# Patient Record
Sex: FEMALE | Race: OTHER | HISPANIC OR LATINO | Employment: OTHER | URBAN - METROPOLITAN AREA
[De-identification: names, ages, dates, MRNs, and addresses within clinical notes are randomized per-mention and may not be internally consistent; named-entity substitution may affect disease eponyms.]

---

## 2019-04-06 LAB — HBA1C MFR BLD HPLC: 8 %

## 2019-11-21 ENCOUNTER — OFFICE VISIT (OUTPATIENT)
Dept: INTERNAL MEDICINE CLINIC | Facility: CLINIC | Age: 64
End: 2019-11-21
Payer: COMMERCIAL

## 2019-11-21 VITALS
SYSTOLIC BLOOD PRESSURE: 150 MMHG | HEART RATE: 78 BPM | WEIGHT: 199 LBS | RESPIRATION RATE: 12 BRPM | BODY MASS INDEX: 39.07 KG/M2 | TEMPERATURE: 98.6 F | DIASTOLIC BLOOD PRESSURE: 80 MMHG | HEIGHT: 60 IN

## 2019-11-21 DIAGNOSIS — E11.9 TYPE 2 DIABETES MELLITUS WITHOUT COMPLICATION, WITHOUT LONG-TERM CURRENT USE OF INSULIN (HCC): Primary | ICD-10-CM

## 2019-11-21 DIAGNOSIS — J45.20 MILD INTERMITTENT ASTHMA WITHOUT COMPLICATION: ICD-10-CM

## 2019-11-21 DIAGNOSIS — E03.9 ACQUIRED HYPOTHYROIDISM: ICD-10-CM

## 2019-11-21 DIAGNOSIS — E78.00 PURE HYPERCHOLESTEROLEMIA: ICD-10-CM

## 2019-11-21 DIAGNOSIS — I10 ESSENTIAL HYPERTENSION: ICD-10-CM

## 2019-11-21 PROCEDURE — 99205 OFFICE O/P NEW HI 60 MIN: CPT | Performed by: INTERNAL MEDICINE

## 2019-11-21 PROCEDURE — 3725F SCREEN DEPRESSION PERFORMED: CPT | Performed by: INTERNAL MEDICINE

## 2019-11-21 PROCEDURE — 93000 ELECTROCARDIOGRAM COMPLETE: CPT | Performed by: INTERNAL MEDICINE

## 2019-11-21 RX ORDER — BUDESONIDE AND FORMOTEROL FUMARATE DIHYDRATE 160; 4.5 UG/1; UG/1
2 AEROSOL RESPIRATORY (INHALATION) 2 TIMES DAILY PRN
COMMUNITY

## 2019-11-21 RX ORDER — OMEPRAZOLE 40 MG/1
40 CAPSULE, DELAYED RELEASE ORAL DAILY
COMMUNITY
End: 2019-12-20

## 2019-11-21 RX ORDER — ATORVASTATIN CALCIUM 40 MG/1
40 TABLET, FILM COATED ORAL DAILY
COMMUNITY
End: 2019-12-20 | Stop reason: SDUPTHER

## 2019-11-21 RX ORDER — METOPROLOL SUCCINATE 25 MG/1
25 TABLET, EXTENDED RELEASE ORAL DAILY
COMMUNITY
End: 2019-12-20 | Stop reason: SDUPTHER

## 2019-11-21 RX ORDER — ASPIRIN 81 MG/1
81 TABLET, CHEWABLE ORAL DAILY
COMMUNITY

## 2019-11-21 RX ORDER — MONTELUKAST SODIUM 10 MG/1
10 TABLET ORAL
COMMUNITY
End: 2021-03-23 | Stop reason: SDUPTHER

## 2019-11-21 RX ORDER — EZETIMIBE 10 MG/1
10 TABLET ORAL DAILY
COMMUNITY
End: 2020-02-25 | Stop reason: SDUPTHER

## 2019-11-21 RX ORDER — GLIMEPIRIDE 4 MG/1
4 TABLET ORAL 2 TIMES DAILY
COMMUNITY
End: 2019-12-20 | Stop reason: SDUPTHER

## 2019-11-21 RX ORDER — INDAPAMIDE 1.25 MG/1
1.25 TABLET, FILM COATED ORAL EVERY MORNING
Qty: 30 TABLET | Refills: 3 | Status: SHIPPED | OUTPATIENT
Start: 2019-11-21 | End: 2020-10-09 | Stop reason: SDUPTHER

## 2019-11-21 RX ORDER — ALBUTEROL SULFATE 90 UG/1
2 AEROSOL, METERED RESPIRATORY (INHALATION) EVERY 6 HOURS PRN
COMMUNITY

## 2019-11-21 RX ORDER — FUROSEMIDE 20 MG/1
20 TABLET ORAL 2 TIMES DAILY PRN
COMMUNITY

## 2019-11-21 RX ORDER — INSULIN GLARGINE 100 [IU]/ML
INJECTION, SOLUTION SUBCUTANEOUS
COMMUNITY
End: 2019-12-12

## 2019-11-21 RX ORDER — LEVOTHYROXINE SODIUM 0.15 MG/1
150 TABLET ORAL DAILY
COMMUNITY
End: 2019-12-20 | Stop reason: SDUPTHER

## 2019-11-21 RX ORDER — LOSARTAN POTASSIUM 50 MG/1
50 TABLET ORAL DAILY
COMMUNITY
End: 2019-12-20 | Stop reason: SDUPTHER

## 2019-11-21 RX ORDER — FLUTICASONE PROPIONATE 50 MCG
1 SPRAY, SUSPENSION (ML) NASAL DAILY PRN
COMMUNITY

## 2019-11-21 RX ORDER — ALBUTEROL SULFATE 2.5 MG/3ML
2.5 SOLUTION RESPIRATORY (INHALATION) EVERY 6 HOURS PRN
Qty: 60 VIAL | Refills: 5 | Status: SHIPPED | OUTPATIENT
Start: 2019-11-21 | End: 2020-03-31 | Stop reason: SDUPTHER

## 2019-11-21 NOTE — PROGRESS NOTES
Assessment/Plan:  New patient to establish herself blood pressure is not control will going to start her on indapamide 1 25 mg daily is the only new medication    EKG      She is depressed on the depression screening when she comes back and going to have her come back in a couple of weeks and see how she is doing and then will probably reinstitute an SSRI she is not suicidal   Is a situation in change she was moved from [facility] she is living with her daughter now  Came in for [facility] is to restart which herself into the practice  She brought her records she is doing rather well these are the problems review  Problem 1  Diabetes type 2 she is on the following medications  Glimepiride in 4 mg twice a day  Lantus 35 units in the morning and 15 units at night  The has no cardiovascular issues  Renal function is normal no protein in the urine    Problem 2  When she went to the ER in [facility] she had problems with swallowing the told her she had an enlarged liver  Will check liver function studies suspect is fatty liver  Problem 3  Asthma as taking Symbicort 164 5 she takes 2 puffs twice a day and she takes albuterol inhaler for rescue  Will check an IgE level  Her Singulair 10 mg    She also had a nebulizer machine with albuterol    Problem 4  Hyperlipidemia she is taking Lipitor 40 mg per day she is also on Zohra a 10 mg per day will check a lipid profile she had a reaction to simvastatin with elevated liver function studies  But she is able to tolerate the Lipitor    Problem 5  Hypothyroidism she is on levothyroxine 150 mcg per day will check a TSH with the next labs  Problem 6  Hypertension blood pressure is elevated today will repeated she is on losartan 50 mg  She is on metoprolol succinate 25 mg at bedtime    Problem 7  Reflux is on omeprazole 40 mg per day she was given a prescription of Pepcid but she is not taking it      She also got a prescription for a diuretic Lasix 20 mg she only takes at needed for increased swelling  As far as surgery she has only had rotator cuff repair of both shoulders  No abdominal surgery        No problem-specific Assessment & Plan notes found for this encounter  There are no diagnoses linked to this encounter  Subjective:       BMI Counseling: There is no height or weight on file to calculate BMI  The BMI is above normal  Nutrition recommendations include reducing portion sizes, decreasing overall calorie intake, 3-5 servings of fruits/vegetables daily, reducing fast food intake, consuming healthier snacks, decreasing soda and/or juice intake, moderation in carbohydrate intake, increasing intake of lean protein, reducing intake of saturated fat and trans fat and reducing intake of cholesterol  Exercise recommendations include moderate aerobic physical activity for 150 minutes/week  Patient ID: Sunita Santo is a 59 y o  female  No chief complaint on file  No current outpatient medications on file  HPI    The following portions of the patient's history were reviewed and updated as appropriate: allergies, current medications, past family history, past medical history, past social history, past surgical history and problem list     Review of Systems   Constitutional: Negative  Negative for activity change, appetite change, fatigue, fever and unexpected weight change  HENT: Negative for congestion, ear pain, hearing loss, mouth sores, postnasal drip, rhinorrhea, sore throat, trouble swallowing and voice change  Eyes: Negative for pain, redness and visual disturbance  Respiratory: Negative for cough, chest tightness, shortness of breath and wheezing  Cardiovascular: Negative for chest pain, palpitations and leg swelling  Gastrointestinal: Negative for abdominal distention, abdominal pain, blood in stool, constipation, diarrhea and nausea     Endocrine: Negative for cold intolerance, heat intolerance, polydipsia, polyphagia and polyuria  Genitourinary: Negative for difficulty urinating, dysuria, flank pain, frequency, hematuria and urgency  Musculoskeletal: Negative for arthralgias, back pain, gait problem, joint swelling and myalgias  Skin: Negative for color change and pallor  Neurological: Negative for dizziness, tremors, seizures, syncope, weakness, numbness and headaches  Hematological: Negative for adenopathy  Does not bruise/bleed easily  Psychiatric/Behavioral: Negative  Negative for sleep disturbance  The patient is not nervous/anxious  Objective:    Patient's shoes and socks removed  Right Foot/Ankle   Right Foot Inspection  Skin Exam: skin normal skin not intact, no dry skin, no warmth, no callus, no erythema, no maceration, no abnormal color, no pre-ulcer, no ulcer and no callus                          Toe Exam: ROM and strength within normal limitsno swelling, no tenderness, erythema and  no right toe deformity  Sensory   Vibration: intact  Proprioception: intact   Monofilament testing: intact  Vascular    The right DP pulse is 2+  Left Foot/Ankle  Left Foot Inspection  Skin Exam: skin normalskin not intact, no dry skin, no warmth, no erythema, no maceration, normal color, no pre-ulcer, no ulcer and no callus                         Toe Exam: ROM and strength within normal limitsno swelling, no tenderness, no erythema and no left toe deformity                   Sensory   Vibration: intact  Proprioception: intact  Monofilament: intact  Vascular    The left DP pulse is 2+  Assign Risk Category:  No deformity present; No loss of protective sensation; No weak pulses       Risk: 0        Results for orders placed or performed in visit on 10/24/13   Cytology (HISTORICAL)   Result Value Ref Range    ADDITIONAL INFORMATION NONE GIVEN     LMP PM     PREV  PAP NONE GIVEN     PREV  BX: NONE GIVEN     SOURCE CERVICAL     Adequacy:       Satisfactory for evaluation  Endocervical/transformation zone componentpresent  GENERAL CATEGORIZATION EPITHELIAL CELL ABNORMALITY (A)     INTERPRETATION Atypical Glandular Cells (A)     COMMENT       Based on the cytology result, reflex High RiskHPV DNA testing was not performed  Suggest clinical correlation and follow-up asclinically appropriate    CYTOTECHNOLOGIST: MADONNA GIFFORD(ASCP)     Jody Parks MD DDS PhD, (electronic signature)[facility] in Anatomic and Clinical Pathology, [address] [place] [address] Maxillofacial Pathology       There were no vitals taken for this visit  Physical Exam   Constitutional: She is oriented to person, place, and time  She appears well-developed and well-nourished  HENT:   Head: Normocephalic  Right Ear: External ear normal    Left Ear: External ear normal    Nose: Nose normal    Mouth/Throat: Oropharynx is clear and moist  No oropharyngeal exudate  Eyes: Pupils are equal, round, and reactive to light  Conjunctivae and EOM are normal    Neck: Normal range of motion  Neck supple  No thyromegaly present  Cardiovascular: Normal rate, regular rhythm, normal heart sounds and intact distal pulses  Exam reveals no gallop and no friction rub  Pulses are no weak pulses  No murmur heard  Pulses:       Dorsalis pedis pulses are 2+ on the right side, and 2+ on the left side  Blood pressure 150/80 bilateral standing 160/80  S1-S2 regular rhythm  Extremities no edema   Pulmonary/Chest: Effort normal and breath sounds normal  No respiratory distress  She has no wheezes  She has no rales  Lungs are clear no wheezing rales or rhonchi   Abdominal: Soft  Bowel sounds are normal  She exhibits no distension and no mass  There is no tenderness  There is no rebound and no guarding  Abdomen obese soft nontender   Musculoskeletal: Normal range of motion  Feet:    Feet:   Right Foot:   Skin Integrity: Negative for ulcer, skin breakdown, erythema, warmth, callus or dry skin     Left Foot:   Skin Integrity: Negative for ulcer, skin breakdown, erythema, warmth, callus or dry skin  Lymphadenopathy:     She has no cervical adenopathy  Neurological: She is alert and oriented to person, place, and time  Skin: Skin is warm and dry  Psychiatric: She has a normal mood and affect  Her behavior is normal  Judgment normal    Nursing note and vitals reviewed

## 2019-11-21 NOTE — PATIENT INSTRUCTIONS
Blood pressure is not control will going to start her on indapamide 1 25 mg daily you continue all other medications  Will going to give you a glucometer so you can check her blood sugars before breakfast and dinner and bring them with the next office visit  Will see her back in 2-3 weeks    If you run out of any medications please call the office and will refer the them for you

## 2019-12-02 ENCOUNTER — APPOINTMENT (OUTPATIENT)
Dept: LAB | Facility: CLINIC | Age: 64
End: 2019-12-02
Payer: COMMERCIAL

## 2019-12-02 DIAGNOSIS — E11.9 TYPE 2 DIABETES MELLITUS WITHOUT COMPLICATION, WITHOUT LONG-TERM CURRENT USE OF INSULIN (HCC): ICD-10-CM

## 2019-12-02 DIAGNOSIS — E03.9 ACQUIRED HYPOTHYROIDISM: ICD-10-CM

## 2019-12-02 DIAGNOSIS — I10 ESSENTIAL HYPERTENSION: ICD-10-CM

## 2019-12-02 DIAGNOSIS — E78.00 PURE HYPERCHOLESTEROLEMIA: ICD-10-CM

## 2019-12-02 DIAGNOSIS — J45.20 MILD INTERMITTENT ASTHMA WITHOUT COMPLICATION: ICD-10-CM

## 2019-12-02 LAB
25(OH)D3 SERPL-MCNC: 19.3 NG/ML (ref 30–100)
ALBUMIN SERPL BCP-MCNC: 3.5 G/DL (ref 3.5–5)
ALP SERPL-CCNC: 98 U/L (ref 46–116)
ALT SERPL W P-5'-P-CCNC: 17 U/L (ref 12–78)
ANION GAP SERPL CALCULATED.3IONS-SCNC: 2 MMOL/L (ref 4–13)
AST SERPL W P-5'-P-CCNC: 11 U/L (ref 5–45)
BACTERIA UR QL AUTO: NORMAL /HPF
BASOPHILS # BLD AUTO: 0.04 THOUSANDS/ΜL (ref 0–0.1)
BASOPHILS NFR BLD AUTO: 1 % (ref 0–1)
BILIRUB SERPL-MCNC: 0.47 MG/DL (ref 0.2–1)
BILIRUB UR QL STRIP: NEGATIVE
BUN SERPL-MCNC: 14 MG/DL (ref 5–25)
CALCIUM SERPL-MCNC: 9.4 MG/DL (ref 8.3–10.1)
CHLORIDE SERPL-SCNC: 105 MMOL/L (ref 100–108)
CHOLEST SERPL-MCNC: 236 MG/DL (ref 50–200)
CLARITY UR: ABNORMAL
CO2 SERPL-SCNC: 32 MMOL/L (ref 21–32)
COLOR UR: YELLOW
CREAT SERPL-MCNC: 0.94 MG/DL (ref 0.6–1.3)
CREAT UR-MCNC: 276 MG/DL
EOSINOPHIL # BLD AUTO: 0.11 THOUSAND/ΜL (ref 0–0.61)
EOSINOPHIL NFR BLD AUTO: 1 % (ref 0–6)
ERYTHROCYTE [DISTWIDTH] IN BLOOD BY AUTOMATED COUNT: 12.8 % (ref 11.6–15.1)
EST. AVERAGE GLUCOSE BLD GHB EST-MCNC: 194 MG/DL
GFR SERPL CREATININE-BSD FRML MDRD: 64 ML/MIN/1.73SQ M
GGT SERPL-CCNC: 21 U/L (ref 5–85)
GLUCOSE P FAST SERPL-MCNC: 170 MG/DL (ref 65–99)
GLUCOSE UR STRIP-MCNC: NEGATIVE MG/DL
HBA1C MFR BLD: 8.4 % (ref 4.2–6.3)
HCT VFR BLD AUTO: 38.9 % (ref 34.8–46.1)
HDLC SERPL-MCNC: 63 MG/DL
HGB BLD-MCNC: 12.8 G/DL (ref 11.5–15.4)
HGB UR QL STRIP.AUTO: NEGATIVE
IMM GRANULOCYTES # BLD AUTO: 0.02 THOUSAND/UL (ref 0–0.2)
IMM GRANULOCYTES NFR BLD AUTO: 0 % (ref 0–2)
KETONES UR STRIP-MCNC: NEGATIVE MG/DL
LDLC SERPL CALC-MCNC: 148 MG/DL (ref 0–100)
LEUKOCYTE ESTERASE UR QL STRIP: NEGATIVE
LYMPHOCYTES # BLD AUTO: 2.98 THOUSANDS/ΜL (ref 0.6–4.47)
LYMPHOCYTES NFR BLD AUTO: 38 % (ref 14–44)
MAGNESIUM SERPL-MCNC: 2.3 MG/DL (ref 1.6–2.6)
MCH RBC QN AUTO: 28.8 PG (ref 26.8–34.3)
MCHC RBC AUTO-ENTMCNC: 32.9 G/DL (ref 31.4–37.4)
MCV RBC AUTO: 87 FL (ref 82–98)
MICROALBUMIN UR-MCNC: 28 MG/L (ref 0–20)
MICROALBUMIN/CREAT 24H UR: 10 MG/G CREATININE (ref 0–30)
MONOCYTES # BLD AUTO: 0.47 THOUSAND/ΜL (ref 0.17–1.22)
MONOCYTES NFR BLD AUTO: 6 % (ref 4–12)
NEUTROPHILS # BLD AUTO: 4.32 THOUSANDS/ΜL (ref 1.85–7.62)
NEUTS SEG NFR BLD AUTO: 54 % (ref 43–75)
NITRITE UR QL STRIP: NEGATIVE
NON-SQ EPI CELLS URNS QL MICRO: NORMAL /HPF
NRBC BLD AUTO-RTO: 0 /100 WBCS
PH UR STRIP.AUTO: 6.5 [PH]
PLATELET # BLD AUTO: 336 THOUSANDS/UL (ref 149–390)
PMV BLD AUTO: 10.8 FL (ref 8.9–12.7)
POTASSIUM SERPL-SCNC: 4.3 MMOL/L (ref 3.5–5.3)
PROT SERPL-MCNC: 7.1 G/DL (ref 6.4–8.2)
PROT UR STRIP-MCNC: ABNORMAL MG/DL
RBC # BLD AUTO: 4.45 MILLION/UL (ref 3.81–5.12)
RBC #/AREA URNS AUTO: NORMAL /HPF
SODIUM SERPL-SCNC: 139 MMOL/L (ref 136–145)
SP GR UR STRIP.AUTO: 1.02 (ref 1–1.03)
T4 FREE SERPL-MCNC: 0.96 NG/DL (ref 0.76–1.46)
TOTAL IGE SMQN RAST: 62 KU/L (ref 0–113)
TRIGL SERPL-MCNC: 124 MG/DL
TSH SERPL DL<=0.05 MIU/L-ACNC: 23.3 UIU/ML (ref 0.36–3.74)
UROBILINOGEN UR QL STRIP.AUTO: 0.2 E.U./DL
WBC # BLD AUTO: 7.94 THOUSAND/UL (ref 4.31–10.16)
WBC #/AREA URNS AUTO: NORMAL /HPF

## 2019-12-02 PROCEDURE — 83735 ASSAY OF MAGNESIUM: CPT

## 2019-12-02 PROCEDURE — 82043 UR ALBUMIN QUANTITATIVE: CPT | Performed by: INTERNAL MEDICINE

## 2019-12-02 PROCEDURE — 80061 LIPID PANEL: CPT

## 2019-12-02 PROCEDURE — 82306 VITAMIN D 25 HYDROXY: CPT

## 2019-12-02 PROCEDURE — 82977 ASSAY OF GGT: CPT

## 2019-12-02 PROCEDURE — 84443 ASSAY THYROID STIM HORMONE: CPT

## 2019-12-02 PROCEDURE — 36415 COLL VENOUS BLD VENIPUNCTURE: CPT

## 2019-12-02 PROCEDURE — 80053 COMPREHEN METABOLIC PANEL: CPT

## 2019-12-02 PROCEDURE — 82785 ASSAY OF IGE: CPT

## 2019-12-02 PROCEDURE — 81001 URINALYSIS AUTO W/SCOPE: CPT | Performed by: INTERNAL MEDICINE

## 2019-12-02 PROCEDURE — 85025 COMPLETE CBC W/AUTO DIFF WBC: CPT

## 2019-12-02 PROCEDURE — 83036 HEMOGLOBIN GLYCOSYLATED A1C: CPT

## 2019-12-02 PROCEDURE — 82570 ASSAY OF URINE CREATININE: CPT | Performed by: INTERNAL MEDICINE

## 2019-12-02 PROCEDURE — 84439 ASSAY OF FREE THYROXINE: CPT

## 2019-12-12 ENCOUNTER — APPOINTMENT (EMERGENCY)
Dept: RADIOLOGY | Facility: HOSPITAL | Age: 64
End: 2019-12-12
Payer: COMMERCIAL

## 2019-12-12 ENCOUNTER — OFFICE VISIT (OUTPATIENT)
Dept: INTERNAL MEDICINE CLINIC | Facility: CLINIC | Age: 64
End: 2019-12-12
Payer: COMMERCIAL

## 2019-12-12 ENCOUNTER — HOSPITAL ENCOUNTER (OUTPATIENT)
Facility: HOSPITAL | Age: 64
Setting detail: OBSERVATION
Discharge: HOME | End: 2019-12-13
Attending: EMERGENCY MEDICINE | Admitting: INTERNAL MEDICINE
Payer: COMMERCIAL

## 2019-12-12 VITALS
SYSTOLIC BLOOD PRESSURE: 148 MMHG | HEIGHT: 60 IN | TEMPERATURE: 98.5 F | HEART RATE: 80 BPM | DIASTOLIC BLOOD PRESSURE: 82 MMHG | WEIGHT: 196 LBS | BODY MASS INDEX: 38.48 KG/M2 | RESPIRATION RATE: 13 BRPM

## 2019-12-12 DIAGNOSIS — E03.9 ACQUIRED HYPOTHYROIDISM: ICD-10-CM

## 2019-12-12 DIAGNOSIS — Z23 ENCOUNTER FOR IMMUNIZATION: ICD-10-CM

## 2019-12-12 DIAGNOSIS — E11.9 TYPE 2 DIABETES MELLITUS WITHOUT COMPLICATION, WITHOUT LONG-TERM CURRENT USE OF INSULIN (HCC): ICD-10-CM

## 2019-12-12 DIAGNOSIS — J45.20 MILD INTERMITTENT ASTHMA WITHOUT COMPLICATION: ICD-10-CM

## 2019-12-12 DIAGNOSIS — Z12.39 SCREENING FOR BREAST CANCER: ICD-10-CM

## 2019-12-12 DIAGNOSIS — R07.9 CHEST PAIN: Primary | ICD-10-CM

## 2019-12-12 DIAGNOSIS — Z12.39 BREAST CANCER SCREENING: ICD-10-CM

## 2019-12-12 DIAGNOSIS — E78.00 PURE HYPERCHOLESTEROLEMIA: ICD-10-CM

## 2019-12-12 DIAGNOSIS — R00.2 PALPITATIONS: ICD-10-CM

## 2019-12-12 DIAGNOSIS — I10 ACCELERATED HYPERTENSION: ICD-10-CM

## 2019-12-12 DIAGNOSIS — I10 ESSENTIAL HYPERTENSION: ICD-10-CM

## 2019-12-12 DIAGNOSIS — Z00.00 HEALTHCARE MAINTENANCE: Primary | ICD-10-CM

## 2019-12-12 PROBLEM — R07.89 ATYPICAL CHEST PAIN: Status: ACTIVE | Noted: 2019-12-12

## 2019-12-12 LAB
ALBUMIN SERPL BCP-MCNC: 3.8 G/DL (ref 3.5–5)
ALP SERPL-CCNC: 108 U/L (ref 46–116)
ALT SERPL W P-5'-P-CCNC: 19 U/L (ref 12–78)
ANION GAP SERPL CALCULATED.3IONS-SCNC: 7 MMOL/L (ref 4–13)
APTT PPP: 28 SECONDS (ref 23–37)
AST SERPL W P-5'-P-CCNC: 11 U/L (ref 5–45)
ATRIAL RATE: 60 BPM
BASOPHILS # BLD AUTO: 0.04 THOUSANDS/ΜL (ref 0–0.1)
BASOPHILS NFR BLD AUTO: 0 % (ref 0–1)
BILIRUB SERPL-MCNC: 0.47 MG/DL (ref 0.2–1)
BUN SERPL-MCNC: 10 MG/DL (ref 5–25)
CALCIUM SERPL-MCNC: 9.4 MG/DL (ref 8.3–10.1)
CHLORIDE SERPL-SCNC: 99 MMOL/L (ref 100–108)
CO2 SERPL-SCNC: 35 MMOL/L (ref 21–32)
CREAT SERPL-MCNC: 0.92 MG/DL (ref 0.6–1.3)
EOSINOPHIL # BLD AUTO: 0.11 THOUSAND/ΜL (ref 0–0.61)
EOSINOPHIL NFR BLD AUTO: 1 % (ref 0–6)
ERYTHROCYTE [DISTWIDTH] IN BLOOD BY AUTOMATED COUNT: 12.9 % (ref 11.6–15.1)
FLUAV RNA NPH QL NAA+PROBE: NORMAL
FLUBV RNA NPH QL NAA+PROBE: NORMAL
GFR SERPL CREATININE-BSD FRML MDRD: 66 ML/MIN/1.73SQ M
GLUCOSE SERPL-MCNC: 134 MG/DL (ref 65–140)
GLUCOSE SERPL-MCNC: 165 MG/DL (ref 65–140)
GLUCOSE SERPL-MCNC: 167 MG/DL (ref 65–140)
GLUCOSE SERPL-MCNC: 195 MG/DL (ref 65–140)
HCT VFR BLD AUTO: 40.8 % (ref 34.8–46.1)
HGB BLD-MCNC: 13.5 G/DL (ref 11.5–15.4)
IMM GRANULOCYTES # BLD AUTO: 0.02 THOUSAND/UL (ref 0–0.2)
IMM GRANULOCYTES NFR BLD AUTO: 0 % (ref 0–2)
INR PPP: 0.92 (ref 0.84–1.19)
LIPASE SERPL-CCNC: 91 U/L (ref 73–393)
LYMPHOCYTES # BLD AUTO: 3.16 THOUSANDS/ΜL (ref 0.6–4.47)
LYMPHOCYTES NFR BLD AUTO: 31 % (ref 14–44)
MCH RBC QN AUTO: 29.3 PG (ref 26.8–34.3)
MCHC RBC AUTO-ENTMCNC: 33.1 G/DL (ref 31.4–37.4)
MCV RBC AUTO: 89 FL (ref 82–98)
MONOCYTES # BLD AUTO: 0.55 THOUSAND/ΜL (ref 0.17–1.22)
MONOCYTES NFR BLD AUTO: 5 % (ref 4–12)
NEUTROPHILS # BLD AUTO: 6.44 THOUSANDS/ΜL (ref 1.85–7.62)
NEUTS SEG NFR BLD AUTO: 63 % (ref 43–75)
NRBC BLD AUTO-RTO: 0 /100 WBCS
P AXIS: 54 DEGREES
PLATELET # BLD AUTO: 307 THOUSANDS/UL (ref 149–390)
PLATELET # BLD AUTO: 309 THOUSANDS/UL (ref 149–390)
PMV BLD AUTO: 10.3 FL (ref 8.9–12.7)
PMV BLD AUTO: 10.6 FL (ref 8.9–12.7)
POTASSIUM SERPL-SCNC: 3.9 MMOL/L (ref 3.5–5.3)
PR INTERVAL: 140 MS
PROT SERPL-MCNC: 7.7 G/DL (ref 6.4–8.2)
PROTHROMBIN TIME: 12.5 SECONDS (ref 11.6–14.5)
QRS AXIS: 32 DEGREES
QRSD INTERVAL: 84 MS
QT INTERVAL: 420 MS
QTC INTERVAL: 420 MS
RBC # BLD AUTO: 4.61 MILLION/UL (ref 3.81–5.12)
RSV RNA NPH QL NAA+PROBE: NORMAL
SODIUM SERPL-SCNC: 141 MMOL/L (ref 136–145)
T WAVE AXIS: 72 DEGREES
TROPONIN I SERPL-MCNC: <0.02 NG/ML
VENTRICULAR RATE: 60 BPM
WBC # BLD AUTO: 10.32 THOUSAND/UL (ref 4.31–10.16)

## 2019-12-12 PROCEDURE — 82948 REAGENT STRIP/BLOOD GLUCOSE: CPT

## 2019-12-12 PROCEDURE — 99285 EMERGENCY DEPT VISIT HI MDM: CPT | Performed by: EMERGENCY MEDICINE

## 2019-12-12 PROCEDURE — 87631 RESP VIRUS 3-5 TARGETS: CPT | Performed by: INTERNAL MEDICINE

## 2019-12-12 PROCEDURE — 36415 COLL VENOUS BLD VENIPUNCTURE: CPT

## 2019-12-12 PROCEDURE — 80053 COMPREHEN METABOLIC PANEL: CPT | Performed by: EMERGENCY MEDICINE

## 2019-12-12 PROCEDURE — 96375 TX/PRO/DX INJ NEW DRUG ADDON: CPT

## 2019-12-12 PROCEDURE — 84484 ASSAY OF TROPONIN QUANT: CPT | Performed by: EMERGENCY MEDICINE

## 2019-12-12 PROCEDURE — 85730 THROMBOPLASTIN TIME PARTIAL: CPT | Performed by: EMERGENCY MEDICINE

## 2019-12-12 PROCEDURE — 85610 PROTHROMBIN TIME: CPT | Performed by: EMERGENCY MEDICINE

## 2019-12-12 PROCEDURE — 99214 OFFICE O/P EST MOD 30 MIN: CPT | Performed by: INTERNAL MEDICINE

## 2019-12-12 PROCEDURE — 96374 THER/PROPH/DIAG INJ IV PUSH: CPT

## 2019-12-12 PROCEDURE — 93010 ELECTROCARDIOGRAM REPORT: CPT

## 2019-12-12 PROCEDURE — 99220 PR INITIAL OBSERVATION CARE/DAY 70 MINUTES: CPT | Performed by: INTERNAL MEDICINE

## 2019-12-12 PROCEDURE — 99285 EMERGENCY DEPT VISIT HI MDM: CPT

## 2019-12-12 PROCEDURE — 85025 COMPLETE CBC W/AUTO DIFF WBC: CPT | Performed by: EMERGENCY MEDICINE

## 2019-12-12 PROCEDURE — 93005 ELECTROCARDIOGRAM TRACING: CPT

## 2019-12-12 PROCEDURE — 85049 AUTOMATED PLATELET COUNT: CPT | Performed by: INTERNAL MEDICINE

## 2019-12-12 PROCEDURE — 71046 X-RAY EXAM CHEST 2 VIEWS: CPT

## 2019-12-12 PROCEDURE — 84484 ASSAY OF TROPONIN QUANT: CPT | Performed by: INTERNAL MEDICINE

## 2019-12-12 PROCEDURE — 83690 ASSAY OF LIPASE: CPT | Performed by: EMERGENCY MEDICINE

## 2019-12-12 RX ORDER — BUDESONIDE AND FORMOTEROL FUMARATE DIHYDRATE 160; 4.5 UG/1; UG/1
2 AEROSOL RESPIRATORY (INHALATION)
Status: DISCONTINUED | OUTPATIENT
Start: 2019-12-12 | End: 2019-12-13 | Stop reason: HOSPADM

## 2019-12-12 RX ORDER — METOPROLOL SUCCINATE 25 MG/1
25 TABLET, EXTENDED RELEASE ORAL DAILY
Status: DISCONTINUED | OUTPATIENT
Start: 2019-12-12 | End: 2019-12-13 | Stop reason: HOSPADM

## 2019-12-12 RX ORDER — GLIMEPIRIDE 2 MG/1
4 TABLET ORAL
Status: DISCONTINUED | OUTPATIENT
Start: 2019-12-12 | End: 2019-12-13 | Stop reason: HOSPADM

## 2019-12-12 RX ORDER — POLYETHYLENE GLYCOL 3350 17 G/17G
17 POWDER, FOR SOLUTION ORAL DAILY
Status: DISCONTINUED | OUTPATIENT
Start: 2019-12-12 | End: 2019-12-13 | Stop reason: HOSPADM

## 2019-12-12 RX ORDER — KETOROLAC TROMETHAMINE 30 MG/ML
15 INJECTION, SOLUTION INTRAMUSCULAR; INTRAVENOUS ONCE
Status: COMPLETED | OUTPATIENT
Start: 2019-12-12 | End: 2019-12-12

## 2019-12-12 RX ORDER — FLUTICASONE PROPIONATE 50 MCG
1 SPRAY, SUSPENSION (ML) NASAL DAILY
Status: DISCONTINUED | OUTPATIENT
Start: 2019-12-12 | End: 2019-12-13 | Stop reason: HOSPADM

## 2019-12-12 RX ORDER — ALBUTEROL SULFATE 2.5 MG/3ML
2.5 SOLUTION RESPIRATORY (INHALATION) EVERY 6 HOURS PRN
Status: DISCONTINUED | OUTPATIENT
Start: 2019-12-12 | End: 2019-12-13 | Stop reason: HOSPADM

## 2019-12-12 RX ORDER — INSULIN GLARGINE 100 [IU]/ML
20 INJECTION, SOLUTION SUBCUTANEOUS
Status: DISCONTINUED | OUTPATIENT
Start: 2019-12-12 | End: 2019-12-13 | Stop reason: HOSPADM

## 2019-12-12 RX ORDER — MONTELUKAST SODIUM 10 MG/1
10 TABLET ORAL
Status: DISCONTINUED | OUTPATIENT
Start: 2019-12-12 | End: 2019-12-13 | Stop reason: HOSPADM

## 2019-12-12 RX ORDER — ALBUTEROL SULFATE 90 UG/1
2 AEROSOL, METERED RESPIRATORY (INHALATION) EVERY 6 HOURS PRN
Status: DISCONTINUED | OUTPATIENT
Start: 2019-12-12 | End: 2019-12-13 | Stop reason: HOSPADM

## 2019-12-12 RX ORDER — ONDANSETRON 2 MG/ML
4 INJECTION INTRAMUSCULAR; INTRAVENOUS EVERY 4 HOURS PRN
Status: DISCONTINUED | OUTPATIENT
Start: 2019-12-12 | End: 2019-12-13 | Stop reason: HOSPADM

## 2019-12-12 RX ORDER — EZETIMIBE 10 MG/1
10 TABLET ORAL DAILY
Status: DISCONTINUED | OUTPATIENT
Start: 2019-12-12 | End: 2019-12-13 | Stop reason: HOSPADM

## 2019-12-12 RX ORDER — ASPIRIN 81 MG/1
81 TABLET, CHEWABLE ORAL DAILY
Status: DISCONTINUED | OUTPATIENT
Start: 2019-12-12 | End: 2019-12-13 | Stop reason: HOSPADM

## 2019-12-12 RX ORDER — PANTOPRAZOLE SODIUM 40 MG/1
40 TABLET, DELAYED RELEASE ORAL
Status: DISCONTINUED | OUTPATIENT
Start: 2019-12-12 | End: 2019-12-13 | Stop reason: HOSPADM

## 2019-12-12 RX ORDER — ATORVASTATIN CALCIUM 40 MG/1
40 TABLET, FILM COATED ORAL DAILY
Status: DISCONTINUED | OUTPATIENT
Start: 2019-12-12 | End: 2019-12-13 | Stop reason: HOSPADM

## 2019-12-12 RX ORDER — INDAPAMIDE 1.25 MG/1
1.25 TABLET, FILM COATED ORAL EVERY MORNING
Status: DISCONTINUED | OUTPATIENT
Start: 2019-12-12 | End: 2019-12-13 | Stop reason: HOSPADM

## 2019-12-12 RX ORDER — DOCUSATE SODIUM 100 MG/1
100 CAPSULE, LIQUID FILLED ORAL 2 TIMES DAILY
Status: DISCONTINUED | OUTPATIENT
Start: 2019-12-12 | End: 2019-12-13 | Stop reason: HOSPADM

## 2019-12-12 RX ORDER — OSELTAMIVIR PHOSPHATE 75 MG/1
75 CAPSULE ORAL ONCE
Status: DISCONTINUED | OUTPATIENT
Start: 2019-12-12 | End: 2019-12-12

## 2019-12-12 RX ORDER — LOSARTAN POTASSIUM 50 MG/1
50 TABLET ORAL DAILY
Status: DISCONTINUED | OUTPATIENT
Start: 2019-12-12 | End: 2019-12-13 | Stop reason: HOSPADM

## 2019-12-12 RX ORDER — ONDANSETRON 2 MG/ML
4 INJECTION INTRAMUSCULAR; INTRAVENOUS ONCE
Status: COMPLETED | OUTPATIENT
Start: 2019-12-12 | End: 2019-12-12

## 2019-12-12 RX ORDER — SENNOSIDES 8.6 MG
2 TABLET ORAL
Status: DISCONTINUED | OUTPATIENT
Start: 2019-12-12 | End: 2019-12-13 | Stop reason: HOSPADM

## 2019-12-12 RX ORDER — LEVOTHYROXINE SODIUM 0.07 MG/1
150 TABLET ORAL
Status: DISCONTINUED | OUTPATIENT
Start: 2019-12-12 | End: 2019-12-13 | Stop reason: HOSPADM

## 2019-12-12 RX ADMIN — POLYETHYLENE GLYCOL 3350 17 G: 17 POWDER, FOR SOLUTION ORAL at 12:03

## 2019-12-12 RX ADMIN — LEVOTHYROXINE SODIUM 150 MCG: 75 TABLET ORAL at 12:01

## 2019-12-12 RX ADMIN — ASPIRIN 81 MG 81 MG: 81 TABLET ORAL at 12:01

## 2019-12-12 RX ADMIN — FLUTICASONE PROPIONATE 1 SPRAY: 50 SPRAY, METERED NASAL at 13:35

## 2019-12-12 RX ADMIN — DOCUSATE SODIUM 100 MG: 100 CAPSULE, LIQUID FILLED ORAL at 17:21

## 2019-12-12 RX ADMIN — INSULIN LISPRO 2 UNITS: 100 INJECTION, SOLUTION INTRAVENOUS; SUBCUTANEOUS at 17:21

## 2019-12-12 RX ADMIN — MONTELUKAST SODIUM 10 MG: 10 TABLET, COATED ORAL at 21:01

## 2019-12-12 RX ADMIN — BUDESONIDE AND FORMOTEROL FUMARATE DIHYDRATE 2 PUFF: 160; 4.5 AEROSOL RESPIRATORY (INHALATION) at 20:59

## 2019-12-12 RX ADMIN — LOSARTAN POTASSIUM 50 MG: 50 TABLET, FILM COATED ORAL at 12:01

## 2019-12-12 RX ADMIN — BUDESONIDE AND FORMOTEROL FUMARATE DIHYDRATE 2 PUFF: 160; 4.5 AEROSOL RESPIRATORY (INHALATION) at 13:37

## 2019-12-12 RX ADMIN — METFORMIN HYDROCHLORIDE 1000 MG: 500 TABLET ORAL at 17:21

## 2019-12-12 RX ADMIN — METOPROLOL SUCCINATE 25 MG: 25 TABLET, EXTENDED RELEASE ORAL at 12:02

## 2019-12-12 RX ADMIN — PANTOPRAZOLE SODIUM 40 MG: 40 TABLET, DELAYED RELEASE ORAL at 12:02

## 2019-12-12 RX ADMIN — DOCUSATE SODIUM 100 MG: 100 CAPSULE, LIQUID FILLED ORAL at 12:01

## 2019-12-12 RX ADMIN — GLIMEPIRIDE 4 MG: 2 TABLET ORAL at 17:21

## 2019-12-12 RX ADMIN — ENOXAPARIN SODIUM 40 MG: 40 INJECTION SUBCUTANEOUS at 12:03

## 2019-12-12 RX ADMIN — EZETIMIBE 10 MG: 10 TABLET ORAL at 12:01

## 2019-12-12 RX ADMIN — SENNOSIDES 17.2 MG: 8.6 TABLET, FILM COATED ORAL at 21:01

## 2019-12-12 RX ADMIN — KETOROLAC TROMETHAMINE 15 MG: 30 INJECTION, SOLUTION INTRAMUSCULAR; INTRAVENOUS at 09:58

## 2019-12-12 RX ADMIN — ONDANSETRON 4 MG: 2 INJECTION INTRAMUSCULAR; INTRAVENOUS at 09:49

## 2019-12-12 RX ADMIN — INSULIN GLARGINE 20 UNITS: 100 INJECTION, SOLUTION SUBCUTANEOUS at 21:01

## 2019-12-12 RX ADMIN — INDAPAMIDE 1.25 MG: 1.25 TABLET, FILM COATED ORAL at 13:36

## 2019-12-12 RX ADMIN — ATORVASTATIN CALCIUM 40 MG: 40 TABLET, FILM COATED ORAL at 12:01

## 2019-12-12 NOTE — ED PROVIDER NOTES
History  Chief Complaint   Patient presents with    Chest Pain     Pt reports L chest pain beneath breast rradiating into left shoulder x 2 days    Dizziness     Pt reports dizziness and feeling off balance when walking x 2 days     58 yo female referred to ED from PCP office for further evaluation of chest pain, palpitations, nausea, vomting, intermittently over the last 2 days  She is treated for HTN, says she took her medication this morning  History provided by:  Patient  Chest Pain   Pain location:  Substernal area  Pain quality: aching    Pain radiates to:  L shoulder  Pain radiates to the back: no    Pain severity:  Moderate  Onset quality:  Gradual  Duration:  3 days  Timing:  Intermittent  Progression:  Waxing and waning  Chronicity:  New  Associated symptoms: dizziness, nausea, palpitations, shortness of breath and vomiting (intermittent for a few days)    Associated symptoms: no abdominal pain, no cough, no fever, no headache and no weakness    Risk factors: diabetes mellitus and hypertension    Risk factors: no smoking        Prior to Admission Medications   Prescriptions Last Dose Informant Patient Reported? Taking?    albuterol (2 5 mg/3 mL) 0 083 % nebulizer solution   No Yes   Sig: Take 1 vial (2 5 mg total) by nebulization every 6 (six) hours as needed for wheezing or shortness of breath   albuterol (PROVENTIL HFA,VENTOLIN HFA) 90 mcg/act inhaler   Yes Yes   Sig: Inhale 2 puffs every 6 (six) hours as needed for wheezing   aspirin 81 mg chewable tablet 2019 at Unknown time  Yes Yes   Sig: Chew 81 mg daily   atorvastatin (LIPITOR) 40 mg tablet   Yes Yes   Sig: Take 40 mg by mouth daily   budesonide-formoterol (SYMBICORT) 160-4 5 mcg/act inhaler   Yes Yes   Sig: Inhale 2 puffs 2 (two) times a day Rinse mouth after use    ezetimibe (ZETIA) 10 mg tablet   Yes Yes   Sig: Take 10 mg by mouth daily   fluticasone (FLONASE) 50 mcg/act nasal spray   Yes Yes   Si spray into each nostril daily furosemide (LASIX) 20 mg tablet   Yes Yes   Sig: Take 20 mg by mouth 2 (two) times a day as needed   glimepiride (AMARYL) 4 mg tablet   Yes Yes   Sig: Take 4 mg by mouth 2 (two) times a day   indapamide (LOZOL) 1 25 mg tablet   No Yes   Sig: Take 1 tablet (1 25 mg total) by mouth every morning   insulin glargine (LANTUS SOLOSTAR) 100 units/mL injection pen   No Yes   Si units in the morning and 15 units in the evening   levothyroxine 150 mcg tablet   Yes Yes   Sig: Take 150 mcg by mouth daily   losartan (COZAAR) 50 mg tablet   Yes Yes   Sig: Take 50 mg by mouth daily   metFORMIN (GLUCOPHAGE) 1000 MG tablet   Yes Yes   Sig: Take 1,000 mg by mouth 2 (two) times a day with meals   metoprolol succinate (TOPROL-XL) 25 mg 24 hr tablet   Yes Yes   Sig: Take 25 mg by mouth daily   montelukast (SINGULAIR) 10 mg tablet   Yes Yes   Sig: Take 10 mg by mouth daily at bedtime   omeprazole (PriLOSEC) 40 MG capsule   Yes Yes   Sig: Take 40 mg by mouth daily      Facility-Administered Medications: None       Past Medical History:   Diagnosis Date    Asthma     Diabetes mellitus ([facility] [address] )     Hypertension     Stroke [facility])        Past Surgical History:   Procedure Laterality Date    ROTATOR CUFF REPAIR Bilateral        Family History   Problem Relation Age of Onset    Diabetes Mother     Hypertension Mother     Hypertension Father      I have reviewed and agree with the history as documented  Social History     Tobacco Use    Smoking status: Never Smoker    Smokeless tobacco: Never Used   Substance Use Topics    Alcohol use: Never     Frequency: Never    Drug use: Never        Review of Systems   Constitutional: Negative for appetite change, chills and fever  HENT: Negative for sore throat  Respiratory: Positive for shortness of breath  Negative for cough and wheezing  Cardiovascular: Positive for chest pain and palpitations  Gastrointestinal: Positive for nausea and vomiting (intermittent for a few days)  Negative for abdominal pain and diarrhea  Genitourinary: Negative for dysuria and hematuria  Musculoskeletal: Negative for neck pain  Skin: Negative for rash  Neurological: Positive for dizziness  Negative for weakness and headaches  Psychiatric/Behavioral: Negative for suicidal ideas  All other systems reviewed and are negative  Physical Exam  Physical Exam   Constitutional: She is oriented to person, place, and time  Vital signs are normal  She appears well-developed and well-nourished  Non-toxic appearance  Blood pressure elevated, rate is normal and regular, while c/o palpitaions   HENT:   Head: Normocephalic and atraumatic  Right Ear: Tympanic membrane and external ear normal    Left Ear: Tympanic membrane and external ear normal    Nose: Nose normal    Mouth/Throat: Oropharynx is clear and moist    Eyes: Pupils are equal, round, and reactive to light  Conjunctivae and EOM are normal    Neck: Normal range of motion and full passive range of motion without pain  Neck supple  No Brudzinski's sign and no Kernig's sign noted  Cardiovascular: Normal rate, regular rhythm, normal heart sounds, intact distal pulses and normal pulses  No murmur heard  Pulmonary/Chest: Effort normal and breath sounds normal  No tachypnea  No respiratory distress  She has no wheezes  Abdominal: Soft  Bowel sounds are normal  She exhibits no distension  There is no tenderness  There is no rigidity, no rebound and no guarding  Musculoskeletal: Normal range of motion  Right lower leg: She exhibits no swelling  Left lower leg: She exhibits no swelling  Lymphadenopathy:     She has no cervical adenopathy  Neurological: She is alert and oriented to person, place, and time  She has normal strength and normal reflexes  No cranial nerve deficit or sensory deficit  Coordination and gait normal  GCS eye subscore is 4  GCS verbal subscore is 5  GCS motor subscore is 6     No pronator drift  BUE strength 6/6  BLE strength 6/6  Symmetrical  strength  Bilateral symmetrical rapid alternating movements that cross midline  Bilateral normal finger nose and crosses midline  No nystagmus  CN 2-12 intact   Skin: Skin is warm and dry  No rash noted  She is not diaphoretic  No pallor  Psychiatric: She has a normal mood and affect  Her speech is normal and behavior is normal  Judgment and thought content normal  Cognition and memory are normal    Nursing note and vitals reviewed        Vital Signs  ED Triage Vitals   Temperature Pulse Respirations Blood Pressure SpO2   12/12/19 0901 12/12/19 0901 12/12/19 0901 12/12/19 0901 12/12/19 0901   97 9 °F (36 6 °C) 78 14 (!) 207/82 98 %      Temp Source Heart Rate Source Patient Position - Orthostatic VS BP Location FiO2 (%)   12/12/19 0901 12/12/19 0901 12/12/19 0947 12/12/19 0901 --   Oral Monitor Lying Left arm       Pain Score       12/12/19 0901       7           Vitals:    12/12/19 0901 12/12/19 0947   BP: (!) 207/82 144/65   Pulse: 78 55   Patient Position - Orthostatic VS:  Lying         Visual Acuity      ED Medications  Medications   ondansetron (ZOFRAN) injection 4 mg (4 mg Intravenous Given 12/12/19 0949)   ketorolac (TORADOL) injection 15 mg (15 mg Intravenous Given 12/12/19 0958)       Diagnostic Studies  Results Reviewed     Procedure Component Value Units Date/Time    Lipase [[id number]]  (Normal) Collected:  12/12/19 0904    Lab Status:  Final result Specimen:  Blood from Arm, Right Updated:  12/12/19 1002     Lipase 91 u/L     Troponin I [[id number]]  (Normal) Collected:  12/12/19 0904    Lab Status:  Final result Specimen:  Blood from Arm, Right Updated:  12/12/19 0929     Troponin I <0 02 ng/mL     Comprehensive metabolic panel [[id number]]  (Abnormal) Collected:  12/12/19 0904    Lab Status:  Final result Specimen:  Blood from Arm, Right Updated:  12/12/19 0926     Sodium 141 mmol/L      Potassium 3 9 mmol/L      Chloride 99 mmol/L      CO2 35 mmol/L      ANION GAP 7 mmol/L      BUN 10 mg/dL      Creatinine 0 92 mg/dL      Glucose 165 mg/dL      Calcium 9 4 mg/dL      AST 11 U/L      ALT 19 U/L      Alkaline Phosphatase 108 U/L      Total Protein 7 7 g/dL      Albumin 3 8 g/dL      Total Bilirubin 0 47 mg/dL      eGFR 66 ml/min/1 73sq m     Narrative:       [place] guidelines for Chronic Kidney Disease (CKD):     Stage 1 with normal or high GFR (GFR > 90 mL/min/1 73 square meters)    Stage 2 Mild CKD (GFR = 60-89 mL/min/1 73 square meters)    Stage 3A Moderate CKD (GFR = 45-59 mL/min/1 73 square meters)    Stage 3B Moderate CKD (GFR = 30-44 mL/min/1 73 square meters)    Stage 4 Severe CKD (GFR = 15-29 mL/min/1 73 square meters)    Stage 5 End Stage CKD (GFR <15 mL/min/1 73 square meters)  Note: GFR calculation is accurate only with a steady state creatinine    CBC and differential [[id number]]  (Abnormal) Collected:  12/12/19 0904    Lab Status:  Final result Specimen:  Blood from Arm, Right Updated:  12/12/19 0912     WBC 10 32 Thousand/uL      RBC 4 61 Million/uL      Hemoglobin 13 5 g/dL      Hematocrit 40 8 %      MCV 89 fL      MCH 29 3 pg      MCHC 33 1 g/dL      RDW 12 9 %      MPV 10 3 fL      Platelets 009 Thousands/uL      nRBC 0 /100 WBCs      Neutrophils Relative 63 %      Immat GRANS % 0 %      Lymphocytes Relative 31 %      Monocytes Relative 5 %      Eosinophils Relative 1 %      Basophils Relative 0 %      Neutrophils Absolute 6 44 Thousands/µL      Immature Grans Absolute 0 02 Thousand/uL      Lymphocytes Absolute 3 16 Thousands/µL      Monocytes Absolute 0 55 Thousand/µL      Eosinophils Absolute 0 11 Thousand/µL      Basophils Absolute 0 04 Thousands/µL     APTT [[id number]] Collected:  12/12/19 0904    Lab Status: In process Specimen:  Blood from Arm, Right Updated:  12/12/19 0909    Protime-INR [[id number]] Collected:  12/12/19 0904    Lab Status:   In process Specimen:  Blood from Arm, Right Updated:  12/12/19 2032 XR chest 2 views   Final Result by Gilbert Orourke MD (12/12 0432)      No acute cardiopulmonary disease  Workstation performed: [id number]                    Procedures  ECG 12 Lead Documentation Only  Date/Time: 12/12/2019 9:05 AM  Performed by: Leigha Reeves MD  Authorized by: Leigha Reeves MD     Rate:     ECG rate:  60  Rhythm:     Rhythm: sinus rhythm    Ectopy:     Ectopy: none    QRS:     QRS axis:  Normal    QRS intervals:  Normal  Conduction:     Conduction: normal    ST segments:     ST segments:  Normal  T waves:     T waves: normal               ED Course  ED Course as of Dec 12 1010   Thu Dec 12, 2019   0942 No acute findings      XR chest 2 views   0950 Reviewed results with patient at bedside and updated on the plan Repeat BP improved without intervention  Chest pain component is resolved  She still c/o palpitations, and added now c/o headache, gradual onset, no change in mental status  I am inclined to admit her for continued observation for serial troponins, BP monitoring                HEART Risk Score      Most Recent Value   History  1 Filed at: 12/12/2019 0943   ECG  0 Filed at: 12/12/2019 3606   Age  1 Filed at: 12/12/2019 0943   Risk Factors  2 Filed at: 12/12/2019 0943   Troponin  0 Filed at: 12/12/2019 0943   Heart Score Risk Calculator   History  1 Filed at: 12/12/2019 0943   ECG  0 Filed at: 12/12/2019 8750   Age  1 Filed at: 12/12/2019 2479   Risk Factors  2 Filed at: 12/12/2019 0943   Troponin  0 Filed at: 12/12/2019 0943   HEART Score  4 Filed at: 12/12/2019 6881   HEART Score  4 Filed at: 12/12/2019 9778                            MDM      Disposition  Final diagnoses:   Chest pain   Palpitations   Accelerated hypertension     Time reflects when diagnosis was documented in both MDM as applicable and the Disposition within this note     Time User Action Codes Description Comment    12/12/2019 10:02 AM Molly Galo Add [R07 9] Chest pain 12/12/2019 10:02 AM Primus Marker L Add [R00 2] Palpitations     12/12/2019 10:02 AM Kanu Kyles Add [I10] Accelerated hypertension       ED Disposition     ED Disposition Condition Date/Time Comment    Admit Stable Thu Dec 12, 2019 10:08 AM Case was discussed with Dr Raynette Lombard and the patient's admission status was agreed to be Admission Status: observation status to the service of Dr Raynette Lombard  Follow-up Information    None         Patient's Medications   Discharge Prescriptions    No medications on file     No discharge procedures on file      ED Provider  Electronically Signed by           Sherry Murphy MD  12/12/19 4914

## 2019-12-12 NOTE — ASSESSMENT & PLAN NOTE
Lab Results   Component Value Date    HGBA1C 8 4 (H) 12/02/2019       Recent Labs     12/12/19  1045 12/12/19  1557   POCGLU 134 195*   SSI and Basal Bolus Protocol    Blood Sugar Average: Last 72 hrs:  (P) 164 5

## 2019-12-12 NOTE — PROGRESS NOTES
Assessment/Plan:  Chest pain palpitation dyspnea intermittent over the last 3 days 3-5 episodes  Min go to the ER for evaluation troponins EKG    She had a normal tracing on November 21st when she came to our office  Sinus rhythm no acute changes the results are in media    Problem 1  Palpitations and chest discomfort she came for routine visit today she has tablet telling me she is getting palpitations and chest pain for 5 episodes in the last 2 or 3 days whether she walks so at rest feels like her heart is palpitating and she has problems breathing and shows his chest tightness  Almost like she had an asthma attack  I am concerned because she has risk factors of coronary artery disease including diabetes hypertension hyperlipidemia she is obese  She just recently started to coming to our practice at told her was the best way to go to the ER be checked  To make sure she does not have an acute coronary syndrome she drove herself to the office and she will be going to the ER  Problem 2  Diabetes unfortunately she did not bring her blood sugar log for me to review I did not make any changes  Will going to see her back in 2 weeks    Problem 3  Blood pressure is elevated now  Will review the medication when she comes back  She needs gynecology referral and ophthalmology referral that will do in the near future    Prevnar vaccine  Mammography  Colonoscopy screening    No problem-specific Assessment & Plan notes found for this encounter  Diagnoses and all orders for this visit:    Healthcare maintenance  -     Hepatitis C antibody; Future  -     HIV 1/2 AG-AB combo; Future          Subjective:      Patient ID: Tamara Daniels is a 59 y o  female  No chief complaint on file          Current Outpatient Medications:     albuterol (2 5 mg/3 mL) 0 083 % nebulizer solution, Take 1 vial (2 5 mg total) by nebulization every 6 (six) hours as needed for wheezing or shortness of breath, Disp: 60 vial, Rfl: 5    albuterol (PROVENTIL HFA,VENTOLIN HFA) 90 mcg/act inhaler, Inhale 2 puffs every 6 (six) hours as needed for wheezing, Disp: , Rfl:     aspirin 81 mg chewable tablet, Chew 81 mg daily, Disp: , Rfl:     atorvastatin (LIPITOR) 40 mg tablet, Take 40 mg by mouth daily, Disp: , Rfl:     budesonide-formoterol (SYMBICORT) 160-4 5 mcg/act inhaler, Inhale 2 puffs 2 (two) times a day Rinse mouth after use , Disp: , Rfl:     ezetimibe (ZETIA) 10 mg tablet, Take 10 mg by mouth daily, Disp: , Rfl:     fluticasone (FLONASE) 50 mcg/act nasal spray, 1 spray into each nostril daily, Disp: , Rfl:     furosemide (LASIX) 20 mg tablet, Take 20 mg by mouth 2 (two) times a day as needed, Disp: , Rfl:     glimepiride (AMARYL) 4 mg tablet, Take 4 mg by mouth 2 (two) times a day, Disp: , Rfl:     indapamide (LOZOL) 1 25 mg tablet, Take 1 tablet (1 25 mg total) by mouth every morning, Disp: 30 tablet, Rfl: 3    insulin glargine (LANTUS) 100 units/mL subcutaneous injection, Inject under the skin Take 35 units in the AM & 15 units in the PM, Disp: , Rfl:     levothyroxine 150 mcg tablet, Take 150 mcg by mouth daily, Disp: , Rfl:     losartan (COZAAR) 50 mg tablet, Take 50 mg by mouth daily, Disp: , Rfl:     metFORMIN (GLUCOPHAGE) 1000 MG tablet, Take 1,000 mg by mouth 2 (two) times a day with meals, Disp: , Rfl:     metoprolol succinate (TOPROL-XL) 25 mg 24 hr tablet, Take 25 mg by mouth daily, Disp: , Rfl:     montelukast (SINGULAIR) 10 mg tablet, Take 10 mg by mouth daily at bedtime, Disp: , Rfl:     omeprazole (PriLOSEC) 40 MG capsule, Take 40 mg by mouth daily, Disp: , Rfl:     HPI    The following portions of the patient's history were reviewed and updated as appropriate: allergies, current medications, past family history, past medical history, past social history, past surgical history and problem list     Review of Systems   Constitutional: Negative    Negative for activity change, appetite change, fatigue, fever and unexpected weight change  HENT: Negative for congestion, ear pain, hearing loss, mouth sores, postnasal drip, rhinorrhea, sore throat, trouble swallowing and voice change  Eyes: Negative for pain, redness and visual disturbance  Respiratory: Negative for cough, chest tightness, shortness of breath and wheezing  Cardiovascular: Positive for chest pain and palpitations  Negative for leg swelling  Gastrointestinal: Negative for abdominal distention, abdominal pain, blood in stool, constipation, diarrhea and nausea  Endocrine: Negative for cold intolerance, heat intolerance, polydipsia, polyphagia and polyuria  Genitourinary: Negative for difficulty urinating, dysuria, flank pain, frequency, hematuria and urgency  Musculoskeletal: Negative for arthralgias, back pain, gait problem, joint swelling and myalgias  Skin: Negative for color change and pallor  Neurological: Negative for dizziness, tremors, seizures, syncope, weakness, numbness and headaches  Hematological: Negative for adenopathy  Does not bruise/bleed easily  Psychiatric/Behavioral: Negative  Negative for sleep disturbance  The patient is not nervous/anxious            Objective:    Results for orders placed or performed in visit on 12/02/19   CBC and differential   Result Value Ref Range    WBC 7 94 4 31 - 10 16 Thousand/uL    RBC 4 45 3 81 - 5 12 Million/uL    Hemoglobin 12 8 11 5 - 15 4 g/dL    Hematocrit 38 9 34 8 - 46 1 %    MCV 87 82 - 98 fL    MCH 28 8 26 8 - 34 3 pg    MCHC 32 9 31 4 - 37 4 g/dL    RDW 12 8 11 6 - 15 1 %    MPV 10 8 8 9 - 12 7 fL    Platelets 049 793 - 999 Thousands/uL    nRBC 0 /100 WBCs    Neutrophils Relative 54 43 - 75 %    Immat GRANS % 0 0 - 2 %    Lymphocytes Relative 38 14 - 44 %    Monocytes Relative 6 4 - 12 %    Eosinophils Relative 1 0 - 6 %    Basophils Relative 1 0 - 1 %    Neutrophils Absolute 4 32 1 85 - 7 62 Thousands/µL    Immature Grans Absolute 0 02 0 00 - 0 20 Thousand/uL    Lymphocytes Absolute 2 98 0 60 - 4 47 Thousands/µL    Monocytes Absolute 0 47 0 17 - 1 22 Thousand/µL    Eosinophils Absolute 0 11 0 00 - 0 61 Thousand/µL    Basophils Absolute 0 04 0 00 - 0 10 Thousands/µL   Comprehensive metabolic panel   Result Value Ref Range    Sodium 139 136 - 145 mmol/L    Potassium 4 3 3 5 - 5 3 mmol/L    Chloride 105 100 - 108 mmol/L    CO2 32 21 - 32 mmol/L    ANION GAP 2 (L) 4 - 13 mmol/L    BUN 14 5 - 25 mg/dL    Creatinine 0 94 0 60 - 1 30 mg/dL    Glucose, Fasting 170 (H) 65 - 99 mg/dL    Calcium 9 4 8 3 - 10 1 mg/dL    AST 11 5 - 45 U/L    ALT 17 12 - 78 U/L    Alkaline Phosphatase 98 46 - 116 U/L    Total Protein 7 1 6 4 - 8 2 g/dL    Albumin 3 5 3 5 - 5 0 g/dL    Total Bilirubin 0 47 0 20 - 1 00 mg/dL    eGFR 64 ml/min/1 73sq m   Lipid Panel with Direct LDL reflex   Result Value Ref Range    Cholesterol 236 (H) 50 - 200 mg/dL    Triglycerides 124 <=150 mg/dL    HDL, Direct 63 >=40 mg/dL    LDL Calculated 148 (H) 0 - 100 mg/dL   TSH, 3rd generation with Free T4 reflex   Result Value Ref Range    TSH 3RD GENERATON 23 300 (H) 0 358 - 3 740 uIU/mL   Vitamin D 25 hydroxy   Result Value Ref Range    Vit D, 25-Hydroxy 19 3 (L) 30 0 - 100 0 ng/mL   Gamma GT   Result Value Ref Range    GGT 21 5 - 85 U/L   Hemoglobin A1C   Result Value Ref Range    Hemoglobin A1C 8 4 (H) 4 2 - 6 3 %     mg/dl   Magnesium   Result Value Ref Range    Magnesium 2 3 1 6 - 2 6 mg/dL   IgE   Result Value Ref Range    IgE 62 0 0 - 113 kU/l   T4, free   Result Value Ref Range    Free T4 0 96 0 76 - 1 46 ng/dL       There were no vitals taken for this visit  Physical Exam   Constitutional: She is oriented to person, place, and time  She appears well-developed and well-nourished  HENT:   Head: Normocephalic  Right Ear: External ear normal    Left Ear: External ear normal    Nose: Nose normal    Mouth/Throat: Oropharynx is clear and moist  No oropharyngeal exudate     Eyes: Pupils are equal, round, and reactive to light  Conjunctivae and EOM are normal    Neck: Normal range of motion  Neck supple  No thyromegaly present  Cardiovascular: Normal rate, regular rhythm, normal heart sounds and intact distal pulses  Exam reveals no gallop and no friction rub  No murmur heard  S1-S2 regular rhythm  Extremities no edema  Blood pressure 155/80   Pulmonary/Chest: Effort normal and breath sounds normal  No respiratory distress  She has no wheezes  She has no rales  Lungs are clear no wheezing rales or rhonchi   Abdominal: Soft  Bowel sounds are normal  She exhibits no distension and no mass  There is no tenderness  There is no rebound and no guarding  Abdomen obese soft nontender   Musculoskeletal: Normal range of motion  Lymphadenopathy:     She has no cervical adenopathy  Neurological: She is alert and oriented to person, place, and time  Skin: Skin is warm and dry  Psychiatric: She has a normal mood and affect  Her behavior is normal  Judgment normal    Nursing note and vitals reviewed

## 2019-12-12 NOTE — PLAN OF CARE
Problem: Potential for Falls  Goal: Patient will remain free of falls  Description  INTERVENTIONS:  - Assess patient frequently for physical needs  -  Identify cognitive and physical deficits and behaviors that affect risk of falls    -  [place] fall precautions as indicated by assessment   - Educate patient/family on patient safety including physical limitations  - Instruct patient to call for assistance with activity based on assessment  - Modify environment to reduce risk of injury  - Consider OT/PT consult to assist with strengthening/mobility  Outcome: Progressing

## 2019-12-13 VITALS
SYSTOLIC BLOOD PRESSURE: 150 MMHG | OXYGEN SATURATION: 98 % | TEMPERATURE: 97.8 F | HEART RATE: 56 BPM | WEIGHT: 196.21 LBS | RESPIRATION RATE: 20 BRPM | DIASTOLIC BLOOD PRESSURE: 72 MMHG | BODY MASS INDEX: 36.11 KG/M2 | HEIGHT: 62 IN

## 2019-12-13 LAB
ERYTHROCYTE [DISTWIDTH] IN BLOOD BY AUTOMATED COUNT: 12.7 % (ref 11.6–15.1)
GLUCOSE SERPL-MCNC: 114 MG/DL (ref 65–140)
GLUCOSE SERPL-MCNC: 175 MG/DL (ref 65–140)
HCT VFR BLD AUTO: 36.9 % (ref 34.8–46.1)
HGB BLD-MCNC: 12 G/DL (ref 11.5–15.4)
MCH RBC QN AUTO: 29.1 PG (ref 26.8–34.3)
MCHC RBC AUTO-ENTMCNC: 32.5 G/DL (ref 31.4–37.4)
MCV RBC AUTO: 90 FL (ref 82–98)
PLATELET # BLD AUTO: 253 THOUSANDS/UL (ref 149–390)
PMV BLD AUTO: 10.7 FL (ref 8.9–12.7)
RBC # BLD AUTO: 4.12 MILLION/UL (ref 3.81–5.12)
WBC # BLD AUTO: 8.01 THOUSAND/UL (ref 4.31–10.16)

## 2019-12-13 PROCEDURE — 85027 COMPLETE CBC AUTOMATED: CPT | Performed by: INTERNAL MEDICINE

## 2019-12-13 PROCEDURE — 82948 REAGENT STRIP/BLOOD GLUCOSE: CPT

## 2019-12-13 PROCEDURE — 99217 PR OBSERVATION CARE DISCHARGE MANAGEMENT: CPT | Performed by: INTERNAL MEDICINE

## 2019-12-13 RX ADMIN — EZETIMIBE 10 MG: 10 TABLET ORAL at 08:51

## 2019-12-13 RX ADMIN — GLIMEPIRIDE 4 MG: 2 TABLET ORAL at 08:50

## 2019-12-13 RX ADMIN — ENOXAPARIN SODIUM 40 MG: 40 INJECTION SUBCUTANEOUS at 08:51

## 2019-12-13 RX ADMIN — BUDESONIDE AND FORMOTEROL FUMARATE DIHYDRATE 2 PUFF: 160; 4.5 AEROSOL RESPIRATORY (INHALATION) at 08:55

## 2019-12-13 RX ADMIN — METOPROLOL SUCCINATE 25 MG: 25 TABLET, EXTENDED RELEASE ORAL at 08:52

## 2019-12-13 RX ADMIN — METFORMIN HYDROCHLORIDE 1000 MG: 500 TABLET ORAL at 08:50

## 2019-12-13 RX ADMIN — DOCUSATE SODIUM 100 MG: 100 CAPSULE, LIQUID FILLED ORAL at 08:51

## 2019-12-13 RX ADMIN — INDAPAMIDE 1.25 MG: 1.25 TABLET, FILM COATED ORAL at 08:53

## 2019-12-13 RX ADMIN — ASPIRIN 81 MG 81 MG: 81 TABLET ORAL at 08:51

## 2019-12-13 RX ADMIN — LOSARTAN POTASSIUM 50 MG: 50 TABLET, FILM COATED ORAL at 08:51

## 2019-12-13 RX ADMIN — PANTOPRAZOLE SODIUM 40 MG: 40 TABLET, DELAYED RELEASE ORAL at 05:28

## 2019-12-13 RX ADMIN — LEVOTHYROXINE SODIUM 150 MCG: 75 TABLET ORAL at 05:28

## 2019-12-13 RX ADMIN — ATORVASTATIN CALCIUM 40 MG: 40 TABLET, FILM COATED ORAL at 08:50

## 2019-12-13 RX ADMIN — FLUTICASONE PROPIONATE 1 SPRAY: 50 SPRAY, METERED NASAL at 08:55

## 2019-12-13 NOTE — DISCHARGE INSTRUCTIONS
Chest Pain   WHAT YOU NEED TO KNOW:   Chest pain can be caused by a range of conditions, from not serious to life-threatening  Chest pain can be a symptom of a digestive problem, such as acid reflux or a stomach ulcer  An anxiety attack or a strong emotion, such as anger, can also cause chest pain  Infection, inflammation, or a fracture in the bones or cartilage in your chest can cause pain or discomfort  Sometimes chest pain or pressure is caused by poor blood flow to your heart (angina)  Chest pain may also be caused by life-threatening conditions such as a heart attack or blood clot in your lungs  DISCHARGE INSTRUCTIONS:   Call 911 if:   · You have any of the following signs of a heart attack:      ¨ Squeezing, pressure, or pain in your chest that lasts longer than 5 minutes or returns    ¨ Discomfort or pain in your back, neck, jaw, stomach, or arm     ¨ Trouble breathing    ¨ Nausea or vomiting    ¨ Lightheadedness or a sudden cold sweat, especially with chest pain or trouble breathing    Return to the emergency department if:   · You have chest discomfort that gets worse, even with medicine  · You cough or vomit blood  · Your bowel movements are black or bloody  · You cannot stop vomiting, or it hurts to swallow  Contact your healthcare provider if:   · You have questions or concerns about your condition or care  Medicines:   · Medicines  may be given to treat the cause of your chest pain  Examples include pain medicine, anxiety medicine, or medicines to increase blood flow to your heart  · Do not take certain medicines without asking your healthcare provider first   These include NSAIDs, herbal or vitamin supplements, or hormones (estrogen or progestin)  · Take your medicine as directed  Contact your healthcare provider if you think your medicine is not helping or if you have side effects  Tell him or her if you are allergic to any medicine   Keep a list of the medicines, vitamins, and herbs you take  Include the amounts, and when and why you take them  Bring the list or the pill bottles to follow-up visits  Carry your medicine list with you in case of an emergency  Follow up with your healthcare provider within 72 hours, or as directed: You may need to return for more tests to find the cause of your chest pain  You may be referred to a specialist, such as a cardiologist or gastroenterologist  Write down your questions so you remember to ask them during your visits  Healthy living tips: The following are general healthy guidelines  If your chest pain is caused by a heart problem, your healthcare provider will give you specific guidelines to follow  · Do not smoke  Nicotine and other chemicals in cigarettes and cigars can cause lung and heart damage  Ask your healthcare provider for information if you currently smoke and need help to quit  E-cigarettes or smokeless tobacco still contain nicotine  Talk to your healthcare provider before you use these products  · Eat a variety of healthy, low-fat foods  Healthy foods include fruits, vegetables, whole-grain breads, low-fat dairy products, beans, lean meats, and fish  Ask for more information about a heart healthy diet  · Ask about activity  Your healthcare provider will tell you which activities to limit or avoid  Ask when you can drive, return to work, and have sex  Ask about the best exercise plan for you  · Maintain a healthy weight  Ask your healthcare provider how much you should weigh  Ask him or her to help you create a weight loss plan if you are overweight  · Get the flu and pneumonia vaccines  All adults should get the influenza (flu) vaccine  Get it every year as soon as it becomes available  The pneumococcal vaccine is given to adults aged 72 years or older  The vaccine is given every 5 years to prevent pneumococcal disease, such as pneumonia    © 2017 [employer] Information is for End User's use only and may not be sold, redistributed or otherwise used for commercial purposes  All illustrations and images included in CareNotes® are the copyrighted property of A D A M [employer] Inc  or [employer]  The above information is an  only  It is not intended as medical advice for individual conditions or treatments  Talk to your doctor, nurse or pharmacist before following any medical regimen to see if it is safe and effective for you

## 2019-12-13 NOTE — ASSESSMENT & PLAN NOTE
Lab Results   Component Value Date    HGBA1C 8 4 (H) 12/02/2019       Recent Labs     12/12/19  1557 12/12/19  2050 12/13/19  0726 12/13/19  1133   POCGLU 195* 167* 114 175*   SSI and Basal Bolus Protocol    Blood Sugar Average: Last 72 hrs:  (P) 157     Patient to discuss outpatient blood glucose control    Blood sugars here under 180 while admitted

## 2019-12-13 NOTE — DISCHARGE SUMMARY
Discharge- Celeste Chi 1955, 59 y o  female MRN: [id number]    Unit/Bed#: E4 -01 Encounter: [id number]    Primary Care Provider: Carrie Payton MD   Date and time admitted to hospital: 12/12/2019  8:58 AM        Pure hypercholesterolemia  Assessment & Plan  Resume statin therapy    Mild intermittent asthma without complication  Assessment & Plan  Nebulizer treatments as needed, no evidence for exacerbation    Essential hypertension  Assessment & Plan  Continue hypertensive regimen, systolic blood pressure goal of 130    Acquired hypothyroidism  Assessment & Plan  Continue levothyroxine    Type 2 diabetes mellitus, without long-term current use of insulin [facility])  Assessment & Plan  Lab Results   Component Value Date    HGBA1C 8 4 (H) 12/02/2019       Recent Labs     12/12/19  1557 12/12/19  2050 12/13/19  0726 12/13/19  1133   POCGLU 195* 167* 114 175*   SSI and Basal Bolus Protocol    Blood Sugar Average: Last 72 hrs:  (P) 157     Patient to discuss outpatient blood glucose control  Blood sugars here under 180 while admitted    * Atypical chest pain  Assessment & Plan  Your patient with cardiac risk factors, will obtain serial set of cardiac enzymes as well as EKG  ANA score of 3  Patient with negative cardiac enzymes, will schedule for outpatient stress testing          Admitting Provider:  Olga Whipple MD  Discharge Provider:  Yadira Valdez DO  Admission Date: 12/12/2019       Discharge Date: 12/13/19   LOS: 0  Primary Care Physician at Discharge: Demian Ashton 80:  Celeste Chi is a 59 y o  female who presented to her primary care's office with a chief complaint of chest pain  She had been recently established in his office, and has a known history of diabetes mellitus as well as hypertension  She has no known history of coronary artery disease, and due to her symptoms it was felt that she would require an acute coronary syndrome evaluation    The patient did report having cough and congestion over the past few days, upon evaluation in the emergency room she was given nebulizer treatment and not found to be with significant exacerbation  The patient had a serial set of cardiac enzymes which were negative for ischemia  She had no evidence of EKG changes and it was felt that she would benefit from outpatient stress testing  At the time of discharge the patient was chest pain-free, she was without any complaints and subsequently was discharged with planned outpatient follow-up  Would recommend additional blood pressure medication titration in the outpatient setting, her blood pressure readings were in the 140s to 150s, and ideally would like to aim for a systolic blood pressure goal 130  Thank you for choosing [facility] for your healthcare needs  If I can be of any assistance in the future, please feel to contact me  DISCHARGE DIAGNOSES  Pure hypercholesterolemia  Assessment & Plan  Resume statin therapy    Mild intermittent asthma without complication  Assessment & Plan  Nebulizer treatments as needed, no evidence for exacerbation    Essential hypertension  Assessment & Plan  Continue hypertensive regimen, systolic blood pressure goal of 130    Acquired hypothyroidism  Assessment & Plan  Continue levothyroxine    Type 2 diabetes mellitus, without long-term current use of insulin [facility])  Assessment & Plan  Lab Results   Component Value Date    HGBA1C 8 4 (H) 12/02/2019       Recent Labs     12/12/19  1557 12/12/19  2050 12/13/19  0726 12/13/19  1133   POCGLU 195* 167* 114 175*   SSI and Basal Bolus Protocol    Blood Sugar Average: Last 72 hrs:  (P) 157     Patient to discuss outpatient blood glucose control  Blood sugars here under 180 while admitted    * Atypical chest pain  Assessment & Plan  Your patient with cardiac risk factors, will obtain serial set of cardiac enzymes as well as EKG    ANA score of 3  Patient with negative cardiac enzymes, will schedule for outpatient stress testing          CONSULTING PROVIDERS   None    PROCEDURES PERFORMED  * No surgery found *    RADIOLOGY RESULTS  Xr Chest 2 Views    Result Date: 12/12/2019  Narrative: CHEST INDICATION:   Chest Pain  COMPARISON:  None EXAM PERFORMED/VIEWS:  XR CHEST PA & LATERAL FINDINGS: Cardiomediastinal silhouette appears unremarkable  Tiny hiatal hernia suspected  The lungs are clear  No pneumothorax or pleural effusion  Osseous structures appear within normal limits for patient age  Postoperative change involving the right rotator cuff noted  Impression: No acute cardiopulmonary disease   Workstation performed: [id number]       LABS  Results from last 7 days   Lab Units 12/13/19  0529 12/12/19  1700 12/12/19  0904   WBC Thousand/uL 8 01  --  10 32*   HEMOGLOBIN g/dL 12 0  --  13 5   HEMATOCRIT % 36 9  --  40 8   MCV fL 90  --  89   PLATELETS Thousands/uL 253 307 309   INR   --   --  0 92     Results from last 7 days   Lab Units 12/12/19  0904   SODIUM mmol/L 141   POTASSIUM mmol/L 3 9   CHLORIDE mmol/L 99*   CO2 mmol/L 35*   BUN mg/dL 10   CREATININE mg/dL 0 92   CALCIUM mg/dL 9 4   ALBUMIN g/dL 3 8   TOTAL BILIRUBIN mg/dL 0 47   ALK PHOS U/L 108   ALT U/L 19   AST U/L 11   EGFR ml/min/1 73sq m 66   GLUCOSE RANDOM mg/dL 165*     Results from last 7 days   Lab Units 12/12/19  1937 12/12/19  1700 12/12/19  0904   TROPONIN I ng/mL <0 02 <0 02 <0 02              Results from last 7 days   Lab Units 12/13/19  1133 12/13/19  0726 12/12/19  2050 12/12/19  1557 12/12/19  1045   POC GLUCOSE mg/dl 175* 114 167* 195* 134                       Cultures:         Invalid input(s): URIBILINOGEN        Results from last 7 days   Lab Units 12/12/19  1205   INFLUENZA A PCR  None Detected       PHYSICAL EXAM:  Vitals:   Blood Pressure: 150/72 (12/13/19 1143)  Pulse: 56 (12/13/19 1143)  Temperature: 97 8 °F (36 6 °C) (12/13/19 0725)  Temp Source: Temporal (12/13/19 1143)  Respirations: 20 (12/13/19 1143)  Height: 5' 2" (157 5 cm) (12/12/19 1037)  Weight - Scale: 89 kg (196 lb 3 4 oz) (12/12/19 1037)  SpO2: 98 % (12/13/19 1143)      General: well appearing, no acute distress  HEENT: atraumatic, PERRLA, moist mucosa, normal pharynx, normal tonsils and adenoids, normal tongue, no fluid in sinuses  Neck: Trachea midline, no carotid bruit, no masses  Respiratory: normal chest wall expansion, CTA B, no r/r/w, no rubs  Cardiovascular: RRR, no m/r/g, Normal S1 and S2  Abdomen: Soft, non-tender, non-distended, normal bowel sounds in all quadrants, no hepatosplenomegaly, no tympany  Rectal: deferred  Musculoskeletal: normal ROM in upper and lower extremities  Integumentary: warm, dry, and pink, with no rash, purpura, or petechia  Heme/Lymph: no lymphadenopathy, no bruises  Neurological: Cranial Nerves II-XII grossly intact, no tics, normal sensation to pressure and light touch  Psychiatric: cooperative with normal mood, affect, and cognition      Discharge Disposition:  Home    Test Results Pending at Discharge:           Medications   · Summary of Medication Adjustments made as a result of this hospitalization:  No new medication changes  · Medication Dosing Tapers - Please refer to Discharge Medication List for details on any medication dosing tapers (if applicable to patient)  · Discharge Medication List: See after visit summary for reconciled discharge medications  Diet restrictions:         Diet Orders   (From admission, onward)             Start     Ordered    12/12/19 1051  Diet Cardiovascular; Cardiac; No Chocolate, No Caffeine  Diet effective now     Question Answer Comment   Diet Type Cardiovascular    Cardiac Cardiac    Other Restriction(s): No Chocolate    Other Restriction(s): No Caffeine    RD to adjust diet per protocol?  Yes        12/12/19 1051              Activity restrictions: No strenuous activity  Discharge Condition: good    Outpatient Follow-Up and Discharge Instructions  See after visit summary section titled Discharge Instructions for information provided to patient and family  Code Status: Level 1 - Full Code  Discharge Statement   I spent 35 minutes discharging the patient  This time was spent on the day of discharge  Greater than 50% of total time was spent with the patient and / or family counseling and / or coordination of care  ** Please Note: This note has been constructed using a voice recognition system   **

## 2019-12-13 NOTE — ASSESSMENT & PLAN NOTE
Your patient with cardiac risk factors, will obtain serial set of cardiac enzymes as well as EKG    ANA score of 3  Patient with negative cardiac enzymes, will schedule for outpatient stress testing

## 2019-12-13 NOTE — UTILIZATION REVIEW
Initial Clinical Review    Admission: Date/Time/Statement:  12/12 @ 1008 to OBSERVATION    Orders Placed This Encounter   Procedures    Place in Observation     Standing Status:   Standing     Number of Occurrences:   1     Order Specific Question:   Admitting Physician     Answer:   Annie Stein [[id number]]     Order Specific Question:   Level of Care     Answer:   Med Surg [16]     ED Arrival Information     Expected Arrival Acuity Means of Arrival Escorted By Service Admission Type    - 12/12/2019 08:56 Emergent [facility] Emergency    Arrival Complaint    chest pain        Chief Complaint   Patient presents with    Chest Pain     Pt reports L chest pain beneath breast rradiating into left shoulder x 2 days    Dizziness     Pt reports dizziness and feeling off balance when walking x 2 days     Assessment/Plan: 60 yo female referred to ED from PCP office for further evaluation of chest pain wit radiation to left arm, palpitations, nausea, vomting, intermittently over the last 2 days  H/O HTN & states she took her medication this morning  Initial trop neg  ANA 3  Admitted to OBS with Chest Pain  Monitor on Tele, W/U for ACS  Continue hypertensive regimen, systolic blood pressure goal of 130    12/13: Written for Discharge  Cardiac enzymes  were negative for ischemia   No evidence of EKG changes and it was felt that she would benefit from outpatient stress testing    ED Triage Vitals   Temperature Pulse Respirations Blood Pressure SpO2   12/12/19 0901 12/12/19 0901 12/12/19 0901 12/12/19 0901 12/12/19 0901   97 9 °F (36 6 °C) 78 14 (!) 207/82 98 %      Temp Source Heart Rate Source Patient Position - Orthostatic VS BP Location FiO2 (%)   12/12/19 0901 12/12/19 0901 12/12/19 0947 12/12/19 0901 --   Oral Monitor Lying Left arm       Pain Score       12/12/19 0901       7        Wt Readings from Last 1 Encounters:   12/12/19 89 kg (196 lb 3 4 oz)     Additional Vital Signs:   12/13/19 0725  97 8 °F (36 6 °C)  58  18  115/81  97 % None (Room air) Lying   12/12/19 2300  98 2 °F (36 8 °C)  54  16  128/73  96 % None (Room air) Lying   12/12/19 1900  98 1 °F (36 7 °C)  55  18  113/60  99 % None (Room air) Lying   12/12/19 1600  98 4 °F (36 9 °C)  55  18  119/74  99 % None (Room air) Lying   12/12/19 1037  97 8 °F (36 6 °C)  58  18  142/78  99 % None (Room air) Lying   12/12/19 0952           None (Room air)    12/12/19 0947    55  16  144/65  100 % None (Room air) Lying       Pertinent Labs/Diagnostic Test Results:   Results from last 7 days   Lab Units 12/13/19  0529 12/12/19  1700 12/12/19  0904   WBC Thousand/uL 8 01  --  10 32*   HEMOGLOBIN g/dL 12 0  --  13 5   HEMATOCRIT % 36 9  --  40 8   PLATELETS Thousands/uL 253 307 309   NEUTROS ABS Thousands/µL  --   --  6 44     Results from last 7 days   Lab Units 12/12/19  0904   SODIUM mmol/L 141   POTASSIUM mmol/L 3 9   CHLORIDE mmol/L 99*   CO2 mmol/L 35*   ANION GAP mmol/L 7   BUN mg/dL 10   CREATININE mg/dL 0 92   EGFR ml/min/1 73sq m 66   CALCIUM mg/dL 9 4     Results from last 7 days   Lab Units 12/12/19  0904   AST U/L 11   ALT U/L 19   ALK PHOS U/L 108   TOTAL PROTEIN g/dL 7 7   ALBUMIN g/dL 3 8   TOTAL BILIRUBIN mg/dL 0 47     Results from last 7 days   Lab Units 12/13/19  0726 12/12/19  2050 12/12/19  1557 12/12/19  1045   POC GLUCOSE mg/dl 114 167* 195* 134     Results from last 7 days   Lab Units 12/12/19  0904   GLUCOSE RANDOM mg/dL 165*     Results from last 7 days   Lab Units 12/12/19  1937 12/12/19  1700 12/12/19  0904   TROPONIN I ng/mL <0 02 <0 02 <0 02       Results from last 7 days   Lab Units 12/12/19  0904   PROTIME seconds 12 5   INR  0 92   PTT seconds 28     Results from last 7 days   Lab Units 12/12/19  0904   LIPASE u/L 91     Results from last 7 days   Lab Units 12/12/19  1205   INFLUENZA A PCR  None Detected   RSV PCR  None Detected     12/12 CXR: No acute cardiopulmonary disease    12/12 EKG: NSR      ED Treatment: Medication Administration from 12/12/2019 0856 to 12/12/2019 1036       Date/Time Order Dose Route Action     12/12/2019 0949 ondansetron (ZOFRAN) injection 4 mg 4 mg Intravenous Given     12/12/2019 0958 ketorolac (TORADOL) injection 15 mg 15 mg Intravenous Given        Past Medical History:   Diagnosis Date    Asthma     [facility] mellitus ([facility] [address] )     Hypertension     Stroke ([facility] )      Present on Admission:   Type 2 diabetes mellitus, without long-term current use of insulin (HCC)   Acquired hypothyroidism   Essential hypertension   Mild intermittent asthma without complication   Pure hypercholesterolemia      Admitting Diagnosis: Palpitations [R00 2]  Chest pain [R07 9]  Accelerated hypertension [I10]  Age/Sex: 59 y o  female     Admission Orders:  Scheduled Medications:  Medications:  aspirin 81 mg Oral Daily   atorvastatin 40 mg Oral Daily   budesonide-formoterol 2 puff Inhalation BID   docusate sodium 100 mg Oral BID   enoxaparin 40 mg Subcutaneous Daily   ezetimibe 10 mg Oral Daily   fluticasone 1 spray Nasal Daily   glimepiride 4 mg Oral BID AC   indapamide 1 25 mg Oral QAM   insulin glargine 20 Units Subcutaneous HS   insulin lispro 1-6 Units Subcutaneous TID AC   levothyroxine 150 mcg Oral Early Morning   losartan 50 mg Oral Daily   metFORMIN 1,000 mg Oral BID With Meals   metoprolol succinate 25 mg Oral Daily   montelukast 10 mg Oral HS   pantoprazole 40 mg Oral Daily Before Breakfast   polyethylene glycol 17 g Oral Daily   senna 2 tablet Oral HS     Continuous IV Infusions:     PRN Meds:    albuterol 2 puff Inhalation Q6H PRN   albuterol 2 5 mg Nebulization Q6H PRN   ondansetron 4 mg Intravenous Q4H PRN       [facility]'s      Network Utilization Review Department  [email or web address]  ATTENTION: Please call with any questions or concerns to [phone] and carefully listen to the prompts so that you are directed to the right person   All voicemails are confidential   Jose Guadalupe Zayas all requests for admission clinical reviews, approved or denied determinations and any other requests to dedicated fax number below belonging to the campus where the patient is receiving treatment   List of dedicated fax numbers for the Facilities:  [address] [place] [address] DENIALS (Administrative/Medical Necessity) [phone]   [address]  [facility] (Maternity/NICU/Pediatrics) [phone]   [facility] [phone]   [facility] [phone]   [facility] [phone]   [address] [phone]   [facility] [phone]   [facility] [address] [facility] [phone]

## 2019-12-13 NOTE — H&P
H&P- Chuckie Elizabeth 1955, 59 y o  female MRN: [id number]    Unit/Bed#: E4 -01 Encounter: [id number]    Primary Care Provider: Jing Salazar MD   Date and time admitted to hospital: 12/12/2019  8:58 AM        Pure hypercholesterolemia  Assessment & Plan  Resume statin therapy    Mild intermittent asthma without complication  Assessment & Plan  Nebulizer treatments as needed, no evidence for exacerbation    Essential hypertension  Assessment & Plan  Continue hypertensive regimen, systolic blood pressure goal of 130    Acquired hypothyroidism  Assessment & Plan  Continue levothyroxine    Type 2 diabetes mellitus, without long-term current use of insulin (HCC)  Assessment & Plan  Lab Results   Component Value Date    HGBA1C 8 4 (H) 12/02/2019       Recent Labs     12/12/19  1045 12/12/19  1557   POCGLU 134 195*   SSI and Basal Bolus Protocol    Blood Sugar Average: Last 72 hrs:  (P) 164 5    * Atypical chest pain  Assessment & Plan  Your patient with cardiac risk factors, will obtain serial set of cardiac enzymes as well as EKG  ANA score of 3    If enzymes are negative could consider stress test, inpatient versus outpatient          VTE Prophylaxis: Enoxaparin (Lovenox)  / foot pump applied   Code Status:  Full code  POLST: There is no POLST form on file for this patient (pre-hospital)  Discussion with family:  Patient    Anticipated Length of Stay:  Patient will be admitted on an Observation basis with an anticipated length of stay of  less than 2 midnights  Justification for Hospital Stay:  Patient with cardiac risk factors as well as atypical chest pain    Total Time for Visit, including Counseling / Coordination of Care: 20 minutes  Greater than 50% of this total time spent on direct patient counseling and coordination of care  Chief Complaint:   Chest pain    History of Present Illness:    Chuckie Elizabeth is a 59 y o  female who presents with chest pain at her cardiologist's office    She is a new patient to that practice common reported having 1-2 days of chest tightness with radiation to her left arm  She describes the pain as dull with no alleviating or relieving factors  She has no known history of coronary disease but does have a history of diabetes mellitus as well as hypertension and well as well as asthma  In the emergency room she is found to have nonischemic EKG, and had a negative cardiac enzyme  She denies any other recent travel or trip history, no sick contacts with any similar symptoms  Review of Systems:    A complete and comprehensive 14 point organ system review has been performed and all other systems are negative other than stated above  Past Medical and Surgical History:     Past Medical History:   Diagnosis Date    Asthma     [facility] mellitus ([facility] [address] )     Hypertension     Stroke [facility])        Past Surgical History:   Procedure Laterality Date    ROTATOR CUFF REPAIR Bilateral        Meds/Allergies:    Prior to Admission medications    Medication Sig Start Date End Date Taking? Authorizing Provider   aspirin 81 mg chewable tablet Chew 81 mg daily   Yes Historical Provider, MD   atorvastatin (LIPITOR) 40 mg tablet Take 40 mg by mouth daily   Yes Historical Provider, MD   budesonide-formoterol (SYMBICORT) 160-4 5 mcg/act inhaler Inhale 2 puffs 2 (two) times a day Rinse mouth after use     Yes Historical Provider, MD   ezetimibe (ZETIA) 10 mg tablet Take 10 mg by mouth daily   Yes Historical Provider, MD   fluticasone (FLONASE) 50 mcg/act nasal spray 1 spray into each nostril daily   Yes Historical Provider, MD   furosemide (LASIX) 20 mg tablet Take 20 mg by mouth 2 (two) times a day as needed   Yes Historical Provider, MD   glimepiride (AMARYL) 4 mg tablet Take 4 mg by mouth 2 (two) times a day   Yes Historical Provider, MD   indapamide (LOZOL) 1 25 mg tablet Take 1 tablet (1 25 mg total) by mouth every morning 11/21/19 12/21/19 Yes Huy Birch MD   insulin glargine (LANTUS SOLOSTAR) 100 units/mL injection pen 35 units in the morning and 15 units in the evening 12/12/19  Yes Mayank Lynch MD   levothyroxine 150 mcg tablet Take 150 mcg by mouth daily   Yes Historical Provider, MD   losartan (COZAAR) 50 mg tablet Take 50 mg by mouth daily   Yes Historical Provider, MD   metFORMIN (GLUCOPHAGE) 1000 MG tablet Take 1,000 mg by mouth 2 (two) times a day with meals   Yes Historical Provider, MD   metoprolol succinate (TOPROL-XL) 25 mg 24 hr tablet Take 25 mg by mouth daily   Yes Historical Provider, MD   montelukast (SINGULAIR) 10 mg tablet Take 10 mg by mouth daily at bedtime   Yes Historical Provider, MD   omeprazole (PriLOSEC) 40 MG capsule Take 40 mg by mouth daily   Yes Historical Provider, MD   albuterol (2 5 mg/3 mL) 0 083 % nebulizer solution Take 1 vial (2 5 mg total) by nebulization every 6 (six) hours as needed for wheezing or shortness of breath 11/21/19   Mayank Lynch MD   albuterol (PROVENTIL HFA,VENTOLIN HFA) 90 mcg/act inhaler Inhale 2 puffs every 6 (six) hours as needed for wheezing    Historical Provider, MD   insulin glargine (LANTUS) 100 units/mL subcutaneous injection Inject under the skin Take 35 units in the AM & 15 units in the PM  12/12/19  Historical Provider, MD     I have reviewed home medications with patient personally  Allergies:    Allergies   Allergen Reactions    Iodinated Diagnostic Agents Shortness Of Breath and Wheezing    Simvastatin Other (See Comments)     elevated liver function     Latex Rash    Morphine Palpitations    Penicillins Rash       Social History:     Marital Status: /Civil Union     Social History     Substance and Sexual Activity   Alcohol Use Never    Frequency: Never     Social History     Tobacco Use   Smoking Status Never Smoker   Smokeless Tobacco Never Used     Social History     Substance and Sexual Activity   Drug Use Never       Family History:    Family History   Problem Relation Age of Onset    Diabetes Mother     Hypertension Mother     Hypertension Father        Physical Exam:     Vitals:   Blood Pressure: 119/74 (12/12/19 1600)  Pulse: 55 (12/12/19 1600)  Temperature: 98 4 °F (36 9 °C) (12/12/19 1600)  Temp Source: Tympanic (12/12/19 1600)  Respirations: 18 (12/12/19 1600)  Height: 5' 2" (157 5 cm) (12/12/19 1037)  Weight - Scale: 89 kg (196 lb 3 4 oz) (12/12/19 1037)  SpO2: 99 % (12/12/19 1600)      General: well appearing, no acute distress  HEENT: atraumatic, PERRLA, moist mucosa, normal pharynx, normal tonsils and adenoids, normal tongue, no fluid in sinuses  Neck: Trachea midline, no carotid bruit, no masses  Respiratory: normal chest wall expansion, CTA B, no r/r/w, no rubs  Cardiovascular: RRR, no m/r/g, Normal S1 and S2  Abdomen: Soft, non-tender, non-distended, normal bowel sounds in all quadrants, no hepatosplenomegaly, no tympany  Rectal: deferred  Musculoskeletal: normal ROM in upper and lower extremities  Integumentary: warm, dry, and pink, with no rash, purpura, or petechia  Heme/Lymph: no lymphadenopathy, no bruises  Neurological: Cranial Nerves II-XII grossly intact, no tics, normal sensation to pressure and light touch  Psychiatric: cooperative with normal mood, affect, and cognition      Additional Data:     Lab Results: I have personally reviewed pertinent reports        Results from last 7 days   Lab Units 12/12/19  1700 12/12/19  0904   WBC Thousand/uL  --  10 32*   HEMOGLOBIN g/dL  --  13 5   HEMATOCRIT %  --  40 8   PLATELETS Thousands/uL 307 309   NEUTROS PCT %  --  63   LYMPHS PCT %  --  31   MONOS PCT %  --  5   EOS PCT %  --  1     Results from last 7 days   Lab Units 12/12/19  0904   SODIUM mmol/L 141   POTASSIUM mmol/L 3 9   CHLORIDE mmol/L 99*   CO2 mmol/L 35*   BUN mg/dL 10   CREATININE mg/dL 0 92   ANION GAP mmol/L 7   CALCIUM mg/dL 9 4   ALBUMIN g/dL 3 8   TOTAL BILIRUBIN mg/dL 0 47   ALK PHOS U/L 108   ALT U/L 19   AST U/L 11   GLUCOSE RANDOM mg/dL 165*     Results from last 7 days   Lab Units 12/12/19  0904   INR  0 92     Results from last 7 days   Lab Units 12/12/19  1557 12/12/19  1045   POC GLUCOSE mg/dl 195* 134               Imaging: I have personally reviewed pertinent reports  XR chest 2 views   Final Result by Daniel Thompson MD (12/12 1639)      No acute cardiopulmonary disease  Workstation performed: [id number]             EKG, Pathology, and Other Studies Reviewed on Admission:   · EKG:  EKG reviewed, normal sinus rhythm no ischemic changes    Allscripts / Epic Records Reviewed: Yes     ** Please Note: This note has been constructed using a voice recognition system   **

## 2019-12-13 NOTE — ASSESSMENT & PLAN NOTE
Your patient with cardiac risk factors, will obtain serial set of cardiac enzymes as well as EKG      ANA score of 3    If enzymes are negative could consider stress test, inpatient versus outpatient

## 2019-12-13 NOTE — PLAN OF CARE
Problem: Potential for Falls  Goal: Patient will remain free of falls  Description  INTERVENTIONS:  - Assess patient frequently for physical needs  -  Identify cognitive and physical deficits and behaviors that affect risk of falls    -  [place] fall precautions as indicated by assessment   - Educate patient/family on patient safety including physical limitations  - Instruct patient to call for assistance with activity based on assessment  - Modify environment to reduce risk of injury  - Consider OT/PT consult to assist with strengthening/mobility  Outcome: Progressing     Problem: CARDIOVASCULAR - ADULT  Goal: Maintains optimal cardiac output and hemodynamic stability  Description  INTERVENTIONS:  - Monitor I/O, vital signs and rhythm  - Monitor for S/S and trends of decreased cardiac output  - Administer and titrate ordered vasoactive medications to optimize hemodynamic stability  - Assess quality of pulses, skin color and temperature  - Assess for signs of decreased coronary artery perfusion  - Instruct patient to report change in severity of symptoms  Outcome: Progressing  Goal: Absence of cardiac dysrhythmias or at baseline rhythm  Description  INTERVENTIONS:  - Continuous cardiac monitoring, vital signs, obtain 12 lead EKG if ordered  - Administer antiarrhythmic and heart rate control medications as ordered  - Monitor electrolytes and administer replacement therapy as ordered  Outcome: Progressing     Problem: METABOLIC, FLUID AND ELECTROLYTES - ADULT  Goal: Electrolytes maintained within normal limits  Description  INTERVENTIONS:  - Monitor labs and assess patient for signs and symptoms of electrolyte imbalances  - Administer electrolyte replacement as ordered  - Monitor response to electrolyte replacements, including repeat lab results as appropriate  - Instruct patient on fluid and nutrition as appropriate  Outcome: Progressing  Goal: Fluid balance maintained  Description  INTERVENTIONS:  - Monitor labs - Monitor I/O and WT  - Instruct patient on fluid and nutrition as appropriate  - Assess for signs & symptoms of volume excess or deficit  Outcome: Progressing  Goal: Glucose maintained within target range  Description  INTERVENTIONS:  - Monitor Blood Glucose as ordered  - Assess for signs and symptoms of hyperglycemia and hypoglycemia  - Administer ordered medications to maintain glucose within target range  - Assess nutritional intake and initiate nutrition service referral as needed  Outcome: Progressing     Problem: PAIN - ADULT  Goal: Verbalizes/displays adequate comfort level or baseline comfort level  Description  Interventions:  - Encourage patient to monitor pain and request assistance  - Assess pain using appropriate pain scale  - Administer analgesics based on type and severity of pain and evaluate response  - Implement non-pharmacological measures as appropriate and evaluate response  - Consider cultural and social influences on pain and pain management  - Notify physician/advanced practitioner if interventions unsuccessful or patient reports new pain  Outcome: Progressing     Problem: INFECTION - ADULT  Goal: Absence or prevention of progression during hospitalization  Description  INTERVENTIONS:  - Assess and monitor for signs and symptoms of infection  - Monitor lab/diagnostic results  - Monitor all insertion sites, i e  indwelling lines, tubes, and drains  - Monitor endotracheal if appropriate and nasal secretions for changes in amount and color  - [place] appropriate cooling/warming therapies per order  - Administer medications as ordered  - Instruct and encourage patient and family to use good hand hygiene technique  - Identify and instruct in appropriate isolation precautions for identified infection/condition  Outcome: Progressing  Goal: Absence of fever/infection during neutropenic period  Description  INTERVENTIONS:  - Monitor WBC    Outcome: Progressing     Problem: SAFETY ADULT  Goal: Maintain or return to baseline ADL function  Description  INTERVENTIONS:  -  Assess patient's ability to carry out ADLs; assess patient's baseline for ADL function and identify physical deficits which impact ability to perform ADLs (bathing, care of mouth/teeth, toileting, grooming, dressing, etc )  - Assess/evaluate cause of self-care deficits   - Assess range of motion  - Assess patient's mobility; develop plan if impaired  - Assess patient's need for assistive devices and provide as appropriate  - Encourage maximum independence but intervene and supervise when necessary  - Involve family in performance of ADLs  - Assess for home care needs following discharge   - Consider OT consult to assist with ADL evaluation and planning for discharge  - Provide patient education as appropriate  Outcome: Progressing  Goal: Maintain or return mobility status to optimal level  Description  INTERVENTIONS:  - Assess patient's baseline mobility status (ambulation, transfers, stairs, etc )    - Identify cognitive and physical deficits and behaviors that affect mobility  - Identify mobility aids required to assist with transfers and/or ambulation (gait belt, sit-to-stand, lift, walker, cane, etc )  - [place] fall precautions as indicated by assessment  - Record patient progress and toleration of activity level on Mobility SBAR; progress patient to next Phase/Stage  - Instruct patient to call for assistance with activity based on assessment  - Consider rehabilitation consult to assist with strengthening/weightbearing, etc   Outcome: Progressing     Problem: DISCHARGE PLANNING  Goal: Discharge to home or other facility with appropriate resources  Description  INTERVENTIONS:  - Identify barriers to discharge w/patient and caregiver  - Arrange for needed discharge resources and transportation as appropriate  - Identify discharge learning needs (meds, wound care, etc )  - Arrange for interpretive services to assist at discharge as needed  - Refer to Case Management Department for coordinating discharge planning if the patient needs post-hospital services based on physician/advanced practitioner order or complex needs related to functional status, cognitive ability, or social support system  Outcome: Progressing     Problem: Knowledge Deficit  Goal: Patient/family/caregiver demonstrates understanding of disease process, treatment plan, medications, and discharge instructions  Description  Complete learning assessment and assess knowledge base    Interventions:  - Provide teaching at level of understanding  - Provide teaching via preferred learning methods  Outcome: Progressing

## 2019-12-13 NOTE — PLAN OF CARE
Problem: Potential for Falls  Goal: Patient will remain free of falls  Description  INTERVENTIONS:  - Assess patient frequently for physical needs  -  Identify cognitive and physical deficits and behaviors that affect risk of falls    -  [place] fall precautions as indicated by assessment   - Educate patient/family on patient safety including physical limitations  - Instruct patient to call for assistance with activity based on assessment  - Modify environment to reduce risk of injury  - Consider OT/PT consult to assist with strengthening/mobility  Outcome: Progressing     Problem: CARDIOVASCULAR - ADULT  Goal: Maintains optimal cardiac output and hemodynamic stability  Description  INTERVENTIONS:  - Monitor I/O, vital signs and rhythm  - Monitor for S/S and trends of decreased cardiac output  - Administer and titrate ordered vasoactive medications to optimize hemodynamic stability  - Assess quality of pulses, skin color and temperature  - Assess for signs of decreased coronary artery perfusion  - Instruct patient to report change in severity of symptoms  Outcome: Progressing  Goal: Absence of cardiac dysrhythmias or at baseline rhythm  Description  INTERVENTIONS:  - Continuous cardiac monitoring, vital signs, obtain 12 lead EKG if ordered  - Administer antiarrhythmic and heart rate control medications as ordered  - Monitor electrolytes and administer replacement therapy as ordered  Outcome: Progressing     Problem: METABOLIC, FLUID AND ELECTROLYTES - ADULT  Goal: Electrolytes maintained within normal limits  Description  INTERVENTIONS:  - Monitor labs and assess patient for signs and symptoms of electrolyte imbalances  - Administer electrolyte replacement as ordered  - Monitor response to electrolyte replacements, including repeat lab results as appropriate  - Instruct patient on fluid and nutrition as appropriate  Outcome: Progressing  Goal: Fluid balance maintained  Description  INTERVENTIONS:  - Monitor labs - Monitor I/O and WT  - Instruct patient on fluid and nutrition as appropriate  - Assess for signs & symptoms of volume excess or deficit  Outcome: Progressing  Goal: Glucose maintained within target range  Description  INTERVENTIONS:  - Monitor Blood Glucose as ordered  - Assess for signs and symptoms of hyperglycemia and hypoglycemia  - Administer ordered medications to maintain glucose within target range  - Assess nutritional intake and initiate nutrition service referral as needed  Outcome: Progressing     Problem: PAIN - ADULT  Goal: Verbalizes/displays adequate comfort level or baseline comfort level  Description  Interventions:  - Encourage patient to monitor pain and request assistance  - Assess pain using appropriate pain scale  - Administer analgesics based on type and severity of pain and evaluate response  - Implement non-pharmacological measures as appropriate and evaluate response  - Consider cultural and social influences on pain and pain management  - Notify physician/advanced practitioner if interventions unsuccessful or patient reports new pain  Outcome: Progressing     Problem: INFECTION - ADULT  Goal: Absence or prevention of progression during hospitalization  Description  INTERVENTIONS:  - Assess and monitor for signs and symptoms of infection  - Monitor lab/diagnostic results  - Monitor all insertion sites, i e  indwelling lines, tubes, and drains  - Monitor endotracheal if appropriate and nasal secretions for changes in amount and color  - [place] appropriate cooling/warming therapies per order  - Administer medications as ordered  - Instruct and encourage patient and family to use good hand hygiene technique  - Identify and instruct in appropriate isolation precautions for identified infection/condition  Outcome: Progressing  Goal: Absence of fever/infection during neutropenic period  Description  INTERVENTIONS:  - Monitor WBC    Outcome: Progressing     Problem: SAFETY ADULT  Goal: Maintain or return to baseline ADL function  Description  INTERVENTIONS:  -  Assess patient's ability to carry out ADLs; assess patient's baseline for ADL function and identify physical deficits which impact ability to perform ADLs (bathing, care of mouth/teeth, toileting, grooming, dressing, etc )  - Assess/evaluate cause of self-care deficits   - Assess range of motion  - Assess patient's mobility; develop plan if impaired  - Assess patient's need for assistive devices and provide as appropriate  - Encourage maximum independence but intervene and supervise when necessary  - Involve family in performance of ADLs  - Assess for home care needs following discharge   - Consider OT consult to assist with ADL evaluation and planning for discharge  - Provide patient education as appropriate  Outcome: Progressing  Goal: Maintain or return mobility status to optimal level  Description  INTERVENTIONS:  - Assess patient's baseline mobility status (ambulation, transfers, stairs, etc )    - Identify cognitive and physical deficits and behaviors that affect mobility  - Identify mobility aids required to assist with transfers and/or ambulation (gait belt, sit-to-stand, lift, walker, cane, etc )  - [place] fall precautions as indicated by assessment  - Record patient progress and toleration of activity level on Mobility SBAR; progress patient to next Phase/Stage  - Instruct patient to call for assistance with activity based on assessment  - Consider rehabilitation consult to assist with strengthening/weightbearing, etc   Outcome: Progressing     Problem: DISCHARGE PLANNING  Goal: Discharge to home or other facility with appropriate resources  Description  INTERVENTIONS:  - Identify barriers to discharge w/patient and caregiver  - Arrange for needed discharge resources and transportation as appropriate  - Identify discharge learning needs (meds, wound care, etc )  - Arrange for interpretive services to assist at discharge as needed  - Refer to Case Management Department for coordinating discharge planning if the patient needs post-hospital services based on physician/advanced practitioner order or complex needs related to functional status, cognitive ability, or social support system  Outcome: Progressing     Problem: Knowledge Deficit  Goal: Patient/family/caregiver demonstrates understanding of disease process, treatment plan, medications, and discharge instructions  Description  Complete learning assessment and assess knowledge base    Interventions:  - Provide teaching at level of understanding  - Provide teaching via preferred learning methods  Outcome: Progressing

## 2019-12-13 NOTE — DISCHARGE INSTR - AVS FIRST PAGE
Mrs Jennie Thompson been diagnosed with atypical chest pain  I have scheduled you for an exercise stress test   Please continue usual home medications, if you develop any new symptoms please report these to her primary care doctor       Thank you for choosing [facility] for your healthcare needs  If I can be of any assistance in the future, please feel to contact me        Dr Janny Hernández

## 2019-12-16 ENCOUNTER — TRANSITIONAL CARE MANAGEMENT (OUTPATIENT)
Dept: INTERNAL MEDICINE CLINIC | Facility: CLINIC | Age: 64
End: 2019-12-16

## 2019-12-17 ENCOUNTER — TRANSITIONAL CARE MANAGEMENT (OUTPATIENT)
Dept: INTERNAL MEDICINE CLINIC | Facility: CLINIC | Age: 64
End: 2019-12-17

## 2019-12-20 ENCOUNTER — OFFICE VISIT (OUTPATIENT)
Dept: INTERNAL MEDICINE CLINIC | Facility: CLINIC | Age: 64
End: 2019-12-20
Payer: COMMERCIAL

## 2019-12-20 VITALS
HEIGHT: 60 IN | WEIGHT: 198 LBS | TEMPERATURE: 98 F | DIASTOLIC BLOOD PRESSURE: 86 MMHG | BODY MASS INDEX: 38.87 KG/M2 | SYSTOLIC BLOOD PRESSURE: 158 MMHG | RESPIRATION RATE: 12 BRPM | HEART RATE: 84 BPM

## 2019-12-20 DIAGNOSIS — E11.9 TYPE 2 DIABETES MELLITUS WITHOUT COMPLICATION, WITHOUT LONG-TERM CURRENT USE OF INSULIN (HCC): Primary | ICD-10-CM

## 2019-12-20 DIAGNOSIS — I10 ESSENTIAL HYPERTENSION: ICD-10-CM

## 2019-12-20 DIAGNOSIS — J45.20 MILD INTERMITTENT ASTHMA WITHOUT COMPLICATION: ICD-10-CM

## 2019-12-20 DIAGNOSIS — E04.1 THYROID NODULE: ICD-10-CM

## 2019-12-20 DIAGNOSIS — R07.89 ATYPICAL CHEST PAIN: ICD-10-CM

## 2019-12-20 DIAGNOSIS — E78.00 PURE HYPERCHOLESTEROLEMIA: ICD-10-CM

## 2019-12-20 DIAGNOSIS — E03.9 ACQUIRED HYPOTHYROIDISM: ICD-10-CM

## 2019-12-20 PROCEDURE — 99495 TRANSJ CARE MGMT MOD F2F 14D: CPT | Performed by: INTERNAL MEDICINE

## 2019-12-20 RX ORDER — LOSARTAN POTASSIUM 50 MG/1
50 TABLET ORAL DAILY
Qty: 30 TABLET | Refills: 4 | Status: SHIPPED | OUTPATIENT
Start: 2019-12-20 | End: 2020-08-07

## 2019-12-20 RX ORDER — ATORVASTATIN CALCIUM 40 MG/1
40 TABLET, FILM COATED ORAL DAILY
Qty: 30 TABLET | Refills: 4 | Status: SHIPPED | OUTPATIENT
Start: 2019-12-20 | End: 2020-09-20

## 2019-12-20 RX ORDER — OMEPRAZOLE 20 MG/1
20 CAPSULE, DELAYED RELEASE ORAL DAILY
Qty: 30 CAPSULE | Refills: 3 | Status: SHIPPED | OUTPATIENT
Start: 2019-12-20 | End: 2020-06-29 | Stop reason: SDUPTHER

## 2019-12-20 RX ORDER — METOPROLOL SUCCINATE 25 MG/1
25 TABLET, EXTENDED RELEASE ORAL DAILY
Qty: 30 TABLET | Refills: 4 | Status: SHIPPED | OUTPATIENT
Start: 2019-12-20 | End: 2020-03-31 | Stop reason: SDUPTHER

## 2019-12-20 RX ORDER — LEVOTHYROXINE SODIUM 0.15 MG/1
150 TABLET ORAL DAILY
Qty: 30 TABLET | Refills: 4 | Status: SHIPPED | OUTPATIENT
Start: 2019-12-20 | End: 2020-08-03

## 2019-12-20 RX ORDER — GLIMEPIRIDE 4 MG/1
4 TABLET ORAL
Qty: 30 TABLET | Refills: 4 | Status: SHIPPED | OUTPATIENT
Start: 2019-12-20 | End: 2020-03-31 | Stop reason: SDUPTHER

## 2019-12-20 NOTE — PROGRESS NOTES
Assessment/Plan:  Admission cardia workup negative no evidence of MI Myoview stress test pending    Ultrasound of the thyroid all order to follow-up thyroid nodules history of that was being follow in [address] out of insulin all medications were renew  Will see her back in 4-6 weeks    Patient was admitted to the hospital because of chest pain  On December 12 this discharge on December 13    Final diagnosis    Chest pain atypical no evidence of myocardial infarction  Mild intermittent asthma  Hyperlipidemia  Hypertension  Diabetes  Hypothyroidism she tells me she had a history of thyroid nodule will follow up with an ultrasound of the thyroid  Type 2 diabetes she ran out of her insulin sugars in the afternoon or elevated I read reorder her diabetic medications at day cut down the glimepiride to 4 mg daily to avoid hypoglycemia        Came into with atypical chest pain I refer her to the ER for further evaluation good news the troponins were negative she has been scheduled for a Myoview stress test the done in the near future will find out the date and tell her    I renew most of her medications that she needed she was running out of her diabetes and blood pressure medication and medications for her cholesterol    Hemoglobin was stable in the hospital at hemoglobin 12 hematocrit 36 normal white count of a 1000 platelet count [id number]    Electrolytes normal including a sodium 141 potassium 3 9 BUN 10 creatinine 0 9 calcium 9 4 normal liver function studies glucose  1 65    Troponins were all negative no evidence of MI  Chest x-ray no acute pulmonary disease              Pneumococcal vaccine Number [id number] followed by the Prevnar vaccine a year later      TCM Call (since 11/19/2019)     Date and time call was made  12/17/2019 11:46 AM    [facility]    Date of Admission  12/12/19    Date of discharge  12/13/19    Diagnosis  atypical chest pain    Disposition  Home    Were the patients medications reviewed and updated  Yes    Current Symptoms  None      TCM Call (since 11/19/2019)     Should patient be enrolled in anticoag monitoring? No    Scheduled for follow up? Yes    I have advised the patient to call PCP with any new or worsening symptoms  LA Avina MA          No problem-specific Assessment & Plan notes found for this encounter  Diagnoses and all orders for this visit:    Type 2 diabetes mellitus without complication, without long-term current use of insulin (HCC)  -     insulin glargine (LANTUS SOLOSTAR) 100 units/mL injection pen; 35 units in the morning and 15 units in the evening  -     atorvastatin (LIPITOR) 40 mg tablet; Take 1 tablet (40 mg total) by mouth daily  -     glimepiride (AMARYL) 4 mg tablet; Take 1 tablet (4 mg total) by mouth daily with breakfast  -     metFORMIN (GLUCOPHAGE) 1000 MG tablet; Take 1 tablet (1,000 mg total) by mouth 2 (two) times a day with meals  -     omeprazole (PriLOSEC) 20 mg delayed release capsule; Take 1 capsule (20 mg total) by mouth daily  -     losartan (COZAAR) 50 mg tablet; Take 1 tablet (50 mg total) by mouth daily  -     metoprolol succinate (TOPROL-XL) 25 mg 24 hr tablet; Take 1 tablet (25 mg total) by mouth daily  -     levothyroxine 150 mcg tablet; Take 1 tablet (150 mcg total) by mouth daily    Acquired hypothyroidism  -     atorvastatin (LIPITOR) 40 mg tablet; Take 1 tablet (40 mg total) by mouth daily  -     glimepiride (AMARYL) 4 mg tablet; Take 1 tablet (4 mg total) by mouth daily with breakfast  -     metFORMIN (GLUCOPHAGE) 1000 MG tablet; Take 1 tablet (1,000 mg total) by mouth 2 (two) times a day with meals  -     omeprazole (PriLOSEC) 20 mg delayed release capsule; Take 1 capsule (20 mg total) by mouth daily  -     losartan (COZAAR) 50 mg tablet; Take 1 tablet (50 mg total) by mouth daily  -     metoprolol succinate (TOPROL-XL) 25 mg 24 hr tablet;  Take 1 tablet (25 mg total) by mouth daily  -     levothyroxine 150 mcg tablet; Take 1 tablet (150 mcg total) by mouth daily    Essential hypertension  -     atorvastatin (LIPITOR) 40 mg tablet; Take 1 tablet (40 mg total) by mouth daily  -     glimepiride (AMARYL) 4 mg tablet; Take 1 tablet (4 mg total) by mouth daily with breakfast  -     metFORMIN (GLUCOPHAGE) 1000 MG tablet; Take 1 tablet (1,000 mg total) by mouth 2 (two) times a day with meals  -     omeprazole (PriLOSEC) 20 mg delayed release capsule; Take 1 capsule (20 mg total) by mouth daily  -     losartan (COZAAR) 50 mg tablet; Take 1 tablet (50 mg total) by mouth daily  -     metoprolol succinate (TOPROL-XL) 25 mg 24 hr tablet; Take 1 tablet (25 mg total) by mouth daily  -     levothyroxine 150 mcg tablet; Take 1 tablet (150 mcg total) by mouth daily    Atypical chest pain  -     atorvastatin (LIPITOR) 40 mg tablet; Take 1 tablet (40 mg total) by mouth daily  -     glimepiride (AMARYL) 4 mg tablet; Take 1 tablet (4 mg total) by mouth daily with breakfast  -     metFORMIN (GLUCOPHAGE) 1000 MG tablet; Take 1 tablet (1,000 mg total) by mouth 2 (two) times a day with meals  -     omeprazole (PriLOSEC) 20 mg delayed release capsule; Take 1 capsule (20 mg total) by mouth daily  -     losartan (COZAAR) 50 mg tablet; Take 1 tablet (50 mg total) by mouth daily  -     metoprolol succinate (TOPROL-XL) 25 mg 24 hr tablet; Take 1 tablet (25 mg total) by mouth daily  -     levothyroxine 150 mcg tablet; Take 1 tablet (150 mcg total) by mouth daily    Pure hypercholesterolemia  -     atorvastatin (LIPITOR) 40 mg tablet; Take 1 tablet (40 mg total) by mouth daily  -     glimepiride (AMARYL) 4 mg tablet; Take 1 tablet (4 mg total) by mouth daily with breakfast  -     metFORMIN (GLUCOPHAGE) 1000 MG tablet; Take 1 tablet (1,000 mg total) by mouth 2 (two) times a day with meals  -     omeprazole (PriLOSEC) 20 mg delayed release capsule; Take 1 capsule (20 mg total) by mouth daily  -     losartan (COZAAR) 50 mg tablet;  Take 1 tablet (50 mg total) by mouth daily  -     metoprolol succinate (TOPROL-XL) 25 mg 24 hr tablet; Take 1 tablet (25 mg total) by mouth daily  -     levothyroxine 150 mcg tablet; Take 1 tablet (150 mcg total) by mouth daily    Mild intermittent asthma without complication  -     atorvastatin (LIPITOR) 40 mg tablet; Take 1 tablet (40 mg total) by mouth daily  -     glimepiride (AMARYL) 4 mg tablet; Take 1 tablet (4 mg total) by mouth daily with breakfast  -     metFORMIN (GLUCOPHAGE) 1000 MG tablet; Take 1 tablet (1,000 mg total) by mouth 2 (two) times a day with meals  -     omeprazole (PriLOSEC) 20 mg delayed release capsule; Take 1 capsule (20 mg total) by mouth daily  -     losartan (COZAAR) 50 mg tablet; Take 1 tablet (50 mg total) by mouth daily  -     metoprolol succinate (TOPROL-XL) 25 mg 24 hr tablet; Take 1 tablet (25 mg total) by mouth daily  -     levothyroxine 150 mcg tablet; Take 1 tablet (150 mcg total) by mouth daily    Thyroid nodule  -     US thyroid; Future          Subjective:      Patient ID: Sri Bhagat is a 59 y o  female  Chief Complaint   Patient presents with    Transition of Care Management     D/C [facility] 12 13 19 for atypical chest pain   Groin Pain     groin pain on he right side        Tingling     left arm         Current Outpatient Medications:     albuterol (2 5 mg/3 mL) 0 083 % nebulizer solution, Take 1 vial (2 5 mg total) by nebulization every 6 (six) hours as needed for wheezing or shortness of breath, Disp: 60 vial, Rfl: 5    albuterol (PROVENTIL HFA,VENTOLIN HFA) 90 mcg/act inhaler, Inhale 2 puffs every 6 (six) hours as needed for wheezing, Disp: , Rfl:     aspirin 81 mg chewable tablet, Chew 81 mg daily, Disp: , Rfl:     atorvastatin (LIPITOR) 40 mg tablet, Take 1 tablet (40 mg total) by mouth daily, Disp: 30 tablet, Rfl: 4    budesonide-formoterol (SYMBICORT) 160-4 5 mcg/act inhaler, Inhale 2 puffs 2 (two) times a day Rinse mouth after use , Disp: , Rfl:     ezetimibe (ZETIA) 10 mg tablet, Take 10 mg by mouth daily, Disp: , Rfl:     fluticasone (FLONASE) 50 mcg/act nasal spray, 1 spray into each nostril daily, Disp: , Rfl:     furosemide (LASIX) 20 mg tablet, Take 20 mg by mouth 2 (two) times a day as needed, Disp: , Rfl:     glimepiride (AMARYL) 4 mg tablet, Take 1 tablet (4 mg total) by mouth daily with breakfast, Disp: 30 tablet, Rfl: 4    indapamide (LOZOL) 1 25 mg tablet, Take 1 tablet (1 25 mg total) by mouth every morning, Disp: 30 tablet, Rfl: 3    insulin glargine (LANTUS SOLOSTAR) 100 units/mL injection pen, 35 units in the morning and 15 units in the evening, Disp: 5 pen, Rfl: 1    levothyroxine 150 mcg tablet, Take 1 tablet (150 mcg total) by mouth daily, Disp: 30 tablet, Rfl: 4    losartan (COZAAR) 50 mg tablet, Take 1 tablet (50 mg total) by mouth daily, Disp: 30 tablet, Rfl: 4    metFORMIN (GLUCOPHAGE) 1000 MG tablet, Take 1 tablet (1,000 mg total) by mouth 2 (two) times a day with meals, Disp: 60 tablet, Rfl: 3    metoprolol succinate (TOPROL-XL) 25 mg 24 hr tablet, Take 1 tablet (25 mg total) by mouth daily, Disp: 30 tablet, Rfl: 4    montelukast (SINGULAIR) 10 mg tablet, Take 10 mg by mouth daily at bedtime, Disp: , Rfl:     omeprazole (PriLOSEC) 20 mg delayed release capsule, Take 1 capsule (20 mg total) by mouth daily, Disp: 30 capsule, Rfl: 3    HPI    The following portions of the patient's history were reviewed and updated as appropriate: allergies, current medications, past family history, past medical history, past social history, past surgical history and problem list     Review of Systems   Constitutional: Negative  Negative for activity change, appetite change, fatigue, fever and unexpected weight change  HENT: Negative for congestion, ear pain, hearing loss, mouth sores, postnasal drip, rhinorrhea, sore throat, trouble swallowing and voice change  Eyes: Negative for pain, redness and visual disturbance     Respiratory: Negative for cough, chest tightness, shortness of breath and wheezing  Cardiovascular: Negative for chest pain, palpitations and leg swelling  Gastrointestinal: Negative for abdominal distention, abdominal pain, blood in stool, constipation, diarrhea and nausea  Endocrine: Negative for cold intolerance, heat intolerance, polydipsia, polyphagia and polyuria  Genitourinary: Negative for difficulty urinating, dysuria, flank pain, frequency, hematuria and urgency  Musculoskeletal: Negative for arthralgias, back pain, gait problem, joint swelling and myalgias  Some discomfort in the left arm appears to be muscular in nature she had good reflexes in the upper extremities   Skin: Negative for color change and pallor  Neurological: Negative for dizziness, tremors, seizures, syncope, weakness, numbness and headaches  Hematological: Negative for adenopathy  Does not bruise/bleed easily  Psychiatric/Behavioral: Negative  Negative for sleep disturbance  The patient is not nervous/anxious  Objective:    /86 (BP Location: Left arm, Patient Position: Sitting, Cuff Size: Large)   Pulse 84   Temp 98 °F (36 7 °C)   Resp 12   Ht 5' (1 524 m)   Wt 89 8 kg (198 lb)   BMI 38 67 kg/m²      Physical Exam   Constitutional: She is oriented to person, place, and time  She appears well-developed and well-nourished  HENT:   Head: Normocephalic  Right Ear: External ear normal    Left Ear: External ear normal    Nose: Nose normal    Mouth/Throat: Oropharynx is clear and moist  No oropharyngeal exudate  Eyes: Pupils are equal, round, and reactive to light  Conjunctivae and EOM are normal    Neck: Normal range of motion  Neck supple  No thyromegaly present  Cardiovascular: Normal rate, regular rhythm, normal heart sounds and intact distal pulses  Exam reveals no gallop and no friction rub  No murmur heard    S1-S2 regular rhythm  Extremities no edema   Pulmonary/Chest: Effort normal and breath sounds normal  No respiratory distress  She has no wheezes  She has no rales  Lungs are clear no wheezing rales or rhonchi   Abdominal: Soft  Bowel sounds are normal  She exhibits no distension and no mass  There is no tenderness  There is no rebound and no guarding  Obese soft nontender   Musculoskeletal: Normal range of motion  She exhibits no edema, tenderness or deformity  Lymphadenopathy:     She has no cervical adenopathy  Neurological: She is alert and oriented to person, place, and time  She displays normal reflexes  No cranial nerve deficit or sensory deficit  She exhibits normal muscle tone  Coordination normal    Skin: Skin is warm and dry  Psychiatric: She has a normal mood and affect  Her behavior is normal  Judgment normal    Nursing note and vitals reviewed

## 2019-12-23 ENCOUNTER — HOSPITAL ENCOUNTER (OUTPATIENT)
Dept: ULTRASOUND IMAGING | Facility: HOSPITAL | Age: 64
Discharge: HOME | End: 2019-12-23
Attending: INTERNAL MEDICINE
Payer: COMMERCIAL

## 2019-12-23 DIAGNOSIS — E04.1 THYROID NODULE: ICD-10-CM

## 2019-12-23 DIAGNOSIS — E11.9 TYPE 2 DIABETES MELLITUS WITHOUT COMPLICATION, WITHOUT LONG-TERM CURRENT USE OF INSULIN (HCC): Primary | ICD-10-CM

## 2019-12-23 PROCEDURE — 76536 US EXAM OF HEAD AND NECK: CPT

## 2019-12-26 DIAGNOSIS — E11.9 TYPE 2 DIABETES MELLITUS WITHOUT COMPLICATION, WITHOUT LONG-TERM CURRENT USE OF INSULIN (HCC): Primary | ICD-10-CM

## 2020-01-06 ENCOUNTER — TELEPHONE (OUTPATIENT)
Dept: INTERNAL MEDICINE CLINIC | Facility: CLINIC | Age: 65
End: 2020-01-06

## 2020-01-06 NOTE — TELEPHONE ENCOUNTER
----- Message from Mayank Lynch MD sent at 12/30/2019  6:57 AM EST -----  Normal results ultrasound of the thyroid no nodules found good news

## 2020-01-10 LAB
LEFT EYE DIABETIC RETINOPATHY: NORMAL
RIGHT EYE DIABETIC RETINOPATHY: NORMAL

## 2020-01-10 PROCEDURE — 2022F DILAT RTA XM EVC RTNOPTHY: CPT | Performed by: INTERNAL MEDICINE

## 2020-01-20 ENCOUNTER — HOSPITAL ENCOUNTER (OUTPATIENT)
Dept: NUCLEAR MEDICINE | Facility: HOSPITAL | Age: 65
Discharge: HOME | End: 2020-01-20
Attending: INTERNAL MEDICINE
Payer: COMMERCIAL

## 2020-01-20 ENCOUNTER — HOSPITAL ENCOUNTER (OUTPATIENT)
Dept: NON INVASIVE DIAGNOSTICS | Facility: HOSPITAL | Age: 65
Discharge: HOME | End: 2020-01-20
Attending: INTERNAL MEDICINE
Payer: COMMERCIAL

## 2020-01-20 DIAGNOSIS — R07.9 CHEST PAIN: ICD-10-CM

## 2020-01-20 LAB
CHEST PAIN STATEMENT: NORMAL
MAX DIASTOLIC BP: 94 MMHG
MAX HEART RATE: 113 BPM
MAX PREDICTED HEART RATE: 156 BPM
MAX. SYSTOLIC BP: 184 MMHG
PROTOCOL NAME: NORMAL
REASON FOR TERMINATION: NORMAL
TARGET HR FORMULA: NORMAL
TEST INDICATION: NORMAL
TIME IN EXERCISE PHASE: NORMAL

## 2020-01-20 PROCEDURE — 93018 CV STRESS TEST I&R ONLY: CPT | Performed by: INTERNAL MEDICINE

## 2020-01-20 PROCEDURE — 78452 HT MUSCLE IMAGE SPECT MULT: CPT

## 2020-01-20 PROCEDURE — 93016 CV STRESS TEST SUPVJ ONLY: CPT | Performed by: INTERNAL MEDICINE

## 2020-01-20 PROCEDURE — 93017 CV STRESS TEST TRACING ONLY: CPT

## 2020-01-20 PROCEDURE — 78452 HT MUSCLE IMAGE SPECT MULT: CPT | Performed by: INTERNAL MEDICINE

## 2020-01-20 PROCEDURE — A9502 TC99M TETROFOSMIN: HCPCS

## 2020-01-20 RX ADMIN — REGADENOSON 0.4 MG: 0.08 INJECTION, SOLUTION INTRAVENOUS at 09:14

## 2020-02-11 ENCOUNTER — TELEPHONE (OUTPATIENT)
Dept: INTERNAL MEDICINE CLINIC | Facility: CLINIC | Age: 65
End: 2020-02-11

## 2020-02-13 ENCOUNTER — TELEPHONE (OUTPATIENT)
Dept: INTERNAL MEDICINE CLINIC | Facility: CLINIC | Age: 65
End: 2020-02-13

## 2020-02-18 ENCOUNTER — HOSPITAL ENCOUNTER (INPATIENT)
Facility: HOSPITAL | Age: 65
LOS: 5 days | Discharge: HOME | DRG: 872 | End: 2020-02-23
Attending: EMERGENCY MEDICINE | Admitting: HOSPITALIST
Payer: COMMERCIAL

## 2020-02-18 ENCOUNTER — APPOINTMENT (EMERGENCY)
Dept: RADIOLOGY | Facility: HOSPITAL | Age: 65
DRG: 872 | End: 2020-02-18
Payer: COMMERCIAL

## 2020-02-18 DIAGNOSIS — J45.901 ACUTE ASTHMA EXACERBATION: Primary | ICD-10-CM

## 2020-02-18 DIAGNOSIS — J10.1 INFLUENZA A: ICD-10-CM

## 2020-02-18 DIAGNOSIS — E11.9 TYPE 2 DIABETES MELLITUS WITHOUT COMPLICATION, WITHOUT LONG-TERM CURRENT USE OF INSULIN (HCC): ICD-10-CM

## 2020-02-18 DIAGNOSIS — R50.9 FEVER: ICD-10-CM

## 2020-02-18 DIAGNOSIS — J45.21 MILD INTERMITTENT ASTHMA WITH ACUTE EXACERBATION: ICD-10-CM

## 2020-02-18 LAB
ALBUMIN SERPL BCP-MCNC: 3.7 G/DL (ref 3.5–5)
ALP SERPL-CCNC: 112 U/L (ref 46–116)
ALT SERPL W P-5'-P-CCNC: 21 U/L (ref 12–78)
ANION GAP SERPL CALCULATED.3IONS-SCNC: 14 MMOL/L (ref 4–13)
APTT PPP: 28 SECONDS (ref 23–37)
AST SERPL W P-5'-P-CCNC: 16 U/L (ref 5–45)
BACTERIA UR QL AUTO: ABNORMAL /HPF
BASOPHILS # BLD AUTO: 0.04 THOUSANDS/ΜL (ref 0–0.1)
BASOPHILS NFR BLD AUTO: 0 % (ref 0–1)
BILIRUB SERPL-MCNC: 0.27 MG/DL (ref 0.2–1)
BILIRUB UR QL STRIP: NEGATIVE
BUN SERPL-MCNC: 14 MG/DL (ref 5–25)
CALCIUM SERPL-MCNC: 9.1 MG/DL (ref 8.3–10.1)
CHLORIDE SERPL-SCNC: 99 MMOL/L (ref 100–108)
CLARITY UR: CLEAR
CO2 SERPL-SCNC: 25 MMOL/L (ref 21–32)
COLOR UR: YELLOW
CREAT SERPL-MCNC: 1.03 MG/DL (ref 0.6–1.3)
EOSINOPHIL # BLD AUTO: 0.02 THOUSAND/ΜL (ref 0–0.61)
EOSINOPHIL NFR BLD AUTO: 0 % (ref 0–6)
ERYTHROCYTE [DISTWIDTH] IN BLOOD BY AUTOMATED COUNT: 12.7 % (ref 11.6–15.1)
GFR SERPL CREATININE-BSD FRML MDRD: 58 ML/MIN/1.73SQ M
GLUCOSE SERPL-MCNC: 275 MG/DL (ref 65–140)
GLUCOSE UR STRIP-MCNC: ABNORMAL MG/DL
HCT VFR BLD AUTO: 37.1 % (ref 34.8–46.1)
HGB BLD-MCNC: 12.5 G/DL (ref 11.5–15.4)
HGB UR QL STRIP.AUTO: NEGATIVE
IMM GRANULOCYTES # BLD AUTO: 0.06 THOUSAND/UL (ref 0–0.2)
IMM GRANULOCYTES NFR BLD AUTO: 1 % (ref 0–2)
INR PPP: 0.92 (ref 0.84–1.19)
KETONES UR STRIP-MCNC: ABNORMAL MG/DL
LACTATE SERPL-SCNC: 3 MMOL/L (ref 0.5–2)
LACTATE SERPL-SCNC: 3.5 MMOL/L (ref 0.5–2)
LEUKOCYTE ESTERASE UR QL STRIP: ABNORMAL
LYMPHOCYTES # BLD AUTO: 0.83 THOUSANDS/ΜL (ref 0.6–4.47)
LYMPHOCYTES NFR BLD AUTO: 7 % (ref 14–44)
MCH RBC QN AUTO: 28.8 PG (ref 26.8–34.3)
MCHC RBC AUTO-ENTMCNC: 33.7 G/DL (ref 31.4–37.4)
MCV RBC AUTO: 86 FL (ref 82–98)
MONOCYTES # BLD AUTO: 0.65 THOUSAND/ΜL (ref 0.17–1.22)
MONOCYTES NFR BLD AUTO: 5 % (ref 4–12)
NEUTROPHILS # BLD AUTO: 10.52 THOUSANDS/ΜL (ref 1.85–7.62)
NEUTS SEG NFR BLD AUTO: 87 % (ref 43–75)
NITRITE UR QL STRIP: NEGATIVE
NON-SQ EPI CELLS URNS QL MICRO: ABNORMAL /HPF
NRBC BLD AUTO-RTO: 0 /100 WBCS
OTHER STN SPEC: ABNORMAL
PH UR STRIP.AUTO: 5.5 [PH]
PLATELET # BLD AUTO: 306 THOUSANDS/UL (ref 149–390)
PMV BLD AUTO: 10 FL (ref 8.9–12.7)
POTASSIUM SERPL-SCNC: 4.2 MMOL/L (ref 3.5–5.3)
PROCALCITONIN SERPL-MCNC: <0.05 NG/ML
PROT SERPL-MCNC: 7.9 G/DL (ref 6.4–8.2)
PROT UR STRIP-MCNC: NEGATIVE MG/DL
PROTHROMBIN TIME: 12.5 SECONDS (ref 11.6–14.5)
RBC # BLD AUTO: 4.34 MILLION/UL (ref 3.81–5.12)
RBC #/AREA URNS AUTO: ABNORMAL /HPF
SODIUM SERPL-SCNC: 138 MMOL/L (ref 136–145)
SP GR UR STRIP.AUTO: 1.02 (ref 1–1.03)
TROPONIN I SERPL-MCNC: 0.02 NG/ML
UROBILINOGEN UR QL STRIP.AUTO: 0.2 E.U./DL
WBC # BLD AUTO: 12.12 THOUSAND/UL (ref 4.31–10.16)
WBC #/AREA URNS AUTO: ABNORMAL /HPF

## 2020-02-18 PROCEDURE — 96361 HYDRATE IV INFUSION ADD-ON: CPT

## 2020-02-18 PROCEDURE — 99223 1ST HOSP IP/OBS HIGH 75: CPT | Performed by: PHYSICIAN ASSISTANT

## 2020-02-18 PROCEDURE — 96375 TX/PRO/DX INJ NEW DRUG ADDON: CPT

## 2020-02-18 PROCEDURE — 71046 X-RAY EXAM CHEST 2 VIEWS: CPT

## 2020-02-18 PROCEDURE — 36415 COLL VENOUS BLD VENIPUNCTURE: CPT

## 2020-02-18 PROCEDURE — 83605 ASSAY OF LACTIC ACID: CPT | Performed by: EMERGENCY MEDICINE

## 2020-02-18 PROCEDURE — 84145 PROCALCITONIN (PCT): CPT | Performed by: EMERGENCY MEDICINE

## 2020-02-18 PROCEDURE — 96365 THER/PROPH/DIAG IV INF INIT: CPT

## 2020-02-18 PROCEDURE — 85025 COMPLETE CBC W/AUTO DIFF WBC: CPT | Performed by: EMERGENCY MEDICINE

## 2020-02-18 PROCEDURE — 81001 URINALYSIS AUTO W/SCOPE: CPT | Performed by: EMERGENCY MEDICINE

## 2020-02-18 PROCEDURE — 99285 EMERGENCY DEPT VISIT HI MDM: CPT | Performed by: EMERGENCY MEDICINE

## 2020-02-18 PROCEDURE — 99285 EMERGENCY DEPT VISIT HI MDM: CPT

## 2020-02-18 PROCEDURE — 87631 RESP VIRUS 3-5 TARGETS: CPT | Performed by: EMERGENCY MEDICINE

## 2020-02-18 PROCEDURE — 93005 ELECTROCARDIOGRAM TRACING: CPT

## 2020-02-18 PROCEDURE — 87040 BLOOD CULTURE FOR BACTERIA: CPT | Performed by: EMERGENCY MEDICINE

## 2020-02-18 PROCEDURE — 94640 AIRWAY INHALATION TREATMENT: CPT

## 2020-02-18 PROCEDURE — 84484 ASSAY OF TROPONIN QUANT: CPT | Performed by: EMERGENCY MEDICINE

## 2020-02-18 PROCEDURE — 85730 THROMBOPLASTIN TIME PARTIAL: CPT | Performed by: EMERGENCY MEDICINE

## 2020-02-18 PROCEDURE — 80053 COMPREHEN METABOLIC PANEL: CPT | Performed by: EMERGENCY MEDICINE

## 2020-02-18 PROCEDURE — 85610 PROTHROMBIN TIME: CPT | Performed by: EMERGENCY MEDICINE

## 2020-02-18 RX ORDER — METHYLPREDNISOLONE SODIUM SUCCINATE 125 MG/2ML
125 INJECTION, POWDER, LYOPHILIZED, FOR SOLUTION INTRAMUSCULAR; INTRAVENOUS ONCE
Status: COMPLETED | OUTPATIENT
Start: 2020-02-18 | End: 2020-02-18

## 2020-02-18 RX ORDER — SODIUM CHLORIDE FOR INHALATION 0.9 %
3 VIAL, NEBULIZER (ML) INHALATION ONCE
Status: COMPLETED | OUTPATIENT
Start: 2020-02-18 | End: 2020-02-18

## 2020-02-18 RX ORDER — ACETAMINOPHEN 325 MG/1
650 TABLET ORAL ONCE
Status: COMPLETED | OUTPATIENT
Start: 2020-02-18 | End: 2020-02-18

## 2020-02-18 RX ORDER — DOXYCYCLINE HYCLATE 100 MG/1
100 CAPSULE ORAL ONCE
Status: COMPLETED | OUTPATIENT
Start: 2020-02-18 | End: 2020-02-18

## 2020-02-18 RX ADMIN — ISODIUM CHLORIDE 3 ML: 0.03 SOLUTION RESPIRATORY (INHALATION) at 22:48

## 2020-02-18 RX ADMIN — ISODIUM CHLORIDE 3 ML: 0.03 SOLUTION RESPIRATORY (INHALATION) at 21:21

## 2020-02-18 RX ADMIN — DOXYCYCLINE 100 MG: 100 CAPSULE ORAL at 21:37

## 2020-02-18 RX ADMIN — ALBUTEROL SULFATE 10 MG: 2.5 SOLUTION RESPIRATORY (INHALATION) at 21:21

## 2020-02-18 RX ADMIN — ALBUTEROL SULFATE 10 MG: 2.5 SOLUTION RESPIRATORY (INHALATION) at 22:47

## 2020-02-18 RX ADMIN — ACETAMINOPHEN 650 MG: 325 TABLET ORAL at 20:51

## 2020-02-18 RX ADMIN — IPRATROPIUM BROMIDE 1 MG: 0.5 SOLUTION RESPIRATORY (INHALATION) at 21:21

## 2020-02-18 RX ADMIN — METHYLPREDNISOLONE SODIUM SUCCINATE 125 MG: 125 INJECTION, POWDER, FOR SOLUTION INTRAMUSCULAR; INTRAVENOUS at 21:28

## 2020-02-18 RX ADMIN — SODIUM CHLORIDE 500 ML: 0.9 INJECTION, SOLUTION INTRAVENOUS at 21:41

## 2020-02-18 RX ADMIN — CEFTRIAXONE 2000 MG: 2 INJECTION, POWDER, FOR SOLUTION INTRAMUSCULAR; INTRAVENOUS at 21:33

## 2020-02-18 RX ADMIN — IPRATROPIUM BROMIDE 1 MG: 0.5 SOLUTION RESPIRATORY (INHALATION) at 22:48

## 2020-02-18 RX ADMIN — SODIUM CHLORIDE 1000 ML: 0.9 INJECTION, SOLUTION INTRAVENOUS at 20:49

## 2020-02-19 PROBLEM — R65.20 SEVERE SEPSIS (HCC): Status: ACTIVE | Noted: 2020-02-19

## 2020-02-19 PROBLEM — J10.1 INFLUENZA A: Status: ACTIVE | Noted: 2020-02-19

## 2020-02-19 PROBLEM — A41.9 SEPSIS (HCC): Status: ACTIVE | Noted: 2020-02-19

## 2020-02-19 PROBLEM — R91.8 LUNG INFILTRATE: Status: ACTIVE | Noted: 2020-02-19

## 2020-02-19 LAB
ATRIAL RATE: 128 BPM
FLUAV RNA NPH QL NAA+PROBE: DETECTED
FLUBV RNA NPH QL NAA+PROBE: ABNORMAL
GLUCOSE SERPL-MCNC: 240 MG/DL (ref 65–140)
GLUCOSE SERPL-MCNC: 248 MG/DL (ref 65–140)
GLUCOSE SERPL-MCNC: 286 MG/DL (ref 65–140)
GLUCOSE SERPL-MCNC: 313 MG/DL (ref 65–140)
GLUCOSE SERPL-MCNC: 407 MG/DL (ref 65–140)
LACTATE SERPL-SCNC: 2 MMOL/L (ref 0.5–2)
LACTATE SERPL-SCNC: 2.3 MMOL/L (ref 0.5–2)
LACTATE SERPL-SCNC: 2.6 MMOL/L (ref 0.5–2)
LACTATE SERPL-SCNC: 5.1 MMOL/L (ref 0.5–2)
LACTATE SERPL-SCNC: 5.3 MMOL/L (ref 0.5–2)
P AXIS: 79 DEGREES
PR INTERVAL: 136 MS
PROCALCITONIN SERPL-MCNC: <0.05 NG/ML
QRS AXIS: 70 DEGREES
QRSD INTERVAL: 80 MS
QT INTERVAL: 318 MS
QTC INTERVAL: 464 MS
RSV RNA NPH QL NAA+PROBE: ABNORMAL
T WAVE AXIS: 58 DEGREES
VENTRICULAR RATE: 128 BPM

## 2020-02-19 PROCEDURE — 84145 PROCALCITONIN (PCT): CPT | Performed by: FAMILY MEDICINE

## 2020-02-19 PROCEDURE — 87449 NOS EACH ORGANISM AG IA: CPT | Performed by: FAMILY MEDICINE

## 2020-02-19 PROCEDURE — 82948 REAGENT STRIP/BLOOD GLUCOSE: CPT

## 2020-02-19 PROCEDURE — 94640 AIRWAY INHALATION TREATMENT: CPT

## 2020-02-19 PROCEDURE — 99232 SBSQ HOSP IP/OBS MODERATE 35: CPT | Performed by: FAMILY MEDICINE

## 2020-02-19 PROCEDURE — 83605 ASSAY OF LACTIC ACID: CPT | Performed by: PHYSICIAN ASSISTANT

## 2020-02-19 PROCEDURE — 93010 ELECTROCARDIOGRAM REPORT: CPT | Performed by: INTERNAL MEDICINE

## 2020-02-19 PROCEDURE — 83605 ASSAY OF LACTIC ACID: CPT | Performed by: HOSPITALIST

## 2020-02-19 PROCEDURE — 36415 COLL VENOUS BLD VENIPUNCTURE: CPT | Performed by: PHYSICIAN ASSISTANT

## 2020-02-19 PROCEDURE — 83605 ASSAY OF LACTIC ACID: CPT | Performed by: FAMILY MEDICINE

## 2020-02-19 PROCEDURE — 94760 N-INVAS EAR/PLS OXIMETRY 1: CPT

## 2020-02-19 RX ORDER — LEVOTHYROXINE SODIUM 0.07 MG/1
150 TABLET ORAL
Status: DISCONTINUED | OUTPATIENT
Start: 2020-02-19 | End: 2020-02-23 | Stop reason: HOSPADM

## 2020-02-19 RX ORDER — SODIUM CHLORIDE 9 MG/ML
125 INJECTION, SOLUTION INTRAVENOUS CONTINUOUS
Status: DISCONTINUED | OUTPATIENT
Start: 2020-02-19 | End: 2020-02-20

## 2020-02-19 RX ORDER — ASPIRIN 81 MG/1
81 TABLET, CHEWABLE ORAL DAILY
Status: DISCONTINUED | OUTPATIENT
Start: 2020-02-19 | End: 2020-02-23 | Stop reason: HOSPADM

## 2020-02-19 RX ORDER — ALBUTEROL SULFATE 90 UG/1
2 AEROSOL, METERED RESPIRATORY (INHALATION) EVERY 6 HOURS PRN
Status: DISCONTINUED | OUTPATIENT
Start: 2020-02-19 | End: 2020-02-23 | Stop reason: HOSPADM

## 2020-02-19 RX ORDER — INSULIN GLARGINE 100 [IU]/ML
35 INJECTION, SOLUTION SUBCUTANEOUS EVERY MORNING
Status: DISCONTINUED | OUTPATIENT
Start: 2020-02-20 | End: 2020-02-20

## 2020-02-19 RX ORDER — PANTOPRAZOLE SODIUM 40 MG/1
40 TABLET, DELAYED RELEASE ORAL
Status: DISCONTINUED | OUTPATIENT
Start: 2020-02-19 | End: 2020-02-19

## 2020-02-19 RX ORDER — LEVALBUTEROL 1.25 MG/.5ML
1.25 SOLUTION, CONCENTRATE RESPIRATORY (INHALATION)
Status: DISCONTINUED | OUTPATIENT
Start: 2020-02-19 | End: 2020-02-23 | Stop reason: HOSPADM

## 2020-02-19 RX ORDER — EZETIMIBE 10 MG/1
10 TABLET ORAL DAILY
Status: DISCONTINUED | OUTPATIENT
Start: 2020-02-19 | End: 2020-02-23 | Stop reason: HOSPADM

## 2020-02-19 RX ORDER — BUDESONIDE AND FORMOTEROL FUMARATE DIHYDRATE 160; 4.5 UG/1; UG/1
2 AEROSOL RESPIRATORY (INHALATION) 2 TIMES DAILY
Status: DISCONTINUED | OUTPATIENT
Start: 2020-02-19 | End: 2020-02-23 | Stop reason: HOSPADM

## 2020-02-19 RX ORDER — LOSARTAN POTASSIUM 50 MG/1
50 TABLET ORAL DAILY
Status: DISCONTINUED | OUTPATIENT
Start: 2020-02-19 | End: 2020-02-23 | Stop reason: HOSPADM

## 2020-02-19 RX ORDER — ACETAMINOPHEN 325 MG/1
650 TABLET ORAL EVERY 6 HOURS PRN
Status: DISCONTINUED | OUTPATIENT
Start: 2020-02-19 | End: 2020-02-23 | Stop reason: HOSPADM

## 2020-02-19 RX ORDER — POLYETHYLENE GLYCOL 3350 17 G/17G
17 POWDER, FOR SOLUTION ORAL DAILY PRN
Status: DISCONTINUED | OUTPATIENT
Start: 2020-02-19 | End: 2020-02-23 | Stop reason: HOSPADM

## 2020-02-19 RX ORDER — PANTOPRAZOLE SODIUM 40 MG/1
40 TABLET, DELAYED RELEASE ORAL
Status: DISCONTINUED | OUTPATIENT
Start: 2020-02-19 | End: 2020-02-23 | Stop reason: HOSPADM

## 2020-02-19 RX ORDER — METHYLPREDNISOLONE SODIUM SUCCINATE 40 MG/ML
40 INJECTION, POWDER, LYOPHILIZED, FOR SOLUTION INTRAMUSCULAR; INTRAVENOUS EVERY 8 HOURS
Status: DISCONTINUED | OUTPATIENT
Start: 2020-02-19 | End: 2020-02-23 | Stop reason: HOSPADM

## 2020-02-19 RX ORDER — ATORVASTATIN CALCIUM 40 MG/1
40 TABLET, FILM COATED ORAL DAILY
Status: DISCONTINUED | OUTPATIENT
Start: 2020-02-19 | End: 2020-02-23 | Stop reason: HOSPADM

## 2020-02-19 RX ORDER — GUAIFENESIN 600 MG
600 TABLET, EXTENDED RELEASE 12 HR ORAL 2 TIMES DAILY
Status: DISCONTINUED | OUTPATIENT
Start: 2020-02-19 | End: 2020-02-23 | Stop reason: HOSPADM

## 2020-02-19 RX ORDER — SODIUM CHLORIDE FOR INHALATION 0.9 %
3 VIAL, NEBULIZER (ML) INHALATION
Status: DISCONTINUED | OUTPATIENT
Start: 2020-02-19 | End: 2020-02-19

## 2020-02-19 RX ORDER — MAGNESIUM HYDROXIDE/ALUMINUM HYDROXICE/SIMETHICONE 120; 1200; 1200 MG/30ML; MG/30ML; MG/30ML
30 SUSPENSION ORAL EVERY 6 HOURS PRN
Status: DISCONTINUED | OUTPATIENT
Start: 2020-02-19 | End: 2020-02-23 | Stop reason: HOSPADM

## 2020-02-19 RX ORDER — INSULIN GLARGINE 100 [IU]/ML
15 INJECTION, SOLUTION SUBCUTANEOUS
Status: DISCONTINUED | OUTPATIENT
Start: 2020-02-19 | End: 2020-02-19

## 2020-02-19 RX ORDER — SODIUM CHLORIDE 9 MG/ML
150 INJECTION, SOLUTION INTRAVENOUS CONTINUOUS
Status: DISPENSED | OUTPATIENT
Start: 2020-02-19 | End: 2020-02-19

## 2020-02-19 RX ORDER — INSULIN GLARGINE 100 [IU]/ML
30 INJECTION, SOLUTION SUBCUTANEOUS EVERY MORNING
Status: DISCONTINUED | OUTPATIENT
Start: 2020-02-19 | End: 2020-02-19

## 2020-02-19 RX ORDER — INSULIN GLARGINE 100 [IU]/ML
20 INJECTION, SOLUTION SUBCUTANEOUS
Status: DISCONTINUED | OUTPATIENT
Start: 2020-02-19 | End: 2020-02-20

## 2020-02-19 RX ORDER — FLUTICASONE PROPIONATE 50 MCG
1 SPRAY, SUSPENSION (ML) NASAL DAILY
Status: DISCONTINUED | OUTPATIENT
Start: 2020-02-19 | End: 2020-02-23 | Stop reason: HOSPADM

## 2020-02-19 RX ORDER — MONTELUKAST SODIUM 10 MG/1
10 TABLET ORAL
Status: DISCONTINUED | OUTPATIENT
Start: 2020-02-19 | End: 2020-02-23 | Stop reason: HOSPADM

## 2020-02-19 RX ORDER — METOPROLOL SUCCINATE 25 MG/1
25 TABLET, EXTENDED RELEASE ORAL DAILY
Status: DISCONTINUED | OUTPATIENT
Start: 2020-02-19 | End: 2020-02-23 | Stop reason: HOSPADM

## 2020-02-19 RX ORDER — OSELTAMIVIR PHOSPHATE 30 MG/1
30 CAPSULE ORAL EVERY 12 HOURS SCHEDULED
Status: DISCONTINUED | OUTPATIENT
Start: 2020-02-19 | End: 2020-02-23 | Stop reason: HOSPADM

## 2020-02-19 RX ADMIN — ACETAMINOPHEN 650 MG: 325 TABLET ORAL at 22:24

## 2020-02-19 RX ADMIN — MONTELUKAST SODIUM 10 MG: 10 TABLET, COATED ORAL at 21:36

## 2020-02-19 RX ADMIN — LEVALBUTEROL HYDROCHLORIDE 1.25 MG: 1.25 SOLUTION, CONCENTRATE RESPIRATORY (INHALATION) at 07:24

## 2020-02-19 RX ADMIN — INSULIN GLARGINE 20 UNITS: 100 INJECTION, SOLUTION SUBCUTANEOUS at 21:36

## 2020-02-19 RX ADMIN — AZITHROMYCIN MONOHYDRATE 500 MG: 500 INJECTION, POWDER, LYOPHILIZED, FOR SOLUTION INTRAVENOUS at 13:32

## 2020-02-19 RX ADMIN — ASPIRIN 81 MG 81 MG: 81 TABLET ORAL at 09:19

## 2020-02-19 RX ADMIN — IPRATROPIUM BROMIDE 0.5 MG: 0.5 SOLUTION RESPIRATORY (INHALATION) at 07:24

## 2020-02-19 RX ADMIN — INSULIN LISPRO 4 UNITS: 100 INJECTION, SOLUTION INTRAVENOUS; SUBCUTANEOUS at 17:33

## 2020-02-19 RX ADMIN — INSULIN LISPRO 6 UNITS: 100 INJECTION, SOLUTION INTRAVENOUS; SUBCUTANEOUS at 03:15

## 2020-02-19 RX ADMIN — LEVALBUTEROL HYDROCHLORIDE 1.25 MG: 1.25 SOLUTION, CONCENTRATE RESPIRATORY (INHALATION) at 19:00

## 2020-02-19 RX ADMIN — METHYLPREDNISOLONE SODIUM SUCCINATE 40 MG: 40 INJECTION, POWDER, FOR SOLUTION INTRAMUSCULAR; INTRAVENOUS at 15:57

## 2020-02-19 RX ADMIN — IPRATROPIUM BROMIDE 0.5 MG: 0.5 SOLUTION RESPIRATORY (INHALATION) at 19:00

## 2020-02-19 RX ADMIN — METHYLPREDNISOLONE SODIUM SUCCINATE 40 MG: 40 INJECTION, POWDER, FOR SOLUTION INTRAMUSCULAR; INTRAVENOUS at 06:09

## 2020-02-19 RX ADMIN — INSULIN LISPRO 6 UNITS: 100 INJECTION, SOLUTION INTRAVENOUS; SUBCUTANEOUS at 13:32

## 2020-02-19 RX ADMIN — LOSARTAN POTASSIUM 50 MG: 50 TABLET, FILM COATED ORAL at 09:19

## 2020-02-19 RX ADMIN — IPRATROPIUM BROMIDE 0.5 MG: 0.5 SOLUTION RESPIRATORY (INHALATION) at 13:07

## 2020-02-19 RX ADMIN — EZETIMIBE 10 MG: 10 TABLET ORAL at 09:19

## 2020-02-19 RX ADMIN — OSELTAMIVIR PHOSPHATE 30 MG: 30 CAPSULE ORAL at 00:43

## 2020-02-19 RX ADMIN — OSELTAMIVIR PHOSPHATE 30 MG: 30 CAPSULE ORAL at 11:06

## 2020-02-19 RX ADMIN — INSULIN LISPRO 3 UNITS: 100 INJECTION, SOLUTION INTRAVENOUS; SUBCUTANEOUS at 21:36

## 2020-02-19 RX ADMIN — METOPROLOL SUCCINATE 25 MG: 25 TABLET, EXTENDED RELEASE ORAL at 09:19

## 2020-02-19 RX ADMIN — INSULIN LISPRO 4 UNITS: 100 INJECTION, SOLUTION INTRAVENOUS; SUBCUTANEOUS at 17:34

## 2020-02-19 RX ADMIN — OSELTAMIVIR PHOSPHATE 30 MG: 30 CAPSULE ORAL at 21:36

## 2020-02-19 RX ADMIN — SODIUM CHLORIDE 125 ML/HR: 0.9 INJECTION, SOLUTION INTRAVENOUS at 18:06

## 2020-02-19 RX ADMIN — BUDESONIDE AND FORMOTEROL FUMARATE DIHYDRATE 2 PUFF: 160; 4.5 AEROSOL RESPIRATORY (INHALATION) at 09:18

## 2020-02-19 RX ADMIN — THIAMINE HYDROCHLORIDE 100 MG: 100 INJECTION, SOLUTION INTRAMUSCULAR; INTRAVENOUS at 12:29

## 2020-02-19 RX ADMIN — FLUTICASONE PROPIONATE 1 SPRAY: 50 SPRAY, METERED NASAL at 09:18

## 2020-02-19 RX ADMIN — ENOXAPARIN SODIUM 40 MG: 40 INJECTION SUBCUTANEOUS at 09:18

## 2020-02-19 RX ADMIN — CEFTRIAXONE SODIUM 1000 MG: 10 INJECTION, POWDER, FOR SOLUTION INTRAVENOUS at 22:20

## 2020-02-19 RX ADMIN — SODIUM CHLORIDE 150 ML/HR: 0.9 INJECTION, SOLUTION INTRAVENOUS at 05:32

## 2020-02-19 RX ADMIN — INSULIN GLARGINE 30 UNITS: 100 INJECTION, SOLUTION SUBCUTANEOUS at 09:20

## 2020-02-19 RX ADMIN — GUAIFENESIN 600 MG: 600 TABLET ORAL at 17:33

## 2020-02-19 RX ADMIN — GUAIFENESIN 600 MG: 600 TABLET ORAL at 09:19

## 2020-02-19 RX ADMIN — THIAMINE HYDROCHLORIDE 100 MG: 100 INJECTION, SOLUTION INTRAMUSCULAR; INTRAVENOUS at 21:36

## 2020-02-19 RX ADMIN — LEVALBUTEROL HYDROCHLORIDE 1.25 MG: 1.25 SOLUTION, CONCENTRATE RESPIRATORY (INHALATION) at 13:07

## 2020-02-19 RX ADMIN — PANTOPRAZOLE SODIUM 40 MG: 40 TABLET, DELAYED RELEASE ORAL at 03:18

## 2020-02-19 RX ADMIN — ACETAMINOPHEN 650 MG: 325 TABLET ORAL at 09:23

## 2020-02-19 RX ADMIN — BUDESONIDE AND FORMOTEROL FUMARATE DIHYDRATE 2 PUFF: 160; 4.5 AEROSOL RESPIRATORY (INHALATION) at 17:36

## 2020-02-19 RX ADMIN — INSULIN LISPRO 8 UNITS: 100 INJECTION, SOLUTION INTRAVENOUS; SUBCUTANEOUS at 09:19

## 2020-02-19 RX ADMIN — INSULIN GLARGINE 15 UNITS: 100 INJECTION, SOLUTION SUBCUTANEOUS at 03:16

## 2020-02-19 RX ADMIN — ATORVASTATIN CALCIUM 40 MG: 40 TABLET, FILM COATED ORAL at 09:19

## 2020-02-19 RX ADMIN — LEVOTHYROXINE SODIUM 150 MCG: 75 TABLET ORAL at 05:36

## 2020-02-19 NOTE — ASSESSMENT & PLAN NOTE
Patient presents with one week of progressively worsening shortness of breath and wheezing  Started on IV solumedrol in ED, continue 40 mg TID  Continue scheduled nebulizers  Given ceftriaxone and doxycycline in ED, will hold off on further antibiotics at this time given negative procalcitonin  Incentive spirometry  Respiratory protocol   Flu pending   Slightly elevated lactic, IV fluids and recheck

## 2020-02-19 NOTE — ED NOTES
Overdue medications not administered at this time d/t not being sent up or verified by pharmacy        Shwetha Urena RN  02/19/20 [id number]

## 2020-02-19 NOTE — PROGRESS NOTES
Progress Note - Ceferino Mays 1955, 59 y o  female MRN: [id number]    Unit/Bed#: [place] [address] -01 Encounter: [id number]    Primary Care Provider: Rod Quinonez MD   Date and time admitted to hospital: 2/18/2020  8:32 PM        * Severe sepsis [facility])  Assessment & Plan  POA  Likely viral sepsis due to influenza infection, also noted for an infiltrate of chest x-ray and possibility of post influenza pneumonia  - patient was meeting criteria with leukocytosis of 12, tachycardia, lactic acidosis  - continue IV fluids  - proceed with Rocephin and azithromycin  - infectious workup    Influenza A  Assessment & Plan  Patient found to be influenza A positive  Started on Tamiflu  Respiratory protocol  Droplet precautions    Mild intermittent asthma with acute exacerbation  Assessment & Plan  Patient presents with one week of progressively worsening shortness of breath and wheezing  Started on IV solumedrol in ED, continue 40 mg TID  Continue scheduled nebulizers  Given ceftriaxone and doxycycline in ED, proceed with Rocephin and azithromycin given and infiltrate on chest x-ray and concern for possible post flu pneumonia in setting of positive flu test  Incentive spirometry  Respiratory protocol   Elevated lactic acid, improved but has not normalized, continue to trend and continue IV fluids      Lung infiltrate  Assessment & Plan  Proceed with Rocephin and azithromycin for concern for post influenza pneumonia in setting of patient's meeting SIRS criteria  Initial procalcitonin negative, repeat in a m    Urine Legionella and strep antigens    Essential hypertension  Assessment & Plan  BP appears controlled at this time  Continue home regimen metoprolol, losartan   Continue to monitor BP     Acquired hypothyroidism  Assessment & Plan  Continue daily levothyroxine    Type 2 diabetes mellitus, without long-term current use of insulin [facility])  Assessment & Plan  Lab Results   Component Value Date    HGBA1C 8 4 (H) 12/02/2019 Recent Labs     20  0302 20  0615 20  1235   POCGLU 407* 313* 286*       Blood Sugar Average: Last 72 hrs:  (P) 239 [id number]   Accu-Cheks markedly elevated with systemic steroid therapy likely contributing  Adjust Lantus - increase to 20 units HS and to 35 units qAM  Add premeal Humalog  Continue sliding scale insulin with accu checks   Diabetic diet       VTE Pharmacologic Prophylaxis:   Pharmacologic: Enoxaparin (Lovenox)  Mechanical VTE Prophylaxis in Place: Yes    Patient Centered Rounds: I have performed bedside rounds with nursing staff today  Discussions with Specialists or Other Care Team Provider: Nursing    Education and Discussions with Family / Patient: patient    Time Spent for Care: 30 minutes  More than 50% of total time spent on counseling and coordination of care as described above  Current Length of Stay: 1 day(s)    Current Patient Status: Inpatient   Certification Statement: The patient will continue to require additional inpatient hospital stay due to IV steroids, antibiotics     Discharge Plan: TBD    Code Status: Level 1 - Full Code      Subjective:   Patient seen and examined  She reports feeling short of breath and wheezing  Was found to be flu positive  No overnight events  Objective:     Vitals:   Temp (24hrs), Av 8 °F (37 7 °C), Min:98 3 °F (36 8 °C), Max:101 °F (38 3 °C)    Temp:  [98 3 °F (36 8 °C)-101 °F (38 3 °C)] 100 °F (37 8 °C)  HR:  [103-131] 109  Resp:  [16-24] 16  BP: (135-206)/(61-88) 141/77  SpO2:  [92 %-100 %] 97 %  Body mass index is 39 53 kg/m²  Input and Output Summary (last 24 hours): Intake/Output Summary (Last 24 hours) at 2020 1701  Last data filed at 2020 0047  Gross per 24 hour   Intake 550 ml   Output    Net 550 ml       Physical Exam:     Physical Exam   Constitutional: She is oriented to person, place, and time  No distress  HENT:   Head: Normocephalic and atraumatic     Eyes: Conjunctivae are normal    Neck: No JVD present  Cardiovascular: Normal rate and regular rhythm  No murmur heard  Pulmonary/Chest: Effort normal  No respiratory distress  She has wheezes  She has no rales  Abdominal: Soft  She exhibits no distension  There is no tenderness  There is no guarding  Musculoskeletal: She exhibits no edema  Neurological: She is alert and oriented to person, place, and time  Skin: Skin is warm and dry  Psychiatric: She has a normal mood and affect  Additional Data:     Labs:    Results from last 7 days   Lab Units 02/18/20  2045   WBC Thousand/uL 12 12*   HEMOGLOBIN g/dL 12 5   HEMATOCRIT % 37 1   PLATELETS Thousands/uL 306   NEUTROS PCT % 87*   LYMPHS PCT % 7*   MONOS PCT % 5   EOS PCT % 0     Results from last 7 days   Lab Units 02/18/20  2046   SODIUM mmol/L 138   POTASSIUM mmol/L 4 2   CHLORIDE mmol/L 99*   CO2 mmol/L 25   BUN mg/dL 14   CREATININE mg/dL 1 03   ANION GAP mmol/L 14*   CALCIUM mg/dL 9 1   ALBUMIN g/dL 3 7   TOTAL BILIRUBIN mg/dL 0 27   ALK PHOS U/L 112   ALT U/L 21   AST U/L 16   GLUCOSE RANDOM mg/dL 275*     Results from last 7 days   Lab Units 02/18/20  2046   INR  0 92     Results from last 7 days   Lab Units 02/19/20  1630 02/19/20  1235 02/19/20  0615 02/19/20  0302   POC GLUCOSE mg/dl 240* 286* 313* 407*         Results from last 7 days   Lab Units 02/19/20  1153 02/19/20  0605 02/19/20  0047 02/18/20  2242 02/18/20  2046   LACTIC ACID mmol/L 2 6* 5 1* 5 3* 3 5* 3 0*   PROCALCITONIN ng/ml  --   --   --   --  <0 05           * I Have Reviewed All Lab Data Listed Above  * Additional Pertinent Lab Tests Reviewed: [place] [facility] Admission Reviewed    Imaging:    Imaging Reports Reviewed Today Include: CXR      Recent Cultures (last 7 days):     Results from last 7 days   Lab Units 02/18/20  2049 02/18/20  2046   BLOOD CULTURE  Received in Microbiology Lab  Culture in Progress  Received in Microbiology Lab  Culture in Progress         Last 24 Hours Medication List:     Current Facility-Administered Medications:  acetaminophen 650 mg Oral Q6H PRN Kristin Smudde, PAJERMAN    albuterol 2 puff Inhalation Q6H PRN Kristin Smudde, HODAN    aluminum-magnesium hydroxide-simethicone 30 mL Oral Q6H PRN Kristin Smudde, PA-C    aspirin 81 mg Oral Daily Kristin Smudde, PA-C    atorvastatin 40 mg Oral Daily Kristin Smudde, PA-C    azithromycin 500 mg Intravenous Q24H Karyle Mater, MD Last Rate: 500 mg (02/19/20 1332)   budesonide-formoterol 2 puff Inhalation BID Kristin Talonudde, HODAN    cefTRIAXone 1,000 mg Intravenous Q24H Karyle Mater, MD    enoxaparin 40 mg Subcutaneous Daily Kristin Smudde, HODAN    ezetimibe 10 mg Oral Daily Kristin Smudde, PA-C    fluticasone 1 spray Nasal Daily Kristin Talonudde, PA-C    guaiFENesin 600 mg Oral BID Kristin Talonudde, HODAN    insulin glargine 20 Units Subcutaneous HS Karyle Mater, MD    [START ON 2/20/2020] insulin glargine 35 Units Subcutaneous QAM Yolanda Roberts MD    insulin lispro 1-6 Units Subcutaneous HS Kristin Talonudde, HODAN    insulin lispro 2-12 Units Subcutaneous TID AC Kristin Mak, HODAN    insulin lispro 4 Units Subcutaneous TID With Meals Karyle Mater, MD    ipratropium 0 5 mg Nebulization TID Alexandra Nasima Smudde, HODAN    levalbuterol 1 25 mg Nebulization TID Alexandra Nasima Smudde, HODAN    levothyroxine 150 mcg Oral Early Morning Kristin Smudde, PAQuianaC    losartan 50 mg Oral Daily Kristin Smudde, HODAN    methylPREDNISolone sodium succinate 40 mg Intravenous Q8H Kristin Smudde, PA-C    metoprolol succinate 25 mg Oral Daily Kristin Smudde, PA-C    montelukast 10 mg Oral HS Kristin Smudde, PA-C    oseltamivir 30 mg Oral Q12H [facility] Kristin Smudde, PA-JOSE    pantoprazole 40 mg Oral Early Morning Kristin Smudde, PA-C    polyethylene glycol 17 g Oral Daily PRN Kristin Mak PA-C    thiamine 100 mg Intravenous BID Karyle Mater, MD Last Rate: 100 mg (02/19/20 1229)        Today, Patient Was Seen By: Angi Miller MD    ** Please Note: Dictation voice to text software may have been used in the creation of this document   **

## 2020-02-19 NOTE — ASSESSMENT & PLAN NOTE
BP appears controlled at this time  Continue home regimen metoprolol, losartan   Continue to monitor BP

## 2020-02-19 NOTE — ASSESSMENT & PLAN NOTE
Proceed with Rocephin and azithromycin for concern for post influenza pneumonia in setting of patient's meeting SIRS criteria  Initial procalcitonin negative, repeat in a m    Urine Legionella and strep antigens

## 2020-02-19 NOTE — H&P
[facility] SPECIALTY [facility] Internal Medicine  H&P- Philipp Outl 1955, 59 y o  female MRN: [id number]    Unit/Bed#: ED 09 Encounter: [id number]    Primary Care Provider: Alvarado Madrigal MD   Date and time admitted to hospital: 2/18/2020  8:32 PM        * Mild intermittent asthma with acute exacerbation  Assessment & Plan  Patient presents with one week of progressively worsening shortness of breath and wheezing  Started on IV solumedrol in ED, continue 40 mg TID  Continue scheduled nebulizers  Given ceftriaxone and doxycycline in ED, will hold off on further antibiotics at this time given negative procalcitonin  Incentive spirometry  Respiratory protocol   Flu pending   Slightly elevated lactic, IV fluids and recheck       Pure hypercholesterolemia  Assessment & Plan  Continue daily statin and Zetia    Essential hypertension  Assessment & Plan  BP appears controlled at this time  Continue home regimen metoprolol, losartan   Continue to monitor BP     Acquired hypothyroidism  Assessment & Plan  Continue daily levothyroxine    Type 2 diabetes mellitus, without long-term current use of insulin (HCC)  Assessment & Plan  Lab Results   Component Value Date    HGBA1C 8 4 (H) 12/02/2019       No results for input(s): POCGLU in the last 72 hours  Blood Sugar Average: Last 72 hrs:   Continue home regimen of Lantus 15 units HS and 30 units qAM  Add sliding scale insulin with accu checks   Monitor for hyperglycemia while on steroids   Diabetic diet       VTE Prophylaxis: Enoxaparin (Lovenox)  / sequential compression device   Code Status:  Full code  POLST: POLST form is not discussed and not completed at this time  Discussion with family:  None    Anticipated Length of Stay:  Patient will be admitted on an Inpatient basis with an anticipated length of stay of  more than 2 midnights  Justification for Hospital Stay:  Asthma exacerbation, influenza    Total Time for Visit, including Counseling / Coordination of Care: 1 hour    Greater than 50% of this total time spent on direct patient counseling and coordination of care  Chief Complaint:   Shortness of breath    History of Present Illness:    Sunita Santo is a 59 y o  female with past medical history of asthma, diabetes, hypertension who presents with shortness of breath  Patient presents with 1 week of progressively worsening shortness of breath  Patient reports tightness in her chest and significant wheezing  Patient also notes she had leftover prednisone which she took a few pills and did not have any relief  Also notes she has been taking her inhalers and nebulizers as often as prescribed without relief of her shortness of breath  Patient also notes intermittent cough, nonproductive  Patient denies any chest pain  Patient also reports recent fevers at home, denies chills  Patient denies any recent sick contacts or travel  Review of Systems:    Review of Systems   Constitutional: Positive for fever  Negative for chills  HENT: Negative for trouble swallowing  Eyes: Negative for visual disturbance  Respiratory: Positive for cough, shortness of breath and wheezing  Cardiovascular: Negative for chest pain and leg swelling  Gastrointestinal: Positive for nausea  Negative for abdominal pain and vomiting  Genitourinary: Negative for difficulty urinating  Musculoskeletal: Negative for back pain and joint swelling  Skin: Negative for rash  Neurological: Negative for dizziness and weakness  Psychiatric/Behavioral: Negative for confusion  Past Medical and Surgical History:     Past Medical History:   Diagnosis Date    Asthma     Diabetes mellitus ([facility] [address] )     Hypertension     Obesity     Stroke [facility])        Past Surgical History:   Procedure Laterality Date    ROTATOR CUFF REPAIR Bilateral        Meds/Allergies:    Prior to Admission medications    Medication Sig Start Date End Date Taking?  Authorizing Provider   albuterol (2 5 mg/3 mL) 0 083 % nebulizer solution Take 1 vial (2 5 mg total) by nebulization every 6 (six) hours as needed for wheezing or shortness of breath 11/21/19  Yes Jose Luis Marroquin MD   albuterol (PROVENTIL HFA,VENTOLIN HFA) 90 mcg/act inhaler Inhale 2 puffs every 6 (six) hours as needed for wheezing   Yes Historical Provider, MD   aspirin 81 mg chewable tablet Chew 81 mg daily   Yes Historical Provider, MD   budesonide-formoterol (SYMBICORT) 160-4 5 mcg/act inhaler Inhale 2 puffs 2 (two) times a day Rinse mouth after use     Yes Historical Provider, MD   ezetimibe (ZETIA) 10 mg tablet Take 10 mg by mouth daily   Yes Historical Provider, MD   fluticasone (FLONASE) 50 mcg/act nasal spray 1 spray into each nostril daily   Yes Historical Provider, MD   glimepiride (AMARYL) 4 mg tablet Take 1 tablet (4 mg total) by mouth daily with breakfast 12/20/19 2/19/20 Yes Jose Luis Marroquin MD   indapamide (LOZOL) 1 25 mg tablet Take 1 tablet (1 25 mg total) by mouth every morning 11/21/19 2/19/20 Yes Jose Luis Marroquin MD   insulin glargine (LANTUS SOLOSTAR) 100 units/mL injection pen 35 units in the morning and 15 units in the evening 12/20/19  Yes Jose Luis Marroquin MD   Insulin Pen Needle (BD PEN NEEDLE LISA U/F) 32G X 4 MM MISC by Does not apply route 2 (two) times a day 12/26/19  Yes Jose Luis Marroquin MD   levothyroxine 150 mcg tablet Take 1 tablet (150 mcg total) by mouth daily 12/20/19 2/19/20 Yes Jose Luis Marroquin MD   losartan (COZAAR) 50 mg tablet Take 1 tablet (50 mg total) by mouth daily 12/20/19 2/19/20 Yes Jose Luis Marroquin MD   metFORMIN (GLUCOPHAGE) 1000 MG tablet Take 1 tablet (1,000 mg total) by mouth 2 (two) times a day with meals 12/20/19 2/19/20 Yes Jose Luis Marroquin MD   metoprolol succinate (TOPROL-XL) 25 mg 24 hr tablet Take 1 tablet (25 mg total) by mouth daily 12/20/19 2/19/20 Yes Jose Luis Marroquin MD   montelukast (SINGULAIR) 10 mg tablet Take 10 mg by mouth daily at bedtime   Yes Historical Provider, MD   omeprazole (PriLOSEC) 20 mg delayed release capsule Take 1 capsule (20 mg total) by mouth daily 12/20/19 2/19/20 Yes Herman Mckay MD   atorvastatin (LIPITOR) 40 mg tablet Take 1 tablet (40 mg total) by mouth daily 12/20/19 1/19/20  Herman Mckay MD   furosemide (LASIX) 20 mg tablet Take 20 mg by mouth 2 (two) times a day as needed    Historical Provider, MD     I have reviewed home medications with patient personally  Allergies: Allergies   Allergen Reactions    Iodinated Diagnostic Agents Shortness Of Breath and Wheezing    Simvastatin Other (See Comments)     elevated liver function     Latex Rash    Morphine Palpitations    Penicillins Rash       Social History:     Marital Status: /Civil Union   Occupation:  Unknown  Patient Pre-hospital Living Situation:  Home  Patient Pre-hospital Level of Mobility:  Ambulatory  Patient Pre-hospital Diet Restrictions:  Diabetic  Substance Use History:   Social History     Substance and Sexual Activity   Alcohol Use Never    Frequency: Never     Social History     Tobacco Use   Smoking Status Never Smoker   Smokeless Tobacco Never Used     Social History     Substance and Sexual Activity   Drug Use Never       Family History:    Family History   Problem Relation Age of Onset    Diabetes Mother     Hypertension Mother     Hypertension Father        Physical Exam:     Vitals:   Blood Pressure: 153/76 (02/19/20 0253)  Pulse: (!) 117 (02/19/20 0253)  Temperature: 98 3 °F (36 8 °C) (02/19/20 0253)  Temp Source: Oral (02/18/20 2141)  Respirations: 20 (02/19/20 0253)  Weight - Scale: 91 8 kg (202 lb 6 1 oz) (02/18/20 2026)  SpO2: 95 % (02/19/20 0253)    Physical Exam   Constitutional: She is oriented to person, place, and time  She appears well-nourished  No distress  HENT:   Head: Normocephalic and atraumatic  Mouth/Throat: Oropharynx is clear and moist    Eyes: Conjunctivae and EOM are normal  No scleral icterus  Neck: Normal range of motion  Cardiovascular: Normal rate, regular rhythm and normal heart sounds     No murmur heard   Pulmonary/Chest: Effort normal  No respiratory distress  She has decreased breath sounds (At bases)  She has wheezes (Diffuse expiratory wheezes)  She has no rales  Abdominal: Soft  Bowel sounds are normal  She exhibits no distension  There is no tenderness  Musculoskeletal: Normal range of motion  She exhibits no edema  Neurological: She is alert and oriented to person, place, and time  Skin: Skin is warm and dry  Psychiatric: She has a normal mood and affect  Her behavior is normal  Thought content normal    Vitals reviewed  Additional Data:     Lab Results: I have personally reviewed pertinent reports  Results from last 7 days   Lab Units 02/18/20  2045   WBC Thousand/uL 12 12*   HEMOGLOBIN g/dL 12 5   HEMATOCRIT % 37 1   PLATELETS Thousands/uL 306   NEUTROS PCT % 87*   LYMPHS PCT % 7*   MONOS PCT % 5   EOS PCT % 0     Results from last 7 days   Lab Units 02/18/20  2046   SODIUM mmol/L 138   POTASSIUM mmol/L 4 2   CHLORIDE mmol/L 99*   CO2 mmol/L 25   BUN mg/dL 14   CREATININE mg/dL 1 03   ANION GAP mmol/L 14*   CALCIUM mg/dL 9 1   ALBUMIN g/dL 3 7   TOTAL BILIRUBIN mg/dL 0 27   ALK PHOS U/L 112   ALT U/L 21   AST U/L 16   GLUCOSE RANDOM mg/dL 275*     Results from last 7 days   Lab Units 02/18/20  2046   INR  0 92     Results from last 7 days   Lab Units 02/19/20  0302   POC GLUCOSE mg/dl 407*         Results from last 7 days   Lab Units 02/19/20  0047 02/18/20  2242 02/18/20  2046   LACTIC ACID mmol/L 5 3* 3 5* 3 0*   PROCALCITONIN ng/ml  --   --  <0 05       Imaging: I have personally reviewed pertinent reports  XR chest 2 views   ED Interpretation by Caridad Hinojosa MD (02/18 2203)            [employer] / [employer] Records Reviewed: Yes     ** Please Note: This note has been constructed using a voice recognition system   ** [phone]

## 2020-02-19 NOTE — PLAN OF CARE
Problem: Potential for Falls  Goal: Patient will remain free of falls  Description  INTERVENTIONS:  - Assess patient frequently for physical needs  -  Identify cognitive and physical deficits and behaviors that affect risk of falls    -  [place] fall precautions as indicated by assessment   - Educate patient/family on patient safety including physical limitations  - Instruct patient to call for assistance with activity based on assessment  - Modify environment to reduce risk of injury  - Consider OT/PT consult to assist with strengthening/mobility  Outcome: Progressing     Problem: PAIN - ADULT  Goal: Verbalizes/displays adequate comfort level or baseline comfort level  Description  Interventions:  - Encourage patient to monitor pain and request assistance  - Assess pain using appropriate pain scale  - Administer analgesics based on type and severity of pain and evaluate response  - Implement non-pharmacological measures as appropriate and evaluate response  - Consider cultural and social influences on pain and pain management  - Notify physician/advanced practitioner if interventions unsuccessful or patient reports new pain  Outcome: Progressing     Problem: INFECTION - ADULT  Goal: Absence or prevention of progression during hospitalization  Description  INTERVENTIONS:  - Assess and monitor for signs and symptoms of infection  - Monitor lab/diagnostic results  - Monitor all insertion sites, i e  indwelling lines, tubes, and drains  - Monitor endotracheal if appropriate and nasal secretions for changes in amount and color  - [place] appropriate cooling/warming therapies per order  - Administer medications as ordered  - Instruct and encourage patient and family to use good hand hygiene technique  - Identify and instruct in appropriate isolation precautions for identified infection/condition  Outcome: Progressing     Problem: SAFETY ADULT  Goal: Patient will remain free of falls  Description  INTERVENTIONS:  - Assess patient frequently for physical needs  -  Identify cognitive and physical deficits and behaviors that affect risk of falls    -  [place] fall precautions as indicated by assessment   - Educate patient/family on patient safety including physical limitations  - Instruct patient to call for assistance with activity based on assessment  - Modify environment to reduce risk of injury  - Consider OT/PT consult to assist with strengthening/mobility  Outcome: Progressing  Goal: Maintain or return to baseline ADL function  Description  INTERVENTIONS:  -  Assess patient's ability to carry out ADLs; assess patient's baseline for ADL function and identify physical deficits which impact ability to perform ADLs (bathing, care of mouth/teeth, toileting, grooming, dressing, etc )  - Assess/evaluate cause of self-care deficits   - Assess range of motion  - Assess patient's mobility; develop plan if impaired  - Assess patient's need for assistive devices and provide as appropriate  - Encourage maximum independence but intervene and supervise when necessary  - Involve family in performance of ADLs  - Assess for home care needs following discharge   - Consider OT consult to assist with ADL evaluation and planning for discharge  - Provide patient education as appropriate  Outcome: Progressing  Goal: Maintain or return mobility status to optimal level  Description  INTERVENTIONS:  - Assess patient's baseline mobility status (ambulation, transfers, stairs, etc )    - Identify cognitive and physical deficits and behaviors that affect mobility  - Identify mobility aids required to assist with transfers and/or ambulation (gait belt, sit-to-stand, lift, walker, cane, etc )  - [place] fall precautions as indicated by assessment  - Record patient progress and toleration of activity level on Mobility SBAR; progress patient to next Phase/Stage  - Instruct patient to call for assistance with activity based on assessment  - Consider rehabilitation consult to assist with strengthening/weightbearing, etc   Outcome: Progressing     Problem: DISCHARGE PLANNING  Goal: Discharge to home or other facility with appropriate resources  Description  INTERVENTIONS:  - Identify barriers to discharge w/patient and caregiver  - Arrange for needed discharge resources and transportation as appropriate  - Identify discharge learning needs (meds, wound care, etc )  - Arrange for interpretive services to assist at discharge as needed  - Refer to Case Management Department for coordinating discharge planning if the patient needs post-hospital services based on physician/advanced practitioner order or complex needs related to functional status, cognitive ability, or social support system  Outcome: Progressing     Problem: Knowledge Deficit  Goal: Patient/family/caregiver demonstrates understanding of disease process, treatment plan, medications, and discharge instructions  Description  Complete learning assessment and assess knowledge base    Interventions:  - Provide teaching at level of understanding  - Provide teaching via preferred learning methods  Outcome: Progressing     Problem: RESPIRATORY - ADULT  Goal: Achieves optimal ventilation and oxygenation  Description  INTERVENTIONS:  - Assess for changes in respiratory status  - Assess for changes in mentation and behavior  - Position to facilitate oxygenation and minimize respiratory effort  - Oxygen administered by appropriate delivery if ordered  - Initiate smoking cessation education as indicated  - Encourage broncho-pulmonary hygiene including cough, deep breathe, Incentive Spirometry  - Assess the need for suctioning and aspirate as needed  - Assess and instruct to report SOB or any respiratory difficulty  - Respiratory Therapy support as indicated  Outcome: Progressing     Problem: METABOLIC, FLUID AND ELECTROLYTES - ADULT  Goal: Electrolytes maintained within normal limits  Description  INTERVENTIONS:  - Monitor labs and assess patient for signs and symptoms of electrolyte imbalances  - Administer electrolyte replacement as ordered  - Monitor response to electrolyte replacements, including repeat lab results as appropriate  - Instruct patient on fluid and nutrition as appropriate  Outcome: Progressing  Goal: Fluid balance maintained  Description  INTERVENTIONS:  - Monitor labs   - Monitor I/O and WT  - Instruct patient on fluid and nutrition as appropriate  - Assess for signs & symptoms of volume excess or deficit  Outcome: Progressing  Goal: Glucose maintained within target range  Description  INTERVENTIONS:  - Monitor Blood Glucose as ordered  - Assess for signs and symptoms of hyperglycemia and hypoglycemia  - Administer ordered medications to maintain glucose within target range  - Assess nutritional intake and initiate nutrition service referral as needed  Outcome: Progressing

## 2020-02-19 NOTE — ED NOTES
Pt ambulated from room 9 to BR  Unsteady gait noted and pt complained of dizziness       Kati Toussaint  02/18/20 2041

## 2020-02-19 NOTE — ASSESSMENT & PLAN NOTE
Patient found to be influenza A positive  Started on Tamiflu  Respiratory protocol  Droplet precautions

## 2020-02-19 NOTE — ED PROVIDER NOTES
History  Chief Complaint   Patient presents with    Asthma     Asthma history, using nebulizer and inhaler without relief, also taking leftover prednisone, dry cough, subjective fevers  HPI   22-year-old woman with history of asthma presenting for shortness of breath  Patient has been feeling generally unwell over the past week  She feels more wheezy and short of breath than usual   She has been using her albuterol nebulizer at home more frequently  She gave herself 3 breathing treatments today  She also took some leftover prednisone that she had from a previous prescription  The shortness of breath feels like her usual asthma flare but less responsive to her home treatments than normal   She has also been coughing  She has some chest congestion but denies explicit chest pain  She has previously required hospitalization for her asthma, but has never required intubation/ICU care  She was noted to be febrile on arrival to the emergency department  She denies any recent sick contacts  Last hospitalization was in December 2019  Prior to Admission Medications   Prescriptions Last Dose Informant Patient Reported? Taking?    Insulin Pen Needle (BD PEN NEEDLE LISA U/F) 32G X 4 MM MISC   No Yes   Sig: by Does not apply route 2 (two) times a day   albuterol (2 5 mg/3 mL) 0 083 % nebulizer solution   No Yes   Sig: Take 1 vial (2 5 mg total) by nebulization every 6 (six) hours as needed for wheezing or shortness of breath   albuterol (PROVENTIL HFA,VENTOLIN HFA) 90 mcg/act inhaler   Yes Yes   Sig: Inhale 2 puffs every 6 (six) hours as needed for wheezing   aspirin 81 mg chewable tablet   Yes Yes   Sig: Chew 81 mg daily   atorvastatin (LIPITOR) 40 mg tablet   No No   Sig: Take 1 tablet (40 mg total) by mouth daily   budesonide-formoterol (SYMBICORT) 160-4 5 mcg/act inhaler   Yes Yes   Sig: Inhale 2 puffs 2 (two) times a day Rinse mouth after use    ezetimibe (ZETIA) 10 mg tablet   Yes Yes   Sig: Take 10 mg by mouth daily   fluticasone (FLONASE) 50 mcg/act nasal spray   Yes Yes   Si spray into each nostril daily   furosemide (LASIX) 20 mg tablet   Yes Yes   Sig: Take 20 mg by mouth 2 (two) times a day as needed   glimepiride (AMARYL) 4 mg tablet   No No   Sig: Take 1 tablet (4 mg total) by mouth daily with breakfast   indapamide (LOZOL) 1 25 mg tablet   No No   Sig: Take 1 tablet (1 25 mg total) by mouth every morning   insulin glargine (LANTUS SOLOSTAR) 100 units/mL injection pen   No Yes   Si units in the morning and 15 units in the evening   levothyroxine 150 mcg tablet   No No   Sig: Take 1 tablet (150 mcg total) by mouth daily   losartan (COZAAR) 50 mg tablet   No No   Sig: Take 1 tablet (50 mg total) by mouth daily   metFORMIN (GLUCOPHAGE) 1000 MG tablet   No No   Sig: Take 1 tablet (1,000 mg total) by mouth 2 (two) times a day with meals   metoprolol succinate (TOPROL-XL) 25 mg 24 hr tablet   No No   Sig: Take 1 tablet (25 mg total) by mouth daily   montelukast (SINGULAIR) 10 mg tablet   Yes Yes   Sig: Take 10 mg by mouth daily at bedtime   omeprazole (PriLOSEC) 20 mg delayed release capsule   No No   Sig: Take 1 capsule (20 mg total) by mouth daily      Facility-Administered Medications: None       Past Medical History:   Diagnosis Date    Asthma     Diabetes mellitus ([facility] [address] )     Hypertension     Stroke [facility])        Past Surgical History:   Procedure Laterality Date    ROTATOR CUFF REPAIR Bilateral        Family History   Problem Relation Age of Onset    Diabetes Mother     Hypertension Mother     Hypertension Father      I have reviewed and agree with the history as documented  Social History     Tobacco Use    Smoking status: Never Smoker    Smokeless tobacco: Never Used   Substance Use Topics    Alcohol use: Never     Frequency: Never    Drug use: Never        Review of Systems   Constitutional: Positive for fatigue and fever  Negative for chills     Respiratory: Positive for cough, chest tightness, shortness of breath and wheezing  Cardiovascular: Negative for chest pain, palpitations and leg swelling  Gastrointestinal: Negative for abdominal pain, diarrhea, nausea and vomiting  Genitourinary: Negative for dysuria, flank pain and frequency  Musculoskeletal: Negative for joint swelling and neck pain  Skin: Negative for pallor and rash  Neurological: Negative for dizziness and headaches  All other systems reviewed and are negative  Physical Exam  ED Triage Vitals [02/18/20 2026]   Temperature Pulse Respirations Blood Pressure SpO2   (!) 101 °F (38 3 °C) (!) 131 22 (!) 206/88 96 %      Temp Source Heart Rate Source Patient Position - Orthostatic VS BP Location FiO2 (%)   Oral Monitor Sitting Right arm --      Pain Score       6             Orthostatic Vital Signs  Vitals:    02/18/20 2141 02/18/20 2245 02/18/20 2300 02/18/20 2330   BP: (!) 178/79  157/67 156/65   Pulse: (!) 125 (!) 114 (!) 108 (!) 112   Patient Position - Orthostatic VS: Sitting          Physical Exam   Constitutional: She is oriented to person, place, and time  She appears well-developed and well-nourished  No distress  HENT:   Head: Normocephalic and atraumatic  Eyes: Pupils are equal, round, and reactive to light  Conjunctivae and EOM are normal  No scleral icterus  Neck: Normal range of motion  Neck supple  No JVD present  Cardiovascular: Regular rhythm  Exam reveals no gallop and no friction rub  No murmur heard  Regular tachycardia  Pulmonary/Chest: No stridor  She has wheezes  She has no rales  She appears slightly tachypneic  She is able to speak in full sentences but has some visibly increased work of breathing  Lung sounds are diminished bilaterally with wheezing heard in all fields  Abdominal: Soft  She exhibits no distension  There is no tenderness  There is no rebound and no guarding  Musculoskeletal: Normal range of motion  She exhibits no edema or tenderness     Neurological: She is alert and oriented to person, place, and time  No cranial nerve deficit or sensory deficit  She exhibits normal muscle tone  Skin: Skin is warm and dry  She is not diaphoretic  No erythema  No pallor  Psychiatric: She has a normal mood and affect  Her behavior is normal    Nursing note and vitals reviewed  ED Medications  Medications   sodium chloride 0 9 % bolus 1,000 mL (1,000 mL Intravenous New Bag 2/18/20 2049)   acetaminophen (TYLENOL) tablet 650 mg (650 mg Oral Given 2/18/20 2051)   albuterol inhalation solution 10 mg (10 mg Nebulization Given 2/18/20 2121)     And   ipratropium (ATROVENT) 0 02 % inhalation solution 1 mg (1 mg Nebulization Given 2/18/20 2121)     And   sodium chloride 0 9 % inhalation solution 3 mL (3 mL Nebulization Given 2/18/20 2121)   methylPREDNISolone sodium succinate (Solu-MEDROL) injection 125 mg (125 mg Intravenous Given 2/18/20 2128)   ceftriaxone (ROCEPHIN) 2 g/50 mL in dextrose IVPB (0 mg Intravenous Stopped 2/18/20 2203)   doxycycline hyclate (VIBRAMYCIN) capsule 100 mg (100 mg Oral Given 2/18/20 2137)   sodium chloride 0 9 % bolus 500 mL (500 mL Intravenous New Bag 2/18/20 2141)   albuterol inhalation solution 10 mg (10 mg Nebulization Given 2/18/20 2247)     And   ipratropium (ATROVENT) 0 02 % inhalation solution 1 mg (1 mg Nebulization Given 2/18/20 2248)     And   sodium chloride 0 9 % inhalation solution 3 mL (3 mL Nebulization Given 2/18/20 2248)       Diagnostic Studies  Results Reviewed     Procedure Component Value Units Date/Time    Lactic acid, plasma [[id number]]     Lab Status:  No result Specimen:  Blood     Influenza A/B and RSV PCR [[id number]] Collected:  02/18/20 2325    Lab Status:   In process Specimen:  Nasopharyngeal Swab Updated:  02/18/20 2331    Lactic acid x2 [[id number]]  (Abnormal) Collected:  02/18/20 2242    Lab Status:  Final result Specimen:  Blood from Arm, Left Updated:  02/18/20 2331     LACTIC ACID 3 5 mmol/L     Narrative:       Result may be elevated if tourniquet was used during collection  Procalcitonin [[id number]]  (Normal) Collected:  02/18/20 2046    Lab Status:  Final result Specimen:  Blood from Arm, Left Updated:  02/18/20 2320     Procalcitonin <0 05 ng/ml     Comprehensive metabolic panel [[id number]]  (Abnormal) Collected:  02/18/20 2046    Lab Status:  Final result Specimen:  Blood from Arm, Left Updated:  02/18/20 2131     Sodium 138 mmol/L      Potassium 4 2 mmol/L      Chloride 99 mmol/L      CO2 25 mmol/L      ANION GAP 14 mmol/L      BUN 14 mg/dL      Creatinine 1 03 mg/dL      Glucose 275 mg/dL      Calcium 9 1 mg/dL      AST 16 U/L      ALT 21 U/L      Alkaline Phosphatase 112 U/L      Total Protein 7 9 g/dL      Albumin 3 7 g/dL      Total Bilirubin 0 27 mg/dL      eGFR 58 ml/min/1 73sq m     Narrative:       [place] guidelines for Chronic Kidney Disease (CKD):     Stage 1 with normal or high GFR (GFR > 90 mL/min/1 73 square meters)    Stage 2 Mild CKD (GFR = 60-89 mL/min/1 73 square meters)    Stage 3A Moderate CKD (GFR = 45-59 mL/min/1 73 square meters)    Stage 3B Moderate CKD (GFR = 30-44 mL/min/1 73 square meters)    Stage 4 Severe CKD (GFR = 15-29 mL/min/1 73 square meters)    Stage 5 End Stage CKD (GFR <15 mL/min/1 73 square meters)  Note: GFR calculation is accurate only with a steady state creatinine    Troponin I [[id number]]  (Normal) Collected:  02/18/20 2049    Lab Status:  Final result Specimen:  Blood from Arm, Left Updated:  02/18/20 2125     Troponin I 0 02 ng/mL     Lactic acid x2 [[id number]]  (Abnormal) Collected:  02/18/20 2046    Lab Status:  Final result Specimen:  Blood from Arm, Left Updated:  02/18/20 2125     LACTIC ACID 3 0 mmol/L     Narrative:       Result may be elevated if tourniquet was used during collection      Urine Microscopic [[id number]]  (Abnormal) Collected:  02/18/20 2044    Lab Status:  Final result Specimen:  Urine, Clean Catch Updated:  02/18/20 2123 RBC, UA None Seen /hpf      WBC, UA 1-2 /hpf      Epithelial Cells Occasional /hpf      Bacteria, UA Occasional /hpf      OTHER OBSERVATIONS Yeast Cells Present    UA w Reflex to Microscopic w Reflex to Culture [[id number]]  (Abnormal) Collected:  02/18/20 2044    Lab Status:  Final result Specimen:  Urine, Clean Catch Updated:  02/18/20 2115     Color, UA Yellow     Clarity, UA Clear     Specific Gravity, UA 1 025     pH, UA 5 5     Leukocytes, UA Elevated glucose may cause decreased leukocyte values   See urine microscopic for [facility] result/     Nitrite, UA Negative     Protein, UA Negative mg/dl      Glucose, UA >=1000 (1%) mg/dl      Ketones, UA Trace mg/dl      Urobilinogen, UA 0 2 E U /dl      Bilirubin, UA Negative     Blood, UA Negative    APTT [[id number]]  (Normal) Collected:  02/18/20 2046    Lab Status:  Final result Specimen:  Blood from Arm, Left Updated:  02/18/20 2111     PTT 28 seconds     Protime-INR [[id number]]  (Normal) Collected:  02/18/20 2046    Lab Status:  Final result Specimen:  Blood from Arm, Left Updated:  02/18/20 2111     Protime 12 5 seconds      INR 0 92    CBC and differential [[id number]]  (Abnormal) Collected:  02/18/20 2045    Lab Status:  Final result Specimen:  Blood from Arm, Left Updated:  02/18/20 2059     WBC 12 12 Thousand/uL      RBC 4 34 Million/uL      Hemoglobin 12 5 g/dL      Hematocrit 37 1 %      MCV 86 fL      MCH 28 8 pg      MCHC 33 7 g/dL      RDW 12 7 %      MPV 10 0 fL      Platelets 696 Thousands/uL      nRBC 0 /100 WBCs      Neutrophils Relative 87 %      Immat GRANS % 1 %      Lymphocytes Relative 7 %      Monocytes Relative 5 %      Eosinophils Relative 0 %      Basophils Relative 0 %      Neutrophils Absolute 10 52 Thousands/µL      Immature Grans Absolute 0 06 Thousand/uL      Lymphocytes Absolute 0 83 Thousands/µL      Monocytes Absolute 0 65 Thousand/µL      Eosinophils Absolute 0 02 Thousand/µL      Basophils Absolute 0 04 Thousands/µL     Blood culture #2 [[id number]] Collected:  02/18/20 2046    Lab Status: In process Specimen:  Blood from Arm, Left Updated:  02/18/20 2054    Blood culture #1 [[id number]] Collected:  02/18/20 2049    Lab Status: In process Specimen:  Blood from Arm, Right Updated:  02/18/20 2054                 XR chest 2 views   ED Interpretation by Domenic Shirley MD (02/18 2203)            Procedures  Procedures      ED Course  ED Course as of Feb 19 0015   Tue Feb 18, 2020 2129 LACTIC ACID(!!): 3 0   2131 Sepsis alert called          Initial Sepsis Screening     Row Name 02/18/20 2215 02/18/20 2115 02/18/20 2055 02/18/20 2046       Is the patient's history suggestive of a new or worsening infection?     -ST  (!) Yes (Proceed)  -ST       Suspected source of infection      -ST  pneumonia  -ST       Are two or more of the following signs & symptoms of infection both present and new to the patient?     -ST  (!) Yes (Proceed)  -ST       Indicate SIRS criteria      -ST  Hyperthemia > 38 3C (100 9F); Tachypnea > 20 resp per min;Leukocytosis (WBC > 56817 IJL); Tachycardia > 90 bpm  -ST       If the answer is yes to both questions, suspicion of sepsis is present             If severe sepsis is present AND tissue hypoperfusion perists in the hour after fluid resuscitation or lactate > 4, the patient meets criteria for SEPTIC SHOCK             Are any of the following organ dysfunction criteria present within 6 hours of suspected infection and SIRS criteria that are NOT considered to be chronic conditions?       (!) Yes  -ST    -ST     Organ dysfunction      Lactate > 2 0 mmol/L  -ST    -ST     Date of presentation of severe sepsis      02/18/20  -ST    -ST     Time of presentation of severe sepsis      2055  -ST    -ST     Tissue hypoperfusion persists in the hour after crystalloid fluid administration, evidenced, by either:             Was hypotension present within one hour of the conclusion of crystalloid fluid administration?   No  -ST      -ST       Date of presentation of septic shock             Time of presentation of septic shock               User Key  (r) = Recorded By, (t) = Taken By, (c) = Cosigned By    [address] Name Provider Anita Garrido MD Resident           Default Flowsheet Data (last 720 hours)      Sepsis Reassess     Row Name 02/18/20 3343                   Repeat Volume Status and Tissue Perfusion Assessment Performed    Repeat Volume Status and Tissue Perfusion Assessment Performed  Yes  -ST           Volume Status and Tissue Perfusion Post Fluid Resuscitation * Must Document All *    Vital Signs Reviewed (HR, RR, BP, T)  Yes  -ST        Shock Index Reviewed  Yes  -ST        Arterial Oxygen Saturation Reviewed (POx, SaO2 or SpO2)  Yes (comment %) 98  -ST        Cardio  (!) Tachycardia  -ST        Pulmonary  (!) Wheezes  -ST        Capillary Refill  Brisk  -ST        Peripheral Pulses  Radial  -ST        Peripheral Pulse  +2  -ST        Skin  Warm  -ST        Urine output assessed  Adequate  -ST           *OR*   Intensive Monitoring- Must Document One of the Following Four *:    Vital Signs Reviewed          * Central Venous Pressure (CVP or RAP)          * Central Venous Oxygen (SVO2, ScvO2 or Oxygen saturation via central catheter)          * Bedside Cardiovascular US in IVC diameter and % collapse          * Passive Leg Raise OR Crystalloid Challenge            User Key  (r) = Recorded By, (t) = Taken By, (c) = Cosigned By    Initials Name Provider Type    ST Nader Johnson MD Resident          MDM  Number of Diagnoses or Management Options  Acute asthma exacerbation: new and requires workup  Fever: new and requires workup     Amount and/or Complexity of Data Reviewed  Clinical lab tests: ordered and reviewed  Tests in the radiology section of CPT®: ordered and reviewed  Tests in the medicine section of CPT®: ordered and reviewed  Discuss the patient with other providers: yes  Independent visualization of images, tracings, or specimens: yes    Patient Progress  Patient progress: improved    70-year-old woman presenting with asthma exacerbation  Chest x-ray appears to show a right lower lobe consolidation concerning for pneumonia, likely cause of her asthma flare  She was given a Heart neb and methylprednisolone  She does meet positive criteria for sepsis given her tachycardia, tachypnea, and fever  Sepsis workup ordered  She does have an elevated lactic acid to 3 0  Sepsis alert called  No other signs of end-organ damage  She was given 30 cc/kilos of IV crystalloid based on ideal body weight given obesity  IV ceftriaxone and doxycycline ordered for her presumed right lower lobe pneumonia  Aeration of her lungs was improved after breathing treatment, but she continued to be wheezy  A 2nd Heart neb was ordered  Patient will require admission to the hospital for treatment of community-acquired pneumonia and asthma exacerbation  Disposition  Final diagnoses:   Acute asthma exacerbation   Fever - possible community acquired pneumonia     Time reflects when diagnosis was documented in both MDM as applicable and the Disposition within this note     Time User Action Codes Description Comment    2/18/2020 11:23 PM Suzan Cordon Add [E81 309] Acute asthma exacerbation     2/18/2020 11:24 PM Suzan Cordon Add [R50 9] Fever     2/18/2020 11:24 PM Suzan Cordon Modify [R50 9] Fever possible community acquired pneumonia      ED Disposition     ED Disposition Condition Date/Time Comment    Admit Stable [place] Feb 18, 2020 11:36 PM Case was discussed with Eddie Fairchild and the patient's admission status was agreed to be Admission Status: inpatient status to the service of Dr Bulmaro Lopez   Follow-up Information    None         Patient's Medications   Discharge Prescriptions    No medications on file     No discharge procedures on file      ED Provider  Attending physically available and evaluated Sharan Richardson I managed the patient along with the ED Attending      Electronically Signed by         Mela Olson MD  02/19/20 0194

## 2020-02-19 NOTE — ED ATTENDING ATTESTATION
2/18/2020  IDeshaun MD, saw and evaluated the patient  I have discussed the patient with the resident/non-physician practitioner and agree with the resident's/non-physician practitioner's findings, Plan of Care, and MDM as documented in the resident's/non-physician practitioner's note, except where noted  All available labs and Radiology studies were reviewed  I was present for key portions of any procedure(s) performed by the resident/non-physician practitioner and I was immediately available to provide assistance  At this point I agree with the current assessment done in the Emergency Department  I have conducted an independent evaluation of this patient a history and physical is as follows:  60 yo female with asthma, c/o sick for about 1 week with increased shortness of breath, dry cough, fever in ED  Home regimen has not been as effective as usual for asthma  She took some steroids she had left over at home  No recent travel, no recent abx  VS on arrival notable for elevated BP, fever with associated tachycardia  She is nontoxic appearing, in mild respiratory distress, with bilateral wheezing, treat with albuterol, additional steroids        ED Course         Critical Care Time  CriticalCare Time  Performed by: Deshaun Valdez MD  Authorized by: Deshaun Valdez MD     Critical care provider statement:     Critical care time (minutes):  30    Critical care time was exclusive of:  Separately billable procedures and treating other patients and teaching time    Critical care was necessary to treat or prevent imminent or life-threatening deterioration of the following conditions:  Sepsis    Critical care was time spent personally by me on the following activities:  Blood draw for specimens, obtaining history from patient or surrogate, development of treatment plan with patient or surrogate, discussions with consultants, evaluation of patient's response to treatment, examination of patient, interpretation of cardiac output measurements, ordering and performing treatments and interventions, ordering and review of laboratory studies, ordering and review of radiographic studies, re-evaluation of patient's condition and review of old charts    I assumed direction of critical care for this patient from another provider in my specialty: no

## 2020-02-19 NOTE — ASSESSMENT & PLAN NOTE
Patient presents with one week of progressively worsening shortness of breath and wheezing  Started on IV solumedrol in ED, continue 40 mg TID  Continue scheduled nebulizers  Given ceftriaxone and doxycycline in ED, proceed with Rocephin and azithromycin given and infiltrate on chest x-ray and concern for possible post flu pneumonia in setting of positive flu test  Incentive spirometry  Respiratory protocol   Elevated lactic acid, improved but has not normalized, continue to trend and continue IV fluids

## 2020-02-19 NOTE — ASSESSMENT & PLAN NOTE
Lab Results   Component Value Date    HGBA1C 8 4 (H) 12/02/2019       Recent Labs     02/19/20  0302 02/19/20  0615 02/19/20  1235   POCGLU 407* 313* 286*       Blood Sugar Average: Last 72 hrs:  (P) 551 [id number]   Accu-Cheks markedly elevated with systemic steroid therapy likely contributing  Adjust Lantus - increase to 20 units HS and to 35 units qAM  Add premeal Humalog  Continue sliding scale insulin with accu checks   Diabetic diet

## 2020-02-19 NOTE — SOCIAL WORK
CM obtained all of the following info about the pt  HOME: Pt reports living in a basement apartment at her step-dtrs home; Pt denies difficulty with climbing steps  LIVES W/: Step-dtr and her five children   : Martín De La Cruz (Step-daughter), [phone] (H)  INDEPENDENT: Yes  No to DME, VNA, STR, HHC, MH or D&A issues  PCP: Dr Leonardo Morning: [facility]    INCOME SOURCE: Unemployed  [facility]: [facility]  MEDICAL POA:  or step-dtr  TRANSPORTATION AT D/C:      CM reviewed d/c planning process including the following: identifying help at home, patient preference for d/c planning needs, [facility] Meds to Bed program, availability of treatment team to discuss questions or concerns patient and/or family may have regarding understanding medications and recognizing signs and symptoms once discharged  CM also encouraged patient to follow up with all recommended appointments after discharge  Patient advised of importance for patient and family to participate in managing patients medical well being  Patient/caregiver received discharge checklist  Content reviewed  Patient/caregiver encouraged to participate in discharge plan of care prior to discharge home      PACHECO Patel  2/19/2020  0096

## 2020-02-19 NOTE — ASSESSMENT & PLAN NOTE
Lab Results   Component Value Date    HGBA1C 8 4 (H) 12/02/2019       No results for input(s): POCGLU in the last 72 hours      Blood Sugar Average: Last 72 hrs:   Continue home regimen of Lantus 15 units HS and 30 units qAM  Add sliding scale insulin with accu checks   Monitor for hyperglycemia while on steroids   Diabetic diet

## 2020-02-19 NOTE — ASSESSMENT & PLAN NOTE
POA  Likely viral sepsis due to influenza infection, also noted for an infiltrate of chest x-ray and possibility of post influenza pneumonia  - patient was meeting criteria with leukocytosis of 12, tachycardia, lactic acidosis  - continue IV fluids  - proceed with Rocephin and azithromycin  - infectious workup

## 2020-02-19 NOTE — NURSING NOTE
Pt  BG is 407  RN administered 15 units Lantus and 6 units Humalog  SLIM made aware  Will continue to monitor

## 2020-02-19 NOTE — UTILIZATION REVIEW
Initial Clinical Review    Admission: Date/Time/Statement: Admission Orders (From admission, onward)     Ordered        02/18/20 2325  Inpatient Admission  Once                   Orders Placed This Encounter   Procedures    Inpatient Admission     Standing Status:   Standing     Number of Occurrences:   1     Order Specific Question:   Admitting Physician     Answer:   Christ Maza [[id number]]     Order Specific Question:   Level of Care     Answer:   Med Surg [[id number]]     Order Specific Question:   Estimated length of stay     Answer:   More than 2 Midnights     Order Specific Question:   Certification     Answer:   I certify that inpatient services are medically necessary for this patient for a duration of greater than two midnights  See H&P and MD Progress Notes for additional information about the patient's course of treatment  ED Arrival Information     Expected Arrival Acuity Means of Arrival Escorted By Service Admission Type    - 2/18/2020 20:18 Emergent Walk-In Self Hospitalist Emergency    Arrival Complaint    asthma        Chief Complaint   Patient presents with    Asthma     Asthma history, using nebulizer and inhaler without relief, also taking leftover prednisone, dry cough, subjective fevers  Assessment/Plan:   60 yo female,  To ER from home,  Admitted INPT status at [facility] level of care for treatment of asthma w/acute exacerbation in setting of Influenza A  Pt reports feeling unwell for past week, more "wheezy and SOB" than usual   Has been using her albuterol neb at home more frequently, 3 breathing rx today + took leslie e "leftover" prednisone   With minimal improvement in symptoms  States  Feels like her "usual" asthma flare but home treatments less effective than normal  Febrile in ER  Procalcitonin neg  Lactic elevated  Exam - cough w/diffuse expiratory wheezing  Will continue IV solumedrol therapy;  Give scheduled and prn duonebs;  Give IVF and roslyn lactic acid until normalized  Initiate Tamiflu        ED Triage Vitals [02/18/20 2026]   Temperature Pulse Respirations Blood Pressure SpO2   (!) 101 °F (38 3 °C) (!) 131 22 (!) 206/88 96 %      Temp Source Heart Rate Source Patient Position - Orthostatic VS BP Location FiO2 (%)   Oral Monitor Sitting Right arm --      Pain Score       6        Wt Readings from Last 1 Encounters:   02/18/20 91 8 kg (202 lb 6 1 oz)     Additional Vital Signs:    02/19/20 0000    110  20  146/65  94  95 %  None (Room air)   02/18/20 2330    112  22  156/65  94  97 %  None (Room air)   02/18/20 2300    108   24Abnormal   157/67  97  99 %  None (Room air)   02/18/20 2245    114  24Abnormal       99 %     02/18/20 2141  100 1   125   24Abnormal   178/79    98 %       02/19/20 02:53:21  98 3 °F   117  20  153/76  102  95 %  rm air        Pertinent Labs/Diagnostic Test Results:   ekg none   2/18  CXR -   Mild streaky opacity in the right lower lung zone may be related to atelectasis or minimal infiltrate         Results from last 7 days   Lab Units 02/18/20  2045   WBC Thousand/uL 12 12*   HEMOGLOBIN g/dL 12 5   HEMATOCRIT % 37 1   PLATELETS Thousands/uL 306   NEUTROS ABS Thousands/µL 10 52*         Results from last 7 days   Lab Units 02/18/20 2046   SODIUM mmol/L 138   POTASSIUM mmol/L 4 2   CHLORIDE mmol/L 99*   CO2 mmol/L 25   ANION GAP mmol/L 14*   BUN mg/dL 14   CREATININE mg/dL 1 03   EGFR ml/min/1 73sq m 58   CALCIUM mg/dL 9 1     Results from last 7 days   Lab Units 02/18/20  2046   AST U/L 16   ALT U/L 21   ALK PHOS U/L 112   TOTAL PROTEIN g/dL 7 9   ALBUMIN g/dL 3 7   TOTAL BILIRUBIN mg/dL 0 27     Results from last 7 days   Lab Units 02/19/20  1235 02/19/20  0615 02/19/20  0302   POC GLUCOSE mg/dl 286* 313* 407*     Results from last 7 days   Lab Units 02/18/20  2046   GLUCOSE RANDOM mg/dL 275*     Results from last 7 days   Lab Units 02/18/20  2049 TROPONIN I ng/mL 0 02         Results from last 7 days   Lab Units 02/18/20  2046   PROTIME seconds 12 5   INR  0 92   PTT seconds 28         Results from last 7 days   Lab Units 02/18/20  2046   PROCALCITONIN ng/ml <0 05     Results from last 7 days   Lab Units 02/19/20  0605 02/19/20  0047 02/18/20  2242 02/18/20  2046   LACTIC ACID mmol/L 5 1* 5 3* 3 5* 3 0*     Results from last 7 days   Lab Units 02/18/20  2044   CLARITY UA  Clear   COLOR UA  Yellow   SPEC GRAV UA  1 025   PH UA  5 5   GLUCOSE UA mg/dl >=1000 (1%)*   KETONES UA mg/dl Trace*   BLOOD UA  Negative   PROTEIN UA mg/dl Negative   NITRITE UA  Negative   BILIRUBIN UA  Negative   UROBILINOGEN UA E U /dl 0 2   LEUKOCYTES UA  Elevated glucose may cause decreased leukocyte values  See urine microscopic for [facility] result/*   WBC UA /hpf 1-2*   RBC UA /hpf None Seen   BACTERIA UA /hpf Occasional   EPITHELIAL CELLS WET PREP /hpf Occasional     Results from last 7 days   Lab 02/18/20  2325   INFLUENZA A PCR Detected*   INFLUENZA B PCR None Detected   RSV PCR None Detected     Results from last 7 days   Lab 02/18/20  2049 02/18/20  2046   BLOOD CULTURE Received in Microbiology Lab  Culture in Progress  Received in Microbiology Lab  Culture in Progress         ED Treatment:   Medication Administration from 02/18/2020 2018 to 02/19/2020 0246       Date/Time Order Dose Route Action     02/18/2020 2049 sodium chloride 0 9 % bolus 1,000 mL 1,000 mL Intravenous New Bag     02/18/2020 2051 acetaminophen (TYLENOL) tablet 650 mg 650 mg Oral Given     02/18/2020 2121 albuterol inhalation solution 10 mg 10 mg Nebulization Given     02/18/2020 2121 ipratropium (ATROVENT) 0 02 % inhalation solution 1 mg 1 mg Nebulization Given     02/18/2020 2128 methylPREDNISolone sodium succinate (Solu-MEDROL) injection 125 mg 125 mg Intravenous Given     02/18/2020 2133 ceftriaxone (ROCEPHIN) 2 g/50 mL in dextrose IVPB 2,000 mg Intravenous New Bag     02/18/2020 2137 doxycycline hyclate (VIBRAMYCIN) capsule 100 mg 100 mg Oral Given     02/18/2020 2141 sodium chloride 0 9 % bolus 500 mL 500 mL Intravenous New Bag     02/18/2020 2247 albuterol inhalation solution 10 mg 10 mg Nebulization Given     02/18/2020 2248 ipratropium (ATROVENT) 0 02 % inhalation solution 1 mg 1 mg Nebulization Given     02/19/2020 0043 oseltamivir (TAMIFLU) capsule 30 mg 30 mg Oral Given        Past Medical History:   Diagnosis Date    Asthma     [facility]     Hypertension     Obesity     Stroke [facility])      Present on Admission:   Type 2 diabetes mellitus, without long-term current use of insulin ([facility]   Acquired hypothyroidism   Essential hypertension   Pure hypercholesterolemia   Mild intermittent asthma with acute exacerbation      Admitting Diagnosis: Asthma [J45 909]  Fever [R50 9]  Acute asthma exacerbation [J45 901]  Age/Sex: 59 y o  female  Admission Orders:  SCD's;  resp therapy to assess q shift/ nebs per protocol;   accucks qid w/SSI;   Lo carb diet;  IVF NS  @  150 cc/hr     Scheduled Medications:    Medications:  aspirin 81 mg Oral Daily   atorvastatin 40 mg Oral Daily   azithromycin 500 mg Intravenous Q24H   budesonide-formoterol 2 puff Inhalation BID   cefTRIAXone 1,000 mg Intravenous Q24H   enoxaparin 40 mg Subcutaneous Daily   ezetimibe 10 mg Oral Daily   fluticasone 1 spray Nasal Daily   guaiFENesin 600 mg Oral BID   insulin glargine 15 Units Subcutaneous HS   insulin glargine 30 Units Subcutaneous QAM   insulin lispro 1-6 Units Subcutaneous HS   insulin lispro 2-12 Units Subcutaneous TID AC   ipratropium 0 5 mg Nebulization TID   levalbuterol 1 25 mg Nebulization TID   levothyroxine 150 mcg Oral Early Morning   losartan 50 mg Oral Daily   methylPREDNISolone sodium succinate 40 mg Intravenous Q8H   metoprolol succinate 25 mg Oral Daily   montelukast 10 mg Oral HS   oseltamivir 30 mg Oral Q12H MARISOL   pantoprazole 40 mg Oral Early Morning   thiamine 100 mg Intravenous BID     Continuous IV Infusions:    sodium chloride 150 mL/hr Intravenous Continuous     PRN Meds:    acetaminophen 650 mg Oral Q6H PRN   albuterol 2 puff Inhalation Q6H PRN   aluminum-magnesium hydroxide-simethicone 30 mL Oral Q6H PRN   polyethylene glycol 17 g Oral Daily PRN       Network Utilization Review Department  [email or web address]  ATTENTION: Please call with any questions or concerns to [phone] and carefully listen to the prompts so that you are directed to the right person  All voicemails are confidential   Lesly Vance all requests for admission clinical reviews, approved or denied determinations and any other requests to dedicated fax number below belonging to the campus where the patient is receiving treatment   List of dedicated fax numbers for the Facilities:  [address] DENIALS (Administrative/Medical Necessity) [phone]   [address] (Maternity/NICU/Pediatrics) [phone]   [facility] [phone]   Michael Bryson [phone]   Mary Lindsey [phone]   Randi Calderon [phone]   1205 [facility] 1525 [facility] [phone]   [facility]  [phone]   2[address] [facility] [address]  2401 [facility] [address] [facility] [phone]

## 2020-02-19 NOTE — PLAN OF CARE
Problem: Potential for Falls  Goal: Patient will remain free of falls  Description  INTERVENTIONS:  - Assess patient frequently for physical needs  -  Identify cognitive and physical deficits and behaviors that affect risk of falls    -  [place] fall precautions as indicated by assessment   - Educate patient/family on patient safety including physical limitations  - Instruct patient to call for assistance with activity based on assessment  - Modify environment to reduce risk of injury  - Consider OT/PT consult to assist with strengthening/mobility  Outcome: Progressing     Problem: PAIN - ADULT  Goal: Verbalizes/displays adequate comfort level or baseline comfort level  Description  Interventions:  - Encourage patient to monitor pain and request assistance  - Assess pain using appropriate pain scale  - Administer analgesics based on type and severity of pain and evaluate response  - Implement non-pharmacological measures as appropriate and evaluate response  - Consider cultural and social influences on pain and pain management  - Notify physician/advanced practitioner if interventions unsuccessful or patient reports new pain  Outcome: Progressing     Problem: INFECTION - ADULT  Goal: Absence or prevention of progression during hospitalization  Description  INTERVENTIONS:  - Assess and monitor for signs and symptoms of infection  - Monitor lab/diagnostic results  - Monitor all insertion sites, i e  indwelling lines, tubes, and drains  - Monitor endotracheal if appropriate and nasal secretions for changes in amount and color  - [place] appropriate cooling/warming therapies per order  - Administer medications as ordered  - Instruct and encourage patient and family to use good hand hygiene technique  - Identify and instruct in appropriate isolation precautions for identified infection/condition  Outcome: Progressing  Goal: Absence of fever/infection during neutropenic period  Description  INTERVENTIONS:  - Monitor WBC    Outcome: Progressing     Problem: SAFETY ADULT  Goal: Patient will remain free of falls  Description  INTERVENTIONS:  - Assess patient frequently for physical needs  -  Identify cognitive and physical deficits and behaviors that affect risk of falls    -  [place] fall precautions as indicated by assessment   - Educate patient/family on patient safety including physical limitations  - Instruct patient to call for assistance with activity based on assessment  - Modify environment to reduce risk of injury  - Consider OT/PT consult to assist with strengthening/mobility  Outcome: Progressing  Goal: Maintain or return to baseline ADL function  Description  INTERVENTIONS:  -  Assess patient's ability to carry out ADLs; assess patient's baseline for ADL function and identify physical deficits which impact ability to perform ADLs (bathing, care of mouth/teeth, toileting, grooming, dressing, etc )  - Assess/evaluate cause of self-care deficits   - Assess range of motion  - Assess patient's mobility; develop plan if impaired  - Assess patient's need for assistive devices and provide as appropriate  - Encourage maximum independence but intervene and supervise when necessary  - Involve family in performance of ADLs  - Assess for home care needs following discharge   - Consider OT consult to assist with ADL evaluation and planning for discharge  - Provide patient education as appropriate  Outcome: Progressing  Goal: Maintain or return mobility status to optimal level  Description  INTERVENTIONS:  - Assess patient's baseline mobility status (ambulation, transfers, stairs, etc )    - Identify cognitive and physical deficits and behaviors that affect mobility  - Identify mobility aids required to assist with transfers and/or ambulation (gait belt, sit-to-stand, lift, walker, cane, etc )  - [place] fall precautions as indicated by assessment  - Record patient progress and toleration of activity level on Mobility SBAR; progress patient to next Phase/Stage  - Instruct patient to call for assistance with activity based on assessment  - Consider rehabilitation consult to assist with strengthening/weightbearing, etc   Outcome: Progressing     Problem: DISCHARGE PLANNING  Goal: Discharge to home or other facility with appropriate resources  Description  INTERVENTIONS:  - Identify barriers to discharge w/patient and caregiver  - Arrange for needed discharge resources and transportation as appropriate  - Identify discharge learning needs (meds, wound care, etc )  - Arrange for interpretive services to assist at discharge as needed  - Refer to Case Management Department for coordinating discharge planning if the patient needs post-hospital services based on physician/advanced practitioner order or complex needs related to functional status, cognitive ability, or social support system  Outcome: Progressing     Problem: Knowledge Deficit  Goal: Patient/family/caregiver demonstrates understanding of disease process, treatment plan, medications, and discharge instructions  Description  Complete learning assessment and assess knowledge base    Interventions:  - Provide teaching at level of understanding  - Provide teaching via preferred learning methods  Outcome: Progressing     Problem: RESPIRATORY - ADULT  Goal: Achieves optimal ventilation and oxygenation  Description  INTERVENTIONS:  - Assess for changes in respiratory status  - Assess for changes in mentation and behavior  - Position to facilitate oxygenation and minimize respiratory effort  - Oxygen administered by appropriate delivery if ordered  - Initiate smoking cessation education as indicated  - Encourage broncho-pulmonary hygiene including cough, deep breathe, Incentive Spirometry  - Assess the need for suctioning and aspirate as needed  - Assess and instruct to report SOB or any respiratory difficulty  - Respiratory Therapy support as indicated  Outcome: Progressing     Problem: METABOLIC, FLUID AND ELECTROLYTES - ADULT  Goal: Electrolytes maintained within normal limits  Description  INTERVENTIONS:  - Monitor labs and assess patient for signs and symptoms of electrolyte imbalances  - Administer electrolyte replacement as ordered  - Monitor response to electrolyte replacements, including repeat lab results as appropriate  - Instruct patient on fluid and nutrition as appropriate  Outcome: Progressing  Goal: Fluid balance maintained  Description  INTERVENTIONS:  - Monitor labs   - Monitor I/O and WT  - Instruct patient on fluid and nutrition as appropriate  - Assess for signs & symptoms of volume excess or deficit  Outcome: Progressing  Goal: Glucose maintained within target range  Description  INTERVENTIONS:  - Monitor Blood Glucose as ordered  - Assess for signs and symptoms of hyperglycemia and hypoglycemia  - Administer ordered medications to maintain glucose within target range  - Assess nutritional intake and initiate nutrition service referral as needed  Outcome: Progressing

## 2020-02-20 LAB
ANION GAP SERPL CALCULATED.3IONS-SCNC: 6 MMOL/L (ref 4–13)
BASOPHILS # BLD AUTO: 0.01 THOUSANDS/ΜL (ref 0–0.1)
BASOPHILS NFR BLD AUTO: 0 % (ref 0–1)
BUN SERPL-MCNC: 17 MG/DL (ref 5–25)
CALCIUM SERPL-MCNC: 8.7 MG/DL (ref 8.3–10.1)
CHLORIDE SERPL-SCNC: 108 MMOL/L (ref 100–108)
CO2 SERPL-SCNC: 30 MMOL/L (ref 21–32)
CREAT SERPL-MCNC: 0.88 MG/DL (ref 0.6–1.3)
EOSINOPHIL # BLD AUTO: 0 THOUSAND/ΜL (ref 0–0.61)
EOSINOPHIL NFR BLD AUTO: 0 % (ref 0–6)
ERYTHROCYTE [DISTWIDTH] IN BLOOD BY AUTOMATED COUNT: 13.3 % (ref 11.6–15.1)
GFR SERPL CREATININE-BSD FRML MDRD: 70 ML/MIN/1.73SQ M
GLUCOSE SERPL-MCNC: 207 MG/DL (ref 65–140)
GLUCOSE SERPL-MCNC: 220 MG/DL (ref 65–140)
GLUCOSE SERPL-MCNC: 223 MG/DL (ref 65–140)
GLUCOSE SERPL-MCNC: 240 MG/DL (ref 65–140)
GLUCOSE SERPL-MCNC: 307 MG/DL (ref 65–140)
HCT VFR BLD AUTO: 32.6 % (ref 34.8–46.1)
HGB BLD-MCNC: 10.7 G/DL (ref 11.5–15.4)
IMM GRANULOCYTES # BLD AUTO: 0.13 THOUSAND/UL (ref 0–0.2)
IMM GRANULOCYTES NFR BLD AUTO: 1 % (ref 0–2)
L PNEUMO1 AG UR QL IA.RAPID: NEGATIVE
LYMPHOCYTES # BLD AUTO: 0.97 THOUSANDS/ΜL (ref 0.6–4.47)
LYMPHOCYTES NFR BLD AUTO: 8 % (ref 14–44)
MCH RBC QN AUTO: 29.2 PG (ref 26.8–34.3)
MCHC RBC AUTO-ENTMCNC: 32.8 G/DL (ref 31.4–37.4)
MCV RBC AUTO: 89 FL (ref 82–98)
MONOCYTES # BLD AUTO: 0.75 THOUSAND/ΜL (ref 0.17–1.22)
MONOCYTES NFR BLD AUTO: 6 % (ref 4–12)
NEUTROPHILS # BLD AUTO: 10.85 THOUSANDS/ΜL (ref 1.85–7.62)
NEUTS SEG NFR BLD AUTO: 85 % (ref 43–75)
NRBC BLD AUTO-RTO: 0 /100 WBCS
PLATELET # BLD AUTO: 266 THOUSANDS/UL (ref 149–390)
PMV BLD AUTO: 10.1 FL (ref 8.9–12.7)
POTASSIUM SERPL-SCNC: 4.2 MMOL/L (ref 3.5–5.3)
PROCALCITONIN SERPL-MCNC: <0.05 NG/ML
RBC # BLD AUTO: 3.66 MILLION/UL (ref 3.81–5.12)
S PNEUM AG UR QL: NEGATIVE
SODIUM SERPL-SCNC: 144 MMOL/L (ref 136–145)
WBC # BLD AUTO: 12.71 THOUSAND/UL (ref 4.31–10.16)

## 2020-02-20 PROCEDURE — 80048 BASIC METABOLIC PNL TOTAL CA: CPT | Performed by: FAMILY MEDICINE

## 2020-02-20 PROCEDURE — 82948 REAGENT STRIP/BLOOD GLUCOSE: CPT

## 2020-02-20 PROCEDURE — 84145 PROCALCITONIN (PCT): CPT | Performed by: FAMILY MEDICINE

## 2020-02-20 PROCEDURE — 99232 SBSQ HOSP IP/OBS MODERATE 35: CPT | Performed by: FAMILY MEDICINE

## 2020-02-20 PROCEDURE — 85025 COMPLETE CBC W/AUTO DIFF WBC: CPT | Performed by: FAMILY MEDICINE

## 2020-02-20 PROCEDURE — 94640 AIRWAY INHALATION TREATMENT: CPT

## 2020-02-20 RX ORDER — INSULIN GLARGINE 100 [IU]/ML
38 INJECTION, SOLUTION SUBCUTANEOUS EVERY MORNING
Status: DISCONTINUED | OUTPATIENT
Start: 2020-02-21 | End: 2020-02-21

## 2020-02-20 RX ORDER — METHOCARBAMOL 500 MG/1
500 TABLET, FILM COATED ORAL EVERY 6 HOURS PRN
Status: DISCONTINUED | OUTPATIENT
Start: 2020-02-20 | End: 2020-02-23 | Stop reason: HOSPADM

## 2020-02-20 RX ORDER — INSULIN GLARGINE 100 [IU]/ML
24 INJECTION, SOLUTION SUBCUTANEOUS
Status: DISCONTINUED | OUTPATIENT
Start: 2020-02-20 | End: 2020-02-21

## 2020-02-20 RX ORDER — LIDOCAINE 50 MG/G
1 PATCH TOPICAL DAILY
Status: DISCONTINUED | OUTPATIENT
Start: 2020-02-20 | End: 2020-02-23 | Stop reason: HOSPADM

## 2020-02-20 RX ADMIN — BUDESONIDE AND FORMOTEROL FUMARATE DIHYDRATE 2 PUFF: 160; 4.5 AEROSOL RESPIRATORY (INHALATION) at 08:43

## 2020-02-20 RX ADMIN — OSELTAMIVIR PHOSPHATE 30 MG: 30 CAPSULE ORAL at 21:42

## 2020-02-20 RX ADMIN — LOSARTAN POTASSIUM 50 MG: 50 TABLET, FILM COATED ORAL at 08:43

## 2020-02-20 RX ADMIN — IPRATROPIUM BROMIDE 0.5 MG: 0.5 SOLUTION RESPIRATORY (INHALATION) at 07:46

## 2020-02-20 RX ADMIN — GUAIFENESIN 600 MG: 600 TABLET ORAL at 08:43

## 2020-02-20 RX ADMIN — THIAMINE HYDROCHLORIDE 100 MG: 100 INJECTION, SOLUTION INTRAMUSCULAR; INTRAVENOUS at 19:00

## 2020-02-20 RX ADMIN — AZITHROMYCIN MONOHYDRATE 500 MG: 500 INJECTION, POWDER, LYOPHILIZED, FOR SOLUTION INTRAVENOUS at 11:56

## 2020-02-20 RX ADMIN — IPRATROPIUM BROMIDE 0.5 MG: 0.5 SOLUTION RESPIRATORY (INHALATION) at 20:00

## 2020-02-20 RX ADMIN — INSULIN LISPRO 3 UNITS: 100 INJECTION, SOLUTION INTRAVENOUS; SUBCUTANEOUS at 21:44

## 2020-02-20 RX ADMIN — LEVALBUTEROL HYDROCHLORIDE 1.25 MG: 1.25 SOLUTION, CONCENTRATE RESPIRATORY (INHALATION) at 07:46

## 2020-02-20 RX ADMIN — EZETIMIBE 10 MG: 10 TABLET ORAL at 08:43

## 2020-02-20 RX ADMIN — LEVALBUTEROL HYDROCHLORIDE 1.25 MG: 1.25 SOLUTION, CONCENTRATE RESPIRATORY (INHALATION) at 13:50

## 2020-02-20 RX ADMIN — LEVALBUTEROL HYDROCHLORIDE 1.25 MG: 1.25 SOLUTION, CONCENTRATE RESPIRATORY (INHALATION) at 20:00

## 2020-02-20 RX ADMIN — THIAMINE HYDROCHLORIDE 100 MG: 100 INJECTION, SOLUTION INTRAMUSCULAR; INTRAVENOUS at 08:56

## 2020-02-20 RX ADMIN — SODIUM CHLORIDE 125 ML/HR: 0.9 INJECTION, SOLUTION INTRAVENOUS at 05:11

## 2020-02-20 RX ADMIN — LEVOTHYROXINE SODIUM 150 MCG: 75 TABLET ORAL at 05:10

## 2020-02-20 RX ADMIN — MONTELUKAST SODIUM 10 MG: 10 TABLET, COATED ORAL at 21:42

## 2020-02-20 RX ADMIN — INSULIN GLARGINE 24 UNITS: 100 INJECTION, SOLUTION SUBCUTANEOUS at 21:42

## 2020-02-20 RX ADMIN — ATORVASTATIN CALCIUM 40 MG: 40 TABLET, FILM COATED ORAL at 08:43

## 2020-02-20 RX ADMIN — METHYLPREDNISOLONE SODIUM SUCCINATE 40 MG: 40 INJECTION, POWDER, FOR SOLUTION INTRAMUSCULAR; INTRAVENOUS at 23:28

## 2020-02-20 RX ADMIN — METHYLPREDNISOLONE SODIUM SUCCINATE 40 MG: 40 INJECTION, POWDER, FOR SOLUTION INTRAMUSCULAR; INTRAVENOUS at 14:48

## 2020-02-20 RX ADMIN — GUAIFENESIN 600 MG: 600 TABLET ORAL at 18:13

## 2020-02-20 RX ADMIN — LIDOCAINE 1 PATCH: 50 PATCH TOPICAL at 14:40

## 2020-02-20 RX ADMIN — PANTOPRAZOLE SODIUM 40 MG: 40 TABLET, DELAYED RELEASE ORAL at 05:11

## 2020-02-20 RX ADMIN — OSELTAMIVIR PHOSPHATE 30 MG: 30 CAPSULE ORAL at 08:51

## 2020-02-20 RX ADMIN — ASPIRIN 81 MG 81 MG: 81 TABLET ORAL at 08:43

## 2020-02-20 RX ADMIN — BUDESONIDE AND FORMOTEROL FUMARATE DIHYDRATE 2 PUFF: 160; 4.5 AEROSOL RESPIRATORY (INHALATION) at 18:13

## 2020-02-20 RX ADMIN — INSULIN LISPRO 4 UNITS: 100 INJECTION, SOLUTION INTRAVENOUS; SUBCUTANEOUS at 16:07

## 2020-02-20 RX ADMIN — INSULIN LISPRO 4 UNITS: 100 INJECTION, SOLUTION INTRAVENOUS; SUBCUTANEOUS at 08:45

## 2020-02-20 RX ADMIN — INSULIN LISPRO 8 UNITS: 100 INJECTION, SOLUTION INTRAVENOUS; SUBCUTANEOUS at 11:55

## 2020-02-20 RX ADMIN — INSULIN LISPRO 4 UNITS: 100 INJECTION, SOLUTION INTRAVENOUS; SUBCUTANEOUS at 11:56

## 2020-02-20 RX ADMIN — METHYLPREDNISOLONE SODIUM SUCCINATE 40 MG: 40 INJECTION, POWDER, FOR SOLUTION INTRAMUSCULAR; INTRAVENOUS at 06:22

## 2020-02-20 RX ADMIN — INSULIN GLARGINE 35 UNITS: 100 INJECTION, SOLUTION SUBCUTANEOUS at 08:44

## 2020-02-20 RX ADMIN — METOPROLOL SUCCINATE 25 MG: 25 TABLET, EXTENDED RELEASE ORAL at 08:50

## 2020-02-20 RX ADMIN — METHYLPREDNISOLONE SODIUM SUCCINATE 40 MG: 40 INJECTION, POWDER, FOR SOLUTION INTRAMUSCULAR; INTRAVENOUS at 00:46

## 2020-02-20 RX ADMIN — ENOXAPARIN SODIUM 40 MG: 40 INJECTION SUBCUTANEOUS at 08:44

## 2020-02-20 RX ADMIN — IPRATROPIUM BROMIDE 0.5 MG: 0.5 SOLUTION RESPIRATORY (INHALATION) at 13:50

## 2020-02-20 RX ADMIN — FLUTICASONE PROPIONATE 1 SPRAY: 50 SPRAY, METERED NASAL at 08:50

## 2020-02-20 NOTE — ASSESSMENT & PLAN NOTE
Patient found to be influenza A positive  Continue course of Tamiflu  Respiratory protocol  Droplet precautions

## 2020-02-20 NOTE — PLAN OF CARE
Problem: Potential for Falls  Goal: Patient will remain free of falls  Description  INTERVENTIONS:  - Assess patient frequently for physical needs  -  Identify cognitive and physical deficits and behaviors that affect risk of falls    -  [place] fall precautions as indicated by assessment   - Educate patient/family on patient safety including physical limitations  - Instruct patient to call for assistance with activity based on assessment  - Modify environment to reduce risk of injury  - Consider OT/PT consult to assist with strengthening/mobility  Outcome: Progressing     Problem: PAIN - ADULT  Goal: Verbalizes/displays adequate comfort level or baseline comfort level  Description  Interventions:  - Encourage patient to monitor pain and request assistance  - Assess pain using appropriate pain scale  - Administer analgesics based on type and severity of pain and evaluate response  - Implement non-pharmacological measures as appropriate and evaluate response  - Consider cultural and social influences on pain and pain management  - Notify physician/advanced practitioner if interventions unsuccessful or patient reports new pain  Outcome: Progressing     Problem: INFECTION - ADULT  Goal: Absence or prevention of progression during hospitalization  Description  INTERVENTIONS:  - Assess and monitor for signs and symptoms of infection  - Monitor lab/diagnostic results  - Monitor all insertion sites, i e  indwelling lines, tubes, and drains  - Monitor endotracheal if appropriate and nasal secretions for changes in amount and color  - [place] appropriate cooling/warming therapies per order  - Administer medications as ordered  - Instruct and encourage patient and family to use good hand hygiene technique  - Identify and instruct in appropriate isolation precautions for identified infection/condition  Outcome: Progressing     Problem: SAFETY ADULT  Goal: Patient will remain free of falls  Description  INTERVENTIONS:  - Assess patient frequently for physical needs  -  Identify cognitive and physical deficits and behaviors that affect risk of falls    -  [place] fall precautions as indicated by assessment   - Educate patient/family on patient safety including physical limitations  - Instruct patient to call for assistance with activity based on assessment  - Modify environment to reduce risk of injury  - Consider OT/PT consult to assist with strengthening/mobility  Outcome: Progressing  Goal: Maintain or return to baseline ADL function  Description  INTERVENTIONS:  -  Assess patient's ability to carry out ADLs; assess patient's baseline for ADL function and identify physical deficits which impact ability to perform ADLs (bathing, care of mouth/teeth, toileting, grooming, dressing, etc )  - Assess/evaluate cause of self-care deficits   - Assess range of motion  - Assess patient's mobility; develop plan if impaired  - Assess patient's need for assistive devices and provide as appropriate  - Encourage maximum independence but intervene and supervise when necessary  - Involve family in performance of ADLs  - Assess for home care needs following discharge   - Consider OT consult to assist with ADL evaluation and planning for discharge  - Provide patient education as appropriate  Outcome: Progressing  Goal: Maintain or return mobility status to optimal level  Description  INTERVENTIONS:  - Assess patient's baseline mobility status (ambulation, transfers, stairs, etc )    - Identify cognitive and physical deficits and behaviors that affect mobility  - Identify mobility aids required to assist with transfers and/or ambulation (gait belt, sit-to-stand, lift, walker, cane, etc )  - [place] fall precautions as indicated by assessment  - Record patient progress and toleration of activity level on Mobility SBAR; progress patient to next Phase/Stage  - Instruct patient to call for assistance with activity based on assessment  - Consider rehabilitation consult to assist with strengthening/weightbearing, etc   Outcome: Progressing     Problem: DISCHARGE PLANNING  Goal: Discharge to home or other facility with appropriate resources  Description  INTERVENTIONS:  - Identify barriers to discharge w/patient and caregiver  - Arrange for needed discharge resources and transportation as appropriate  - Identify discharge learning needs (meds, wound care, etc )  - Arrange for interpretive services to assist at discharge as needed  - Refer to Case Management Department for coordinating discharge planning if the patient needs post-hospital services based on physician/advanced practitioner order or complex needs related to functional status, cognitive ability, or social support system  Outcome: Progressing     Problem: Knowledge Deficit  Goal: Patient/family/caregiver demonstrates understanding of disease process, treatment plan, medications, and discharge instructions  Description  Complete learning assessment and assess knowledge base    Interventions:  - Provide teaching at level of understanding  - Provide teaching via preferred learning methods  Outcome: Progressing     Problem: RESPIRATORY - ADULT  Goal: Achieves optimal ventilation and oxygenation  Description  INTERVENTIONS:  - Assess for changes in respiratory status  - Assess for changes in mentation and behavior  - Position to facilitate oxygenation and minimize respiratory effort  - Oxygen administered by appropriate delivery if ordered  - Initiate smoking cessation education as indicated  - Encourage broncho-pulmonary hygiene including cough, deep breathe, Incentive Spirometry  - Assess the need for suctioning and aspirate as needed  - Assess and instruct to report SOB or any respiratory difficulty  - Respiratory Therapy support as indicated  Outcome: Progressing     Problem: METABOLIC, FLUID AND ELECTROLYTES - ADULT  Goal: Electrolytes maintained within normal limits  Description  INTERVENTIONS:  - Monitor labs and assess patient for signs and symptoms of electrolyte imbalances  - Administer electrolyte replacement as ordered  - Monitor response to electrolyte replacements, including repeat lab results as appropriate  - Instruct patient on fluid and nutrition as appropriate  Outcome: Progressing  Goal: Fluid balance maintained  Description  INTERVENTIONS:  - Monitor labs   - Monitor I/O and WT  - Instruct patient on fluid and nutrition as appropriate  - Assess for signs & symptoms of volume excess or deficit  Outcome: Progressing  Goal: Glucose maintained within target range  Description  INTERVENTIONS:  - Monitor Blood Glucose as ordered  - Assess for signs and symptoms of hyperglycemia and hypoglycemia  - Administer ordered medications to maintain glucose within target range  - Assess nutritional intake and initiate nutrition service referral as needed  Outcome: Progressing

## 2020-02-20 NOTE — ASSESSMENT & PLAN NOTE
Patient presents with one week of progressively worsening shortness of breath and wheezing  Started on IV solumedrol in ED, continue 40 mg TID  Continue scheduled nebulizers  Given ceftriaxone and doxycycline in ED, continued with Rocephin and azithromycin given and infiltrate on chest x-ray and concern for possible post flu pneumonia in setting of positive flu test - given normal procalcitonin x 2 Abx discontinued  Incentive spirometry  Respiratory protocol

## 2020-02-20 NOTE — ASSESSMENT & PLAN NOTE
POA  Likely viral sepsis due to influenza infection, also noted for an infiltrate of chest x-ray but procalcitonin normal and pneumonia workup negaitve  - patient was meeting criteria with leukocytosis of 12, tachycardia, lactic acidosis resolved with IVF, discontinued  - f/u remained of the infectious workup  - D/c Abx and observe off

## 2020-02-20 NOTE — PROGRESS NOTES
Progress Note - Blair Leonardo 1955, 59 y o  female MRN: [id number]    Unit/Bed#: [place] [address] -01 Encounter: [id number]    Primary Care Provider: Gonzalo Pulido MD   Date and time admitted to hospital: 2/18/2020  8:32 PM        * Severe sepsis [facility])  Assessment & Plan  POA  Likely viral sepsis due to influenza infection, also noted for an infiltrate of chest x-ray but procalcitonin normal and pneumonia workup negaitve  - patient was meeting criteria with leukocytosis of 12, tachycardia, lactic acidosis resolved with IVF, discontinued  - f/u remained of the infectious workup  - D/c Abx and observe off     Influenza A  Assessment & Plan  Patient found to be influenza A positive  Continue course of Tamiflu  Respiratory protocol  Droplet precautions    Mild intermittent asthma with acute exacerbation  Assessment & Plan  Patient presents with one week of progressively worsening shortness of breath and wheezing  Started on IV solumedrol in ED, continue 40 mg TID  Continue scheduled nebulizers  Given ceftriaxone and doxycycline in ED, continued with Rocephin and azithromycin given and infiltrate on chest x-ray and concern for possible post flu pneumonia in setting of positive flu test - given normal procalcitonin x 2 Abx discontinued  Incentive spirometry  Respiratory protocol         Lung infiltrate  Assessment & Plan  Procalcitonin negative x2  Urine Legionella and strep antigens negative  Antibiotics discontinued    Essential hypertension  Assessment & Plan  BP appears controlled at this time  Continue home regimen metoprolol, losartan   Continue to monitor BP     Acquired hypothyroidism  Assessment & Plan  Continue daily levothyroxine    Type 2 diabetes mellitus, without long-term current use of insulin [facility])  Assessment & Plan  Lab Results   Component Value Date    HGBA1C 8 4 (H) 12/02/2019       Recent Labs     02/19/20  0302 02/19/20  0615 02/19/20  1235   POCGLU 407* 313* 286*       Blood Sugar Average: Last 72 hrs:  (P) 828 [id number]   Accu-Cheks markedly elevated with systemic steroid therapy likely contributing  Adjust Lantus - increase to 24 units HS and to 38 units qAM  Increase premeal Humalog to 6 u TID   Continue sliding scale insulin with accu checks   Diabetic diet       VTE Pharmacologic Prophylaxis:   Pharmacologic: Enoxaparin (Lovenox)  Mechanical VTE Prophylaxis in Place: Yes    Patient Centered Rounds: I have performed bedside rounds with nursing staff today  Discussions with Specialists or Other Care Team Provider: CM    Education and Discussions with Family / Patient: Patient    Time Spent for Care: 30 minutes  More than 50% of total time spent on counseling and coordination of care as described above  Current Length of Stay: 2 day(s)    Current Patient Status: Inpatient   Certification Statement: The patient will continue to require additional inpatient hospital stay due to no new    Discharge Plan: TBD    Code Status: Level 1 - Full Code      Subjective:   Patient seen and examined  She reports cough and pleuritic pain  Fever curve trending down  No overnight events  Objective:     Vitals:   Temp (24hrs), Av 2 °F (37 9 °C), Min:98 6 °F (37 °C), Max:101 7 °F (38 7 °C)    Temp:  [98 6 °F (37 °C)-101 7 °F (38 7 °C)] 98 6 °F (37 °C)  HR:  [] 77  Resp:  [16-20] 20  BP: (136-141)/() 136/105  SpO2:  [92 %-97 %] 94 %  Body mass index is 39 53 kg/m²  Input and Output Summary (last 24 hours):     No intake or output data in the 24 hours ending 20 1302    Physical Exam:     Physical Exam   Constitutional: She is oriented to person, place, and time  No distress  HENT:   Head: Normocephalic and atraumatic  Eyes: Conjunctivae are normal    Neck: No JVD present  Cardiovascular: Normal rate and regular rhythm  No murmur heard  Pulmonary/Chest: Effort normal  No respiratory distress  She has wheezes  She has no rales  Abdominal: Soft   She exhibits no distension  There is no tenderness  There is no guarding  Musculoskeletal: She exhibits no edema  Neurological: She is alert and oriented to person, place, and time  Skin: Skin is warm and dry  Psychiatric: She has a normal mood and affect  Additional Data:     Labs:    Results from last 7 days   Lab Units 02/20/20  0631   WBC Thousand/uL 12 71*   HEMOGLOBIN g/dL 10 7*   HEMATOCRIT % 32 6*   PLATELETS Thousands/uL 266   NEUTROS PCT % 85*   LYMPHS PCT % 8*   MONOS PCT % 6   EOS PCT % 0     Results from last 7 days   Lab Units 02/20/20  0631 02/18/20  2046   SODIUM mmol/L 144 138   POTASSIUM mmol/L 4 2 4 2   CHLORIDE mmol/L 108 99*   CO2 mmol/L 30 25   BUN mg/dL 17 14   CREATININE mg/dL 0 88 1 03   ANION GAP mmol/L 6 14*   CALCIUM mg/dL 8 7 9 1   ALBUMIN g/dL  --  3 7   TOTAL BILIRUBIN mg/dL  --  0 27   ALK PHOS U/L  --  112   ALT U/L  --  21   AST U/L  --  16   GLUCOSE RANDOM mg/dL 223* 275*     Results from last 7 days   Lab Units 02/18/20  2046   INR  0 92     Results from last 7 days   Lab Units 02/20/20  1047 02/20/20  0719 02/19/20  2107 02/19/20  1630 02/19/20  1235 02/19/20  0615 02/19/20  0302   POC GLUCOSE mg/dl 307* 207* 248* 240* 286* 313* 407*         Results from last 7 days   Lab Units 02/20/20  0631 02/19/20  2036 02/19/20  1639 02/19/20  1153 02/19/20  0605  02/18/20  2046   LACTIC ACID mmol/L  --  2 0 2 3* 2 6* 5 1*   < > 3 0*   PROCALCITONIN ng/ml <0 05  --   --  <0 05  --   --  <0 05    < > = values in this interval not displayed  * I Have Reviewed All Lab Data Listed Above  * Additional Pertinent Lab Tests Reviewed: [facility] Admission Reviewed    Imaging:    Imaging Reports Reviewed Today Include: no new      Recent Cultures (last 7 days):     Results from last 7 days   Lab Units 02/19/20  1850 02/18/20  2049 02/18/20  2046   BLOOD CULTURE   --  No Growth at 24 hrs  No Growth at 24 hrs     LEGIONELLA URINARY ANTIGEN  Negative  --   --        Last 24 Hours Medication List:     Current Facility-Administered Medications:  acetaminophen 650 mg Oral Q6H PRN Kristin Smudde, PA-C    albuterol 2 puff Inhalation Q6H PRN Kristin Smudde, PA-C    aluminum-magnesium hydroxide-simethicone 30 mL Oral Q6H PRN Kristin Smudde, PA-C    aspirin 81 mg Oral Daily Kristin Smudde, PA-C    atorvastatin 40 mg Oral Daily Kristin Smudde, PA-C    budesonide-formoterol 2 puff Inhalation BID Kristin Smudde, PA-C    enoxaparin 40 mg Subcutaneous Daily Kristin Smudde, PA-C    ezetimibe 10 mg Oral Daily Kristin Smudde, PA-C    fluticasone 1 spray Nasal Daily Kristin Smudde, PA-C    guaiFENesin 600 mg Oral BID Kristin Smudde, PA-C    insulin glargine 24 Units Subcutaneous HS Leigh Vuong MD    [START ON 2/21/2020] insulin glargine 38 Units Subcutaneous QAM Yolanda Roberts MD    insulin lispro 1-6 Units Subcutaneous HS Kristin Smudde, PA-C    insulin lispro 2-12 Units Subcutaneous TID AC Kristin Smudde, PA-C    insulin lispro 6 Units Subcutaneous TID With Meals Leigh Vuong MD    ipratropium 0 5 mg Nebulization TID Amanda Rodairam Hanleyudde, PA-C    levalbuterol 1 25 mg Nebulization TID Amanda Adelaida Sandovalde, PA-C    levothyroxine 150 mcg Oral Early Morning Kristin Smudde, PA-C    lidocaine 1 patch Topical Daily Leigh Vuong MD    losartan 50 mg Oral Daily Kristin Smudde, PAQuianaC    methocarbamol 500 mg Oral Q6H PRN Leigh Vuong MD    methylPREDNISolone sodium succinate 40 mg Intravenous Q8H Kristin Smudde, PA-C    metoprolol succinate 25 mg Oral Daily Kristin Smudde, PA-C    montelukast 10 mg Oral HS Kristin Smudde, PA-C    oseltamivir 30 mg Oral Q12H [address] Kristin Smudde, PA-C    pantoprazole 40 mg Oral Early Morning Kristin Smudde, PA-C    polyethylene glycol 17 g Oral Daily PRN Kristin Mak PA-C    thiamine 100 mg Intravenous BID Leigh Vuong MD Last Rate: Stopped (02/20/20 0945)        Today, Patient Was Seen By: Vinita Contreras MD    ** Please Note: Dictation voice to text software may have been used in the creation of this document   **

## 2020-02-20 NOTE — ASSESSMENT & PLAN NOTE
Lab Results   Component Value Date    HGBA1C 8 4 (H) 12/02/2019       Recent Labs     02/19/20  0302 02/19/20  0615 02/19/20  1235   POCGLU 407* 313* 286*       Blood Sugar Average: Last 72 hrs:  (P) 791 [id number]   Accu-Cheks markedly elevated with systemic steroid therapy likely contributing  Adjust Lantus - increase to 24 units HS and to 38 units qAM  Increase premeal Humalog to 6 u TID   Continue sliding scale insulin with accu checks   Diabetic diet

## 2020-02-21 LAB
ANION GAP SERPL CALCULATED.3IONS-SCNC: 9 MMOL/L (ref 4–13)
BASOPHILS # BLD AUTO: 0.01 THOUSANDS/ΜL (ref 0–0.1)
BASOPHILS NFR BLD AUTO: 0 % (ref 0–1)
BUN SERPL-MCNC: 16 MG/DL (ref 5–25)
CALCIUM SERPL-MCNC: 8.6 MG/DL (ref 8.3–10.1)
CHLORIDE SERPL-SCNC: 104 MMOL/L (ref 100–108)
CO2 SERPL-SCNC: 28 MMOL/L (ref 21–32)
CREAT SERPL-MCNC: 0.93 MG/DL (ref 0.6–1.3)
EOSINOPHIL # BLD AUTO: 0 THOUSAND/ΜL (ref 0–0.61)
EOSINOPHIL NFR BLD AUTO: 0 % (ref 0–6)
ERYTHROCYTE [DISTWIDTH] IN BLOOD BY AUTOMATED COUNT: 13.3 % (ref 11.6–15.1)
GFR SERPL CREATININE-BSD FRML MDRD: 65 ML/MIN/1.73SQ M
GLUCOSE SERPL-MCNC: 171 MG/DL (ref 65–140)
GLUCOSE SERPL-MCNC: 175 MG/DL (ref 65–140)
GLUCOSE SERPL-MCNC: 280 MG/DL (ref 65–140)
GLUCOSE SERPL-MCNC: 304 MG/DL (ref 65–140)
GLUCOSE SERPL-MCNC: 345 MG/DL (ref 65–140)
HCT VFR BLD AUTO: 34.4 % (ref 34.8–46.1)
HGB BLD-MCNC: 11 G/DL (ref 11.5–15.4)
IMM GRANULOCYTES # BLD AUTO: 0.13 THOUSAND/UL (ref 0–0.2)
IMM GRANULOCYTES NFR BLD AUTO: 1 % (ref 0–2)
LYMPHOCYTES # BLD AUTO: 1.17 THOUSANDS/ΜL (ref 0.6–4.47)
LYMPHOCYTES NFR BLD AUTO: 10 % (ref 14–44)
MCH RBC QN AUTO: 28.5 PG (ref 26.8–34.3)
MCHC RBC AUTO-ENTMCNC: 32 G/DL (ref 31.4–37.4)
MCV RBC AUTO: 89 FL (ref 82–98)
MONOCYTES # BLD AUTO: 0.76 THOUSAND/ΜL (ref 0.17–1.22)
MONOCYTES NFR BLD AUTO: 7 % (ref 4–12)
NEUTROPHILS # BLD AUTO: 9.34 THOUSANDS/ΜL (ref 1.85–7.62)
NEUTS SEG NFR BLD AUTO: 82 % (ref 43–75)
NRBC BLD AUTO-RTO: 0 /100 WBCS
PLATELET # BLD AUTO: 270 THOUSANDS/UL (ref 149–390)
PMV BLD AUTO: 10.7 FL (ref 8.9–12.7)
POTASSIUM SERPL-SCNC: 4 MMOL/L (ref 3.5–5.3)
RBC # BLD AUTO: 3.86 MILLION/UL (ref 3.81–5.12)
SODIUM SERPL-SCNC: 141 MMOL/L (ref 136–145)
WBC # BLD AUTO: 11.41 THOUSAND/UL (ref 4.31–10.16)

## 2020-02-21 PROCEDURE — 82948 REAGENT STRIP/BLOOD GLUCOSE: CPT

## 2020-02-21 PROCEDURE — 80048 BASIC METABOLIC PNL TOTAL CA: CPT | Performed by: FAMILY MEDICINE

## 2020-02-21 PROCEDURE — 85025 COMPLETE CBC W/AUTO DIFF WBC: CPT | Performed by: FAMILY MEDICINE

## 2020-02-21 PROCEDURE — 99232 SBSQ HOSP IP/OBS MODERATE 35: CPT | Performed by: FAMILY MEDICINE

## 2020-02-21 PROCEDURE — 94640 AIRWAY INHALATION TREATMENT: CPT

## 2020-02-21 RX ORDER — INSULIN GLARGINE 100 [IU]/ML
26 INJECTION, SOLUTION SUBCUTANEOUS
Status: DISCONTINUED | OUTPATIENT
Start: 2020-02-21 | End: 2020-02-22

## 2020-02-21 RX ORDER — INSULIN GLARGINE 100 [IU]/ML
40 INJECTION, SOLUTION SUBCUTANEOUS EVERY MORNING
Status: DISCONTINUED | OUTPATIENT
Start: 2020-02-22 | End: 2020-02-22

## 2020-02-21 RX ADMIN — IPRATROPIUM BROMIDE 0.5 MG: 0.5 SOLUTION RESPIRATORY (INHALATION) at 08:19

## 2020-02-21 RX ADMIN — LIDOCAINE 1 PATCH: 50 PATCH TOPICAL at 08:36

## 2020-02-21 RX ADMIN — LEVALBUTEROL HYDROCHLORIDE 1.25 MG: 1.25 SOLUTION, CONCENTRATE RESPIRATORY (INHALATION) at 19:30

## 2020-02-21 RX ADMIN — PANTOPRAZOLE SODIUM 40 MG: 40 TABLET, DELAYED RELEASE ORAL at 06:05

## 2020-02-21 RX ADMIN — LOSARTAN POTASSIUM 50 MG: 50 TABLET, FILM COATED ORAL at 08:43

## 2020-02-21 RX ADMIN — INSULIN LISPRO 2 UNITS: 100 INJECTION, SOLUTION INTRAVENOUS; SUBCUTANEOUS at 08:31

## 2020-02-21 RX ADMIN — INSULIN GLARGINE 26 UNITS: 100 INJECTION, SOLUTION SUBCUTANEOUS at 21:45

## 2020-02-21 RX ADMIN — THIAMINE HYDROCHLORIDE 100 MG: 100 INJECTION, SOLUTION INTRAMUSCULAR; INTRAVENOUS at 08:35

## 2020-02-21 RX ADMIN — INSULIN LISPRO 4 UNITS: 100 INJECTION, SOLUTION INTRAVENOUS; SUBCUTANEOUS at 21:45

## 2020-02-21 RX ADMIN — THIAMINE HYDROCHLORIDE 100 MG: 100 INJECTION, SOLUTION INTRAMUSCULAR; INTRAVENOUS at 17:04

## 2020-02-21 RX ADMIN — MONTELUKAST SODIUM 10 MG: 10 TABLET, COATED ORAL at 21:45

## 2020-02-21 RX ADMIN — OSELTAMIVIR PHOSPHATE 30 MG: 30 CAPSULE ORAL at 08:43

## 2020-02-21 RX ADMIN — LEVALBUTEROL HYDROCHLORIDE 1.25 MG: 1.25 SOLUTION, CONCENTRATE RESPIRATORY (INHALATION) at 08:19

## 2020-02-21 RX ADMIN — OSELTAMIVIR PHOSPHATE 30 MG: 30 CAPSULE ORAL at 21:45

## 2020-02-21 RX ADMIN — EZETIMIBE 10 MG: 10 TABLET ORAL at 08:43

## 2020-02-21 RX ADMIN — INSULIN GLARGINE 38 UNITS: 100 INJECTION, SOLUTION SUBCUTANEOUS at 08:35

## 2020-02-21 RX ADMIN — METHYLPREDNISOLONE SODIUM SUCCINATE 40 MG: 40 INJECTION, POWDER, FOR SOLUTION INTRAMUSCULAR; INTRAVENOUS at 08:34

## 2020-02-21 RX ADMIN — INSULIN LISPRO 8 UNITS: 100 INJECTION, SOLUTION INTRAVENOUS; SUBCUTANEOUS at 16:54

## 2020-02-21 RX ADMIN — IPRATROPIUM BROMIDE 0.5 MG: 0.5 SOLUTION RESPIRATORY (INHALATION) at 13:30

## 2020-02-21 RX ADMIN — METHYLPREDNISOLONE SODIUM SUCCINATE 40 MG: 40 INJECTION, POWDER, FOR SOLUTION INTRAMUSCULAR; INTRAVENOUS at 23:57

## 2020-02-21 RX ADMIN — BUDESONIDE AND FORMOTEROL FUMARATE DIHYDRATE 2 PUFF: 160; 4.5 AEROSOL RESPIRATORY (INHALATION) at 08:34

## 2020-02-21 RX ADMIN — ENOXAPARIN SODIUM 40 MG: 40 INJECTION SUBCUTANEOUS at 08:35

## 2020-02-21 RX ADMIN — GUAIFENESIN 600 MG: 600 TABLET ORAL at 08:43

## 2020-02-21 RX ADMIN — IPRATROPIUM BROMIDE 0.5 MG: 0.5 SOLUTION RESPIRATORY (INHALATION) at 19:29

## 2020-02-21 RX ADMIN — INSULIN LISPRO 6 UNITS: 100 INJECTION, SOLUTION INTRAVENOUS; SUBCUTANEOUS at 13:03

## 2020-02-21 RX ADMIN — FLUTICASONE PROPIONATE 1 SPRAY: 50 SPRAY, METERED NASAL at 08:34

## 2020-02-21 RX ADMIN — LEVALBUTEROL HYDROCHLORIDE 1.25 MG: 1.25 SOLUTION, CONCENTRATE RESPIRATORY (INHALATION) at 13:30

## 2020-02-21 RX ADMIN — GUAIFENESIN 600 MG: 600 TABLET ORAL at 17:03

## 2020-02-21 RX ADMIN — ATORVASTATIN CALCIUM 40 MG: 40 TABLET, FILM COATED ORAL at 08:43

## 2020-02-21 RX ADMIN — ASPIRIN 81 MG 81 MG: 81 TABLET ORAL at 08:43

## 2020-02-21 RX ADMIN — BUDESONIDE AND FORMOTEROL FUMARATE DIHYDRATE 2 PUFF: 160; 4.5 AEROSOL RESPIRATORY (INHALATION) at 17:04

## 2020-02-21 RX ADMIN — METOPROLOL SUCCINATE 25 MG: 25 TABLET, EXTENDED RELEASE ORAL at 08:43

## 2020-02-21 RX ADMIN — METHYLPREDNISOLONE SODIUM SUCCINATE 40 MG: 40 INJECTION, POWDER, FOR SOLUTION INTRAMUSCULAR; INTRAVENOUS at 15:19

## 2020-02-21 RX ADMIN — LEVOTHYROXINE SODIUM 150 MCG: 75 TABLET ORAL at 06:05

## 2020-02-21 NOTE — ASSESSMENT & PLAN NOTE
POA  Likely viral sepsis due to influenza infection, also noted for an infiltrate of chest x-ray but procalcitonin normal and pneumonia workup negaitve  - patient was meeting criteria with leukocytosis of 12, tachycardia, lactic acidosis resolved with IVF, discontinued  - continue to observe off antibiotics

## 2020-02-21 NOTE — PROGRESS NOTES
Progress Note - Danny Chavez 1955, 59 y o  female MRN: [id number]    Unit/Bed#: [place] [address] -01 Encounter: [id number]    Primary Care Provider: Rosita Thompson MD   Date and time admitted to hospital: 2/18/2020  8:32 PM        * Severe sepsis [facility])  Assessment & Plan  POA  Likely viral sepsis due to influenza infection, also noted for an infiltrate of chest x-ray but procalcitonin normal and pneumonia workup negaitve  - patient was meeting criteria with leukocytosis of 12, tachycardia, lactic acidosis resolved with IVF, discontinued  - continue to observe off antibiotics    Influenza A  Assessment & Plan  Patient found to be influenza A positive  Continue course of Tamiflu  Respiratory protocol  Droplet precautions    Mild intermittent asthma with acute exacerbation  Assessment & Plan  Patient presents with one week of progressively worsening shortness of breath and wheezing  Started on IV solumedrol in ED, continue 40 mg TID  Continue scheduled nebulizers  Given ceftriaxone and doxycycline in ED, continued with Rocephin and azithromycin given and infiltrate on chest x-ray and concern for possible post flu pneumonia in setting of positive flu test - given normal procalcitonin x 2 Abx discontinued  Incentive spirometry  Respiratory protocol         Lung infiltrate  Assessment & Plan  Procalcitonin negative x2  Urine Legionella and strep antigens negative  Antibiotics discontinued 2/20    Essential hypertension  Assessment & Plan  BP appears controlled at this time  Continue home regimen metoprolol, losartan   Continue to monitor BP     Acquired hypothyroidism  Assessment & Plan  Continue daily levothyroxine    Type 2 diabetes mellitus, without long-term current use of insulin [facility])  Assessment & Plan  Lab Results   Component Value Date    HGBA1C 8 4 (H) 12/02/2019       Recent Labs     02/20/20  2115 02/21/20  0727 02/21/20  1130 02/21/20  1608   POCGLU 240* 171* 280* 345*       Blood Sugar Average: Last 72 hrs:  (P) 272   Accu-Cheks markedly elevated with systemic steroid therapy likely contributing  Adjust Lantus - increase to 26 units HS and to 40 units qAM  Increase premeal Humalog to 8 u TID   Continue sliding scale insulin with accu checks   Diabetic diet       VTE Pharmacologic Prophylaxis:   Pharmacologic: Enoxaparin (Lovenox)  Mechanical VTE Prophylaxis in Place: Yes    Patient Centered Rounds: I have performed bedside rounds with nursing staff today  Discussions with Specialists or Other Care Team Provider: CM    Education and Discussions with Family / Patient: Patient    Time Spent for Care: 30 minutes  More than 50% of total time spent on counseling and coordination of care as described above  Current Length of Stay: 3 day(s)    Current Patient Status: Inpatient   Certification Statement: The patient will continue to require additional inpatient hospital stay due to IV steroids    Discharge Plan: 1-2 days    Code Status: Level 1 - Full Code      Subjective:   Patient seen and examined  She continues to experience significant amount of wheezing and exertional SOB with tachycardia  Afebrile  No overnight events  Objective:     Vitals:   Temp (24hrs), Av 2 °F (36 8 °C), Min:97 9 °F (36 6 °C), Max:98 5 °F (36 9 °C)    Temp:  [97 9 °F (36 6 °C)-98 5 °F (36 9 °C)] 98 2 °F (36 8 °C)  HR:  [65-94] 94  Resp:  [19-20] 20  BP: (123-147)/(63-75) 137/66  SpO2:  [94 %-100 %] 94 %  Body mass index is 39 53 kg/m²  Input and Output Summary (last 24 hours): Intake/Output Summary (Last 24 hours) at 2020 1724  Last data filed at 2020 0500  Gross per 24 hour   Intake    Output 800 ml   Net -800 ml       Physical Exam:     Physical Exam   Constitutional: She is oriented to person, place, and time  No distress  HENT:   Head: Normocephalic and atraumatic  Eyes: Conjunctivae are normal    Neck: No JVD present  Cardiovascular: Normal rate and regular rhythm     No murmur heard   Pulmonary/Chest: Effort normal  No respiratory distress  She has wheezes  She has no rales  Abdominal: Soft  She exhibits no distension  There is no tenderness  There is no guarding  Musculoskeletal: She exhibits no edema  Neurological: She is alert and oriented to person, place, and time  Skin: Skin is warm and dry  Psychiatric: She has a normal mood and affect  Additional Data:     Labs:    Results from last 7 days   Lab Units 02/21/20  0510   WBC Thousand/uL 11 41*   HEMOGLOBIN g/dL 11 0*   HEMATOCRIT % 34 4*   PLATELETS Thousands/uL 270   NEUTROS PCT % 82*   LYMPHS PCT % 10*   MONOS PCT % 7   EOS PCT % 0     Results from last 7 days   Lab Units 02/21/20  0510  02/18/20  2046   SODIUM mmol/L 141   < > 138   POTASSIUM mmol/L 4 0   < > 4 2   CHLORIDE mmol/L 104   < > 99*   CO2 mmol/L 28   < > 25   BUN mg/dL 16   < > 14   CREATININE mg/dL 0 93   < > 1 03   ANION GAP mmol/L 9   < > 14*   CALCIUM mg/dL 8 6   < > 9 1   ALBUMIN g/dL  --   --  3 7   TOTAL BILIRUBIN mg/dL  --   --  0 27   ALK PHOS U/L  --   --  112   ALT U/L  --   --  21   AST U/L  --   --  16   GLUCOSE RANDOM mg/dL 175*   < > 275*    < > = values in this interval not displayed  Results from last 7 days   Lab Units 02/18/20  2046   INR  0 92     Results from last 7 days   Lab Units 02/21/20  1608 02/21/20  1130 02/21/20  0727 02/20/20  2115 02/20/20  1548 02/20/20  1047 02/20/20  0719 02/19/20  2107 02/19/20  1630 02/19/20  1235 02/19/20  0615 02/19/20  0302   POC GLUCOSE mg/dl 345* 280* 171* 240* 220* 307* 207* 248* 240* 286* 313* 407*         Results from last 7 days   Lab Units 02/20/20  0631 02/19/20  2036 02/19/20  1639 02/19/20  1153 02/19/20  0605  02/18/20  2046   LACTIC ACID mmol/L  --  2 0 2 3* 2 6* 5 1*   < > 3 0*   PROCALCITONIN ng/ml <0 05  --   --  <0 05  --   --  <0 05    < > = values in this interval not displayed  * I Have Reviewed All Lab Data Listed Above    * Additional Pertinent Lab Tests Reviewed: [address] [address] Admission Reviewed    Imaging:    Imaging Reports Reviewed Today Include: no new    Recent Cultures (last 7 days):     Results from last 7 days   Lab Units 02/19/20  1850 02/18/20 2049 02/18/20 2046   BLOOD CULTURE   --  No Growth at 48 hrs  No Growth at 48 hrs     LEGIONELLA URINARY ANTIGEN  Negative  --   --        Last 24 Hours Medication List:     Current Facility-Administered Medications:  acetaminophen 650 mg Oral Q6H PRN Kristin Smudde, PA-C    albuterol 2 puff Inhalation Q6H PRN Kristin Smudde, PA-C    aluminum-magnesium hydroxide-simethicone 30 mL Oral Q6H PRN Kristin Smudde, PA-C    aspirin 81 mg Oral Daily Kristin Smudde, PA-C    atorvastatin 40 mg Oral Daily Kristin Smudde, PA-C    budesonide-formoterol 2 puff Inhalation BID Kristin Smudde, PA-C    enoxaparin 40 mg Subcutaneous Daily Kristin Smudde, PA-C    ezetimibe 10 mg Oral Daily Kristin Smudde, PA-C    fluticasone 1 spray Nasal Daily Kristin Smudde, PA-C    guaiFENesin 600 mg Oral BID Kristin Smudde, PA-C    insulin glargine 26 Units Subcutaneous HS Abida Silva MD    [START ON 2/22/2020] insulin glargine 40 Units Subcutaneous QAM Yolanda Roberts MD    insulin lispro 1-6 Units Subcutaneous HS Kristin Mak, HODAN    insulin lispro 2-12 Units Subcutaneous TID AC Kristin Mak PA-C    [START ON 2/22/2020] insulin lispro 8 Units Subcutaneous TID With Meals Abida Silva MD    ipratropium 0 5 mg Nebulization TID Almer Patron Smudde, HODAN    levalbuterol 1 25 mg Nebulization TID Almer Patron Loride, HODAN    levothyroxine 150 mcg Oral Early Morning Kristin Smudde, PA-JOSE    lidocaine 1 patch Topical Daily Yolanda Roberts MD    losartan 50 mg Oral Daily Kristin Smudde, HODAN    methocarbamol 500 mg Oral Q6H PRN Abida Silva MD    methylPREDNISolone sodium succinate 40 mg Intravenous Q8H Kristin Smudde, PA-C    metoprolol succinate 25 mg Oral Daily Kristin Mak PA-C    montelukast 10 mg Oral HS Kristin Mak PA-C    oseltamivir 30 mg Oral Q12H [address] Kristin Aubree PA-C    pantoprazole 40 mg Oral Early Morning Kristin Smudde, PA-C    polyethylene glycol 17 g Oral Daily PRN Kristin Mak, PA-C    thiamine 100 mg Intravenous BID Mary Villarreal MD Last Rate: 100 mg (02/21/20 2539)        Today, Patient Was Seen By: Mayela Ramos MD    ** Please Note: Dictation voice to text software may have been used in the creation of this document   **

## 2020-02-21 NOTE — PROGRESS NOTES
Patient resting comfortably in bed  All VS within normal limits  Call bell within reach  Will continue to monitor patient

## 2020-02-21 NOTE — ASSESSMENT & PLAN NOTE
Procalcitonin negative x2  Urine Legionella and strep antigens negative  Antibiotics discontinued 2/20

## 2020-02-21 NOTE — ASSESSMENT & PLAN NOTE
Lab Results   Component Value Date    HGBA1C 8 4 (H) 12/02/2019       Recent Labs     02/20/20  2115 02/21/20  0727 02/21/20  1130 02/21/20  1608   POCGLU 240* 171* 280* 345*       Blood Sugar Average: Last 72 hrs:  (P) 272   Accu-Cheks markedly elevated with systemic steroid therapy likely contributing  Adjust Lantus - increase to 26 units HS and to 40 units qAM  Increase premeal Humalog to 8 u TID   Continue sliding scale insulin with accu checks   Diabetic diet

## 2020-02-22 LAB
ANION GAP SERPL CALCULATED.3IONS-SCNC: 7 MMOL/L (ref 4–13)
BASOPHILS # BLD AUTO: 0.01 THOUSANDS/ΜL (ref 0–0.1)
BASOPHILS NFR BLD AUTO: 0 % (ref 0–1)
BUN SERPL-MCNC: 25 MG/DL (ref 5–25)
CALCIUM SERPL-MCNC: 8.6 MG/DL (ref 8.3–10.1)
CHLORIDE SERPL-SCNC: 105 MMOL/L (ref 100–108)
CO2 SERPL-SCNC: 30 MMOL/L (ref 21–32)
CREAT SERPL-MCNC: 0.92 MG/DL (ref 0.6–1.3)
EOSINOPHIL # BLD AUTO: 0 THOUSAND/ΜL (ref 0–0.61)
EOSINOPHIL NFR BLD AUTO: 0 % (ref 0–6)
ERYTHROCYTE [DISTWIDTH] IN BLOOD BY AUTOMATED COUNT: 13.2 % (ref 11.6–15.1)
GFR SERPL CREATININE-BSD FRML MDRD: 66 ML/MIN/1.73SQ M
GLUCOSE SERPL-MCNC: 202 MG/DL (ref 65–140)
GLUCOSE SERPL-MCNC: 211 MG/DL (ref 65–140)
GLUCOSE SERPL-MCNC: 226 MG/DL (ref 65–140)
GLUCOSE SERPL-MCNC: 282 MG/DL (ref 65–140)
GLUCOSE SERPL-MCNC: 322 MG/DL (ref 65–140)
HCT VFR BLD AUTO: 33.6 % (ref 34.8–46.1)
HGB BLD-MCNC: 11.3 G/DL (ref 11.5–15.4)
IMM GRANULOCYTES # BLD AUTO: 0.14 THOUSAND/UL (ref 0–0.2)
IMM GRANULOCYTES NFR BLD AUTO: 1 % (ref 0–2)
LYMPHOCYTES # BLD AUTO: 1.32 THOUSANDS/ΜL (ref 0.6–4.47)
LYMPHOCYTES NFR BLD AUTO: 11 % (ref 14–44)
MCH RBC QN AUTO: 29.3 PG (ref 26.8–34.3)
MCHC RBC AUTO-ENTMCNC: 33.6 G/DL (ref 31.4–37.4)
MCV RBC AUTO: 87 FL (ref 82–98)
MONOCYTES # BLD AUTO: 0.59 THOUSAND/ΜL (ref 0.17–1.22)
MONOCYTES NFR BLD AUTO: 5 % (ref 4–12)
NEUTROPHILS # BLD AUTO: 9.77 THOUSANDS/ΜL (ref 1.85–7.62)
NEUTS SEG NFR BLD AUTO: 83 % (ref 43–75)
NRBC BLD AUTO-RTO: 0 /100 WBCS
PLATELET # BLD AUTO: 244 THOUSANDS/UL (ref 149–390)
PMV BLD AUTO: 10.1 FL (ref 8.9–12.7)
POTASSIUM SERPL-SCNC: 3.8 MMOL/L (ref 3.5–5.3)
RBC # BLD AUTO: 3.86 MILLION/UL (ref 3.81–5.12)
SODIUM SERPL-SCNC: 142 MMOL/L (ref 136–145)
WBC # BLD AUTO: 11.83 THOUSAND/UL (ref 4.31–10.16)

## 2020-02-22 PROCEDURE — 99232 SBSQ HOSP IP/OBS MODERATE 35: CPT | Performed by: FAMILY MEDICINE

## 2020-02-22 PROCEDURE — 80048 BASIC METABOLIC PNL TOTAL CA: CPT | Performed by: FAMILY MEDICINE

## 2020-02-22 PROCEDURE — 94640 AIRWAY INHALATION TREATMENT: CPT

## 2020-02-22 PROCEDURE — 85025 COMPLETE CBC W/AUTO DIFF WBC: CPT | Performed by: FAMILY MEDICINE

## 2020-02-22 PROCEDURE — 82948 REAGENT STRIP/BLOOD GLUCOSE: CPT

## 2020-02-22 PROCEDURE — 94760 N-INVAS EAR/PLS OXIMETRY 1: CPT

## 2020-02-22 RX ORDER — PREDNISONE 10 MG/1
TABLET ORAL
Qty: 45 TABLET | Refills: 0 | Status: SHIPPED | OUTPATIENT
Start: 2020-02-22 | End: 2020-02-23 | Stop reason: SDUPTHER

## 2020-02-22 RX ORDER — INSULIN GLARGINE 100 [IU]/ML
45 INJECTION, SOLUTION SUBCUTANEOUS EVERY MORNING
Status: DISCONTINUED | OUTPATIENT
Start: 2020-02-23 | End: 2020-02-23 | Stop reason: HOSPADM

## 2020-02-22 RX ORDER — INSULIN GLARGINE 100 [IU]/ML
30 INJECTION, SOLUTION SUBCUTANEOUS
Status: DISCONTINUED | OUTPATIENT
Start: 2020-02-22 | End: 2020-02-23 | Stop reason: HOSPADM

## 2020-02-22 RX ADMIN — PANTOPRAZOLE SODIUM 40 MG: 40 TABLET, DELAYED RELEASE ORAL at 06:14

## 2020-02-22 RX ADMIN — THIAMINE HYDROCHLORIDE 100 MG: 100 INJECTION, SOLUTION INTRAMUSCULAR; INTRAVENOUS at 08:30

## 2020-02-22 RX ADMIN — INSULIN LISPRO 5 UNITS: 100 INJECTION, SOLUTION INTRAVENOUS; SUBCUTANEOUS at 21:58

## 2020-02-22 RX ADMIN — ATORVASTATIN CALCIUM 40 MG: 40 TABLET, FILM COATED ORAL at 08:29

## 2020-02-22 RX ADMIN — IPRATROPIUM BROMIDE 0.5 MG: 0.5 SOLUTION RESPIRATORY (INHALATION) at 18:59

## 2020-02-22 RX ADMIN — METHYLPREDNISOLONE SODIUM SUCCINATE 40 MG: 40 INJECTION, POWDER, FOR SOLUTION INTRAMUSCULAR; INTRAVENOUS at 06:14

## 2020-02-22 RX ADMIN — INSULIN LISPRO 4 UNITS: 100 INJECTION, SOLUTION INTRAVENOUS; SUBCUTANEOUS at 08:27

## 2020-02-22 RX ADMIN — THIAMINE HYDROCHLORIDE 100 MG: 100 INJECTION, SOLUTION INTRAMUSCULAR; INTRAVENOUS at 16:13

## 2020-02-22 RX ADMIN — METOPROLOL SUCCINATE 25 MG: 25 TABLET, EXTENDED RELEASE ORAL at 08:29

## 2020-02-22 RX ADMIN — GUAIFENESIN 600 MG: 600 TABLET ORAL at 16:14

## 2020-02-22 RX ADMIN — LEVALBUTEROL HYDROCHLORIDE 1.25 MG: 1.25 SOLUTION, CONCENTRATE RESPIRATORY (INHALATION) at 13:21

## 2020-02-22 RX ADMIN — METHYLPREDNISOLONE SODIUM SUCCINATE 40 MG: 40 INJECTION, POWDER, FOR SOLUTION INTRAMUSCULAR; INTRAVENOUS at 14:54

## 2020-02-22 RX ADMIN — ENOXAPARIN SODIUM 40 MG: 40 INJECTION SUBCUTANEOUS at 08:26

## 2020-02-22 RX ADMIN — MONTELUKAST SODIUM 10 MG: 10 TABLET, COATED ORAL at 22:03

## 2020-02-22 RX ADMIN — INSULIN LISPRO 4 UNITS: 100 INJECTION, SOLUTION INTRAVENOUS; SUBCUTANEOUS at 12:22

## 2020-02-22 RX ADMIN — METHYLPREDNISOLONE SODIUM SUCCINATE 40 MG: 40 INJECTION, POWDER, FOR SOLUTION INTRAMUSCULAR; INTRAVENOUS at 22:00

## 2020-02-22 RX ADMIN — EZETIMIBE 10 MG: 10 TABLET ORAL at 08:29

## 2020-02-22 RX ADMIN — LIDOCAINE 1 PATCH: 50 PATCH TOPICAL at 08:29

## 2020-02-22 RX ADMIN — FLUTICASONE PROPIONATE 1 SPRAY: 50 SPRAY, METERED NASAL at 08:29

## 2020-02-22 RX ADMIN — GUAIFENESIN 600 MG: 600 TABLET ORAL at 08:29

## 2020-02-22 RX ADMIN — INSULIN GLARGINE 30 UNITS: 100 INJECTION, SOLUTION SUBCUTANEOUS at 21:58

## 2020-02-22 RX ADMIN — LEVALBUTEROL HYDROCHLORIDE 1.25 MG: 1.25 SOLUTION, CONCENTRATE RESPIRATORY (INHALATION) at 07:30

## 2020-02-22 RX ADMIN — BUDESONIDE AND FORMOTEROL FUMARATE DIHYDRATE 2 PUFF: 160; 4.5 AEROSOL RESPIRATORY (INHALATION) at 08:30

## 2020-02-22 RX ADMIN — LOSARTAN POTASSIUM 50 MG: 50 TABLET, FILM COATED ORAL at 08:29

## 2020-02-22 RX ADMIN — OSELTAMIVIR PHOSPHATE 30 MG: 30 CAPSULE ORAL at 08:29

## 2020-02-22 RX ADMIN — OSELTAMIVIR PHOSPHATE 30 MG: 30 CAPSULE ORAL at 20:06

## 2020-02-22 RX ADMIN — INSULIN LISPRO 6 UNITS: 100 INJECTION, SOLUTION INTRAVENOUS; SUBCUTANEOUS at 17:11

## 2020-02-22 RX ADMIN — IPRATROPIUM BROMIDE 0.5 MG: 0.5 SOLUTION RESPIRATORY (INHALATION) at 07:30

## 2020-02-22 RX ADMIN — INSULIN GLARGINE 40 UNITS: 100 INJECTION, SOLUTION SUBCUTANEOUS at 08:26

## 2020-02-22 RX ADMIN — ASPIRIN 81 MG 81 MG: 81 TABLET ORAL at 08:29

## 2020-02-22 RX ADMIN — LEVOTHYROXINE SODIUM 150 MCG: 75 TABLET ORAL at 06:14

## 2020-02-22 RX ADMIN — ACETAMINOPHEN 650 MG: 325 TABLET ORAL at 20:06

## 2020-02-22 RX ADMIN — LEVALBUTEROL HYDROCHLORIDE 1.25 MG: 1.25 SOLUTION, CONCENTRATE RESPIRATORY (INHALATION) at 18:59

## 2020-02-22 RX ADMIN — IPRATROPIUM BROMIDE 0.5 MG: 0.5 SOLUTION RESPIRATORY (INHALATION) at 13:21

## 2020-02-22 RX ADMIN — BUDESONIDE AND FORMOTEROL FUMARATE DIHYDRATE 2 PUFF: 160; 4.5 AEROSOL RESPIRATORY (INHALATION) at 17:23

## 2020-02-22 NOTE — ASSESSMENT & PLAN NOTE
Patient presents with one week of progressively worsening shortness of breath and wheezing - still persistent wheezing and SOB on exertion  Continue 40 mg TID  Continue scheduled nebulizers  Given ceftriaxone and doxycycline in ED, continued with Rocephin and azithromycin given and infiltrate on chest x-ray and concern for possible post flu pneumonia in setting of positive flu test - given normal procalcitonin x 2 Abx discontinued  Incentive spirometry  Respiratory protocol

## 2020-02-22 NOTE — ASSESSMENT & PLAN NOTE
Lab Results   Component Value Date    HGBA1C 8 4 (H) 12/02/2019       Recent Labs     02/21/20  1608 02/21/20  2125 02/22/20  0801 02/22/20  1130   POCGLU 345* 304* 202* 226*       Blood Sugar Average: Last 72 hrs:  (P) 266 4   Accu-Cheks markedly elevated with systemic steroid therapy likely contributing  Adjust Lantus - increase to 30 units HS and to 45 units qAM  Increase premeal Humalog to 10 u TID   Continue sliding scale insulin with accu checks   Diabetic diet

## 2020-02-22 NOTE — PROGRESS NOTES
Progress Note - Blair Leonardo 1955, 59 y o  female MRN: [id number]    Unit/Bed#: [place] [address] -01 Encounter: [id number]    Primary Care Provider: Gonzalo Pulido MD   Date and time admitted to hospital: 2/18/2020  8:32 PM        * Severe sepsis [facility])  Assessment & Plan  POA  Likely viral sepsis due to influenza infection, also noted for an infiltrate of chest x-ray but procalcitonin normal and pneumonia workup negaitve  - patient was meeting criteria with leukocytosis of 12, tachycardia, lactic acidosis resolved with IVF, discontinued  - continue to observe off antibiotics    Influenza A  Assessment & Plan  Patient found to be influenza A positive  Continue course of Tamiflu  Respiratory protocol  Droplet precautions    Mild intermittent asthma with acute exacerbation  Assessment & Plan  Patient presents with one week of progressively worsening shortness of breath and wheezing - still persistent wheezing and SOB on exertion  Continue 40 mg TID  Continue scheduled nebulizers  Given ceftriaxone and doxycycline in ED, continued with Rocephin and azithromycin given and infiltrate on chest x-ray and concern for possible post flu pneumonia in setting of positive flu test - given normal procalcitonin x 2 Abx discontinued  Incentive spirometry  Respiratory protocol         Lung infiltrate  Assessment & Plan  Procalcitonin negative x2  Urine Legionella and strep antigens negative  Antibiotics discontinued 2/20    Pure hypercholesterolemia  Assessment & Plan  Continue daily statin and Zetia    Essential hypertension  Assessment & Plan  BP appears controlled at this time  Continue home regimen metoprolol, losartan   Continue to monitor BP     Acquired hypothyroidism  Assessment & Plan  Continue daily levothyroxine    Type 2 diabetes mellitus, without long-term current use of insulin [facility])  Assessment & Plan  Lab Results   Component Value Date    HGBA1C 8 4 (H) 12/02/2019       Recent Labs     02/21/20  1608 205 20  0801 20  1130   POCGLU 345* 304* 202* 226*       Blood Sugar Average: Last 72 hrs:  (P) 266 4   Accu-Cheks markedly elevated with systemic steroid therapy likely contributing  Adjust Lantus - increase to 30 units HS and to 45 units qAM  Increase premeal Humalog to 10 u TID   Continue sliding scale insulin with accu checks   Diabetic diet       VTE Pharmacologic Prophylaxis:   Pharmacologic: Enoxaparin (Lovenox)  Mechanical VTE Prophylaxis in Place: Yes    Patient Centered Rounds: I have performed bedside rounds with nursing staff today  Discussions with Specialists or Other Care Team Provider: Nursing    Education and Discussions with Family / Patient: Patient    Time Spent for Care: 30 minutes  More than 50% of total time spent on counseling and coordination of care as described above  Current Length of Stay: 4 day(s)    Current Patient Status: Inpatient   Certification Statement: The patient will continue to require additional inpatient hospital stay due to IV steroids    Discharge Plan: possibly tomorrow    Code Status: Level 1 - Full Code      Subjective:   Patient seen and examined  She continues to complain of severe wheezing and exertional shortness of breath  Afebrile  Feels somewhat improved overall  No overnight events  Objective:     Vitals:   Temp (24hrs), Av 3 °F (36 8 °C), Min:98 2 °F (36 8 °C), Max:98 5 °F (36 9 °C)    Temp:  [98 2 °F (36 8 °C)-98 5 °F (36 9 °C)] 98 2 °F (36 8 °C)  HR:  [61-79] 73  Resp:  [18-20] 18  BP: (115-164)/(57-72) 115/57  SpO2:  [94 %-96 %] 94 %  Body mass index is 39 53 kg/m²  Input and Output Summary (last 24 hours):     No intake or output data in the 24 hours ending 20 1526    Physical Exam:     Physical Exam   Constitutional: She is oriented to person, place, and time  No distress  HENT:   Head: Normocephalic and atraumatic  Eyes: Conjunctivae are normal    Neck: No JVD present     Cardiovascular: Normal rate and regular rhythm  No murmur heard  Pulmonary/Chest: Effort normal  No respiratory distress  She has wheezes (inspiratory/expiratory diffusely )  Abdominal: Soft  She exhibits no distension  There is no tenderness  There is no guarding  Musculoskeletal: She exhibits no edema  Neurological: She is alert and oriented to person, place, and time  Skin: Skin is warm and dry  Psychiatric: She has a normal mood and affect  Additional Data:     Labs:    Results from last 7 days   Lab Units 02/22/20  0554   WBC Thousand/uL 11 83*   HEMOGLOBIN g/dL 11 3*   HEMATOCRIT % 33 6*   PLATELETS Thousands/uL 244   NEUTROS PCT % 83*   LYMPHS PCT % 11*   MONOS PCT % 5   EOS PCT % 0     Results from last 7 days   Lab Units 02/22/20  0554  02/18/20  2046   SODIUM mmol/L 142   < > 138   POTASSIUM mmol/L 3 8   < > 4 2   CHLORIDE mmol/L 105   < > 99*   CO2 mmol/L 30   < > 25   BUN mg/dL 25   < > 14   CREATININE mg/dL 0 92   < > 1 03   ANION GAP mmol/L 7   < > 14*   CALCIUM mg/dL 8 6   < > 9 1   ALBUMIN g/dL  --   --  3 7   TOTAL BILIRUBIN mg/dL  --   --  0 27   ALK PHOS U/L  --   --  112   ALT U/L  --   --  21   AST U/L  --   --  16   GLUCOSE RANDOM mg/dL 211*   < > 275*    < > = values in this interval not displayed  Results from last 7 days   Lab Units 02/18/20  2046   INR  0 92     Results from last 7 days   Lab Units 02/22/20  1130 02/22/20  0801 02/21/20  2125 02/21/20  1608 02/21/20  1130 02/21/20  0727 02/20/20  2115 02/20/20  1548 02/20/20  1047 02/20/20  0719 02/19/20  2107 02/19/20  1630   POC GLUCOSE mg/dl 226* 202* 304* 345* 280* 171* 240* 220* 307* 207* 248* 240*         Results from last 7 days   Lab Units 02/20/20  0631 02/19/20  2036 02/19/20  1639 02/19/20  1153 02/19/20  0605  02/18/20  2046   LACTIC ACID mmol/L  --  2 0 2 3* 2 6* 5 1*   < > 3 0*   PROCALCITONIN ng/ml <0 05  --   --  <0 05  --   --  <0 05    < > = values in this interval not displayed             * I Have Reviewed All Lab Data Listed Above   * Additional Pertinent Lab Tests Reviewed: [place] [facility] Admission Reviewed    Imaging:    Imaging Reports Reviewed Today Include: no new      Recent Cultures (last 7 days):     Results from last 7 days   Lab Units 02/19/20 1850 02/18/20 2049 02/18/20 2046   BLOOD CULTURE   --  No Growth at 72 hrs  No Growth at 72 hrs     LEGIONELLA URINARY ANTIGEN  Negative  --   --        Last 24 Hours Medication List:     Current Facility-Administered Medications:  acetaminophen 650 mg Oral Q6H PRN Kristin Smudde, PA-C    albuterol 2 puff Inhalation Q6H PRN Kristin Smudde, PA-C    aluminum-magnesium hydroxide-simethicone 30 mL Oral Q6H PRN Kristin Smudde, PA-C    aspirin 81 mg Oral Daily Kristin Smudde, PA-C    atorvastatin 40 mg Oral Daily Kristin Smudde, PA-C    budesonide-formoterol 2 puff Inhalation BID Kristin Smudde, PA-C    enoxaparin 40 mg Subcutaneous Daily Kristin Smudde, PA-C    ezetimibe 10 mg Oral Daily Kristin Smudde, PA-C    fluticasone 1 spray Nasal Daily Kristin Smudde, PA-C    guaiFENesin 600 mg Oral BID Kristin Smudde, PA-C    insulin glargine 30 Units Subcutaneous HS Inder Lee MD    [START ON 2/23/2020] insulin glargine 45 Units Subcutaneous QAM Yolanda Roberts MD    insulin lispro 1-6 Units Subcutaneous HS Kristin Mak, PA-C    insulin lispro 10 Units Subcutaneous TID With Meals Inder Lee MD    insulin lispro 2-12 Units Subcutaneous TID AC Kristin Loride, PA-C    ipratropium 0 5 mg Nebulization TID Kristin Smudde, PA-C    levalbuterol 1 25 mg Nebulization TID Mine Mak, PA-C    levothyroxine 150 mcg Oral Early Morning Kristin Smudde, PA-C    lidocaine 1 patch Topical Daily Yolanda Roberts MD    losartan 50 mg Oral Daily Kristin Smudde, PA-C    methocarbamol 500 mg Oral Q6H PRN Inder Lee MD    methylPREDNISolone sodium succinate 40 mg Intravenous Q8H Kristin Smudde, PA-C    metoprolol succinate 25 mg Oral Daily Kristin Mak PA-C    montelukast 10 mg Oral HS Stan Monahan PA-C oseltamivir 30 mg Oral Q12H [address] Kristin Mak PA-C    pantoprazole 40 mg Oral Early Morning Kristin Mak PA-C    polyethylene glycol 17 g Oral Daily PRN Kristin Mak PA-C    thiamine 100 mg Intravenous BID Miguelina Mays MD Last Rate: 100 mg (02/22/20 0830)        Today, Patient Was Seen By: Bonnie George MD    ** Please Note: Dictation voice to text software may have been used in the creation of this document   **

## 2020-02-23 VITALS
SYSTOLIC BLOOD PRESSURE: 158 MMHG | RESPIRATION RATE: 18 BRPM | BODY MASS INDEX: 39.53 KG/M2 | WEIGHT: 202.38 LBS | TEMPERATURE: 97.4 F | DIASTOLIC BLOOD PRESSURE: 73 MMHG | HEART RATE: 57 BPM | OXYGEN SATURATION: 98 %

## 2020-02-23 LAB
ANION GAP SERPL CALCULATED.3IONS-SCNC: 4 MMOL/L (ref 4–13)
BASOPHILS # BLD AUTO: 0.01 THOUSANDS/ΜL (ref 0–0.1)
BASOPHILS NFR BLD AUTO: 0 % (ref 0–1)
BUN SERPL-MCNC: 25 MG/DL (ref 5–25)
CALCIUM SERPL-MCNC: 8.6 MG/DL (ref 8.3–10.1)
CHLORIDE SERPL-SCNC: 105 MMOL/L (ref 100–108)
CO2 SERPL-SCNC: 33 MMOL/L (ref 21–32)
CREAT SERPL-MCNC: 0.83 MG/DL (ref 0.6–1.3)
EOSINOPHIL # BLD AUTO: 0 THOUSAND/ΜL (ref 0–0.61)
EOSINOPHIL NFR BLD AUTO: 0 % (ref 0–6)
ERYTHROCYTE [DISTWIDTH] IN BLOOD BY AUTOMATED COUNT: 12.9 % (ref 11.6–15.1)
GFR SERPL CREATININE-BSD FRML MDRD: 75 ML/MIN/1.73SQ M
GLUCOSE SERPL-MCNC: 129 MG/DL (ref 65–140)
GLUCOSE SERPL-MCNC: 164 MG/DL (ref 65–140)
HCT VFR BLD AUTO: 33.6 % (ref 34.8–46.1)
HGB BLD-MCNC: 11.1 G/DL (ref 11.5–15.4)
IMM GRANULOCYTES # BLD AUTO: 0.17 THOUSAND/UL (ref 0–0.2)
IMM GRANULOCYTES NFR BLD AUTO: 1 % (ref 0–2)
LYMPHOCYTES # BLD AUTO: 1.62 THOUSANDS/ΜL (ref 0.6–4.47)
LYMPHOCYTES NFR BLD AUTO: 14 % (ref 14–44)
MCH RBC QN AUTO: 28.9 PG (ref 26.8–34.3)
MCHC RBC AUTO-ENTMCNC: 33 G/DL (ref 31.4–37.4)
MCV RBC AUTO: 88 FL (ref 82–98)
MONOCYTES # BLD AUTO: 0.45 THOUSAND/ΜL (ref 0.17–1.22)
MONOCYTES NFR BLD AUTO: 4 % (ref 4–12)
NEUTROPHILS # BLD AUTO: 9.75 THOUSANDS/ΜL (ref 1.85–7.62)
NEUTS SEG NFR BLD AUTO: 81 % (ref 43–75)
NRBC BLD AUTO-RTO: 0 /100 WBCS
PLATELET # BLD AUTO: 255 THOUSANDS/UL (ref 149–390)
PMV BLD AUTO: 10.4 FL (ref 8.9–12.7)
POTASSIUM SERPL-SCNC: 3.8 MMOL/L (ref 3.5–5.3)
RBC # BLD AUTO: 3.84 MILLION/UL (ref 3.81–5.12)
SODIUM SERPL-SCNC: 142 MMOL/L (ref 136–145)
WBC # BLD AUTO: 12 THOUSAND/UL (ref 4.31–10.16)

## 2020-02-23 PROCEDURE — 80048 BASIC METABOLIC PNL TOTAL CA: CPT | Performed by: FAMILY MEDICINE

## 2020-02-23 PROCEDURE — 82948 REAGENT STRIP/BLOOD GLUCOSE: CPT

## 2020-02-23 PROCEDURE — 99239 HOSP IP/OBS DSCHRG MGMT >30: CPT | Performed by: FAMILY MEDICINE

## 2020-02-23 PROCEDURE — 94640 AIRWAY INHALATION TREATMENT: CPT

## 2020-02-23 PROCEDURE — 85025 COMPLETE CBC W/AUTO DIFF WBC: CPT | Performed by: FAMILY MEDICINE

## 2020-02-23 RX ORDER — OSELTAMIVIR PHOSPHATE 30 MG/1
30 CAPSULE ORAL EVERY 12 HOURS SCHEDULED
Qty: 1 CAPSULE | Refills: 0 | Status: SHIPPED | OUTPATIENT
Start: 2020-02-23 | End: 2020-02-23

## 2020-02-23 RX ORDER — GUAIFENESIN 600 MG
600 TABLET, EXTENDED RELEASE 12 HR ORAL 2 TIMES DAILY
Qty: 14 TABLET | Refills: 0 | Status: SHIPPED | OUTPATIENT
Start: 2020-02-23 | End: 2020-02-25 | Stop reason: ALTCHOICE

## 2020-02-23 RX ORDER — ACETAMINOPHEN 325 MG/1
650 TABLET ORAL EVERY 6 HOURS PRN
Qty: 30 TABLET | Refills: 0 | Status: SHIPPED | OUTPATIENT
Start: 2020-02-23 | End: 2021-03-31 | Stop reason: ALTCHOICE

## 2020-02-23 RX ORDER — PREDNISONE 10 MG/1
TABLET ORAL
Qty: 45 TABLET | Refills: 0 | Status: SHIPPED | OUTPATIENT
Start: 2020-02-23 | End: 2020-02-25 | Stop reason: SDUPTHER

## 2020-02-23 RX ORDER — OSELTAMIVIR PHOSPHATE 30 MG/1
30 CAPSULE ORAL EVERY 12 HOURS SCHEDULED
Qty: 1 CAPSULE | Refills: 0 | Status: SHIPPED | OUTPATIENT
Start: 2020-02-23 | End: 2020-02-24

## 2020-02-23 RX ADMIN — LEVOTHYROXINE SODIUM 150 MCG: 75 TABLET ORAL at 05:54

## 2020-02-23 RX ADMIN — ATORVASTATIN CALCIUM 40 MG: 40 TABLET, FILM COATED ORAL at 08:28

## 2020-02-23 RX ADMIN — METHYLPREDNISOLONE SODIUM SUCCINATE 40 MG: 40 INJECTION, POWDER, FOR SOLUTION INTRAMUSCULAR; INTRAVENOUS at 05:54

## 2020-02-23 RX ADMIN — LIDOCAINE 1 PATCH: 50 PATCH TOPICAL at 08:29

## 2020-02-23 RX ADMIN — GUAIFENESIN 600 MG: 600 TABLET ORAL at 08:28

## 2020-02-23 RX ADMIN — METOPROLOL SUCCINATE 25 MG: 25 TABLET, EXTENDED RELEASE ORAL at 08:28

## 2020-02-23 RX ADMIN — ASPIRIN 81 MG 81 MG: 81 TABLET ORAL at 08:29

## 2020-02-23 RX ADMIN — LEVALBUTEROL HYDROCHLORIDE 1.25 MG: 1.25 SOLUTION, CONCENTRATE RESPIRATORY (INHALATION) at 07:17

## 2020-02-23 RX ADMIN — FLUTICASONE PROPIONATE 1 SPRAY: 50 SPRAY, METERED NASAL at 08:30

## 2020-02-23 RX ADMIN — PANTOPRAZOLE SODIUM 40 MG: 40 TABLET, DELAYED RELEASE ORAL at 05:54

## 2020-02-23 RX ADMIN — BUDESONIDE AND FORMOTEROL FUMARATE DIHYDRATE 2 PUFF: 160; 4.5 AEROSOL RESPIRATORY (INHALATION) at 08:28

## 2020-02-23 RX ADMIN — EZETIMIBE 10 MG: 10 TABLET ORAL at 08:28

## 2020-02-23 RX ADMIN — IPRATROPIUM BROMIDE 0.5 MG: 0.5 SOLUTION RESPIRATORY (INHALATION) at 07:17

## 2020-02-23 RX ADMIN — INSULIN GLARGINE 45 UNITS: 100 INJECTION, SOLUTION SUBCUTANEOUS at 08:29

## 2020-02-23 RX ADMIN — LOSARTAN POTASSIUM 50 MG: 50 TABLET, FILM COATED ORAL at 08:28

## 2020-02-23 RX ADMIN — THIAMINE HYDROCHLORIDE 100 MG: 100 INJECTION, SOLUTION INTRAMUSCULAR; INTRAVENOUS at 08:39

## 2020-02-23 RX ADMIN — OSELTAMIVIR PHOSPHATE 30 MG: 30 CAPSULE ORAL at 08:28

## 2020-02-23 NOTE — ASSESSMENT & PLAN NOTE
Patient found to be influenza A positive  Completing course of Tamiflu  Treating associated asthma exacerbation with course of steroids and frequent nebs

## 2020-02-23 NOTE — PLAN OF CARE
Problem: Potential for Falls  Goal: Patient will remain free of falls  Description  INTERVENTIONS:  - Assess patient frequently for physical needs  -  Identify cognitive and physical deficits and behaviors that affect risk of falls    -  [place] fall precautions as indicated by assessment   - Educate patient/family on patient safety including physical limitations  - Instruct patient to call for assistance with activity based on assessment  - Modify environment to reduce risk of injury  - Consider OT/PT consult to assist with strengthening/mobility  Outcome: Progressing     Problem: PAIN - ADULT  Goal: Verbalizes/displays adequate comfort level or baseline comfort level  Description  Interventions:  - Encourage patient to monitor pain and request assistance  - Assess pain using appropriate pain scale  - Administer analgesics based on type and severity of pain and evaluate response  - Implement non-pharmacological measures as appropriate and evaluate response  - Consider cultural and social influences on pain and pain management  - Notify physician/advanced practitioner if interventions unsuccessful or patient reports new pain  Outcome: Progressing     Problem: INFECTION - ADULT  Goal: Absence or prevention of progression during hospitalization  Description  INTERVENTIONS:  - Assess and monitor for signs and symptoms of infection  - Monitor lab/diagnostic results  - Monitor all insertion sites, i e  indwelling lines, tubes, and drains  - Monitor endotracheal if appropriate and nasal secretions for changes in amount and color  - [place] appropriate cooling/warming therapies per order  - Administer medications as ordered  - Instruct and encourage patient and family to use good hand hygiene technique  - Identify and instruct in appropriate isolation precautions for identified infection/condition  Outcome: Progressing     Problem: SAFETY ADULT  Goal: Patient will remain free of falls  Description  INTERVENTIONS:  - Assess patient frequently for physical needs  -  Identify cognitive and physical deficits and behaviors that affect risk of falls    -  [place] fall precautions as indicated by assessment   - Educate patient/family on patient safety including physical limitations  - Instruct patient to call for assistance with activity based on assessment  - Modify environment to reduce risk of injury  - Consider OT/PT consult to assist with strengthening/mobility  Outcome: Progressing  Goal: Maintain or return to baseline ADL function  Description  INTERVENTIONS:  -  Assess patient's ability to carry out ADLs; assess patient's baseline for ADL function and identify physical deficits which impact ability to perform ADLs (bathing, care of mouth/teeth, toileting, grooming, dressing, etc )  - Assess/evaluate cause of self-care deficits   - Assess range of motion  - Assess patient's mobility; develop plan if impaired  - Assess patient's need for assistive devices and provide as appropriate  - Encourage maximum independence but intervene and supervise when necessary  - Involve family in performance of ADLs  - Assess for home care needs following discharge   - Consider OT consult to assist with ADL evaluation and planning for discharge  - Provide patient education as appropriate  Outcome: Progressing  Goal: Maintain or return mobility status to optimal level  Description  INTERVENTIONS:  - Assess patient's baseline mobility status (ambulation, transfers, stairs, etc )    - Identify cognitive and physical deficits and behaviors that affect mobility  - Identify mobility aids required to assist with transfers and/or ambulation (gait belt, sit-to-stand, lift, walker, cane, etc )  - [place] fall precautions as indicated by assessment  - Record patient progress and toleration of activity level on Mobility SBAR; progress patient to next Phase/Stage  - Instruct patient to call for assistance with activity based on assessment  - Consider rehabilitation consult to assist with strengthening/weightbearing, etc   Outcome: Progressing     Problem: DISCHARGE PLANNING  Goal: Discharge to home or other facility with appropriate resources  Description  INTERVENTIONS:  - Identify barriers to discharge w/patient and caregiver  - Arrange for needed discharge resources and transportation as appropriate  - Identify discharge learning needs (meds, wound care, etc )  - Arrange for interpretive services to assist at discharge as needed  - Refer to Case Management Department for coordinating discharge planning if the patient needs post-hospital services based on physician/advanced practitioner order or complex needs related to functional status, cognitive ability, or social support system  Outcome: Progressing     Problem: Knowledge Deficit  Goal: Patient/family/caregiver demonstrates understanding of disease process, treatment plan, medications, and discharge instructions  Description  Complete learning assessment and assess knowledge base    Interventions:  - Provide teaching at level of understanding  - Provide teaching via preferred learning methods  Outcome: Progressing     Problem: RESPIRATORY - ADULT  Goal: Achieves optimal ventilation and oxygenation  Description  INTERVENTIONS:  - Assess for changes in respiratory status  - Assess for changes in mentation and behavior  - Position to facilitate oxygenation and minimize respiratory effort  - Oxygen administered by appropriate delivery if ordered  - Initiate smoking cessation education as indicated  - Encourage broncho-pulmonary hygiene including cough, deep breathe, Incentive Spirometry  - Assess the need for suctioning and aspirate as needed  - Assess and instruct to report SOB or any respiratory difficulty  - Respiratory Therapy support as indicated  Outcome: Progressing     Problem: METABOLIC, FLUID AND ELECTROLYTES - ADULT  Goal: Electrolytes maintained within normal limits  Description  INTERVENTIONS:  - Monitor labs and assess patient for signs and symptoms of electrolyte imbalances  - Administer electrolyte replacement as ordered  - Monitor response to electrolyte replacements, including repeat lab results as appropriate  - Instruct patient on fluid and nutrition as appropriate  Outcome: Progressing  Goal: Fluid balance maintained  Description  INTERVENTIONS:  - Monitor labs   - Monitor I/O and WT  - Instruct patient on fluid and nutrition as appropriate  - Assess for signs & symptoms of volume excess or deficit  Outcome: Progressing  Goal: Glucose maintained within target range  Description  INTERVENTIONS:  - Monitor Blood Glucose as ordered  - Assess for signs and symptoms of hyperglycemia and hypoglycemia  - Administer ordered medications to maintain glucose within target range  - Assess nutritional intake and initiate nutrition service referral as needed  Outcome: Progressing

## 2020-02-23 NOTE — PLAN OF CARE
Problem: Potential for Falls  Goal: Patient will remain free of falls  Description  INTERVENTIONS:  - Assess patient frequently for physical needs  -  Identify cognitive and physical deficits and behaviors that affect risk of falls    -  [place] fall precautions as indicated by assessment   - Educate patient/family on patient safety including physical limitations  - Instruct patient to call for assistance with activity based on assessment  - Modify environment to reduce risk of injury  - Consider OT/PT consult to assist with strengthening/mobility  Outcome: Progressing     Problem: PAIN - ADULT  Goal: Verbalizes/displays adequate comfort level or baseline comfort level  Description  Interventions:  - Encourage patient to monitor pain and request assistance  - Assess pain using appropriate pain scale  - Administer analgesics based on type and severity of pain and evaluate response  - Implement non-pharmacological measures as appropriate and evaluate response  - Consider cultural and social influences on pain and pain management  - Notify physician/advanced practitioner if interventions unsuccessful or patient reports new pain  Outcome: Progressing     Problem: INFECTION - ADULT  Goal: Absence or prevention of progression during hospitalization  Description  INTERVENTIONS:  - Assess and monitor for signs and symptoms of infection  - Monitor lab/diagnostic results  - Monitor all insertion sites, i e  indwelling lines, tubes, and drains  - Monitor endotracheal if appropriate and nasal secretions for changes in amount and color  - [place] appropriate cooling/warming therapies per order  - Administer medications as ordered  - Instruct and encourage patient and family to use good hand hygiene technique  - Identify and instruct in appropriate isolation precautions for identified infection/condition  Outcome: Progressing     Problem: SAFETY ADULT  Goal: Patient will remain free of falls  Description  INTERVENTIONS:  - Assess patient frequently for physical needs  -  Identify cognitive and physical deficits and behaviors that affect risk of falls    -  [place] fall precautions as indicated by assessment   - Educate patient/family on patient safety including physical limitations  - Instruct patient to call for assistance with activity based on assessment  - Modify environment to reduce risk of injury  - Consider OT/PT consult to assist with strengthening/mobility  Outcome: Progressing  Goal: Maintain or return to baseline ADL function  Description  INTERVENTIONS:  -  Assess patient's ability to carry out ADLs; assess patient's baseline for ADL function and identify physical deficits which impact ability to perform ADLs (bathing, care of mouth/teeth, toileting, grooming, dressing, etc )  - Assess/evaluate cause of self-care deficits   - Assess range of motion  - Assess patient's mobility; develop plan if impaired  - Assess patient's need for assistive devices and provide as appropriate  - Encourage maximum independence but intervene and supervise when necessary  - Involve family in performance of ADLs  - Assess for home care needs following discharge   - Consider OT consult to assist with ADL evaluation and planning for discharge  - Provide patient education as appropriate  Outcome: Progressing  Goal: Maintain or return mobility status to optimal level  Description  INTERVENTIONS:  - Assess patient's baseline mobility status (ambulation, transfers, stairs, etc )    - Identify cognitive and physical deficits and behaviors that affect mobility  - Identify mobility aids required to assist with transfers and/or ambulation (gait belt, sit-to-stand, lift, walker, cane, etc )  - [place] fall precautions as indicated by assessment  - Record patient progress and toleration of activity level on Mobility SBAR; progress patient to next Phase/Stage  - Instruct patient to call for assistance with activity based on assessment  - Consider rehabilitation consult to assist with strengthening/weightbearing, etc   Outcome: Progressing     Problem: DISCHARGE PLANNING  Goal: Discharge to home or other facility with appropriate resources  Description  INTERVENTIONS:  - Identify barriers to discharge w/patient and caregiver  - Arrange for needed discharge resources and transportation as appropriate  - Identify discharge learning needs (meds, wound care, etc )  - Arrange for interpretive services to assist at discharge as needed  - Refer to Case Management Department for coordinating discharge planning if the patient needs post-hospital services based on physician/advanced practitioner order or complex needs related to functional status, cognitive ability, or social support system  Outcome: Progressing     Problem: Knowledge Deficit  Goal: Patient/family/caregiver demonstrates understanding of disease process, treatment plan, medications, and discharge instructions  Description  Complete learning assessment and assess knowledge base    Interventions:  - Provide teaching at level of understanding  - Provide teaching via preferred learning methods  Outcome: Progressing     Problem: RESPIRATORY - ADULT  Goal: Achieves optimal ventilation and oxygenation  Description  INTERVENTIONS:  - Assess for changes in respiratory status  - Assess for changes in mentation and behavior  - Position to facilitate oxygenation and minimize respiratory effort  - Oxygen administered by appropriate delivery if ordered  - Initiate smoking cessation education as indicated  - Encourage broncho-pulmonary hygiene including cough, deep breathe, Incentive Spirometry  - Assess the need for suctioning and aspirate as needed  - Assess and instruct to report SOB or any respiratory difficulty  - Respiratory Therapy support as indicated  Outcome: Progressing     Problem: METABOLIC, FLUID AND ELECTROLYTES - ADULT  Goal: Electrolytes maintained within normal limits  Description  INTERVENTIONS:  - Monitor labs and assess patient for signs and symptoms of electrolyte imbalances  - Administer electrolyte replacement as ordered  - Monitor response to electrolyte replacements, including repeat lab results as appropriate  - Instruct patient on fluid and nutrition as appropriate  Outcome: Progressing  Goal: Fluid balance maintained  Description  INTERVENTIONS:  - Monitor labs   - Monitor I/O and WT  - Instruct patient on fluid and nutrition as appropriate  - Assess for signs & symptoms of volume excess or deficit  Outcome: Progressing  Goal: Glucose maintained within target range  Description  INTERVENTIONS:  - Monitor Blood Glucose as ordered  - Assess for signs and symptoms of hyperglycemia and hypoglycemia  - Administer ordered medications to maintain glucose within target range  - Assess nutritional intake and initiate nutrition service referral as needed  Outcome: Progressing

## 2020-02-23 NOTE — PLAN OF CARE
Problem: Potential for Falls  Goal: Patient will remain free of falls  Description  INTERVENTIONS:  - Assess patient frequently for physical needs  -  Identify cognitive and physical deficits and behaviors that affect risk of falls    -  [place] fall precautions as indicated by assessment   - Educate patient/family on patient safety including physical limitations  - Instruct patient to call for assistance with activity based on assessment  - Modify environment to reduce risk of injury  - Consider OT/PT consult to assist with strengthening/mobility  2/23/2020 1016 by Kel Lin RN  Outcome: Adequate for Discharge  2/23/2020 0944 by Kel Lin RN  Outcome: Progressing     Problem: PAIN - ADULT  Goal: Verbalizes/displays adequate comfort level or baseline comfort level  Description  Interventions:  - Encourage patient to monitor pain and request assistance  - Assess pain using appropriate pain scale  - Administer analgesics based on type and severity of pain and evaluate response  - Implement non-pharmacological measures as appropriate and evaluate response  - Consider cultural and social influences on pain and pain management  - Notify physician/advanced practitioner if interventions unsuccessful or patient reports new pain  2/23/2020 1016 by Kel Lin RN  Outcome: Adequate for Discharge  2/23/2020 0944 by Kel Lin RN  Outcome: Progressing     Problem: INFECTION - ADULT  Goal: Absence or prevention of progression during hospitalization  Description  INTERVENTIONS:  - Assess and monitor for signs and symptoms of infection  - Monitor lab/diagnostic results  - Monitor all insertion sites, i e  indwelling lines, tubes, and drains  - Monitor endotracheal if appropriate and nasal secretions for changes in amount and color  - [place] appropriate cooling/warming therapies per order  - Administer medications as ordered  - Instruct and encourage patient and family to use good hand hygiene technique  - Identify and instruct in appropriate isolation precautions for identified infection/condition  2/23/2020 1016 by Melody Dodd RN  Outcome: Adequate for Discharge  2/23/2020 0944 by Melody Dodd RN  Outcome: Progressing     Problem: SAFETY ADULT  Goal: Patient will remain free of falls  Description  INTERVENTIONS:  - Assess patient frequently for physical needs  -  Identify cognitive and physical deficits and behaviors that affect risk of falls    -  [place] fall precautions as indicated by assessment   - Educate patient/family on patient safety including physical limitations  - Instruct patient to call for assistance with activity based on assessment  - Modify environment to reduce risk of injury  - Consider OT/PT consult to assist with strengthening/mobility  2/23/2020 1016 by Melody Dodd RN  Outcome: Adequate for Discharge  2/23/2020 0944 by Melody Dodd RN  Outcome: Progressing  Goal: Maintain or return to baseline ADL function  Description  INTERVENTIONS:  -  Assess patient's ability to carry out ADLs; assess patient's baseline for ADL function and identify physical deficits which impact ability to perform ADLs (bathing, care of mouth/teeth, toileting, grooming, dressing, etc )  - Assess/evaluate cause of self-care deficits   - Assess range of motion  - Assess patient's mobility; develop plan if impaired  - Assess patient's need for assistive devices and provide as appropriate  - Encourage maximum independence but intervene and supervise when necessary  - Involve family in performance of ADLs  - Assess for home care needs following discharge   - Consider OT consult to assist with ADL evaluation and planning for discharge  - Provide patient education as appropriate  2/23/2020 1016 by Melody Dodd RN  Outcome: Adequate for Discharge  2/23/2020 0944 by Melody Dodd RN  Outcome: Progressing  Goal: Maintain or return mobility status to optimal level  Description  INTERVENTIONS:  - Assess patient's baseline mobility status (ambulation, transfers, stairs, etc )    - Identify cognitive and physical deficits and behaviors that affect mobility  - Identify mobility aids required to assist with transfers and/or ambulation (gait belt, sit-to-stand, lift, walker, cane, etc )  - [place] fall precautions as indicated by assessment  - Record patient progress and toleration of activity level on Mobility SBAR; progress patient to next Phase/Stage  - Instruct patient to call for assistance with activity based on assessment  - Consider rehabilitation consult to assist with strengthening/weightbearing, etc   2/23/2020 1016 by Catherne Ahumada, RN  Outcome: Adequate for Discharge  2/23/2020 0944 by Catherne Ahumada, RN  Outcome: Progressing     Problem: DISCHARGE PLANNING  Goal: Discharge to home or other facility with appropriate resources  Description  INTERVENTIONS:  - Identify barriers to discharge w/patient and caregiver  - Arrange for needed discharge resources and transportation as appropriate  - Identify discharge learning needs (meds, wound care, etc )  - Arrange for interpretive services to assist at discharge as needed  - Refer to Case Management Department for coordinating discharge planning if the patient needs post-hospital services based on physician/advanced practitioner order or complex needs related to functional status, cognitive ability, or social support system  2/23/2020 1016 by Catherne Ahumada, RN  Outcome: Adequate for Discharge  2/23/2020 0944 by Catherne Ahumada, RN  Outcome: Progressing     Problem: Knowledge Deficit  Goal: Patient/family/caregiver demonstrates understanding of disease process, treatment plan, medications, and discharge instructions  Description  Complete learning assessment and assess knowledge base    Interventions:  - Provide teaching at level of understanding  - Provide teaching via preferred learning methods  2/23/2020 1016 by Catherne Ahumada, RN  Outcome: Adequate for Discharge  2/23/2020 0944 by Catherne Ahumada, RN  Outcome: Progressing     Problem: RESPIRATORY - ADULT  Goal: Achieves optimal ventilation and oxygenation  Description  INTERVENTIONS:  - Assess for changes in respiratory status  - Assess for changes in mentation and behavior  - Position to facilitate oxygenation and minimize respiratory effort  - Oxygen administered by appropriate delivery if ordered  - Initiate smoking cessation education as indicated  - Encourage broncho-pulmonary hygiene including cough, deep breathe, Incentive Spirometry  - Assess the need for suctioning and aspirate as needed  - Assess and instruct to report SOB or any respiratory difficulty  - Respiratory Therapy support as indicated  2/23/2020 1016 by Markell Dietrich RN  Outcome: Adequate for Discharge  2/23/2020 0944 by Markell Dietrich RN  Outcome: Progressing     Problem: METABOLIC, FLUID AND ELECTROLYTES - ADULT  Goal: Electrolytes maintained within normal limits  Description  INTERVENTIONS:  - Monitor labs and assess patient for signs and symptoms of electrolyte imbalances  - Administer electrolyte replacement as ordered  - Monitor response to electrolyte replacements, including repeat lab results as appropriate  - Instruct patient on fluid and nutrition as appropriate  2/23/2020 1016 by Markell Dietrich RN  Outcome: Adequate for Discharge  2/23/2020 0944 by Markell Dietrich RN  Outcome: Progressing  Goal: Fluid balance maintained  Description  INTERVENTIONS:  - Monitor labs   - Monitor I/O and WT  - Instruct patient on fluid and nutrition as appropriate  - Assess for signs & symptoms of volume excess or deficit  2/23/2020 1016 by Markell Dietrich RN  Outcome: Adequate for Discharge  2/23/2020 0944 by Markell Dietrich RN  Outcome: Progressing  Goal: Glucose maintained within target range  Description  INTERVENTIONS:  - Monitor Blood Glucose as ordered  - Assess for signs and symptoms of hyperglycemia and hypoglycemia  - Administer ordered medications to maintain glucose within target range  - Assess nutritional intake and initiate nutrition service referral as needed  2/23/2020 1016 by Jeanne Swift RN  Outcome: Adequate for Discharge  2/23/2020 0944 by Jeanne Swift RN  Outcome: Progressing

## 2020-02-23 NOTE — ASSESSMENT & PLAN NOTE
Lab Results   Component Value Date    HGBA1C 8 4 (H) 12/02/2019       Recent Labs     02/22/20  1130 02/22/20  1632 02/22/20  2101 02/23/20  0738   POCGLU 226* 282* 322* 129       Blood Sugar Average: Last 72 hrs:  (P) 455 [id number]   Accu-Cheks markedly elevated with systemic steroid therapy likely contributing - improved with adjusted insulin  D/c on home regimen

## 2020-02-23 NOTE — DISCHARGE SUMMARY
Discharge- Chuckie Elizabeth 1955, 59 y o  female MRN: [id number]    Unit/Bed#: [address] 87 214-01 Encounter: [id number]    Primary Care Provider: Jing Salazar MD   Date and time admitted to hospital: 2/18/2020  8:32 PM        * Severe sepsis [facility])  Assessment & Plan  POA  Likely viral sepsis due to influenza infection, also noted for an infiltrate of chest x-ray but procalcitonin normal and pneumonia workup negaitve  - patient was meeting criteria with leukocytosis of 12, tachycardia, lactic acidosis resolved with IVF, discontinued  - resolved  - stable off antibiotics since 2/20/20    Influenza A  Assessment & Plan  Patient found to be influenza A positive  Completing course of Tamiflu  Treating associated asthma exacerbation with course of steroids and frequent nebs     Mild intermittent asthma with acute exacerbation  Assessment & Plan  Secondary to influenza infeciton  Patient presents with one week of progressively worsening shortness of breath and wheezing - still persistent wheezing and SOB on exertion  Discharge on prolonged steroid taper   Continue nebulizers upon discharge  F/u with PCP in 1 week         Lung infiltrate  Assessment & Plan  Procalcitonin negative x2  Urine Legionella and strep antigens negative  Antibiotics discontinued 2/20    Pure hypercholesterolemia  Assessment & Plan  Continue daily statin and Zetia    Essential hypertension  Assessment & Plan  BP appears controlled at this time  Continue home regimen metoprolol, losartan   Continue to monitor BP     Acquired hypothyroidism  Assessment & Plan  Continue daily levothyroxine    Type 2 diabetes mellitus, without long-term current use of insulin [facility])  Assessment & Plan  Lab Results   Component Value Date    HGBA1C 8 4 (H) 12/02/2019       Recent Labs     02/22/20  1130 02/22/20  1632 02/22/20  2101 02/23/20  0738   POCGLU 226* 282* 322* 129       Blood Sugar Average: Last 72 hrs:  (P) 248 [id number]   Accu-Cheks markedly elevated with systemic steroid therapy likely contributing - improved with adjusted insulin  D/c on home regimen          Discharging Physician / Practitioner: Armando Babinski, MD  PCP: Jose Luis Marroquin MD  Admission Date:   Admission Orders (From admission, onward)     Ordered        02/18/20 2325  Inpatient Admission  Once                   Discharge Date: 02/23/20    Resolved Problems  Date Reviewed: 2/23/2020    None          Consultations During Hospital Stay:  · CM    Procedures Performed:   XR chest 2 views   Final Result by Christiano Rodgers MD (02/19 0535)      Mild streaky opacity in the right lower lung zone may be related to atelectasis or minimal infiltrate  Findings concur with the preliminary ER interpretation  Workstation performed: [id number]             Significant Findings / Test Results:   Results from last 7 days   Lab Units 02/23/20  0449   WBC Thousand/uL 12 00*   HEMOGLOBIN g/dL 11 1*   HEMATOCRIT % 33 6*   PLATELETS Thousands/uL 255     Results from last 7 days   Lab Units 02/23/20  0449   SODIUM mmol/L 142   POTASSIUM mmol/L 3 8   CHLORIDE mmol/L 105   CO2 mmol/L 33*   BUN mg/dL 25   CREATININE mg/dL 0 83   CALCIUM mg/dL 8 6         Incidental Findings:   None     Test Results Pending at Discharge (will require follow up): · None     Outpatient Tests Requested:  · None    Complications:  None    Reason for Admission: SOB    Hospital Course:     Sharan Richardson is a 59 y o  female patient with history of diabetes, obesity and asthma who originally presented to the hospital on 2/18/2020 due to shortness of breath  The patient was found to be an asthma exacerbation secondary to influenza infection  There was a questionable infiltrate on the chest x-ray, however, pneumonia workup was negative and antibiotics were discontinued  The patient was noted for prolonged hospital stay due to persistent wheezing, she was treated with IV steroids and showed improvement prior to discharge  The patient was also treated with Tamiflu with her course complelting on the day of the discharge  The patient was feeling significantly improved the time of the discharge  Oxygen saturations were normal on room air  Her appetite and oral intake were adequate  Return precautions were discussed with the patient and she verbalized understanding  Please see above list of diagnoses and related plan for additional information  Condition at Discharge: good     Discharge Day Visit / Exam:     Subjective:  Patient seen and examined  She reports improved and states that she would like to be discharged home  She reports improvement in her wheezing and cough  She denies chest pain  She denies shortness of breath  She denies nausea, vomiting, diarrhea constipation  No overnight events  Vitals: Blood Pressure: 158/73 (02/23/20 0719)  Pulse: 57 (02/23/20 0719)  Temperature: (!) 97 4 °F (36 3 °C) (02/23/20 0719)  Temp Source: Oral (02/23/20 0719)  Respirations: 18 (02/23/20 0719)  Weight - Scale: 91 8 kg (202 lb 6 1 oz) (02/18/20 2026)  SpO2: 98 % (02/23/20 0719)    Exam:     Physical Exam   Constitutional: She is oriented to person, place, and time  No distress  HENT:   Head: Normocephalic and atraumatic  Eyes: Conjunctivae are normal    Neck: No JVD present  Cardiovascular: Normal rate and regular rhythm  No murmur heard  Pulmonary/Chest: Effort normal  No respiratory distress  She has wheezes  She has no rales  Abdominal: Soft  She exhibits no distension  There is no tenderness  There is no guarding  Musculoskeletal: She exhibits no edema  Neurological: She is alert and oriented to person, place, and time  Skin: Skin is warm and dry  Discussion with Family: Patient stated she would update family    Discharge instructions/Information to patient and family:   See after visit summary for information provided to patient and family        Provisions for Follow-Up Care:  See after visit summary for information related to follow-up care and any pertinent home health orders  Disposition:     Home    For Discharges to [address] [facility]:   · Not Applicable to this Patient - Not Applicable to this Patient    Planned Readmission: No     Discharge Statement:  I spent 35 minutes discharging the patient  This time was spent on the day of discharge  I had direct contact with the patient on the day of discharge  Greater than 50% of the total time was spent examining patient, answering all patient questions, arranging and discussing plan of care with patient as well as directly providing post-discharge instructions  Additional time then spent on discharge activities  Discharge Medications:  See after visit summary for reconciled discharge medications provided to patient and family        ** Please Note: This note has been constructed using a voice recognition system **

## 2020-02-23 NOTE — ASSESSMENT & PLAN NOTE
Secondary to influenza infeciton  Patient presents with one week of progressively worsening shortness of breath and wheezing - still persistent wheezing and SOB on exertion  Discharge on prolonged steroid taper   Continue nebulizers upon discharge  F/u with PCP in 1 week

## 2020-02-23 NOTE — NURSING NOTE
Patient discharged 2/23/2020 10:33 AM  AVS and discharge instructions reviewed with patient and spouse  All questions answered  Patient walked to exit with spouse to be taken home via personal vehicle      Giuseppe Null RN 2/23/2020 10:34 AM

## 2020-02-23 NOTE — ASSESSMENT & PLAN NOTE
POA  Likely viral sepsis due to influenza infection, also noted for an infiltrate of chest x-ray but procalcitonin normal and pneumonia workup negaitve  - patient was meeting criteria with leukocytosis of 12, tachycardia, lactic acidosis resolved with IVF, discontinued  - resolved  - stable off antibiotics since 2/20/20

## 2020-02-24 ENCOUNTER — TRANSITIONAL CARE MANAGEMENT (OUTPATIENT)
Dept: INTERNAL MEDICINE CLINIC | Facility: CLINIC | Age: 65
End: 2020-02-24

## 2020-02-24 LAB
BACTERIA BLD CULT: NORMAL
BACTERIA BLD CULT: NORMAL

## 2020-02-25 ENCOUNTER — OFFICE VISIT (OUTPATIENT)
Dept: INTERNAL MEDICINE CLINIC | Facility: CLINIC | Age: 65
End: 2020-02-25
Payer: COMMERCIAL

## 2020-02-25 VITALS
RESPIRATION RATE: 13 BRPM | BODY MASS INDEX: 39.85 KG/M2 | SYSTOLIC BLOOD PRESSURE: 140 MMHG | WEIGHT: 203 LBS | TEMPERATURE: 98.9 F | HEIGHT: 60 IN | DIASTOLIC BLOOD PRESSURE: 60 MMHG | HEART RATE: 70 BPM

## 2020-02-25 DIAGNOSIS — E11.9 TYPE 2 DIABETES MELLITUS WITHOUT COMPLICATION, WITHOUT LONG-TERM CURRENT USE OF INSULIN (HCC): ICD-10-CM

## 2020-02-25 DIAGNOSIS — E11.9 TYPE 2 DIABETES MELLITUS WITHOUT COMPLICATION, WITHOUT LONG-TERM CURRENT USE OF INSULIN (HCC): Primary | ICD-10-CM

## 2020-02-25 DIAGNOSIS — I10 ESSENTIAL HYPERTENSION: ICD-10-CM

## 2020-02-25 DIAGNOSIS — R65.20 SEVERE SEPSIS (HCC): ICD-10-CM

## 2020-02-25 DIAGNOSIS — A41.9 SEVERE SEPSIS (HCC): ICD-10-CM

## 2020-02-25 DIAGNOSIS — J10.1 INFLUENZA A: Primary | ICD-10-CM

## 2020-02-25 DIAGNOSIS — N30.90 CYSTITIS: ICD-10-CM

## 2020-02-25 DIAGNOSIS — J45.21 MILD INTERMITTENT ASTHMA WITH ACUTE EXACERBATION: ICD-10-CM

## 2020-02-25 DIAGNOSIS — E03.9 ACQUIRED HYPOTHYROIDISM: ICD-10-CM

## 2020-02-25 DIAGNOSIS — E78.00 PURE HYPERCHOLESTEROLEMIA: ICD-10-CM

## 2020-02-25 PROCEDURE — 1111F DSCHRG MED/CURRENT MED MERGE: CPT | Performed by: INTERNAL MEDICINE

## 2020-02-25 PROCEDURE — 99496 TRANSJ CARE MGMT HIGH F2F 7D: CPT | Performed by: INTERNAL MEDICINE

## 2020-02-25 RX ORDER — PREDNISONE 10 MG/1
TABLET ORAL
Qty: 45 TABLET | Refills: 0 | Status: SHIPPED | OUTPATIENT
Start: 2020-02-25 | End: 2020-03-11

## 2020-02-25 RX ORDER — BLOOD-GLUCOSE METER
EACH MISCELLANEOUS
Qty: 1 EACH | Refills: 0 | Status: SHIPPED | OUTPATIENT
Start: 2020-02-25

## 2020-02-25 RX ORDER — LANCETS 33 GAUGE
EACH MISCELLANEOUS
Qty: 100 EACH | Refills: 1 | Status: SHIPPED | OUTPATIENT
Start: 2020-02-25 | End: 2020-06-29

## 2020-02-25 RX ORDER — EZETIMIBE 10 MG/1
10 TABLET ORAL DAILY
Qty: 90 TABLET | Refills: 1 | Status: SHIPPED | OUTPATIENT
Start: 2020-02-25 | End: 2020-12-21 | Stop reason: SDUPTHER

## 2020-02-25 NOTE — PROGRESS NOTES
Assessment/Plan:Tapering doses of prednisone will give the normal back vaccine when she comes back in 2 weeks  She was instructed on the Tresiba at the new medication that will stop stop to to the Lantus      Complain of some dysuria and burning feeling she was in the hospital I think we should check a UA and CNS      Pneumococcal vaccine number 23  Colonoscopy in future    Post hospital visit was admitted with severe sepsis  From February 18 2 February 23rd she is here for post hospital visit she has not  the prednisone yet as tapering doses because it was not available will start her on a lower dose of prednisone she is still wheezing  She but she appears in no distress  Will going to do the following  Prednisone 10 mg 3 tablets for 3 days 2 tablets for 3 days 1 tablet for 1 week and then stop  She will continue on the Symbicort inhalers and the all the other medications    Diabetes of course the blood sugars were elevated when she was in the hospital because she had influenza and she had an exacerbation of her asthma  She had no evidence of infection and the antibiotics were stopped and she was on tapering doses of prednisone  Her Lantus has been changed to [place] by pharmaceutical so she is going to start that in about a day or 2  She was instructed to take 25 units in the morning and 10 units in the evening  Going to take glimepiride 4 mg in the morning  Metformin a 1000 mg twice a day way     Blood pressure well control as you are    No change in medications    Hypothyroidism continue levothyroxine as you are     Hyperlipidemia continue on the  zetia  and and atorvastatin                TCM Call (since 1/25/2020)     Date and time call was made  2/24/2020 12:10 PM    Patient was hospitialized at  [facility]     Date of Admission  02/18/20    Date of discharge  02/23/20    Diagnosis  asthma with acute exacerbation    Disposition  Home    Were the patients medications reviewed and updated  Yes    Current Symptoms  None      TCM Call (since 1/25/2020)     Should patient be enrolled in anticoag monitoring? No    Scheduled for follow up? Yes    I have advised the patient to call PCP with any new or worsening symptoms  Surendra Wolfe MA          No problem-specific Assessment & Plan notes found for this encounter  There are no diagnoses linked to this encounter  Subjective:      Patient ID: Tamara Daniels is a 59 y o  female  No chief complaint on file          Current Outpatient Medications:     acetaminophen (TYLENOL) 325 mg tablet, Take 2 tablets (650 mg total) by mouth every 6 (six) hours as needed for mild pain, moderate pain, headaches or fever, Disp: 30 tablet, Rfl: 0    albuterol (2 5 mg/3 mL) 0 083 % nebulizer solution, Take 1 vial (2 5 mg total) by nebulization every 6 (six) hours as needed for wheezing or shortness of breath, Disp: 60 vial, Rfl: 5    albuterol (PROVENTIL HFA,VENTOLIN HFA) 90 mcg/act inhaler, Inhale 2 puffs every 6 (six) hours as needed for wheezing, Disp: , Rfl:     aspirin 81 mg chewable tablet, Chew 81 mg daily, Disp: , Rfl:     atorvastatin (LIPITOR) 40 mg tablet, Take 1 tablet (40 mg total) by mouth daily, Disp: 30 tablet, Rfl: 4    budesonide-formoterol (SYMBICORT) 160-4 5 mcg/act inhaler, Inhale 2 puffs 2 (two) times a day Rinse mouth after use , Disp: , Rfl:     ezetimibe (ZETIA) 10 mg tablet, Take 10 mg by mouth daily, Disp: , Rfl:     fluticasone (FLONASE) 50 mcg/act nasal spray, 1 spray into each nostril daily, Disp: , Rfl:     furosemide (LASIX) 20 mg tablet, Take 20 mg by mouth 2 (two) times a day as needed, Disp: , Rfl:     glimepiride (AMARYL) 4 mg tablet, Take 1 tablet (4 mg total) by mouth daily with breakfast, Disp: 30 tablet, Rfl: 4    guaiFENesin (MUCINEX) 600 mg 12 hr tablet, Take 1 tablet (600 mg total) by mouth 2 (two) times a day, Disp: 14 tablet, Rfl: 0    indapamide (LOZOL) 1 25 mg tablet, Take 1 tablet (1 25 mg total) by mouth every morning, Disp: 30 tablet, Rfl: 3    insulin glargine (Lantus SoloStar) 100 units/mL injection pen, 35 units in the morning and 15 units in the evening, Disp: 5 pen, Rfl: 0    Insulin Pen Needle (BD PEN NEEDLE LISA U/F) 32G X 4 MM MISC, by Does not apply route 2 (two) times a day, Disp: 100 each, Rfl: 1    levothyroxine 150 mcg tablet, Take 1 tablet (150 mcg total) by mouth daily, Disp: 30 tablet, Rfl: 4    losartan (COZAAR) 50 mg tablet, Take 1 tablet (50 mg total) by mouth daily, Disp: 30 tablet, Rfl: 4    metFORMIN (GLUCOPHAGE) 1000 MG tablet, Take 1 tablet (1,000 mg total) by mouth 2 (two) times a day with meals, Disp: 60 tablet, Rfl: 3    metoprolol succinate (TOPROL-XL) 25 mg 24 hr tablet, Take 1 tablet (25 mg total) by mouth daily, Disp: 30 tablet, Rfl: 4    montelukast (SINGULAIR) 10 mg tablet, Take 10 mg by mouth daily at bedtime, Disp: , Rfl:     omeprazole (PriLOSEC) 20 mg delayed release capsule, Take 1 capsule (20 mg total) by mouth daily, Disp: 30 capsule, Rfl: 3    predniSONE 10 mg tablet, 5 tabs daily x 3 days, 4 tabs daily x 3 days, 3 tabs daily x 3 days, 2 tabs daily x 3 days, 1 tab daily x 3 days, Disp: 45 tablet, Rfl: 0    HPI    The following portions of the patient's history were reviewed and updated as appropriate: allergies, current medications, past family history, past medical history, past social history, past surgical history and problem list     Review of Systems   Constitutional: Negative  Negative for activity change, appetite change, fatigue, fever and unexpected weight change  HENT: Negative for congestion, ear pain, hearing loss, mouth sores, postnasal drip, rhinorrhea, sore throat, trouble swallowing and voice change  Eyes: Negative for pain, redness and visual disturbance  Respiratory: Positive for wheezing  Negative for cough, chest tightness and shortness of breath  Cardiovascular: Negative for chest pain, palpitations and leg swelling  Gastrointestinal: Negative for abdominal distention, abdominal pain, blood in stool, constipation, diarrhea and nausea  Endocrine: Negative for cold intolerance, heat intolerance, polydipsia, polyphagia and polyuria  Genitourinary: Positive for dysuria and frequency  Negative for difficulty urinating, flank pain, hematuria and urgency  Musculoskeletal: Negative for arthralgias, back pain, gait problem, joint swelling and myalgias  Skin: Negative for color change and pallor  Neurological: Negative for dizziness, tremors, seizures, syncope, weakness, numbness and headaches  Hematological: Negative for adenopathy  Does not bruise/bleed easily  Psychiatric/Behavioral: Negative  Negative for sleep disturbance  The patient is not nervous/anxious  Objective:    Results for orders placed or performed during the hospital encounter of 02/18/20   Blood culture #1   Result Value Ref Range    Blood Culture No Growth After 5 Days  Blood culture #2   Result Value Ref Range    Blood Culture No Growth After 5 Days      Influenza A/B and RSV PCR   Result Value Ref Range    INFLUENZA A PCR Detected (A) None Detected    INFLUENZA B PCR None Detected None Detected    RSV PCR None Detected None Detected   Legionella antigen, urine   Result Value Ref Range    Legionella Urinary Antigen Negative Negative   Strep Pneumoniae, Urine   Result Value Ref Range    Strep pneumoniae antigen, urine Negative Negative   APTT   Result Value Ref Range    PTT 28 23 - 37 seconds   Protime-INR   Result Value Ref Range    Protime 12 5 11 6 - 14 5 seconds    INR 0 92 0 84 - 1 19   CBC and differential   Result Value Ref Range    WBC 12 12 (H) 4 31 - 10 16 Thousand/uL    RBC 4 34 3 81 - 5 12 Million/uL    Hemoglobin 12 5 11 5 - 15 4 g/dL    Hematocrit 37 1 34 8 - 46 1 %    MCV 86 82 - 98 fL    MCH 28 8 26 8 - 34 3 pg    MCHC 33 7 31 4 - 37 4 g/dL    RDW 12 7 11 6 - 15 1 %    MPV 10 0 8 9 - 12 7 fL    Platelets 850 460 - 846 Thousands/uL    nRBC 0 /100 WBCs    Neutrophils Relative 87 (H) 43 - 75 %    Immat GRANS % 1 0 - 2 %    Lymphocytes Relative 7 (L) 14 - 44 %    Monocytes Relative 5 4 - 12 %    Eosinophils Relative 0 0 - 6 %    Basophils Relative 0 0 - 1 %    Neutrophils Absolute 10 52 (H) 1 85 - 7 62 Thousands/µL    Immature Grans Absolute 0 06 0 00 - 0 20 Thousand/uL    Lymphocytes Absolute 0 83 0 60 - 4 47 Thousands/µL    Monocytes Absolute 0 65 0 17 - 1 22 Thousand/µL    Eosinophils Absolute 0 02 0 00 - 0 61 Thousand/µL    Basophils Absolute 0 04 0 00 - 0 10 Thousands/µL   Comprehensive metabolic panel   Result Value Ref Range    Sodium 138 136 - 145 mmol/L    Potassium 4 2 3 5 - 5 3 mmol/L    Chloride 99 (L) 100 - 108 mmol/L    CO2 25 21 - 32 mmol/L    ANION GAP 14 (H) 4 - 13 mmol/L    BUN 14 5 - 25 mg/dL    Creatinine 1 03 0 60 - 1 30 mg/dL    Glucose 275 (H) 65 - 140 mg/dL    Calcium 9 1 8 3 - 10 1 mg/dL    AST 16 5 - 45 U/L    ALT 21 12 - 78 U/L    Alkaline Phosphatase 112 46 - 116 U/L    Total Protein 7 9 6 4 - 8 2 g/dL    Albumin 3 7 3 5 - 5 0 g/dL    Total Bilirubin 0 27 0 20 - 1 00 mg/dL    eGFR 58 ml/min/1 73sq m   Lactic acid x2   Result Value Ref Range    LACTIC ACID 3 0 (HH) 0 5 - 2 0 mmol/L   Lactic acid x2   Result Value Ref Range    LACTIC ACID 3 5 (HH) 0 5 - 2 0 mmol/L   Procalcitonin   Result Value Ref Range    Procalcitonin <0 05 <=0 25 ng/ml   UA w Reflex to Microscopic w Reflex to Culture   Result Value Ref Range    Color, UA Yellow     Clarity, UA Clear     Specific Gravity, UA 1 025 1 003 - 1 030    pH, UA 5 5 4 5, 5 0, 5 5, 6 0, 6 5, 7 0, 7 5, 8 0    Leukocytes, UA (A) Negative     Elevated glucose may cause decreased leukocyte values   See urine microscopic for [facility] result/    Nitrite, UA Negative Negative    Protein, UA Negative Negative mg/dl    Glucose, UA >=1000 (1%) (A) Negative mg/dl    Ketones, UA Trace (A) Negative mg/dl    Urobilinogen, UA 0 2 0 2, 1 0 E U /dl E U /dl    Bilirubin, UA Negative Negative    Blood, UA Negative Negative   Troponin I   Result Value Ref Range    Troponin I 0 02 <=0 04 ng/mL   Urine Microscopic   Result Value Ref Range    RBC, UA None Seen None Seen, 0-5 /hpf    WBC, UA 1-2 (A) None Seen, 0-5, 5-55, 5-65 /hpf    Epithelial Cells Occasional None Seen, Occasional /hpf    Bacteria, UA Occasional None Seen, Occasional /hpf    OTHER OBSERVATIONS Yeast Cells Present    Lactic acid, plasma   Result Value Ref Range    LACTIC ACID 5 3 (HH) 0 5 - 2 0 mmol/L   Lactic acid, plasma   Result Value Ref Range    LACTIC ACID 5 1 (HH) 0 5 - 2 0 mmol/L   Procalcitonin   Result Value Ref Range    Procalcitonin <0 05 <=0 25 ng/ml   Lactic acid, plasma   Result Value Ref Range    LACTIC ACID 2 6 (HH) 0 5 - 2 0 mmol/L   Lactic acid, plasma   Result Value Ref Range    LACTIC ACID 2 3 (HH) 0 5 - 2 0 mmol/L   Lactic acid, plasma   Result Value Ref Range    LACTIC ACID 2 0 0 5 - 2 0 mmol/L   CBC and differential   Result Value Ref Range    WBC 12 71 (H) 4 31 - 10 16 Thousand/uL    RBC 3 66 (L) 3 81 - 5 12 Million/uL    Hemoglobin 10 7 (L) 11 5 - 15 4 g/dL    Hematocrit 32 6 (L) 34 8 - 46 1 %    MCV 89 82 - 98 fL    MCH 29 2 26 8 - 34 3 pg    MCHC 32 8 31 4 - 37 4 g/dL    RDW 13 3 11 6 - 15 1 %    MPV 10 1 8 9 - 12 7 fL    Platelets 280 873 - 866 Thousands/uL    nRBC 0 /100 WBCs    Neutrophils Relative 85 (H) 43 - 75 %    Immat GRANS % 1 0 - 2 %    Lymphocytes Relative 8 (L) 14 - 44 %    Monocytes Relative 6 4 - 12 %    Eosinophils Relative 0 0 - 6 %    Basophils Relative 0 0 - 1 %    Neutrophils Absolute 10 85 (H) 1 85 - 7 62 Thousands/µL    Immature Grans Absolute 0 13 0 00 - 0 20 Thousand/uL    Lymphocytes Absolute 0 97 0 60 - 4 47 Thousands/µL    Monocytes Absolute 0 75 0 17 - 1 22 Thousand/µL    Eosinophils Absolute 0 00 0 00 - 0 61 Thousand/µL    Basophils Absolute 0 01 0 00 - 0 10 Thousands/µL   Basic metabolic panel   Result Value Ref Range    Sodium 144 136 - 145 mmol/L    Potassium 4 2 3 5 - 5 3 mmol/L    Chloride 108 100 - 108 mmol/L    CO2 30 21 - 32 mmol/L    ANION GAP 6 4 - 13 mmol/L    BUN 17 5 - 25 mg/dL    Creatinine 0 88 0 60 - 1 30 mg/dL    Glucose 223 (H) 65 - 140 mg/dL    Calcium 8 7 8 3 - 10 1 mg/dL    eGFR 70 ml/min/1 73sq m   Procalcitonin   Result Value Ref Range    Procalcitonin <0 05 <=0 25 ng/ml   CBC and differential   Result Value Ref Range    WBC 11 41 (H) 4 31 - 10 16 Thousand/uL    RBC 3 86 3 81 - 5 12 Million/uL    Hemoglobin 11 0 (L) 11 5 - 15 4 g/dL    Hematocrit 34 4 (L) 34 8 - 46 1 %    MCV 89 82 - 98 fL    MCH 28 5 26 8 - 34 3 pg    MCHC 32 0 31 4 - 37 4 g/dL    RDW 13 3 11 6 - 15 1 %    MPV 10 7 8 9 - 12 7 fL    Platelets 107 521 - 045 Thousands/uL    nRBC 0 /100 WBCs    Neutrophils Relative 82 (H) 43 - 75 %    Immat GRANS % 1 0 - 2 %    Lymphocytes Relative 10 (L) 14 - 44 %    Monocytes Relative 7 4 - 12 %    Eosinophils Relative 0 0 - 6 %    Basophils Relative 0 0 - 1 %    Neutrophils Absolute 9 34 (H) 1 85 - 7 62 Thousands/µL    Immature Grans Absolute 0 13 0 00 - 0 20 Thousand/uL    Lymphocytes Absolute 1 17 0 60 - 4 47 Thousands/µL    Monocytes Absolute 0 76 0 17 - 1 22 Thousand/µL    Eosinophils Absolute 0 00 0 00 - 0 61 Thousand/µL    Basophils Absolute 0 01 0 00 - 0 10 Thousands/µL   Basic metabolic panel   Result Value Ref Range    Sodium 141 136 - 145 mmol/L    Potassium 4 0 3 5 - 5 3 mmol/L    Chloride 104 100 - 108 mmol/L    CO2 28 21 - 32 mmol/L    ANION GAP 9 4 - 13 mmol/L    BUN 16 5 - 25 mg/dL    Creatinine 0 93 0 60 - 1 30 mg/dL    Glucose 175 (H) 65 - 140 mg/dL    Calcium 8 6 8 3 - 10 1 mg/dL    eGFR 65 ml/min/1 73sq m   CBC and differential   Result Value Ref Range    WBC 11 83 (H) 4 31 - 10 16 Thousand/uL    RBC 3 86 3 81 - 5 12 Million/uL    Hemoglobin 11 3 (L) 11 5 - 15 4 g/dL    Hematocrit 33 6 (L) 34 8 - 46 1 %    MCV 87 82 - 98 fL    MCH 29 3 26 8 - 34 3 pg    MCHC 33 6 31 4 - 37 4 g/dL    RDW 13 2 11 6 - 15 1 %    MPV 10 1 8 9 - 12 7 fL    Platelets 244 149 - 390 Thousands/uL    nRBC 0 /100 WBCs    Neutrophils Relative 83 (H) 43 - 75 %    Immat GRANS % 1 0 - 2 %    Lymphocytes Relative 11 (L) 14 - 44 %    Monocytes Relative 5 4 - 12 %    Eosinophils Relative 0 0 - 6 %    Basophils Relative 0 0 - 1 %    Neutrophils Absolute 9 77 (H) 1 85 - 7 62 Thousands/µL    Immature Grans Absolute 0 14 0 00 - 0 20 Thousand/uL    Lymphocytes Absolute 1 32 0 60 - 4 47 Thousands/µL    Monocytes Absolute 0 59 0 17 - 1 22 Thousand/µL    Eosinophils Absolute 0 00 0 00 - 0 61 Thousand/µL    Basophils Absolute 0 01 0 00 - 0 10 Thousands/µL   Basic metabolic panel   Result Value Ref Range    Sodium 142 136 - 145 mmol/L    Potassium 3 8 3 5 - 5 3 mmol/L    Chloride 105 100 - 108 mmol/L    CO2 30 21 - 32 mmol/L    ANION GAP 7 4 - 13 mmol/L    BUN 25 5 - 25 mg/dL    Creatinine 0 92 0 60 - 1 30 mg/dL    Glucose 211 (H) 65 - 140 mg/dL    Calcium 8 6 8 3 - 10 1 mg/dL    eGFR 66 ml/min/1 73sq m   CBC and differential   Result Value Ref Range    WBC 12 00 (H) 4 31 - 10 16 Thousand/uL    RBC 3 84 3 81 - 5 12 Million/uL    Hemoglobin 11 1 (L) 11 5 - 15 4 g/dL    Hematocrit 33 6 (L) 34 8 - 46 1 %    MCV 88 82 - 98 fL    MCH 28 9 26 8 - 34 3 pg    MCHC 33 0 31 4 - 37 4 g/dL    RDW 12 9 11 6 - 15 1 %    MPV 10 4 8 9 - 12 7 fL    Platelets 082 243 - 115 Thousands/uL    nRBC 0 /100 WBCs    Neutrophils Relative 81 (H) 43 - 75 %    Immat GRANS % 1 0 - 2 %    Lymphocytes Relative 14 14 - 44 %    Monocytes Relative 4 4 - 12 %    Eosinophils Relative 0 0 - 6 %    Basophils Relative 0 0 - 1 %    Neutrophils Absolute 9 75 (H) 1 85 - 7 62 Thousands/µL    Immature Grans Absolute 0 17 0 00 - 0 20 Thousand/uL    Lymphocytes Absolute 1 62 0 60 - 4 47 Thousands/µL    Monocytes Absolute 0 45 0 17 - 1 22 Thousand/µL    Eosinophils Absolute 0 00 0 00 - 0 61 Thousand/µL    Basophils Absolute 0 01 0 00 - 0 10 Thousands/µL   Basic metabolic panel   Result Value Ref Range    Sodium 142 136 - 145 mmol/L    Potassium 3 8 3 5 - 5 3 mmol/L    Chloride 105 100 - 108 mmol/L    CO2 33 (H) 21 - 32 mmol/L    ANION GAP 4 4 - 13 mmol/L    BUN 25 5 - 25 mg/dL    Creatinine 0 83 0 60 - 1 30 mg/dL    Glucose 164 (H) 65 - 140 mg/dL    Calcium 8 6 8 3 - 10 1 mg/dL    eGFR 75 ml/min/1 73sq m   ECG 12 lead   Result Value Ref Range    Ventricular Rate 128 BPM    Atrial Rate 128 BPM    DE Interval 136 ms    QRSD Interval 80 ms    QT Interval 318 ms    QTC Interval 464 ms    P [place] 79 degrees    QRS Axis 70 degrees    T Wave Axis 58 degrees   Fingerstick Glucose (POCT)   Result Value Ref Range    POC Glucose 407 (H) 65 - 140 mg/dl   Fingerstick Glucose (POCT)   Result Value Ref Range    POC Glucose 313 (H) 65 - 140 mg/dl   Fingerstick Glucose (POCT)   Result Value Ref Range    POC Glucose 286 (H) 65 - 140 mg/dl   Fingerstick Glucose (POCT)   Result Value Ref Range    POC Glucose 240 (H) 65 - 140 mg/dl   Fingerstick Glucose (POCT)   Result Value Ref Range    POC Glucose 248 (H) 65 - 140 mg/dl   Fingerstick Glucose (POCT)   Result Value Ref Range    POC Glucose 207 (H) 65 - 140 mg/dl   Fingerstick Glucose (POCT)   Result Value Ref Range    POC Glucose 307 (H) 65 - 140 mg/dl   Fingerstick Glucose (POCT)   Result Value Ref Range    POC Glucose 220 (H) 65 - 140 mg/dl   Fingerstick Glucose (POCT)   Result Value Ref Range    POC Glucose 240 (H) 65 - 140 mg/dl   Fingerstick Glucose (POCT)   Result Value Ref Range    POC Glucose 171 (H) 65 - 140 mg/dl   Fingerstick Glucose (POCT)   Result Value Ref Range    POC Glucose 280 (H) 65 - 140 mg/dl   Fingerstick Glucose (POCT)   Result Value Ref Range    POC Glucose 345 (H) 65 - 140 mg/dl   Fingerstick Glucose (POCT)   Result Value Ref Range    POC Glucose 304 (H) 65 - 140 mg/dl   Fingerstick Glucose (POCT)   Result Value Ref Range    POC Glucose 202 (H) 65 - 140 mg/dl   Fingerstick Glucose (POCT)   Result Value Ref Range    POC Glucose 226 (H) 65 - 140 mg/dl   Fingerstick Glucose (POCT)   Result Value Ref Range POC Glucose 282 (H) 65 - 140 mg/dl   Fingerstick Glucose (POCT)   Result Value Ref Range    POC Glucose 322 (H) 65 - 140 mg/dl   Fingerstick Glucose (POCT)   Result Value Ref Range    POC Glucose 129 65 - 140 mg/dl     There were no vitals taken for this visit  Physical Exam   Constitutional: She is oriented to person, place, and time  She appears well-developed and well-nourished  HENT:   Head: Normocephalic  Right Ear: External ear normal    Left Ear: External ear normal    Nose: Nose normal    Mouth/Throat: Oropharynx is clear and moist  No oropharyngeal exudate  Eyes: Pupils are equal, round, and reactive to light  Conjunctivae and EOM are normal    Neck: Normal range of motion  Neck supple  No thyromegaly present  Cardiovascular: Normal rate, regular rhythm, normal heart sounds and intact distal pulses  Exam reveals no gallop and no friction rub  No murmur heard  S1-S2 regular rhythm  Extremities no edema or calf tenderness     Pulmonary/Chest: Effort normal and breath sounds normal  No respiratory distress  She has no wheezes  She has no rales  Lungs show some expiratory wheezing anteriorly some rhonchi at the bases  But good breath sounds   Abdominal: Soft  Bowel sounds are normal  She exhibits no distension and no mass  There is no tenderness  There is no rebound and no guarding  Musculoskeletal: Normal range of motion  Lymphadenopathy:     She has no cervical adenopathy  Neurological: She is alert and oriented to person, place, and time  Skin: Skin is warm and dry  Psychiatric: She has a normal mood and affect  Her behavior is normal  Judgment normal    Nursing note and vitals reviewed

## 2020-02-25 NOTE — PATIENT INSTRUCTIONS
prednisone 10 mg 3 tablets for 3 days 2 tablets for 3 days 1 tablet for 1 week    We send Tresiba to the pharmacy with the instructions that was substitute the Lantus    Continue all the other medications    Check her blood sugar before breakfast and before dinner and in the middle of the day if you need to before lunch    I will see her back in 2 weeks

## 2020-02-27 ENCOUNTER — TELEPHONE (OUTPATIENT)
Dept: INTERNAL MEDICINE CLINIC | Facility: CLINIC | Age: 65
End: 2020-02-27

## 2020-02-27 NOTE — TELEPHONE ENCOUNTER
Called patient, as per Dr Ben Chi, to instruct her to go the the lab to have a UA and Urine culture    Patient stated she would go tomorrow

## 2020-03-02 ENCOUNTER — APPOINTMENT (OUTPATIENT)
Dept: LAB | Facility: HOSPITAL | Age: 65
End: 2020-03-02
Attending: INTERNAL MEDICINE
Payer: COMMERCIAL

## 2020-03-02 DIAGNOSIS — I10 ESSENTIAL HYPERTENSION: ICD-10-CM

## 2020-03-02 DIAGNOSIS — E11.9 TYPE 2 DIABETES MELLITUS WITHOUT COMPLICATION, WITHOUT LONG-TERM CURRENT USE OF INSULIN (HCC): ICD-10-CM

## 2020-03-02 DIAGNOSIS — E03.9 ACQUIRED HYPOTHYROIDISM: ICD-10-CM

## 2020-03-02 DIAGNOSIS — N30.90 CYSTITIS: ICD-10-CM

## 2020-03-02 LAB
ANION GAP SERPL CALCULATED.3IONS-SCNC: 6 MMOL/L (ref 4–13)
BASOPHILS # BLD AUTO: 0.02 THOUSANDS/ΜL (ref 0–0.1)
BASOPHILS NFR BLD AUTO: 0 % (ref 0–1)
BILIRUB UR QL STRIP: NEGATIVE
BUN SERPL-MCNC: 17 MG/DL (ref 5–25)
CALCIUM SERPL-MCNC: 9.2 MG/DL (ref 8.3–10.1)
CHLORIDE SERPL-SCNC: 103 MMOL/L (ref 100–108)
CLARITY UR: NORMAL
CO2 SERPL-SCNC: 32 MMOL/L (ref 21–32)
COLOR UR: YELLOW
CREAT SERPL-MCNC: 0.88 MG/DL (ref 0.6–1.3)
EOSINOPHIL # BLD AUTO: 0.08 THOUSAND/ΜL (ref 0–0.61)
EOSINOPHIL NFR BLD AUTO: 1 % (ref 0–6)
ERYTHROCYTE [DISTWIDTH] IN BLOOD BY AUTOMATED COUNT: 12.9 % (ref 11.6–15.1)
GFR SERPL CREATININE-BSD FRML MDRD: 70 ML/MIN/1.73SQ M
GLUCOSE P FAST SERPL-MCNC: 146 MG/DL (ref 65–99)
GLUCOSE UR STRIP-MCNC: NEGATIVE MG/DL
HCT VFR BLD AUTO: 35.1 % (ref 34.8–46.1)
HGB BLD-MCNC: 11.5 G/DL (ref 11.5–15.4)
HGB UR QL STRIP.AUTO: NEGATIVE
IMM GRANULOCYTES # BLD AUTO: 0.09 THOUSAND/UL (ref 0–0.2)
IMM GRANULOCYTES NFR BLD AUTO: 1 % (ref 0–2)
KETONES UR STRIP-MCNC: NEGATIVE MG/DL
LEUKOCYTE ESTERASE UR QL STRIP: NEGATIVE
LYMPHOCYTES # BLD AUTO: 3.31 THOUSANDS/ΜL (ref 0.6–4.47)
LYMPHOCYTES NFR BLD AUTO: 30 % (ref 14–44)
MAGNESIUM SERPL-MCNC: 1.6 MG/DL (ref 1.6–2.6)
MCH RBC QN AUTO: 29.1 PG (ref 26.8–34.3)
MCHC RBC AUTO-ENTMCNC: 32.8 G/DL (ref 31.4–37.4)
MCV RBC AUTO: 89 FL (ref 82–98)
MONOCYTES # BLD AUTO: 0.61 THOUSAND/ΜL (ref 0.17–1.22)
MONOCYTES NFR BLD AUTO: 6 % (ref 4–12)
NEUTROPHILS # BLD AUTO: 6.87 THOUSANDS/ΜL (ref 1.85–7.62)
NEUTS SEG NFR BLD AUTO: 62 % (ref 43–75)
NITRITE UR QL STRIP: NEGATIVE
NRBC BLD AUTO-RTO: 0 /100 WBCS
PH UR STRIP.AUTO: 6.5 [PH]
PLATELET # BLD AUTO: 261 THOUSANDS/UL (ref 149–390)
PMV BLD AUTO: 9.9 FL (ref 8.9–12.7)
POTASSIUM SERPL-SCNC: 4.2 MMOL/L (ref 3.5–5.3)
PROT UR STRIP-MCNC: NEGATIVE MG/DL
RBC # BLD AUTO: 3.95 MILLION/UL (ref 3.81–5.12)
SODIUM SERPL-SCNC: 141 MMOL/L (ref 136–145)
SP GR UR STRIP.AUTO: 1.02 (ref 1–1.03)
UROBILINOGEN UR QL STRIP.AUTO: 0.2 E.U./DL
WBC # BLD AUTO: 10.98 THOUSAND/UL (ref 4.31–10.16)

## 2020-03-02 PROCEDURE — 81003 URINALYSIS AUTO W/O SCOPE: CPT | Performed by: INTERNAL MEDICINE

## 2020-03-02 PROCEDURE — 36415 COLL VENOUS BLD VENIPUNCTURE: CPT

## 2020-03-02 PROCEDURE — 80048 BASIC METABOLIC PNL TOTAL CA: CPT

## 2020-03-02 PROCEDURE — 87086 URINE CULTURE/COLONY COUNT: CPT

## 2020-03-02 PROCEDURE — 85025 COMPLETE CBC W/AUTO DIFF WBC: CPT

## 2020-03-02 PROCEDURE — 83735 ASSAY OF MAGNESIUM: CPT

## 2020-03-03 LAB — BACTERIA UR CULT: NORMAL

## 2020-03-06 ENCOUNTER — TELEPHONE (OUTPATIENT)
Dept: INTERNAL MEDICINE CLINIC | Facility: CLINIC | Age: 65
End: 2020-03-06

## 2020-03-06 NOTE — TELEPHONE ENCOUNTER
Spoke to patient and gave her the results      ----- Message from Oscar Salazar MD sent at 3/2/2020 10:46 AM EST -----  Normal results normal urinalysis no evidence of infection which is good news

## 2020-03-11 ENCOUNTER — OFFICE VISIT (OUTPATIENT)
Dept: INTERNAL MEDICINE CLINIC | Facility: CLINIC | Age: 65
End: 2020-03-11
Payer: COMMERCIAL

## 2020-03-11 VITALS
TEMPERATURE: 98.6 F | BODY MASS INDEX: 40.44 KG/M2 | HEIGHT: 60 IN | WEIGHT: 206 LBS | HEART RATE: 60 BPM | RESPIRATION RATE: 13 BRPM | SYSTOLIC BLOOD PRESSURE: 122 MMHG | DIASTOLIC BLOOD PRESSURE: 70 MMHG

## 2020-03-11 DIAGNOSIS — E03.9 ACQUIRED HYPOTHYROIDISM: Primary | ICD-10-CM

## 2020-03-11 DIAGNOSIS — E11.9 TYPE 2 DIABETES MELLITUS WITHOUT COMPLICATION, WITHOUT LONG-TERM CURRENT USE OF INSULIN (HCC): ICD-10-CM

## 2020-03-11 DIAGNOSIS — I10 ESSENTIAL HYPERTENSION: ICD-10-CM

## 2020-03-11 DIAGNOSIS — Z12.11 COLON CANCER SCREENING: ICD-10-CM

## 2020-03-11 DIAGNOSIS — E78.00 PURE HYPERCHOLESTEROLEMIA: ICD-10-CM

## 2020-03-11 DIAGNOSIS — Z00.00 HEALTHCARE MAINTENANCE: ICD-10-CM

## 2020-03-11 DIAGNOSIS — F41.9 ANXIETY: ICD-10-CM

## 2020-03-11 DIAGNOSIS — Z23 ENCOUNTER FOR IMMUNIZATION: ICD-10-CM

## 2020-03-11 DIAGNOSIS — J45.21 MILD INTERMITTENT ASTHMA WITH ACUTE EXACERBATION: ICD-10-CM

## 2020-03-11 DIAGNOSIS — Z12.39 SCREENING FOR BREAST CANCER: ICD-10-CM

## 2020-03-11 PROCEDURE — 90670 PCV13 VACCINE IM: CPT | Performed by: INTERNAL MEDICINE

## 2020-03-11 PROCEDURE — 2026F EYE IMG VALID EVC RTNOPTHY: CPT | Performed by: INTERNAL MEDICINE

## 2020-03-11 PROCEDURE — 3074F SYST BP LT 130 MM HG: CPT | Performed by: INTERNAL MEDICINE

## 2020-03-11 PROCEDURE — 3078F DIAST BP <80 MM HG: CPT | Performed by: INTERNAL MEDICINE

## 2020-03-11 PROCEDURE — 3008F BODY MASS INDEX DOCD: CPT | Performed by: INTERNAL MEDICINE

## 2020-03-11 PROCEDURE — G0009 ADMIN PNEUMOCOCCAL VACCINE: HCPCS | Performed by: INTERNAL MEDICINE

## 2020-03-11 PROCEDURE — 99214 OFFICE O/P EST MOD 30 MIN: CPT | Performed by: INTERNAL MEDICINE

## 2020-03-11 PROCEDURE — 1111F DSCHRG MED/CURRENT MED MERGE: CPT | Performed by: INTERNAL MEDICINE

## 2020-03-11 PROCEDURE — 1036F TOBACCO NON-USER: CPT | Performed by: INTERNAL MEDICINE

## 2020-03-11 RX ORDER — CLONAZEPAM 0.12 MG/1
TABLET, ORALLY DISINTEGRATING ORAL
Qty: 30 TABLET | Refills: 1 | Status: SHIPPED | OUTPATIENT
Start: 2020-03-11 | End: 2020-06-17

## 2020-03-11 NOTE — PROGRESS NOTES
Assessment/Plan:    Pneumococcal vaccine Prevnar 13  Colonoscopy  Hospitalization severe sepsis influenza tapering steroids February 18, 2020    Problem 1  Diabetes according to the patient is better control she did not bring the blood sugars to review here but we review the medications for diabetes he is taking them appropriately  Metformin a g twice a day  Ukprachiine her long-acting insulin  Glimepiride 4 mg in the morning    Problem 2  High blood pressure well controlled no chest pain or palpitations  She is on the following medications  Metoprolol succinate extended release 25 mg  Losartan 50 mg  Lasix 20 mg twice a day as needed for edema    Indapamide 1 25 mg daily    Reactive airway disease bronchitis reason flu is still taking the inhalers as albuterol inhaler and the Symbicort that she takes 2 puffs twice a day    Hyperlipidemia she is on Lipitor 40 mg per day no myalgias also takes zetia  10 mg daily    No problem-specific Assessment & Plan notes found for this encounter  Diagnoses and all orders for this visit:    Acquired hypothyroidism  -     CBC and differential; Future  -     Comprehensive metabolic panel; Future  -     TSH, 3rd generation with Free T4 reflex; Future  -     Magnesium; Future  -     Lipid Panel with Direct LDL reflex; Future  -     UA (URINE) with reflex to Scope  -     Vitamin D 25 hydroxy; Future  -     Gamma GT; Future  -     Hemoglobin A1C; Future    Type 2 diabetes mellitus without complication, without long-term current use of insulin (HCC)  -     CBC and differential; Future  -     Comprehensive metabolic panel; Future  -     TSH, 3rd generation with Free T4 reflex; Future  -     Magnesium; Future  -     Lipid Panel with Direct LDL reflex; Future  -     UA (URINE) with reflex to Scope  -     Vitamin D 25 hydroxy;  Future  -     Gamma GT; Future  -     Hemoglobin A1C; Future    Mild intermittent asthma with acute exacerbation    Essential hypertension  -     CBC and differential; Future  -     Comprehensive metabolic panel; Future  -     TSH, 3rd generation with Free T4 reflex; Future  -     Magnesium; Future  -     Lipid Panel with Direct LDL reflex; Future  -     UA (URINE) with reflex to Scope  -     Vitamin D 25 hydroxy; Future  -     Gamma GT; Future  -     Hemoglobin A1C; Future    Pure hypercholesterolemia  -     CBC and differential; Future  -     Comprehensive metabolic panel; Future  -     TSH, 3rd generation with Free T4 reflex; Future  -     Magnesium; Future  -     Lipid Panel with Direct LDL reflex; Future  -     UA (URINE) with reflex to Scope  -     Vitamin D 25 hydroxy; Future  -     Gamma GT; Future  -     Hemoglobin A1C; Future    Encounter for immunization  -     PNEUMOCOCCAL CONJUGATE VACCINE 13-VALENT GREATER THAN 6 MONTHS    Colon cancer screening  -     Ambulatory referral for colonoscopy; Future    Screening for breast cancer  -     Mammo screening bilateral w cad; Future    Anxiety  -     clonazePAM (KlonoPIN) 0 125 mg disintegrating tablet; One tablet as needed for anxiety    Healthcare maintenance  -     Ambulatory referral to Gynecology; Future          Subjective:      Patient ID: Jose Sandoval is a 59 y o  female  Chief Complaint   Patient presents with    Physical Exam     Meds not verified  Needs mammogram, colonoscopy & pap smear        Urinary Frequency     burning & itching         Current Outpatient Medications:     acetaminophen (TYLENOL) 325 mg tablet, Take 2 tablets (650 mg total) by mouth every 6 (six) hours as needed for mild pain, moderate pain, headaches or fever, Disp: 30 tablet, Rfl: 0    albuterol (2 5 mg/3 mL) 0 083 % nebulizer solution, Take 1 vial (2 5 mg total) by nebulization every 6 (six) hours as needed for wheezing or shortness of breath, Disp: 60 vial, Rfl: 5    albuterol (PROVENTIL HFA,VENTOLIN HFA) 90 mcg/act inhaler, Inhale 2 puffs every 6 (six) hours as needed for wheezing, Disp: , Rfl:     aspirin 81 mg chewable tablet, Chew 81 mg daily, Disp: , Rfl:     atorvastatin (LIPITOR) 40 mg tablet, Take 1 tablet (40 mg total) by mouth daily, Disp: 30 tablet, Rfl: 4    Blood Glucose Monitoring Suppl (ONE TOUCH ULTRA 2) w/Device KIT, Test blood sugar 3 times daily, Disp: 1 each, Rfl: 0    budesonide-formoterol (SYMBICORT) 160-4 5 mcg/act inhaler, Inhale 2 puffs 2 (two) times a day Rinse mouth after use , Disp: , Rfl:     clonazePAM (KlonoPIN) 0 125 mg disintegrating tablet, One tablet as needed for anxiety, Disp: 30 tablet, Rfl: 1    ezetimibe (ZETIA) 10 mg tablet, Take 1 tablet (10 mg total) by mouth daily, Disp: 90 tablet, Rfl: 1    fluticasone (FLONASE) 50 mcg/act nasal spray, 1 spray into each nostril daily, Disp: , Rfl:     furosemide (LASIX) 20 mg tablet, Take 20 mg by mouth 2 (two) times a day as needed, Disp: , Rfl:     glimepiride (AMARYL) 4 mg tablet, Take 1 tablet (4 mg total) by mouth daily with breakfast, Disp: 30 tablet, Rfl: 4    glucose blood (ONE TOUCH ULTRA TEST) test strip, Test blood sugar 3 times daily, Disp: 100 each, Rfl: 1    indapamide (LOZOL) 1 25 mg tablet, Take 1 tablet (1 25 mg total) by mouth every morning, Disp: 30 tablet, Rfl: 3    insulin degludec (Tresiba FlexTouch) 100 units/mL injection pen, Inject 25 units in the morning and 10 units in the evening, Disp: 5 pen, Rfl: 1    Insulin Pen Needle (BD PEN NEEDLE LISA U/F) 32G X 4 MM MISC, by Does not apply route 2 (two) times a day, Disp: 100 each, Rfl: 1    levothyroxine 150 mcg tablet, Take 1 tablet (150 mcg total) by mouth daily, Disp: 30 tablet, Rfl: 4    losartan (COZAAR) 50 mg tablet, Take 1 tablet (50 mg total) by mouth daily, Disp: 30 tablet, Rfl: 4    metFORMIN (GLUCOPHAGE) 1000 MG tablet, Take 1 tablet (1,000 mg total) by mouth 2 (two) times a day with meals, Disp: 60 tablet, Rfl: 3    metoprolol succinate (TOPROL-XL) 25 mg 24 hr tablet, Take 1 tablet (25 mg total) by mouth daily, Disp: 30 tablet, Rfl: 4    montelukast (SINGULAIR) 10 mg tablet, Take 10 mg by mouth daily at bedtime, Disp: , Rfl:     omeprazole (PriLOSEC) 20 mg delayed release capsule, Take 1 capsule (20 mg total) by mouth daily, Disp: 30 capsule, Rfl: 3    OneTouch Delica Lancets 89A MISC, Test blood sugar 3 times daily, Disp: 100 each, Rfl: 1    HPI    The following portions of the patient's history were reviewed and updated as appropriate: allergies, current medications, past family history, past medical history, past social history, past surgical history and problem list     Review of Systems   Constitutional: Negative  Negative for activity change, appetite change, fatigue, fever and unexpected weight change  HENT: Negative for congestion, ear pain, hearing loss, mouth sores, postnasal drip, rhinorrhea, sore throat, trouble swallowing and voice change  Eyes: Negative for pain, redness and visual disturbance  Respiratory: Negative for cough, chest tightness, shortness of breath and wheezing  Cardiovascular: Negative for chest pain, palpitations and leg swelling  Gastrointestinal: Negative for abdominal distention, abdominal pain, blood in stool, constipation, diarrhea and nausea  Endocrine: Negative for cold intolerance, heat intolerance, polydipsia, polyphagia and polyuria  Genitourinary: Negative for difficulty urinating, dysuria, flank pain, frequency, hematuria and urgency  Musculoskeletal: Negative for arthralgias, back pain, gait problem, joint swelling and myalgias  Skin: Negative for color change and pallor  Neurological: Negative for dizziness, tremors, seizures, syncope, weakness, numbness and headaches  Hematological: Negative for adenopathy  Does not bruise/bleed easily  Psychiatric/Behavioral: Negative  Negative for sleep disturbance  The patient is not nervous/anxious            Objective:    Results for orders placed or performed in visit on 03/02/20   Urine culture   Result Value Ref Range    Urine Culture >100,000 cfu/ml     CBC and differential   Result Value Ref Range    WBC 10 98 (H) 4 31 - 10 16 Thousand/uL    RBC 3 95 3 81 - 5 12 Million/uL    Hemoglobin 11 5 11 5 - 15 4 g/dL    Hematocrit 35 1 34 8 - 46 1 %    MCV 89 82 - 98 fL    MCH 29 1 26 8 - 34 3 pg    MCHC 32 8 31 4 - 37 4 g/dL    RDW 12 9 11 6 - 15 1 %    MPV 9 9 8 9 - 12 7 fL    Platelets 860 086 - 371 Thousands/uL    nRBC 0 /100 WBCs    Neutrophils Relative 62 43 - 75 %    Immat GRANS % 1 0 - 2 %    Lymphocytes Relative 30 14 - 44 %    Monocytes Relative 6 4 - 12 %    Eosinophils Relative 1 0 - 6 %    Basophils Relative 0 0 - 1 %    Neutrophils Absolute 6 87 1 85 - 7 62 Thousands/µL    Immature Grans Absolute 0 09 0 00 - 0 20 Thousand/uL    Lymphocytes Absolute 3 31 0 60 - 4 47 Thousands/µL    Monocytes Absolute 0 61 0 17 - 1 22 Thousand/µL    Eosinophils Absolute 0 08 0 00 - 0 61 Thousand/µL    Basophils Absolute 0 02 0 00 - 0 10 Thousands/µL   Basic metabolic panel   Result Value Ref Range    Sodium 141 136 - 145 mmol/L    Potassium 4 2 3 5 - 5 3 mmol/L    Chloride 103 100 - 108 mmol/L    CO2 32 21 - 32 mmol/L    ANION GAP 6 4 - 13 mmol/L    BUN 17 5 - 25 mg/dL    Creatinine 0 88 0 60 - 1 30 mg/dL    Glucose, Fasting 146 (H) 65 - 99 mg/dL    Calcium 9 2 8 3 - 10 1 mg/dL    eGFR 70 ml/min/1 73sq m   Magnesium   Result Value Ref Range    Magnesium 1 6 1 6 - 2 6 mg/dL       /70 (BP Location: Right arm, Patient Position: Sitting)   Pulse 60   Temp 98 6 °F (37 °C)   Resp 13   Ht 5' (1 524 m)   Wt 93 4 kg (206 lb)   BMI 40 23 kg/m²      Physical Exam   Constitutional: She is oriented to person, place, and time  She appears well-developed and well-nourished  HENT:   Head: Normocephalic  Right Ear: External ear normal    Left Ear: External ear normal    Nose: Nose normal    Mouth/Throat: Oropharynx is clear and moist  No oropharyngeal exudate  Eyes: Pupils are equal, round, and reactive to light   Conjunctivae and EOM are normal    Neck: Normal range of motion  Neck supple  No thyromegaly present  Cardiovascular: Normal rate, regular rhythm, normal heart sounds and intact distal pulses  Exam reveals no gallop and no friction rub  No murmur heard  Pulmonary/Chest: Effort normal and breath sounds normal  No respiratory distress  She has no wheezes  She has no rales  Abdominal: Soft  Bowel sounds are normal  She exhibits no distension and no mass  There is no tenderness  There is no rebound and no guarding  Musculoskeletal: Normal range of motion  Lymphadenopathy:     She has no cervical adenopathy  Neurological: She is alert and oriented to person, place, and time  Skin: Skin is warm and dry  Psychiatric: She has a normal mood and affect  Her behavior is normal  Judgment normal    Nursing note and vitals reviewed

## 2020-03-11 NOTE — PATIENT INSTRUCTIONS
Get her lab work done before the next office visit  Your blood pressure is control  Monitor your diabetes check her blood sugars before breakfast and dinner  Go for your gynecology evaluation and Pap smear  Will order the mammography  Your order to go to see the gastroenterologist for your colonoscopy colon cancer screening  Will see her back in 3 months

## 2020-03-31 DIAGNOSIS — E11.9 TYPE 2 DIABETES MELLITUS WITHOUT COMPLICATION, WITHOUT LONG-TERM CURRENT USE OF INSULIN (HCC): ICD-10-CM

## 2020-03-31 DIAGNOSIS — E03.9 ACQUIRED HYPOTHYROIDISM: ICD-10-CM

## 2020-03-31 DIAGNOSIS — I10 ESSENTIAL HYPERTENSION: ICD-10-CM

## 2020-03-31 DIAGNOSIS — E78.00 PURE HYPERCHOLESTEROLEMIA: ICD-10-CM

## 2020-03-31 DIAGNOSIS — R07.89 ATYPICAL CHEST PAIN: ICD-10-CM

## 2020-03-31 DIAGNOSIS — J45.20 MILD INTERMITTENT ASTHMA WITHOUT COMPLICATION: ICD-10-CM

## 2020-03-31 RX ORDER — METOPROLOL SUCCINATE 25 MG/1
25 TABLET, EXTENDED RELEASE ORAL DAILY
Qty: 30 TABLET | Refills: 4 | Status: SHIPPED | OUTPATIENT
Start: 2020-03-31 | End: 2021-05-12 | Stop reason: SDUPTHER

## 2020-03-31 RX ORDER — GLIMEPIRIDE 4 MG/1
4 TABLET ORAL
Qty: 30 TABLET | Refills: 4 | Status: SHIPPED | OUTPATIENT
Start: 2020-03-31 | End: 2020-12-21 | Stop reason: SDUPTHER

## 2020-03-31 RX ORDER — ALBUTEROL SULFATE 2.5 MG/3ML
2.5 SOLUTION RESPIRATORY (INHALATION) EVERY 6 HOURS PRN
Qty: 60 VIAL | Refills: 5 | Status: SHIPPED | OUTPATIENT
Start: 2020-03-31

## 2020-05-11 ENCOUNTER — TELEMEDICINE (OUTPATIENT)
Dept: INTERNAL MEDICINE CLINIC | Facility: CLINIC | Age: 65
End: 2020-05-11
Payer: COMMERCIAL

## 2020-05-11 DIAGNOSIS — L24.7 IRRITANT CONTACT DERMATITIS DUE TO PLANTS, EXCEPT FOOD: Primary | ICD-10-CM

## 2020-05-11 DIAGNOSIS — L30.9 ECZEMA, UNSPECIFIED TYPE: ICD-10-CM

## 2020-05-11 DIAGNOSIS — E03.9 ACQUIRED HYPOTHYROIDISM: ICD-10-CM

## 2020-05-11 DIAGNOSIS — J45.21 MILD INTERMITTENT ASTHMA WITH ACUTE EXACERBATION: ICD-10-CM

## 2020-05-11 DIAGNOSIS — E11.9 TYPE 2 DIABETES MELLITUS WITHOUT COMPLICATION, WITHOUT LONG-TERM CURRENT USE OF INSULIN (HCC): ICD-10-CM

## 2020-05-11 DIAGNOSIS — E78.00 PURE HYPERCHOLESTEROLEMIA: ICD-10-CM

## 2020-05-11 PROBLEM — L24.9 IRRITANT CONTACT DERMATITIS: Status: ACTIVE | Noted: 2020-05-11

## 2020-05-11 PROCEDURE — 4040F PNEUMOC VAC/ADMIN/RCVD: CPT | Performed by: INTERNAL MEDICINE

## 2020-05-11 PROCEDURE — 99214 OFFICE O/P EST MOD 30 MIN: CPT | Performed by: INTERNAL MEDICINE

## 2020-05-12 DIAGNOSIS — J45.21 MILD INTERMITTENT ASTHMA WITH ACUTE EXACERBATION: ICD-10-CM

## 2020-05-12 DIAGNOSIS — E11.9 TYPE 2 DIABETES MELLITUS WITHOUT COMPLICATION, WITHOUT LONG-TERM CURRENT USE OF INSULIN (HCC): ICD-10-CM

## 2020-05-12 RX ORDER — PREDNISONE 10 MG/1
TABLET ORAL
Qty: 45 TABLET | Refills: 0 | Status: SHIPPED | OUTPATIENT
Start: 2020-05-12 | End: 2020-06-17

## 2020-06-17 ENCOUNTER — OFFICE VISIT (OUTPATIENT)
Dept: INTERNAL MEDICINE CLINIC | Facility: CLINIC | Age: 65
End: 2020-06-17
Payer: COMMERCIAL

## 2020-06-17 VITALS
DIASTOLIC BLOOD PRESSURE: 88 MMHG | SYSTOLIC BLOOD PRESSURE: 176 MMHG | HEIGHT: 60 IN | RESPIRATION RATE: 14 BRPM | WEIGHT: 212 LBS | BODY MASS INDEX: 41.62 KG/M2 | HEART RATE: 80 BPM | TEMPERATURE: 98.8 F

## 2020-06-17 DIAGNOSIS — J45.21 MILD INTERMITTENT ASTHMA WITH ACUTE EXACERBATION: ICD-10-CM
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
## 2020-06-17 DIAGNOSIS — E78.00 PURE HYPERCHOLESTEROLEMIA: ICD-10-CM

## 2020-06-17 DIAGNOSIS — E03.9 ACQUIRED HYPOTHYROIDISM: ICD-10-CM

## 2020-06-17 DIAGNOSIS — R26.9 GAIT ABNORMALITY: ICD-10-CM

## 2020-06-17 DIAGNOSIS — E11.9 TYPE 2 DIABETES MELLITUS WITHOUT COMPLICATION, WITHOUT LONG-TERM CURRENT USE OF INSULIN (HCC): Primary | ICD-10-CM

## 2020-06-17 DIAGNOSIS — I10 ESSENTIAL HYPERTENSION: ICD-10-CM

## 2020-06-17 PROCEDURE — 1100F PTFALLS ASSESS-DOCD GE2>/YR: CPT | Performed by: INTERNAL MEDICINE

## 2020-06-17 PROCEDURE — 2026F EYE IMG VALID EVC RTNOPTHY: CPT | Performed by: INTERNAL MEDICINE

## 2020-06-17 PROCEDURE — 99214 OFFICE O/P EST MOD 30 MIN: CPT | Performed by: INTERNAL MEDICINE

## 2020-06-17 PROCEDURE — 3077F SYST BP >= 140 MM HG: CPT | Performed by: INTERNAL MEDICINE

## 2020-06-17 PROCEDURE — 4040F PNEUMOC VAC/ADMIN/RCVD: CPT | Performed by: INTERNAL MEDICINE

## 2020-06-17 PROCEDURE — 3008F BODY MASS INDEX DOCD: CPT | Performed by: INTERNAL MEDICINE

## 2020-06-17 PROCEDURE — 1036F TOBACCO NON-USER: CPT | Performed by: INTERNAL MEDICINE

## 2020-06-17 PROCEDURE — 3288F FALL RISK ASSESSMENT DOCD: CPT | Performed by: INTERNAL MEDICINE

## 2020-06-17 PROCEDURE — 3079F DIAST BP 80-89 MM HG: CPT | Performed by: INTERNAL MEDICINE

## 2020-06-19 ENCOUNTER — EVALUATION (OUTPATIENT)
Dept: PHYSICAL THERAPY | Facility: CLINIC | Age: 65
End: 2020-06-19
Payer: COMMERCIAL
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
## 2020-06-19 DIAGNOSIS — R26.9 GAIT ABNORMALITY: ICD-10-CM

## 2020-06-19 PROCEDURE — 97161 PT EVAL LOW COMPLEX 20 MIN: CPT | Performed by: PHYSICAL THERAPIST

## 2020-06-19 PROCEDURE — 97110 THERAPEUTIC EXERCISES: CPT | Performed by: PHYSICAL THERAPIST

## 2020-06-19 PROCEDURE — 97112 NEUROMUSCULAR REEDUCATION: CPT | Performed by: PHYSICAL THERAPIST

## 2020-06-26 ENCOUNTER — APPOINTMENT (OUTPATIENT)
Dept: PHYSICAL THERAPY | Facility: CLINIC | Age: 65
End: 2020-06-26
Payer: COMMERCIAL

## 2020-06-29 ENCOUNTER — APPOINTMENT (OUTPATIENT)
Dept: LAB | Facility: HOSPITAL | Age: 65
End: 2020-06-29
Attending: INTERNAL MEDICINE
Payer: COMMERCIAL

## 2020-06-29 DIAGNOSIS — E78.00 PURE HYPERCHOLESTEROLEMIA: ICD-10-CM

## 2020-06-29 DIAGNOSIS — E03.9 ACQUIRED HYPOTHYROIDISM: ICD-10-CM

## 2020-06-29 DIAGNOSIS — E11.9 TYPE 2 DIABETES MELLITUS WITHOUT COMPLICATION, WITHOUT LONG-TERM CURRENT USE OF INSULIN (HCC): ICD-10-CM

## 2020-06-29 DIAGNOSIS — J45.20 MILD INTERMITTENT ASTHMA WITHOUT COMPLICATION: ICD-10-CM

## 2020-06-29 DIAGNOSIS — I10 ESSENTIAL HYPERTENSION: ICD-10-CM

## 2020-06-29 DIAGNOSIS — R07.89 ATYPICAL CHEST PAIN: ICD-10-CM

## 2020-06-29 LAB
25(OH)D3 SERPL-MCNC: 19.2 NG/ML (ref 30–100)
ALBUMIN SERPL BCP-MCNC: 3.3 G/DL (ref 3.5–5)
ALP SERPL-CCNC: 85 U/L (ref 46–116)
ALT SERPL W P-5'-P-CCNC: 19 U/L (ref 12–78)
ANION GAP SERPL CALCULATED.3IONS-SCNC: 6 MMOL/L (ref 4–13)
AST SERPL W P-5'-P-CCNC: 16 U/L (ref 5–45)
BASOPHILS # BLD AUTO: 0.04 THOUSANDS/ΜL (ref 0–0.1)
BASOPHILS NFR BLD AUTO: 1 % (ref 0–1)
BILIRUB SERPL-MCNC: 0.4 MG/DL (ref 0.2–1)
BILIRUB UR QL STRIP: NEGATIVE
BUN SERPL-MCNC: 21 MG/DL (ref 5–25)
CALCIUM SERPL-MCNC: 8.9 MG/DL (ref 8.3–10.1)
CHLORIDE SERPL-SCNC: 104 MMOL/L (ref 100–108)
CHOLEST SERPL-MCNC: 196 MG/DL (ref 50–200)
CLARITY UR: CLEAR
CO2 SERPL-SCNC: 30 MMOL/L (ref 21–32)
COLOR UR: YELLOW
CREAT SERPL-MCNC: 0.87 MG/DL (ref 0.6–1.3)
EOSINOPHIL # BLD AUTO: 0.12 THOUSAND/ΜL (ref 0–0.61)
EOSINOPHIL NFR BLD AUTO: 2 % (ref 0–6)
ERYTHROCYTE [DISTWIDTH] IN BLOOD BY AUTOMATED COUNT: 13.1 % (ref 11.6–15.1)
EST. AVERAGE GLUCOSE BLD GHB EST-MCNC: 169 MG/DL
GFR SERPL CREATININE-BSD FRML MDRD: 70 ML/MIN/1.73SQ M
GGT SERPL-CCNC: 25 U/L (ref 5–85)
GLUCOSE P FAST SERPL-MCNC: 172 MG/DL (ref 65–99)
GLUCOSE UR STRIP-MCNC: NEGATIVE MG/DL
HBA1C MFR BLD: 7.5 %
HCT VFR BLD AUTO: 35.3 % (ref 34.8–46.1)
HDLC SERPL-MCNC: 61 MG/DL
HGB BLD-MCNC: 11.6 G/DL (ref 11.5–15.4)
HGB UR QL STRIP.AUTO: NEGATIVE
IMM GRANULOCYTES # BLD AUTO: 0.03 THOUSAND/UL (ref 0–0.2)
IMM GRANULOCYTES NFR BLD AUTO: 0 % (ref 0–2)
KETONES UR STRIP-MCNC: NEGATIVE MG/DL
LDLC SERPL CALC-MCNC: 111 MG/DL (ref 0–100)
LEUKOCYTE ESTERASE UR QL STRIP: NEGATIVE
LYMPHOCYTES # BLD AUTO: 2.43 THOUSANDS/ΜL (ref 0.6–4.47)
LYMPHOCYTES NFR BLD AUTO: 32 % (ref 14–44)
MAGNESIUM SERPL-MCNC: 2 MG/DL (ref 1.6–2.6)
MCH RBC QN AUTO: 28.8 PG (ref 26.8–34.3)
MCHC RBC AUTO-ENTMCNC: 32.9 G/DL (ref 31.4–37.4)
MCV RBC AUTO: 88 FL (ref 82–98)
MONOCYTES # BLD AUTO: 0.51 THOUSAND/ΜL (ref 0.17–1.22)
MONOCYTES NFR BLD AUTO: 7 % (ref 4–12)
NEUTROPHILS # BLD AUTO: 4.38 THOUSANDS/ΜL (ref 1.85–7.62)
NEUTS SEG NFR BLD AUTO: 58 % (ref 43–75)
NITRITE UR QL STRIP: NEGATIVE
NRBC BLD AUTO-RTO: 0 /100 WBCS
PH UR STRIP.AUTO: 5.5 [PH]
PLATELET # BLD AUTO: 259 THOUSANDS/UL (ref 149–390)
PMV BLD AUTO: 10.2 FL (ref 8.9–12.7)
POTASSIUM SERPL-SCNC: 4.3 MMOL/L (ref 3.5–5.3)
PROT SERPL-MCNC: 6.8 G/DL (ref 6.4–8.2)
PROT UR STRIP-MCNC: NEGATIVE MG/DL
RBC # BLD AUTO: 4.03 MILLION/UL (ref 3.81–5.12)
SODIUM SERPL-SCNC: 140 MMOL/L (ref 136–145)
SP GR UR STRIP.AUTO: >=1.03 (ref 1–1.03)
T4 FREE SERPL-MCNC: 1.09 NG/DL (ref 0.76–1.46)
TRIGL SERPL-MCNC: 119 MG/DL
TSH SERPL DL<=0.05 MIU/L-ACNC: 7.29 UIU/ML (ref 0.36–3.74)
UROBILINOGEN UR QL STRIP.AUTO: 0.2 E.U./DL
WBC # BLD AUTO: 7.51 THOUSAND/UL (ref 4.31–10.16)

## 2020-06-29 PROCEDURE — 83735 ASSAY OF MAGNESIUM: CPT

## 2020-06-29 PROCEDURE — 80061 LIPID PANEL: CPT

## 2020-06-29 PROCEDURE — 81003 URINALYSIS AUTO W/O SCOPE: CPT | Performed by: INTERNAL MEDICINE

## 2020-06-29 PROCEDURE — 82977 ASSAY OF GGT: CPT

## 2020-06-29 PROCEDURE — 84443 ASSAY THYROID STIM HORMONE: CPT

## 2020-06-29 PROCEDURE — 36415 COLL VENOUS BLD VENIPUNCTURE: CPT

## 2020-06-29 PROCEDURE — 83036 HEMOGLOBIN GLYCOSYLATED A1C: CPT

## 2020-06-29 PROCEDURE — 85025 COMPLETE CBC W/AUTO DIFF WBC: CPT

## 2020-06-29 PROCEDURE — 84439 ASSAY OF FREE THYROXINE: CPT

## 2020-06-29 PROCEDURE — 80053 COMPREHEN METABOLIC PANEL: CPT

## 2020-06-29 PROCEDURE — 3051F HG A1C>EQUAL 7.0%<8.0%: CPT | Performed by: INTERNAL MEDICINE

## 2020-06-29 PROCEDURE — 82306 VITAMIN D 25 HYDROXY: CPT

## 2020-06-29 RX ORDER — LANCETS 33 GAUGE
EACH MISCELLANEOUS
Qty: 100 EACH | Refills: 1 | Status: SHIPPED | OUTPATIENT
Start: 2020-06-29

## 2020-06-29 RX ORDER — OMEPRAZOLE 20 MG/1
20 CAPSULE, DELAYED RELEASE ORAL DAILY
Qty: 30 CAPSULE | Refills: 3 | Status: SHIPPED | OUTPATIENT
Start: 2020-06-29 | End: 2020-08-07

## 2020-06-29 RX ORDER — OMEPRAZOLE 20 MG/1
CAPSULE, DELAYED RELEASE ORAL
Qty: 90 CAPSULE | Refills: 1 | Status: SHIPPED | OUTPATIENT
Start: 2020-06-29 | End: 2020-12-21 | Stop reason: SDUPTHER

## 2020-08-03 DIAGNOSIS — J45.20 MILD INTERMITTENT ASTHMA WITHOUT COMPLICATION: ICD-10-CM

## 2020-08-03 DIAGNOSIS — E11.9 TYPE 2 DIABETES MELLITUS WITHOUT COMPLICATION, WITHOUT LONG-TERM CURRENT USE OF INSULIN (HCC): ICD-10-CM

## 2020-08-03 DIAGNOSIS — E03.9 ACQUIRED HYPOTHYROIDISM: ICD-10-CM

## 2020-08-03 DIAGNOSIS — E78.00 PURE HYPERCHOLESTEROLEMIA: ICD-10-CM

## 2020-08-03 DIAGNOSIS — I10 ESSENTIAL HYPERTENSION: ICD-10-CM

## 2020-08-03 DIAGNOSIS — R07.89 ATYPICAL CHEST PAIN: ICD-10-CM

## 2020-08-03 RX ORDER — LEVOTHYROXINE SODIUM 0.15 MG/1
TABLET ORAL
Qty: 90 TABLET | Refills: 1 | Status: SHIPPED | OUTPATIENT
Start: 2020-08-03 | End: 2020-12-21 | Stop reason: SDUPTHER

## 2020-08-07 ENCOUNTER — OFFICE VISIT (OUTPATIENT)
Dept: INTERNAL MEDICINE CLINIC | Facility: CLINIC | Age: 65
End: 2020-08-07
Payer: COMMERCIAL

## 2020-08-07 VITALS
SYSTOLIC BLOOD PRESSURE: 150 MMHG | HEIGHT: 60 IN | HEART RATE: 76 BPM | TEMPERATURE: 97.6 F | BODY MASS INDEX: 42.13 KG/M2 | WEIGHT: 214.6 LBS | DIASTOLIC BLOOD PRESSURE: 82 MMHG | RESPIRATION RATE: 14 BRPM

## 2020-08-07 DIAGNOSIS — I10 ESSENTIAL HYPERTENSION: ICD-10-CM

## 2020-08-07 DIAGNOSIS — E78.00 PURE HYPERCHOLESTEROLEMIA: ICD-10-CM

## 2020-08-07 DIAGNOSIS — J45.21 MILD INTERMITTENT ASTHMA WITH ACUTE EXACERBATION: ICD-10-CM

## 2020-08-07 DIAGNOSIS — E03.9 ACQUIRED HYPOTHYROIDISM: ICD-10-CM

## 2020-08-07 DIAGNOSIS — E11.9 TYPE 2 DIABETES MELLITUS WITHOUT COMPLICATION, WITHOUT LONG-TERM CURRENT USE OF INSULIN (HCC): Primary | ICD-10-CM

## 2020-08-07 PROCEDURE — 3079F DIAST BP 80-89 MM HG: CPT | Performed by: INTERNAL MEDICINE

## 2020-08-07 PROCEDURE — 4010F ACE/ARB THERAPY RXD/TAKEN: CPT | Performed by: INTERNAL MEDICINE

## 2020-08-07 PROCEDURE — 3008F BODY MASS INDEX DOCD: CPT | Performed by: INTERNAL MEDICINE

## 2020-08-07 PROCEDURE — 3051F HG A1C>EQUAL 7.0%<8.0%: CPT | Performed by: INTERNAL MEDICINE

## 2020-08-07 PROCEDURE — 2026F EYE IMG VALID EVC RTNOPTHY: CPT | Performed by: INTERNAL MEDICINE

## 2020-08-07 PROCEDURE — 4040F PNEUMOC VAC/ADMIN/RCVD: CPT | Performed by: INTERNAL MEDICINE

## 2020-08-07 PROCEDURE — 3077F SYST BP >= 140 MM HG: CPT | Performed by: INTERNAL MEDICINE

## 2020-08-07 PROCEDURE — 1036F TOBACCO NON-USER: CPT | Performed by: INTERNAL MEDICINE

## 2020-08-07 PROCEDURE — 99214 OFFICE O/P EST MOD 30 MIN: CPT | Performed by: INTERNAL MEDICINE

## 2020-08-07 RX ORDER — LOSARTAN POTASSIUM 100 MG/1
100 TABLET ORAL DAILY
Qty: 90 TABLET | Refills: 1 | Status: SHIPPED | OUTPATIENT
Start: 2020-08-07 | End: 2020-08-07 | Stop reason: SDUPTHER

## 2020-08-07 RX ORDER — LOSARTAN POTASSIUM 100 MG/1
100 TABLET ORAL DAILY
Qty: 90 TABLET | Refills: 1 | Status: SHIPPED | OUTPATIENT
Start: 2020-08-07 | End: 2020-12-21 | Stop reason: SDUPTHER

## 2020-08-07 NOTE — PROGRESS NOTES
Assessment/Plan:  Increase the losartan from  mg per day back in October for flu shot and blood pressure and diabetes checkup    Problem 1  Blood pressure not well control the goal is to get a below 313 systolic will increase the losartan from  mg per day her renal function and electrolytes are normal that will be repeated with the next lab on also microalbumin creatinine ratio    Problem 2  Diabetes no polyuria polydipsia will check a hemoglobin A1c needs to check her blood sugars from home when she comes back  Did not make any medication changes  Problem 3  Hyperlipidemia continue on statin Lipitor 40 mg per day no myalgias also takes Zohra a  Problem 4  Asthma is in remission she has no wheezing of chest pain or shortness of breath  No problem-specific Assessment & Plan notes found for this encounter  Diagnoses and all orders for this visit:    Type 2 diabetes mellitus without complication, without long-term current use of insulin (HCC)    Acquired hypothyroidism    Mild intermittent asthma with acute exacerbation    Essential hypertension    Pure hypercholesterolemia          Subjective:      Patient ID: Candance Gone is a 72 y o  female  No chief complaint on file          Current Outpatient Medications:     acetaminophen (TYLENOL) 325 mg tablet, Take 2 tablets (650 mg total) by mouth every 6 (six) hours as needed for mild pain, moderate pain, headaches or fever, Disp: 30 tablet, Rfl: 0    albuterol (2 5 mg/3 mL) 0 083 % nebulizer solution, Take 1 vial (2 5 mg total) by nebulization every 6 (six) hours as needed for wheezing or shortness of breath, Disp: 60 vial, Rfl: 5    albuterol (PROVENTIL HFA,VENTOLIN HFA) 90 mcg/act inhaler, Inhale 2 puffs every 6 (six) hours as needed for wheezing, Disp: , Rfl:     aspirin 81 mg chewable tablet, Chew 81 mg daily, Disp: , Rfl:     atorvastatin (LIPITOR) 40 mg tablet, Take 1 tablet (40 mg total) by mouth daily, Disp: 30 tablet, Rfl: 4    Blood Glucose Monitoring Suppl (ONE TOUCH ULTRA 2) w/Device KIT, Test blood sugar 3 times daily, Disp: 1 each, Rfl: 0    budesonide-formoterol (SYMBICORT) 160-4 5 mcg/act inhaler, Inhale 2 puffs 2 (two) times a day Rinse mouth after use , Disp: , Rfl:     ezetimibe (ZETIA) 10 mg tablet, Take 1 tablet (10 mg total) by mouth daily, Disp: 90 tablet, Rfl: 1    fluticasone (FLONASE) 50 mcg/act nasal spray, 1 spray into each nostril daily, Disp: , Rfl:     furosemide (LASIX) 20 mg tablet, Take 20 mg by mouth 2 (two) times a day as needed, Disp: , Rfl:     glimepiride (AMARYL) 4 mg tablet, Take 1 tablet (4 mg total) by mouth daily with breakfast, Disp: 30 tablet, Rfl: 4    glucose blood test strip, Prior to each meal, Disp: 300 each, Rfl: 1    hydrocortisone 2 5 % cream, Apply topically 2 (two) times a day, Disp: 30 g, Rfl: 0    indapamide (LOZOL) 1 25 mg tablet, Take 1 tablet (1 25 mg total) by mouth every morning, Disp: 30 tablet, Rfl: 3    insulin degludec (Tresiba FlexTouch) 100 units/mL injection pen, inject 25 units subcutaneously every morning and 10 units every evening, Disp: 15 mL, Rfl: 1    Insulin Pen Needle (BD PEN NEEDLE LISA U/F) 32G X 4 MM MISC, by Does not apply route 2 (two) times a day, Disp: 100 each, Rfl: 1    Lancets (ONETOUCH DELICA PLUS [id number]) MISC, TEST 3 TIMES, Disp: 100 each, Rfl: 1    levothyroxine 150 mcg tablet, take 1 tablet by mouth once daily, Disp: 90 tablet, Rfl: 1    losartan (COZAAR) 50 mg tablet, Take 1 tablet (50 mg total) by mouth daily, Disp: 30 tablet, Rfl: 4    metFORMIN (GLUCOPHAGE) 1000 MG tablet, Take 1 tablet (1,000 mg total) by mouth 2 (two) times a day with meals, Disp: 60 tablet, Rfl: 3    metoprolol succinate (TOPROL-XL) 25 mg 24 hr tablet, Take 1 tablet (25 mg total) by mouth daily, Disp: 30 tablet, Rfl: 4    montelukast (SINGULAIR) 10 mg tablet, Take 10 mg by mouth daily at bedtime, Disp: , Rfl:     omeprazole (PriLOSEC) 20 mg delayed release capsule, Take 1 capsule (20 mg total) by mouth daily, Disp: 30 capsule, Rfl: 3    omeprazole (PriLOSEC) 20 mg delayed release capsule, take 1 capsule by mouth daily, Disp: 90 capsule, Rfl: 1    HPI    The following portions of the patient's history were reviewed and updated as appropriate: allergies, current medications, past family history, past medical history, past social history, past surgical history and problem list     Review of Systems   Constitutional: Negative  Negative for activity change, appetite change, fatigue, fever and unexpected weight change  HENT: Negative for congestion, ear pain, hearing loss, mouth sores, postnasal drip, rhinorrhea, sore throat, trouble swallowing and voice change  Eyes: Negative for pain, redness and visual disturbance  Respiratory: Negative for cough, chest tightness, shortness of breath and wheezing  Cardiovascular: Negative for chest pain, palpitations and leg swelling  Gastrointestinal: Negative for abdominal distention, abdominal pain, blood in stool, constipation, diarrhea and nausea  Endocrine: Negative for cold intolerance, heat intolerance, polydipsia, polyphagia and polyuria  Genitourinary: Negative for difficulty urinating, dysuria, flank pain, frequency, hematuria and urgency  Musculoskeletal: Negative for arthralgias, back pain, gait problem, joint swelling and myalgias  Skin: Negative for color change and pallor  Neurological: Negative for dizziness, tremors, seizures, syncope, weakness, numbness and headaches  Hematological: Negative for adenopathy  Does not bruise/bleed easily  Psychiatric/Behavioral: Negative  Negative for sleep disturbance  The patient is not nervous/anxious  Objective: There were no vitals taken for this visit  Physical Exam   Constitutional: She is oriented to person, place, and time  She appears well-developed  HENT:   Head: Normocephalic     Right Ear: External ear normal    Left Ear: External ear normal    Nose: Nose normal    Mouth/Throat: No oropharyngeal exudate  Eyes: Pupils are equal, round, and reactive to light  Conjunctivae are normal    Neck: Normal range of motion  Neck supple  No thyromegaly present  Cardiovascular: Normal rate, regular rhythm and normal heart sounds  Exam reveals no gallop and no friction rub  No murmur heard  S1-S2 regular rhythm  Blood pressure 150/80 standing 140/80  Extremities no edema   Pulmonary/Chest: Effort normal and breath sounds normal  No respiratory distress  She has no wheezes  She has no rales  Abdominal: Soft  Bowel sounds are normal  She exhibits no distension and no mass  There is no abdominal tenderness  There is no rebound and no guarding  Musculoskeletal: Normal range of motion  Lymphadenopathy:     She has no cervical adenopathy  Neurological: She is alert and oriented to person, place, and time  Skin: Skin is warm and dry  Psychiatric: Her behavior is normal  Judgment normal    Nursing note and vitals reviewed

## 2020-08-07 NOTE — PATIENT INSTRUCTIONS
Better control your blood pressure we increase the losartan from  mg per day continue your indapamide 1 25 mg daily and dural metoprolol succinate 25 mg per day will see her back in about 8 or 9 weeks and give you the flu vaccine

## 2020-08-13 DIAGNOSIS — E11.9 TYPE 2 DIABETES MELLITUS WITHOUT COMPLICATION, WITHOUT LONG-TERM CURRENT USE OF INSULIN (HCC): ICD-10-CM

## 2020-08-13 RX ORDER — PEN NEEDLE, DIABETIC 32GX 5/32"
NEEDLE, DISPOSABLE MISCELLANEOUS
Qty: 100 EACH | Refills: 1 | Status: SHIPPED | OUTPATIENT
Start: 2020-08-13

## 2020-09-18 DIAGNOSIS — J45.20 MILD INTERMITTENT ASTHMA WITHOUT COMPLICATION: ICD-10-CM

## 2020-09-18 DIAGNOSIS — I10 ESSENTIAL HYPERTENSION: ICD-10-CM

## 2020-09-18 DIAGNOSIS — E78.00 PURE HYPERCHOLESTEROLEMIA: ICD-10-CM

## 2020-09-18 DIAGNOSIS — E11.9 TYPE 2 DIABETES MELLITUS WITHOUT COMPLICATION, WITHOUT LONG-TERM CURRENT USE OF INSULIN (HCC): ICD-10-CM

## 2020-09-18 DIAGNOSIS — E03.9 ACQUIRED HYPOTHYROIDISM: ICD-10-CM

## 2020-09-18 DIAGNOSIS — R07.89 ATYPICAL CHEST PAIN: ICD-10-CM

## 2020-09-20 DIAGNOSIS — E11.9 TYPE 2 DIABETES MELLITUS WITHOUT COMPLICATION, WITHOUT LONG-TERM CURRENT USE OF INSULIN (HCC): ICD-10-CM

## 2020-09-20 DIAGNOSIS — E78.00 PURE HYPERCHOLESTEROLEMIA: ICD-10-CM

## 2020-09-20 DIAGNOSIS — E03.9 ACQUIRED HYPOTHYROIDISM: ICD-10-CM

## 2020-09-20 DIAGNOSIS — J45.20 MILD INTERMITTENT ASTHMA WITHOUT COMPLICATION: ICD-10-CM

## 2020-09-20 DIAGNOSIS — I10 ESSENTIAL HYPERTENSION: ICD-10-CM

## 2020-09-20 DIAGNOSIS — R07.89 ATYPICAL CHEST PAIN: ICD-10-CM

## 2020-09-20 RX ORDER — ATORVASTATIN CALCIUM 40 MG/1
TABLET, FILM COATED ORAL
Qty: 90 TABLET | Refills: 1 | Status: SHIPPED | OUTPATIENT
Start: 2020-09-20 | End: 2020-12-21 | Stop reason: SDUPTHER

## 2020-10-02 DIAGNOSIS — E11.9 TYPE 2 DIABETES MELLITUS WITHOUT COMPLICATION, WITHOUT LONG-TERM CURRENT USE OF INSULIN (HCC): ICD-10-CM

## 2020-10-02 DIAGNOSIS — E03.9 ACQUIRED HYPOTHYROIDISM: ICD-10-CM

## 2020-10-02 DIAGNOSIS — R07.89 ATYPICAL CHEST PAIN: ICD-10-CM

## 2020-10-02 DIAGNOSIS — J45.20 MILD INTERMITTENT ASTHMA WITHOUT COMPLICATION: ICD-10-CM

## 2020-10-02 DIAGNOSIS — I10 ESSENTIAL HYPERTENSION: ICD-10-CM

## 2020-10-02 DIAGNOSIS — E78.00 PURE HYPERCHOLESTEROLEMIA: ICD-10-CM

## 2020-10-09 ENCOUNTER — OFFICE VISIT (OUTPATIENT)
Dept: INTERNAL MEDICINE CLINIC | Facility: CLINIC | Age: 65
End: 2020-10-09
Payer: COMMERCIAL

## 2020-10-09 ENCOUNTER — LAB (OUTPATIENT)
Dept: LAB | Facility: HOSPITAL | Age: 65
End: 2020-10-09
Attending: INTERNAL MEDICINE
Payer: COMMERCIAL

## 2020-10-09 VITALS
TEMPERATURE: 98.1 F | HEIGHT: 60 IN | BODY MASS INDEX: 41.62 KG/M2 | RESPIRATION RATE: 13 BRPM | SYSTOLIC BLOOD PRESSURE: 150 MMHG | HEART RATE: 86 BPM | DIASTOLIC BLOOD PRESSURE: 72 MMHG | WEIGHT: 212 LBS

## 2020-10-09 DIAGNOSIS — I10 ESSENTIAL HYPERTENSION: ICD-10-CM

## 2020-10-09 DIAGNOSIS — E03.9 ACQUIRED HYPOTHYROIDISM: ICD-10-CM

## 2020-10-09 DIAGNOSIS — J45.21 MILD INTERMITTENT ASTHMA WITH ACUTE EXACERBATION: ICD-10-CM

## 2020-10-09 DIAGNOSIS — E11.9 TYPE 2 DIABETES MELLITUS WITHOUT COMPLICATION, WITHOUT LONG-TERM CURRENT USE OF INSULIN (HCC): ICD-10-CM

## 2020-10-09 DIAGNOSIS — E78.00 PURE HYPERCHOLESTEROLEMIA: ICD-10-CM

## 2020-10-09 DIAGNOSIS — E87.6 HYPOKALEMIA: ICD-10-CM

## 2020-10-09 DIAGNOSIS — E11.9 TYPE 2 DIABETES MELLITUS WITHOUT COMPLICATION, WITHOUT LONG-TERM CURRENT USE OF INSULIN (HCC): Primary | ICD-10-CM

## 2020-10-09 LAB
25(OH)D3 SERPL-MCNC: 24.9 NG/ML (ref 30–100)
ALBUMIN SERPL BCP-MCNC: 3.2 G/DL (ref 3.5–5)
ALP SERPL-CCNC: 102 U/L (ref 46–116)
ALT SERPL W P-5'-P-CCNC: 22 U/L (ref 12–78)
ANION GAP SERPL CALCULATED.3IONS-SCNC: 8 MMOL/L (ref 4–13)
AST SERPL W P-5'-P-CCNC: 12 U/L (ref 5–45)
BACTERIA UR QL AUTO: ABNORMAL /HPF
BASOPHILS # BLD AUTO: 0.04 THOUSANDS/ΜL (ref 0–0.1)
BASOPHILS NFR BLD AUTO: 1 % (ref 0–1)
BILIRUB SERPL-MCNC: 0.41 MG/DL (ref 0.2–1)
BILIRUB UR QL STRIP: NEGATIVE
BUN SERPL-MCNC: 13 MG/DL (ref 5–25)
CALCIUM ALBUM COR SERPL-MCNC: 9.4 MG/DL (ref 8.3–10.1)
CALCIUM SERPL-MCNC: 8.8 MG/DL (ref 8.3–10.1)
CHLORIDE SERPL-SCNC: 103 MMOL/L (ref 100–108)
CHOLEST SERPL-MCNC: 147 MG/DL (ref 50–200)
CLARITY UR: ABNORMAL
CO2 SERPL-SCNC: 27 MMOL/L (ref 21–32)
COLOR UR: YELLOW
CREAT SERPL-MCNC: 0.95 MG/DL (ref 0.6–1.3)
CREAT UR-MCNC: 218 MG/DL
EOSINOPHIL # BLD AUTO: 0.08 THOUSAND/ΜL (ref 0–0.61)
EOSINOPHIL NFR BLD AUTO: 1 % (ref 0–6)
ERYTHROCYTE [DISTWIDTH] IN BLOOD BY AUTOMATED COUNT: 12.7 % (ref 11.6–15.1)
EST. AVERAGE GLUCOSE BLD GHB EST-MCNC: 189 MG/DL
GFR SERPL CREATININE-BSD FRML MDRD: 63 ML/MIN/1.73SQ M
GGT SERPL-CCNC: 22 U/L (ref 5–85)
GLUCOSE P FAST SERPL-MCNC: 173 MG/DL (ref 65–99)
GLUCOSE UR STRIP-MCNC: NEGATIVE MG/DL
HBA1C MFR BLD: 8.2 %
HCT VFR BLD AUTO: 35.5 % (ref 34.8–46.1)
HDLC SERPL-MCNC: 59 MG/DL
HGB BLD-MCNC: 11.7 G/DL (ref 11.5–15.4)
HGB UR QL STRIP.AUTO: NEGATIVE
IMM GRANULOCYTES # BLD AUTO: 0.02 THOUSAND/UL (ref 0–0.2)
IMM GRANULOCYTES NFR BLD AUTO: 0 % (ref 0–2)
KETONES UR STRIP-MCNC: NEGATIVE MG/DL
LDLC SERPL CALC-MCNC: 67 MG/DL (ref 0–100)
LEUKOCYTE ESTERASE UR QL STRIP: NEGATIVE
LYMPHOCYTES # BLD AUTO: 3.34 THOUSANDS/ΜL (ref 0.6–4.47)
LYMPHOCYTES NFR BLD AUTO: 40 % (ref 14–44)
MAGNESIUM SERPL-MCNC: 1.7 MG/DL (ref 1.6–2.6)
MCH RBC QN AUTO: 28.5 PG (ref 26.8–34.3)
MCHC RBC AUTO-ENTMCNC: 33 G/DL (ref 31.4–37.4)
MCV RBC AUTO: 86 FL (ref 82–98)
MICROALBUMIN UR-MCNC: 57.4 MG/L (ref 0–20)
MICROALBUMIN/CREAT 24H UR: 26 MG/G CREATININE (ref 0–30)
MONOCYTES # BLD AUTO: 0.45 THOUSAND/ΜL (ref 0.17–1.22)
MONOCYTES NFR BLD AUTO: 5 % (ref 4–12)
NEUTROPHILS # BLD AUTO: 4.51 THOUSANDS/ΜL (ref 1.85–7.62)
NEUTS SEG NFR BLD AUTO: 53 % (ref 43–75)
NITRITE UR QL STRIP: NEGATIVE
NON-SQ EPI CELLS URNS QL MICRO: ABNORMAL /HPF
NRBC BLD AUTO-RTO: 0 /100 WBCS
PH UR STRIP.AUTO: 5.5 [PH]
PLATELET # BLD AUTO: 270 THOUSANDS/UL (ref 149–390)
PMV BLD AUTO: 9.9 FL (ref 8.9–12.7)
POTASSIUM SERPL-SCNC: 3.5 MMOL/L (ref 3.5–5.3)
PROT SERPL-MCNC: 7 G/DL (ref 6.4–8.2)
PROT UR STRIP-MCNC: ABNORMAL MG/DL
RBC # BLD AUTO: 4.11 MILLION/UL (ref 3.81–5.12)
RBC #/AREA URNS AUTO: ABNORMAL /HPF
SODIUM SERPL-SCNC: 138 MMOL/L (ref 136–145)
SP GR UR STRIP.AUTO: >=1.03 (ref 1–1.03)
T4 FREE SERPL-MCNC: 1.26 NG/DL (ref 0.76–1.46)
TRIGL SERPL-MCNC: 105 MG/DL
TSH SERPL DL<=0.05 MIU/L-ACNC: 4.12 UIU/ML (ref 0.36–3.74)
UROBILINOGEN UR QL STRIP.AUTO: 0.2 E.U./DL
WBC # BLD AUTO: 8.44 THOUSAND/UL (ref 4.31–10.16)
WBC #/AREA URNS AUTO: ABNORMAL /HPF

## 2020-10-09 PROCEDURE — 3052F HG A1C>EQUAL 8.0%<EQUAL 9.0%: CPT | Performed by: INTERNAL MEDICINE

## 2020-10-09 PROCEDURE — 1036F TOBACCO NON-USER: CPT | Performed by: INTERNAL MEDICINE

## 2020-10-09 PROCEDURE — 82977 ASSAY OF GGT: CPT

## 2020-10-09 PROCEDURE — 84439 ASSAY OF FREE THYROXINE: CPT

## 2020-10-09 PROCEDURE — 80061 LIPID PANEL: CPT

## 2020-10-09 PROCEDURE — 3061F NEG MICROALBUMINURIA REV: CPT | Performed by: INTERNAL MEDICINE

## 2020-10-09 PROCEDURE — 36415 COLL VENOUS BLD VENIPUNCTURE: CPT

## 2020-10-09 PROCEDURE — 83735 ASSAY OF MAGNESIUM: CPT

## 2020-10-09 PROCEDURE — 85025 COMPLETE CBC W/AUTO DIFF WBC: CPT

## 2020-10-09 PROCEDURE — 82306 VITAMIN D 25 HYDROXY: CPT

## 2020-10-09 PROCEDURE — 81001 URINALYSIS AUTO W/SCOPE: CPT | Performed by: INTERNAL MEDICINE

## 2020-10-09 PROCEDURE — 82043 UR ALBUMIN QUANTITATIVE: CPT | Performed by: INTERNAL MEDICINE

## 2020-10-09 PROCEDURE — 80053 COMPREHEN METABOLIC PANEL: CPT

## 2020-10-09 PROCEDURE — 3078F DIAST BP <80 MM HG: CPT | Performed by: INTERNAL MEDICINE

## 2020-10-09 PROCEDURE — 83036 HEMOGLOBIN GLYCOSYLATED A1C: CPT

## 2020-10-09 PROCEDURE — 82570 ASSAY OF URINE CREATININE: CPT | Performed by: INTERNAL MEDICINE

## 2020-10-09 PROCEDURE — 84443 ASSAY THYROID STIM HORMONE: CPT

## 2020-10-09 PROCEDURE — 99214 OFFICE O/P EST MOD 30 MIN: CPT | Performed by: INTERNAL MEDICINE

## 2020-10-09 RX ORDER — SPIRONOLACTONE 25 MG/1
25 TABLET ORAL DAILY
Qty: 90 TABLET | Refills: 1 | Status: SHIPPED | OUTPATIENT
Start: 2020-10-09 | End: 2021-03-23 | Stop reason: SDUPTHER

## 2020-10-09 RX ORDER — INDAPAMIDE 1.25 MG/1
1.25 TABLET, FILM COATED ORAL EVERY MORNING
Qty: 90 TABLET | Refills: 1 | Status: SHIPPED | OUTPATIENT
Start: 2020-10-09 | End: 2021-03-23 | Stop reason: SDUPTHER

## 2020-10-14 DIAGNOSIS — E11.9 TYPE 2 DIABETES MELLITUS WITHOUT COMPLICATION, WITHOUT LONG-TERM CURRENT USE OF INSULIN (HCC): ICD-10-CM

## 2020-10-14 RX ORDER — INSULIN DEGLUDEC INJECTION 100 U/ML
INJECTION, SOLUTION SUBCUTANEOUS
Qty: 5 PEN | Refills: 0 | Status: SHIPPED | OUTPATIENT
Start: 2020-10-14 | End: 2020-12-11

## 2020-11-19 ENCOUNTER — OFFICE VISIT (OUTPATIENT)
Dept: INTERNAL MEDICINE CLINIC | Facility: CLINIC | Age: 65
End: 2020-11-19
Payer: COMMERCIAL

## 2020-11-19 VITALS
SYSTOLIC BLOOD PRESSURE: 136 MMHG | OXYGEN SATURATION: 98 % | HEART RATE: 78 BPM | TEMPERATURE: 97.6 F | BODY MASS INDEX: 42.41 KG/M2 | DIASTOLIC BLOOD PRESSURE: 78 MMHG | HEIGHT: 60 IN | WEIGHT: 216 LBS

## 2020-11-19 DIAGNOSIS — E03.9 ACQUIRED HYPOTHYROIDISM: ICD-10-CM

## 2020-11-19 DIAGNOSIS — R07.2 PRECORDIAL PAIN: ICD-10-CM

## 2020-11-19 DIAGNOSIS — F33.9 DEPRESSION, RECURRENT (HCC): ICD-10-CM

## 2020-11-19 DIAGNOSIS — R00.2 PALPITATIONS: ICD-10-CM

## 2020-11-19 DIAGNOSIS — Z00.00 MEDICARE ANNUAL WELLNESS VISIT, SUBSEQUENT: ICD-10-CM

## 2020-11-19 DIAGNOSIS — J45.21 MILD INTERMITTENT ASTHMA WITH ACUTE EXACERBATION: ICD-10-CM

## 2020-11-19 DIAGNOSIS — Z23 FLU VACCINE NEED: ICD-10-CM

## 2020-11-19 DIAGNOSIS — Z12.31 ENCOUNTER FOR SCREENING MAMMOGRAM FOR MALIGNANT NEOPLASM OF BREAST: ICD-10-CM

## 2020-11-19 DIAGNOSIS — E78.00 PURE HYPERCHOLESTEROLEMIA: ICD-10-CM

## 2020-11-19 DIAGNOSIS — E11.65 UNCONTROLLED TYPE 2 DIABETES MELLITUS WITH HYPERGLYCEMIA (HCC): ICD-10-CM

## 2020-11-19 DIAGNOSIS — I10 ESSENTIAL HYPERTENSION: ICD-10-CM

## 2020-11-19 DIAGNOSIS — E66.01 CLASS 3 SEVERE OBESITY DUE TO EXCESS CALORIES WITH SERIOUS COMORBIDITY AND BODY MASS INDEX (BMI) OF 40.0 TO 44.9 IN ADULT (HCC): ICD-10-CM

## 2020-11-19 DIAGNOSIS — E11.9 TYPE 2 DIABETES MELLITUS WITHOUT COMPLICATION, WITHOUT LONG-TERM CURRENT USE OF INSULIN (HCC): Primary | ICD-10-CM

## 2020-11-19 PROCEDURE — G0008 ADMIN INFLUENZA VIRUS VAC: HCPCS | Performed by: INTERNAL MEDICINE

## 2020-11-19 PROCEDURE — 3075F SYST BP GE 130 - 139MM HG: CPT | Performed by: INTERNAL MEDICINE

## 2020-11-19 PROCEDURE — 3725F SCREEN DEPRESSION PERFORMED: CPT | Performed by: INTERNAL MEDICINE

## 2020-11-19 PROCEDURE — G0438 PPPS, INITIAL VISIT: HCPCS | Performed by: INTERNAL MEDICINE

## 2020-11-19 PROCEDURE — 1036F TOBACCO NON-USER: CPT | Performed by: INTERNAL MEDICINE

## 2020-11-19 PROCEDURE — 3008F BODY MASS INDEX DOCD: CPT | Performed by: INTERNAL MEDICINE

## 2020-11-19 PROCEDURE — 99214 OFFICE O/P EST MOD 30 MIN: CPT | Performed by: INTERNAL MEDICINE

## 2020-11-19 PROCEDURE — 93000 ELECTROCARDIOGRAM COMPLETE: CPT | Performed by: INTERNAL MEDICINE

## 2020-11-19 PROCEDURE — 90662 IIV NO PRSV INCREASED AG IM: CPT | Performed by: INTERNAL MEDICINE

## 2020-11-19 PROCEDURE — 3078F DIAST BP <80 MM HG: CPT | Performed by: INTERNAL MEDICINE

## 2020-11-30 ENCOUNTER — HOSPITAL ENCOUNTER (OUTPATIENT)
Dept: NON INVASIVE DIAGNOSTICS | Facility: HOSPITAL | Age: 65
Discharge: HOME | End: 2020-11-30
Attending: INTERNAL MEDICINE
Payer: COMMERCIAL

## 2020-11-30 DIAGNOSIS — E11.9 TYPE 2 DIABETES MELLITUS WITHOUT COMPLICATION, WITHOUT LONG-TERM CURRENT USE OF INSULIN (HCC): ICD-10-CM

## 2020-11-30 DIAGNOSIS — I10 ESSENTIAL HYPERTENSION: ICD-10-CM

## 2020-11-30 DIAGNOSIS — R00.2 PALPITATIONS: ICD-10-CM

## 2020-11-30 DIAGNOSIS — R07.2 PRECORDIAL PAIN: ICD-10-CM

## 2020-11-30 PROCEDURE — 93226 XTRNL ECG REC<48 HR SCAN A/R: CPT

## 2020-11-30 PROCEDURE — 93225 XTRNL ECG REC<48 HRS REC: CPT

## 2020-12-04 PROCEDURE — 93227 XTRNL ECG REC<48 HR R&I: CPT

## 2020-12-11 DIAGNOSIS — E11.9 TYPE 2 DIABETES MELLITUS WITHOUT COMPLICATION, WITHOUT LONG-TERM CURRENT USE OF INSULIN (HCC): ICD-10-CM

## 2020-12-11 RX ORDER — INSULIN DEGLUDEC INJECTION 100 U/ML
INJECTION, SOLUTION SUBCUTANEOUS
Qty: 5 PEN | Refills: 1 | Status: SHIPPED | OUTPATIENT
Start: 2020-12-11 | End: 2021-03-23 | Stop reason: SDUPTHER

## 2020-12-14 ENCOUNTER — TELEPHONE (OUTPATIENT)
Dept: SLEEP CENTER | Facility: CLINIC | Age: 65
End: 2020-12-14

## 2020-12-21 DIAGNOSIS — E78.00 PURE HYPERCHOLESTEROLEMIA: ICD-10-CM

## 2020-12-21 DIAGNOSIS — J45.20 MILD INTERMITTENT ASTHMA WITHOUT COMPLICATION: ICD-10-CM

## 2020-12-21 DIAGNOSIS — E11.9 TYPE 2 DIABETES MELLITUS WITHOUT COMPLICATION, WITHOUT LONG-TERM CURRENT USE OF INSULIN (HCC): ICD-10-CM

## 2020-12-21 DIAGNOSIS — R07.89 ATYPICAL CHEST PAIN: ICD-10-CM

## 2020-12-21 DIAGNOSIS — I10 ESSENTIAL HYPERTENSION: ICD-10-CM

## 2020-12-21 DIAGNOSIS — E03.9 ACQUIRED HYPOTHYROIDISM: ICD-10-CM

## 2020-12-21 PROCEDURE — 4010F ACE/ARB THERAPY RXD/TAKEN: CPT | Performed by: INTERNAL MEDICINE

## 2020-12-21 RX ORDER — LEVOTHYROXINE SODIUM 0.15 MG/1
150 TABLET ORAL DAILY
Qty: 90 TABLET | Refills: 1 | Status: SHIPPED | OUTPATIENT
Start: 2020-12-21 | End: 2021-03-23 | Stop reason: SDUPTHER

## 2020-12-21 RX ORDER — EZETIMIBE 10 MG/1
10 TABLET ORAL DAILY
Qty: 90 TABLET | Refills: 1 | Status: SHIPPED | OUTPATIENT
Start: 2020-12-21 | End: 2021-03-23 | Stop reason: SDUPTHER

## 2020-12-21 RX ORDER — ATORVASTATIN CALCIUM 40 MG/1
40 TABLET, FILM COATED ORAL DAILY
Qty: 90 TABLET | Refills: 1 | Status: SHIPPED | OUTPATIENT
Start: 2020-12-21 | End: 2021-03-23 | Stop reason: SDUPTHER

## 2020-12-21 RX ORDER — GLIMEPIRIDE 4 MG/1
4 TABLET ORAL
Qty: 90 TABLET | Refills: 1 | Status: SHIPPED | OUTPATIENT
Start: 2020-12-21 | End: 2021-03-23 | Stop reason: SDUPTHER

## 2020-12-21 RX ORDER — OMEPRAZOLE 20 MG/1
20 CAPSULE, DELAYED RELEASE ORAL DAILY
Qty: 90 CAPSULE | Refills: 1 | Status: SHIPPED | OUTPATIENT
Start: 2020-12-21 | End: 2021-03-23 | Stop reason: SDUPTHER

## 2020-12-21 RX ORDER — LOSARTAN POTASSIUM 100 MG/1
100 TABLET ORAL DAILY
Qty: 90 TABLET | Refills: 1 | Status: SHIPPED | OUTPATIENT
Start: 2020-12-21 | End: 2021-03-23 | Stop reason: SDUPTHER

## 2021-01-14 ENCOUNTER — OFFICE VISIT (OUTPATIENT)
Dept: INTERNAL MEDICINE CLINIC | Facility: CLINIC | Age: 66
End: 2021-01-14
Payer: COMMERCIAL

## 2021-01-14 ENCOUNTER — CONSULT (OUTPATIENT)
Dept: CARDIOLOGY CLINIC | Facility: CLINIC | Age: 66
End: 2021-01-14
Payer: COMMERCIAL

## 2021-01-14 VITALS
HEIGHT: 60 IN | RESPIRATION RATE: 12 BRPM | TEMPERATURE: 97.6 F | BODY MASS INDEX: 41.62 KG/M2 | HEART RATE: 84 BPM | SYSTOLIC BLOOD PRESSURE: 148 MMHG | DIASTOLIC BLOOD PRESSURE: 80 MMHG | WEIGHT: 212 LBS

## 2021-01-14 VITALS
HEIGHT: 60 IN | BODY MASS INDEX: 41.35 KG/M2 | SYSTOLIC BLOOD PRESSURE: 136 MMHG | TEMPERATURE: 97.9 F | WEIGHT: 210.6 LBS | DIASTOLIC BLOOD PRESSURE: 70 MMHG | HEART RATE: 97 BPM

## 2021-01-14 DIAGNOSIS — R94.31 ABNORMAL ECG: ICD-10-CM

## 2021-01-14 DIAGNOSIS — E78.00 PURE HYPERCHOLESTEROLEMIA: ICD-10-CM

## 2021-01-14 DIAGNOSIS — G89.29 CHRONIC BILATERAL LOW BACK PAIN WITH SCIATICA, SCIATICA LATERALITY UNSPECIFIED: ICD-10-CM

## 2021-01-14 DIAGNOSIS — E11.9 TYPE 2 DIABETES MELLITUS WITHOUT COMPLICATION, WITHOUT LONG-TERM CURRENT USE OF INSULIN (HCC): Primary | ICD-10-CM

## 2021-01-14 DIAGNOSIS — M54.40 CHRONIC BILATERAL LOW BACK PAIN WITH SCIATICA, SCIATICA LATERALITY UNSPECIFIED: ICD-10-CM

## 2021-01-14 DIAGNOSIS — I10 ESSENTIAL HYPERTENSION: ICD-10-CM

## 2021-01-14 DIAGNOSIS — R00.2 PALPITATIONS: Primary | ICD-10-CM

## 2021-01-14 DIAGNOSIS — E66.01 CLASS 3 SEVERE OBESITY DUE TO EXCESS CALORIES WITH SERIOUS COMORBIDITY AND BODY MASS INDEX (BMI) OF 40.0 TO 44.9 IN ADULT (HCC): ICD-10-CM

## 2021-01-14 DIAGNOSIS — F33.9 DEPRESSION, RECURRENT (HCC): ICD-10-CM

## 2021-01-14 DIAGNOSIS — J45.21 MILD INTERMITTENT ASTHMA WITH ACUTE EXACERBATION: ICD-10-CM

## 2021-01-14 DIAGNOSIS — E11.65 UNCONTROLLED TYPE 2 DIABETES MELLITUS WITH HYPERGLYCEMIA (HCC): ICD-10-CM

## 2021-01-14 DIAGNOSIS — E11.9 TYPE 2 DIABETES MELLITUS WITHOUT COMPLICATION, WITHOUT LONG-TERM CURRENT USE OF INSULIN (HCC): ICD-10-CM

## 2021-01-14 DIAGNOSIS — Z78.9 HEALTH MAINTENANCE ALTERATION: ICD-10-CM

## 2021-01-14 DIAGNOSIS — Z12.31 ENCOUNTER FOR SCREENING MAMMOGRAM FOR MALIGNANT NEOPLASM OF BREAST: ICD-10-CM

## 2021-01-14 PROCEDURE — 99214 OFFICE O/P EST MOD 30 MIN: CPT | Performed by: INTERNAL MEDICINE

## 2021-01-14 PROCEDURE — 99204 OFFICE O/P NEW MOD 45 MIN: CPT | Performed by: INTERNAL MEDICINE

## 2021-01-14 PROCEDURE — 93000 ELECTROCARDIOGRAM COMPLETE: CPT | Performed by: INTERNAL MEDICINE

## 2021-01-14 NOTE — PROGRESS NOTES
Cardiology Office Note  MD Daniel Mckenzie MD Brunetta Deal, DO, MD Carol Crow DO, Valarie Jaramillo DO, [facility] [place]  ----------------------------------------------------------------  [address]  [address], [address]    Sri Bhagat 72 y o  female MRN: [id number]  Unit/Bed#:  Encounter: [id number]      History of Present Illness: It was a pleasure to see Sri Bhagat in the office today for initial CV evaluation  She has a past medical history of hypertension, dyslipidemia, type 2 diabetes mellitus and hypothyroid disease  She presents with progressive episodes of palpitations  Palpitations began in October 2020 following her  requiring coronary artery bypass graft surgery  She admits to significant anxiety around that time frame  Since then, things have improved and her palpitations have gotten better along with her anxiety  Palpitations come on and last hours at a time and then subsequently resolved  It is described as a racing in the chest   She has never lost consciousness  previously in January 2020, she underwent pharmacologic nuclear stress test due to episodes of progressive dyspnea on exertion that she has been experiencing  These episodes of dyspnea have not progressed since January  She denies any chest pain, pressure, tightness or squeezing  Denies lower extremity swelling, orthopnea or paroxysmal nocturnal dyspnea  Review of Systems:  Review of Systems   Constitution: Negative for decreased appetite, fever, weight gain and weight loss  HENT: Negative for congestion and sore throat  Eyes: Negative for visual disturbance  Cardiovascular: Positive for palpitations  Negative for chest pain, dyspnea on exertion, leg swelling and near-syncope  Respiratory: Negative for cough and shortness of breath  Hematologic/Lymphatic: Negative for bleeding problem  Skin: Negative for rash  Musculoskeletal: Negative for myalgias and neck pain  Gastrointestinal: Negative for abdominal pain and nausea  Neurological: Negative for light-headedness and weakness  Psychiatric/Behavioral: Negative for depression         Past Medical History:   Diagnosis Date    Asthma     Diabetes mellitus ([address] )     Hypertension     Obesity     Stroke [facility])        Past Surgical History:   Procedure Laterality Date    ROTATOR CUFF REPAIR Bilateral        Social History     Socioeconomic History    Marital status: /Civil Union     Spouse name: Not on file    Number of children: Not on file    Years of education: Not on file    Highest education level: Not on file   Occupational History    Not on file   Social Needs    Financial resource strain: Not on file    Food insecurity     Worry: Not on file     Inability: Not on file   [employer] needs     Medical: Not on file     Non-medical: Not on file   Tobacco Use    Smoking status: Never Smoker    Smokeless tobacco: Never Used   Substance and Sexual Activity    Alcohol use: Never     Frequency: Never    Drug use: Never    Sexual activity: Not on file   Lifestyle    Physical activity     Days per week: Not on file     Minutes per session: Not on file    Stress: Not on file   Relationships    Social connections     Talks on phone: Not on file     Gets together: Not on file     Attends [employer] service: Not on file     Active member of club or organization: Not on file     Attends meetings of clubs or organizations: Not on file     Relationship status: Not on file    Intimate partner violence     Fear of current or ex partner: Not on file     Emotionally abused: Not on file     Physically abused: Not on file     Forced sexual activity: Not on file   Other Topics Concern    Not on file   Social History Narrative    Not on file       Family History   Problem Relation Age of Onset    Diabetes Mother     Hypertension Mother     Hypertension Father        Allergies   Allergen Reactions    Etodolac Shortness Of Breath    Iodinated Diagnostic Agents Shortness Of Breath and Wheezing    Simvastatin Other (See Comments)     elevated liver function     Latex Rash    Morphine Palpitations    Penicillins Rash         Current Outpatient Medications:     acetaminophen (TYLENOL) 325 mg tablet, Take 2 tablets (650 mg total) by mouth every 6 (six) hours as needed for mild pain, moderate pain, headaches or fever, Disp: 30 tablet, Rfl: 0    albuterol (2 5 mg/3 mL) 0 083 % nebulizer solution, Take 1 vial (2 5 mg total) by nebulization every 6 (six) hours as needed for wheezing or shortness of breath, Disp: 60 vial, Rfl: 5    albuterol (PROVENTIL HFA,VENTOLIN HFA) 90 mcg/act inhaler, Inhale 2 puffs every 6 (six) hours as needed for wheezing, Disp: , Rfl:     aspirin 81 mg chewable tablet, Chew 81 mg daily, Disp: , Rfl:     atorvastatin (LIPITOR) 40 mg tablet, Take 1 tablet (40 mg total) by mouth daily, Disp: 90 tablet, Rfl: 1    BD Pen Needle Berenice 2nd Gen 32G X 4 MM MISC, USE TWICE A DAY, Disp: 100 each, Rfl: 1    Blood Glucose Monitoring Suppl (ONE TOUCH ULTRA 2) w/Device KIT, Test blood sugar 3 times daily, Disp: 1 each, Rfl: 0    budesonide-formoterol (SYMBICORT) 160-4 5 mcg/act inhaler, Inhale 2 puffs 2 (two) times a day Rinse mouth after use , Disp: , Rfl:     ezetimibe (ZETIA) 10 mg tablet, Take 1 tablet (10 mg total) by mouth daily, Disp: 90 tablet, Rfl: 1    fluticasone (FLONASE) 50 mcg/act nasal spray, 1 spray into each nostril daily, Disp: , Rfl:     furosemide (LASIX) 20 mg tablet, Take 20 mg by mouth 2 (two) times a day as needed, Disp: , Rfl:     glimepiride (AMARYL) 4 mg tablet, Take 1 tablet (4 mg total) by mouth daily with breakfast, Disp: 90 tablet, Rfl: 1    glucose blood test strip, Prior to each meal, Disp: 300 each, Rfl: 1    hydrocortisone 2 5 % cream, Apply topically 2 (two) times a day, Disp: 30 g, Rfl: 0    indapamide (LOZOL) 1 25 mg tablet, Take 1 tablet (1 25 mg total) by mouth every morning, Disp: 90 tablet, Rfl: 1    Lancets (ONETOUCH DELICA PLUS DLGIEX02N) MISC, TEST 3 TIMES, Disp: 100 each, Rfl: 1    levothyroxine 150 mcg tablet, Take 1 tablet (150 mcg total) by mouth daily, Disp: 90 tablet, Rfl: 1    losartan (COZAAR) 100 MG tablet, Take 1 tablet (100 mg total) by mouth daily, Disp: 90 tablet, Rfl: 1    metFORMIN (GLUCOPHAGE) 1000 MG tablet, Take 1 tablet (1,000 mg total) by mouth 2 (two) times a day with meals, Disp: 180 tablet, Rfl: 1    metoprolol succinate (TOPROL-XL) 25 mg 24 hr tablet, Take 1 tablet (25 mg total) by mouth daily, Disp: 30 tablet, Rfl: 4    montelukast (SINGULAIR) 10 mg tablet, Take 10 mg by mouth daily at bedtime, Disp: , Rfl:     omeprazole (PriLOSEC) 20 mg delayed release capsule, Take 1 capsule (20 mg total) by mouth daily, Disp: 90 capsule, Rfl: 1    spironolactone (ALDACTONE) 25 mg tablet, Take 1 tablet (25 mg total) by mouth daily, Disp: 90 tablet, Rfl: 1    Tresiba FlexTouch 100 units/mL injection pen, INJECT 25UNITS IN THE MORNING AND 10 UNITS IN THE EVENING, Disp: 5 pen, Rfl: 1    Vitals:    01/14/21 1548   BP: 136/70   Pulse: 97   Temp: 97 9 °F (36 6 °C)   Weight: 95 5 kg (210 lb 9 6 oz)   Height: 5' (1 524 m)       PHYSICAL EXAMINATION:  Gen: Awake, Alert, NAD    HEENT: AT/NC, Anicteric, mmm  Neck: Supple, No elevated JVP  Resp: CTA bilaterally no w/r/r  CV: RRR +S1, S2, No m/r/g  Abd: Soft, NT/ND + BS  Ext: warm, no LE edema bilaterally  Neuro: Follows commands, moves all extermities  Psych: Appropriate affect    --------------------------------------------------------------------------------  TREADMILL STRESS  No results found for this or any previous visit    --------------------------------------------------------------------------------  NUCLEAR STRESS TEST: No results found for this or any previous visit    Results for orders placed during the hospital encounter of 01/20/20 NM myocardial perfusion spect (rx stress and/or rest)    Narrative [address] [place] [address]  [place], [address]  [phone]    Rest/Stress Gated SPECT Myocardial Perfusion Imaging After Regadenoson    Patient: Justo Shaw  MR number: [id number]  Account number: [de-identified]  : 1955  Age: 59 years  Gender: Female  Status: Outpatient  Location: Stress lab  Height: 60 in  Weight: 198 lb  BP: 150/ 66 mmHg    Allergies: IODINATED DIAGNOSTIC AGENTS, SIMVASTATIN, LATEX, MORPHINE, PENICILLINS    Diagnosis: R07 89 - Other chest pain    Primary Physician:  Johnson Guadalupe MD  Technician:  Nirali Lackey  RN:  Mina Fatima RN BSN  Referring Physician:  Karie Markham:  [facility]  Report Prepared By[de-identified]  Mina Fatima RN BSN  Interpreting Physician:  Nikolay Watson DO    INDICATIONS: Detection of Coronary artery disease  HISTORY: The patient is a 59year old  female  Chest pain status: chest pain, consistent with atypical angina  Coronary artery disease risk factors: dyslipidemia, hypertension, diabetes mellitus, and post-menopausal state  Cardiovascular history: none significant  Co-morbidity: history of lung disease  Medications: a beta blocker, an ACE inhibitor/ARB, a diuretic, aspirin, a lipid lowering agent, and thyroid medications  PHYSICAL EXAM: Baseline physical exam screening: normal lung exam     REST ECG: Normal sinus rhythm  Normal baseline ECG  PROCEDURE: The study was performed in the the Stress lab  A regadenoson infusion pharmacologic stress test was performed  Gated SPECT myocardial perfusion imaging was performed after stress and at rest  Systolic blood pressure was 150  mmHg, at the start of the study  Diastolic blood pressure was 66 mmHg, at the start of the study  The heart rate was 63 bpm, at the start of the study  IV double checked    Regadenoson protocol:  HR bpm SBP mmHg DBP mmHg Symptoms  Baseline 63 150 66 none  Immediate 113 174 70 moderate dyspnea  3 min 109 184 94 subsiding  5 min 94 164 80 none  No medications or fluids given  The patient also performed low level exercise  STRESS SUMMARY: Duration of pharmacologic stress was 3 min  Maximal heart rate during stress was 113 bpm  The rate-pressure product for the peak heart rate and blood pressure was 74978  There was no chest pain during stress  The stress  test was terminated due to protocol completion  Pre oxygen saturation: 99 %  Peak oxygen saturation: 100 %  The stress ECG was negative for ischemia  There were no stress arrhythmias or conduction abnormalities  ISOTOPE ADMINISTRATION:  Resting isotope administration Stress isotope administration  Agent Tetrofosmin Tetrofosmin  Dose 10 mCi 31 89 mCi  Date 01/20/2020 01/20/2020  Injection time 07:51 09:13  Imaging time 08:18 09:50  Injection-image interval 27 min 37 min    The radiopharmaceutical was injected at the peak effect of pharmacologic stress  MYOCARDIAL PERFUSION IMAGING:  The image quality was good  Left ventricular size was normal  The TID ratio was 1 14  PERFUSION DEFECTS:  -  There were no perfusion defects  GATED SPECT:  The calculated left ventricular ejection fraction was 68 %  Left ventricular ejection fraction was within normal limits by visual estimate  There was no left ventricular regional abnormality  SUMMARY:  -  Stress results: There was no chest pain during stress  -  ECG conclusions: The stress ECG was negative for ischemia  -  Perfusion imaging: There were no perfusion defects   -  Gated SPECT: The calculated left ventricular ejection fraction was 68 %  Left ventricular ejection fraction was within normal limits by visual estimate  There was no left ventricular regional abnormality  IMPRESSIONS: Normal study after vasodilation and low level exercise  Myocardial perfusion imaging was normal at rest and with stress   Left ventricular systolic function was normal     Prepared and signed by    DO Rosalinda Pretty 2020 14:02:00         --------------------------------------------------------------------------------  CATH:  No results found for this or any previous visit   --------------------------------------------------------------------------------  ECHO:   No results found for this or any previous visit  No results found for this or any previous visit   --------------------------------------------------------------------------------  HOLTER  No results found for this or any previous visit  Results for orders placed during the hospital encounter of 20   Holter monitor - 48 hour    Narrative PT NAME: Sunita Santo  : 1955  AGE: 72 y o  GENDER: female  MRN: [id number]   PROCEDURE: Holter monitor - 48 hour    INDICATIONS:  Palpitations    FINDINGS:  Total beats: 193,833    Ventricular Ectopy  Total VE Beats: 0  VE percentage: 0%    Supraventricular Ectopy  Total SVE Beat: 57  SVE percentage: <0 1%    Atrial Fibrillation  AF beats: 0  AF percentage 0%    CONCLUSIONS:  1  Holter monitor reveals the underlying rhythm is sinus rhythm with an   average heart rate of 73 beats per minute, a minimum heart rate of 51   beats per minute and a maximum heart rate of 133 beats per minute  2  There were 0 ventricular ectopic beats with no evidence of ventricular   tachycardia  3  There were about 57 supraventricular ectopic beats, mostly occurring as   single PAC's and late beats with no evidence of supraventricular   tachycardia, atrial fibrillation, or atrial flutter  4  There is no evidence of significant bradyarrhythmia or advanced heart   block  The longest R to R was 1 3 seconds  5  The patient's symptom diary reported no symptoms  6  The overall quality of the scan was poor    Significant baseline wander   and baseline artifact was noted        --------------------------------------------------------------------------------  CAROTIDS  No results found for this or any previous visit    --------------------------------------------------------------------------------  ECGs:  Results for orders placed or performed in visit on 01/14/21   POCT ECG    Impression    Sinus rhythm 97 bpm, low voltage, non-specific ST-T wave abnormalities        Lab Results   Component Value Date    WBC 8 44 10/09/2020    HGB 11 7 10/09/2020    HCT 35 5 10/09/2020    MCV 86 10/09/2020     10/09/2020      Lab Results   Component Value Date    SODIUM 138 10/09/2020    K 3 5 10/09/2020     10/09/2020    CO2 27 10/09/2020    BUN 13 10/09/2020    CREATININE 0 95 10/09/2020    GLUC 164 (H) 02/23/2020    CALCIUM 8 8 10/09/2020      Lab Results   Component Value Date    HGBA1C 8 2 (H) 10/09/2020      Lab Results   Component Value Date    CHOL 168 03/26/2014     Lab Results   Component Value Date    HDL 59 10/09/2020    HDL 61 06/29/2020    HDL 63 12/02/2019     Lab Results   Component Value Date    LDLCALC 67 10/09/2020    LDLCALC 111 (H) 06/29/2020    LDLCALC 148 (H) 12/02/2019     Lab Results   Component Value Date    TRIG 105 10/09/2020    TRIG 119 06/29/2020    TRIG 124 12/02/2019     No results found for: CHOLHDL   Lab Results   Component Value Date    INR 0 92 02/18/2020    INR 0 92 12/12/2019    PROTIME 12 5 02/18/2020    PROTIME 12 5 12/12/2019        1  Palpitations  -     Ambulatory referral to Cardiology  -     POCT ECG  -     Echo complete with contrast if indicated; Future; Expected date: 01/14/2021    2  Abnormal ECG    3  Essential hypertension  -     Ambulatory referral to Cardiology  -     POCT ECG  -     Echo complete with contrast if indicated; Future; Expected date: 01/14/2021    4  Type 2 diabetes mellitus without complication, without long-term current use of insulin ([facility] [address] )  -     Ambulatory referral to Cardiology  -     POCT ECG    5   Pure hypercholesterolemia  -     Ambulatory referral to Cardiology  -     POCT ECG        IMPRESSION:  · Palpitations  · Hypertension   · Dyslipidemia w/ LDL 67 mg/dL October 2020  · Type 2 diabetes mellitus  · Holter w/ SR avg 73 bpm, rare APCs, no reported symptoms, November 2020  · Pharmacologic nuclear stress test negative for myocardial ischemia, gated EF 68%, January 2020  · Hypothyroid    PLAN:  It was a pleasure to see Glady Mcburney in the office today for initial CV evaluation  She is here today for episodes of palpitations she has been having  The palpitations have been going on since October 2020  The seem to coincide with the time that her  underwent CABG surgery  Holter monitor was performed 48 hours the patient states that her palpitations are going on during the monitor  She assures me that they were as bad as they ever get during the test   Monitor demonstrated no evidence of significant arrhythmia with only rare APCs  I have shared with her the stable news  Her blood pressure and heart rate are stable in the office today  She has no side effects on her current medication regimen  She has no symptoms concerning for angina and no signs or symptoms of heart failure  She examines to be euvolemic in the office today  Based on her clinical presentation, I have the following recommendations:    1  Recommend checking a 2D echocardiogram to assess cardiac structure and function for completeness  2  Should the patient have any further palpitations without any high risk features such as loss of consciousness or lightheadedness, have instructed her to call the office so that we may get further monitoring  If symptoms of lightheadedness or severe palpitations occur, she should seek immediate medical attention  3  Repeat TSH is pending  Recent T4 level was within normal limits  4  Continue current antihypertensive regimen including losartan, Toprol-XL and spironolactone without any changes  5  Continue high-intensity statin  6   As always, I have recommended a heart healthy diet low in sodium and exercise regimen  7  We will follow up with her after testing  As always, please do not hesitate to call with any questions  Portions of the record may have been created with voice recognition software  Occasional wrong word or "sound a like" substitutions may have occurred due to the inherent limitations of voice recognition software  Read the chart carefully and recognize, using context, where substitutions have occurred          Signed: Quita Flowers DO, [facility] [place]

## 2021-01-14 NOTE — PATIENT INSTRUCTIONS
Go for your cardiology appointment  Go for your sleep study appointment  Get her lab work done  I order x-rays or your back  I order an orthopedic consultation  Will see her back in 6 weeks

## 2021-01-14 NOTE — PROGRESS NOTES
Assessment/Plan:    Problem 1  Chest pain says is better I think you mostly treated by anxiety but she is going to see the cardiologist this afternoon which is great  Problem 2  Back pain she has had back pain for years she states she was workup in [place] they told her there was a pinched nerve occasionally she has pain radiating down to the back will going to send her to orthopedic she is walking well without any assistive device here on percussion of the back she has exquisite tenderness in the sacral area  Will get an x-ray of the back  Problem 3  High blood pressure little labile she gets nervous when she comes to the office  She is taking the following medications for blood pressure  Indapamide 1 25 mg  Losartan 100 mg  Spironolactone 25 mg  Metoprolol succinate 25 mg  She takes Lasix as needed for increasing edema    Problem 4  Diabetes no polyuria polydipsia she is overdue for lab will check a hemoglobin A1c with the labs  She is on the following medications  Glimepiride 4 mg with breakfast   Tresiba 25 units in the morning and 10 units in the evening  Metformin a g twice a day    Problem 5  Hyperlipidemia she has no myalgias she is tolerating the statin she is also on Zohra a 10 mg per day and Lipitor 40 mg daily    Hypothyroidism on levothyroxine will check a TSH with the next labs  She is doing better she is taking care of her  the has a heart disease condition      Obese class 3 I encouraged her to lose weight for better diabetic control      We had a discussion about the COVID-19 vaccine I recommended that she should take it we gave her literature concerning that when her age group comes up which is anybody over the age of 72  Suspect sleep apnea the workup is pending she has an appointment  No problem-specific Assessment & Plan notes found for this encounter         Diagnoses and all orders for this visit:    Type 2 diabetes mellitus without complication, without long-term current use of insulin (HCC)  -     CBC and differential; Future  -     Comprehensive metabolic panel; Future  -     TSH, 3rd generation with Free T4 reflex; Future  -     Magnesium; Future  -     Hemoglobin A1C; Future  -     Microalbumin / creatinine urine ratio  -     UA (URINE) with reflex to Scope    Mild intermittent asthma with acute exacerbation  -     CBC and differential; Future  -     Comprehensive metabolic panel; Future  -     TSH, 3rd generation with Free T4 reflex; Future  -     Magnesium; Future  -     Hemoglobin A1C; Future  -     Microalbumin / creatinine urine ratio  -     UA (URINE) with reflex to Scope    Essential hypertension  -     CBC and differential; Future  -     Comprehensive metabolic panel; Future  -     TSH, 3rd generation with Free T4 reflex; Future  -     Magnesium; Future  -     Hemoglobin A1C; Future  -     Microalbumin / creatinine urine ratio  -     UA (URINE) with reflex to Scope    Pure hypercholesterolemia    [facility]    Uncontrolled type 2 diabetes mellitus with hyperglycemia ([facility])  -     CBC and differential; Future  -     Comprehensive metabolic panel; Future  -     TSH, 3rd generation with Free T4 reflex; Future  -     Magnesium; Future  -     Hemoglobin A1C; Future  -     Microalbumin / creatinine urine ratio  -     UA (URINE) with reflex to Scope    Class 3 severe obesity due to excess calories with serious comorbidity and body mass index (BMI) of 40 0 to 44 9 in adult ([facility])  -     CBC and differential; Future  -     Comprehensive metabolic panel; Future  -     TSH, 3rd generation with Free T4 reflex; Future  -     Magnesium; Future  -     Hemoglobin A1C; Future  -     Microalbumin / creatinine urine ratio  -     UA (URINE) with reflex to Scope  -     XR spine lumbar minimum 4 views non injury; Future  -     Ambulatory referral to Physical Therapy; Future  -     Ambulatory referral to Orthopedic Surgery;  Future    Chronic bilateral low back pain with sciatica, sciatica laterality unspecified    Health maintenance alteration  -     Mammo screening bilateral w 3d & cad; Future  -     Hepatitis C antibody; Future  -     HIV 1/2 Antigen/Antibody (4th Generation) w Reflex [facility]; Future    Encounter for screening mammogram for malignant neoplasm of breast  -     Mammo screening bilateral w 3d & cad; Future  -     Hepatitis C antibody; Future  -     HIV 1/2 Antigen/Antibody (4th Generation) w Reflex [facility]; Future          Subjective:      Patient ID: Colette Zhang is a 72 y o  female  Chief Complaint   Patient presents with    Follow-up     Needs colonoscopy & pap smear           Current Outpatient Medications:     acetaminophen (TYLENOL) 325 mg tablet, Take 2 tablets (650 mg total) by mouth every 6 (six) hours as needed for mild pain, moderate pain, headaches or fever, Disp: 30 tablet, Rfl: 0    albuterol (2 5 mg/3 mL) 0 083 % nebulizer solution, Take 1 vial (2 5 mg total) by nebulization every 6 (six) hours as needed for wheezing or shortness of breath, Disp: 60 vial, Rfl: 5    albuterol (PROVENTIL HFA,VENTOLIN HFA) 90 mcg/act inhaler, Inhale 2 puffs every 6 (six) hours as needed for wheezing, Disp: , Rfl:     aspirin 81 mg chewable tablet, Chew 81 mg daily, Disp: , Rfl:     atorvastatin (LIPITOR) 40 mg tablet, Take 1 tablet (40 mg total) by mouth daily, Disp: 90 tablet, Rfl: 1    BD Pen Needle Berenice 2nd Gen 32G X 4 MM MISC, USE TWICE A DAY, Disp: 100 each, Rfl: 1    Blood Glucose Monitoring Suppl (ONE TOUCH ULTRA 2) w/Device KIT, Test blood sugar 3 times daily, Disp: 1 each, Rfl: 0    budesonide-formoterol (SYMBICORT) 160-4 5 mcg/act inhaler, Inhale 2 puffs 2 (two) times a day Rinse mouth after use , Disp: , Rfl:     ezetimibe (ZETIA) 10 mg tablet, Take 1 tablet (10 mg total) by mouth daily, Disp: 90 tablet, Rfl: 1    fluticasone (FLONASE) 50 mcg/act nasal spray, 1 spray into each nostril daily, Disp: , Rfl:     furosemide (LASIX) 20 mg tablet, Take 20 mg by mouth 2 (two) times a day as needed, Disp: , Rfl:     glimepiride (AMARYL) 4 mg tablet, Take 1 tablet (4 mg total) by mouth daily with breakfast, Disp: 90 tablet, Rfl: 1    glucose blood test strip, Prior to each meal, Disp: 300 each, Rfl: 1    hydrocortisone 2 5 % cream, Apply topically 2 (two) times a day, Disp: 30 g, Rfl: 0    indapamide (LOZOL) 1 25 mg tablet, Take 1 tablet (1 25 mg total) by mouth every morning, Disp: 90 tablet, Rfl: 1    Lancets (ONETOUCH DELICA PLUS [id number]) MISC, TEST 3 TIMES, Disp: 100 each, Rfl: 1    levothyroxine 150 mcg tablet, Take 1 tablet (150 mcg total) by mouth daily, Disp: 90 tablet, Rfl: 1    losartan (COZAAR) 100 MG tablet, Take 1 tablet (100 mg total) by mouth daily, Disp: 90 tablet, Rfl: 1    metFORMIN (GLUCOPHAGE) 1000 MG tablet, Take 1 tablet (1,000 mg total) by mouth 2 (two) times a day with meals, Disp: 180 tablet, Rfl: 1    montelukast (SINGULAIR) 10 mg tablet, Take 10 mg by mouth daily at bedtime, Disp: , Rfl:     omeprazole (PriLOSEC) 20 mg delayed release capsule, Take 1 capsule (20 mg total) by mouth daily, Disp: 90 capsule, Rfl: 1    spironolactone (ALDACTONE) 25 mg tablet, Take 1 tablet (25 mg total) by mouth daily, Disp: 90 tablet, Rfl: 1    Tresiba FlexTouch 100 units/mL injection pen, INJECT 25UNITS IN THE MORNING AND 10 UNITS IN THE EVENING, Disp: 5 pen, Rfl: 1    metoprolol succinate (TOPROL-XL) 25 mg 24 hr tablet, Take 1 tablet (25 mg total) by mouth daily, Disp: 30 tablet, Rfl: 4    HPI    The following portions of the patient's history were reviewed and updated as appropriate: allergies, current medications, past family history, past medical history, past social history, past surgical history and problem list     Review of Systems   Constitutional: Negative  Negative for activity change, appetite change, fatigue, fever and unexpected weight change     HENT: Negative for congestion, ear pain, hearing loss, mouth sores, postnasal drip, rhinorrhea, sore throat, trouble swallowing and voice change  Eyes: Negative for pain, redness and visual disturbance  Respiratory: Negative for cough, chest tightness, shortness of breath and wheezing  Cardiovascular: Negative for chest pain, palpitations and leg swelling  Gastrointestinal: Negative for abdominal distention, abdominal pain, blood in stool, constipation, diarrhea and nausea  Endocrine: Negative for cold intolerance, heat intolerance, polydipsia, polyphagia and polyuria  Genitourinary: Negative for difficulty urinating, dysuria, flank pain, frequency, hematuria and urgency  Musculoskeletal: Positive for back pain  Negative for arthralgias, gait problem, joint swelling and myalgias  Skin: Negative for color change and pallor  Neurological: Negative for dizziness, tremors, seizures, syncope, weakness, numbness and headaches  Hematological: Negative for adenopathy  Does not bruise/bleed easily  Psychiatric/Behavioral: Negative  Negative for sleep disturbance  The patient is not nervous/anxious  Objective:    /80 (BP Location: Left arm, Patient Position: Sitting)   Pulse 84   Temp 97 6 °F (36 4 °C)   Resp 12   Ht 5' (1 524 m)   Wt 96 2 kg (212 lb)   BMI 41 40 kg/m²      Physical Exam  Vitals signs and nursing note reviewed  Constitutional:       Appearance: She is well-developed  HENT:      Head: Normocephalic  Right Ear: External ear normal       Left Ear: External ear normal       Nose: Nose normal       Mouth/Throat:      Pharynx: No oropharyngeal exudate  Eyes:      Conjunctiva/sclera: Conjunctivae normal       Pupils: Pupils are equal, round, and reactive to light  Neck:      Musculoskeletal: Normal range of motion and neck supple  Thyroid: No thyromegaly  Cardiovascular:      Rate and Rhythm: Normal rate and regular rhythm  Heart sounds: Normal heart sounds  No murmur  No friction rub  No gallop         Comments: S1-S2 regular rhythm  Extremities no edema or calf tenderness    Pulmonary:      Effort: Pulmonary effort is normal  No respiratory distress  Breath sounds: Normal breath sounds  No wheezing or rales  Comments: Lungs are clear no wheezing rales or rhonchi  Abdominal:      General: Bowel sounds are normal  There is no distension  Palpations: Abdomen is soft  There is no mass  Tenderness: There is no abdominal tenderness  There is no guarding or rebound  Musculoskeletal: Normal range of motion  Lymphadenopathy:      Cervical: No cervical adenopathy  Skin:     General: Skin is warm and dry  Neurological:      Mental Status: She is alert and oriented to person, place, and time     Psychiatric:         Behavior: Behavior normal          Judgment: Judgment normal

## 2021-01-18 PROBLEM — E11.9 TYPE 2 DIABETES MELLITUS, WITHOUT LONG-TERM CURRENT USE OF INSULIN (HCC): Status: RESOLVED | Noted: 2019-11-21 | Resolved: 2021-01-18

## 2021-01-20 ENCOUNTER — TELEPHONE (OUTPATIENT)
Dept: INTERNAL MEDICINE CLINIC | Facility: CLINIC | Age: 66
End: 2021-01-20

## 2021-01-20 ENCOUNTER — HOSPITAL ENCOUNTER (EMERGENCY)
Facility: HOSPITAL | Age: 66
Discharge: HOME | End: 2021-01-20
Attending: EMERGENCY MEDICINE | Admitting: EMERGENCY MEDICINE
Payer: COMMERCIAL

## 2021-01-20 ENCOUNTER — APPOINTMENT (EMERGENCY)
Dept: CT IMAGING | Facility: HOSPITAL | Age: 66
End: 2021-01-20
Payer: COMMERCIAL

## 2021-01-20 ENCOUNTER — APPOINTMENT (EMERGENCY)
Dept: ULTRASOUND IMAGING | Facility: HOSPITAL | Age: 66
End: 2021-01-20
Payer: COMMERCIAL

## 2021-01-20 VITALS
SYSTOLIC BLOOD PRESSURE: 152 MMHG | RESPIRATION RATE: 18 BRPM | HEART RATE: 82 BPM | OXYGEN SATURATION: 98 % | TEMPERATURE: 99 F | DIASTOLIC BLOOD PRESSURE: 72 MMHG | WEIGHT: 208.78 LBS | BODY MASS INDEX: 40.77 KG/M2

## 2021-01-20 DIAGNOSIS — K80.50 BILIARY COLIC: ICD-10-CM

## 2021-01-20 DIAGNOSIS — R10.11 RIGHT UPPER QUADRANT ABDOMINAL PAIN: Primary | ICD-10-CM

## 2021-01-20 LAB
ALBUMIN SERPL BCP-MCNC: 3.6 G/DL (ref 3.5–5)
ALP SERPL-CCNC: 84 U/L (ref 46–116)
ALT SERPL W P-5'-P-CCNC: 18 U/L (ref 12–78)
ANION GAP SERPL CALCULATED.3IONS-SCNC: 7 MMOL/L (ref 4–13)
AST SERPL W P-5'-P-CCNC: 8 U/L (ref 5–45)
BASOPHILS # BLD AUTO: 0.04 THOUSANDS/ΜL (ref 0–0.1)
BASOPHILS NFR BLD AUTO: 0 % (ref 0–1)
BILIRUB SERPL-MCNC: 0.34 MG/DL (ref 0.2–1)
BUN SERPL-MCNC: 18 MG/DL (ref 5–25)
CALCIUM SERPL-MCNC: 9.5 MG/DL (ref 8.3–10.1)
CHLORIDE SERPL-SCNC: 100 MMOL/L (ref 100–108)
CO2 SERPL-SCNC: 33 MMOL/L (ref 21–32)
CREAT SERPL-MCNC: 1.02 MG/DL (ref 0.6–1.3)
EOSINOPHIL # BLD AUTO: 0.04 THOUSAND/ΜL (ref 0–0.61)
EOSINOPHIL NFR BLD AUTO: 0 % (ref 0–6)
ERYTHROCYTE [DISTWIDTH] IN BLOOD BY AUTOMATED COUNT: 13.6 % (ref 11.6–15.1)
GFR SERPL CREATININE-BSD FRML MDRD: 58 ML/MIN/1.73SQ M
GLUCOSE SERPL-MCNC: 131 MG/DL (ref 65–140)
GLUCOSE SERPL-MCNC: 150 MG/DL (ref 65–140)
GLUCOSE SERPL-MCNC: 159 MG/DL (ref 65–140)
HCT VFR BLD AUTO: 36.9 % (ref 34.8–46.1)
HGB BLD-MCNC: 12.2 G/DL (ref 11.5–15.4)
IMM GRANULOCYTES # BLD AUTO: 0.06 THOUSAND/UL (ref 0–0.2)
IMM GRANULOCYTES NFR BLD AUTO: 1 % (ref 0–2)
LIPASE SERPL-CCNC: 100 U/L (ref 73–393)
LYMPHOCYTES # BLD AUTO: 3.24 THOUSANDS/ΜL (ref 0.6–4.47)
LYMPHOCYTES NFR BLD AUTO: 30 % (ref 14–44)
MCH RBC QN AUTO: 28.5 PG (ref 26.8–34.3)
MCHC RBC AUTO-ENTMCNC: 33.1 G/DL (ref 31.4–37.4)
MCV RBC AUTO: 86 FL (ref 82–98)
MONOCYTES # BLD AUTO: 0.61 THOUSAND/ΜL (ref 0.17–1.22)
MONOCYTES NFR BLD AUTO: 6 % (ref 4–12)
NEUTROPHILS # BLD AUTO: 6.68 THOUSANDS/ΜL (ref 1.85–7.62)
NEUTS SEG NFR BLD AUTO: 63 % (ref 43–75)
NRBC BLD AUTO-RTO: 0 /100 WBCS
PLATELET # BLD AUTO: 288 THOUSANDS/UL (ref 149–390)
PMV BLD AUTO: 10.2 FL (ref 8.9–12.7)
POTASSIUM SERPL-SCNC: 3.7 MMOL/L (ref 3.5–5.3)
PROT SERPL-MCNC: 7.5 G/DL (ref 6.4–8.2)
RBC # BLD AUTO: 4.28 MILLION/UL (ref 3.81–5.12)
SODIUM SERPL-SCNC: 140 MMOL/L (ref 136–145)
WBC # BLD AUTO: 10.67 THOUSAND/UL (ref 4.31–10.16)

## 2021-01-20 PROCEDURE — 82948 REAGENT STRIP/BLOOD GLUCOSE: CPT

## 2021-01-20 PROCEDURE — 85025 COMPLETE CBC W/AUTO DIFF WBC: CPT | Performed by: EMERGENCY MEDICINE

## 2021-01-20 PROCEDURE — 96375 TX/PRO/DX INJ NEW DRUG ADDON: CPT

## 2021-01-20 PROCEDURE — 99284 EMERGENCY DEPT VISIT MOD MDM: CPT

## 2021-01-20 PROCEDURE — 83690 ASSAY OF LIPASE: CPT | Performed by: EMERGENCY MEDICINE

## 2021-01-20 PROCEDURE — 36415 COLL VENOUS BLD VENIPUNCTURE: CPT | Performed by: EMERGENCY MEDICINE

## 2021-01-20 PROCEDURE — 74176 CT ABD & PELVIS W/O CONTRAST: CPT

## 2021-01-20 PROCEDURE — 96365 THER/PROPH/DIAG IV INF INIT: CPT

## 2021-01-20 PROCEDURE — 80053 COMPREHEN METABOLIC PANEL: CPT | Performed by: EMERGENCY MEDICINE

## 2021-01-20 PROCEDURE — 99284 EMERGENCY DEPT VISIT MOD MDM: CPT | Performed by: EMERGENCY MEDICINE

## 2021-01-20 PROCEDURE — 76705 ECHO EXAM OF ABDOMEN: CPT

## 2021-01-20 RX ORDER — MAGNESIUM HYDROXIDE/ALUMINUM HYDROXICE/SIMETHICONE 120; 1200; 1200 MG/30ML; MG/30ML; MG/30ML
30 SUSPENSION ORAL ONCE
Status: COMPLETED | OUTPATIENT
Start: 2021-01-20 | End: 2021-01-20

## 2021-01-20 RX ORDER — LIDOCAINE HYDROCHLORIDE 20 MG/ML
15 SOLUTION OROPHARYNGEAL ONCE
Status: COMPLETED | OUTPATIENT
Start: 2021-01-20 | End: 2021-01-20

## 2021-01-20 RX ADMIN — FAMOTIDINE 20 MG: 10 INJECTION INTRAVENOUS at 20:10

## 2021-01-20 RX ADMIN — SODIUM CHLORIDE, SODIUM LACTATE, POTASSIUM CHLORIDE, AND CALCIUM CHLORIDE 1000 ML: .6; .31; .03; .02 INJECTION, SOLUTION INTRAVENOUS at 20:10

## 2021-01-20 RX ADMIN — ALUMINUM HYDROXIDE, MAGNESIUM HYDROXIDE, AND SIMETHICONE 30 ML: 200; 200; 20 SUSPENSION ORAL at 20:10

## 2021-01-20 RX ADMIN — LIDOCAINE HYDROCHLORIDE 15 ML: 20 SOLUTION ORAL; TOPICAL at 20:10

## 2021-01-20 NOTE — ED PROVIDER NOTES
Pt Name: Vianey Monteiro  MRN: [id number]  [place] 1955  Age/Sex: 72 y o  female  Date of evaluation: 1/20/2021  PCP: Omkar Khan MD    [address] [place] [address]    Chief Complaint   Patient presents with    Abdominal Pain     Pt was seen at PCP due to abdominal pain in upper middle and RUQ that radiates into upper back  Pt is concerned for appendicitis  Pt has nausea but no vomiting  Pt is has glucose level concerns due to shaky hands  HPI    [place]s presents to the Emergency Department complaining of upper abdominal pain since Monday  She ate a cheesesteak and then started to have significant pain  The pain has persisted so she spoke with her PCP who told her to come to ER for evaluation  No fever  No vomiting or diarrhea  No other complaints  She had similar pain years ago and had barium study and EGD that revealed possible ulcer and she has been taking prilosec since  HPI      Past Medical and Surgical History    Past Medical History:   Diagnosis Date    Asthma     Diabetes mellitus [facility] )     Hypertension     Obesity     Stroke [facility])        Past Surgical History:   Procedure Laterality Date    ROTATOR CUFF REPAIR Bilateral        Family History   Problem Relation Age of Onset    Diabetes Mother     Hypertension Mother     Hypertension Father        Social History     Tobacco Use    Smoking status: Never Smoker    Smokeless tobacco: Never Used   Substance Use Topics    Alcohol use: Never     Frequency: Never    Drug use: Never           Allergies    Allergies   Allergen Reactions    Etodolac Shortness Of Breath    Iodinated Diagnostic Agents Shortness Of Breath and Wheezing    Simvastatin Other (See Comments)     elevated liver function     Latex Rash    Morphine Palpitations    Penicillins Rash       Home Medications    Prior to Admission medications    Medication Sig Start Date End Date Taking?  Authorizing Provider   acetaminophen (TYLENOL) 325 mg tablet Take 2 tablets (650 mg total) by mouth every 6 (six) hours as needed for mild pain, moderate pain, headaches or fever 2/23/20   Dominick Ornelas MD   albuterol (2 5 mg/3 mL) 0 083 % nebulizer solution Take 1 vial (2 5 mg total) by nebulization every 6 (six) hours as needed for wheezing or shortness of breath 3/31/20   Guillermina Carter MD   albuterol (PROVENTIL HFA,VENTOLIN HFA) 90 mcg/act inhaler Inhale 2 puffs every 6 (six) hours as needed for wheezing    Historical Provider, MD   aspirin 81 mg chewable tablet Chew 81 mg daily    Historical Provider, MD   atorvastatin (LIPITOR) 40 mg tablet Take 1 tablet (40 mg total) by mouth daily 12/21/20   Guillermina Carter MD   BD Pen Needle Berenice 2nd Gen 32G X 4 MM MISC USE TWICE A DAY 8/13/20   Guillermina Carter MD   Blood Glucose Monitoring Suppl (ONE TOUCH ULTRA 2) w/Device KIT Test blood sugar 3 times daily 2/25/20   Guillermina Carter MD   budesonide-formoterol [facility]) 160-4 5 mcg/act inhaler Inhale 2 puffs 2 (two) times a day Rinse mouth after use      Historical Provider, MD   ezetimibe (ZETIA) 10 mg tablet Take 1 tablet (10 mg total) by mouth daily 12/21/20   Guillermina Carter MD   fluticasone [facility]) 50 mcg/act nasal spray 1 spray into each nostril daily    Historical Provider, MD   furosemide (LASIX) 20 mg tablet Take 20 mg by mouth 2 (two) times a day as needed    Historical Provider, MD   glimepiride (AMARYL) 4 mg tablet Take 1 tablet (4 mg total) by mouth daily with breakfast 12/21/20   Guillermina Carter MD   glucose blood test strip Prior to each meal 6/29/20   Guillermina Carter MD   hydrocortisone 2 5 % cream Apply topically 2 (two) times a day 5/11/20   Guillermina Carter MD   indapamide (LOZOL) 1 25 mg tablet Take 1 tablet (1 25 mg total) by mouth every morning 10/9/20 1/14/21  Guillermina Carter MD   Lancets ([address]) [address] TEST 3 TIMES 6/29/20   Guillermina Carter MD   levothyroxine 150 mcg tablet Take 1 tablet (150 mcg total) by mouth daily 12/21/20   Guillermina Carter MD   losartan (COZAAR) 100 MG tablet Take 1 tablet (100 mg total) by mouth daily 12/21/20 3/21/21  Kimberlyn Ibanez MD   metFORMIN (GLUCOPHAGE) 1000 MG tablet Take 1 tablet (1,000 mg total) by mouth 2 (two) times a day with meals 12/21/20   Kimberlyn Ibanez MD   metoprolol succinate (TOPROL-XL) 25 mg 24 hr tablet Take 1 tablet (25 mg total) by mouth daily 3/31/20 1/14/21  Kimberlyn Ibanez MD   montelukast (SINGULAIR) 10 mg tablet Take 10 mg by mouth daily at bedtime    Historical Provider, MD   omeprazole (PriLOSEC) 20 mg delayed release capsule Take 1 capsule (20 mg total) by mouth daily 12/21/20   Kimberlyn Ibanez MD   spironolactone (ALDACTONE) 25 mg tablet Take 1 tablet (25 mg total) by mouth daily 10/9/20 1/14/21  Kimberlyn Ibanez MD   Joyce Poles FlexTouch 100 units/mL injection pen INJECT 25UNITS IN THE MORNING AND 10 UNITS IN THE EVENING 12/11/20   Kimberlyn Ibanez MD           Review of Systems    Review of Systems   Constitutional: Negative for chills and fever  HENT: Negative for ear pain and sore throat  Eyes: Negative for pain and visual disturbance  Respiratory: Negative for cough and shortness of breath  Cardiovascular: Negative for chest pain and palpitations  Gastrointestinal: Positive for abdominal pain  Negative for vomiting  Genitourinary: Negative for dysuria and hematuria  Musculoskeletal: Negative for arthralgias and back pain  Skin: Negative for color change and rash  Neurological: Negative for seizures and syncope  All other systems reviewed and are negative          Physical Exam      ED Triage Vitals   Temperature Pulse Respirations Blood Pressure SpO2   01/20/21 1844 01/20/21 1844 01/20/21 1844 01/20/21 1844 01/20/21 1956   99 °F (37 2 °C) 96 18 (!) 224/105 98 %      Temp Source Heart Rate Source Patient Position - Orthostatic VS BP Location FiO2 (%)   01/20/21 1844 01/20/21 1844 01/20/21 1844 01/20/21 1844 --   Temporal Monitor Sitting Right arm       Pain Score       01/20/21 1844       Worst Possible Pain Physical Exam  Vitals signs and nursing note reviewed  Constitutional:       General: She is not in acute distress  Appearance: She is well-developed  HENT:      Head: Normocephalic and atraumatic  Eyes:      Conjunctiva/sclera: Conjunctivae normal    Neck:      Musculoskeletal: Neck supple  Cardiovascular:      Rate and Rhythm: Normal rate and regular rhythm  Heart sounds: No murmur  Pulmonary:      Effort: Pulmonary effort is normal  No respiratory distress  Breath sounds: Normal breath sounds  Abdominal:      Palpations: Abdomen is soft  Tenderness: There is no abdominal tenderness  Skin:     General: Skin is warm and dry  Neurological:      Mental Status: She is alert  Assessment and Plan    Leonila Ruffin is a 72 y o  female who presents with upper abdominal pain  Physical examination remarkable for same  Differential diagnosis (not completely inclusive) includes acute intra-abdominal pathology, biliary colic, gastris vs ulcer  Plan will be to perform diagnostic testing and treat symptomatically        MDM    Diagnostic Results      Labs:    Results for orders placed or performed during the hospital encounter of 01/20/21   CBC and differential   Result Value Ref Range    WBC 10 67 (H) 4 31 - 10 16 Thousand/uL    RBC 4 28 3 81 - 5 12 Million/uL    Hemoglobin 12 2 11 5 - 15 4 g/dL    Hematocrit 36 9 34 8 - 46 1 %    MCV 86 82 - 98 fL    MCH 28 5 26 8 - 34 3 pg    MCHC 33 1 31 4 - 37 4 g/dL    RDW 13 6 11 6 - 15 1 %    MPV 10 2 8 9 - 12 7 fL    Platelets 479 186 - 785 Thousands/uL    nRBC 0 /100 WBCs    Neutrophils Relative 63 43 - 75 %    Immat GRANS % 1 0 - 2 %    Lymphocytes Relative 30 14 - 44 %    Monocytes Relative 6 4 - 12 %    Eosinophils Relative 0 0 - 6 %    Basophils Relative 0 0 - 1 %    Neutrophils Absolute 6 68 1 85 - 7 62 Thousands/µL    Immature Grans Absolute 0 06 0 00 - 0 20 Thousand/uL    Lymphocytes Absolute 3 24 0 60 - 4 47 Thousands/µL Monocytes Absolute 0 61 0 17 - 1 22 Thousand/µL    Eosinophils Absolute 0 04 0 00 - 0 61 Thousand/µL    Basophils Absolute 0 04 0 00 - 0 10 Thousands/µL   Comprehensive metabolic panel   Result Value Ref Range    Sodium 140 136 - 145 mmol/L    Potassium 3 7 3 5 - 5 3 mmol/L    Chloride 100 100 - 108 mmol/L    CO2 33 (H) 21 - 32 mmol/L    ANION GAP 7 4 - 13 mmol/L    BUN 18 5 - 25 mg/dL    Creatinine 1 02 0 60 - 1 30 mg/dL    Glucose 159 (H) 65 - 140 mg/dL    Calcium 9 5 8 3 - 10 1 mg/dL    AST 8 5 - 45 U/L    ALT 18 12 - 78 U/L    Alkaline Phosphatase 84 46 - 116 U/L    Total Protein 7 5 6 4 - 8 2 g/dL    Albumin 3 6 3 5 - 5 0 g/dL    Total Bilirubin 0 34 0 20 - 1 00 mg/dL    eGFR 58 ml/min/1 73sq m   Lipase   Result Value Ref Range    Lipase 100 73 - 393 u/L   Fingerstick Glucose (POCT)   Result Value Ref Range    POC Glucose 131 65 - 140 mg/dl   Fingerstick Glucose (POCT)   Result Value Ref Range    POC Glucose 150 (H) 65 - 140 mg/dl       All labs reviewed and utilized in the medical decision making process    Radiology:    US right upper quadrant   Final Result      1  Limited evaluation due to the patient's body habitus and bowel gas  2   The gallbladder is partially contracted limiting its evaluation  The gallbladder wall is top normal in caliber measuring 3 mm in thickness and there is no pericholecystic fluid  A positive sonographic Buchanan's sign was elicited  The findings are    equivocal for acute cholecystitis and clinical correlation is recommended  HIDA scan may be considered for further evaluation, if clinically indicated  3   Hepatic steatosis  Workstation performed: [id number]         CT abdomen pelvis wo contrast   Final Result      No acute inflammatory changes in the abdomen or pelvis              Workstation performed: [id number]             All radiology studies independently viewed by me and interpreted by the radiologist     Procedure    Procedures      ED Course of Care and Re-Assessments        Medications   lactated ringers bolus 1,000 mL (0 mL Intravenous Stopped 1/20/21 2123)   famotidine (PEPCID) injection 20 mg (20 mg Intravenous Given 1/20/21 2010)   aluminum-magnesium hydroxide-simethicone (MYLANTA) oral suspension 30 mL (30 mL Oral Given 1/20/21 2010)   Lidocaine Viscous HCl (XYLOCAINE) 2 % mucosal solution 15 mL (15 mL Swish & Spit Given 1/20/21 2010)           FINAL IMPRESSION    Final diagnoses:   Right upper quadrant abdominal pain   Biliary colic         DISPOSITION/PLAN      Time reflects when diagnosis was documented in both MDM as applicable and the Disposition within this note     Time User Action Codes Description Comment    1/20/2021  9:57 PM Karen MILLER Add [R10 11] Right upper quadrant abdominal pain     1/20/2021 10:04 PM Karen Mahoney [P65 05] Biliary colic       ED Disposition     ED Disposition Condition Date/Time Comment    Discharge Stable Wed Jan 20, 2021  9:57 PM Kin Gone discharge to home/self care  Follow-up Information     Follow up With Specialties Details Why 1531 MD Y[facility] Internal Medicine Schedule an appointment as soon as possible for a visit   [address] [facility]  [address]       Maureen Augustine MD General Surgery Schedule an appointment as soon as possible for a visit   [address] [facility]   [address]  [address] [facility] [address]  Jim Fontaine [address] [facility], [address], [address] [place], [place], [address]   [phone]            PATIENT REFERRED TO:    Mikayla Da Silva MD  [address]   [facility] [facility]    Schedule an appointment as soon as possible for a visit       Maureen Augustine[address] [place] [address]     Schedule an appointment as soon as possible for a visit       Dre Holt [facility]  [address] [facility]  [address] [facility] [address] [phone]          DISCHARGE MEDICATIONS:    Patient's Medications   Discharge Prescriptions    No medications on file       No discharge procedures on file           Hang Foster, [address] [place] [address],   01/20/21 2778

## 2021-01-21 ENCOUNTER — OFFICE VISIT (OUTPATIENT)
Dept: INTERNAL MEDICINE CLINIC | Facility: CLINIC | Age: 66
End: 2021-01-21
Payer: COMMERCIAL

## 2021-01-21 VITALS
HEIGHT: 60 IN | OXYGEN SATURATION: 96 % | SYSTOLIC BLOOD PRESSURE: 140 MMHG | DIASTOLIC BLOOD PRESSURE: 70 MMHG | TEMPERATURE: 97.7 F | HEART RATE: 63 BPM | BODY MASS INDEX: 41.23 KG/M2 | WEIGHT: 210 LBS

## 2021-01-21 DIAGNOSIS — E11.65 UNCONTROLLED TYPE 2 DIABETES MELLITUS WITH HYPERGLYCEMIA (HCC): ICD-10-CM

## 2021-01-21 DIAGNOSIS — R10.11 RIGHT UPPER QUADRANT PAIN: Primary | ICD-10-CM

## 2021-01-21 PROCEDURE — 99214 OFFICE O/P EST MOD 30 MIN: CPT | Performed by: INTERNAL MEDICINE

## 2021-01-21 NOTE — PROGRESS NOTES
Assessment/Plan:  Refer to general surgeon to check the gallbladder  I told her to take omeprazole 20 mg twice a day I told her to going to low-fat diet    Problem 1  Right upper quadrant tenderness  Went to the ER yesterday had a CT scan of the abdomen was unremarkable had an ultrasound of the abdomen which showed contracted gallbladder  With a thickened wall but no inflammation  I had her come here today she has quitted tenderness in the right upper quadrant and some rib tenderness also she is nauseated when she had a sandwich a couple of days ago  Continue taking omeprazole 20 mg in the morning  And take omeprazole 20 mg in the evening    Surgical evaluation    Problem 2  Blood pressure well control continue same medications    Problem 3  Diabetes continue your medication check her blood sugars before breakfast and dinner    Obesity try to continue to lose some weight with the low-fat diet        No problem-specific Assessment & Plan notes found for this encounter  There are no diagnoses linked to this encounter  Subjective:      Patient ID: Colette Zhang is a 72 y o  female      Chief Complaint   Patient presents with    Follow-up     ER visit follow up epigastric pain          Current Outpatient Medications:     acetaminophen (TYLENOL) 325 mg tablet, Take 2 tablets (650 mg total) by mouth every 6 (six) hours as needed for mild pain, moderate pain, headaches or fever, Disp: 30 tablet, Rfl: 0    albuterol (2 5 mg/3 mL) 0 083 % nebulizer solution, Take 1 vial (2 5 mg total) by nebulization every 6 (six) hours as needed for wheezing or shortness of breath, Disp: 60 vial, Rfl: 5    albuterol (PROVENTIL HFA,VENTOLIN HFA) 90 mcg/act inhaler, Inhale 2 puffs every 6 (six) hours as needed for wheezing, Disp: , Rfl:     aspirin 81 mg chewable tablet, Chew 81 mg daily, Disp: , Rfl:     atorvastatin (LIPITOR) 40 mg tablet, Take 1 tablet (40 mg total) by mouth daily, Disp: 90 tablet, Rfl: 1    BD Pen Needle Berneice 2nd Gen 32G X 4 MM MISC, USE TWICE A DAY, Disp: 100 each, Rfl: 1    Blood Glucose Monitoring Suppl (ONE TOUCH ULTRA 2) w/Device KIT, Test blood sugar 3 times daily, Disp: 1 each, Rfl: 0    budesonide-formoterol (SYMBICORT) 160-4 5 mcg/act inhaler, Inhale 2 puffs 2 (two) times a day Rinse mouth after use , Disp: , Rfl:     ezetimibe (ZETIA) 10 mg tablet, Take 1 tablet (10 mg total) by mouth daily, Disp: 90 tablet, Rfl: 1    fluticasone (FLONASE) 50 mcg/act nasal spray, 1 spray into each nostril daily, Disp: , Rfl:     furosemide (LASIX) 20 mg tablet, Take 20 mg by mouth 2 (two) times a day as needed, Disp: , Rfl:     glimepiride (AMARYL) 4 mg tablet, Take 1 tablet (4 mg total) by mouth daily with breakfast, Disp: 90 tablet, Rfl: 1    glucose blood test strip, Prior to each meal, Disp: 300 each, Rfl: 1    hydrocortisone 2 5 % cream, Apply topically 2 (two) times a day, Disp: 30 g, Rfl: 0    Lancets (ONETOUCH DELICA PLUS [id number]) MISC, TEST 3 TIMES, Disp: 100 each, Rfl: 1    levothyroxine 150 mcg tablet, Take 1 tablet (150 mcg total) by mouth daily, Disp: 90 tablet, Rfl: 1    losartan (COZAAR) 100 MG tablet, Take 1 tablet (100 mg total) by mouth daily, Disp: 90 tablet, Rfl: 1    metFORMIN (GLUCOPHAGE) 1000 MG tablet, Take 1 tablet (1,000 mg total) by mouth 2 (two) times a day with meals, Disp: 180 tablet, Rfl: 1    montelukast (SINGULAIR) 10 mg tablet, Take 10 mg by mouth daily at bedtime, Disp: , Rfl:     omeprazole (PriLOSEC) 20 mg delayed release capsule, Take 1 capsule (20 mg total) by mouth daily, Disp: 90 capsule, Rfl: 1    Tresiba FlexTouch 100 units/mL injection pen, INJECT 25UNITS IN THE MORNING AND 10 UNITS IN THE EVENING, Disp: 5 pen, Rfl: 1    indapamide (LOZOL) 1 25 mg tablet, Take 1 tablet (1 25 mg total) by mouth every morning, Disp: 90 tablet, Rfl: 1    metoprolol succinate (TOPROL-XL) 25 mg 24 hr tablet, Take 1 tablet (25 mg total) by mouth daily, Disp: 30 tablet, Rfl: 4    spironolactone (ALDACTONE) 25 mg tablet, Take 1 tablet (25 mg total) by mouth daily, Disp: 90 tablet, Rfl: 1  No current facility-administered medications for this visit  HPI    The following portions of the patient's history were reviewed and updated as appropriate: allergies, current medications, past family history, past medical history, past social history, past surgical history and problem list     Review of Systems   Constitutional: Negative  Negative for activity change, appetite change, fatigue, fever and unexpected weight change  HENT: Negative for congestion, ear pain, hearing loss, mouth sores, postnasal drip, rhinorrhea, sore throat, trouble swallowing and voice change  Eyes: Negative for pain, redness and visual disturbance  Respiratory: Negative for cough, chest tightness, shortness of breath and wheezing  Cardiovascular: Negative for chest pain, palpitations and leg swelling  Gastrointestinal: Positive for abdominal pain  Negative for abdominal distention, blood in stool, constipation and diarrhea  Endocrine: Negative for cold intolerance, heat intolerance, polydipsia, polyphagia and polyuria  Genitourinary: Negative for difficulty urinating, dysuria, flank pain, frequency, hematuria and urgency  Musculoskeletal: Negative for arthralgias, back pain, gait problem, joint swelling and myalgias  Skin: Negative for color change and pallor  Neurological: Negative for dizziness, tremors, seizures, syncope, weakness, numbness and headaches  Hematological: Negative for adenopathy  Does not bruise/bleed easily  Psychiatric/Behavioral: Negative  Negative for sleep disturbance  The patient is not nervous/anxious  Objective:    /70 (BP Location: Left arm, Patient Position: Sitting)   Pulse 63   Temp 97 7 °F (36 5 °C) (Skin)   Ht 5' (1 524 m)   Wt 95 3 kg (210 lb)   SpO2 96%   BMI 41 01 kg/m²      Physical Exam  Vitals signs and nursing note reviewed  Constitutional:       Appearance: She is well-developed  HENT:      Head: Normocephalic  Right Ear: External ear normal       Left Ear: External ear normal       Nose: Nose normal       Mouth/Throat:      Pharynx: No oropharyngeal exudate  Eyes:      Conjunctiva/sclera: Conjunctivae normal       Pupils: Pupils are equal, round, and reactive to light  Neck:      Musculoskeletal: Normal range of motion and neck supple  Thyroid: No thyromegaly  Cardiovascular:      Rate and Rhythm: Normal rate and regular rhythm  Heart sounds: Normal heart sounds  No murmur  No friction rub  No gallop  Comments: S1-S2 regular rhythm    Pulmonary:      Effort: Pulmonary effort is normal  No respiratory distress  Breath sounds: Normal breath sounds  No wheezing or rales  Comments: Lungs are clear no wheezing rales or rhonchi  Abdominal:      General: Bowel sounds are normal  There is no distension  Palpations: Abdomen is soft  There is no mass  Tenderness: There is no abdominal tenderness  There is no guarding or rebound  Comments: Abdomen obese soft tenderness  in the right upper quadrant no rebound tenderness active bowel sounds   Musculoskeletal: Normal range of motion  Lymphadenopathy:      Cervical: No cervical adenopathy  Skin:     General: Skin is warm and dry  Neurological:      Mental Status: She is alert and oriented to person, place, and time     Psychiatric:         Behavior: Behavior normal          Judgment: Judgment normal

## 2021-01-21 NOTE — LETTER
January 21, 2021     Maureen Augustine MD  [address] [place] [address]    Patient: Олег Nolen   YOB: 1955   Date of Visit: 1/21/2021       Dear Dr Debby Hobson: Thank you for referring Caitlin Rodriguez to me for evaluation  Below are my notes for this consultation  If you have questions, please do not hesitate to call me  I look forward to following your patient along with you  Good afternoon  As saw this patient today having right upper quadrant pain she had a workup in the ER if you could please see her within a week I would appreciated started her on a low-fat diet I told her to take omeprazole 20 mg twice a day       Sincerely,        Mikayla Da Silva MD        CC: No Recipients  Mikayla Da Silva MD  1/21/2021  4:12 PM  Sign when Signing Visit  Assessment/Plan:  Refer to general surgeon to check the gallbladder  I told her to take omeprazole 20 mg twice a day I told her to going to low-fat diet    Problem 1  Right upper quadrant tenderness  Went to the ER yesterday had a CT scan of the abdomen was unremarkable had an ultrasound of the abdomen which showed contracted gallbladder  With a thickened wall but no inflammation  I had her come here today she has quitted tenderness in the right upper quadrant and some rib tenderness also she is nauseated when she had a sandwich a couple of days ago  Continue taking omeprazole 20 mg in the morning  And take omeprazole 20 mg in the evening    Surgical evaluation    Problem 2  Blood pressure well control continue same medications    Problem 3  Diabetes continue your medication check her blood sugars before breakfast and dinner    Obesity try to continue to lose some weight with the low-fat diet        No problem-specific Assessment & Plan notes found for this encounter  There are no diagnoses linked to this encounter  Subjective:      Patient ID: Олег Nolen is a 72 y o  female      Chief Complaint   Patient presents with  Follow-up     ER visit follow up epigastric pain          Current Outpatient Medications:     acetaminophen (TYLENOL) 325 mg tablet, Take 2 tablets (650 mg total) by mouth every 6 (six) hours as needed for mild pain, moderate pain, headaches or fever, Disp: 30 tablet, Rfl: 0    albuterol (2 5 mg/3 mL) 0 083 % nebulizer solution, Take 1 vial (2 5 mg total) by nebulization every 6 (six) hours as needed for wheezing or shortness of breath, Disp: 60 vial, Rfl: 5    albuterol (PROVENTIL HFA,VENTOLIN HFA) 90 mcg/act inhaler, Inhale 2 puffs every 6 (six) hours as needed for wheezing, Disp: , Rfl:     aspirin 81 mg chewable tablet, Chew 81 mg daily, Disp: , Rfl:     atorvastatin (LIPITOR) 40 mg tablet, Take 1 tablet (40 mg total) by mouth daily, Disp: 90 tablet, Rfl: 1    BD Pen Needle Berenice 2nd Gen 32G X 4 MM MISC, USE TWICE A DAY, Disp: 100 each, Rfl: 1    Blood Glucose Monitoring Suppl (ONE TOUCH ULTRA 2) w/Device KIT, Test blood sugar 3 times daily, Disp: 1 each, Rfl: 0    budesonide-formoterol (SYMBICORT) 160-4 5 mcg/act inhaler, Inhale 2 puffs 2 (two) times a day Rinse mouth after use , Disp: , Rfl:     ezetimibe (ZETIA) 10 mg tablet, Take 1 tablet (10 mg total) by mouth daily, Disp: 90 tablet, Rfl: 1    fluticasone (FLONASE) 50 mcg/act nasal spray, 1 spray into each nostril daily, Disp: , Rfl:     furosemide (LASIX) 20 mg tablet, Take 20 mg by mouth 2 (two) times a day as needed, Disp: , Rfl:     glimepiride (AMARYL) 4 mg tablet, Take 1 tablet (4 mg total) by mouth daily with breakfast, Disp: 90 tablet, Rfl: 1    glucose blood test strip, Prior to each meal, Disp: 300 each, Rfl: 1    hydrocortisone 2 5 % cream, Apply topically 2 (two) times a day, Disp: 30 g, Rfl: 0    Lancets (ONETOUCH DELICA PLUS [id number]) MISC, TEST 3 TIMES, Disp: 100 each, Rfl: 1    levothyroxine 150 mcg tablet, Take 1 tablet (150 mcg total) by mouth daily, Disp: 90 tablet, Rfl: 1    losartan (COZAAR) 100 MG tablet, Take 1 tablet (100 mg total) by mouth daily, Disp: 90 tablet, Rfl: 1    metFORMIN (GLUCOPHAGE) 1000 MG tablet, Take 1 tablet (1,000 mg total) by mouth 2 (two) times a day with meals, Disp: 180 tablet, Rfl: 1    montelukast (SINGULAIR) 10 mg tablet, Take 10 mg by mouth daily at bedtime, Disp: , Rfl:     omeprazole (PriLOSEC) 20 mg delayed release capsule, Take 1 capsule (20 mg total) by mouth daily, Disp: 90 capsule, Rfl: 1    Tresiba FlexTouch 100 units/mL injection pen, INJECT 25UNITS IN THE MORNING AND 10 UNITS IN THE EVENING, Disp: 5 pen, Rfl: 1    indapamide (LOZOL) 1 25 mg tablet, Take 1 tablet (1 25 mg total) by mouth every morning, Disp: 90 tablet, Rfl: 1    metoprolol succinate (TOPROL-XL) 25 mg 24 hr tablet, Take 1 tablet (25 mg total) by mouth daily, Disp: 30 tablet, Rfl: 4    spironolactone (ALDACTONE) 25 mg tablet, Take 1 tablet (25 mg total) by mouth daily, Disp: 90 tablet, Rfl: 1  No current facility-administered medications for this visit  HPI    The following portions of the patient's history were reviewed and updated as appropriate: allergies, current medications, past family history, past medical history, past social history, past surgical history and problem list     Review of Systems   Constitutional: Negative  Negative for activity change, appetite change, fatigue, fever and unexpected weight change  HENT: Negative for congestion, ear pain, hearing loss, mouth sores, postnasal drip, rhinorrhea, sore throat, trouble swallowing and voice change  Eyes: Negative for pain, redness and visual disturbance  Respiratory: Negative for cough, chest tightness, shortness of breath and wheezing  Cardiovascular: Negative for chest pain, palpitations and leg swelling  Gastrointestinal: Positive for abdominal pain  Negative for abdominal distention, blood in stool, constipation and diarrhea  Endocrine: Negative for cold intolerance, heat intolerance, polydipsia, polyphagia and polyuria  Genitourinary: Negative for difficulty urinating, dysuria, flank pain, frequency, hematuria and urgency  Musculoskeletal: Negative for arthralgias, back pain, gait problem, joint swelling and myalgias  Skin: Negative for color change and pallor  Neurological: Negative for dizziness, tremors, seizures, syncope, weakness, numbness and headaches  Hematological: Negative for adenopathy  Does not bruise/bleed easily  Psychiatric/Behavioral: Negative  Negative for sleep disturbance  The patient is not nervous/anxious  Objective:    /70 (BP Location: Left arm, Patient Position: Sitting)   Pulse 63   Temp 97 7 °F (36 5 °C) (Skin)   Ht 5' (1 524 m)   Wt 95 3 kg (210 lb)   SpO2 96%   BMI 41 01 kg/m²      Physical Exam  Vitals signs and nursing note reviewed  Constitutional:       Appearance: She is well-developed  HENT:      Head: Normocephalic  Right Ear: External ear normal       Left Ear: External ear normal       Nose: Nose normal       Mouth/Throat:      Pharynx: No oropharyngeal exudate  Eyes:      Conjunctiva/sclera: Conjunctivae normal       Pupils: Pupils are equal, round, and reactive to light  Neck:      Musculoskeletal: Normal range of motion and neck supple  Thyroid: No thyromegaly  Cardiovascular:      Rate and Rhythm: Normal rate and regular rhythm  Heart sounds: Normal heart sounds  No murmur  No friction rub  No gallop  Comments: S1-S2 regular rhythm    Pulmonary:      Effort: Pulmonary effort is normal  No respiratory distress  Breath sounds: Normal breath sounds  No wheezing or rales  Comments: Lungs are clear no wheezing rales or rhonchi  Abdominal:      General: Bowel sounds are normal  There is no distension  Palpations: Abdomen is soft  There is no mass  Tenderness: There is no abdominal tenderness  There is no guarding or rebound        Comments: Abdomen obese soft tenderness  in the right upper quadrant no rebound tenderness active bowel sounds   Musculoskeletal: Normal range of motion  Lymphadenopathy:      Cervical: No cervical adenopathy  Skin:     General: Skin is warm and dry  Neurological:      Mental Status: She is alert and oriented to person, place, and time     Psychiatric:         Behavior: Behavior normal          Judgment: Judgment normal

## 2021-01-21 NOTE — DISCHARGE INSTRUCTIONS
You should have a HIDA scan for further evaluation of your gallbladder and follow up with general surgery

## 2021-01-28 ENCOUNTER — TELEPHONE (OUTPATIENT)
Dept: SURGERY | Facility: CLINIC | Age: 66
End: 2021-01-28

## 2021-01-28 ENCOUNTER — HOSPITAL ENCOUNTER (OUTPATIENT)
Dept: RADIOLOGY | Facility: HOSPITAL | Age: 66
Discharge: HOME | End: 2021-01-28
Attending: INTERNAL MEDICINE
Payer: COMMERCIAL

## 2021-01-28 ENCOUNTER — APPOINTMENT (OUTPATIENT)
Dept: LAB | Facility: HOSPITAL | Age: 66
End: 2021-01-28
Attending: INTERNAL MEDICINE
Payer: COMMERCIAL

## 2021-01-28 ENCOUNTER — TELEPHONE (OUTPATIENT)
Dept: INTERNAL MEDICINE CLINIC | Facility: CLINIC | Age: 66
End: 2021-01-28

## 2021-01-28 ENCOUNTER — CONSULT (OUTPATIENT)
Dept: SURGERY | Facility: CLINIC | Age: 66
End: 2021-01-28
Payer: COMMERCIAL

## 2021-01-28 VITALS
HEIGHT: 60 IN | TEMPERATURE: 97.1 F | BODY MASS INDEX: 40.68 KG/M2 | HEART RATE: 98 BPM | SYSTOLIC BLOOD PRESSURE: 124 MMHG | DIASTOLIC BLOOD PRESSURE: 60 MMHG | WEIGHT: 207.2 LBS

## 2021-01-28 DIAGNOSIS — I10 ESSENTIAL HYPERTENSION: ICD-10-CM

## 2021-01-28 DIAGNOSIS — Z12.31 ENCOUNTER FOR SCREENING MAMMOGRAM FOR MALIGNANT NEOPLASM OF BREAST: ICD-10-CM

## 2021-01-28 DIAGNOSIS — J45.21 MILD INTERMITTENT ASTHMA WITH ACUTE EXACERBATION: ICD-10-CM

## 2021-01-28 DIAGNOSIS — E66.01 CLASS 3 SEVERE OBESITY DUE TO EXCESS CALORIES WITH SERIOUS COMORBIDITY AND BODY MASS INDEX (BMI) OF 40.0 TO 44.9 IN ADULT (HCC): ICD-10-CM

## 2021-01-28 DIAGNOSIS — K21.9 GASTROESOPHAGEAL REFLUX DISEASE, UNSPECIFIED WHETHER ESOPHAGITIS PRESENT: Primary | ICD-10-CM

## 2021-01-28 DIAGNOSIS — R10.11 RIGHT UPPER QUADRANT PAIN: ICD-10-CM

## 2021-01-28 DIAGNOSIS — E11.9 TYPE 2 DIABETES MELLITUS WITHOUT COMPLICATION, WITHOUT LONG-TERM CURRENT USE OF INSULIN (HCC): ICD-10-CM

## 2021-01-28 DIAGNOSIS — E11.65 UNCONTROLLED TYPE 2 DIABETES MELLITUS WITH HYPERGLYCEMIA (HCC): ICD-10-CM

## 2021-01-28 DIAGNOSIS — Z78.9 HEALTH MAINTENANCE ALTERATION: ICD-10-CM

## 2021-01-28 LAB
ALBUMIN SERPL BCP-MCNC: 3.6 G/DL (ref 3.5–5)
ALP SERPL-CCNC: 93 U/L (ref 46–116)
ALT SERPL W P-5'-P-CCNC: 17 U/L (ref 12–78)
ANION GAP SERPL CALCULATED.3IONS-SCNC: 3 MMOL/L (ref 4–13)
AST SERPL W P-5'-P-CCNC: 15 U/L (ref 5–45)
BASOPHILS # BLD AUTO: 0.03 THOUSANDS/ΜL (ref 0–0.1)
BASOPHILS NFR BLD AUTO: 0 % (ref 0–1)
BILIRUB SERPL-MCNC: 0.57 MG/DL (ref 0.2–1)
BILIRUB UR QL STRIP: NEGATIVE
BUN SERPL-MCNC: 10 MG/DL (ref 5–25)
CALCIUM SERPL-MCNC: 9.1 MG/DL (ref 8.3–10.1)
CHLORIDE SERPL-SCNC: 102 MMOL/L (ref 100–108)
CLARITY UR: CLEAR
CO2 SERPL-SCNC: 34 MMOL/L (ref 21–32)
COLOR UR: YELLOW
CREAT SERPL-MCNC: 0.95 MG/DL (ref 0.6–1.3)
CREAT UR-MCNC: 54.5 MG/DL
EOSINOPHIL # BLD AUTO: 0.02 THOUSAND/ΜL (ref 0–0.61)
EOSINOPHIL NFR BLD AUTO: 0 % (ref 0–6)
ERYTHROCYTE [DISTWIDTH] IN BLOOD BY AUTOMATED COUNT: 13.5 % (ref 11.6–15.1)
EST. AVERAGE GLUCOSE BLD GHB EST-MCNC: 180 MG/DL
GFR SERPL CREATININE-BSD FRML MDRD: 63 ML/MIN/1.73SQ M
GLUCOSE P FAST SERPL-MCNC: 101 MG/DL (ref 65–99)
GLUCOSE UR STRIP-MCNC: NEGATIVE MG/DL
HBA1C MFR BLD: 7.9 %
HCT VFR BLD AUTO: 37.8 % (ref 34.8–46.1)
HCV AB SER QL: NORMAL
HGB BLD-MCNC: 12.4 G/DL (ref 11.5–15.4)
HGB UR QL STRIP.AUTO: NEGATIVE
IMM GRANULOCYTES # BLD AUTO: 0.04 THOUSAND/UL (ref 0–0.2)
IMM GRANULOCYTES NFR BLD AUTO: 0 % (ref 0–2)
KETONES UR STRIP-MCNC: NEGATIVE MG/DL
LEUKOCYTE ESTERASE UR QL STRIP: NEGATIVE
LYMPHOCYTES # BLD AUTO: 2.62 THOUSANDS/ΜL (ref 0.6–4.47)
LYMPHOCYTES NFR BLD AUTO: 24 % (ref 14–44)
MAGNESIUM SERPL-MCNC: 1.7 MG/DL (ref 1.6–2.6)
MCH RBC QN AUTO: 28.6 PG (ref 26.8–34.3)
MCHC RBC AUTO-ENTMCNC: 32.8 G/DL (ref 31.4–37.4)
MCV RBC AUTO: 87 FL (ref 82–98)
MICROALBUMIN UR-MCNC: 8.4 MG/L (ref 0–20)
MICROALBUMIN/CREAT 24H UR: 15 MG/G CREATININE (ref 0–30)
MONOCYTES # BLD AUTO: 0.57 THOUSAND/ΜL (ref 0.17–1.22)
MONOCYTES NFR BLD AUTO: 5 % (ref 4–12)
NEUTROPHILS # BLD AUTO: 7.55 THOUSANDS/ΜL (ref 1.85–7.62)
NEUTS SEG NFR BLD AUTO: 71 % (ref 43–75)
NITRITE UR QL STRIP: NEGATIVE
NRBC BLD AUTO-RTO: 0 /100 WBCS
PH UR STRIP.AUTO: 7.5 [PH]
PLATELET # BLD AUTO: 337 THOUSANDS/UL (ref 149–390)
PMV BLD AUTO: 10.2 FL (ref 8.9–12.7)
POTASSIUM SERPL-SCNC: 3.5 MMOL/L (ref 3.5–5.3)
PROT SERPL-MCNC: 7.6 G/DL (ref 6.4–8.2)
PROT UR STRIP-MCNC: NEGATIVE MG/DL
RBC # BLD AUTO: 4.34 MILLION/UL (ref 3.81–5.12)
SODIUM SERPL-SCNC: 139 MMOL/L (ref 136–145)
SP GR UR STRIP.AUTO: 1.01 (ref 1–1.03)
T4 FREE SERPL-MCNC: 1.07 NG/DL (ref 0.76–1.46)
TSH SERPL DL<=0.05 MIU/L-ACNC: 16.6 UIU/ML (ref 0.36–3.74)
UROBILINOGEN UR QL STRIP.AUTO: 0.2 E.U./DL
WBC # BLD AUTO: 10.83 THOUSAND/UL (ref 4.31–10.16)

## 2021-01-28 PROCEDURE — 84443 ASSAY THYROID STIM HORMONE: CPT

## 2021-01-28 PROCEDURE — 3061F NEG MICROALBUMINURIA REV: CPT | Performed by: EMERGENCY MEDICINE

## 2021-01-28 PROCEDURE — 85025 COMPLETE CBC W/AUTO DIFF WBC: CPT

## 2021-01-28 PROCEDURE — 72110 X-RAY EXAM L-2 SPINE 4/>VWS: CPT

## 2021-01-28 PROCEDURE — 83735 ASSAY OF MAGNESIUM: CPT

## 2021-01-28 PROCEDURE — 81003 URINALYSIS AUTO W/O SCOPE: CPT | Performed by: INTERNAL MEDICINE

## 2021-01-28 PROCEDURE — 3051F HG A1C>EQUAL 7.0%<8.0%: CPT | Performed by: EMERGENCY MEDICINE

## 2021-01-28 PROCEDURE — 80053 COMPREHEN METABOLIC PANEL: CPT

## 2021-01-28 PROCEDURE — 84439 ASSAY OF FREE THYROXINE: CPT

## 2021-01-28 PROCEDURE — 83036 HEMOGLOBIN GLYCOSYLATED A1C: CPT

## 2021-01-28 PROCEDURE — 87389 HIV-1 AG W/HIV-1&-2 AB AG IA: CPT

## 2021-01-28 PROCEDURE — 36415 COLL VENOUS BLD VENIPUNCTURE: CPT

## 2021-01-28 PROCEDURE — 82570 ASSAY OF URINE CREATININE: CPT | Performed by: INTERNAL MEDICINE

## 2021-01-28 PROCEDURE — 99203 OFFICE O/P NEW LOW 30 MIN: CPT | Performed by: PHYSICIAN ASSISTANT

## 2021-01-28 PROCEDURE — 86803 HEPATITIS C AB TEST: CPT

## 2021-01-28 PROCEDURE — 82043 UR ALBUMIN QUANTITATIVE: CPT | Performed by: INTERNAL MEDICINE

## 2021-01-28 RX ORDER — SODIUM, POTASSIUM,MAG SULFATES 17.5-3.13G
1 SOLUTION, RECONSTITUTED, ORAL ORAL ONCE
Qty: 2 BOTTLE | Refills: 0 | Status: SHIPPED | OUTPATIENT
Start: 2021-01-28 | End: 2021-03-31 | Stop reason: ALTCHOICE

## 2021-01-28 NOTE — TELEPHONE ENCOUNTER
Dr Carlos Grant,    Please see clearance form for patient under media tab  Can patient hold aspirin 5-7 days prior to colonoscopy/EGD on 2/9/21?     Thanks,  Gagan Lopez [facility]

## 2021-01-28 NOTE — PROGRESS NOTES
Assessment/Plan:   Danny Gibbs is a 72 y  o female who is here for The primary encounter diagnosis was Gastroesophageal reflux disease, unspecified whether esophagitis present  A diagnosis of Right upper quadrant pain was also pertinent to this visit  patient with approximately 2 week history of right upper quadrant or pain that radiates to her right flank  Pain is worse after eating  Patient feels as if food gets stuck in her mid chest to epigastric  She has been on a liquid diet since the pain started  Associated nausea  andvomiting and especially after eating     patient had a colonoscopy and upper endoscopy approximately 7-8 years ago  There is question of Kaplan's esophagitis  Along with history of GERD in which she takes omeprazole daily      Ultrasound without gallstones, mildly thickened  Contracted gallbladder  Plan:  Would recommend upper endoscopy and colonoscopy  Prior to surgical removal of gallbladder  Although that she may have a component of biliary colic  Would proceed with upper endoscopy and colonoscopy  Will also order a HIDA scan to evaluate for gallbladder dysfunction  Patient will follow-up after upper endoscopy and colonoscopy to discuss  If there is a further need for surgical removal of her gallbladder  Recommend a bland low-fat diet until all studies completed        ______________________________________________________  CC:Cholelithiasis (Patient states she cannot eat because it causes her pain states she has been taking fluids only)    HPI:  Danny Gibbs is a 72 y  o female who was referred for evaluation of Cholelithiasis (Patient states she cannot eat because it causes her pain states she has been taking fluids only)    Currently  Epigastric pain that radiates to right upper quadrant  Associated with nausea and vomiting  This has been going on for approximately 2 weeks    Patient is currently on a liquid diet because food gets stuck in her chest   Pain has been slowly improving except when she eats  Denies fevers, chills, shortness of breath or chest pain      ROS:  General ROS: negative  negative for - chills, fatigue, fever or night sweats, weight loss  Respiratory ROS: no cough, shortness of breath, or wheezing  Cardiovascular ROS: no chest pain or dyspnea on exertion  Genito-Urinary ROS: no dysuria, trouble voiding, or hematuria  Musculoskeletal ROS: negative for - gait disturbance, joint pain or muscle pain  Neurological ROS: no TIA or stroke symptoms  Gastrointestinal: see HPI  No abdominal pain, melena, BRB unless specified in HPI  Skin ROS: no new rashes or lesions   Lymphatic ROS: no new adenopathy noted by pt     GYN ROS: see HPI, no new GYN history or bleeding noted  Psy ROS: no new mental or behavioral disturbances       Patient Active Problem List   Diagnosis    Acquired hypothyroidism    Essential hypertension    Mild intermittent asthma with acute exacerbation    Pure hypercholesterolemia    Atypical chest pain    Influenza A    Lung infiltrate    [facility] ([facility])    Irritant contact dermatitis    Depression, recurrent ([address] )    Uncontrolled type 2 diabetes mellitus with hyperglycemia ([facility])    Class 3 severe obesity due to excess calories with serious comorbidity and body mass index (BMI) of 40 0 to 44 9 in adult [facility])         Allergies:  Etodolac, Iodinated diagnostic agents, Simvastatin, Latex, Morphine, and Penicillins      Current Outpatient Medications:     acetaminophen (TYLENOL) 325 mg tablet, Take 2 tablets (650 mg total) by mouth every 6 (six) hours as needed for mild pain, moderate pain, headaches or fever, Disp: 30 tablet, Rfl: 0    albuterol (2 5 mg/3 mL) 0 083 % nebulizer solution, Take 1 vial (2 5 mg total) by nebulization every 6 (six) hours as needed for wheezing or shortness of breath, Disp: 60 vial, Rfl: 5    albuterol (PROVENTIL HFA,VENTOLIN HFA) 90 mcg/act inhaler, Inhale 2 puffs every 6 (six) hours as needed for wheezing, Disp: , Rfl:     aspirin 81 mg chewable tablet, Chew 81 mg daily, Disp: , Rfl:     atorvastatin (LIPITOR) 40 mg tablet, Take 1 tablet (40 mg total) by mouth daily, Disp: 90 tablet, Rfl: 1    BD Pen Needle Berenice 2nd Gen 32G X 4 MM MISC, USE TWICE A DAY, Disp: 100 each, Rfl: 1    Blood Glucose Monitoring Suppl (ONE TOUCH ULTRA 2) w/Device KIT, Test blood sugar 3 times daily, Disp: 1 each, Rfl: 0    budesonide-formoterol (SYMBICORT) 160-4 5 mcg/act inhaler, Inhale 2 puffs 2 (two) times a day Rinse mouth after use , Disp: , Rfl:     ezetimibe (ZETIA) 10 mg tablet, Take 1 tablet (10 mg total) by mouth daily, Disp: 90 tablet, Rfl: 1    fluticasone (FLONASE) 50 mcg/act nasal spray, 1 spray into each nostril daily, Disp: , Rfl:     furosemide (LASIX) 20 mg tablet, Take 20 mg by mouth 2 (two) times a day as needed, Disp: , Rfl:     glimepiride (AMARYL) 4 mg tablet, Take 1 tablet (4 mg total) by mouth daily with breakfast, Disp: 90 tablet, Rfl: 1    glucose blood test strip, Prior to each meal, Disp: 300 each, Rfl: 1    hydrocortisone 2 5 % cream, Apply topically 2 (two) times a day, Disp: 30 g, Rfl: 0    Lancets (ONETOUCH DELICA PLUS [id number]) MISC, TEST 3 TIMES, Disp: 100 each, Rfl: 1    levothyroxine 150 mcg tablet, Take 1 tablet (150 mcg total) by mouth daily, Disp: 90 tablet, Rfl: 1    losartan (COZAAR) 100 MG tablet, Take 1 tablet (100 mg total) by mouth daily, Disp: 90 tablet, Rfl: 1    metFORMIN (GLUCOPHAGE) 1000 MG tablet, Take 1 tablet (1,000 mg total) by mouth 2 (two) times a day with meals, Disp: 180 tablet, Rfl: 1    montelukast (SINGULAIR) 10 mg tablet, Take 10 mg by mouth daily at bedtime, Disp: , Rfl:     omeprazole (PriLOSEC) 20 mg delayed release capsule, Take 1 capsule (20 mg total) by mouth daily, Disp: 90 capsule, Rfl: 1    Tresiba FlexTouch 100 units/mL injection pen, INJECT 25UNITS IN THE MORNING AND 10 UNITS IN THE EVENING, Disp: 5 pen, Rfl: 1    indapamide (LOZOL) 1 25 mg tablet, Take 1 tablet (1 25 mg total) by mouth every morning, Disp: 90 tablet, Rfl: 1    metoprolol succinate (TOPROL-XL) 25 mg 24 hr tablet, Take 1 tablet (25 mg total) by mouth daily, Disp: 30 tablet, Rfl: 4    Na Sulfate-K Sulfate-Mg Sulf (Suprep Bowel Prep Kit) 17 5-3 13-1 6 GM/177ML SOLN, Take 1 kit by mouth once for 1 dose, Disp: 2 Bottle, Rfl: 0    spironolactone (ALDACTONE) 25 mg tablet, Take 1 tablet (25 mg total) by mouth daily, Disp: 90 tablet, Rfl: 1    Past Medical History:   Diagnosis Date    Asthma     Diabetes mellitus ([facility] )     Hypertension     Obesity     Stroke [facility])        Past Surgical History:   Procedure Laterality Date    ROTATOR CUFF REPAIR Bilateral        Family History   Problem Relation Age of Onset    Diabetes Mother     Hypertension Mother     Hypertension Father         reports that she has never smoked  She has never used smokeless tobacco  She reports that she does not drink alcohol or use drugs  Labs:   Lab Results   Component Value Date    WBC 10 67 (H) 01/20/2021    HGB 12 2 01/20/2021    HCT 36 9 01/20/2021    MCV 86 01/20/2021     01/20/2021     Lab Results   Component Value Date     03/26/2014    K 3 7 01/20/2021     01/20/2021    CO2 33 (H) 01/20/2021    ANIONGAP 8 03/26/2014    BUN 18 01/20/2021    CREATININE 1 02 01/20/2021    GLUCOSE 231 (H) 03/26/2014    GLUF 173 (H) 10/09/2020    CALCIUM 9 5 01/20/2021    CORRECTEDCA 9 4 10/09/2020    AST 8 01/20/2021    ALT 18 01/20/2021    ALKPHOS 84 01/20/2021    PROT 7 3 03/26/2014    BILITOT 0 3 03/26/2014    EGFR 58 01/20/2021         Imaging: No new pertinent imaging studies           PHYSICAL EXAM    Vitals:    01/28/21 0959   BP: 124/60   Pulse: 98   Temp: (!) 97 1 °F (36 2 °C)          PHYSICAL EXAM  General Appearance:    Alert, cooperative, no distress, healthy   Head:    Normocephalic without obvious abnormality   Eyes:    PERRL, conjunctiva/corneas clear, EOM's intact        Neck:   Supple, no adenopathy, no JVD   Back:     Symmetric, no spinal or CVA tenderness   Lungs:     Clear to auscultation bilaterally, no wheezing or rhonchi   Heart:    Regular rate and rhythm, S1 and S2 normal, no murmur   Abdomen:     Soft,  Mild epigastric and right upper quadrant tenderness to deep palpitation  Positive bowel sounds   Extremities:   Extremities normal  No clubbing, cyanosis or edema   Psych:   Normal Affect   Neurologic:   CNII-XII intact  Strength symmetric, speech intact                                           Some portions of this record may have been generated with voice recognition software  There may be translation, syntax,  or grammatical errors  Occasional wrong word or "sound-a-like" substitutions may have occurred due to the inherent limitations of the voice recognition software  Read the chart carefully and recognize, using context, where substitutions may have occurred  If you have any questions, please contact the dictating provider for clarification or correction, as needed  This encounter has been coded by a non-certified coder         Rafi Saxena PA-C    Date: 1/28/2021 Time: 10:36 AM

## 2021-01-28 NOTE — TELEPHONE ENCOUNTER
Patient called stated her blood sugars have been running low because she has not been eating much because of her stomach issues  She gave me the following blood sugar readings:    1/25/21 - 134 AM  1/26/21 - 129 AM  1/27/21 - 115 AM - 34 PM  1/28/21 - 98 AM    I spoke with Dr Ami Paz and he stated he will have her stop Glimepiride 4mg, continue Metformin as directed and she is to decrease Tresiba to 15 units at bedtime  She is to check her blood sugars twice and day and call back with readings  I called the patient back and advised her of Dr Tracy Son orders and she understood

## 2021-01-28 NOTE — H&P (VIEW-ONLY)
Assessment/Plan:   Debbie Busby is a 72 y  o female who is here for The primary encounter diagnosis was Gastroesophageal reflux disease, unspecified whether esophagitis present  A diagnosis of Right upper quadrant pain was also pertinent to this visit  patient with approximately 2 week history of right upper quadrant or pain that radiates to her right flank  Pain is worse after eating  Patient feels as if food gets stuck in her mid chest to epigastric  She has been on a liquid diet since the pain started  Associated nausea  andvomiting and especially after eating     patient had a colonoscopy and upper endoscopy approximately 7-8 years ago  There is question of Kaplan's esophagitis  Along with history of GERD in which she takes omeprazole daily      Ultrasound without gallstones, mildly thickened  Contracted gallbladder  Plan:  Would recommend upper endoscopy and colonoscopy  Prior to surgical removal of gallbladder  Although that she may have a component of biliary colic  Would proceed with upper endoscopy and colonoscopy  Will also order a HIDA scan to evaluate for gallbladder dysfunction  Patient will follow-up after upper endoscopy and colonoscopy to discuss  If there is a further need for surgical removal of her gallbladder  Recommend a bland low-fat diet until all studies completed        ______________________________________________________  CC:Cholelithiasis (Patient states she cannot eat because it causes her pain states she has been taking fluids only)    HPI:  Debbie Busby is a 72 y  o female who was referred for evaluation of Cholelithiasis (Patient states she cannot eat because it causes her pain states she has been taking fluids only)    Currently  Epigastric pain that radiates to right upper quadrant  Associated with nausea and vomiting  This has been going on for approximately 2 weeks    Patient is currently on a liquid diet because food gets stuck in her chest   Pain has been slowly improving except when she eats  Denies fevers, chills, shortness of breath or chest pain      ROS:  General ROS: negative  negative for - chills, fatigue, fever or night sweats, weight loss  Respiratory ROS: no cough, shortness of breath, or wheezing  Cardiovascular ROS: no chest pain or dyspnea on exertion  Genito-Urinary ROS: no dysuria, trouble voiding, or hematuria  Musculoskeletal ROS: negative for - gait disturbance, joint pain or muscle pain  Neurological ROS: no TIA or stroke symptoms  Gastrointestinal: see HPI  No abdominal pain, melena, BRB unless specified in HPI  Skin ROS: no new rashes or lesions   Lymphatic ROS: no new adenopathy noted by pt     GYN ROS: see HPI, no new GYN history or bleeding noted  Psy ROS: no new mental or behavioral disturbances       Patient Active Problem List   Diagnosis    Acquired hypothyroidism    Essential hypertension    Mild intermittent asthma with acute exacerbation    Pure hypercholesterolemia    Atypical chest pain    Influenza A    Lung infiltrate    [facility]    Irritant contact dermatitis    Depression, recurrent ([address] )    Uncontrolled type 2 diabetes mellitus with hyperglycemia ([facility])    Class 3 severe obesity due to excess calories with serious comorbidity and body mass index (BMI) of 40 0 to 44 9 in adult [facility])         Allergies:  Etodolac, Iodinated diagnostic agents, Simvastatin, Latex, Morphine, and Penicillins      Current Outpatient Medications:     acetaminophen (TYLENOL) 325 mg tablet, Take 2 tablets (650 mg total) by mouth every 6 (six) hours as needed for mild pain, moderate pain, headaches or fever, Disp: 30 tablet, Rfl: 0    albuterol (2 5 mg/3 mL) 0 083 % nebulizer solution, Take 1 vial (2 5 mg total) by nebulization every 6 (six) hours as needed for wheezing or shortness of breath, Disp: 60 vial, Rfl: 5    albuterol (PROVENTIL HFA,VENTOLIN HFA) 90 mcg/act inhaler, Inhale 2 puffs every 6 (six) hours as needed for wheezing, Disp: , Rfl:     aspirin 81 mg chewable tablet, Chew 81 mg daily, Disp: , Rfl:     atorvastatin (LIPITOR) 40 mg tablet, Take 1 tablet (40 mg total) by mouth daily, Disp: 90 tablet, Rfl: 1    BD Pen Needle Berenice 2nd Gen 32G X 4 MM MISC, USE TWICE A DAY, Disp: 100 each, Rfl: 1    Blood Glucose Monitoring Suppl (ONE TOUCH ULTRA 2) w/Device KIT, Test blood sugar 3 times daily, Disp: 1 each, Rfl: 0    budesonide-formoterol (SYMBICORT) 160-4 5 mcg/act inhaler, Inhale 2 puffs 2 (two) times a day Rinse mouth after use , Disp: , Rfl:     ezetimibe (ZETIA) 10 mg tablet, Take 1 tablet (10 mg total) by mouth daily, Disp: 90 tablet, Rfl: 1    fluticasone (FLONASE) 50 mcg/act nasal spray, 1 spray into each nostril daily, Disp: , Rfl:     furosemide (LASIX) 20 mg tablet, Take 20 mg by mouth 2 (two) times a day as needed, Disp: , Rfl:     glimepiride (AMARYL) 4 mg tablet, Take 1 tablet (4 mg total) by mouth daily with breakfast, Disp: 90 tablet, Rfl: 1    glucose blood test strip, Prior to each meal, Disp: 300 each, Rfl: 1    hydrocortisone 2 5 % cream, Apply topically 2 (two) times a day, Disp: 30 g, Rfl: 0    Lancets (ONETOUCH DELICA PLUS [id number]) MISC, TEST 3 TIMES, Disp: 100 each, Rfl: 1    levothyroxine 150 mcg tablet, Take 1 tablet (150 mcg total) by mouth daily, Disp: 90 tablet, Rfl: 1    losartan (COZAAR) 100 MG tablet, Take 1 tablet (100 mg total) by mouth daily, Disp: 90 tablet, Rfl: 1    metFORMIN (GLUCOPHAGE) 1000 MG tablet, Take 1 tablet (1,000 mg total) by mouth 2 (two) times a day with meals, Disp: 180 tablet, Rfl: 1    montelukast (SINGULAIR) 10 mg tablet, Take 10 mg by mouth daily at bedtime, Disp: , Rfl:     omeprazole (PriLOSEC) 20 mg delayed release capsule, Take 1 capsule (20 mg total) by mouth daily, Disp: 90 capsule, Rfl: 1    Tresiba FlexTouch 100 units/mL injection pen, INJECT 25UNITS IN THE MORNING AND 10 UNITS IN THE EVENING, Disp: 5 pen, Rfl: 1    indapamide (LOZOL) 1 25 mg tablet, Take 1 tablet (1 25 mg total) by mouth every morning, Disp: 90 tablet, Rfl: 1    metoprolol succinate (TOPROL-XL) 25 mg 24 hr tablet, Take 1 tablet (25 mg total) by mouth daily, Disp: 30 tablet, Rfl: 4    Na Sulfate-K Sulfate-Mg Sulf (Suprep Bowel Prep Kit) 17 5-3 13-1 6 GM/177ML SOLN, Take 1 kit by mouth once for 1 dose, Disp: 2 Bottle, Rfl: 0    spironolactone (ALDACTONE) 25 mg tablet, Take 1 tablet (25 mg total) by mouth daily, Disp: 90 tablet, Rfl: 1    Past Medical History:   Diagnosis Date    Asthma     [facility] mellitus [facility]     Hypertension     Obesity     Stroke [facility])        Past Surgical History:   Procedure Laterality Date    ROTATOR CUFF REPAIR Bilateral        Family History   Problem Relation Age of Onset    Diabetes Mother     Hypertension Mother     Hypertension Father         reports that she has never smoked  She has never used smokeless tobacco  She reports that she does not drink alcohol or use drugs  Labs:   Lab Results   Component Value Date    WBC 10 67 (H) 01/20/2021    HGB 12 2 01/20/2021    HCT 36 9 01/20/2021    MCV 86 01/20/2021     01/20/2021     Lab Results   Component Value Date     03/26/2014    K 3 7 01/20/2021     01/20/2021    CO2 33 (H) 01/20/2021    ANIONGAP 8 03/26/2014    BUN 18 01/20/2021    CREATININE 1 02 01/20/2021    GLUCOSE 231 (H) 03/26/2014    GLUF 173 (H) 10/09/2020    CALCIUM 9 5 01/20/2021    CORRECTEDCA 9 4 10/09/2020    AST 8 01/20/2021    ALT 18 01/20/2021    ALKPHOS 84 01/20/2021    PROT 7 3 03/26/2014    BILITOT 0 3 03/26/2014    EGFR 58 01/20/2021         Imaging: No new pertinent imaging studies           PHYSICAL EXAM    Vitals:    01/28/21 0959   BP: 124/60   Pulse: 98   Temp: (!) 97 1 °F (36 2 °C)          PHYSICAL EXAM  General Appearance:    Alert, cooperative, no distress, healthy   Head:    Normocephalic without obvious abnormality   Eyes:    PERRL, conjunctiva/corneas clear, EOM's intact        Neck:   Supple, no adenopathy, no JVD   Back:     Symmetric, no spinal or CVA tenderness   Lungs:     Clear to auscultation bilaterally, no wheezing or rhonchi   Heart:    Regular rate and rhythm, S1 and S2 normal, no murmur   Abdomen:     Soft,  Mild epigastric and right upper quadrant tenderness to deep palpitation  Positive bowel sounds   Extremities:   Extremities normal  No clubbing, cyanosis or edema   Psych:   Normal Affect   Neurologic:   CNII-XII intact  Strength symmetric, speech intact                                           Some portions of this record may have been generated with voice recognition software  There may be translation, syntax,  or grammatical errors  Occasional wrong word or "sound-a-like" substitutions may have occurred due to the inherent limitations of the voice recognition software  Read the chart carefully and recognize, using context, where substitutions may have occurred  If you have any questions, please contact the dictating provider for clarification or correction, as needed  This encounter has been coded by a non-certified coder         Jeanne Wren PA-C    Date: 1/28/2021 Time: 10:36 AM

## 2021-01-29 ENCOUNTER — OFFICE VISIT (OUTPATIENT)
Dept: OBGYN CLINIC | Facility: MEDICAL CENTER | Age: 66
End: 2021-01-29
Payer: COMMERCIAL

## 2021-01-29 VITALS
HEART RATE: 83 BPM | DIASTOLIC BLOOD PRESSURE: 76 MMHG | BODY MASS INDEX: 36.8 KG/M2 | HEIGHT: 62 IN | SYSTOLIC BLOOD PRESSURE: 163 MMHG | WEIGHT: 200 LBS

## 2021-01-29 DIAGNOSIS — M43.16 SPONDYLOLISTHESIS OF LUMBAR REGION: ICD-10-CM

## 2021-01-29 DIAGNOSIS — G89.29 CHRONIC MIDLINE LOW BACK PAIN WITHOUT SCIATICA: Primary | ICD-10-CM

## 2021-01-29 DIAGNOSIS — M47.816 FACET ARTHROPATHY, LUMBAR: ICD-10-CM

## 2021-01-29 DIAGNOSIS — M54.50 CHRONIC MIDLINE LOW BACK PAIN WITHOUT SCIATICA: Primary | ICD-10-CM

## 2021-01-29 DIAGNOSIS — E66.01 CLASS 3 SEVERE OBESITY DUE TO EXCESS CALORIES WITH SERIOUS COMORBIDITY AND BODY MASS INDEX (BMI) OF 40.0 TO 44.9 IN ADULT (HCC): ICD-10-CM

## 2021-01-29 LAB — HIV 1+2 AB+HIV1 P24 AG SERPL QL IA: NORMAL

## 2021-01-29 PROCEDURE — 3008F BODY MASS INDEX DOCD: CPT | Performed by: EMERGENCY MEDICINE

## 2021-01-29 PROCEDURE — 3078F DIAST BP <80 MM HG: CPT | Performed by: EMERGENCY MEDICINE

## 2021-01-29 PROCEDURE — 3077F SYST BP >= 140 MM HG: CPT | Performed by: EMERGENCY MEDICINE

## 2021-01-29 PROCEDURE — 1160F RVW MEDS BY RX/DR IN RCRD: CPT | Performed by: EMERGENCY MEDICINE

## 2021-01-29 PROCEDURE — 99203 OFFICE O/P NEW LOW 30 MIN: CPT | Performed by: EMERGENCY MEDICINE

## 2021-01-29 PROCEDURE — 1036F TOBACCO NON-USER: CPT | Performed by: EMERGENCY MEDICINE
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
## 2021-01-29 NOTE — PATIENT INSTRUCTIONS
Dolor crónico de espalda   LO QUE NECESITA SABER:   Dolor de espalda crónico es aquel que dura 3 meses o más  Puede incluir el dolor que no se ha controlado o no mejore con el tratamiento  Puede que barfield dolor de espalda cause debilidad o dolor que se propaga a zohaib brazos o piernas  INSTRUCCIONES SOBRE EL [facility]:   Regrese a la rocio de emergencias si:   · Usted tiene dolor intenso  · Usted tiene signos nuevos de adormecimiento o debilidad, sobre todo en la parte inferior de la espalda, piernas, brazos o genitales  · Usted pierde control de barfield vejiga o heces  · Usted tiene fiebre o pérdida de peso repentina  Pregúntele a barfield Dorisann Rollins vitaminas y minerales son adecuados para usted  · Usted tiene un dolor nuevo o peor  · Usted tiene preguntas o inquietudes acerca de barfield condición o cuidado  Medicamentos:   · AINEs (Analgésicos antiinflamatorios no esteroides)  ayudan a disminuir la inflamación y el dolor  Lyric medicamento se puede comprar con o sin receta médica  Lyric medicamento puede causar sangrado estomacal o problemas en los riñones en ciertas personas  Si usted pedro un medicamento anticoagulante, asegúrese de preguntarle a barfield médico si los LUIS son seguros para usted  Norma siempre la etiqueta y siga cuidadosamente las instrucciones antes de usar lyric medicamento  · El acetaminofén  [employer]  Está disponible sin receta médica  Pregunte la cantidad y la frecuencia con que debe tomarlos  [address]  El acetaminofén puede causar daño en el hígado cuando no se pedro de forma correcta  · Un medicamento con receta para el dolor  también se puede administrar para disminuir el dolor  No espere hasta que el dolor sea severo antes de mt lyric medicamento  · Relajantes musculares  ayudan a disminuir los espasmos musculares y el dolor de espalda  · [place] zohaib medicamentos jaleel se le haya indicado    Consulte con barfield médico si usted romulo que barfield medicamento no le está ayudando o si presenta efectos secundarios  Infórmele si es alérgico a cualquier medicamento  Mantenga maria luz lista actualizada de los [place], las vitaminas y los productos herbales que pedro  Incluya los siguientes datos de los medicamentos: cantidad, frecuencia y motivo de administración  Traiga con usted la lista o los envases de la píldoras a zohaib citas de seguimiento  Lleve la lista de los medicamentos con usted en valente de maria luz emergencia  Acuda a zohaib consultas de control con london médico según le indicaron  Es probable que lo refieran a un especialista en medicina del deporte o un especialista en eve dorsal  Anote zohaib preguntas para que se acuerde de hacerlas kady zohaib visitas  Controle london dolor crónico de espalda:   · El calor  ayuda a disminuir dolor y espasmos musculares  Aplique calor en london espalda por 15 a 20 minutos cada 2 horas o con la frecuencia posible  · Manténgase activo  lo más que pueda sin causar [employer]  Pregúntele a london médico cuáles ejercicios son correctos para usted  No se siente ni se acueste kady largos períodos  Debido a que un movimiento brusco podría empeorar london dolor de espalda  Evite levantar objetos hasta que ya no tenga dolor  · Un fisioterapeuta  puede enseñarle ejercicios para ayudar a mejorar el movimiento y la fuerza, y para disminuir el dolor  © 2017 [address] Information is for End User's use only and may not be sold, redistributed or otherwise used for commercial purposes  All illustrations and images included in CareNotes® are the copyrighted property of A D A M , Inc  or [place]  Esta información es sólo para uso en educación  [place] intención no es darle un consejo médico sobre enfermedades o tratamientos  Colsulte con london [place] Rafael farmacéutico antes de seguir cualquier régimen médico para saber si es seguro y efectivo para usted
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
## 2021-01-29 NOTE — LETTER
January 29, 2021     Chalino Mccauley MD  [address] [facility]    Patient: Kan Lung   YOB: 1955   Date of Visit: 1/29/2021       Dear Dr Terell Harrington: Thank you for referring Dayana Carlson to me for evaluation  Below are the relevant portions of my assessment and plan of care  If you have questions, please do not hesitate to call me  I look forward to following [facility] along with you           Sincerely,        Rodrigue Rodriguez MD        CC: No Recipients

## 2021-02-05 ENCOUNTER — TELEPHONE (OUTPATIENT)
Dept: INTERNAL MEDICINE CLINIC | Facility: CLINIC | Age: 66
End: 2021-02-05

## 2021-02-05 ENCOUNTER — TELEPHONE (OUTPATIENT)
Dept: SURGERY | Facility: CLINIC | Age: 66
End: 2021-02-05
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
## 2021-02-05 NOTE — TELEPHONE ENCOUNTER
Patient is scheduled for an EGD and colonoscopy with Dr Amy Her  We recived a call from their office requesting a hold on her aspirin for 5 days  Per Dr Ceferino Shukla pt can hold aspirin for 5 days  Kelin at Dr Sharona Orantes office has been informed

## 2021-02-08 ENCOUNTER — ANESTHESIA EVENT (OUTPATIENT)
Dept: GASTROENTEROLOGY | Facility: HOSPITAL | Age: 66
End: 2021-02-08

## 2021-02-09 ENCOUNTER — HOSPITAL ENCOUNTER (EMERGENCY)
Facility: HOSPITAL | Age: 66
Discharge: HOME | End: 2021-02-09
Attending: EMERGENCY MEDICINE | Admitting: EMERGENCY MEDICINE
Payer: COMMERCIAL

## 2021-02-09 ENCOUNTER — APPOINTMENT (EMERGENCY)
Dept: RADIOLOGY | Facility: HOSPITAL | Age: 66
End: 2021-02-09
Payer: COMMERCIAL

## 2021-02-09 ENCOUNTER — TELEPHONE (OUTPATIENT)
Dept: SURGERY | Facility: CLINIC | Age: 66
End: 2021-02-09

## 2021-02-09 ENCOUNTER — ANESTHESIA (OUTPATIENT)
Dept: GASTROENTEROLOGY | Facility: HOSPITAL | Age: 66
End: 2021-02-09

## 2021-02-09 ENCOUNTER — TRANSCRIBE ORDERS (OUTPATIENT)
Dept: SURGERY | Facility: CLINIC | Age: 66
End: 2021-02-09

## 2021-02-09 ENCOUNTER — HOSPITAL ENCOUNTER (OUTPATIENT)
Dept: GASTROENTEROLOGY | Facility: HOSPITAL | Age: 66
Setting detail: OUTPATIENT SURGERY
Discharge: HOME | End: 2021-02-09
Attending: SURGERY
Payer: COMMERCIAL

## 2021-02-09 VITALS
OXYGEN SATURATION: 100 % | DIASTOLIC BLOOD PRESSURE: 63 MMHG | TEMPERATURE: 99.9 F | HEART RATE: 56 BPM | RESPIRATION RATE: 17 BRPM | SYSTOLIC BLOOD PRESSURE: 134 MMHG

## 2021-02-09 VITALS — HEART RATE: 59 BPM

## 2021-02-09 VITALS
HEART RATE: 70 BPM | HEIGHT: 62 IN | TEMPERATURE: 98.3 F | SYSTOLIC BLOOD PRESSURE: 177 MMHG | WEIGHT: 202.6 LBS | BODY MASS INDEX: 37.28 KG/M2 | OXYGEN SATURATION: 98 % | DIASTOLIC BLOOD PRESSURE: 84 MMHG | RESPIRATION RATE: 20 BRPM

## 2021-02-09 DIAGNOSIS — K21.9 GASTROESOPHAGEAL REFLUX DISEASE, UNSPECIFIED WHETHER ESOPHAGITIS PRESENT: ICD-10-CM

## 2021-02-09 DIAGNOSIS — J45.901 ASTHMA EXACERBATION: Primary | ICD-10-CM

## 2021-02-09 DIAGNOSIS — R10.11 RIGHT UPPER QUADRANT PAIN: Primary | ICD-10-CM

## 2021-02-09 DIAGNOSIS — Z12.11 ENCOUNTER FOR SCREENING COLONOSCOPY: ICD-10-CM

## 2021-02-09 DIAGNOSIS — R10.11 RIGHT UPPER QUADRANT PAIN: ICD-10-CM

## 2021-02-09 LAB
GLUCOSE SERPL-MCNC: 159 MG/DL (ref 65–140)
GLUCOSE SERPL-MCNC: 173 MG/DL (ref 65–140)

## 2021-02-09 PROCEDURE — 94640 AIRWAY INHALATION TREATMENT: CPT

## 2021-02-09 PROCEDURE — 82948 REAGENT STRIP/BLOOD GLUCOSE: CPT

## 2021-02-09 PROCEDURE — U0005 INFEC AGEN DETEC AMPLI PROBE: HCPCS | Performed by: EMERGENCY MEDICINE

## 2021-02-09 PROCEDURE — U0003 INFECTIOUS AGENT DETECTION BY NUCLEIC ACID (DNA OR RNA); SEVERE ACUTE RESPIRATORY SYNDROME CORONAVIRUS 2 (SARS-COV-2) (CORONAVIRUS DISEASE [COVID-19]), AMPLIFIED PROBE TECHNIQUE, MAKING USE OF HIGH THROUGHPUT TECHNOLOGIES AS DESCRIBED BY CMS-2020-01-R: HCPCS | Performed by: EMERGENCY MEDICINE

## 2021-02-09 PROCEDURE — 71045 X-RAY EXAM CHEST 1 VIEW: CPT

## 2021-02-09 PROCEDURE — 45385 COLONOSCOPY W/LESION REMOVAL: CPT | Performed by: SURGERY

## 2021-02-09 PROCEDURE — 43239 EGD BIOPSY SINGLE/MULTIPLE: CPT | Performed by: SURGERY

## 2021-02-09 PROCEDURE — 45380 COLONOSCOPY AND BIOPSY: CPT | Performed by: SURGERY

## 2021-02-09 PROCEDURE — 99284 EMERGENCY DEPT VISIT MOD MDM: CPT | Performed by: EMERGENCY MEDICINE

## 2021-02-09 PROCEDURE — 88305 TISSUE EXAM BY PATHOLOGIST: CPT | Performed by: PATHOLOGY

## 2021-02-09 PROCEDURE — 99285 EMERGENCY DEPT VISIT HI MDM: CPT

## 2021-02-09 RX ORDER — FENTANYL CITRATE/PF 50 MCG/ML
25 SYRINGE (ML) INJECTION
Status: DISCONTINUED | OUTPATIENT
Start: 2021-02-09 | End: 2021-02-13 | Stop reason: HOSPADM

## 2021-02-09 RX ORDER — PREDNISONE 20 MG/1
40 TABLET ORAL DAILY
Qty: 8 TABLET | Refills: 0 | Status: SHIPPED | OUTPATIENT
Start: 2021-02-09 | End: 2021-02-13

## 2021-02-09 RX ORDER — ALBUTEROL SULFATE 90 UG/1
1-2 AEROSOL, METERED RESPIRATORY (INHALATION) EVERY 6 HOURS PRN
Qty: 1 INHALER | Refills: 0 | Status: ON HOLD | OUTPATIENT
Start: 2021-02-09 | End: 2021-03-10

## 2021-02-09 RX ORDER — SODIUM CHLORIDE 9 MG/ML
125 INJECTION, SOLUTION INTRAVENOUS CONTINUOUS
Status: DISCONTINUED | OUTPATIENT
Start: 2021-02-09 | End: 2021-02-13 | Stop reason: HOSPADM

## 2021-02-09 RX ORDER — ONDANSETRON 2 MG/ML
4 INJECTION INTRAMUSCULAR; INTRAVENOUS ONCE AS NEEDED
Status: DISCONTINUED | OUTPATIENT
Start: 2021-02-09 | End: 2021-02-13 | Stop reason: HOSPADM

## 2021-02-09 RX ORDER — IPRATROPIUM BROMIDE AND ALBUTEROL SULFATE 2.5; .5 MG/3ML; MG/3ML
3 SOLUTION RESPIRATORY (INHALATION) ONCE
Status: COMPLETED | OUTPATIENT
Start: 2021-02-09 | End: 2021-02-09

## 2021-02-09 RX ORDER — PROPOFOL 10 MG/ML
INJECTION, EMULSION INTRAVENOUS AS NEEDED
Status: DISCONTINUED | OUTPATIENT
Start: 2021-02-09 | End: 2021-02-09

## 2021-02-09 RX ORDER — PREDNISONE 20 MG/1
60 TABLET ORAL ONCE
Status: COMPLETED | OUTPATIENT
Start: 2021-02-09 | End: 2021-02-09

## 2021-02-09 RX ORDER — ALBUTEROL SULFATE 2.5 MG/3ML
2.5 SOLUTION RESPIRATORY (INHALATION) EVERY 6 HOURS PRN
Qty: 75 ML | Refills: 0 | Status: ON HOLD | OUTPATIENT
Start: 2021-02-09 | End: 2021-03-10

## 2021-02-09 RX ADMIN — PROPOFOL 50 MG: 10 INJECTION, EMULSION INTRAVENOUS at 07:39

## 2021-02-09 RX ADMIN — PROPOFOL 120 MG: 10 INJECTION, EMULSION INTRAVENOUS at 07:36

## 2021-02-09 RX ADMIN — SODIUM CHLORIDE 125 ML/HR: 0.9 INJECTION, SOLUTION INTRAVENOUS at 07:28

## 2021-02-09 RX ADMIN — PROPOFOL 50 MG: 10 INJECTION, EMULSION INTRAVENOUS at 07:52

## 2021-02-09 RX ADMIN — PROPOFOL 50 MG: 10 INJECTION, EMULSION INTRAVENOUS at 07:43

## 2021-02-09 RX ADMIN — IPRATROPIUM BROMIDE AND ALBUTEROL SULFATE 3 ML: .5; 3 SOLUTION RESPIRATORY (INHALATION) at 16:42

## 2021-02-09 RX ADMIN — PREDNISONE 60 MG: 20 TABLET ORAL at 16:41

## 2021-02-09 NOTE — DISCHARGE INSTRUCTIONS
[facility]  Endoscopy Post-Operative Instructions  Dr Epi Cary MD, FACS    Procedure: Colonoscopy and EGD     Findings:  Diverticulosis and Colon Polyp(s) also a large hiatal hernia with short esophagus  Follow-Up: You will need a repeat Endoscopy in (generally)3 years    Consider seen in the office to consider esophageal surgery  You need an upper GI  Our office should call you to schedule this  Your hiatal hernia may be causing many of your symptoms        Will await final pathology report for final determination of number of years until your follow up endoscopy, if you had polyps on this exam   Different types of polyps require different lengths of follow up surveillance  Please call our office or your primary doctor's office if you have any questions, once the report is returned  You should have an endoscopy sooner than recommended if you have any symptoms of bleeding or change in stools or other concerns  You will receive a call from our office with your results, in addition to the the preliminary results you received today  You will usually receive a follow-up letter from our office in 1-2 weeks  Call the office if you do not hear from us  You are welcome to also schedule an office visit if desired to discuss the results further  It is your responsibility to contact our office for results in 1- 2 weeks if you do not hear from us  If a follow up endoscopy is needed, you are responsible for arranging that follow up appointment at the appropriate time  The office may or may not issue a reminder at that future time  Please take responsibility for your own follow up healthcare  Diet: Eat a light snack first, and then resume your previous diet  Call the office if you have unusual fevers, chills, nausea or vomiting or abdominal pain  Report to the emergency room with these are severe in nature        Activity: Do not drive a car, operate machinery, or sign legal documents for 24 hours after your procedure  Normal activity may be resumed on the day following the procedure  Call the office at [phone] for any of the following: Severe abdominal pain, significant rectal bleeding, chills, or fever above 100°, new onset of persistent cough or persistent vomiting  [address], [address]   Phone: [phone]                        Upper Endoscopy   WHAT YOU NEED TO KNOW:   An upper endoscopy is also called an upper gastrointestinal (GI) endoscopy, or an esophagogastroduodenoscopy (EGD)  You may feel bloated, gassy, or have some abdominal discomfort after your procedure  Your throat may be sore for 24 to 36 hours  You may burp or pass gas from air that is still inside your body  DISCHARGE INSTRUCTIONS:   Call 911 if:   · You have sudden chest pain or trouble breathing  Seek care immediately if:   · You feel dizzy or faint  · You have trouble swallowing  · You have severe throat pain  · Your bowel movements are very dark or black  · Your abdomen is hard and firm and you have severe pain  · You vomit blood  Contact your healthcare provider if:   · You feel full or bloated and cannot burp or pass gas  · You have not had a bowel movement for 3 days after your procedure  · You have neck pain  · You have a fever or chills  · You have nausea or are vomiting  · You have a rash or hives  · You have questions or concerns about your endoscopy  Relieve a sore throat:  Suck on throat lozenges or crushed ice  Gargle with a small amount of warm salt water  Mix 1 teaspoon of salt and 1 cup of warm water to make salt water  Relieve gas and discomfort from bloating:  Lie on your right side with a heating pad on your abdomen  Take short walks to help pass gas  Eat small meals until bloating is relieved    Rest after your procedure:  Do not drive or make important decisions until the day after your procedure  Return to your normal activity as directed  You can usually return to work the day after your procedure  Follow up with your healthcare provider as directed:  Write down your questions so you remember to ask them during your visits  © Copyright [address] Information is for End User's use only and may not be sold, redistributed or otherwise used for commercial purposes  All illustrations and images included in CareNotes® are the copyrighted property of A D A M , Inc  or Delmar Johnson   The above information is an  only  It is not intended as medical advice for individual conditions or treatments  Talk to your doctor, nurse or pharmacist before following any medical regimen to see if it is safe and effective for you  Hiatal Hernia   WHAT YOU NEED TO KNOW:   What is a hiatal hernia? A hiatal hernia is a condition that causes part of your stomach to bulge through the hiatus (small opening) in your diaphragm  The part of the stomach may move up and down, or it may get trapped above the diaphragm  What increases my risk for a hiatal hernia? The exact cause of a hiatal hernia is not known  You may have been born with a large hiatus  The following may increase your risk of a hiatal hernia:  · Obesity    · Older age    · Medical conditions such as diverticulosis or esophagitis    · Previous surgery of the esophagus or stomach or trauma such as from a motor vehicle accident    What are the types of hiatal hernia? · Type I (sliding hiatal hernia): A portion of the stomach slides in and out of the hiatus  This type is the most common and usually causes gastroesophageal reflux disease (GERD)  GERD occurs when the esophageal sphincter does not close properly and causes acid reflux  The esophageal sphincter is the lower muscle of the esophagus       · Type II (paraesophageal hiatal hernia):  Type II hiatal hernia forms when a part of the stomach squeezes through the hiatus and lies next to the esophagus  · Type III (combined):  Type III hiatal hernia is a combination of a sliding and a paraesophageal hiatal hernia  · Type IV (complex paraesophageal hiatal hernia): The whole stomach, the small and large bowels, spleen, pancreas, or liver is pushed up into the chest     What are the signs and symptoms of a hiatal hernia? The most common symptom is heartburn  This usually occurs after meals and spreads to your neck, jaw, or shoulder  You may have no signs or symptoms, or you may have any of the following:  · Abdominal pain, especially in the area just above your navel    · Bitter or acid taste in your mouth    · Trouble swallowing    · Coughing or hoarseness    · Chest pain or shortness of breath that occurs after eating    · Frequent burping or hiccups    · Uncomfortable feeling of fullness after eating    How is a hiatal hernia diagnosed? · An upper GI series test  includes x-rays of your esophagus, stomach, and your small intestines  It is also called a barium swallow test  You will be given barium (a chalky liquid) to drink before the pictures are taken  This liquid helps your stomach and intestines show up better on the x-rays  An upper GI series can show if you have an ulcer, a blocked intestine, or other problems  · An endoscopy  uses a scope to see the inside of your digestive tract  A scope is a long, bendable tube with a light on the end of it  A camera may be hooked to the scope to take pictures  How is a hiatal hernia treated? Treatment depends on the type of hiatal hernia you have and on your symptoms  You may not need any treatment  You may need any of the following:  · Medicines  may be given to relieve heartburn symptoms  These medicines help to decrease or block stomach acid  You may also be given medicines that help to tighten the esophageal sphincter       · Surgery  may be done when medicines cannot control your symptoms, or other problems are present  Your healthcare provider may also suggest surgery depending on the type of hernia you have  Your healthcare provider can put your stomach back into its normal location  He may make the hiatus (hole) smaller and anchor your stomach in your abdomen  Fundoplication is a surgery that wraps the upper part of the stomach around the esophageal sphincter to strengthen it  How can I manage symptoms? The following nutrition and lifestyle changes may be recommended to relieve symptoms of heartburn  · Avoid foods that make your symptoms worse  These may include spicy foods, fruit juices, alcohol, caffeine, chocolate, and mint  · Eat several small meals during the day  Small meals give your stomach less food to digest     · Avoid lying down and bending forward after you eat  Do not eat meals 2 to 3 hours before bedtime  This decreases your risk for reflux  · Maintain a healthy weight  If you are overweight, weight loss may help relieve your symptoms  · Sleep with your head elevated  at least 6 inches  · Do not smoke  Smoking can increase your symptoms of heartburn  When should I seek immediate care? · You have severe abdominal pain  · You try to vomit but nothing comes out (retching)  · You have severe chest pain and sudden trouble breathing  · Your bowel movements are black or bloody  · Your vomit looks like coffee grounds or has blood in it  When should I contact my healthcare provider? · Your symptoms are getting worse  · You have nausea, and you are vomiting  · You are losing weight without trying  · You have questions or concerns about your condition or care  CARE AGREEMENT:   You have the right to help plan your care  Learn about your health condition and how it may be treated  Discuss treatment options with your healthcare providers to decide what care you want to receive  You always have the right to refuse treatment   The above information is an  only  It is not intended as medical advice for individual conditions or treatments  Talk to your doctor, nurse or pharmacist before following any medical regimen to see if it is safe and effective for you  © Copyright [address] Information is for End User's use only and may not be sold, redistributed or otherwise used for commercial purposes  All illustrations and images included in CareNotes® are the copyrighted property of A D A M , Inc  or [address] [place], [place] [address]      Colonoscopy   WHAT YOU NEED TO KNOW:   A colonoscopy is a procedure to examine the inside of your colon (intestine) with a scope  Polyps or tissue growths may have been removed during your colonoscopy  It is normal to feel bloated and to have some abdominal discomfort  You should be passing gas  If you have hemorrhoids or you had polyps removed, you may have a small amount of bleeding  DISCHARGE INSTRUCTIONS:   Call your doctor if:   · You have a large amount of bright red blood in your bowel movements  · Your abdomen is hard and firm and you have severe pain  · You have sudden trouble breathing  · You develop a rash or hives  · You have a fever within 24 hours of your procedure  · You have not had a bowel movement for 3 days after your procedure  · You have questions or concerns about your condition or care  After your colonoscopy:   · Do not lift, strain, or run  for 3 days  · Rest as much as possible  You have been given medicine to relax you  Do not  drive or make important decisions for at least 24 hours  Return to your normal activity as directed  · Relieve gas and discomfort from bloating  by lying on your left side with a heating pad on your abdomen  You may need to take short walks to help the gas move out  Eat small meals until bloating is relieved  If you had polyps removed: For 7 days after your procedure:  · Do not  take aspirin      · Do not  go on long car rides  Help prevent constipation:   · Eat a variety of healthy foods  Healthy foods include fruit, vegetables, whole-grain breads, low-fat dairy products, beans, lean meat, and fish  Ask if you need to be on a special diet  Your healthcare provider may recommend that you eat high-fiber foods such as cooked beans  Fiber helps you have regular bowel movements  · Drink liquids as directed  Adults should drink between 9 and 13 eight-ounce cups of liquid every day  Ask what amount is best for you  For most people, good liquids to drink are water, juice, and milk  · Exercise as directed  Talk to your healthcare provider about the best exercise plan for you  Exercise can help prevent constipation, decrease your blood pressure and improve your health  Follow up with your healthcare provider as directed:  Write down your questions so you remember to ask them during your visits  © Copyright [facility] [address] Information is for End User's use only and may not be sold, redistributed or otherwise used for commercial purposes  All illustrations and images included in CareNotes® are the copyrighted property of A D A M , Inc  or [address] [place] [address]  The above information is an  only  It is not intended as medical advice for individual conditions or treatments  Talk to your doctor, nurse or pharmacist before following any medical regimen to see if it is safe and effective for you  Colorectal Polyps   WHAT YOU NEED TO KNOW:   What are colorectal polyps? Colorectal polyps are small growths of tissue in the lining of the colon and rectum  Most polyps are hyperplastic polyps and are usually benign (noncancerous)  Certain types of polyps, called adenomatous polyps, may turn into cancer  What increases my risk of colorectal polyps? The exact cause of colorectal polyps is unknown   The following may increase your risk:  · Older age    · A diet of foods high in fat and low in fiber · Family history of polyps    · Intestinal diseases, such as Crohn's disease or ulcerative colitis    · An unhealthy lifestyle, such as physical inactivity, smoking, or drinking alcohol    · Obesity    What are the signs and symptoms of colorectal polyps? · Blood in your bowel movement or bleeding from the rectum    · Change in bowel movement habits, such as diarrhea and constipation    · Abdominal pain    How are colorectal polyps diagnosed? You should have fecal blood screening once a year for colorectal disease if you are over 48years old  You should be screened earlier if you have an intestinal disease or a family history of polyps or colorectal cancer  During this screening, a sample of your bowel movement is checked for blood, which may be an early sign of colorectal polyps or cancer  You may also need any of the following tests:  · Digital rectal exam:  Your healthcare provider will examine your anus and use a finger to check your rectum for polyps  · Barium enema: A barium enema is an x-ray of the colon  A tube is put into your anus, and a liquid called barium is put through the tube  Barium is used so that healthcare providers can see your colon better on the x-ray film  · Virtual colonoscopy: This is a CT scan that takes pictures of the inside of your colon and rectum  A small, flexible tube is put into your rectum and air or carbon dioxide (gas) is used to expand your colon  This lets healthcare providers clearly see your colon and any polyps on a monitor  · Colonoscopy or sigmoidoscopy: These procedures help your healthcare provider see the inside of your colon using a flexible tube with a small light and camera on the end  During a sigmoidoscopy, your healthcare provider will only look at rectum and lower colon  During a colonoscopy, healthcare providers will look at the full length of your colon   Healthcare providers may remove a small amount of tissue from the colon for a biopsy  How are colorectal polyps treated? A polypectomy is a minimally invasive procedure to remove your polyps  They may be removed during a colonoscopy or sigmoidoscopy  Your healthcare provider may need to remove the polyps with a laparoscope  Laparoscopy is done by inserting a small, flexible scope into incisions made on your abdomen  What are the risks of colorectal polyps? You may bleed during a colonoscopy procedure  Your bowel may be perforated (torn) when polyps are removed  This may lead to an open abdominal surgery  During surgery, you may bleed too much or get an infection  Adenomatous polyps that are not removed may turn into cancer and become more difficult to treat  Where can I find support and more information? · Kareem Mcclelland ([facility])  [address], [place] [address]  Phone: [phone]  Web Address: Elio Ball  [facility] gov    When should I contact my healthcare provider? · You have a fever  · You have chills, a cough, or feel weak and achy  · You have abdominal pain that does not go away or gets worse after you take medicine  · Your abdomen is swollen  · You are losing weight without trying  · You have questions or concerns about your condition or care  When should I seek immediate care or call 911? · You have sudden shortness of breath  · You have a fast heart rate, fast breathing, or are too dizzy to stand up  · You have severe abdominal pain  · You see blood in your bowel movement  CARE AGREEMENT:   You have the right to help plan your care  Learn about your health condition and how it may be treated  Discuss treatment options with your healthcare providers to decide what care you want to receive  You always have the right to refuse treatment  The above information is an  only  It is not intended as medical advice for individual conditions or treatments   Talk to your doctor, nurse or pharmacist before following any medical regimen to see if it is safe and effective for you  © Copyright [address] Information is for End User's use only and may not be sold, redistributed or otherwise used for commercial purposes  All illustrations and images included in CareNotes® are the copyrighted property of A D A M , Inc  or Delmar Johnson       Diverticulosis   WHAT YOU NEED TO KNOW:   What is diverticulosis? Diverticulosis is a condition that causes small pockets called diverticula to form in your intestine  These pockets make it difficult for bowel movements to pass through your digestive system  What causes diverticulosis? Diverticula form when muscles have to work hard to move bowel movements through the intestine  The force causes bulges to form at weak areas in the intestine  This may happen if you eat foods that are low in fiber  Fiber helps give your bowel movements more bulk so they are larger and easier to move through your colon  The following may increase your risk of diverticulosis:  · A history of constipation    · Age 36 or older    · Obesity    · Lack of exercise    What are the signs and symptoms of diverticulosis? Diverticulosis usually does not cause any signs or symptoms  It may cause any of the following in some people:  · Pain or discomfort in your lower abdomen    · Abdominal bloating    · Constipation or diarrhea    How is diverticulosis diagnosed? Your healthcare provider will examine you and ask about your bowel movements, diet, and symptoms  He or she will also ask about any medical conditions you have or medicines you take  You may need any of the following:  · Blood tests  may be done to check for signs of inflammation  · A barium enema  is an x-ray of your colon that may show diverticula  A tube is put into your anus, and a liquid called barium is put through the tube   Barium is used so that healthcare providers can see your colon more clearly  · Flexible sigmoidoscopy  is a test to look for any changes in your lower intestines and rectum  It may also show the cause of any bleeding or pain  A soft, bendable tube with a light on the end will be put into your anus  It will then be moved forward into your intestine  · A colonoscopy  is used to look at your whole colon  A scope (long bendable tube with a light on the end) is used to take pictures  This test may show diverticula  · A CT scan , or CAT scan, may show diverticula  You may be given contrast liquid before the scan  Tell the healthcare provider if you have ever had an allergic reaction to contrast liquid  How is diverticulosis managed? The goal of treatment is to manage any symptoms you have and prevent other problems such as diverticulitis  Diverticulitis is swelling or infection of the diverticula  Your healthcare provider may recommend any of the following:  · Eat a variety of high-fiber foods  High-fiber foods help you have regular bowel movements  High-fiber foods include cooked beans, fruits, vegetables, and some cereals  Most adults need 25 to 35 grams of fiber each day  Your healthcare provider may recommend that you have more  Ask your healthcare provider how much fiber you need  Increase fiber slowly  You may have abdominal discomfort, bloating, and gas if you add fiber to your diet too quickly  You may need to take a fiber supplement if you are not getting enough fiber from food  · Medicines  to soften your bowel movements may be given  You may also need medicines to treat symptoms such as bloating and pain  · Drink liquids as directed  You may need to drink 2 to 3 liters (8 to 12 cups) of liquids every day  Ask your healthcare provider how much liquid to drink each day and which liquids are best for you  · Apply heat  on your abdomen for 20 to 30 minutes every 2 hours for as many days as directed  Heat helps decrease pain and muscle spasms      How can I help prevent diverticulitis or other symptoms? The following may help decrease your risk for diverticulitis or symptoms, such as bleeding  Talk to your provider about these or other things you can do to prevent problems that may occur with diverticulosis  · Exercise regularly  Ask your healthcare provider about the best exercise plan for you  Exercise can help you have regular bowel movements  Get 30 minutes of exercise on most days of the week  · Maintain a healthy weight  Ask your healthcare provider how much you should weigh  Ask him or her to help you create a weight loss plan if you are overweight  · Do not smoke  Nicotine and other chemicals in cigarettes increase your risk for diverticulitis  Ask your healthcare provider for information if you currently smoke and need help to quit  E-cigarettes or smokeless tobacco still contain nicotine  Talk to your healthcare provider before you use these products  · Ask your healthcare provider if it is safe to take NSAIDs  NSAIDs may increase your risk of diverticulitis  When should I seek immediate care? · You have severe pain on the left side of your lower abdomen  · Your bowel movements are bright or dark red  When should I contact my healthcare provider? · You have a fever and chills  · You feel dizzy or lightheaded  · You have nausea, or you are vomiting  · You have a change in your bowel movements  · You have questions or concerns about your condition or care  CARE AGREEMENT:   You have the right to help plan your care  Learn about your health condition and how it may be treated  Discuss treatment options with your healthcare providers to decide what care you want to receive  You always have the right to refuse treatment  The above information is an  only  It is not intended as medical advice for individual conditions or treatments   Talk to your doctor, nurse or pharmacist before following any medical regimen to see if it is safe and effective for you  © Copyright [address] Information is for End User's use only and may not be sold, redistributed or otherwise used for commercial purposes   All illustrations and images included in CareNotes® are the copyrighted property of A D A M , Inc  or [address] [place] [address]

## 2021-02-09 NOTE — TELEPHONE ENCOUNTER
Dr Angie Blancas called the office after patient's colonoscopy & EGD she would like patient to have a barium swallow test and also go through with the HIDA scan  Patient called in after she was home and the plan was reviewed with her  She is to go for her HIDA scan on Thursday 2/11 and we will put an order in for the barium swallow and also schedule the test  Patient agreed

## 2021-02-09 NOTE — ED PROVIDER NOTES
History  Chief Complaint   Patient presents with    Shortness of Breath     pt c/o sob after having a asthma exerbation x2 days ago  pt states she has nebulizers at home but has not gotten any relief  pt denies cp/n/v/d   Asthma     C/o sob/wheezing for 2 days  Pt  Has a hx of asthma and states that her inhaler isn't really helping lately  She also states that her nebulizer machine at home broke  No fevers, +slight cough  She had a colonoscopy/EGD this am that was uneventful  No known covid contacts  No cp          Prior to Admission Medications   Prescriptions Last Dose Informant Patient Reported? Taking?    BD Pen Needle Berenice 2nd Gen 32G X 4 MM MISC  Self No No   Sig: USE TWICE A DAY   Blood Glucose Monitoring Suppl (ONE TOUCH ULTRA 2) w/Device KIT  Self No No   Sig: Test blood sugar 3 times daily   Lancets (ONETOUCH DELICA PLUS [id number]) MISC  Self No Yes   Sig: TEST 3 TIMES   Na Sulfate-K Sulfate-Mg Sulf (Suprep Bowel Prep Kit) 17 5-3 13-1 6 GM/177ML SOLN   No No   Sig: Take 1 kit by mouth once for 1 dose   Tresiba FlexTouch 100 units/mL injection pen Past Week at Unknown time Self No Yes   Sig: INJECT 25UNITS IN THE MORNING AND 10 UNITS IN THE EVENING   acetaminophen (TYLENOL) 325 mg tablet  Self No Yes   Sig: Take 2 tablets (650 mg total) by mouth every 6 (six) hours as needed for mild pain, moderate pain, headaches or fever   albuterol (2 5 mg/3 mL) 0 083 % nebulizer solution Past Month at Unknown time Self No Yes   Sig: Take 1 vial (2 5 mg total) by nebulization every 6 (six) hours as needed for wheezing or shortness of breath   albuterol (PROVENTIL HFA,VENTOLIN HFA) 90 mcg/act inhaler 2/9/2021 at Unknown time Self Yes Yes   Sig: Inhale 2 puffs every 6 (six) hours as needed for wheezing   aspirin 81 mg chewable tablet Past Month at Unknown time Self Yes Yes   Sig: Chew 81 mg daily   atorvastatin (LIPITOR) 40 mg tablet Past Week at Unknown time Self No Yes   Sig: Take 1 tablet (40 mg total) by mouth daily   budesonide-formoterol (SYMBICORT) 160-4 5 mcg/act inhaler 2021 at Unknown time Self Yes Yes   Sig: Inhale 2 puffs 2 (two) times a day Rinse mouth after use    ezetimibe (ZETIA) 10 mg tablet 2021 at Unknown time Self No Yes   Sig: Take 1 tablet (10 mg total) by mouth daily   fluticasone (FLONASE) 50 mcg/act nasal spray 2021 at Unknown time Self Yes Yes   Si spray into each nostril daily   furosemide (LASIX) 20 mg tablet Past Week at Unknown time Self Yes Yes   Sig: Take 20 mg by mouth 2 (two) times a day as needed   glimepiride (AMARYL) 4 mg tablet 2021 at Unknown time Self No Yes   Sig: Take 1 tablet (4 mg total) by mouth daily with breakfast   glucose blood test strip  Self No No   Sig: Prior to each meal   hydrocortisone 2 5 % cream  Self No No   Sig: Apply topically 2 (two) times a day   indapamide (LOZOL) 1 25 mg tablet Past Month at Unknown time Self No Yes   Sig: Take 1 tablet (1 25 mg total) by mouth every morning   levothyroxine 150 mcg tablet 2021 at Unknown time Self No Yes   Sig: Take 1 tablet (150 mcg total) by mouth daily   losartan (COZAAR) 100 MG tablet 2021 at Unknown time Self No Yes   Sig: Take 1 tablet (100 mg total) by mouth daily   metFORMIN (GLUCOPHAGE) 1000 MG tablet 2021 at Unknown time Self No Yes   Sig: Take 1 tablet (1,000 mg total) by mouth 2 (two) times a day with meals   metoprolol succinate (TOPROL-XL) 25 mg 24 hr tablet 2021 at Unknown time Self No Yes   Sig: Take 1 tablet (25 mg total) by mouth daily   montelukast (SINGULAIR) 10 mg tablet Past Month at Unknown time Self Yes Yes   Sig: Take 10 mg by mouth daily at bedtime   omeprazole (PriLOSEC) 20 mg delayed release capsule  Self No No   Sig: Take 1 capsule (20 mg total) by mouth daily   spironolactone (ALDACTONE) 25 mg tablet 2021 at Unknown time Self No Yes   Sig: Take 1 tablet (25 mg total) by mouth daily      Facility-Administered Medications: None       Past Medical History: Diagnosis Date    Asthma     Diabetes mellitus ([facility] [address] )     Hypertension     Obesity     Stroke [facility])        Past Surgical History:   Procedure Laterality Date    ROTATOR CUFF REPAIR Bilateral        Family History   Problem Relation Age of Onset    Diabetes Mother     Hypertension Mother     Hypertension Father      I have reviewed and agree with the history as documented  E-Cigarette/Vaping    E-Cigarette Use Never User      E-Cigarette/Vaping Substances    Nicotine No     THC No     CBD No     Flavoring No     Other No     Unknown No      Social History     Tobacco Use    Smoking status: Never Smoker    Smokeless tobacco: Never Used   Substance Use Topics    Alcohol use: Never     Frequency: Never    Drug use: Never       Review of Systems   Constitutional: Negative for appetite change, fatigue and fever  HENT: Negative for rhinorrhea and sore throat  Respiratory: Positive for cough, shortness of breath and wheezing  Cardiovascular: Negative for chest pain and leg swelling  Gastrointestinal: Negative for abdominal pain, diarrhea, nausea and vomiting  Genitourinary: Negative for dysuria and flank pain  Musculoskeletal: Negative for back pain and neck pain  Skin: Negative for rash  Neurological: Negative for syncope and headaches  Psychiatric/Behavioral:        Mood normal       Physical Exam  Physical Exam  Vitals signs and nursing note reviewed  Constitutional:       Appearance: She is well-developed  HENT:      Head: Normocephalic and atraumatic  Neck:      Musculoskeletal: Normal range of motion and neck supple  Cardiovascular:      Rate and Rhythm: Normal rate and regular rhythm  Pulmonary:      Effort: Pulmonary effort is normal       Breath sounds: Normal breath sounds  Comments: Occasional exp  wheezing  Abdominal:      Palpations: Abdomen is soft  Tenderness: There is no abdominal tenderness  Musculoskeletal: Normal range of motion     Skin: General: Skin is warm and dry  Neurological:      General: No focal deficit present  Mental Status: She is alert and oriented to person, place, and time  Vital Signs  ED Triage Vitals [02/09/21 1556]   Temperature Pulse Respirations Blood Pressure SpO2   98 3 °F (36 8 °C) 81 18 (!) 228/90 98 %      Temp Source Heart Rate Source Patient Position - Orthostatic VS BP Location FiO2 (%)   Oral Monitor Sitting Right arm --      Pain Score       3           Vitals:    02/09/21 1556 02/09/21 1616 02/09/21 1642   BP: (!) 228/90 (!) 192/95 (!) 177/84   Pulse: 81 89 70   Patient Position - Orthostatic VS: Sitting  Sitting         Visual Acuity      ED Medications  Medications   predniSONE tablet 60 mg (60 mg Oral Given 2/9/21 1641)   ipratropium-albuterol (DUO-NEB) 0 5-2 5 mg/3 mL inhalation solution 3 mL (3 mL Nebulization Given 2/9/21 1642)       Diagnostic Studies  Results Reviewed     Procedure Component Value Units Date/Time    Novel Coronavirus [facility] [[id number]] Collected: 02/09/21 1643    Lab Status: In process Specimen: Nares from Nasopharyngeal Swab Updated: 02/09/21 1647                 XR chest 1 view portable   Final Result by Son Garrison MD (02/09 1658)      No acute cardiopulmonary disease  Workstation performed: [id number]                    Procedures  Procedures         ED Course                             SBIRT 22yo+      Most Recent Value   SBIRT (24 yo +)   In order to provide better care to our patients, we are screening all of our patients for alcohol and drug use  Would it be okay to ask you these screening questions? No Filed at: 02/09/2021 1614   Initial Alcohol Screen: US AUDIT-C    1  How often do you have a drink containing alcohol?  0 Filed at: 02/09/2021 1614   2  How many drinks containing alcohol do you have on a typical day you are drinking? 0 Filed at: 02/09/2021 1614   3a  Male UNDER 65:  How often do you have five or more drinks on one occasion? 0 Filed at: 02/09/2021 1614   3b  FEMALE Any Age, or MALE 65+: How often do you have 4 or more drinks on one occassion? 0 Filed at: 02/09/2021 1614   Audit-C Score  0 Filed at: 02/09/2021 1614   ARA: How many times in the past year have you    Used an illegal drug or used a prescription medication for non-medical reasons? Never Filed at: 02/09/2021 1614                    [facility]  Number of Diagnoses or Management Options  Asthma exacerbation:      Amount and/or Complexity of Data Reviewed  Tests in the radiology section of CPT®: ordered and reviewed    Risk of Complications, Morbidity, and/or Mortality  Presenting problems: moderate  General comments: Pt  Felt better after meds/nebs - pulse ox 98% RA    She is stable for outpt  Follow up        Disposition  Final diagnoses:   Asthma exacerbation     Time reflects when diagnosis was documented in both MDM as applicable and the Disposition within this note     Time User Action Codes Description Comment    2/9/2021  4:31 PM Rosalie Salas Add [Z86 [id number]] Asthma exacerbation       ED Disposition     ED Disposition Condition Date/Time Comment    Discharge Stable Tue Feb 9, 2021  5:05 PM Mary Million discharge to home/self care  Follow-up Information     Follow up With Specialties Details Why Rosario Razo MD Internal Medicine   [address] [facility]  [address] [place] [address]            Discharge Medication List as of 2/9/2021  5:06 PM      START taking these medications    Details   !! albuterol (2 5 mg/3 mL) 0 083 % nebulizer solution Take 1 vial (2 5 mg total) by nebulization every 6 (six) hours as needed for wheezing or shortness of breath, Starting Tue 2/9/2021, Normal      !! albuterol (PROVENTIL HFA,VENTOLIN HFA) 90 mcg/act inhaler Inhale 1-2 puffs every 6 (six) hours as needed for wheezing, Starting Tue 2/9/2021, Normal      predniSONE 20 mg tablet Take 2 tablets (40 mg total) by mouth daily for 4 days, Starting Tue 2/9/2021, Until Sat 2/13/2021, Normal       !! - Potential duplicate medications found  Please discuss with provider        CONTINUE these medications which have NOT CHANGED    Details   acetaminophen (TYLENOL) 325 mg tablet Take 2 tablets (650 mg total) by mouth every 6 (six) hours as needed for mild pain, moderate pain, headaches or fever, Starting Sun 2/23/2020, Normal      !! albuterol (2 5 mg/3 mL) 0 083 % nebulizer solution Take 1 vial (2 5 mg total) by nebulization every 6 (six) hours as needed for wheezing or shortness of breath, Starting Tue 3/31/2020, Normal      !! albuterol (PROVENTIL HFA,VENTOLIN HFA) 90 mcg/act inhaler Inhale 2 puffs every 6 (six) hours as needed for wheezing, Historical Med      aspirin 81 mg chewable tablet Chew 81 mg daily, Historical Med      atorvastatin (LIPITOR) 40 mg tablet Take 1 tablet (40 mg total) by mouth daily, Starting Mon 12/21/2020, Normal      budesonide-formoterol (SYMBICORT) 160-4 5 mcg/act inhaler Inhale 2 puffs 2 (two) times a day Rinse mouth after use , Historical Med      ezetimibe (ZETIA) 10 mg tablet Take 1 tablet (10 mg total) by mouth daily, Starting Mon 12/21/2020, Normal      fluticasone (FLONASE) 50 mcg/act nasal spray 1 spray into each nostril daily, Historical Med      furosemide (LASIX) 20 mg tablet Take 20 mg by mouth 2 (two) times a day as needed, Historical Med      glimepiride (AMARYL) 4 mg tablet Take 1 tablet (4 mg total) by mouth daily with breakfast, Starting Mon 12/21/2020, Normal      indapamide (LOZOL) 1 25 mg tablet Take 1 tablet (1 25 mg total) by mouth every morning, Starting Fri 10/9/2020, Until Tue 2/9/2021, Normal      Lancets (ONETOUCH DELICA PLUS [id number]) MISC TEST 3 TIMES, Normal      levothyroxine 150 mcg tablet Take 1 tablet (150 mcg total) by mouth daily, Starting Mon 12/21/2020, Normal      losartan (COZAAR) 100 MG tablet Take 1 tablet (100 mg total) by mouth daily, Starting Mon 12/21/2020, Until Sun 3/21/2021, Normal      metFORMIN (GLUCOPHAGE) 1000 MG tablet Take 1 tablet (1,000 mg total) by mouth 2 (two) times a day with meals, Starting Mon 12/21/2020, Normal      metoprolol succinate (TOPROL-XL) 25 mg 24 hr tablet Take 1 tablet (25 mg total) by mouth daily, Starting Tue 3/31/2020, Until Tue 2/9/2021, Normal      montelukast (SINGULAIR) 10 mg tablet Take 10 mg by mouth daily at bedtime, Historical Med      spironolactone (ALDACTONE) 25 mg tablet Take 1 tablet (25 mg total) by mouth daily, Starting Fri 10/9/2020, Until Tue 2/9/2021, Normal      Tresiba FlexTouch 100 units/mL injection pen INJECT 25UNITS IN THE MORNING AND 10 UNITS IN THE EVENING, Normal      BD Pen Needle Berenice 2nd Gen 32G X 4 MM MISC USE TWICE A DAY, Normal      Blood Glucose Monitoring Suppl (ONE TOUCH ULTRA 2) w/Device KIT Test blood sugar 3 times daily, Normal      glucose blood test strip Prior to each meal, Normal      hydrocortisone 2 5 % cream Apply topically 2 (two) times a day, Starting Mon 5/11/2020, Normal      Na Sulfate-K Sulfate-Mg Sulf (Suprep Bowel Prep Kit) 17 5-3 13-1 6 GM/177ML SOLN Take 1 kit by mouth once for 1 dose, Starting Thu 1/28/2021, Normal      omeprazole (PriLOSEC) 20 mg delayed release capsule Take 1 capsule (20 mg total) by mouth daily, Starting Mon 12/21/2020, Normal       !! - Potential duplicate medications found  Please discuss with provider          Outpatient Discharge Orders   Nebulizer       PDMP Review     None          ED Provider  Electronically Signed by           Henrique Green MD  02/09/21 8290

## 2021-02-09 NOTE — ANESTHESIA PREPROCEDURE EVALUATION
Procedure:  COLONOSCOPY  EGD    Relevant Problems   CARDIO   (+) Atypical chest pain   (+) Essential hypertension   (+) Pure hypercholesterolemia      ENDO   (+) Acquired hypothyroidism      NEURO/PSYCH   (+) Depression, recurrent (HCC)      PULMONARY   (+) Mild intermittent asthma with acute exacerbation        Physical Exam    Airway    Mallampati score: II  TM Distance: >3 FB  Neck ROM: full     Dental   Comment: Partial (out),     Cardiovascular  Rhythm: regular, Rate: normal, Cardiovascular exam normal    Pulmonary  Pulmonary exam normal Breath sounds clear to auscultation,     Other Findings        Anesthesia Plan  ASA Score- 2     Anesthesia Type- IV sedation with anesthesia and general with ASA Monitors  Additional Monitors:   Airway Plan:           Plan Factors-    Chart reviewed  Patient summary reviewed  Patient is not a current smoker  Patient instructed to abstain from smoking on day of procedure  Patient did not smoke on day of surgery  Induction- intravenous  Postoperative Plan-     Informed Consent- Anesthetic plan and risks discussed with patient

## 2021-02-09 NOTE — TELEPHONE ENCOUNTER
Put barium swallow order in [facility], called scheduled for Friday 2/12 830am at [facility]  Gabe Goncalves will call patient and go over instructions for barium swallow

## 2021-02-09 NOTE — INTERVAL H&P NOTE
H&P reviewed  After examining the patient I find no changes in the patients condition since the H&P had been written      Vitals:    02/09/21 0703   BP: 148/70   Pulse: 81   Resp: 16   Temp: 99 9 °F (37 7 °C)   SpO2: 99%

## 2021-02-10 LAB — SARS-COV-2 N GENE RESP QL NAA+PROBE: NEGATIVE

## 2021-02-10 NOTE — TELEPHONE ENCOUNTER
S/P = colonoscopy = 2/9/21    Called and spoke with patient  She denies any F/V/N/C and she has had a bowel movement  Patient is aware she will be receiving a call once her pathology has been finalized and verified  Path pending

## 2021-02-10 NOTE — RESULT ENCOUNTER NOTE
I called Cassy Adames and let her know that her COVID-19 swab was negative  I advised her to continue social distancing procedures

## 2021-02-11 ENCOUNTER — HOSPITAL ENCOUNTER (OUTPATIENT)
Dept: RADIOLOGY | Facility: HOSPITAL | Age: 66
Discharge: HOME | End: 2021-02-11
Attending: SURGERY
Payer: COMMERCIAL

## 2021-02-11 ENCOUNTER — TELEPHONE (OUTPATIENT)
Dept: INTERNAL MEDICINE CLINIC | Facility: CLINIC | Age: 66
End: 2021-02-11

## 2021-02-11 DIAGNOSIS — E03.9 ACQUIRED HYPOTHYROIDISM: Primary | ICD-10-CM

## 2021-02-11 DIAGNOSIS — R10.11 RIGHT UPPER QUADRANT PAIN: ICD-10-CM

## 2021-02-11 PROCEDURE — G1004 CDSM NDSC: HCPCS

## 2021-02-11 PROCEDURE — 78227 HEPATOBIL SYST IMAGE W/DRUG: CPT

## 2021-02-11 PROCEDURE — A9537 TC99M MEBROFENIN: HCPCS

## 2021-02-11 RX ADMIN — SINCALIDE 1.8 MCG: 5 INJECTION, POWDER, LYOPHILIZED, FOR SOLUTION INTRAVENOUS at 09:27

## 2021-02-11 NOTE — TELEPHONE ENCOUNTER
LM on  to return call for results      ----- Message from Rod Quinonez MD sent at 1/28/2021  5:58 PM EST -----  Please call the patient regarding her abnormal result   Lab resolves hemoglobin A1c 7 9 which has improved good news   TSH is 16 63 months ago was 4 1 make sure she is taking the levothyroxine is a high dose 150 mcg daily on an empty stomach if she is repeat the TSH and T4 in 4 weeks save the note for office review

## 2021-02-11 NOTE — TELEPHONE ENCOUNTER
Spoke to patient and reviewed the results  I told her I would mail out the lab slip for the repeat of the TSH and T4  Instructed again on how to take the Levothyroxin       ----- Message from Oscar Salazar MD sent at 1/28/2021  5:58 PM EST -----  Please call the patient regarding her abnormal result   Lab resolves hemoglobin A1c 7 9 which has improved good news   TSH is 16 63 months ago was 4 1 make sure she is taking the levothyroxine is a high dose 150 mcg daily on an empty stomach if she is repeat the TSH and T4 in 4 weeks save the note for office review

## 2021-02-12 ENCOUNTER — HOSPITAL ENCOUNTER (OUTPATIENT)
Dept: RADIOLOGY | Facility: HOSPITAL | Age: 66
Discharge: HOME | End: 2021-02-12
Attending: SURGERY
Payer: COMMERCIAL

## 2021-02-12 DIAGNOSIS — R10.11 RIGHT UPPER QUADRANT PAIN: ICD-10-CM

## 2021-02-12 DIAGNOSIS — K21.9 GASTROESOPHAGEAL REFLUX DISEASE, UNSPECIFIED WHETHER ESOPHAGITIS PRESENT: ICD-10-CM

## 2021-02-12 PROCEDURE — 74220 X-RAY XM ESOPHAGUS 1CNTRST: CPT

## 2021-02-15 NOTE — RESULT ENCOUNTER NOTE
Please call pt with abnormal results and schedule follow up  If I recall, her HIDA scan is positive  Bring her back in to discuss HIDA scan versus the reflux  Probably proceed with a lap choly

## 2021-02-19 ENCOUNTER — OFFICE VISIT (OUTPATIENT)
Dept: SURGERY | Facility: CLINIC | Age: 66
End: 2021-02-19
Payer: COMMERCIAL

## 2021-02-19 VITALS
WEIGHT: 205 LBS | SYSTOLIC BLOOD PRESSURE: 142 MMHG | DIASTOLIC BLOOD PRESSURE: 82 MMHG | TEMPERATURE: 97.1 F | HEIGHT: 62 IN | HEART RATE: 60 BPM | BODY MASS INDEX: 37.73 KG/M2

## 2021-02-19 DIAGNOSIS — K21.9 GASTROESOPHAGEAL REFLUX DISEASE WITHOUT ESOPHAGITIS: ICD-10-CM

## 2021-02-19 DIAGNOSIS — K80.50 BILIARY COLIC: Primary | ICD-10-CM

## 2021-02-19 PROCEDURE — 99213 OFFICE O/P EST LOW 20 MIN: CPT | Performed by: SURGERY

## 2021-02-19 NOTE — H&P (VIEW-ONLY)
Assessment/Plan:   Celeste Chi is a 72 y  o female who is here for Gallbladder disease      patient is here for follow-up from her right upper quadrant abdominal pain associated with nausea and vomiting  She did undergo an upper endoscopy and colonoscopy along with a barium swallow and HIDA scan   Since her last visit  patient with history of GERD and a small hiatal hernia which we thought was related to majority of her symptoms but she does have a positive HIDA scan with a 70% ejection fraction  Patient continues with upper abdominal pain and nausea vomiting especially after eating  She is eating small frequent meals  Upon evaluation today patient has: Bilary colic      Plan: Laparoscopic Cholecystectomy with possible Intraoperative Cholangiogram  Possible Robotic assisted  patient is aware that because of her GERD and hiatal hernia she still may have similar symptoms but the hope is with removal of her gallbladder her symptoms may improved by 50% or more  Consider GI follow-up and evaluation     HIDA scan with abnormal contract tile response to the gallbladder suspicious for biliary dyskinesia  Gallbladder ejection fraction of 17%     barium swallow with mild intermittent gastroesophageal reflux with findings suggested of mild reflux esophagitis  Very small hiatal hernia     EGD/colonoscopy on 01/28/2021  With tubular adenomas  No H pylori  Post Op Pain Management: [facility]      Ultrasound findings:  Partially contracted gallbladder with top normal caliber measuring 3 mm in thickness  There is no pericholecystic fluid  No stones or sludge  The liver measures 17 cm  Preoperative Clearance: Medical          Images:         ____________________________________________________    HPI:  Celeste Chi is a 72 y  o female who was referred for evaluation of There were no encounter diagnoses        Abdominal pain Location: in the RUQ without radiation  Quality: pressure-like, sharp and shooting  Quantity: 6/10 in intensity  Chronicity: Onset 1 month ago, unchanged since and intermittent   Aggravating factors: eating  Alleviating factors: none  Associated symptoms: nausea and vomiting, which is Intermittent  and over 1 month     nausea and vomiting,     regular bowel movement  Duration of pain or symptoms: Intermittent and over 1 month      Prior Colonoscopy : 1/28/21    Prior Abdominal Surgery: none    Anticoagulation: none    Imaging:   No orders to display           LABS:    Lab Results   Component Value Date     03/26/2014    K 3 5 01/28/2021     01/28/2021    CO2 34 (H) 01/28/2021    ANIONGAP 8 03/26/2014    BUN 10 01/28/2021    CREATININE 0 95 01/28/2021    GLUCOSE 231 (H) 03/26/2014    GLUF 101 (H) 01/28/2021    CALCIUM 9 1 01/28/2021    CORRECTEDCA 9 4 10/09/2020    AST 15 01/28/2021    ALT 17 01/28/2021    ALKPHOS 93 01/28/2021    PROT 7 3 03/26/2014    BILITOT 0 3 03/26/2014    EGFR 63 01/28/2021       Lab Results   Component Value Date    WBC 10 83 (H) 01/28/2021    HGB 12 4 01/28/2021    HCT 37 8 01/28/2021    MCV 87 01/28/2021     01/28/2021     Lab Results   Component Value Date    INR 0 92 02/18/2020    PROTIME 12 5 02/18/2020     Lab Results   Component Value Date    HGBA1C 7 9 (H) 01/28/2021           ROS:  General ROS: negative for - chills, fatigue, fever or night sweats, weight loss  Respiratory ROS: no cough, shortness of breath, or wheezing  Cardiovascular ROS: no chest pain or dyspnea on exertion  Genito-Urinary ROS: no dysuria, trouble voiding, or hematuria  Musculoskeletal ROS: negative for - gait disturbance, joint pain or muscle pain  Neurological ROS: no TIA or stroke symptoms  Gastrointestinal ROS: See HPI   GI ROS: see HPI  Skin ROS: no new rashes or lesions   Lymphatic ROS: no new adenopathy noted by pt     GYN ROS: see HPI, no new GYN history or bleeding noted  Psy ROS: no new mental or behavioral disturbances       Patient Active Problem List Diagnosis    Acquired hypothyroidism    Essential hypertension    Mild intermittent asthma with acute exacerbation    Pure hypercholesterolemia    Atypical chest pain    Influenza A    Lung infiltrate    Severe sepsis (HCC)    Irritant contact dermatitis    Depression, recurrent (HCC)    Uncontrolled type 2 diabetes mellitus with hyperglycemia ([facility])    Class 3 severe obesity due to excess calories with serious comorbidity and body mass index (BMI) of 40 0 to 44 9 in adult [facility])    Spondylolisthesis of lumbar region         Allergies:  Etodolac, Iodinated diagnostic agents, Simvastatin, Latex, Morphine, and Penicillins      Current Outpatient Medications:     albuterol (2 5 mg/3 mL) 0 083 % nebulizer solution, Take 1 vial (2 5 mg total) by nebulization every 6 (six) hours as needed for wheezing or shortness of breath, Disp: 60 vial, Rfl: 5    albuterol (PROVENTIL HFA,VENTOLIN HFA) 90 mcg/act inhaler, Inhale 2 puffs every 6 (six) hours as needed for wheezing, Disp: , Rfl:     aspirin 81 mg chewable tablet, Chew 81 mg daily, Disp: , Rfl:     atorvastatin (LIPITOR) 40 mg tablet, Take 1 tablet (40 mg total) by mouth daily, Disp: 90 tablet, Rfl: 1    BD Pen Needle Berenice 2nd Gen 32G X 4 MM MISC, USE TWICE A DAY, Disp: 100 each, Rfl: 1    Blood Glucose Monitoring Suppl (ONE TOUCH ULTRA 2) w/Device KIT, Test blood sugar 3 times daily, Disp: 1 each, Rfl: 0    budesonide-formoterol (SYMBICORT) 160-4 5 mcg/act inhaler, Inhale 2 puffs 2 (two) times a day Rinse mouth after use , Disp: , Rfl:     ezetimibe (ZETIA) 10 mg tablet, Take 1 tablet (10 mg total) by mouth daily, Disp: 90 tablet, Rfl: 1    fluticasone (FLONASE) 50 mcg/act nasal spray, 1 spray into each nostril daily, Disp: , Rfl:     furosemide (LASIX) 20 mg tablet, Take 20 mg by mouth 2 (two) times a day as needed, Disp: , Rfl:     glimepiride (AMARYL) 4 mg tablet, Take 1 tablet (4 mg total) by mouth daily with breakfast, Disp: 90 tablet, Rfl: 1    glucose blood test strip, Prior to each meal, Disp: 300 each, Rfl: 1    Lancets (ONETOUCH DELICA PLUS [id number]) MISC, TEST 3 TIMES, Disp: 100 each, Rfl: 1    levothyroxine 150 mcg tablet, Take 1 tablet (150 mcg total) by mouth daily, Disp: 90 tablet, Rfl: 1    losartan (COZAAR) 100 MG tablet, Take 1 tablet (100 mg total) by mouth daily, Disp: 90 tablet, Rfl: 1    metFORMIN (GLUCOPHAGE) 1000 MG tablet, Take 1 tablet (1,000 mg total) by mouth 2 (two) times a day with meals, Disp: 180 tablet, Rfl: 1    montelukast (SINGULAIR) 10 mg tablet, Take 10 mg by mouth daily at bedtime, Disp: , Rfl:     omeprazole (PriLOSEC) 20 mg delayed release capsule, Take 1 capsule (20 mg total) by mouth daily, Disp: 90 capsule, Rfl: 1    Tresiba FlexTouch 100 units/mL injection pen, INJECT 25UNITS IN THE MORNING AND 10 UNITS IN THE EVENING, Disp: 5 pen, Rfl: 1    acetaminophen (TYLENOL) 325 mg tablet, Take 2 tablets (650 mg total) by mouth every 6 (six) hours as needed for mild pain, moderate pain, headaches or fever (Patient not taking: Reported on 2/19/2021), Disp: 30 tablet, Rfl: 0    albuterol (2 5 mg/3 mL) 0 083 % nebulizer solution, Take 1 vial (2 5 mg total) by nebulization every 6 (six) hours as needed for wheezing or shortness of breath (Patient not taking: Reported on 2/19/2021), Disp: 75 mL, Rfl: 0    albuterol (PROVENTIL HFA,VENTOLIN HFA) 90 mcg/act inhaler, Inhale 1-2 puffs every 6 (six) hours as needed for wheezing (Patient not taking: Reported on 2/19/2021), Disp: 1 Inhaler, Rfl: 0    hydrocortisone 2 5 % cream, Apply topically 2 (two) times a day (Patient not taking: Reported on 2/19/2021), Disp: 30 g, Rfl: 0    indapamide (LOZOL) 1 25 mg tablet, Take 1 tablet (1 25 mg total) by mouth every morning, Disp: 90 tablet, Rfl: 1    metoprolol succinate (TOPROL-XL) 25 mg 24 hr tablet, Take 1 tablet (25 mg total) by mouth daily, Disp: 30 tablet, Rfl: 4    Na Sulfate-K Sulfate-Mg Sulf (Suprep Bowel Prep Kit) 17 5-3 13-1 6 GM/177ML SOLN, Take 1 kit by mouth once for 1 dose, Disp: 2 Bottle, Rfl: 0    spironolactone (ALDACTONE) 25 mg tablet, Take 1 tablet (25 mg total) by mouth daily, Disp: 90 tablet, Rfl: 1    Past Medical History:   Diagnosis Date    Asthma     Diabetes mellitus ([facility] [address] )     Hypertension     Obesity     Stroke [facility])        Past Surgical History:   Procedure Laterality Date    ROTATOR CUFF REPAIR Bilateral        Family History   Problem Relation Age of Onset    Diabetes Mother     Hypertension Mother     Hypertension Father         reports that she has never smoked  She has never used smokeless tobacco  She reports that she does not drink alcohol or use drugs  Exam:   Vitals:    02/19/21 0936   BP: 142/82   Pulse: 60   Temp: (!) 97 1 °F (36 2 °C)       PHYSICAL EXAM  General Appearance:    Alert, cooperative, no distress, healthy   Head:    Normocephalic without obvious abnormality   Eyes:    PERRL, conjunctiva/corneas clear,       Neck:   Supple, no adenopathy, no JVD   Back:     Symmetric, no spinal or CVA tenderness   Lungs:     Clear to auscultation bilaterally, no wheezing or rhonchi   Heart:    Regular rate and rhythm, S1 and S2 normal, no murmur   Abdomen:     mild tenderness in the in the RLQ  Bowel sounds are normal      Extremities:   Extremities normal  No clubbing, cyanosis or edema   Psych:   Normal Affect, AOx3  Neurologic:  Skin:   CNII-XII intact  Strength symmetric, speech intact    Warm, dry, intact, no visible rashes or lesions      Informed consent for procedure was personally discussed, reviewed, and signed by Dr Tania Saucedo  Discussion by Dr Tania Saucedo was carried out regarding risks, benefits, and alternatives with the patient  Risks include but are not limited to:  bleeding, infection, and delayed wound healing or an open wound, pulmonary embolus, leaks from bowel or bile ducts or other viscus, transfusions, death    Discussed in further detail the more common complications and their rates of occurrence   was used if necessary  Patient expressed understanding of the issues discussed and wished/consented to proceed  All questions were answered by Dr Farhat Izquierdo  Discussion performed between patient and the provider signing below  Signature:   Melanie Green MD    Date: 2/19/2021 Time: 9:52 AM                          Some portions of this record may have been generated with voice recognition software  There may be translation, syntax,  or grammatical errors  Occasional wrong word or "sound-a-like" substitutions may have occurred due to the inherent limitations of the voice recognition software  Read the chart carefully and recognize, using context, where substitutions may have occurred  If you have any questions, please contact the dictating provider for clarification or correction, as needed  This encounter has been coded by a non-certified coder

## 2021-02-19 NOTE — PROGRESS NOTES
Assessment/Plan:   Deisi Muhammad is a 72 y  o female who is here for Gallbladder disease      patient is here for follow-up from her right upper quadrant abdominal pain associated with nausea and vomiting  She did undergo an upper endoscopy and colonoscopy along with a barium swallow and HIDA scan   Since her last visit  patient with history of GERD and a small hiatal hernia which we thought was related to majority of her symptoms but she does have a positive HIDA scan with a 70% ejection fraction  Patient continues with upper abdominal pain and nausea vomiting especially after eating  She is eating small frequent meals  Upon evaluation today patient has: Bilary colic      Plan: Laparoscopic Cholecystectomy with possible Intraoperative Cholangiogram  Possible Robotic assisted  patient is aware that because of her GERD and hiatal hernia she still may have similar symptoms but the hope is with removal of her gallbladder her symptoms may improved by 50% or more  Consider GI follow-up and evaluation     HIDA scan with abnormal contract tile response to the gallbladder suspicious for biliary dyskinesia  Gallbladder ejection fraction of 17%     barium swallow with mild intermittent gastroesophageal reflux with findings suggested of mild reflux esophagitis  Very small hiatal hernia     EGD/colonoscopy on 01/28/2021  With tubular adenomas  No H pylori  Post Op Pain Management: [facility]co      Ultrasound findings:  Partially contracted gallbladder with top normal caliber measuring 3 mm in thickness  There is no pericholecystic fluid  No stones or sludge  The liver measures 17 cm  Preoperative Clearance: Medical          Images:         ____________________________________________________    HPI:  Deisi Muhammad is a 72 y  o female who was referred for evaluation of There were no encounter diagnoses        Abdominal pain Location: in the RUQ without radiation  Quality: pressure-like, sharp and shooting  Quantity: 6/10 in intensity  Chronicity: Onset 1 month ago, unchanged since and intermittent   Aggravating factors: eating  Alleviating factors: none  Associated symptoms: nausea and vomiting, which is Intermittent  and over 1 month     nausea and vomiting,     regular bowel movement  Duration of pain or symptoms: Intermittent and over 1 month      Prior Colonoscopy : 1/28/21    Prior Abdominal Surgery: none    Anticoagulation: none    Imaging:   No orders to display           LABS:    Lab Results   Component Value Date     03/26/2014    K 3 5 01/28/2021     01/28/2021    CO2 34 (H) 01/28/2021    ANIONGAP 8 03/26/2014    BUN 10 01/28/2021    CREATININE 0 95 01/28/2021    GLUCOSE 231 (H) 03/26/2014    GLUF 101 (H) 01/28/2021    CALCIUM 9 1 01/28/2021    CORRECTEDCA 9 4 10/09/2020    AST 15 01/28/2021    ALT 17 01/28/2021    ALKPHOS 93 01/28/2021    PROT 7 3 03/26/2014    BILITOT 0 3 03/26/2014    EGFR 63 01/28/2021       Lab Results   Component Value Date    WBC 10 83 (H) 01/28/2021    HGB 12 4 01/28/2021    HCT 37 8 01/28/2021    MCV 87 01/28/2021     01/28/2021     Lab Results   Component Value Date    INR 0 92 02/18/2020    PROTIME 12 5 02/18/2020     Lab Results   Component Value Date    HGBA1C 7 9 (H) 01/28/2021           ROS:  General ROS: negative for - chills, fatigue, fever or night sweats, weight loss  Respiratory ROS: no cough, shortness of breath, or wheezing  Cardiovascular ROS: no chest pain or dyspnea on exertion  Genito-Urinary ROS: no dysuria, trouble voiding, or hematuria  Musculoskeletal ROS: negative for - gait disturbance, joint pain or muscle pain  Neurological ROS: no TIA or stroke symptoms  Gastrointestinal ROS: See HPI   GI ROS: see HPI  Skin ROS: no new rashes or lesions   Lymphatic ROS: no new adenopathy noted by pt     GYN ROS: see HPI, no new GYN history or bleeding noted  Psy ROS: no new mental or behavioral disturbances       Patient Active Problem List Diagnosis    Acquired hypothyroidism    Essential hypertension    Mild intermittent asthma with acute exacerbation    Pure hypercholesterolemia    Atypical chest pain    Influenza A    Lung infiltrate    Severe sepsis (HCC)    Irritant contact dermatitis    Depression, recurrent (HCC)    Uncontrolled type 2 diabetes mellitus with hyperglycemia ([facility])    Class 3 severe obesity due to excess calories with serious comorbidity and body mass index (BMI) of 40 0 to 44 9 in adult [facility])    Spondylolisthesis of lumbar region         Allergies:  Etodolac, Iodinated diagnostic agents, Simvastatin, Latex, Morphine, and Penicillins      Current Outpatient Medications:     albuterol (2 5 mg/3 mL) 0 083 % nebulizer solution, Take 1 vial (2 5 mg total) by nebulization every 6 (six) hours as needed for wheezing or shortness of breath, Disp: 60 vial, Rfl: 5    albuterol (PROVENTIL HFA,VENTOLIN HFA) 90 mcg/act inhaler, Inhale 2 puffs every 6 (six) hours as needed for wheezing, Disp: , Rfl:     aspirin 81 mg chewable tablet, Chew 81 mg daily, Disp: , Rfl:     atorvastatin (LIPITOR) 40 mg tablet, Take 1 tablet (40 mg total) by mouth daily, Disp: 90 tablet, Rfl: 1    BD Pen Needle Berenice 2nd Gen 32G X 4 MM MISC, USE TWICE A DAY, Disp: 100 each, Rfl: 1    Blood Glucose Monitoring Suppl (ONE TOUCH ULTRA 2) w/Device KIT, Test blood sugar 3 times daily, Disp: 1 each, Rfl: 0    budesonide-formoterol (SYMBICORT) 160-4 5 mcg/act inhaler, Inhale 2 puffs 2 (two) times a day Rinse mouth after use , Disp: , Rfl:     ezetimibe (ZETIA) 10 mg tablet, Take 1 tablet (10 mg total) by mouth daily, Disp: 90 tablet, Rfl: 1    fluticasone (FLONASE) 50 mcg/act nasal spray, 1 spray into each nostril daily, Disp: , Rfl:     furosemide (LASIX) 20 mg tablet, Take 20 mg by mouth 2 (two) times a day as needed, Disp: , Rfl:     glimepiride (AMARYL) 4 mg tablet, Take 1 tablet (4 mg total) by mouth daily with breakfast, Disp: 90 tablet, Rfl: 1    glucose blood test strip, Prior to each meal, Disp: 300 each, Rfl: 1    Lancets (ONETOUCH DELICA PLUS [id number]) MISC, TEST 3 TIMES, Disp: 100 each, Rfl: 1    levothyroxine 150 mcg tablet, Take 1 tablet (150 mcg total) by mouth daily, Disp: 90 tablet, Rfl: 1    losartan (COZAAR) 100 MG tablet, Take 1 tablet (100 mg total) by mouth daily, Disp: 90 tablet, Rfl: 1    metFORMIN (GLUCOPHAGE) 1000 MG tablet, Take 1 tablet (1,000 mg total) by mouth 2 (two) times a day with meals, Disp: 180 tablet, Rfl: 1    montelukast (SINGULAIR) 10 mg tablet, Take 10 mg by mouth daily at bedtime, Disp: , Rfl:     omeprazole (PriLOSEC) 20 mg delayed release capsule, Take 1 capsule (20 mg total) by mouth daily, Disp: 90 capsule, Rfl: 1    Tresiba FlexTouch 100 units/mL injection pen, INJECT 25UNITS IN THE MORNING AND 10 UNITS IN THE EVENING, Disp: 5 pen, Rfl: 1    acetaminophen (TYLENOL) 325 mg tablet, Take 2 tablets (650 mg total) by mouth every 6 (six) hours as needed for mild pain, moderate pain, headaches or fever (Patient not taking: Reported on 2/19/2021), Disp: 30 tablet, Rfl: 0    albuterol (2 5 mg/3 mL) 0 083 % nebulizer solution, Take 1 vial (2 5 mg total) by nebulization every 6 (six) hours as needed for wheezing or shortness of breath (Patient not taking: Reported on 2/19/2021), Disp: 75 mL, Rfl: 0    albuterol (PROVENTIL HFA,VENTOLIN HFA) 90 mcg/act inhaler, Inhale 1-2 puffs every 6 (six) hours as needed for wheezing (Patient not taking: Reported on 2/19/2021), Disp: 1 Inhaler, Rfl: 0    hydrocortisone 2 5 % cream, Apply topically 2 (two) times a day (Patient not taking: Reported on 2/19/2021), Disp: 30 g, Rfl: 0    indapamide (LOZOL) 1 25 mg tablet, Take 1 tablet (1 25 mg total) by mouth every morning, Disp: 90 tablet, Rfl: 1    metoprolol succinate (TOPROL-XL) 25 mg 24 hr tablet, Take 1 tablet (25 mg total) by mouth daily, Disp: 30 tablet, Rfl: 4    Na Sulfate-K Sulfate-Mg Sulf (Suprep Bowel Prep Kit) 17 5-3 13-1 6 GM/177ML SOLN, Take 1 kit by mouth once for 1 dose, Disp: 2 Bottle, Rfl: 0    spironolactone (ALDACTONE) 25 mg tablet, Take 1 tablet (25 mg total) by mouth daily, Disp: 90 tablet, Rfl: 1    Past Medical History:   Diagnosis Date    Asthma     Diabetes mellitus ([facility] [address] )     Hypertension     Obesity     [facility])        Past Surgical History:   Procedure Laterality Date    ROTATOR CUFF REPAIR Bilateral        Family History   Problem Relation Age of Onset    Diabetes Mother     Hypertension Mother     Hypertension Father         reports that she has never smoked  She has never used smokeless tobacco  She reports that she does not drink alcohol or use drugs  Exam:   Vitals:    02/19/21 0936   BP: 142/82   Pulse: 60   Temp: (!) 97 1 °F (36 2 °C)       PHYSICAL EXAM  General Appearance:    Alert, cooperative, no distress, healthy   Head:    Normocephalic without obvious abnormality   Eyes:    PERRL, conjunctiva/corneas clear,       Neck:   Supple, no adenopathy, no JVD   Back:     Symmetric, no spinal or CVA tenderness   Lungs:     Clear to auscultation bilaterally, no wheezing or rhonchi   Heart:    Regular rate and rhythm, S1 and S2 normal, no murmur   Abdomen:     mild tenderness in the in the RLQ  Bowel sounds are normal      Extremities:   Extremities normal  No clubbing, cyanosis or edema   Psych:   Normal Affect, AOx3  Neurologic:  Skin:   CNII-XII intact  Strength symmetric, speech intact    Warm, dry, intact, no visible rashes or lesions      Informed consent for procedure was personally discussed, reviewed, and signed by Dr Tania Saucedo  Discussion by Dr Tania Saucedo was carried out regarding risks, benefits, and alternatives with the patient  Risks include but are not limited to:  bleeding, infection, and delayed wound healing or an open wound, pulmonary embolus, leaks from bowel or bile ducts or other viscus, transfusions, death    Discussed in further detail the more common complications and their rates of occurrence   was used if necessary  Patient expressed understanding of the issues discussed and wished/consented to proceed  All questions were answered by Dr Medhat Zelaya  Discussion performed between patient and the provider signing below  Signature:   Leona Andrew MD    Date: 2/19/2021 Time: 9:52 AM                          Some portions of this record may have been generated with voice recognition software  There may be translation, syntax,  or grammatical errors  Occasional wrong word or "sound-a-like" substitutions may have occurred due to the inherent limitations of the voice recognition software  Read the chart carefully and recognize, using context, where substitutions may have occurred  If you have any questions, please contact the dictating provider for clarification or correction, as needed  This encounter has been coded by a non-certified coder

## 2021-02-22 ENCOUNTER — HOSPITAL ENCOUNTER (OUTPATIENT)
Dept: MAMMOGRAPHY | Facility: CLINIC | Age: 66
Discharge: HOME | End: 2021-02-22
Payer: COMMERCIAL

## 2021-02-22 VITALS — WEIGHT: 205 LBS | HEIGHT: 62 IN | BODY MASS INDEX: 37.73 KG/M2

## 2021-02-22 DIAGNOSIS — Z12.31 ENCOUNTER FOR SCREENING MAMMOGRAM FOR MALIGNANT NEOPLASM OF BREAST: ICD-10-CM

## 2021-02-22 PROCEDURE — 77067 SCR MAMMO BI INCL CAD: CPT

## 2021-02-22 PROCEDURE — 77063 BREAST TOMOSYNTHESIS BI: CPT

## 2021-03-03 ENCOUNTER — ANESTHESIA EVENT (OUTPATIENT)
Dept: PERIOP | Facility: HOSPITAL | Age: 66
End: 2021-03-03
Payer: COMMERCIAL

## 2021-03-03 RX ORDER — MAGNESIUM HYDROXIDE/ALUMINUM HYDROXICE/SIMETHICONE 120; 1200; 1200 MG/30ML; MG/30ML; MG/30ML
30 SUSPENSION ORAL EVERY 4 HOURS PRN
COMMUNITY
End: 2021-03-31 | Stop reason: ALTCHOICE

## 2021-03-03 NOTE — PRE-PROCEDURE INSTRUCTIONS
Pre-Surgery Instructions:   Medication Instructions    albuterol (2 5 mg/3 mL) 0 083 % nebulizer solution Instructed patient per Anesthesia Guidelines   albuterol (PROVENTIL HFA,VENTOLIN HFA) 90 mcg/act inhaler Instructed patient per Anesthesia Guidelines   aluminum-magnesium hydroxide-simethicone (MYLANTA) 200-200-20 mg/5 mL suspension Instructed patient per Anesthesia Guidelines   aspirin 81 mg chewable tablet Instructed patient per Anesthesia Guidelines   aspirin-sod bicarb-citric acid (BARRINGTON-SELTZER) 325 MG TBEF Instructed patient per Anesthesia Guidelines   atorvastatin (LIPITOR) 40 mg tablet Instructed patient per Anesthesia Guidelines   budesonide-formoterol (SYMBICORT) 160-4 5 mcg/act inhaler Instructed patient per Anesthesia Guidelines   ezetimibe (ZETIA) 10 mg tablet Instructed patient per Anesthesia Guidelines   fluticasone (FLONASE) 50 mcg/act nasal spray Instructed patient per Anesthesia Guidelines   furosemide (LASIX) 20 mg tablet Instructed patient per Anesthesia Guidelines   indapamide (LOZOL) 1 25 mg tablet Instructed patient per Anesthesia Guidelines   levothyroxine 150 mcg tablet Instructed patient per Anesthesia Guidelines   losartan (COZAAR) 100 MG tablet Instructed patient per Anesthesia Guidelines   metFORMIN (GLUCOPHAGE) 1000 MG tablet Instructed patient per Anesthesia Guidelines   metoprolol succinate (TOPROL-XL) 25 mg 24 hr tablet Instructed patient per Anesthesia Guidelines   montelukast (SINGULAIR) 10 mg tablet Instructed patient per Anesthesia Guidelines   omeprazole (PriLOSEC) 20 mg delayed release capsule Instructed patient per Anesthesia Guidelines   spironolactone (ALDACTONE) 25 mg tablet Instructed patient per Anesthesia Guidelines  Jessie Splinter Jannette Overall FlexTouch 100 units/mL injection pen Instructed patient per Anesthesia Guidelines   VITAMIN D PO Instructed patient per Anesthesia Guidelines      Patient  instructed *to take atorvastatin,ezemetibe,omeprazole, metoprolol and levothyroxine*with a sip of water the morning of surgery and if needed Inhalers and or Nebulizer Patient / instructed on use of chlorhexidine soap per hospital protocol    Patient instructed to stop all ASA, NSAIDS, vitamins and herbal supplements one week prior to surgery or per Dr Ifrah Bourne

## 2021-03-04 ENCOUNTER — CONSULT (OUTPATIENT)
Dept: INTERNAL MEDICINE CLINIC | Facility: CLINIC | Age: 66
End: 2021-03-04
Payer: COMMERCIAL

## 2021-03-04 VITALS
HEART RATE: 60 BPM | BODY MASS INDEX: 36.25 KG/M2 | HEIGHT: 62 IN | DIASTOLIC BLOOD PRESSURE: 80 MMHG | TEMPERATURE: 97.4 F | WEIGHT: 197 LBS | SYSTOLIC BLOOD PRESSURE: 142 MMHG | OXYGEN SATURATION: 99 %

## 2021-03-04 DIAGNOSIS — F33.9 DEPRESSION, RECURRENT (HCC): ICD-10-CM

## 2021-03-04 DIAGNOSIS — E11.65 UNCONTROLLED TYPE 2 DIABETES MELLITUS WITH HYPERGLYCEMIA (HCC): ICD-10-CM

## 2021-03-04 DIAGNOSIS — Z01.818 PREOP EXAMINATION: ICD-10-CM

## 2021-03-04 DIAGNOSIS — J45.21 MILD INTERMITTENT ASTHMA WITH ACUTE EXACERBATION: ICD-10-CM

## 2021-03-04 DIAGNOSIS — E78.00 PURE HYPERCHOLESTEROLEMIA: ICD-10-CM

## 2021-03-04 DIAGNOSIS — E66.01 CLASS 3 SEVERE OBESITY DUE TO EXCESS CALORIES WITH SERIOUS COMORBIDITY AND BODY MASS INDEX (BMI) OF 40.0 TO 44.9 IN ADULT (HCC): ICD-10-CM

## 2021-03-04 DIAGNOSIS — I10 ESSENTIAL HYPERTENSION: ICD-10-CM

## 2021-03-04 DIAGNOSIS — E03.9 ACQUIRED HYPOTHYROIDISM: Primary | ICD-10-CM

## 2021-03-04 PROCEDURE — 99214 OFFICE O/P EST MOD 30 MIN: CPT | Performed by: INTERNAL MEDICINE

## 2021-03-04 PROCEDURE — 1160F RVW MEDS BY RX/DR IN RCRD: CPT | Performed by: INTERNAL MEDICINE

## 2021-03-04 PROCEDURE — 1036F TOBACCO NON-USER: CPT | Performed by: INTERNAL MEDICINE

## 2021-03-04 NOTE — PROGRESS NOTES
Assessment/Plan:        Medically clear for gallbladder surgery she has multiple comorbidities        Cardiovascular risk is intermediate moderate because of the history of diabetes obesity and stay she had a history of mini-stroke no history of MI or heart failure renal function is normal      She saw the cardiologist in January there was a thallium stress test done last year which showed no evidence of ischemia  There is no evidence of heart failure on the physical examination  Her blood pressure is well control  Her diabetes is acceptable control blood sugars were review  We went over the medications with her that she needs to take the day of surgery  The day of surgery she would hold all diabetic medications  The medication she will take the morning of surgery are as follows      EKG sinus rhythm rate of 70 nonspecific T-wave changes done by Cardiology in January this year    Indapamide 1 25 mg  Zetia 10 mg  Montelukast 10 mg  Losartan 100 mg  Levothyroxine 150 mcg  Metoprolol succinate 25 mg  Spironolactone 25 mg  Atorvastatin 40 mg  Symbicort 160/4 5  2 puffs    Results for orders placed or performed during the hospital encounter of 02/09/21   [facility]    Specimen: Nasopharyngeal Swab; Nares   Result Value Ref Range    SARS-CoV-2 Negative Negative       No problem-specific Assessment & Plan notes found for this encounter  Diagnoses and all orders for this visit:    Acquired hypothyroidism    Uncontrolled type 2 diabetes mellitus with hyperglycemia ([address] )    Mild intermittent asthma with acute exacerbation    Essential hypertension    Pure hypercholesterolemia    Class 3 severe obesity due to excess calories with serious comorbidity and body mass index (BMI) of 40 0 to 44 9 in [facility])    Depression, recurrent (HCC)          Subjective:      Patient ID: Tamara Daniels is a 72 y o  female  No chief complaint on file          Current Outpatient Medications:    acetaminophen (TYLENOL) 325 mg tablet, Take 2 tablets (650 mg total) by mouth every 6 (six) hours as needed for mild pain, moderate pain, headaches or fever (Patient not taking: Reported on 2/19/2021), Disp: 30 tablet, Rfl: 0    albuterol (2 5 mg/3 mL) 0 083 % nebulizer solution, Take 1 vial (2 5 mg total) by nebulization every 6 (six) hours as needed for wheezing or shortness of breath, Disp: 60 vial, Rfl: 5    albuterol (2 5 mg/3 mL) 0 083 % nebulizer solution, Take 1 vial (2 5 mg total) by nebulization every 6 (six) hours as needed for wheezing or shortness of breath (Patient not taking: Reported on 2/19/2021), Disp: 75 mL, Rfl: 0    albuterol (PROVENTIL HFA,VENTOLIN HFA) 90 mcg/act inhaler, Inhale 2 puffs every 6 (six) hours as needed for wheezing, Disp: , Rfl:     albuterol (PROVENTIL HFA,VENTOLIN HFA) 90 mcg/act inhaler, Inhale 1-2 puffs every 6 (six) hours as needed for wheezing (Patient not taking: Reported on 2/19/2021), Disp: 1 Inhaler, Rfl: 0    aluminum-magnesium hydroxide-simethicone (MYLANTA) 200-200-20 mg/5 mL suspension, Take 30 mL by mouth every 4 (four) hours as needed for indigestion or heartburn, Disp: , Rfl:     aspirin 81 mg chewable tablet, Chew 81 mg daily, Disp: , Rfl:     aspirin-sod bicarb-citric acid (BARRINGTON-SELTZER) 325 MG TBEF, Take 325 mg by mouth every 6 (six) hours as needed for mild pain, Disp: , Rfl:     atorvastatin (LIPITOR) 40 mg tablet, Take 1 tablet (40 mg total) by mouth daily, Disp: 90 tablet, Rfl: 1    BD Pen Needle Berenice 2nd Gen 32G X 4 MM MISC, USE TWICE A DAY, Disp: 100 each, Rfl: 1    Blood Glucose Monitoring Suppl (ONE TOUCH ULTRA 2) w/Device KIT, Test blood sugar 3 times daily, Disp: 1 each, Rfl: 0    budesonide-formoterol (SYMBICORT) 160-4 5 mcg/act inhaler, Inhale 2 puffs 2 (two) times a day as needed Rinse mouth after use  , Disp: , Rfl:     ezetimibe (ZETIA) 10 mg tablet, Take 1 tablet (10 mg total) by mouth daily, Disp: 90 tablet, Rfl: 1    fluticasone (FLONASE) 50 mcg/act nasal spray, 1 spray into each nostril daily as needed , Disp: , Rfl:     furosemide (LASIX) 20 mg tablet, Take 20 mg by mouth 2 (two) times a day as needed, Disp: , Rfl:     glimepiride (AMARYL) 4 mg tablet, Take 1 tablet (4 mg total) by mouth daily with breakfast, Disp: 90 tablet, Rfl: 1    glucose blood test strip, Prior to each meal, Disp: 300 each, Rfl: 1    hydrocortisone 2 5 % cream, Apply topically 2 (two) times a day (Patient not taking: Reported on 2/19/2021), Disp: 30 g, Rfl: 0    indapamide (LOZOL) 1 25 mg tablet, Take 1 tablet (1 25 mg total) by mouth every morning (Patient taking differently: Take 1 25 mg by mouth daily as needed Depending on BP), Disp: 90 tablet, Rfl: 1    Lancets (ONETOUCH DELICA PLUS [id number]) MISC, TEST 3 TIMES, Disp: 100 each, Rfl: 1    levothyroxine 150 mcg tablet, Take 1 tablet (150 mcg total) by mouth daily, Disp: 90 tablet, Rfl: 1    losartan (COZAAR) 100 MG tablet, Take 1 tablet (100 mg total) by mouth daily, Disp: 90 tablet, Rfl: 1    metFORMIN (GLUCOPHAGE) 1000 MG tablet, Take 1 tablet (1,000 mg total) by mouth 2 (two) times a day with meals, Disp: 180 tablet, Rfl: 1    metoprolol succinate (TOPROL-XL) 25 mg 24 hr tablet, Take 1 tablet (25 mg total) by mouth daily, Disp: 30 tablet, Rfl: 4    montelukast (SINGULAIR) 10 mg tablet, Take 10 mg by mouth daily at bedtime as needed , Disp: , Rfl:     Na Sulfate-K Sulfate-Mg Sulf (Suprep Bowel Prep Kit) 17 5-3 13-1 6 GM/177ML SOLN, Take 1 kit by mouth once for 1 dose, Disp: 2 Bottle, Rfl: 0    omeprazole (PriLOSEC) 20 mg delayed release capsule, Take 1 capsule (20 mg total) by mouth daily, Disp: 90 capsule, Rfl: 1    spironolactone (ALDACTONE) 25 mg tablet, Take 1 tablet (25 mg total) by mouth daily (Patient taking differently: Take 25 mg by mouth daily as needed If needed depending on BP), Disp: 90 tablet, Rfl: 1    Tresiba FlexTouch 100 units/mL injection pen, INJECT 25UNITS IN THE MORNING AND 10 UNITS IN THE EVENING (Patient taking differently: every evening Pt reports currently only taking 10 UNITS IN THE EVENING,depending on BS), Disp: 5 pen, Rfl: 1    VITAMIN D PO, Take by mouth, Disp: , Rfl:     HPI    The following portions of the patient's history were reviewed and updated as appropriate: allergies, current medications, past family history, past medical history, past social history, past surgical history and problem list     Review of Systems   Constitutional: Negative  Negative for activity change, appetite change, fatigue, fever and unexpected weight change  HENT: Negative for congestion, ear pain, hearing loss, mouth sores, postnasal drip, rhinorrhea, sore throat, trouble swallowing and voice change  Eyes: Negative for pain, redness and visual disturbance  Respiratory: Negative for cough, chest tightness, shortness of breath and wheezing  Cardiovascular: Negative for chest pain, palpitations and leg swelling  Gastrointestinal: Negative for abdominal distention, abdominal pain, blood in stool, constipation, diarrhea and nausea  Endocrine: Negative for cold intolerance, heat intolerance, polydipsia, polyphagia and polyuria  Genitourinary: Negative for difficulty urinating, dysuria, flank pain, frequency, hematuria and urgency  Musculoskeletal: Negative for arthralgias, back pain, gait problem, joint swelling and myalgias  Skin: Negative for color change and pallor  Neurological: Negative for dizziness, tremors, seizures, syncope, weakness, numbness and headaches  Hematological: Negative for adenopathy  Does not bruise/bleed easily  Psychiatric/Behavioral: Negative  Negative for sleep disturbance  The patient is not nervous/anxious  Objective: There were no vitals taken for this visit  Physical Exam  Vitals signs and nursing note reviewed  Constitutional:       Appearance: She is well-developed  HENT:      Head: Normocephalic        Right Ear: External ear normal       Left Ear: External ear normal       Nose: Nose normal       Mouth/Throat:      Pharynx: No oropharyngeal exudate  Eyes:      Conjunctiva/sclera: Conjunctivae normal       Pupils: Pupils are equal, round, and reactive to light  Neck:      Musculoskeletal: Normal range of motion and neck supple  Thyroid: No thyromegaly  Cardiovascular:      Rate and Rhythm: Normal rate and regular rhythm  Heart sounds: Normal heart sounds  No murmur  No friction rub  No gallop  Comments: S1-S2 regular rhythm  Extremities no edema  Pulmonary:      Effort: Pulmonary effort is normal  No respiratory distress  Breath sounds: Normal breath sounds  No wheezing or rales  Comments: Lungs are clear no wheezing rales or rhonchi  Abdominal:      General: Bowel sounds are normal  There is no distension  Palpations: Abdomen is soft  There is no mass  Tenderness: There is no abdominal tenderness  There is no guarding or rebound  Comments: Abdomen obese soft mild tenderness in the right upper quadrant no rebound tenderness hypoactive bowel sounds   Musculoskeletal: Normal range of motion  Lymphadenopathy:      Cervical: No cervical adenopathy  Skin:     General: Skin is warm and dry  Neurological:      Mental Status: She is alert and oriented to person, place, and time     Psychiatric:         Behavior: Behavior normal          Judgment: Judgment normal

## 2021-03-04 NOTE — LETTER
March 4, 2021     [facility] [place]    Patient: Philipp Candelaria   YOB: 1955   Date of Visit: 3/4/2021       Dear Dr Purnima Cade:         Jo Ann Force to me for evaluation  Below are my notes for this consultation  If you have questions, please do not hesitate to call me  I look forward to following your patient along with you    Good morning  I just clear our patient for gallbladder surgery on March 10 I see because of whether she was not able to do the echocardiogram if is absolutely necessary please if you go order it stat so we can have the results    I saw your evaluation her EKG is stable there is no evidence of heart failure she has no angina    Thank you for your attention    Elner Cover       Sincerely,        Alvarado Madrigal MD        CC: No Recipients  Alvarado Madrigal MD  3/4/2021  9:31 AM  Sign when Signing Visit  Assessment/Plan:        Medically clear for gallbladder surgery she has multiple comorbidities        Cardiovascular risk is intermediate moderate because of the history of diabetes obesity and stay she had a history of mini-stroke no history of MI or heart failure renal function is normal      She saw the cardiologist in January there was a thallium stress test done last year which showed no evidence of ischemia  There is no evidence of heart failure on the physical examination  Her blood pressure is well control  Her diabetes is acceptable control blood sugars were review  We went over the medications with her that she needs to take the day of surgery  The day of surgery she would hold all diabetic medications  The medication she will take the morning of surgery are as follows      EKG sinus rhythm rate of 70 nonspecific T-wave changes done by Cardiology in January this year    Indapamide 1 25 mg  Zetia 10 mg  Montelukast 10 mg  Losartan 100 mg  Levothyroxine 150 mcg  Metoprolol succinate 25 mg  Spironolactone 25 mg  Atorvastatin 40 mg  Symbicort 160/4 5  2 puffs    Results for orders placed or performed during the hospital encounter of 02/09/21   Novel Coronavirus (Covid-19),PCR [facility]    Specimen: Nasopharyngeal Swab; Nares   Result Value Ref Range    SARS-CoV-2 Negative Negative       No problem-specific Assessment & Plan notes found for this encounter  Diagnoses and all orders for this visit:    Acquired hypothyroidism    Uncontrolled type 2 diabetes mellitus with hyperglycemia ([address] )    Mild intermittent asthma with acute exacerbation    Essential hypertension    Pure hypercholesterolemia    Class 3 severe obesity due to excess calories with serious comorbidity and body mass index (BMI) of 40 0 to 44 9 in adult [facility])    Depression, recurrent (HCC)          Subjective:      Patient ID: Danny Chavez is a 72 y o  female  No chief complaint on file          Current Outpatient Medications:     acetaminophen (TYLENOL) 325 mg tablet, Take 2 tablets (650 mg total) by mouth every 6 (six) hours as needed for mild pain, moderate pain, headaches or fever (Patient not taking: Reported on 2/19/2021), Disp: 30 tablet, Rfl: 0    albuterol (2 5 mg/3 mL) 0 083 % nebulizer solution, Take 1 vial (2 5 mg total) by nebulization every 6 (six) hours as needed for wheezing or shortness of breath, Disp: 60 vial, Rfl: 5    albuterol (2 5 mg/3 mL) 0 083 % nebulizer solution, Take 1 vial (2 5 mg total) by nebulization every 6 (six) hours as needed for wheezing or shortness of breath (Patient not taking: Reported on 2/19/2021), Disp: 75 mL, Rfl: 0    albuterol (PROVENTIL HFA,VENTOLIN HFA) 90 mcg/act inhaler, Inhale 2 puffs every 6 (six) hours as needed for wheezing, Disp: , Rfl:     albuterol (PROVENTIL HFA,VENTOLIN HFA) 90 mcg/act inhaler, Inhale 1-2 puffs every 6 (six) hours as needed for wheezing (Patient not taking: Reported on 2/19/2021), Disp: 1 Inhaler, Rfl: 0    aluminum-magnesium hydroxide-simethicone (MYLANTA) 200-200-20 mg/5 mL suspension, Take 30 mL by mouth every 4 (four) hours as needed for indigestion or heartburn, Disp: , Rfl:     aspirin 81 mg chewable tablet, Chew 81 mg daily, Disp: , Rfl:     aspirin-sod bicarb-citric acid (BARRINGTON-SELTZER) 325 MG TBEF, Take 325 mg by mouth every 6 (six) hours as needed for mild pain, Disp: , Rfl:     atorvastatin (LIPITOR) 40 mg tablet, Take 1 tablet (40 mg total) by mouth daily, Disp: 90 tablet, Rfl: 1    BD Pen Needle Berenice 2nd Gen 32G X 4 MM MISC, USE TWICE A DAY, Disp: 100 each, Rfl: 1    Blood Glucose Monitoring Suppl (ONE TOUCH ULTRA 2) w/Device KIT, Test blood sugar 3 times daily, Disp: 1 each, Rfl: 0    budesonide-formoterol (SYMBICORT) 160-4 5 mcg/act inhaler, Inhale 2 puffs 2 (two) times a day as needed Rinse mouth after use  , Disp: , Rfl:     ezetimibe (ZETIA) 10 mg tablet, Take 1 tablet (10 mg total) by mouth daily, Disp: 90 tablet, Rfl: 1    fluticasone (FLONASE) 50 mcg/act nasal spray, 1 spray into each nostril daily as needed , Disp: , Rfl:     furosemide (LASIX) 20 mg tablet, Take 20 mg by mouth 2 (two) times a day as needed, Disp: , Rfl:     glimepiride (AMARYL) 4 mg tablet, Take 1 tablet (4 mg total) by mouth daily with breakfast, Disp: 90 tablet, Rfl: 1    glucose blood test strip, Prior to each meal, Disp: 300 each, Rfl: 1    hydrocortisone 2 5 % cream, Apply topically 2 (two) times a day (Patient not taking: Reported on 2/19/2021), Disp: 30 g, Rfl: 0    indapamide (LOZOL) 1 25 mg tablet, Take 1 tablet (1 25 mg total) by mouth every morning (Patient taking differently: Take 1 25 mg by mouth daily as needed Depending on BP), Disp: 90 tablet, Rfl: 1    Lancets (ONETOUCH DELICA PLUS [id number]) MISC, TEST 3 TIMES, Disp: 100 each, Rfl: 1    levothyroxine 150 mcg tablet, Take 1 tablet (150 mcg total) by mouth daily, Disp: 90 tablet, Rfl: 1    losartan (COZAAR) 100 MG tablet, Take 1 tablet (100 mg total) by mouth daily, Disp: 90 tablet, Rfl: 1    metFORMIN (GLUCOPHAGE) 1000 MG tablet, Take 1 tablet (1,000 mg total) by mouth 2 (two) times a day with meals, Disp: 180 tablet, Rfl: 1    metoprolol succinate (TOPROL-XL) 25 mg 24 hr tablet, Take 1 tablet (25 mg total) by mouth daily, Disp: 30 tablet, Rfl: 4    montelukast (SINGULAIR) 10 mg tablet, Take 10 mg by mouth daily at bedtime as needed , Disp: , Rfl:     Na Sulfate-K Sulfate-Mg Sulf (Suprep Bowel Prep Kit) 17 5-3 13-1 6 GM/177ML SOLN, Take 1 kit by mouth once for 1 dose, Disp: 2 Bottle, Rfl: 0    omeprazole (PriLOSEC) 20 mg delayed release capsule, Take 1 capsule (20 mg total) by mouth daily, Disp: 90 capsule, Rfl: 1    spironolactone (ALDACTONE) 25 mg tablet, Take 1 tablet (25 mg total) by mouth daily (Patient taking differently: Take 25 mg by mouth daily as needed If needed depending on BP), Disp: 90 tablet, Rfl: 1    Tresiba FlexTouch 100 units/mL injection pen, INJECT 25UNITS IN THE MORNING AND 10 UNITS IN THE EVENING (Patient taking differently: every evening Pt reports currently only taking 10 UNITS IN THE EVENING,depending on BS), Disp: 5 pen, Rfl: 1    VITAMIN D PO, Take by mouth, Disp: , Rfl:     HPI    The following portions of the patient's history were reviewed and updated as appropriate: allergies, current medications, past family history, past medical history, past social history, past surgical history and problem list     Review of Systems   Constitutional: Negative  Negative for activity change, appetite change, fatigue, fever and unexpected weight change  HENT: Negative for congestion, ear pain, hearing loss, mouth sores, postnasal drip, rhinorrhea, sore throat, trouble swallowing and voice change  Eyes: Negative for pain, redness and visual disturbance  Respiratory: Negative for cough, chest tightness, shortness of breath and wheezing  Cardiovascular: Negative for chest pain, palpitations and leg swelling     Gastrointestinal: Negative for abdominal distention, abdominal pain, blood in stool, constipation, diarrhea and nausea  Endocrine: Negative for cold intolerance, heat intolerance, polydipsia, polyphagia and polyuria  Genitourinary: Negative for difficulty urinating, dysuria, flank pain, frequency, hematuria and urgency  Musculoskeletal: Negative for arthralgias, back pain, gait problem, joint swelling and myalgias  Skin: Negative for color change and pallor  Neurological: Negative for dizziness, tremors, seizures, syncope, weakness, numbness and headaches  Hematological: Negative for adenopathy  Does not bruise/bleed easily  Psychiatric/Behavioral: Negative  Negative for sleep disturbance  The patient is not nervous/anxious  Objective: There were no vitals taken for this visit  Physical Exam  Vitals signs and nursing note reviewed  Constitutional:       Appearance: She is well-developed  HENT:      Head: Normocephalic  Right Ear: External ear normal       Left Ear: External ear normal       Nose: Nose normal       Mouth/Throat:      Pharynx: No oropharyngeal exudate  Eyes:      Conjunctiva/sclera: Conjunctivae normal       Pupils: Pupils are equal, round, and reactive to light  Neck:      Musculoskeletal: Normal range of motion and neck supple  Thyroid: No thyromegaly  Cardiovascular:      Rate and Rhythm: Normal rate and regular rhythm  Heart sounds: Normal heart sounds  No murmur  No friction rub  No gallop  Comments: S1-S2 regular rhythm  Extremities no edema  Pulmonary:      Effort: Pulmonary effort is normal  No respiratory distress  Breath sounds: Normal breath sounds  No wheezing or rales  Comments: Lungs are clear no wheezing rales or rhonchi  Abdominal:      General: Bowel sounds are normal  There is no distension  Palpations: Abdomen is soft  There is no mass  Tenderness: There is no abdominal tenderness  There is no guarding or rebound        Comments: Abdomen obese soft mild tenderness in the right upper quadrant no rebound tenderness hypoactive bowel sounds   Musculoskeletal: Normal range of motion  Lymphadenopathy:      Cervical: No cervical adenopathy  Skin:     General: Skin is warm and dry  Neurological:      Mental Status: She is alert and oriented to person, place, and time     Psychiatric:         Behavior: Behavior normal          Judgment: Judgment normal

## 2021-03-04 NOTE — PATIENT INSTRUCTIONS
The day of surgery you will do the following  No diabetic medications   Atorvastatin 40 milligrams per day   Symbicort 160/4 52 puff   zetia 10 milligrams   Indapamide 1 25 milligrams   Levothyroxine 150 micrograms   Losartan 100 milligrams   Metoprolol succinate 25 milligrams   Montelukast 10 milligrams   Omeprazole 20 milligrams   Spironolactone 25 milligrams     Hold all other medications

## 2021-03-05 ENCOUNTER — HOSPITAL ENCOUNTER (OUTPATIENT)
Dept: NON INVASIVE DIAGNOSTICS | Facility: CLINIC | Age: 66
Discharge: HOME | End: 2021-03-05
Attending: INTERNAL MEDICINE
Payer: COMMERCIAL

## 2021-03-05 DIAGNOSIS — R00.2 PALPITATIONS: ICD-10-CM

## 2021-03-05 DIAGNOSIS — I10 ESSENTIAL HYPERTENSION: ICD-10-CM

## 2021-03-05 PROCEDURE — 93306 TTE W/DOPPLER COMPLETE: CPT | Performed by: INTERNAL MEDICINE

## 2021-03-05 PROCEDURE — 93306 TTE W/DOPPLER COMPLETE: CPT

## 2021-03-08 ENCOUNTER — DOCUMENTATION (OUTPATIENT)
Dept: NON INVASIVE DIAGNOSTICS | Facility: HOSPITAL | Age: 66
End: 2021-03-08

## 2021-03-08 NOTE — PROGRESS NOTES
Cardiology Note:  Patient is for gallbladder surgery in the next couple days  I have spoken with her directly by telephone and she states she has no chest discomfort concerning for angina and no symptoms of heart failure  She denies any lower extremity swelling or orthopnea  Echocardiogram demonstrated normal cardiac structure and function without any significant valvular disease  I have shared with her the stable news  She has had a stress test about 1 year ago that was found to be negative for myocardial ischemia  Recent Holter monitor showing sinus rhythm without VT or PSVT  Based on these findings, I believe that she is at a low CV risk for her upcoming gallbladder surgery  No further testing prior to the OR as it would not change our management  Recommend routine patient follow-up      Signed: Eben Heredia DO, [facility], [facility]

## 2021-03-10 ENCOUNTER — APPOINTMENT (OUTPATIENT)
Dept: RADIOLOGY | Facility: HOSPITAL | Age: 66
End: 2021-03-10
Payer: COMMERCIAL

## 2021-03-10 ENCOUNTER — ANESTHESIA (OUTPATIENT)
Dept: PERIOP | Facility: HOSPITAL | Age: 66
End: 2021-03-10
Payer: COMMERCIAL

## 2021-03-10 ENCOUNTER — HOSPITAL ENCOUNTER (OUTPATIENT)
Facility: HOSPITAL | Age: 66
Setting detail: OUTPATIENT SURGERY
Discharge: HOME | End: 2021-03-10
Attending: SURGERY | Admitting: SURGERY
Payer: COMMERCIAL

## 2021-03-10 VITALS
WEIGHT: 200 LBS | RESPIRATION RATE: 18 BRPM | SYSTOLIC BLOOD PRESSURE: 116 MMHG | BODY MASS INDEX: 36.8 KG/M2 | DIASTOLIC BLOOD PRESSURE: 56 MMHG | HEART RATE: 99 BPM | HEIGHT: 62 IN | TEMPERATURE: 98 F | OXYGEN SATURATION: 95 %

## 2021-03-10 DIAGNOSIS — K80.50 BILIARY COLIC: ICD-10-CM

## 2021-03-10 DIAGNOSIS — K80.10 CALCULUS OF GALLBLADDER WITH CHRONIC CHOLECYSTITIS WITHOUT OBSTRUCTION: Primary | ICD-10-CM

## 2021-03-10 PROBLEM — E66.9 OBESITY (BMI 35.0-39.9 WITHOUT COMORBIDITY): Status: ACTIVE | Noted: 2021-03-10

## 2021-03-10 PROBLEM — Z87.898 HISTORY OF ORTHOPNEA: Status: ACTIVE | Noted: 2021-03-10

## 2021-03-10 PROBLEM — E66.01 CLASS 3 SEVERE OBESITY DUE TO EXCESS CALORIES WITH SERIOUS COMORBIDITY AND BODY MASS INDEX (BMI) OF 40.0 TO 44.9 IN ADULT (HCC): Status: RESOLVED | Noted: 2021-01-14 | Resolved: 2021-03-10

## 2021-03-10 LAB
GLUCOSE SERPL-MCNC: 178 MG/DL (ref 65–140)
GLUCOSE SERPL-MCNC: 259 MG/DL (ref 65–140)

## 2021-03-10 PROCEDURE — 88304 TISSUE EXAM BY PATHOLOGIST: CPT | Performed by: PATHOLOGY

## 2021-03-10 PROCEDURE — 47562 LAPAROSCOPIC CHOLECYSTECTOMY: CPT | Performed by: PHYSICIAN ASSISTANT

## 2021-03-10 PROCEDURE — 47562 LAPAROSCOPIC CHOLECYSTECTOMY: CPT | Performed by: SURGERY

## 2021-03-10 PROCEDURE — 82948 REAGENT STRIP/BLOOD GLUCOSE: CPT

## 2021-03-10 RX ORDER — MIDAZOLAM HYDROCHLORIDE 2 MG/2ML
INJECTION, SOLUTION INTRAMUSCULAR; INTRAVENOUS AS NEEDED
Status: DISCONTINUED | OUTPATIENT
Start: 2021-03-10 | End: 2021-03-10

## 2021-03-10 RX ORDER — PROPOFOL 10 MG/ML
INJECTION, EMULSION INTRAVENOUS AS NEEDED
Status: DISCONTINUED | OUTPATIENT
Start: 2021-03-10 | End: 2021-03-10

## 2021-03-10 RX ORDER — ONDANSETRON 4 MG/1
4 TABLET, ORALLY DISINTEGRATING ORAL EVERY 8 HOURS PRN
Status: DISCONTINUED | OUTPATIENT
Start: 2021-03-10 | End: 2021-03-10 | Stop reason: HOSPADM

## 2021-03-10 RX ORDER — SUCCINYLCHOLINE/SOD CL,ISO/PF 100 MG/5ML
SYRINGE (ML) INTRAVENOUS AS NEEDED
Status: DISCONTINUED | OUTPATIENT
Start: 2021-03-10 | End: 2021-03-10

## 2021-03-10 RX ORDER — FENTANYL CITRATE 50 UG/ML
INJECTION, SOLUTION INTRAMUSCULAR; INTRAVENOUS AS NEEDED
Status: DISCONTINUED | OUTPATIENT
Start: 2021-03-10 | End: 2021-03-10

## 2021-03-10 RX ORDER — CEFAZOLIN SODIUM 2 G/50ML
2000 SOLUTION INTRAVENOUS ONCE
Status: COMPLETED | OUTPATIENT
Start: 2021-03-10 | End: 2021-03-10

## 2021-03-10 RX ORDER — ONDANSETRON 2 MG/ML
4 INJECTION INTRAMUSCULAR; INTRAVENOUS EVERY 6 HOURS PRN
Status: DISCONTINUED | OUTPATIENT
Start: 2021-03-10 | End: 2021-03-10 | Stop reason: HOSPADM

## 2021-03-10 RX ORDER — ALBUTEROL SULFATE 2.5 MG/3ML
2.5 SOLUTION RESPIRATORY (INHALATION) ONCE
Status: COMPLETED | OUTPATIENT
Start: 2021-03-10 | End: 2021-03-10

## 2021-03-10 RX ORDER — HYDROCODONE BITARTRATE AND ACETAMINOPHEN 5; 325 MG/1; MG/1
1 TABLET ORAL EVERY 4 HOURS PRN
Status: DISCONTINUED | OUTPATIENT
Start: 2021-03-10 | End: 2021-03-10 | Stop reason: HOSPADM

## 2021-03-10 RX ORDER — ACETAMINOPHEN 325 MG/1
650 TABLET ORAL EVERY 6 HOURS PRN
Status: DISCONTINUED | OUTPATIENT
Start: 2021-03-10 | End: 2021-03-10 | Stop reason: HOSPADM

## 2021-03-10 RX ORDER — DEXAMETHASONE SODIUM PHOSPHATE 10 MG/ML
INJECTION, SOLUTION INTRAMUSCULAR; INTRAVENOUS AS NEEDED
Status: DISCONTINUED | OUTPATIENT
Start: 2021-03-10 | End: 2021-03-10

## 2021-03-10 RX ORDER — HYDROMORPHONE HCL/PF 1 MG/ML
0.5 SYRINGE (ML) INJECTION
Status: DISCONTINUED | OUTPATIENT
Start: 2021-03-10 | End: 2021-03-10 | Stop reason: HOSPADM

## 2021-03-10 RX ORDER — ROCURONIUM BROMIDE 10 MG/ML
INJECTION, SOLUTION INTRAVENOUS AS NEEDED
Status: DISCONTINUED | OUTPATIENT
Start: 2021-03-10 | End: 2021-03-10

## 2021-03-10 RX ORDER — DEXTROSE AND SODIUM CHLORIDE 5; .45 G/100ML; G/100ML
80 INJECTION, SOLUTION INTRAVENOUS CONTINUOUS
Status: DISCONTINUED | OUTPATIENT
Start: 2021-03-10 | End: 2021-03-10 | Stop reason: HOSPADM

## 2021-03-10 RX ORDER — MAGNESIUM HYDROXIDE 1200 MG/15ML
LIQUID ORAL AS NEEDED
Status: DISCONTINUED | OUTPATIENT
Start: 2021-03-10 | End: 2021-03-10 | Stop reason: HOSPADM

## 2021-03-10 RX ORDER — LIDOCAINE HYDROCHLORIDE 10 MG/ML
INJECTION, SOLUTION EPIDURAL; INFILTRATION; INTRACAUDAL; PERINEURAL AS NEEDED
Status: DISCONTINUED | OUTPATIENT
Start: 2021-03-10 | End: 2021-03-10

## 2021-03-10 RX ORDER — DOCUSATE SODIUM 100 MG/1
100 CAPSULE, LIQUID FILLED ORAL 2 TIMES DAILY
Qty: 60 CAPSULE | Refills: 0 | Status: SHIPPED | OUTPATIENT
Start: 2021-03-10 | End: 2021-04-09

## 2021-03-10 RX ORDER — NEOSTIGMINE METHYLSULFATE 1 MG/ML
INJECTION INTRAVENOUS AS NEEDED
Status: DISCONTINUED | OUTPATIENT
Start: 2021-03-10 | End: 2021-03-10

## 2021-03-10 RX ORDER — SODIUM CHLORIDE 9 MG/ML
125 INJECTION, SOLUTION INTRAVENOUS CONTINUOUS
Status: DISCONTINUED | OUTPATIENT
Start: 2021-03-10 | End: 2021-03-10 | Stop reason: HOSPADM

## 2021-03-10 RX ORDER — INDOCYANINE GREEN AND WATER 25 MG
25 KIT INJECTION ONCE
Status: COMPLETED | OUTPATIENT
Start: 2021-03-10 | End: 2021-03-10

## 2021-03-10 RX ORDER — ONDANSETRON 2 MG/ML
INJECTION INTRAMUSCULAR; INTRAVENOUS AS NEEDED
Status: DISCONTINUED | OUTPATIENT
Start: 2021-03-10 | End: 2021-03-10

## 2021-03-10 RX ORDER — ONDANSETRON 4 MG/1
4 TABLET, FILM COATED ORAL EVERY 8 HOURS PRN
Qty: 20 TABLET | Refills: 0 | Status: SHIPPED | OUTPATIENT
Start: 2021-03-10 | End: 2021-03-31 | Stop reason: ALTCHOICE

## 2021-03-10 RX ORDER — FENTANYL CITRATE 50 UG/ML
50 INJECTION, SOLUTION INTRAMUSCULAR; INTRAVENOUS
Status: DISCONTINUED | OUTPATIENT
Start: 2021-03-10 | End: 2021-03-10 | Stop reason: HOSPADM

## 2021-03-10 RX ORDER — HYDROCODONE BITARTRATE AND ACETAMINOPHEN 5; 325 MG/1; MG/1
1 TABLET ORAL EVERY 4 HOURS PRN
Qty: 10 TABLET | Refills: 0 | Status: SHIPPED | OUTPATIENT
Start: 2021-03-10 | End: 2022-03-23

## 2021-03-10 RX ORDER — GLYCOPYRROLATE 0.2 MG/ML
INJECTION INTRAMUSCULAR; INTRAVENOUS AS NEEDED
Status: DISCONTINUED | OUTPATIENT
Start: 2021-03-10 | End: 2021-03-10

## 2021-03-10 RX ORDER — ONDANSETRON 2 MG/ML
4 INJECTION INTRAMUSCULAR; INTRAVENOUS EVERY 8 HOURS PRN
Status: DISCONTINUED | OUTPATIENT
Start: 2021-03-10 | End: 2021-03-10 | Stop reason: HOSPADM

## 2021-03-10 RX ADMIN — DEXAMETHASONE SODIUM PHOSPHATE 4 MG: 10 INJECTION, SOLUTION INTRAMUSCULAR; INTRAVENOUS at 07:29

## 2021-03-10 RX ADMIN — INDOCYANINE GREEN AND WATER 25 MG: KIT at 06:33

## 2021-03-10 RX ADMIN — SODIUM CHLORIDE 125 ML/HR: 0.9 INJECTION, SOLUTION INTRAVENOUS at 10:39

## 2021-03-10 RX ADMIN — PROPOFOL 200 MG: 10 INJECTION, EMULSION INTRAVENOUS at 07:29

## 2021-03-10 RX ADMIN — FENTANYL CITRATE 50 MCG: 50 INJECTION INTRAMUSCULAR; INTRAVENOUS at 09:22

## 2021-03-10 RX ADMIN — SODIUM CHLORIDE 125 ML/HR: 0.9 INJECTION, SOLUTION INTRAVENOUS at 06:07

## 2021-03-10 RX ADMIN — ROCURONIUM BROMIDE 5 MG: 10 INJECTION, SOLUTION INTRAVENOUS at 07:29

## 2021-03-10 RX ADMIN — MIDAZOLAM 2 MG: 1 INJECTION INTRAMUSCULAR; INTRAVENOUS at 07:22

## 2021-03-10 RX ADMIN — ALBUTEROL SULFATE 2.5 MG: 2.5 SOLUTION RESPIRATORY (INHALATION) at 10:41

## 2021-03-10 RX ADMIN — TRIMETHOBENZAMIDE HYDROCHLORIDE 200 MG: 100 INJECTION INTRAMUSCULAR at 09:56

## 2021-03-10 RX ADMIN — LIDOCAINE HYDROCHLORIDE 100 MG: 10 INJECTION, SOLUTION EPIDURAL; INFILTRATION; INTRACAUDAL; PERINEURAL at 07:29

## 2021-03-10 RX ADMIN — CEFAZOLIN SODIUM 2000 MG: 2 SOLUTION INTRAVENOUS at 07:17

## 2021-03-10 RX ADMIN — FENTANYL CITRATE 50 MCG: 50 INJECTION, SOLUTION INTRAMUSCULAR; INTRAVENOUS at 09:10

## 2021-03-10 RX ADMIN — ONDANSETRON 4 MG: 2 INJECTION INTRAMUSCULAR; INTRAVENOUS at 07:29

## 2021-03-10 RX ADMIN — FENTANYL CITRATE 50 MCG: 50 INJECTION, SOLUTION INTRAMUSCULAR; INTRAVENOUS at 07:29

## 2021-03-10 RX ADMIN — ONDANSETRON 4 MG: 2 INJECTION INTRAMUSCULAR; INTRAVENOUS at 09:37

## 2021-03-10 RX ADMIN — FENTANYL CITRATE 50 MCG: 50 INJECTION, SOLUTION INTRAMUSCULAR; INTRAVENOUS at 07:56

## 2021-03-10 RX ADMIN — SODIUM CHLORIDE: 0.9 INJECTION, SOLUTION INTRAVENOUS at 08:16

## 2021-03-10 RX ADMIN — PHENYLEPHRINE HYDROCHLORIDE 100 MCG: 10 INJECTION INTRAVENOUS at 07:43

## 2021-03-10 RX ADMIN — Medication 100 MG: at 07:29

## 2021-03-10 RX ADMIN — GLYCOPYRROLATE 0.4 MG: 0.2 INJECTION, SOLUTION INTRAMUSCULAR; INTRAVENOUS at 09:03

## 2021-03-10 RX ADMIN — ROCURONIUM BROMIDE 35 MG: 10 INJECTION, SOLUTION INTRAVENOUS at 07:33

## 2021-03-10 RX ADMIN — NEOSTIGMINE METHYLSULFATE 3 MG: 1 INJECTION INTRAVENOUS at 09:03

## 2021-03-10 RX ADMIN — SODIUM CHLORIDE: 0.9 INJECTION, SOLUTION INTRAVENOUS at 07:36

## 2021-03-10 NOTE — OP NOTE
CHOLECYSTECTOMY LAPAROSCOPIC W/ ROBOTICS  Postoperative Note  PATIENT NAME: Serene Mack  : 1955  MRN: [id number]  AL OR ROOM 08    Surgery Date: 3/10/2021    Pre operative diagnosis:  Biliary colic [N14 61]    Operative Indications:  Symptomatic gallbladder disease    Consent:  The risks, benefits, and alternatives to the surgery were discussed with the patient and with the family prior to surgery if present, personally by Dr Rhea Dean  If the consent was obtained by the physician assistant or other representative, the consent was reviewed once again personally by the operating physician  Common complications particular for this procedure as well as unusual complications were discussed, including but not limited to:  bleeding, wound infection, prolonged wound healing, open wounds, reoperation, leak from the bowel or viscus, leak from the bile duct or injury to adjacent or other organs or blood vessels in the abdomen  The significance of bile duct reconstruction was discussed  If the surgery was laparoscopic, it was discussed that possible open surgery could also occur during that same surgery and is always an option in laparoscopic surgery and/or reoperation  A  was used if necessary  The patient expressed understanding of the issues discussed and wished and consented to the procedure to proceed  All questions were answered  Dr Rhea Dean personally discussed the informed consent with this patient  Operative Findings:  Excellent critical view  Excellent ICG view     Post operative diagnosis:  Post-Op Diagnosis Codes:     * Biliary colic [L03 20]    Procedure:   Procedure(s):  CHOLECYSTECTOMY LAPAROSCOPIC W/ ROBOTICS    Interpretation of fluorescein dye by surgeon              Surgeon(s) and Role:     * Herminia Mata MD - Primary     * LAURA Jean Baptiste- - [facility] [place] [facility], MD - resident learner         [employer]  assistant/PA was medically necessary for surgical safety the case including suturing, retraction, and hemostasis  A qualified resident was not available for first assistance  I provided direct and immediate supervision  I was present for the entire procedure  Drains:  * No LDAs found *    Specimens:  ID Type Source Tests Collected by Time Destination   1 :  Tissue Gallbladder TISSUE EXAM Marilyn Monroe MD 3/10/2021 0871        Estimated Blood Loss:   Minimal    Anesthesia Type:   Choice     Procedure: The patient was seen again in the Holding Room  The risks, benefits, complications, treatment options, and expected outcomes were discussed with the patient  The possibilities of reaction to medication, pulmonary aspiration, perforation of viscus, bleeding, recurrent infection, finding a normal gallbladder, the need for additional procedures, failure to diagnose a condition, the possible need to convert to an open procedure, and creating a complication requiring transfusion or operation were discussed with the patient  The patient and/or family concurred with the proposed plan, giving informed consent  The site of surgery properly noted/marked  The patient was taken to Operating Room, identified as Chuckie Elizabeth and the procedure verified as Laparoscopic Cholecystectomy with Possible Intraoperative Cholangiogram  A Time Out was held and the above information confirmed  Prior to the induction of general anesthesia, antibiotic prophylaxis was administered  General endotracheal anesthesia was then administered and tolerated well  After the induction, the abdomen was prepped in the usual sterile fashion  The patient was positioned in the supine position  The patient was positioned in the supine position, along with some reverse Trendelenburg  Local anesthetic agent was injected into the skin near the umbilicus and an incision made  The midline fascia was incised and the open technique was used to introduce a  port under direct vision  Pneumoperitoneum was then created with CO2 and tolerated well without any adverse changes in the patient's vital signs  Additional trocars were introduced under direct vision  All skin incisions were infiltrated with a local anesthetic agent before making the incision and placing the trocars  The patient was placed in the head up, left side down position  Robotic ports were used  The robot was then docked  The gallbladder was identified, the fundus grasped and retracted cephalad  Adhesions were lysed bluntly and with the electrocautery where indicated, taking care not to injure any adjacent organs or viscus  The infundibulum was grasped and retracted laterally, exposing the peritoneum overlying the triangle of Calot  The peritoneum was removed anteriorly and posteriorly to the gallbladder, with special attention to the backside of the gallbladder dissection  This allowed for freeing up the gallbladder  The critical view of the triangle Calot was carried out, dissecting out the cystic duct and cystic artery as the only two tubular structures leading to the gallbladder  Once these were clearly identified, the back wall of the gallbladder was lifted away from the cystic plate to expose the posterior aspect of this dissection, ensuring that there were no posterior structures leading into the liver  Fluorescein dye had been given to the patient preoperatively  Using the appropriate camera view, the common bile duct was visualized and well away from the cystic duct during the critical view  The cystic duct was then doubly ligated with Surgical Clips on the patient side and singly clipped on the gallbladder side and divided  The cystic artery was re-identified and ligated with clips and divided as well  The gallbladder was dissected from the liver bed in retrograde fashion with the electrocautery or harmonic scalpel where appropriate    The gallbladder was removed, via an Endo pouch, through the 10 mm port      The liver bed was irrigated and inspected  Hemostasis was achieved  Copious irrigation was utilized and was repeatedly aspirated until clear  The pneumoperitoneum was completely reduced after viewing removal of the trocars under direct vision  The wounds were thoroughly irrigated and the larger port site fascia was then closed with a figure of eight suture; the skin was then closed with 4-0 Monocryl sutures and a sterile dressing was applied  Instrument, sponge, and needle counts were correct at closure and at the conclusion of the case  The patient tolerated the procedure well  Some portions of this record may have been generated with voice recognition software  There may be translation, syntax,  or grammatical errors  Occasional wrong word or "sound-a-like" substitutions may have occurred due to the inherent limitations of the voice recognition software  Read the chart carefully and recognize, using context, where substations may have occurred  If you have any questions, please contact the dictating provider for clarification or correction, as needed         Complications: None    Condition: Stable to PACU    SIGNATURE: Regine Noguera MD   DATE: March 10, 2021   TIME: 8:55 AM

## 2021-03-10 NOTE — ANESTHESIA POSTPROCEDURE EVALUATION
Post-Op Assessment Note    CV Status:  Stable  Pain Score: 2    Pain management: adequate     Mental Status:  Alert and awake   Hydration Status:  Euvolemic and stable   PONV Controlled:  Controlled   Airway Patency:  Patent      Post Op Vitals Reviewed: Yes      Staff: Anesthesiologist         No complications documented      BP      Temp      Pulse     Resp 17 (03/10/21 0958)    SpO2 95 % (03/10/21 0958)

## 2021-03-10 NOTE — INTERVAL H&P NOTE
H&P reviewed  After examining the patient I find no changes in the patients condition since the H&P had been written      Vitals:    03/10/21 0539   BP: 160/73   Pulse:    Resp:    Temp:    SpO2:

## 2021-03-10 NOTE — DISCHARGE INSTRUCTIONS
[facility] Instructions  Dr Alejandra Le MD, FACS    1  General: You will feel pulling sensations around the wound or funny aches and pains around the incisions  This is normal  Even minor surgery is a change in your body and this is your bodys way of reaction to it  If you have had abdominal surgery, it may help to support the incision with a small pillow or blanket for comfort when moving or coughing  2  Wound care: Make sure to remove the bandage in about 24 hours, unless instructed otherwise  You usually don't have to redress the wound after 24-48 hours, unless for comfort  Keep the incision clean and dry  Let air get to it  If this Steri-Strips fall off, just keep the wound clean  3  Water: You may shower over the wound, unless there are drain tubes left in place  Do not bathe or use a pool or hot tub until cleared by the physician  You may shower right over the staples or Steri-Strips and packing dry when you are done  4  Activity: You may go up and down stairs, walk as much as you are comfortable, but walk at least 3 times each day  If you have had abdominal surgery, do not lift anything heavier than 15 pounds for at least 2-4 weeks, unless cleared by the doctor  5  Diet: You may resume a regular diet  If you had a same-day surgery or overnight stay surgery, you may wish to eat lightly for a few days: soups, crackers, and sandwiches  You may resume a regular diet when ready  6  Medications: Resume all of your previous medications, unless told otherwise by the doctor  Avoid aspirin or ibuprofen (Advil, Motrin, etc ) products for 2-3 days after the date of surgery  You may, at that time, began to take them again  Tylenol is always fine, unless you are taking any narcotic pain medication containing Tylenol (such as Percocet, Darvocet, Vicodin, or anything containing acetaminophen)  Do not take Tylenol if you're taking these medications   You do not need to take the narcotic pain medications unless you are having significant pain and discomfort  7  Driving: You will need someone to drive you home on the day of surgery  Do not drive or make any important decisions while on narcotic pain medication or 24 hours and after anesthesia or sedation for surgery  Generally, you may drive when your off all narcotic pain medications  8  Upset Stomach: You may take Maalox, Tums, or similar items for an upset stomach  If your narcotic pain medication causes an upset stomach, do not take it on an empty stomach  Try taking it with at least some crackers or toast      9  Constipation: Patients often experienced constipation after surgery  You may take over-the-counter medication for this, such as Metamucil, Senokot, Dulcolax, milk of magnesia, etc  You may take a suppository unless you have had anorectal surgery such as a procedure on your hemorrhoids  If you experience significant nausea or vomiting after abdominal surgery, call the office before trying any of these medications  10  Call the office: If you are experiencing any of the following, fevers above 101 5°, significant nausea or vomiting, if the wound develops drainage and/or is excessive redness around the wound, or if you have significant diarrhea or other worsening symptoms  11  Pain: You may be given a prescription for pain  This will be given to the hospital, the day of surgery  12  Sexual Activity: You may resume sexual activity when you feel ready and comfortable and your incision is sealed and healed without apparent infection risk  13  Urination: If you haven't urinated in 6 hours, go directly to the ER for evaluation for urinary retention       [address], [address]  [place], [address]   Phone: [phone]

## 2021-03-10 NOTE — ANESTHESIA PREPROCEDURE EVALUATION
Procedure:  CHOLECYSTECTOMY LAPAROSCOPIC W/ ROBOTICS POSS IOC (N/A Abdomen)    Relevant Problems   CARDIO   (+) Atypical chest pain   (+) Essential hypertension   (+) Pure hypercholesterolemia      ENDO   (+) Acquired hypothyroidism      NEURO/PSYCH   (+) Depression, recurrent (HCC)   (+) History of orthopnea      PULMONARY   (+) Mild intermittent asthma with acute exacerbation      Other   (+) Obesity (BMI 35 0-39 9 without comorbidity)   (+) Uncontrolled type 2 diabetes mellitus with hyperglycemia (HCC)        Physical Exam    Airway    Mallampati score: II  TM Distance: >3 FB  Neck ROM: full     Dental   Comment: Partial (out),     Cardiovascular  Rhythm: regular, Rate: normal, Cardiovascular exam normal    Pulmonary  Pulmonary exam normal Breath sounds clear to auscultation,     Other Findings        Anesthesia Plan  ASA Score- 3     Anesthesia Type- general with ASA Monitors  Additional Monitors:   Airway Plan: ETT  Plan Factors-Exercise tolerance (METS): <4 METS  Chart reviewed  Patient summary reviewed  Patient is not a current smoker  Patient instructed to abstain from smoking on day of procedure  Patient did not smoke on day of surgery  Obstructive sleep apnea risk education given perioperatively  Induction- intravenous  Postoperative Plan-     Informed Consent- Anesthetic plan and risks discussed with patient  I personally reviewed this patient with the CRNA  Discussed and agreed on the Anesthesia Plan with the CRNA  Jayy Reed

## 2021-03-11 ENCOUNTER — TELEPHONE (OUTPATIENT)
Dept: SURGERY | Facility: CLINIC | Age: 66
End: 2021-03-11

## 2021-03-11 NOTE — TELEPHONE ENCOUNTER
S/P = Lap aviva = 3/10/21    Called and spoke with patient  She denies any F/V/N/C and she has not had a bowel movement  Patient is aware she can remove the dressing she can shower and bathe  Patient is aware to let water and soap run over her incision  She is aware to keep the incision sight clean and dry  Patient is aware she will be receiving a call once her pathology has been finalized and verified  She is aware to call the office with any questions or concerns  Path pending

## 2021-03-12 NOTE — TELEPHONE ENCOUNTER
Pathology has been finalized and verified by provider  Called and spoke to patient  Results were given and patient verbalized understanding  Patient states that she feels great  She states that she did not take any pain meds  She believes Dr Ifrah Bourne has "magical hands"  Confirmed POP[facility] appt for 3/23  Patient will call office, if needed, prior to that

## 2021-03-23 ENCOUNTER — OFFICE VISIT (OUTPATIENT)
Dept: SURGERY | Facility: CLINIC | Age: 66
End: 2021-03-23

## 2021-03-23 VITALS
DIASTOLIC BLOOD PRESSURE: 60 MMHG | HEIGHT: 62 IN | WEIGHT: 196 LBS | SYSTOLIC BLOOD PRESSURE: 150 MMHG | TEMPERATURE: 96.6 F | HEART RATE: 90 BPM | BODY MASS INDEX: 36.07 KG/M2

## 2021-03-23 DIAGNOSIS — I10 ESSENTIAL HYPERTENSION: ICD-10-CM

## 2021-03-23 DIAGNOSIS — R68.81 EARLY SATIETY: ICD-10-CM

## 2021-03-23 DIAGNOSIS — E03.9 ACQUIRED HYPOTHYROIDISM: ICD-10-CM

## 2021-03-23 DIAGNOSIS — R07.89 ATYPICAL CHEST PAIN: ICD-10-CM

## 2021-03-23 DIAGNOSIS — K21.9 GASTROESOPHAGEAL REFLUX DISEASE WITHOUT ESOPHAGITIS: Primary | ICD-10-CM

## 2021-03-23 DIAGNOSIS — E78.00 PURE HYPERCHOLESTEROLEMIA: ICD-10-CM

## 2021-03-23 DIAGNOSIS — E87.6 HYPOKALEMIA: ICD-10-CM

## 2021-03-23 DIAGNOSIS — J45.20 MILD INTERMITTENT ASTHMA WITHOUT COMPLICATION: ICD-10-CM

## 2021-03-23 DIAGNOSIS — E11.9 TYPE 2 DIABETES MELLITUS WITHOUT COMPLICATION, WITHOUT LONG-TERM CURRENT USE OF INSULIN (HCC): ICD-10-CM

## 2021-03-23 PROCEDURE — 4010F ACE/ARB THERAPY RXD/TAKEN: CPT | Performed by: INTERNAL MEDICINE

## 2021-03-23 PROCEDURE — 3008F BODY MASS INDEX DOCD: CPT | Performed by: INTERNAL MEDICINE

## 2021-03-23 PROCEDURE — 99024 POSTOP FOLLOW-UP VISIT: CPT | Performed by: PHYSICIAN ASSISTANT

## 2021-03-23 RX ORDER — LEVOTHYROXINE SODIUM 0.15 MG/1
150 TABLET ORAL DAILY
Qty: 90 TABLET | Refills: 1 | Status: SHIPPED | OUTPATIENT
Start: 2021-03-23 | End: 2021-11-05 | Stop reason: SDUPTHER

## 2021-03-23 RX ORDER — EZETIMIBE 10 MG/1
10 TABLET ORAL DAILY
Qty: 90 TABLET | Refills: 1 | Status: SHIPPED | OUTPATIENT
Start: 2021-03-23 | End: 2021-07-01 | Stop reason: SDUPTHER

## 2021-03-23 RX ORDER — MONTELUKAST SODIUM 10 MG/1
10 TABLET ORAL DAILY
Qty: 90 TABLET | Refills: 1 | Status: SHIPPED | OUTPATIENT
Start: 2021-03-23 | End: 2021-07-01 | Stop reason: SDUPTHER

## 2021-03-23 RX ORDER — OMEPRAZOLE 20 MG/1
20 CAPSULE, DELAYED RELEASE ORAL DAILY
Qty: 90 CAPSULE | Refills: 1 | Status: SHIPPED | OUTPATIENT
Start: 2021-03-23 | End: 2022-02-16

## 2021-03-23 RX ORDER — GLIMEPIRIDE 4 MG/1
4 TABLET ORAL
Qty: 90 TABLET | Refills: 1 | Status: SHIPPED | OUTPATIENT
Start: 2021-03-23 | End: 2021-05-07 | Stop reason: SDUPTHER

## 2021-03-23 RX ORDER — INDAPAMIDE 1.25 MG/1
1.25 TABLET, FILM COATED ORAL EVERY MORNING
Qty: 90 TABLET | Refills: 1 | Status: SHIPPED | OUTPATIENT
Start: 2021-03-23 | End: 2021-03-31 | Stop reason: ALTCHOICE

## 2021-03-23 RX ORDER — INSULIN DEGLUDEC INJECTION 100 U/ML
25 INJECTION, SOLUTION SUBCUTANEOUS DAILY
Qty: 15 ML | Refills: 5 | Status: SHIPPED | OUTPATIENT
Start: 2021-03-23 | End: 2022-04-21

## 2021-03-23 RX ORDER — LOSARTAN POTASSIUM 100 MG/1
100 TABLET ORAL DAILY
Qty: 90 TABLET | Refills: 1 | Status: SHIPPED | OUTPATIENT
Start: 2021-03-23 | End: 2021-07-01 | Stop reason: SDUPTHER

## 2021-03-23 RX ORDER — ATORVASTATIN CALCIUM 40 MG/1
40 TABLET, FILM COATED ORAL DAILY
Qty: 90 TABLET | Refills: 1 | Status: SHIPPED | OUTPATIENT
Start: 2021-03-23 | End: 2021-03-31 | Stop reason: ALTCHOICE

## 2021-03-23 RX ORDER — SPIRONOLACTONE 25 MG/1
25 TABLET ORAL DAILY
Qty: 90 TABLET | Refills: 1 | Status: SHIPPED | OUTPATIENT
Start: 2021-03-23 | End: 2022-06-14

## 2021-03-23 NOTE — PROGRESS NOTES
Assessment/Plan:   Mary Caraballo is a 72 y  o female who comes in today for postoperative check after  Laparoscopic cholecystectomy on 03/10/2021     patient is doing well and admits to improvement of her right upper quadrant pain but still has epigastric pain and difficulty swallowing which is the same as prior to surgical intervention  An upper GI was performed prior to surgical intervention which showed normal motility with mild acid reflux and a small hiatal hernia  Pathology: Reviewed with patient, all questions answered  Chronic cholecystitis     due to persistent symptoms similar to prior to surgical intervention would refer to GI for further evaluation and workup  May require upper endoscopy or gastric emptying evaluation due to history of diabetes  ______________________________________________________  HPI:  Mary Caraballo is a 72 y  o female who comes in today for postoperative check after recent  Laparoscopic cholecystectomy on 03/10/2021 for biliary dyskinesia    Currently doing well with some problems :  Continued epigastric pain and difficulty tolerating food, no fever or chills,no nausea and no vomiting  Patient reports they have resumed their normal diet  denies biliary diarrhea  Reports  Early  saiety and feels as if food gets stuck in her chest     ROS:  General ROS: negative for - chills, fatigue, fever or night sweats, weight loss  Respiratory ROS: no cough, shortness of breath, or wheezing  Cardiovascular ROS: no chest pain or dyspnea on exertion  Genito-Urinary ROS: no dysuria, trouble voiding, or hematuria  Musculoskeletal ROS: negative for - gait disturbance, joint pain or muscle pain  Neurological ROS: no TIA or stroke symptoms  GI ROS: see HPI  Skin ROS: no new rashes or lesions   Lymphatic ROS: no new adenopathy noted by pt     GYN ROS: see HPI, no new GYN history or bleeding noted  Psy ROS: no new mental or behavioral disturbances       Patient Active Problem List   Diagnosis  Acquired hypothyroidism    Essential hypertension    Mild intermittent asthma with acute exacerbation    Pure hypercholesterolemia    Atypical chest pain    Influenza A    Lung infiltrate    Severe sepsis (HCC)    Irritant contact dermatitis    Depression, recurrent (HCC)    Uncontrolled type 2 diabetes mellitus with hyperglycemia (HCC)    Spondylolisthesis of lumbar region    Preop examination    History of orthopnea    Obesity (BMI 35 0-39 9 without comorbidity)    Calculus of gallbladder with chronic cholecystitis without obstruction           Allergies:  Etodolac, Iodinated diagnostic agents, Simvastatin, Latex, Morphine, and Penicillins      Current Outpatient Medications:     acetaminophen (TYLENOL) 325 mg tablet, Take 2 tablets (650 mg total) by mouth every 6 (six) hours as needed for mild pain, moderate pain, headaches or fever, Disp: 30 tablet, Rfl: 0    albuterol (2 5 mg/3 mL) 0 083 % nebulizer solution, Take 1 vial (2 5 mg total) by nebulization every 6 (six) hours as needed for wheezing or shortness of breath, Disp: 60 vial, Rfl: 5    albuterol (PROVENTIL HFA,VENTOLIN HFA) 90 mcg/act inhaler, Inhale 2 puffs every 6 (six) hours as needed for wheezing, Disp: , Rfl:     aluminum-magnesium hydroxide-simethicone (MYLANTA) 200-200-20 mg/5 mL suspension, Take 30 mL by mouth every 4 (four) hours as needed for indigestion or heartburn, Disp: , Rfl:     aspirin 81 mg chewable tablet, Chew 81 mg daily, Disp: , Rfl:     aspirin-sod bicarb-citric acid (BARRINGTON-SELTZER) 325 MG TBEF, Take 325 mg by mouth every 6 (six) hours as needed for mild pain, Disp: , Rfl:     atorvastatin (LIPITOR) 40 mg tablet, Take 1 tablet (40 mg total) by mouth daily, Disp: 90 tablet, Rfl: 1    BD Pen Needle Berenice 2nd Gen 32G X 4 MM MISC, USE TWICE A DAY, Disp: 100 each, Rfl: 1    Blood Glucose Monitoring Suppl (ONE TOUCH ULTRA 2) w/Device KIT, Test blood sugar 3 times daily, Disp: 1 each, Rfl: 0   budesonide-formoterol (SYMBICORT) 160-4 5 mcg/act inhaler, Inhale 2 puffs 2 (two) times a day as needed Rinse mouth after use  , Disp: , Rfl:     docusate sodium (COLACE) 100 mg capsule, Take 1 capsule (100 mg total) by mouth 2 (two) times a day, Disp: 60 capsule, Rfl: 0    ezetimibe (ZETIA) 10 mg tablet, Take 1 tablet (10 mg total) by mouth daily, Disp: 90 tablet, Rfl: 1    fluticasone (FLONASE) 50 mcg/act nasal spray, 1 spray into each nostril daily as needed , Disp: , Rfl:     furosemide (LASIX) 20 mg tablet, Take 20 mg by mouth 2 (two) times a day as needed, Disp: , Rfl:     glimepiride (AMARYL) 4 mg tablet, Take 1 tablet (4 mg total) by mouth daily with breakfast, Disp: 90 tablet, Rfl: 1    glucose blood test strip, Prior to each meal, Disp: 300 each, Rfl: 1    HYDROcodone-acetaminophen (NORCO) 5-325 mg per tablet, Take 1 tablet by mouth every 4 (four) hours as needed for pain for up to 10 dosesMax Daily Amount: 6 tablets, Disp: 10 tablet, Rfl: 0    hydrocortisone 2 5 % cream, Apply topically 2 (two) times a day, Disp: 30 g, Rfl: 0    Lancets (ONETOUCH DELICA PLUS ERONPB17C) MISC, TEST 3 TIMES, Disp: 100 each, Rfl: 1    levothyroxine 150 mcg tablet, Take 1 tablet (150 mcg total) by mouth daily, Disp: 90 tablet, Rfl: 1    metFORMIN (GLUCOPHAGE) 1000 MG tablet, Take 1 tablet (1,000 mg total) by mouth 2 (two) times a day with meals, Disp: 180 tablet, Rfl: 1    montelukast (SINGULAIR) 10 mg tablet, Take 10 mg by mouth daily at bedtime as needed , Disp: , Rfl:     omeprazole (PriLOSEC) 20 mg delayed release capsule, Take 1 capsule (20 mg total) by mouth daily, Disp: 90 capsule, Rfl: 1    Tresiba FlexTouch 100 units/mL injection pen, INJECT 25UNITS IN THE MORNING AND 10 UNITS IN THE EVENING (Patient taking differently: every evening Pt reports currently only taking 10 UNITS IN THE EVENING,depending on BS), Disp: 5 pen, Rfl: 1    VITAMIN D PO, Take by mouth, Disp: , Rfl:     indapamide (LOZOL) 1 25 mg tablet, Take 1 tablet (1 25 mg total) by mouth every morning (Patient taking differently: Take 1 25 mg by mouth daily as needed Depending on BP), Disp: 90 tablet, Rfl: 1    losartan (COZAAR) 100 MG tablet, Take 1 tablet (100 mg total) by mouth daily, Disp: 90 tablet, Rfl: 1    metoprolol succinate (TOPROL-XL) 25 mg 24 hr tablet, Take 1 tablet (25 mg total) by mouth daily, Disp: 30 tablet, Rfl: 4    Na Sulfate-K Sulfate-Mg Sulf (Suprep Bowel Prep Kit) 17 5-3 13-1 6 GM/177ML SOLN, Take 1 kit by mouth once for 1 dose, Disp: 2 Bottle, Rfl: 0    ondansetron (ZOFRAN) 4 mg tablet, Take 1 tablet (4 mg total) by mouth every 8 (eight) hours as needed for nausea or vomiting for up to 7 days, Disp: 20 tablet, Rfl: 0    spironolactone (ALDACTONE) 25 mg tablet, Take 1 tablet (25 mg total) by mouth daily (Patient taking differently: Take 25 mg by mouth daily as needed If needed depending on BP), Disp: 90 tablet, Rfl: 1    Past Medical History:   Diagnosis Date    Abdominal pain     Asthma     Back pain     occas due to "disc problem"    Balance problems     "right side"    Cholelithiasis     lap aviva today 3/10/2021    Colon polyp     Cracked tooth     3    Depression     Diabetes mellitus (HCC)     Difficulty swallowing     "food feels like stuck sometimes'    Exercise involving walking     steps and cleaning    Fatty liver     GERD (gastroesophageal reflux disease)     Hiatal hernia     History of pneumonia     History of stomach ulcers     Hyperlipidemia     Hypertension     Obesity     Refusal of blood transfusions as patient is Anabaptism     Risk for falls     Stroke [facility])     Uses Bulgarian as primary spoken language     Wears glasses        Past Surgical History:   Procedure Laterality Date    EGD AND COLONOSCOPY  02/09/2021    3 YEAR RECOMMENDED = COLONO    OH LAP,CHOLECYSTECTOMY N/A 3/10/2021    Procedure: CHOLECYSTECTOMY LAPAROSCOPIC W/ ROBOTICS;  Surgeon: Maureen Augustine MD; Location[facility];  Service: General    ROTATOR CUFF REPAIR Bilateral     screws implanted"       Family History   Problem Relation Age of Onset    Diabetes Mother     Hypertension Mother     Hypertension Father     No Known Problems Sister     No Known Problems Daughter     No Known Problems Maternal Grandmother     No Known Problems Maternal Grandfather     No Known Problems Paternal Grandmother     No Known Problems Paternal Grandfather     No Known Problems Son     No Known Problems Son     No Known Problems Daughter     No Known Problems Daughter     Colon cancer Brother 71        reports that she has never smoked  She has never used smokeless tobacco  She reports that she does not drink alcohol or use drugs  Vitals:    03/23/21 1046   BP: 150/60   Pulse: 90   Temp: (!) 96 6 °F (35 9 °C)       PHYSICAL EXAM  General: normal, cooperative, no distress  Abdominal: soft, nondistended or nontender  Incision: clean, dry, and intact and healing well      Some portions of this record may have been generated with voice recognition software  There may be translation, syntax,  or grammatical errors  Occasional wrong word or "sound-a-like" substitutions may have occurred due to the inherent limitations of the voice recognition software  Read the chart carefully and recognize, using context, where substitutions may have occurred  If you have any questions, please contact the dictating provider for clarification or correction, as needed  This encounter has been coded by a non-certified coder         Alana Butcher PA-C      Date: 3/23/2021 Time: 11:05 AM

## 2021-03-25 ENCOUNTER — IMMUNIZATIONS (OUTPATIENT)
Dept: FAMILY MEDICINE CLINIC | Facility: HOSPITAL | Age: 66
End: 2021-03-25

## 2021-03-25 DIAGNOSIS — Z23 ENCOUNTER FOR IMMUNIZATION: Primary | ICD-10-CM

## 2021-03-25 PROCEDURE — 91301 SARS-COV-2 / COVID-19 MRNA VACCINE (MODERNA) 100 MCG: CPT

## 2021-03-25 PROCEDURE — 0011A SARS-COV-2 / COVID-19 MRNA VACCINE (MODERNA) 100 MCG: CPT

## 2021-03-30 ENCOUNTER — RA CDI HCC (OUTPATIENT)
Dept: OTHER | Facility: HOSPITAL | Age: 66
End: 2021-03-30

## 2021-03-30 NOTE — PROGRESS NOTES
Hccs found were used in 2021  [facility] 75  coding oppertunities          Chart reviewed, no opportunity found: CHART REVIEWED, NO OPPORTUNITY FOUND

## 2021-04-06 ENCOUNTER — OFFICE VISIT (OUTPATIENT)
Dept: INTERNAL MEDICINE CLINIC | Facility: CLINIC | Age: 66
End: 2021-04-06
Payer: COMMERCIAL

## 2021-04-06 VITALS
DIASTOLIC BLOOD PRESSURE: 80 MMHG | HEART RATE: 84 BPM | WEIGHT: 190 LBS | TEMPERATURE: 97.7 F | HEIGHT: 62 IN | RESPIRATION RATE: 13 BRPM | SYSTOLIC BLOOD PRESSURE: 140 MMHG | BODY MASS INDEX: 34.96 KG/M2

## 2021-04-06 DIAGNOSIS — E03.9 ACQUIRED HYPOTHYROIDISM: ICD-10-CM

## 2021-04-06 DIAGNOSIS — E78.00 PURE HYPERCHOLESTEROLEMIA: ICD-10-CM

## 2021-04-06 DIAGNOSIS — K80.10 CALCULUS OF GALLBLADDER WITH CHRONIC CHOLECYSTITIS WITHOUT OBSTRUCTION: ICD-10-CM

## 2021-04-06 DIAGNOSIS — I10 ESSENTIAL HYPERTENSION: ICD-10-CM

## 2021-04-06 DIAGNOSIS — F33.9 DEPRESSION, RECURRENT (HCC): ICD-10-CM

## 2021-04-06 DIAGNOSIS — E66.9 OBESITY (BMI 35.0-39.9 WITHOUT COMORBIDITY): ICD-10-CM

## 2021-04-06 DIAGNOSIS — J45.21 MILD INTERMITTENT ASTHMA WITH ACUTE EXACERBATION: ICD-10-CM

## 2021-04-06 DIAGNOSIS — E11.65 UNCONTROLLED TYPE 2 DIABETES MELLITUS WITH HYPERGLYCEMIA (HCC): Primary | ICD-10-CM

## 2021-04-06 DIAGNOSIS — M72.2 PLANTAR FASCIITIS OF LEFT FOOT: ICD-10-CM

## 2021-04-06 PROCEDURE — 99214 OFFICE O/P EST MOD 30 MIN: CPT | Performed by: INTERNAL MEDICINE

## 2021-04-06 PROCEDURE — 1036F TOBACCO NON-USER: CPT | Performed by: INTERNAL MEDICINE

## 2021-04-06 PROCEDURE — 3725F SCREEN DEPRESSION PERFORMED: CPT | Performed by: INTERNAL MEDICINE

## 2021-04-06 PROCEDURE — 1160F RVW MEDS BY RX/DR IN RCRD: CPT | Performed by: INTERNAL MEDICINE

## 2021-04-06 PROCEDURE — 3008F BODY MASS INDEX DOCD: CPT | Performed by: INTERNAL MEDICINE

## 2021-04-06 PROCEDURE — 3077F SYST BP >= 140 MM HG: CPT | Performed by: INTERNAL MEDICINE

## 2021-04-06 PROCEDURE — 3079F DIAST BP 80-89 MM HG: CPT | Performed by: INTERNAL MEDICINE

## 2021-04-06 RX ORDER — FAMOTIDINE 10 MG
10 TABLET ORAL DAILY
COMMUNITY
End: 2022-02-16

## 2021-04-06 NOTE — PROGRESS NOTES
Assessment/Plan:       problem 1  Blood pressure labile but acceptable control did not make any medication changes at this particular time she is on the following medications  Losartan 100 mg   Metoprolol succinate 25 mg   Spironolactone 25 mg   Lasix 20 mg    Problem 2  Diabetes hemoglobin A1c 7 8 which is acceptable could be better she is on the following medications   Metformin a g twice a day  Glimepiride in 4 mg daily with breakfast   Tresiba long-acting insulin 25 units daily she can always cut down the dose she feels her sugars are low  Do not stop the medication altogether     Problem 3  Status post cholecystectomy she still has some dyspepsia and stomach issues she has seen the gastroenterologist for workup the going to do an EGD possibly check her for motility disorder  Problem 4  Her asthma seems to be stable she is not coughing or shortness of breath      Problem 5  Hypothyroidism TSH was slightly elevated the last time she is taking the levothyroxine on a regular basis will check a TSH and T4 when she comes back  She has pain in her heel on the left foot suggestive of plantar fasciitis will refer to Podiatry        No problem-specific Assessment & Plan notes found for this encounter  Diagnoses and all orders for this visit:    Uncontrolled type 2 diabetes mellitus with hyperglycemia ([facility] [address] )    Acquired hypothyroidism    Mild intermittent asthma with acute exacerbation    Essential hypertension    Pure hypercholesterolemia    Obesity (BMI 35 0-39 9 without comorbidity)    Depression, recurrent (HCC)    Calculus of gallbladder with chronic cholecystitis without obstruction          Subjective:      Patient ID: Joanna Lawler is a 77 y o  female  No chief complaint on file          Current Outpatient Medications:     albuterol (2 5 mg/3 mL) 0 083 % nebulizer solution, Take 1 vial (2 5 mg total) by nebulization every 6 (six) hours as needed for wheezing or shortness of breath, Disp: 60 vial, Rfl: 5    albuterol (PROVENTIL HFA,VENTOLIN HFA) 90 mcg/act inhaler, Inhale 2 puffs every 6 (six) hours as needed for wheezing, Disp: , Rfl:     Alum Hydroxide-Mag Carbonate (GAVISCON PO), Take 1 Dose by mouth as needed, Disp: , Rfl:     aspirin 81 mg chewable tablet, Chew 81 mg daily, Disp: , Rfl:     BD Pen Needle Berenice 2nd Gen 32G X 4 MM MISC, USE TWICE A DAY, Disp: 100 each, Rfl: 1    Blood Glucose Monitoring Suppl (ONE TOUCH ULTRA 2) w/Device KIT, Test blood sugar 3 times daily, Disp: 1 each, Rfl: 0    budesonide-formoterol (SYMBICORT) 160-4 5 mcg/act inhaler, Inhale 2 puffs 2 (two) times a day as needed Rinse mouth after use  , Disp: , Rfl:     clonazePAM (KlonoPIN) 0 125 mg disintegrating tablet, Take 0 125 mg by mouth as needed for anxiety, Disp: , Rfl:     docusate sodium (COLACE) 100 mg capsule, Take 1 capsule (100 mg total) by mouth 2 (two) times a day, Disp: 60 capsule, Rfl: 0    ezetimibe (ZETIA) 10 mg tablet, Take 1 tablet (10 mg total) by mouth daily, Disp: 90 tablet, Rfl: 1    fluticasone (FLONASE) 50 mcg/act nasal spray, 1 spray into each nostril daily as needed , Disp: , Rfl:     furosemide (LASIX) 20 mg tablet, Take 20 mg by mouth 2 (two) times a day as needed, Disp: , Rfl:     glimepiride (AMARYL) 4 mg tablet, Take 1 tablet (4 mg total) by mouth daily with breakfast, Disp: 90 tablet, Rfl: 1    glucose blood test strip, Prior to each meal, Disp: 300 each, Rfl: 1    HYDROcodone-acetaminophen (NORCO) 5-325 mg per tablet, Take 1 tablet by mouth every 4 (four) hours as needed for pain for up to 10 dosesMax Daily Amount: 6 tablets, Disp: 10 tablet, Rfl: 0    hydrocortisone 2 5 % cream, Apply topically 2 (two) times a day, Disp: 30 g, Rfl: 0    insulin degludec Reino Love FlexTouch) 100 units/mL injection pen, Inject 25 Units under the skin daily Pt reports currently only taking 10 UNITS IN THE EVENING,depending on BS, Disp: 15 mL, Rfl: 5    Lancets ([address]) MISC, TEST 3 TIMES, Disp: 100 each, Rfl: 1    levothyroxine 150 mcg tablet, Take 1 tablet (150 mcg total) by mouth daily, Disp: 90 tablet, Rfl: 1    losartan (COZAAR) 100 MG tablet, Take 1 tablet (100 mg total) by mouth daily, Disp: 90 tablet, Rfl: 1    metFORMIN (GLUCOPHAGE) 1000 MG tablet, Take 1 tablet (1,000 mg total) by mouth 2 (two) times a day with meals, Disp: 180 tablet, Rfl: 1    metoprolol succinate (TOPROL-XL) 25 mg 24 hr tablet, Take 1 tablet (25 mg total) by mouth daily, Disp: 30 tablet, Rfl: 4    montelukast (SINGULAIR) 10 mg tablet, Take 1 tablet (10 mg total) by mouth daily, Disp: 90 tablet, Rfl: 1    omeprazole (PriLOSEC) 20 mg delayed release capsule, Take 1 capsule (20 mg total) by mouth daily, Disp: 90 capsule, Rfl: 1    spironolactone (ALDACTONE) 25 mg tablet, Take 1 tablet (25 mg total) by mouth daily, Disp: 90 tablet, Rfl: 1    VITAMIN D PO, Take by mouth, Disp: , Rfl:     HPI    The following portions of the patient's history were reviewed and updated as appropriate: allergies, current medications, past family history, past medical history, past social history, past surgical history and problem list     Review of Systems   Constitutional: Negative  Negative for activity change, appetite change, fatigue, fever and unexpected weight change  HENT: Negative for congestion, ear pain, hearing loss, mouth sores, postnasal drip, rhinorrhea, sore throat, trouble swallowing and voice change  Eyes: Negative for pain, redness and visual disturbance  Respiratory: Negative for cough, chest tightness, shortness of breath and wheezing  Cardiovascular: Negative for chest pain, palpitations and leg swelling  Gastrointestinal: Negative for abdominal distention, abdominal pain, blood in stool, constipation, diarrhea and nausea  Endocrine: Negative for cold intolerance, heat intolerance, polydipsia, polyphagia and polyuria     Genitourinary: Negative for difficulty urinating, dysuria, flank pain, frequency, hematuria and urgency  Musculoskeletal: Negative for arthralgias, back pain, gait problem, joint swelling and myalgias  Skin: Negative for color change and pallor  Neurological: Negative for dizziness, tremors, seizures, syncope, weakness, numbness and headaches  Hematological: Negative for adenopathy  Does not bruise/bleed easily  Psychiatric/Behavioral: Negative  Negative for sleep disturbance  The patient is not nervous/anxious  Objective:    Patient's shoes and socks removed  Right Foot/Ankle   Right Foot Inspection  Skin Exam: skin normal skin not intact, no dry skin, no warmth, no callus, no erythema, no maceration, no abnormal color, no pre-ulcer, no ulcer and no callus                          Toe Exam: ROM and strength within normal limitsno swelling, no tenderness, erythema and  no right toe deformity  Sensory   Vibration: intact  Proprioception: intact   Monofilament testing: intact  Vascular    The right DP pulse is 2+  Left Foot/Ankle  Left Foot Inspection  Skin Exam: skin normalskin not intact, no dry skin, no warmth, no erythema, no maceration, normal color, no pre-ulcer, no ulcer and no callus                         Toe Exam: ROM and strength within normal limitsno swelling, no tenderness, no erythema and no left toe deformity                   Sensory   Vibration: intact  Proprioception: intact  Monofilament: intact  Vascular    The left DP pulse is 2+  Assign Risk Category:  No deformity present; No loss of protective sensation; No weak pulses       Risk: 0        There were no vitals taken for this visit  Physical Exam  Vitals signs and nursing note reviewed  Constitutional:       Appearance: She is well-developed  HENT:      Head: Normocephalic  Right Ear: External ear normal       Left Ear: External ear normal       Nose: Nose normal       Mouth/Throat:      Pharynx: No oropharyngeal exudate     Eyes:      Conjunctiva/sclera: Conjunctivae normal       Pupils: Pupils are equal, round, and reactive to light  Neck:      Musculoskeletal: Normal range of motion and neck supple  Thyroid: No thyromegaly  Cardiovascular:      Rate and Rhythm: Normal rate and regular rhythm  Pulses: no weak pulses          Dorsalis pedis pulses are 2+ on the right side and 2+ on the left side  Heart sounds: Normal heart sounds  No murmur  No friction rub  No gallop  Comments: S1-S2 regular rhythm   Extremities no edema  Pulmonary:      Effort: Pulmonary effort is normal  No respiratory distress  Breath sounds: Normal breath sounds  No wheezing or rales  Comments: Lungs are clear no wheezing rales or rhonchi  Abdominal:      General: Bowel sounds are normal  There is no distension  Palpations: Abdomen is soft  There is no mass  Tenderness: There is no abdominal tenderness  There is no guarding or rebound  Musculoskeletal: Normal range of motion  Feet:    Feet:      Right foot:      Skin integrity: No ulcer, skin breakdown, erythema, warmth, callus or dry skin  Left foot:      Skin integrity: No ulcer, skin breakdown, erythema, warmth, callus or dry skin  Lymphadenopathy:      Cervical: No cervical adenopathy  Skin:     General: Skin is warm and dry  Neurological:      Mental Status: She is alert and oriented to person, place, and time     Psychiatric:         Behavior: Behavior normal          Judgment: Judgment normal

## 2021-04-06 NOTE — PATIENT INSTRUCTIONS
Fascitis plantar   LO QUE NECESITA SABER:   El reposo, el hielo y los soportes para pies pueden ayudar a barfield fascia plantar a sanar  Es posible que usted necesite medicamentos para disminuir la hinchazón y el dolor  Un fisioterapeuta puede enseñarle ejercicios que contribuyan a mejorar barfield movimiento y fuerza, y a aliviar el dolor  INSTRUCCIONES SOBRE EL AURELIANO HOSPITALARIA:   Comuníquese con barfield médico o terapeuta si:  · Barfield dolor e inflamación aumentan    · Presenta dolor de rodilla, cadera o espalda  · Usted tiene preguntas o inquietudes acerca de barfield condición o cuidado  Medicamentos: Es posible que usted necesite alguno de los siguientes:  · Acetaminofén vikash el dolor y baja la fiebre  Está disponible sin receta médica  Pregunte qué cantidad debe darle a barfield param y con qué frecuencia  [facility]  Norma las etiquetas de todos los demás medicamentos que esté tomando barfield hijo para saber si también contienen acetaminofén, o pregunte a barfield médico o farmacéutico  El acetaminofén puede causar daño en el hígado cuando no se pedro de forma correcta  · Los [place], jaleel el ibuprofeno, [facility] a disminuir la inflamación, el dolor y la [place]  Los LUIS pueden causar sangrado estomacal o problemas renales en ciertas personas  Si usted pedro un medicamento anticoagulante, siempre pregúntele a barfield médico si los LUIS son seguros para usted  Siempre norma la etiqueta de page medicamento y [place] instrucciones  · [place] zohaib medicamentos jaleel se le haya indicado  Consulte con barfield médico si usted romulo que barfield medicamento no le está ayudando o si presenta efectos secundarios  Infórmele si es alérgico a algún medicamento  Mantenga maria luz lista actualizada de los [place], las vitaminas y los productos herbales que pedro  Incluya los siguientes datos de los medicamentos: cantidad, frecuencia y motivo de administración  Traiga con usted la lista o los envases de las píldoras a zohaib citas de seguimiento   Lleve la [facility] medicamentos con usted en valente de maria luz emergencia  Cuidados personales:  · Use la férula o las plantillas jaleel se le indique  Es posible que usted necesite usar maria luz férula por la noche para mantener el pie estirado mientras duerme  [place] ayudará a evitar el dolor kelsi temprano por la [place]  Las plantillas ayudarán a disminuir la tensión en la fascia plantar al caminar o hacer ejercicio  · Repose según le indicaron  Descanse tanto jaleel sea posible para disminuir la hinchazón y evitar un daño mayor  Pregunte a barfield médico cuándo puede retomar zohaib [employer]  · Aplique hielo sobre la fascia plantar  El hielo ayuda a evitar daño al tejido y a disminuir la inflamación y el dolor  Llene maria luz botella de agua con agua y congélela  Envuelva maria luz toalla alrededor de la botella o cúbrala con maria luz joan de [place]  Ruede la botella del agua bajo el pie por 10 minutos en la mañana y después del [place]  · Masaje de la fascia plantar jaleel le indicaron  [place] podría ayudar a disminuir la hinchazón y el dolor  Ruede maria luz pelota de golf debajo de los pies kady 10 minutos  Adam esto 3 veces al día  · Vaya a terapia física según indicaciones  Un fisioterapeuta le puede enseñar ejercicios para ayudarle a mejorar el movimiento y la fuerza, y para disminuir el dolor  Evite la fascitis plantar:  · Mantenga un peso saludable  [place] ayudará a diminuir la [place]  Consulte con barfield médico cuánto debería pesar  Pida que le ayude a crear un plan para bajar de peso si usted tiene sobrepeso  · [place] ejercicios de bajo impacto  Los ejercicios de bajo impacto disminuyen la tensión en la fascia plantar  Por ejemplo, nadar o andar en bicicleta  · Comience nuevas actividades lentamente  Aumente gradualmente la intensidad y la duración  · Use calzado que le quede justen y que brinde soporte al arco  Reemplace el calzado antes de que la plantilla o el amortiguador de golpes se desgaste   Evite caminar o pararse descalzo o en sandalias por largos períodos de tiempo  · Realice ejercicios de calentamiento antes de hacer maria luz actividad física  Pregunte a barfield médico cómo estirar los músculos de la fascia plantar y la pantorilla  Acuda a zohaib consultas de control con barfield médico o podólogo según le indicaron: Anote zohaib preguntas para que se acuerde de hacerlas kady zohaib visitas  © [facility] [address] Information is for End User's use only and may not be sold, redistributed or otherwise used for commercial purposes  All illustrations and images included in CareNotes® are the copyrighted property of [employer]  or [address] [place] [address] es sólo para uso en educación  Barfield intención no es darle un consejo médico sobre enfermedades o tratamientos  Colsulte con barfield Danna Tesfaye farmacéutico antes de seguir cualquier régimen médico para saber si es seguro y efectivo para usted

## 2021-04-26 ENCOUNTER — IMMUNIZATIONS (OUTPATIENT)
Dept: FAMILY MEDICINE CLINIC | Facility: HOSPITAL | Age: 66
End: 2021-04-26

## 2021-04-26 DIAGNOSIS — Z23 ENCOUNTER FOR IMMUNIZATION: Primary | ICD-10-CM

## 2021-04-26 PROCEDURE — 91301 SARS-COV-2 / COVID-19 MRNA VACCINE (MODERNA) 100 MCG: CPT

## 2021-04-26 PROCEDURE — 0012A SARS-COV-2 / COVID-19 MRNA VACCINE (MODERNA) 100 MCG: CPT

## 2021-04-28 ENCOUNTER — ANESTHESIA EVENT (OUTPATIENT)
Dept: GASTROENTEROLOGY | Facility: HOSPITAL | Age: 66
End: 2021-04-28

## 2021-04-30 ENCOUNTER — ANESTHESIA (OUTPATIENT)
Dept: GASTROENTEROLOGY | Facility: HOSPITAL | Age: 66
End: 2021-04-30

## 2021-04-30 ENCOUNTER — HOSPITAL ENCOUNTER (OUTPATIENT)
Dept: GASTROENTEROLOGY | Facility: HOSPITAL | Age: 66
Setting detail: OUTPATIENT SURGERY
Discharge: HOME | End: 2021-04-30
Attending: INTERNAL MEDICINE | Admitting: INTERNAL MEDICINE
Payer: COMMERCIAL

## 2021-04-30 VITALS
HEART RATE: 66 BPM | HEIGHT: 62 IN | RESPIRATION RATE: 16 BRPM | BODY MASS INDEX: 34.96 KG/M2 | SYSTOLIC BLOOD PRESSURE: 119 MMHG | OXYGEN SATURATION: 97 % | TEMPERATURE: 98.5 F | WEIGHT: 190 LBS | DIASTOLIC BLOOD PRESSURE: 61 MMHG

## 2021-04-30 DIAGNOSIS — R68.81 EARLY SATIETY: ICD-10-CM

## 2021-04-30 DIAGNOSIS — R10.13 EPIGASTRIC PAIN: ICD-10-CM

## 2021-04-30 DIAGNOSIS — K21.9 GASTROESOPHAGEAL REFLUX DISEASE WITHOUT ESOPHAGITIS: ICD-10-CM

## 2021-04-30 DIAGNOSIS — R13.10 DYSPHAGIA, UNSPECIFIED TYPE: ICD-10-CM

## 2021-04-30 LAB — GLUCOSE SERPL-MCNC: 188 MG/DL (ref 65–140)

## 2021-04-30 PROCEDURE — 43450 DILATE ESOPHAGUS 1/MULT PASS: CPT | Performed by: INTERNAL MEDICINE

## 2021-04-30 PROCEDURE — 88305 TISSUE EXAM BY PATHOLOGIST: CPT | Performed by: PATHOLOGY

## 2021-04-30 PROCEDURE — 43239 EGD BIOPSY SINGLE/MULTIPLE: CPT | Performed by: INTERNAL MEDICINE

## 2021-04-30 PROCEDURE — 82948 REAGENT STRIP/BLOOD GLUCOSE: CPT

## 2021-04-30 RX ORDER — SODIUM CHLORIDE 9 MG/ML
125 INJECTION, SOLUTION INTRAVENOUS CONTINUOUS
Status: DISCONTINUED | OUTPATIENT
Start: 2021-04-30 | End: 2021-05-04 | Stop reason: HOSPADM

## 2021-04-30 RX ORDER — PROPOFOL 10 MG/ML
INJECTION, EMULSION INTRAVENOUS AS NEEDED
Status: DISCONTINUED | OUTPATIENT
Start: 2021-04-30 | End: 2021-04-30

## 2021-04-30 RX ORDER — LIDOCAINE HYDROCHLORIDE 20 MG/ML
INJECTION, SOLUTION EPIDURAL; INFILTRATION; INTRACAUDAL; PERINEURAL AS NEEDED
Status: DISCONTINUED | OUTPATIENT
Start: 2021-04-30 | End: 2021-04-30

## 2021-04-30 RX ADMIN — SODIUM CHLORIDE 125 ML/HR: 0.9 INJECTION, SOLUTION INTRAVENOUS at 08:17

## 2021-04-30 RX ADMIN — PROPOFOL 150 MG: 10 INJECTION, EMULSION INTRAVENOUS at 08:51

## 2021-04-30 RX ADMIN — PROPOFOL 50 MG: 10 INJECTION, EMULSION INTRAVENOUS at 08:54

## 2021-04-30 RX ADMIN — LIDOCAINE HYDROCHLORIDE 100 MG: 20 INJECTION, SOLUTION EPIDURAL; INFILTRATION; INTRACAUDAL; PERINEURAL at 08:51

## 2021-04-30 NOTE — ANESTHESIA PREPROCEDURE EVALUATION
Procedure:  EGD    Relevant Problems   CARDIO   (+) Atypical chest pain   (+) Essential hypertension   (+) Pure hypercholesterolemia      ENDO   (+) Acquired hypothyroidism      NEURO/PSYCH   (+) Depression, recurrent (HCC)   (+) History of orthopnea      PULMONARY   (+) Mild intermittent asthma with acute exacerbation        Physical Exam    Airway    Mallampati score: II  TM Distance: >3 FB  Neck ROM: full     Dental   Comment: Partial (out),     Cardiovascular  Rhythm: regular, Rate: normal, Cardiovascular exam normal    Pulmonary  Pulmonary exam normal Breath sounds clear to auscultation,     Other Findings        Anesthesia Plan  ASA Score- 3     Anesthesia Type- general and IV sedation with anesthesia with ASA Monitors  Additional Monitors:   Airway Plan:           Plan Factors-Exercise tolerance (METS): <4 METS  Chart reviewed  Patient summary reviewed  Patient is not a current smoker  Patient instructed to abstain from smoking on day of procedure  Patient did not smoke on day of surgery  Obstructive sleep apnea risk education given perioperatively  Induction- intravenous  Postoperative Plan-     Informed Consent- Anesthetic plan and risks discussed with patient  I personally reviewed this patient with the CRNA  Discussed and agreed on the Anesthesia Plan with the CRNA  Mina Liao

## 2021-04-30 NOTE — DISCHARGE INSTRUCTIONS
Please call [phone] with any problems  I dilated your esophagus today  My office should contact you to increase your medication to twice a day  She can try increasing her diet tomorrow  My office should also contact you and make sure you return to office go over biopsies and your progress  If she does not hear from us call please call our office as above  Upper Endoscopy   WHAT YOU NEED TO KNOW:   An upper endoscopy is also called an upper gastrointestinal (GI) endoscopy, or an esophagogastroduodenoscopy (EGD)  You may feel bloated, gassy, or have some abdominal discomfort after your procedure  Your throat may be sore for 24 to 36 hours  You may burp or pass gas from air that is still inside your body  DISCHARGE INSTRUCTIONS:   Call 911 if:   · You have sudden chest pain or trouble breathing  Seek care immediately if:   · You feel dizzy or faint  · You have trouble swallowing  · You have severe throat pain  · Your bowel movements are very dark or black  · Your abdomen is hard and firm and you have severe pain  · You vomit blood  Contact your healthcare provider if:   · You feel full or bloated and cannot burp or pass gas  · You have not had a bowel movement for 3 days after your procedure  · You have neck pain  · You have a fever or chills  · You have nausea or are vomiting  · You have a rash or hives  · You have questions or concerns about your endoscopy  Relieve a sore throat:  Suck on throat lozenges or crushed ice  Gargle with a small amount of warm salt water  Mix 1 teaspoon of salt and 1 cup of warm water to make salt water  Relieve gas and discomfort from bloating:  Lie on your right side with a heating pad on your abdomen  Take short walks to help pass gas  Eat small meals until bloating is relieved  Rest after your procedure:  Do not drive or make important decisions until the day after your procedure   Return to your normal activity as directed  You can usually return to work the day after your procedure  Follow up with your healthcare provider as directed:  Write down your questions so you remember to ask them during your visits  © Copyright [address] Information is for End User's use only and may not be sold, redistributed or otherwise used for commercial purposes  All illustrations and images included in CareNotes® are the copyrighted property of [employer]  or Delmar Johnson   The above information is an  only  It is not intended as medical advice for individual conditions or treatments  Talk to your doctor, nurse or pharmacist before following any medical regimen to see if it is safe and effective for you  Esophageal Dilation   WHAT YOU NEED TO KNOW:   Esophageal dilation is a procedure to widen a narrow part of your esophagus  Your healthcare provider will use a dilator (inflatable balloon or another tool that expands) to make the area wider  He or she may also do an endoscopy before or during your esophageal dilation  During an endoscopy, your healthcare provider will use a scope to see inside your esophagus  DISCHARGE INSTRUCTIONS:   Call your local emergency number (911 in the [address] [place] [address],[address]) if:   · You vomit blood  · You have a fast heartbeat, chest pain, or sudden trouble breathing  · Your abdomen suddenly becomes tender and hard  Call your doctor if:   · You are not able to swallow any food  · You have a fever  · You feel very full or bloated  · You have nausea or are vomiting  · You have questions or concerns about your condition or care  Medicines:   · Medicines  may be given to decrease stomach acid that can irritate your esophagus  · Take your medicine as directed  Contact your healthcare provider if you think your medicine is not helping or if you have side effects  Tell him or her if you are allergic to any medicine   Keep a list of the medicines, vitamins, and herbs you take  Include the amounts, and when and why you take them  Bring the list or the pill bottles to follow-up visits  Carry your medicine list with you in case of an emergency  Nutrition:  Your healthcare provider will tell you how long to wait after the procedure before you eat or drink anything  You may need to wait until any numbness in your throat is gone  When it is okay to eat, chew your food well  Eat soft foods if you still have problems swallowing  Soft foods include applesauce, bananas, cooked cereal, cottage cheese, eggs, pudding, and yogurt  Follow up with your healthcare provider as directed:  Write down your questions so you remember to ask them during your visits  © Copyright [address] Information is for End User's use only and may not be sold, redistributed or otherwise used for commercial purposes  All illustrations and images included in CareNotes® are the copyrighted property of A D A M [employer] Inc  or [facility] Isis Johnson   The above information is an  only  It is not intended as medical advice for individual conditions or treatments  Talk to your doctor, nurse or pharmacist before following any medical regimen to see if it is safe and effective for you

## 2021-04-30 NOTE — NURSING NOTE
Pt  does not want blood products  Pt  Signed release form for same, and ID bracelet applied to left wrist stating this

## 2021-04-30 NOTE — INTERVAL H&P NOTE
H&P reviewed  After examining the patient I find no changes in the patients condition since the H&P had been written      Vitals:    04/30/21 0802   BP: 126/73   Pulse: 77   Resp: 18   Temp: 98 5 °F (36 9 °C)   SpO2: 98%

## 2021-05-07 DIAGNOSIS — R07.89 ATYPICAL CHEST PAIN: ICD-10-CM

## 2021-05-07 DIAGNOSIS — I10 ESSENTIAL HYPERTENSION: ICD-10-CM

## 2021-05-07 DIAGNOSIS — E11.9 TYPE 2 DIABETES MELLITUS WITHOUT COMPLICATION, WITHOUT LONG-TERM CURRENT USE OF INSULIN (HCC): ICD-10-CM

## 2021-05-07 DIAGNOSIS — J45.20 MILD INTERMITTENT ASTHMA WITHOUT COMPLICATION: ICD-10-CM

## 2021-05-07 DIAGNOSIS — E03.9 ACQUIRED HYPOTHYROIDISM: ICD-10-CM

## 2021-05-07 DIAGNOSIS — E78.00 PURE HYPERCHOLESTEROLEMIA: ICD-10-CM

## 2021-05-07 RX ORDER — GLIMEPIRIDE 4 MG/1
4 TABLET ORAL
Qty: 90 TABLET | Refills: 1 | Status: SHIPPED | OUTPATIENT
Start: 2021-05-07 | End: 2021-07-01 | Stop reason: SDUPTHER

## 2021-05-10 ENCOUNTER — TELEPHONE (OUTPATIENT)
Dept: INTERNAL MEDICINE CLINIC | Facility: CLINIC | Age: 66
End: 2021-05-10

## 2021-05-12 DIAGNOSIS — R07.89 ATYPICAL CHEST PAIN: ICD-10-CM

## 2021-05-12 DIAGNOSIS — I10 ESSENTIAL HYPERTENSION: ICD-10-CM

## 2021-05-12 DIAGNOSIS — E78.00 PURE HYPERCHOLESTEROLEMIA: ICD-10-CM

## 2021-05-12 DIAGNOSIS — E11.9 TYPE 2 DIABETES MELLITUS WITHOUT COMPLICATION, WITHOUT LONG-TERM CURRENT USE OF INSULIN (HCC): ICD-10-CM

## 2021-05-12 DIAGNOSIS — J45.20 MILD INTERMITTENT ASTHMA WITHOUT COMPLICATION: ICD-10-CM

## 2021-05-12 DIAGNOSIS — E03.9 ACQUIRED HYPOTHYROIDISM: ICD-10-CM

## 2021-05-13 ENCOUNTER — HOSPITAL ENCOUNTER (OUTPATIENT)
Dept: NUCLEAR MEDICINE | Facility: HOSPITAL | Age: 66
Discharge: HOME | End: 2021-05-13
Payer: COMMERCIAL

## 2021-05-13 DIAGNOSIS — R68.81 EARLY SATIETY: ICD-10-CM

## 2021-05-13 PROCEDURE — 78264 GASTRIC EMPTYING IMG STUDY: CPT

## 2021-05-13 PROCEDURE — A9541 TC99M SULFUR COLLOID: HCPCS

## 2021-05-15 RX ORDER — METOPROLOL SUCCINATE 25 MG/1
25 TABLET, EXTENDED RELEASE ORAL DAILY
Qty: 90 TABLET | Refills: 1 | OUTPATIENT
Start: 2021-05-15 | End: 2021-07-01 | Stop reason: SDUPTHER

## 2021-05-19 ENCOUNTER — OFFICE VISIT (OUTPATIENT)
Dept: PODIATRY | Facility: CLINIC | Age: 66
End: 2021-05-19
Payer: COMMERCIAL

## 2021-05-19 VITALS
BODY MASS INDEX: 35.88 KG/M2 | WEIGHT: 195 LBS | SYSTOLIC BLOOD PRESSURE: 140 MMHG | DIASTOLIC BLOOD PRESSURE: 82 MMHG | HEIGHT: 62 IN

## 2021-05-19 DIAGNOSIS — E11.65 UNCONTROLLED TYPE 2 DIABETES MELLITUS WITH HYPERGLYCEMIA (HCC): Primary | ICD-10-CM

## 2021-05-19 DIAGNOSIS — M72.2 PLANTAR FASCIITIS OF LEFT FOOT: ICD-10-CM

## 2021-05-19 DIAGNOSIS — B35.1 TINEA UNGUIUM: ICD-10-CM

## 2021-05-19 PROCEDURE — 3008F BODY MASS INDEX DOCD: CPT | Performed by: PODIATRIST

## 2021-05-19 PROCEDURE — 1160F RVW MEDS BY RX/DR IN RCRD: CPT | Performed by: PODIATRIST

## 2021-05-19 PROCEDURE — 3079F DIAST BP 80-89 MM HG: CPT | Performed by: PODIATRIST

## 2021-05-19 PROCEDURE — 99203 OFFICE O/P NEW LOW 30 MIN: CPT | Performed by: PODIATRIST

## 2021-05-19 PROCEDURE — 1036F TOBACCO NON-USER: CPT | Performed by: PODIATRIST

## 2021-05-19 PROCEDURE — 3077F SYST BP >= 140 MM HG: CPT | Performed by: PODIATRIST

## 2021-05-19 NOTE — PROGRESS NOTES
Assessment/Plan:         Diagnoses and all orders for this visit:    Uncontrolled type 2 diabetes mellitus with hyperglycemia ([address] )    Plantar fasciitis of left foot  -     Ambulatory referral to Podiatry    Tinea unguium  Comments:  left hallux, no symptoms or pain, monitor for now        Plantar fascitis  1  Discussed diagnosis and treatment options  2  Provided home therapy and stretching exercises  3  Stressed compliance with home/formal PT and arch supports     -Educated on DM risk to lower extremities, proper shoe gear, and daily monitoring of feet    -Educated on A1C and the risks of poorly controlled Diabetes   -Discussed weight loss and suitable exercise regiment  RTC 6 weeks      Data Reviewed: Independent review of image: none    Labs: Contains abnormal data Hemoglobin A1C  Order: [id number]  Status:  Final result   Visible to patient:  No (inaccessible in [address])   Next appt:  05/20/2021 at 11:00 [facility]   Dx:  Essential hypertension; Mild intermit    Ref Range & Units 1/28/21 12:01 PM   Hemoglobin A1C Normal 3 8-5 6%; PreDiabetic 5 7-6 4%; Diabetic >=6 5%; Glycemic control for adults with diabetes <7 0% % 7 9High     EAG mg/dl 180          Specimen Collected: 01/28/21 12:01 PM   Last Resulted: 01/28/21  5:54 PM             Other provider documentation: PCP visit 4/6/21  DM medications reviewed, A1C has been better recently  Risk of Complication:  mild      Subjective:      Patient ID: Cody Argueta is a 77 y o  female  Patient presents with left foot pain  She is getting a lot of heel pain when walking  Pain is on the bottom of the heel, worse after periods of rest  Pain has been getting worse for 2 months  She tried an insert but it didn't do much  Her left toenail is irregular  She had it removed 3 times and although it looks thick she does not think there is much to do  PMH HTN, DM2, asthma, obesity   She gets some cramping and tingling at night in her toes and feet  The following portions of the patient's history were reviewed and updated as appropriate:   She  has a past medical history of Abdominal pain, Asthma, Back pain, Balance problems, Cholelithiasis, Colon polyp, Cracked tooth, Depression, Diabetes mellitus ([facility] ), Difficulty swallowing, Exercise involving walking, Fatty liver, GERD (gastroesophageal reflux disease), Hiatal hernia, History of pneumonia, History of stomach ulcers, Hyperlipidemia, Hypertension, Obesity, Pneumonia, Refusal of blood transfusions as patient is [place], Risk for falls, Stroke ([facility] [address] ), Uses Telugu as primary spoken language, and Wears glasses  She   Patient Active Problem List    Diagnosis Date Noted    Plantar fasciitis of left foot 05/19/2021    History of orthopnea 03/10/2021    Obesity (BMI 35 0-39 9 without comorbidity) 03/10/2021    Calculus of gallbladder with chronic cholecystitis without obstruction     Preop examination 03/04/2021    Spondylolisthesis of lumbar region 01/29/2021    Depression, recurrent ([facility] [address] ) 11/19/2020    Uncontrolled type 2 diabetes mellitus with hyperglycemia ([facility] ) 11/19/2020    Irritant contact dermatitis 05/11/2020    Influenza A 02/19/2020    Lung infiltrate 02/19/2020    Severe sepsis ([facility] [address] ) 02/19/2020    Atypical chest pain 12/12/2019    Acquired hypothyroidism 11/21/2019    Essential hypertension 11/21/2019    Mild intermittent asthma with acute exacerbation 11/21/2019    Pure hypercholesterolemia 11/21/2019     She  has a past surgical history that includes EGD AND COLONOSCOPY (02/09/2021); Rotator cuff repair (Bilateral); pr lap,cholecystectomy (N/A, 3/10/2021); Colonoscopy (02/09/2021); Upper gastrointestinal endoscopy (02/09/2021); and Cholecystectomy    Her family history includes Colon cancer (age of onset: 71) in her brother; Diabetes in her mother; Hypertension in her father and mother; No Known Problems in her daughter, daughter, daughter, maternal grandfather, maternal grandmother, paternal grandfather, paternal grandmother, sister, son, and son  She  reports that she has never smoked  She has never used smokeless tobacco  She reports that she does not drink alcohol or use drugs  Current Outpatient Medications   Medication Sig Dispense Refill    albuterol (2 5 mg/3 mL) 0 083 % nebulizer solution Take 1 vial (2 5 mg total) by nebulization every 6 (six) hours as needed for wheezing or shortness of breath 60 vial 5    albuterol (PROVENTIL HFA,VENTOLIN HFA) 90 mcg/act inhaler Inhale 2 puffs every 6 (six) hours as needed for wheezing      Alum Hydroxide-Mag Carbonate (GAVISCON PO) Take 1 Dose by mouth as needed      aspirin 81 mg chewable tablet Chew 81 mg daily      BD Pen Needle Berenice 2nd Gen 32G X 4 MM MISC USE TWICE A  each 1    Blood Glucose Monitoring Suppl (ONE TOUCH ULTRA 2) w/Device KIT Test blood sugar 3 times daily 1 each 0    budesonide-formoterol (SYMBICORT) 160-4 5 mcg/act inhaler Inhale 2 puffs 2 (two) times a day as needed Rinse mouth after use         clonazePAM (KlonoPIN) 0 125 mg disintegrating tablet Take 0 125 mg by mouth as needed for anxiety      ezetimibe (ZETIA) 10 mg tablet Take 1 tablet (10 mg total) by mouth daily 90 tablet 1    famotidine (Pepcid AC) 10 mg tablet Take 10 mg by mouth daily      fluticasone (FLONASE) 50 mcg/act nasal spray 1 spray into each nostril daily as needed       furosemide (LASIX) 20 mg tablet Take 20 mg by mouth 2 (two) times a day as needed      glimepiride (AMARYL) 4 mg tablet Take 1 tablet (4 mg total) by mouth daily with breakfast 90 tablet 1    glucose blood test strip Prior to each meal 300 each 1    HYDROcodone-acetaminophen (NORCO) 5-325 mg per tablet Take 1 tablet by mouth every 4 (four) hours as needed for pain for up to 10 dosesMax Daily Amount: 6 tablets 10 tablet 0    hydrocortisone 2 5 % cream Apply topically 2 (two) times a day 30 g 0    insulin degludec Milli Taylor FlexTouch) 100 units/mL injection pen Inject 25 Units under the skin daily Pt reports currently only taking 10 UNITS IN THE EVENING,depending on BS 15 mL 5    Lancets (ONETOUCH DELICA PLUS [id number]) MISC TEST 3 TIMES 100 each 1    levothyroxine 150 mcg tablet Take 1 tablet (150 mcg total) by mouth daily 90 tablet 1    losartan (COZAAR) 100 MG tablet Take 1 tablet (100 mg total) by mouth daily 90 tablet 1    metFORMIN (GLUCOPHAGE) 1000 MG tablet Take 1 tablet (1,000 mg total) by mouth 2 (two) times a day with meals 180 tablet 1    metoprolol succinate (TOPROL-XL) 25 mg 24 hr tablet Take 1 tablet (25 mg total) by mouth daily 90 tablet 1    montelukast (SINGULAIR) 10 mg tablet Take 1 tablet (10 mg total) by mouth daily 90 tablet 1    omeprazole (PriLOSEC) 20 mg delayed release capsule Take 1 capsule (20 mg total) by mouth daily 90 capsule 1    omeprazole (PriLOSEC) 20 mg delayed release capsule Take 1 capsule (20 mg total) by mouth 2 (two) times a day 60 capsule 5    spironolactone (ALDACTONE) 25 mg tablet Take 1 tablet (25 mg total) by mouth daily 90 tablet 1    VITAMIN D PO Take by mouth      docusate sodium (COLACE) 100 mg capsule Take 1 capsule (100 mg total) by mouth 2 (two) times a day 60 capsule 0     No current facility-administered medications for this visit        Current Outpatient Medications on File Prior to Visit   Medication Sig    albuterol (2 5 mg/3 mL) 0 083 % nebulizer solution Take 1 vial (2 5 mg total) by nebulization every 6 (six) hours as needed for wheezing or shortness of breath    albuterol (PROVENTIL HFA,VENTOLIN HFA) 90 mcg/act inhaler Inhale 2 puffs every 6 (six) hours as needed for wheezing    Alum Hydroxide-Mag Carbonate (GAVISCON PO) Take 1 Dose by mouth as needed    aspirin 81 mg chewable tablet Chew 81 mg daily    BD Pen Needle Berenice 2nd Gen 32G X 4 MM MISC USE TWICE A DAY    Blood Glucose Monitoring Suppl (ONE TOUCH ULTRA 2) w/Device KIT Test blood sugar 3 times daily    budesonide-formoterol (SYMBICORT) 160-4 5 mcg/act inhaler Inhale 2 puffs 2 (two) times a day as needed Rinse mouth after use       clonazePAM (KlonoPIN) 0 125 mg disintegrating tablet Take 0 125 mg by mouth as needed for anxiety    ezetimibe (ZETIA) 10 mg tablet Take 1 tablet (10 mg total) by mouth daily    famotidine (Pepcid AC) 10 mg tablet Take 10 mg by mouth daily    fluticasone (FLONASE) 50 mcg/act nasal spray 1 spray into each nostril daily as needed     furosemide (LASIX) 20 mg tablet Take 20 mg by mouth 2 (two) times a day as needed    glimepiride (AMARYL) 4 mg tablet Take 1 tablet (4 mg total) by mouth daily with breakfast    glucose blood test strip Prior to each meal    HYDROcodone-acetaminophen (NORCO) 5-325 mg per tablet Take 1 tablet by mouth every 4 (four) hours as needed for pain for up to 10 dosesMax Daily Amount: 6 tablets    hydrocortisone 2 5 % cream Apply topically 2 (two) times a day    insulin degludec Izzy Freeman FlexTouch) 100 units/mL injection pen Inject 25 Units under the skin daily Pt reports currently only taking 10 UNITS IN THE EVENING,depending on BS    Lancets (ONETOUCH DELICA PLUS [id number]) MISC TEST 3 TIMES    levothyroxine 150 mcg tablet Take 1 tablet (150 mcg total) by mouth daily    losartan (COZAAR) 100 MG tablet Take 1 tablet (100 mg total) by mouth daily    metFORMIN (GLUCOPHAGE) 1000 MG tablet Take 1 tablet (1,000 mg total) by mouth 2 (two) times a day with meals    metoprolol succinate (TOPROL-XL) 25 mg 24 hr tablet Take 1 tablet (25 mg total) by mouth daily    montelukast (SINGULAIR) 10 mg tablet Take 1 tablet (10 mg total) by mouth daily    omeprazole (PriLOSEC) 20 mg delayed release capsule Take 1 capsule (20 mg total) by mouth daily    omeprazole (PriLOSEC) 20 mg delayed release capsule Take 1 capsule (20 mg total) by mouth 2 (two) times a day    spironolactone (ALDACTONE) 25 mg tablet Take 1 tablet (25 mg total) by mouth daily    VITAMIN D PO Take by mouth    docusate sodium (COLACE) 100 mg capsule Take 1 capsule (100 mg total) by mouth 2 (two) times a day     No current facility-administered medications on file prior to visit  She is allergic to etodolac; iodinated diagnostic agents; simvastatin; latex; morphine; and penicillins       Review of Systems   Constitutional: Negative  HENT: Negative for sinus pressure and sinus pain  Respiratory: Negative for cough and shortness of breath  Cardiovascular: Negative for chest pain and leg swelling  Gastrointestinal: Negative for diarrhea, nausea and vomiting  Musculoskeletal: Positive for arthralgias and myalgias  Skin: Positive for color change  Negative for wound  Neurological: Negative for weakness and numbness  Objective:      /82   Ht 5' 2" (1 575 m)   Wt 88 5 kg (195 lb)   BMI 35 67 kg/m²     Contains abnormal data Hemoglobin A1C  Order: [id number]  Status:  Final result   Visible to patient:  No (inaccessible in [address] [facility])   Next appt:  05/20/2021 at 11:00 AM in Gastroenterology [facility], [address] [place] [address])   Dx:  Essential hypertension; Mild intermit    Ref Range & Units 1/28/21 12:01 PM   Hemoglobin A1C Normal 3 8-5 6%; PreDiabetic 5 7-6 4%; Diabetic >=6 5%; Glycemic control for adults with diabetes <7 0% % 7 9High     EAG mg/dl 180          Specimen Collected: 01/28/21 12:01 PM   Last Resulted: 01/28/21  5:54 PM                Physical Exam  Constitutional:       Appearance: She is obese  She is not ill-appearing or diaphoretic  HENT:      Nose: No congestion or rhinorrhea  Cardiovascular:      Rate and Rhythm: Normal rate  Pulses: Normal pulses  no weak pulses          Dorsalis pedis pulses are 2+ on the right side and 2+ on the left side  Posterior tibial pulses are 2+ on the right side and 2+ on the left side  Pulmonary:      Effort: Pulmonary effort is normal  No respiratory distress     Musculoskeletal: Feet:    Feet:      Right foot:      Protective Sensation: 10 sites tested  10 sites sensed  Skin integrity: Skin integrity normal  No ulcer, skin breakdown, erythema, warmth, callus or dry skin  Left foot:      Protective Sensation: 10 sites tested  10 sites sensed  Skin integrity: Skin integrity normal  No ulcer, skin breakdown, erythema, warmth, callus or dry skin  Toenail Condition: Fungal disease present  Skin:     Capillary Refill: Capillary refill takes less than 2 seconds  Findings: No erythema  Neurological:      Mental Status: She is alert  Sensory: No sensory deficit  Gait: Gait normal        Diabetic Foot Exam    Patient's shoes and socks removed  Right Foot/Ankle   Right Foot Inspection  Skin Exam: skin normal and skin intact no dry skin, no warmth, no callus, no erythema, no maceration, no abnormal color, no pre-ulcer, no ulcer and no callus                          Toe Exam: no tenderness, erythema and  no right toe deformity  Sensory   Vibration: intact  Proprioception: intact   Monofilament testing: intact  Vascular    The right DP pulse is 2+  The right PT pulse is 2+  Right Toe  - Comprehensive Exam  Arch: pes planus    Left Foot/Ankle  Left Foot Inspection  Skin Exam: skin normal and skin intactno dry skin, no warmth, no erythema, no maceration, normal color, no pre-ulcer, no ulcer and no callus                         Toe Exam: tendernessno erythema and no left toe deformity                   Sensory   Vibration: intact  Proprioception: intact  Monofilament: intact  Vascular    The left DP pulse is 2+  The left PT pulse is 2+  Left Toe  - Comprehensive Exam  Arch: pes planus  Assign Risk Category:  No deformity present; No loss of protective sensation;  No weak pulses       Risk: 0

## 2021-05-19 NOTE — PATIENT INSTRUCTIONS
Plantar Fasciitis Exercises   WHAT YOU NEED TO KNOW:   Plantar fasciitis exercises help stretch your plantar fascia, calf muscles, and Achilles tendon  They also help strengthen the muscles that support your heel and foot  Exercises and stretching can help prevent plantar fasciitis from getting worse or coming back  DISCHARGE INSTRUCTIONS:   Contact your healthcare provider if:   · Your pain and swelling increase  · You develop new knee, hip, or back pain  · You have questions or concerns about your condition or care  How to do plantar fasciitis exercises:  Ask your healthcare provider when to start these exercises and how often to do them  · Slant board stretch:  Stand on a slanted board with your toes higher than your heel  Press your heel into the board  Keep your knee slightly bent  Hold this position for 1 minute  Repeat 5 times  · Heel stretch:  Stand up straight with your hands on a wall  Place your injured leg slightly behind your other leg  Keep your heels flat on the floor, lean forward, and bend both knees  Hold for 30 seconds  · Calf stretch:  Stand up straight with your hands on a wall  Step forward so that your uninjured foot is in front of your injured foot  Keep your front leg bent and your back leg straight  Gently lean forward until you feel your calf stretch  Hold for 30 seconds and then relax  · Seated plantar fascia stretch:  Sit on a firm surface, such as the floor or a mat  Extend your legs out in front of you  Raise your injured foot a few inches off the ground  Keep your leg straight  Grab the toes of your injured foot and pull them toward you  With your other hand, feel your plantar fascia  You should feel it press outward  Hold for 30 seconds  If you cannot reach your toes, loop a towel or tie around your foot  Gently pull on the towel or tie and flex your toes toward you  · Heel raises:  Stand on the injured leg   Raise your other leg off the ground  Hold onto a railing or wall for balance  Slowly rise up on the toes of your injured leg  Hold for 5 seconds  Slowly lower your heel to the ground  · Toe curls:  Place a towel on the floor  Put your foot flat on the towel  Grab the towel with your toes by curling them around the towel  Lift the towel up with your toes  · Toe taps:  Sit down and place your foot flat on the floor  Keep your heel on the floor  Point all your toes up toward the ceiling  While the 4 smaller toes are pointed up, bend your big toe down and tap it on the ground  Do 10 to 50 taps  Point all 5 toes up toward the ceiling again  This time keep your big toe pointed up and tap the 4 smaller toes on the ground  Do 10 to 50 taps each time  Follow up with your healthcare provider as directed:  Write down your questions so you remember to ask them during your visits  © Copyright [address] Information is for End User's use only and may not be sold, redistributed or otherwise used for commercial purposes  All illustrations and images included in CareNotes® are the copyrighted property of A D A M [employer]  or [address] [place], [place] [address]seda marialuisa   The above information is an  only  It is not intended as medical advice for individual conditions or treatments  Talk to your doctor, nurse or pharmacist before following any medical regimen to see if it is safe and effective for you

## 2021-06-01 ENCOUNTER — RA CDI HCC (OUTPATIENT)
Dept: OTHER | Facility: HOSPITAL | Age: 66
End: 2021-06-01

## 2021-06-01 NOTE — PROGRESS NOTES
[facility]  coding opportunities          Chart reviewed, no opportunity found: CHART REVIEWED, NO OPPORTUNITY FOUND              Patients insurance company:  [employer] [facility] (Medicare Advantage and Commercial)

## 2021-06-14 DIAGNOSIS — E11.65 UNCONTROLLED TYPE 2 DIABETES MELLITUS WITH HYPERGLYCEMIA (HCC): Primary | ICD-10-CM

## 2021-06-29 ENCOUNTER — APPOINTMENT (OUTPATIENT)
Dept: LAB | Facility: HOSPITAL | Age: 66
End: 2021-06-29
Attending: INTERNAL MEDICINE
Payer: COMMERCIAL

## 2021-06-29 DIAGNOSIS — E11.65 UNCONTROLLED TYPE 2 DIABETES MELLITUS WITH HYPERGLYCEMIA (HCC): ICD-10-CM

## 2021-06-29 DIAGNOSIS — J45.21 MILD INTERMITTENT ASTHMA WITH ACUTE EXACERBATION: ICD-10-CM

## 2021-06-29 DIAGNOSIS — E66.9 OBESITY (BMI 35.0-39.9 WITHOUT COMORBIDITY): ICD-10-CM

## 2021-06-29 DIAGNOSIS — I10 ESSENTIAL HYPERTENSION: ICD-10-CM

## 2021-06-29 DIAGNOSIS — E03.9 ACQUIRED HYPOTHYROIDISM: ICD-10-CM

## 2021-06-29 LAB
ALBUMIN SERPL BCP-MCNC: 3.3 G/DL (ref 3.5–5)
ALP SERPL-CCNC: 86 U/L (ref 46–116)
ALT SERPL W P-5'-P-CCNC: 22 U/L (ref 12–78)
ANION GAP SERPL CALCULATED.3IONS-SCNC: 6 MMOL/L (ref 4–13)
AST SERPL W P-5'-P-CCNC: 14 U/L (ref 5–45)
BASOPHILS # BLD AUTO: 0.04 THOUSANDS/ΜL (ref 0–0.1)
BASOPHILS NFR BLD AUTO: 1 % (ref 0–1)
BILIRUB SERPL-MCNC: 0.35 MG/DL (ref 0.2–1)
BUN SERPL-MCNC: 20 MG/DL (ref 5–25)
CALCIUM ALBUM COR SERPL-MCNC: 9.4 MG/DL (ref 8.3–10.1)
CALCIUM SERPL-MCNC: 8.8 MG/DL (ref 8.3–10.1)
CHLORIDE SERPL-SCNC: 105 MMOL/L (ref 100–108)
CO2 SERPL-SCNC: 30 MMOL/L (ref 21–32)
CREAT SERPL-MCNC: 0.92 MG/DL (ref 0.6–1.3)
EOSINOPHIL # BLD AUTO: 0.1 THOUSAND/ΜL (ref 0–0.61)
EOSINOPHIL NFR BLD AUTO: 1 % (ref 0–6)
ERYTHROCYTE [DISTWIDTH] IN BLOOD BY AUTOMATED COUNT: 13.5 % (ref 11.6–15.1)
EST. AVERAGE GLUCOSE BLD GHB EST-MCNC: 157 MG/DL
GFR SERPL CREATININE-BSD FRML MDRD: 65 ML/MIN/1.73SQ M
GLUCOSE P FAST SERPL-MCNC: 277 MG/DL (ref 65–99)
HBA1C MFR BLD: 7.1 %
HCT VFR BLD AUTO: 32.9 % (ref 34.8–46.1)
HGB BLD-MCNC: 11 G/DL (ref 11.5–15.4)
IMM GRANULOCYTES # BLD AUTO: 0.04 THOUSAND/UL (ref 0–0.2)
IMM GRANULOCYTES NFR BLD AUTO: 1 % (ref 0–2)
LYMPHOCYTES # BLD AUTO: 2.39 THOUSANDS/ΜL (ref 0.6–4.47)
LYMPHOCYTES NFR BLD AUTO: 33 % (ref 14–44)
MCH RBC QN AUTO: 29.3 PG (ref 26.8–34.3)
MCHC RBC AUTO-ENTMCNC: 33.4 G/DL (ref 31.4–37.4)
MCV RBC AUTO: 88 FL (ref 82–98)
MONOCYTES # BLD AUTO: 0.36 THOUSAND/ΜL (ref 0.17–1.22)
MONOCYTES NFR BLD AUTO: 5 % (ref 4–12)
NEUTROPHILS # BLD AUTO: 4.27 THOUSANDS/ΜL (ref 1.85–7.62)
NEUTS SEG NFR BLD AUTO: 59 % (ref 43–75)
NRBC BLD AUTO-RTO: 0 /100 WBCS
PLATELET # BLD AUTO: 269 THOUSANDS/UL (ref 149–390)
PMV BLD AUTO: 10.5 FL (ref 8.9–12.7)
POTASSIUM SERPL-SCNC: 4.2 MMOL/L (ref 3.5–5.3)
PROT SERPL-MCNC: 6.5 G/DL (ref 6.4–8.2)
RBC # BLD AUTO: 3.76 MILLION/UL (ref 3.81–5.12)
SODIUM SERPL-SCNC: 141 MMOL/L (ref 136–145)
T4 FREE SERPL-MCNC: 1.03 NG/DL (ref 0.76–1.46)
T4 FREE SERPL-MCNC: 1.05 NG/DL (ref 0.76–1.46)
TSH SERPL DL<=0.05 MIU/L-ACNC: 14.38 UIU/ML (ref 0.36–3.74)
WBC # BLD AUTO: 7.2 THOUSAND/UL (ref 4.31–10.16)

## 2021-06-29 PROCEDURE — 83036 HEMOGLOBIN GLYCOSYLATED A1C: CPT

## 2021-06-29 PROCEDURE — 84443 ASSAY THYROID STIM HORMONE: CPT

## 2021-06-29 PROCEDURE — 85025 COMPLETE CBC W/AUTO DIFF WBC: CPT

## 2021-06-29 PROCEDURE — 84439 ASSAY OF FREE THYROXINE: CPT

## 2021-06-29 PROCEDURE — 80053 COMPREHEN METABOLIC PANEL: CPT

## 2021-06-29 PROCEDURE — 3051F HG A1C>EQUAL 7.0%<8.0%: CPT | Performed by: INTERNAL MEDICINE

## 2021-06-29 PROCEDURE — 36415 COLL VENOUS BLD VENIPUNCTURE: CPT

## 2021-07-01 ENCOUNTER — OFFICE VISIT (OUTPATIENT)
Dept: INTERNAL MEDICINE CLINIC | Facility: CLINIC | Age: 66
End: 2021-07-01
Payer: COMMERCIAL

## 2021-07-01 VITALS
HEART RATE: 62 BPM | OXYGEN SATURATION: 98 % | WEIGHT: 198.2 LBS | BODY MASS INDEX: 36.47 KG/M2 | DIASTOLIC BLOOD PRESSURE: 76 MMHG | HEIGHT: 62 IN | SYSTOLIC BLOOD PRESSURE: 144 MMHG | TEMPERATURE: 96.6 F

## 2021-07-01 DIAGNOSIS — E04.1 THYROID NODULE: ICD-10-CM

## 2021-07-01 DIAGNOSIS — J45.21 MILD INTERMITTENT ASTHMA WITH ACUTE EXACERBATION: ICD-10-CM

## 2021-07-01 DIAGNOSIS — E11.65 UNCONTROLLED TYPE 2 DIABETES MELLITUS WITH HYPERGLYCEMIA (HCC): Primary | ICD-10-CM

## 2021-07-01 DIAGNOSIS — M81.0 OSTEOPOROSIS, UNSPECIFIED OSTEOPOROSIS TYPE, UNSPECIFIED PATHOLOGICAL FRACTURE PRESENCE: ICD-10-CM

## 2021-07-01 DIAGNOSIS — J45.20 MILD INTERMITTENT ASTHMA WITHOUT COMPLICATION: ICD-10-CM

## 2021-07-01 DIAGNOSIS — K80.10 CALCULUS OF GALLBLADDER WITH CHRONIC CHOLECYSTITIS WITHOUT OBSTRUCTION: ICD-10-CM

## 2021-07-01 DIAGNOSIS — Z23 NEED FOR PNEUMOCOCCAL VACCINE: ICD-10-CM

## 2021-07-01 DIAGNOSIS — Z00.00 HEALTHCARE MAINTENANCE: ICD-10-CM

## 2021-07-01 DIAGNOSIS — E78.00 PURE HYPERCHOLESTEROLEMIA: ICD-10-CM

## 2021-07-01 DIAGNOSIS — E03.9 ACQUIRED HYPOTHYROIDISM: ICD-10-CM

## 2021-07-01 DIAGNOSIS — E66.9 OBESITY (BMI 35.0-39.9 WITHOUT COMORBIDITY): ICD-10-CM

## 2021-07-01 DIAGNOSIS — F33.9 DEPRESSION, RECURRENT (HCC): ICD-10-CM

## 2021-07-01 DIAGNOSIS — I10 ESSENTIAL HYPERTENSION: ICD-10-CM

## 2021-07-01 DIAGNOSIS — E11.9 TYPE 2 DIABETES MELLITUS WITHOUT COMPLICATION, WITHOUT LONG-TERM CURRENT USE OF INSULIN (HCC): ICD-10-CM

## 2021-07-01 DIAGNOSIS — R07.89 ATYPICAL CHEST PAIN: ICD-10-CM

## 2021-07-01 PROBLEM — K22.2 LOWER ESOPHAGEAL RING (SCHATZKI): Status: ACTIVE | Noted: 2021-07-01

## 2021-07-01 PROCEDURE — 3008F BODY MASS INDEX DOCD: CPT | Performed by: INTERNAL MEDICINE

## 2021-07-01 PROCEDURE — 1160F RVW MEDS BY RX/DR IN RCRD: CPT | Performed by: INTERNAL MEDICINE

## 2021-07-01 PROCEDURE — 99214 OFFICE O/P EST MOD 30 MIN: CPT | Performed by: INTERNAL MEDICINE

## 2021-07-01 PROCEDURE — 1036F TOBACCO NON-USER: CPT | Performed by: INTERNAL MEDICINE

## 2021-07-01 PROCEDURE — 3725F SCREEN DEPRESSION PERFORMED: CPT | Performed by: INTERNAL MEDICINE

## 2021-07-01 PROCEDURE — G0009 ADMIN PNEUMOCOCCAL VACCINE: HCPCS | Performed by: INTERNAL MEDICINE

## 2021-07-01 PROCEDURE — 90732 PPSV23 VACC 2 YRS+ SUBQ/IM: CPT | Performed by: INTERNAL MEDICINE

## 2021-07-01 PROCEDURE — 4040F PNEUMOC VAC/ADMIN/RCVD: CPT | Performed by: INTERNAL MEDICINE

## 2021-07-01 PROCEDURE — 3078F DIAST BP <80 MM HG: CPT | Performed by: INTERNAL MEDICINE

## 2021-07-01 PROCEDURE — 4010F ACE/ARB THERAPY RXD/TAKEN: CPT | Performed by: INTERNAL MEDICINE

## 2021-07-01 RX ORDER — GLIMEPIRIDE 4 MG/1
4 TABLET ORAL
Qty: 90 TABLET | Refills: 1 | Status: SHIPPED | OUTPATIENT
Start: 2021-07-01 | End: 2021-09-29 | Stop reason: SDUPTHER

## 2021-07-01 RX ORDER — MONTELUKAST SODIUM 10 MG/1
10 TABLET ORAL DAILY
Qty: 90 TABLET | Refills: 1 | Status: SHIPPED | OUTPATIENT
Start: 2021-07-01 | End: 2021-09-29 | Stop reason: SDUPTHER

## 2021-07-01 RX ORDER — LOSARTAN POTASSIUM 100 MG/1
100 TABLET ORAL DAILY
Qty: 90 TABLET | Refills: 1 | Status: SHIPPED | OUTPATIENT
Start: 2021-07-01 | End: 2021-09-29 | Stop reason: SDUPTHER

## 2021-07-01 RX ORDER — METOPROLOL SUCCINATE 25 MG/1
25 TABLET, EXTENDED RELEASE ORAL DAILY
Qty: 90 TABLET | Refills: 1 | OUTPATIENT
Start: 2021-07-01 | End: 2022-03-23

## 2021-07-01 RX ORDER — EZETIMIBE 10 MG/1
10 TABLET ORAL DAILY
Qty: 90 TABLET | Refills: 1 | Status: SHIPPED | OUTPATIENT
Start: 2021-07-01 | End: 2022-04-25 | Stop reason: SDUPTHER

## 2021-07-01 NOTE — PROGRESS NOTES
Assessment/Plan:  Ultrasound of the thyroid repeat the thyroid function      For 1 hypothyroidism TSH is still elevated that means she is not replacing a wall she is on levothyroxine 150 mcg she tells me when she takes it at night she is sometimes forget to take the medication so she is not compliant very difficult to take something and night if you fall asleep and forget so I told her to switch her to the morning and take it every morning if she forgets to write it down and take 2 pills the next day will repeat the TSH and T4 when she comes back    2  Diabetes has improved the hemoglobin A1c went from 7 8-7 no polyuria polydipsia she is on the following  Metformin 1 g twice a day  Glimepiride 4 4 mg with breakfast  Tresiba that long-acting insulin 25 units daily    3  Tablets post cholecystectomy dyspepsia has resolved follow-up with gastroenterologist is now on Pepcid twice a day  4  Asthma stable no coughing or shortness of breath    Plantar fasciitis was referred to a podiatrist refer her for physical therapy in exercise  She feels a little better    Care gap will going to order a DEXA scan  Will refer her to Gynecology    History of thyroid nodules will repeat the ultrasound of the thyroid          No problem-specific Assessment & Plan notes found for this encounter  Diagnoses and all orders for this visit:    Uncontrolled type 2 diabetes mellitus with hyperglycemia ([address] )    Acquired hypothyroidism    Calculus of gallbladder with chronic cholecystitis without obstruction    Mild intermittent asthma with acute exacerbation    Essential hypertension    Pure hypercholesterolemia    Obesity (BMI 35 0-39 9 without comorbidity)    Depression, recurrent (HCC)          Subjective:      Patient ID: Mickey Hendrickson is a 77 y o  female  No chief complaint on file          Current Outpatient Medications:     albuterol (2 5 mg/3 mL) 0 083 % nebulizer solution, Take 1 vial (2 5 mg total) by nebulization every 6 (six) hours as needed for wheezing or shortness of breath, Disp: 60 vial, Rfl: 5    albuterol (PROVENTIL HFA,VENTOLIN HFA) 90 mcg/act inhaler, Inhale 2 puffs every 6 (six) hours as needed for wheezing, Disp: , Rfl:     Alum Hydroxide-Mag Carbonate (GAVISCON PO), Take 1 Dose by mouth as needed, Disp: , Rfl:     aspirin 81 mg chewable tablet, Chew 81 mg daily, Disp: , Rfl:     BD Pen Needle Berenice 2nd Gen 32G X 4 MM MISC, USE TWICE A DAY, Disp: 100 each, Rfl: 1    Blood Glucose Monitoring Suppl (ONE TOUCH ULTRA 2) w/Device KIT, Test blood sugar 3 times daily, Disp: 1 each, Rfl: 0    budesonide-formoterol (SYMBICORT) 160-4 5 mcg/act inhaler, Inhale 2 puffs 2 (two) times a day as needed Rinse mouth after use  , Disp: , Rfl:     clonazePAM (KlonoPIN) 0 125 mg disintegrating tablet, Take 0 125 mg by mouth as needed for anxiety, Disp: , Rfl:     docusate sodium (COLACE) 100 mg capsule, Take 1 capsule (100 mg total) by mouth 2 (two) times a day, Disp: 60 capsule, Rfl: 0    ezetimibe (ZETIA) 10 mg tablet, Take 1 tablet (10 mg total) by mouth daily, Disp: 90 tablet, Rfl: 1    famotidine (Pepcid AC) 10 mg tablet, Take 10 mg by mouth daily, Disp: , Rfl:     famotidine (PEPCID) 20 mg tablet, Take 1 tablet (20 mg total) by mouth 2 (two) times a day, Disp: 60 tablet, Rfl: 5    fluticasone (FLONASE) 50 mcg/act nasal spray, 1 spray into each nostril daily as needed , Disp: , Rfl:     furosemide (LASIX) 20 mg tablet, Take 20 mg by mouth 2 (two) times a day as needed, Disp: , Rfl:     glimepiride (AMARYL) 4 mg tablet, Take 1 tablet (4 mg total) by mouth daily with breakfast, Disp: 90 tablet, Rfl: 1    glucose blood test strip, Prior to each meal, Disp: 300 each, Rfl: 1    HYDROcodone-acetaminophen (NORCO) 5-325 mg per tablet, Take 1 tablet by mouth every 4 (four) hours as needed for pain for up to 10 dosesMax Daily Amount: 6 tablets, Disp: 10 tablet, Rfl: 0    hydrocortisone 2 5 % cream, Apply topically 2 (two) times a day, Disp: 30 g, Rfl: 0    insulin degludec Sugar Anca FlexTouch) 100 units/mL injection pen, Inject 25 Units under the skin daily Pt reports currently only taking 10 UNITS IN THE EVENING,depending on BS, Disp: 15 mL, Rfl: 5    Lancets (ONETOUCH DELICA PLUS [id number]) MISC, TEST 3 TIMES, Disp: 100 each, Rfl: 1    levothyroxine 150 mcg tablet, Take 1 tablet (150 mcg total) by mouth daily, Disp: 90 tablet, Rfl: 1    losartan (COZAAR) 100 MG tablet, Take 1 tablet (100 mg total) by mouth daily, Disp: 90 tablet, Rfl: 1    metFORMIN (GLUCOPHAGE) 1000 MG tablet, Take 1 tablet (1,000 mg total) by mouth 2 (two) times a day with meals, Disp: 180 tablet, Rfl: 1    metoprolol succinate (TOPROL-XL) 25 mg 24 hr tablet, Take 1 tablet (25 mg total) by mouth daily, Disp: 90 tablet, Rfl: 1    montelukast (SINGULAIR) 10 mg tablet, Take 1 tablet (10 mg total) by mouth daily, Disp: 90 tablet, Rfl: 1    omeprazole (PriLOSEC) 20 mg delayed release capsule, Take 1 capsule (20 mg total) by mouth daily, Disp: 90 capsule, Rfl: 1    omeprazole (PriLOSEC) 20 mg delayed release capsule, Take 1 capsule (20 mg total) by mouth 2 (two) times a day, Disp: 60 capsule, Rfl: 5    spironolactone (ALDACTONE) 25 mg tablet, Take 1 tablet (25 mg total) by mouth daily, Disp: 90 tablet, Rfl: 1    VITAMIN D PO, Take by mouth, Disp: , Rfl:     HPI    The following portions of the patient's history were reviewed and updated as appropriate: allergies, current medications, past family history, past medical history, past social history, past surgical history and problem list     Review of Systems   Constitutional: Negative  Negative for activity change, appetite change, fatigue, fever and unexpected weight change  HENT: Negative for congestion, ear pain, hearing loss, mouth sores, postnasal drip, rhinorrhea, sore throat, trouble swallowing and voice change  Eyes: Negative for pain, redness and visual disturbance     Respiratory: Negative for cough, chest tightness, shortness of breath and wheezing  Cardiovascular: Negative for chest pain, palpitations and leg swelling  Gastrointestinal: Negative for abdominal distention, abdominal pain, blood in stool, constipation, diarrhea and nausea  Endocrine: Negative for cold intolerance, heat intolerance, polydipsia, polyphagia and polyuria  Genitourinary: Negative for difficulty urinating, dysuria, flank pain, frequency, hematuria and urgency  Musculoskeletal: Negative for arthralgias, back pain, gait problem, joint swelling and myalgias  Skin: Negative for color change and pallor  Neurological: Negative for dizziness, tremors, seizures, syncope, weakness, numbness and headaches  Hematological: Negative for adenopathy  Does not bruise/bleed easily  Psychiatric/Behavioral: Negative  Negative for sleep disturbance  The patient is not nervous/anxious  Objective: There were no vitals taken for this visit  Physical Exam  Constitutional:       Appearance: Normal appearance  She is well-developed  She is obese  HENT:      Head: Normocephalic  Right Ear: External ear normal       Left Ear: External ear normal       Nose: Nose normal       Mouth/Throat:      Pharynx: No oropharyngeal exudate  Eyes:      Conjunctiva/sclera: Conjunctivae normal       Pupils: Pupils are equal, round, and reactive to light  Neck:      Thyroid: No thyromegaly  Cardiovascular:      Rate and Rhythm: Normal rate and regular rhythm  Heart sounds: Normal heart sounds  No murmur heard  No friction rub  No gallop  Comments: S1-S2 regular rhythm  Extremities no edema  Pulmonary:      Effort: Pulmonary effort is normal  No respiratory distress  Breath sounds: Normal breath sounds  No wheezing or rales  Comments: Lungs are clear no wheezing rales or rhonchi  Abdominal:      General: Bowel sounds are normal  There is no distension  Palpations: Abdomen is soft  There is no mass  Tenderness: There is no abdominal tenderness  There is no guarding or rebound  Comments: Abdomen soft nontender   Musculoskeletal:         General: Normal range of motion  Cervical back: Normal range of motion and neck supple  Lymphadenopathy:      Cervical: No cervical adenopathy  Skin:     General: Skin is warm and dry  Neurological:      Mental Status: She is alert and oriented to person, place, and time     Psychiatric:         Behavior: Behavior normal          Judgment: Judgment normal

## 2021-07-01 NOTE — PATIENT INSTRUCTIONS
Take her levothyroxine in the morning on an empty stomach if you forget to take the medication take 2 pills the next day to make up for the loss pill most important that you take the medication every day on an empty stomach will check the thyroid function in 4-6 weeks a TSH and T4  Because of your history of thyroid nodules will check an ultrasound of the thyroid  Your diabetes is in excellent control as well as her blood pressure no change in medication or plan

## 2021-07-07 ENCOUNTER — HOSPITAL ENCOUNTER (OUTPATIENT)
Dept: BONE DENSITY | Facility: CLINIC | Age: 66
Discharge: HOME | End: 2021-07-07
Payer: COMMERCIAL

## 2021-07-07 DIAGNOSIS — E66.9 OBESITY (BMI 35.0-39.9 WITHOUT COMORBIDITY): ICD-10-CM

## 2021-07-07 DIAGNOSIS — I10 ESSENTIAL HYPERTENSION: ICD-10-CM

## 2021-07-07 DIAGNOSIS — E11.9 TYPE 2 DIABETES MELLITUS WITHOUT COMPLICATION, WITHOUT LONG-TERM CURRENT USE OF INSULIN (HCC): ICD-10-CM

## 2021-07-07 DIAGNOSIS — M81.0 OSTEOPOROSIS, UNSPECIFIED OSTEOPOROSIS TYPE, UNSPECIFIED PATHOLOGICAL FRACTURE PRESENCE: ICD-10-CM

## 2021-07-07 PROCEDURE — 77080 DXA BONE DENSITY AXIAL: CPT

## 2021-07-08 ENCOUNTER — HOSPITAL ENCOUNTER (OUTPATIENT)
Dept: ULTRASOUND IMAGING | Facility: HOSPITAL | Age: 66
Discharge: HOME | End: 2021-07-08
Payer: COMMERCIAL

## 2021-07-08 DIAGNOSIS — E04.1 THYROID NODULE: ICD-10-CM

## 2021-07-08 PROCEDURE — 76536 US EXAM OF HEAD AND NECK: CPT

## 2021-07-19 ENCOUNTER — TELEPHONE (OUTPATIENT)
Dept: INTERNAL MEDICINE CLINIC | Facility: CLINIC | Age: 66
End: 2021-07-19

## 2021-07-19 NOTE — TELEPHONE ENCOUNTER
----- Message from Chance Cantor MD sent at 7/17/2021  9:10 PM EDT -----  Normal results thyroid  U/S

## 2021-07-19 NOTE — TELEPHONE ENCOUNTER
Called pt detailed message given about thyroid U/S    FYI: pt would like to know about the dexa scan done please advise

## 2021-09-01 ENCOUNTER — VBI (OUTPATIENT)
Dept: ADMINISTRATIVE | Facility: OTHER | Age: 66
End: 2021-09-01

## 2021-09-29 ENCOUNTER — RA CDI HCC (OUTPATIENT)
Dept: OTHER | Facility: HOSPITAL | Age: 66
End: 2021-09-29

## 2021-09-29 DIAGNOSIS — I10 ESSENTIAL HYPERTENSION: ICD-10-CM

## 2021-09-29 DIAGNOSIS — R07.89 ATYPICAL CHEST PAIN: ICD-10-CM

## 2021-09-29 DIAGNOSIS — E11.9 TYPE 2 DIABETES MELLITUS WITHOUT COMPLICATION, WITHOUT LONG-TERM CURRENT USE OF INSULIN (HCC): ICD-10-CM

## 2021-09-29 DIAGNOSIS — J45.20 MILD INTERMITTENT ASTHMA WITHOUT COMPLICATION: ICD-10-CM

## 2021-09-29 DIAGNOSIS — E78.00 PURE HYPERCHOLESTEROLEMIA: ICD-10-CM

## 2021-09-29 DIAGNOSIS — E03.9 ACQUIRED HYPOTHYROIDISM: ICD-10-CM

## 2021-09-29 RX ORDER — MONTELUKAST SODIUM 10 MG/1
10 TABLET ORAL DAILY
Qty: 90 TABLET | Refills: 1 | Status: SHIPPED | OUTPATIENT
Start: 2021-09-29 | End: 2022-07-06 | Stop reason: SDUPTHER

## 2021-09-29 RX ORDER — GLIMEPIRIDE 4 MG/1
4 TABLET ORAL
Qty: 90 TABLET | Refills: 1 | Status: SHIPPED | OUTPATIENT
Start: 2021-09-29 | End: 2022-04-25 | Stop reason: SDUPTHER

## 2021-09-29 RX ORDER — LOSARTAN POTASSIUM 100 MG/1
100 TABLET ORAL DAILY
Qty: 90 TABLET | Refills: 1 | Status: SHIPPED | OUTPATIENT
Start: 2021-09-29 | End: 2022-07-06 | Stop reason: SDUPTHER

## 2021-10-25 ENCOUNTER — TELEPHONE (OUTPATIENT)
Dept: ADMINISTRATIVE | Facility: OTHER | Age: 66
End: 2021-10-25

## 2021-11-04 ENCOUNTER — RA CDI HCC (OUTPATIENT)
Dept: OTHER | Facility: HOSPITAL | Age: 66
End: 2021-11-04

## 2021-11-05 DIAGNOSIS — E03.9 ACQUIRED HYPOTHYROIDISM: ICD-10-CM

## 2021-11-05 DIAGNOSIS — E11.9 TYPE 2 DIABETES MELLITUS WITHOUT COMPLICATION, WITHOUT LONG-TERM CURRENT USE OF INSULIN (HCC): ICD-10-CM

## 2021-11-05 DIAGNOSIS — J45.20 MILD INTERMITTENT ASTHMA WITHOUT COMPLICATION: ICD-10-CM

## 2021-11-05 DIAGNOSIS — R07.89 ATYPICAL CHEST PAIN: ICD-10-CM

## 2021-11-05 DIAGNOSIS — E78.00 PURE HYPERCHOLESTEROLEMIA: ICD-10-CM

## 2021-11-05 DIAGNOSIS — I10 ESSENTIAL HYPERTENSION: ICD-10-CM

## 2021-11-05 RX ORDER — LEVOTHYROXINE SODIUM 0.15 MG/1
150 TABLET ORAL DAILY
Qty: 90 TABLET | Refills: 1 | Status: SHIPPED | OUTPATIENT
Start: 2021-11-05 | End: 2022-04-27 | Stop reason: SDUPTHER

## 2021-11-11 ENCOUNTER — OFFICE VISIT (OUTPATIENT)
Dept: INTERNAL MEDICINE CLINIC | Facility: CLINIC | Age: 66
End: 2021-11-11
Payer: COMMERCIAL

## 2021-11-11 VITALS
BODY MASS INDEX: 37.54 KG/M2 | WEIGHT: 204 LBS | TEMPERATURE: 98.4 F | HEART RATE: 86 BPM | RESPIRATION RATE: 13 BRPM | HEIGHT: 62 IN | SYSTOLIC BLOOD PRESSURE: 140 MMHG | DIASTOLIC BLOOD PRESSURE: 80 MMHG

## 2021-11-11 DIAGNOSIS — E66.9 OBESITY (BMI 35.0-39.9 WITHOUT COMORBIDITY): ICD-10-CM

## 2021-11-11 DIAGNOSIS — E78.00 PURE HYPERCHOLESTEROLEMIA: ICD-10-CM

## 2021-11-11 DIAGNOSIS — J45.21 MILD INTERMITTENT ASTHMA WITH ACUTE EXACERBATION: ICD-10-CM

## 2021-11-11 DIAGNOSIS — Z00.00 HEALTHCARE MAINTENANCE: ICD-10-CM

## 2021-11-11 DIAGNOSIS — F11.20 CONTINUOUS OPIOID DEPENDENCE (HCC): ICD-10-CM

## 2021-11-11 DIAGNOSIS — I10 ESSENTIAL HYPERTENSION: ICD-10-CM

## 2021-11-11 DIAGNOSIS — E03.9 ACQUIRED HYPOTHYROIDISM: Primary | ICD-10-CM

## 2021-11-11 DIAGNOSIS — K22.2 LOWER ESOPHAGEAL RING (SCHATZKI): ICD-10-CM

## 2021-11-11 DIAGNOSIS — F33.9 DEPRESSION, RECURRENT (HCC): ICD-10-CM

## 2021-11-11 PROCEDURE — 1036F TOBACCO NON-USER: CPT | Performed by: INTERNAL MEDICINE

## 2021-11-11 PROCEDURE — 3079F DIAST BP 80-89 MM HG: CPT | Performed by: INTERNAL MEDICINE

## 2021-11-11 PROCEDURE — 3077F SYST BP >= 140 MM HG: CPT | Performed by: INTERNAL MEDICINE

## 2021-11-11 PROCEDURE — 3008F BODY MASS INDEX DOCD: CPT | Performed by: INTERNAL MEDICINE

## 2021-11-11 PROCEDURE — 99214 OFFICE O/P EST MOD 30 MIN: CPT | Performed by: INTERNAL MEDICINE

## 2021-11-11 PROCEDURE — 1160F RVW MEDS BY RX/DR IN RCRD: CPT | Performed by: INTERNAL MEDICINE

## 2021-11-17 ENCOUNTER — TELEPHONE (OUTPATIENT)
Dept: ADMINISTRATIVE | Facility: OTHER | Age: 66
End: 2021-11-17

## 2021-12-23 ENCOUNTER — OFFICE VISIT (OUTPATIENT)
Dept: OBGYN CLINIC | Facility: MEDICAL CENTER | Age: 66
End: 2021-12-23
Payer: COMMERCIAL

## 2021-12-23 VITALS
BODY MASS INDEX: 37.73 KG/M2 | DIASTOLIC BLOOD PRESSURE: 80 MMHG | SYSTOLIC BLOOD PRESSURE: 124 MMHG | WEIGHT: 205 LBS | HEIGHT: 62 IN

## 2021-12-23 DIAGNOSIS — Z01.419 ENCOUNTER FOR WELL WOMAN EXAM WITH ROUTINE GYNECOLOGICAL EXAM: Primary | ICD-10-CM

## 2021-12-23 PROCEDURE — G0145 SCR C/V CYTO,THINLAYER,RESCR: HCPCS | Performed by: OBSTETRICS & GYNECOLOGY

## 2021-12-23 PROCEDURE — S0610 ANNUAL GYNECOLOGICAL EXAMINA: HCPCS | Performed by: OBSTETRICS & GYNECOLOGY

## 2021-12-23 PROCEDURE — G0476 HPV COMBO ASSAY CA SCREEN: HCPCS | Performed by: OBSTETRICS & GYNECOLOGY

## 2021-12-23 PROCEDURE — 3008F BODY MASS INDEX DOCD: CPT | Performed by: INTERNAL MEDICINE

## 2021-12-27 LAB
HPV HR 12 DNA CVX QL NAA+PROBE: NEGATIVE
HPV16 DNA CVX QL NAA+PROBE: NEGATIVE
HPV18 DNA CVX QL NAA+PROBE: NEGATIVE

## 2022-01-06 LAB
LAB AP GYN PRIMARY INTERPRETATION: NORMAL
Lab: NORMAL

## 2022-01-18 ENCOUNTER — VBI (OUTPATIENT)
Dept: ADMINISTRATIVE | Facility: OTHER | Age: 67
End: 2022-01-18

## 2022-02-09 ENCOUNTER — RA CDI HCC (OUTPATIENT)
Dept: OTHER | Facility: HOSPITAL | Age: 67
End: 2022-02-09

## 2022-02-09 NOTE — PROGRESS NOTES
[facility]  coding opportunities             Chart Reviewed * (Number of) Inbasket suggestions sent to Provider: 1                  Patients insurance company: [employer] (Medicare Advantage and Commercial)           [id number]

## 2022-02-16 ENCOUNTER — CONSULT (OUTPATIENT)
Dept: GASTROENTEROLOGY | Facility: MEDICAL CENTER | Age: 67
End: 2022-02-16
Payer: MEDICARE

## 2022-02-16 VITALS
SYSTOLIC BLOOD PRESSURE: 150 MMHG | DIASTOLIC BLOOD PRESSURE: 80 MMHG | WEIGHT: 208.8 LBS | HEART RATE: 67 BPM | TEMPERATURE: 98.9 F | BODY MASS INDEX: 38.19 KG/M2

## 2022-02-16 DIAGNOSIS — K21.9 GASTROESOPHAGEAL REFLUX DISEASE WITHOUT ESOPHAGITIS: Primary | ICD-10-CM

## 2022-02-16 PROCEDURE — 99214 OFFICE O/P EST MOD 30 MIN: CPT | Performed by: INTERNAL MEDICINE

## 2022-02-16 RX ORDER — OMEPRAZOLE 40 MG/1
40 CAPSULE, DELAYED RELEASE ORAL DAILY
Qty: 90 CAPSULE | Refills: 0 | Status: SHIPPED | OUTPATIENT
Start: 2022-02-16 | End: 2022-04-25 | Stop reason: SDUPTHER

## 2022-02-16 NOTE — PROGRESS NOTES
[facility] - Outpatient Follow-up Note  Rios Khan 77 y o  female MRN: [id number]  Encounter: [id number]          ASSESSMENT AND PLAN:    Rios Khan is a 77 y o  female with hiatal hernia and GERD complicated by Schatzki's ring  Her last EGD was less than a year ago with dilation of the ring  She was doing well on low-dose daily omeprazole  Unfortunately she has not done well on daily famotidine  I believe she has a good indication to be on a PPI  We discussed her concerns today  She was worried about potential kidney damage from long-term PPI use  I discussed with her is that is she has normal kidney function based on her last labs, and and kidney damage from PPI use is unlikely  She is willing to go back on omeprazole at this time  We are going to start at 40 mg once daily given her symptoms  I would like to see her back in 3 months and I am hopeful that we can decrease the dose down to 20 mg at that time  We are also going to check her basic metabolic panel to confirm normal kidney function at this time  If her symptoms persist we can consider repeat endoscopy with dilation  1  Gastroesophageal reflux disease without esophagitis        Orders Placed This Encounter   Procedures    Basic metabolic panel     ______________________________________________________________________    SUBJECTIVE:    Rios Khan is a 77 y o  female who presents for follow up of GERD and dysphagia  She is s/p dilation of Schatzki's ring in 3/2021  She also has a hiatal hernia  She was last seen by GI in 5/2021 and was doing well on omeprazole 20 mg daily  She had concerns about long-term side effects of omeprazole and therapy was switched to famotidine  She has been taking famotidine 20 mg once daily and it is not working  Her symptoms include severe GERD and epigastric discomfort especially at night when she lies flat    Occasionally has swallowing problems in the upper esophagus  She avoids spicy foods and tomato sauces  She drinks coffee in the evening around 7 pm but goes to bed at midnight or 1 am   Does not eat before bed  She uses pillows to elevate the head of the bed  No weight loss  Glucose elevated on CMP panels  She had normal GES in 5/2021  She is s/p cholecystectomy in 3/2021    EGD 4/20/201  · The stomach and duodenum appeared normal  Performed random biopsy  Body stomach biopsy showed H pylori  · Non-obstructing Schatzki ring in the GE junction; performed cold forceps biopsy; dilated with AMResorts dilator from 47 Fr starting size to 54 Fr ending size  Biopsy to rule out eosinophilic esophagitis  · Small sliding hiatal hernia (type I hiatal hernia) without Lorre Mac lesions present  A  Gastric body:  - Benign oxyntic type gastric mucosa without specific histopathologic abnormality   - No evidence of curvilinear Helicobacter pylori organisms by routine H&E stains   - No atrophy or intestinal metaplasia identified   - No epithelial dysplasia and no evidence of malignancy  B  Esophagus:  - Benign squamous epithelium without specific histopathologic abnormality   - Eosinophils are fewer than 3 cells per high power field in squamous epithelium, negative for eosinophilic esophagitis  - No intestinal [goblet cell] metaplasia; no columnar epithelium seen  - No epithelial dysplasia and no evidence of malignancy  REVIEW OF SYSTEMS IS OTHERWISE NEGATIVE        Historical Information   Past Medical History:   Diagnosis Date    Abdominal pain     Asthma     Back pain     occas due to "disc problem"    Balance problems     "right side"    Cholelithiasis     lap aviva today 3/10/2021    Colon polyp     Cracked tooth     3    Depression     Diabetes mellitus (HCC)     Difficulty swallowing     "food feels like stuck sometimes'    Exercise involving walking     steps and cleaning    Fatty liver     GERD (gastroesophageal reflux disease)     Hiatal hernia  History of pneumonia     History of stomach ulcers     Hyperlipidemia     Hypertension     Obesity     Pneumonia     Refusal of blood transfusions as patient is Gnosticist     Risk for falls     Stroke [facility])     Uses Albanian as primary spoken language     Wears glasses      Past Surgical History:   Procedure Laterality Date    CHOLECYSTECTOMY      COLONOSCOPY  02/09/2021    4 polyps tubular adenomas by Dr Alena Sykes EGD  03/31/2021    nonobstructing Schatzki's ring dilated with 47 [place] Hernandez, sliding hiatal hernia otherwise normal   Biopsies stomach  negative for H  pylori biopsy esophagus negative for eosinophilic esophagitis by Dr Ant Valverde EGD AND COLONOSCOPY  02/09/2021    3 YEAR RECOMMENDED = COLONO    TX LAP,CHOLECYSTECTOMY N/A 3/10/2021    Procedure: CHOLECYSTECTOMY LAPAROSCOPIC W/ ROBOTICS;  Surgeon: Caty Case MD;  Location: [facility];  Service: General    [address] Bilateral     screws implanted"    UPPER GASTROINTESTINAL ENDOSCOPY  02/09/2021    4 cm hiatal hernia without Alejandro lesions  Possibly shortened esophagus    Biopsy of the stomach negative for H  pylori, biopsy of the EG junction negative for Kaplan's, biopsied duodenum negative for celiac by Dr Rebekah Mariee History   Social History     Substance and Sexual Activity   Alcohol Use Never     Social History     Substance and Sexual Activity   Drug Use Never     Social History     Tobacco Use   Smoking Status Never Smoker   Smokeless Tobacco Never Used     Family History   Problem Relation Age of Onset    Diabetes Mother     Hypertension Mother     Hypertension Father     No Known Problems Sister     No Known Problems Daughter     No Known Problems Maternal Grandmother     No Known Problems Maternal Grandfather     No Known Problems Paternal Grandmother     No Known Problems Paternal Grandfather     No Known Problems Son     No Known Problems Son     No Known Problems Daughter  No Known Problems Daughter     Colon cancer Brother 71       Meds/Allergies       Current Outpatient Medications:     albuterol (2 5 mg/3 mL) 0 083 % nebulizer solution    albuterol (PROVENTIL HFA,VENTOLIN HFA) 90 mcg/act inhaler    Alum Hydroxide-Mag Carbonate (GAVISCON PO)    aspirin 81 mg chewable tablet    BD Pen Needle Berenice 2nd Gen 32G X 4 MM MISC    Blood Glucose Monitoring Suppl (ONE TOUCH ULTRA 2) w/Device KIT    budesonide-formoterol (SYMBICORT) 160-4 5 mcg/act inhaler    clonazePAM (KlonoPIN) 0 125 mg disintegrating tablet    ezetimibe (ZETIA) 10 mg tablet    fluticasone (FLONASE) 50 mcg/act nasal spray    furosemide (LASIX) 20 mg tablet    glimepiride (AMARYL) 4 mg tablet    glucose blood test strip    HYDROcodone-acetaminophen (NORCO) 5-325 mg per tablet    insulin degludec Dois Minors FlexTouch) 100 units/mL injection pen    Lancets (ONETOUCH DELICA PLUS [id number]) MISC    levothyroxine 150 mcg tablet    metFORMIN (GLUCOPHAGE) 1000 MG tablet    montelukast (SINGULAIR) 10 mg tablet    VITAMIN D PO    docusate sodium (COLACE) 100 mg capsule    hydrocortisone 2 5 % cream    losartan (COZAAR) 100 MG tablet    metoprolol succinate (TOPROL-XL) 25 mg 24 hr tablet    omeprazole (PriLOSEC) 40 MG capsule    spironolactone (ALDACTONE) 25 mg tablet    Allergies   Allergen Reactions    Etodolac Shortness Of Breath    Iodinated Diagnostic Agents Shortness Of Breath and Wheezing    Simvastatin Other (See Comments)     elevated liver function     Latex Rash    Morphine Palpitations    Penicillins Rash           Objective     Blood pressure 150/80, pulse 67, temperature 98 9 °F (37 2 °C), weight 94 7 kg (208 lb 12 8 oz)  Body mass index is 38 19 kg/m²  PHYSICAL EXAM:      General Appearance:   Alert, cooperative, no distress   HEENT:   Normocephalic, atraumatic, anicteric       Neck:  Supple, symmetrical, trachea midline   Lungs:   Clear to auscultation bilaterally; no rales, rhonchi or wheezing; respirations unlabored    Heart[de-identified]   Regular rate and rhythm; no murmur, rub, or gallop  Abdomen:   Soft, very ttp in RUQ and epigastric areas, non-distended; normal bowel sounds; no masses, no organomegaly    Genitalia:   Deferred    Rectal:   Deferred    Extremities:  No cyanosis, clubbing or edema    Pulses:  2+ and symmetric    Skin:  No jaundice, rashes, or lesions    Lymph nodes:  No palpable cervical lymphadenopathy        Lab Results:   No visits with results within 1 Day(s) from this visit  Latest known visit with results is:   Prep for Procedure on 02/03/2022   Component Date Value    Right Eye Diabetic Retin* 02/03/2022 Mild     Left Eye Diabetic Retino* 02/03/2022 Mild        Lab Results   Component Value Date    WBC 7 20 06/29/2021    HGB 11 0 (L) 06/29/2021    HCT 32 9 (L) 06/29/2021    MCV 88 06/29/2021     06/29/2021       Lab Results   Component Value Date     03/26/2014    SODIUM 141 06/29/2021    K 4 2 06/29/2021     06/29/2021    CO2 30 06/29/2021    ANIONGAP 8 03/26/2014    AGAP 6 06/29/2021    BUN 20 06/29/2021    CREATININE 0 92 06/29/2021    GLUC 159 (H) 01/20/2021    GLUF 277 (H) 06/29/2021    CALCIUM 8 8 06/29/2021    AST 14 06/29/2021    ALT 22 06/29/2021    ALKPHOS 86 06/29/2021    PROT 7 3 03/26/2014    TP 6 5 06/29/2021    BILITOT 0 3 03/26/2014    TBILI 0 35 06/29/2021    EGFR 65 06/29/2021       No results found for: CRP    Lab Results   Component Value Date    [id number] 14 376 (H) 06/29/2021       No results found for: IRON, TIBC, FERRITIN    Radiology Results:   No results found

## 2022-02-17 ENCOUNTER — TELEPHONE (OUTPATIENT)
Dept: GASTROENTEROLOGY | Facility: MEDICAL CENTER | Age: 67
End: 2022-02-17

## 2022-02-17 ENCOUNTER — OFFICE VISIT (OUTPATIENT)
Dept: INTERNAL MEDICINE CLINIC | Facility: CLINIC | Age: 67
End: 2022-02-17
Payer: MEDICARE

## 2022-02-17 VITALS
DIASTOLIC BLOOD PRESSURE: 78 MMHG | HEART RATE: 80 BPM | HEIGHT: 62 IN | WEIGHT: 207 LBS | TEMPERATURE: 97.6 F | BODY MASS INDEX: 38.09 KG/M2 | RESPIRATION RATE: 12 BRPM | SYSTOLIC BLOOD PRESSURE: 140 MMHG

## 2022-02-17 DIAGNOSIS — E78.00 PURE HYPERCHOLESTEROLEMIA: ICD-10-CM

## 2022-02-17 DIAGNOSIS — E03.9 ACQUIRED HYPOTHYROIDISM: Primary | ICD-10-CM

## 2022-02-17 DIAGNOSIS — E66.9 OBESITY (BMI 35.0-39.9 WITHOUT COMORBIDITY): ICD-10-CM

## 2022-02-17 DIAGNOSIS — I10 ESSENTIAL HYPERTENSION: ICD-10-CM

## 2022-02-17 DIAGNOSIS — F11.20 CONTINUOUS OPIOID DEPENDENCE (HCC): ICD-10-CM

## 2022-02-17 DIAGNOSIS — F33.9 DEPRESSION, RECURRENT (HCC): ICD-10-CM

## 2022-02-17 DIAGNOSIS — J45.21 MILD INTERMITTENT ASTHMA WITH ACUTE EXACERBATION: ICD-10-CM

## 2022-02-17 DIAGNOSIS — K22.2 LOWER ESOPHAGEAL RING (SCHATZKI): ICD-10-CM

## 2022-02-17 DIAGNOSIS — E04.1 THYROID NODULE: ICD-10-CM

## 2022-02-17 PROCEDURE — 1124F ACP DISCUSS-NO DSCNMKR DOCD: CPT | Performed by: INTERNAL MEDICINE

## 2022-02-17 PROCEDURE — 99214 OFFICE O/P EST MOD 30 MIN: CPT | Performed by: INTERNAL MEDICINE

## 2022-02-17 NOTE — PATIENT INSTRUCTIONS
Continue same medications  Follow-up with the instructions of the gastroenterologist  Get her lab work today  The forget to get her mammography  Will see her back in the spring in 3 months

## 2022-02-17 NOTE — PROGRESS NOTES
Assessment/Plan:  So the gastroenterologist switch her to omeprazole which is an excellent idea 40 mg per day  She holds on on the famotidine renal function is stable she is going to check lab wants now before she leaves will see her back in 3 months      1  Diabetes she is going to have a hemoglobin A1c with the rest of the labs  She is feeling well no polyuria polydipsia continue same medication metformin a 1000 mg twice a day  Tresiba 25 units daily  Glimepiride 4 mg with breakfast    2  Blood pressure fairly well control blood pressure I got 140/78 no change in medication or plan she is on the following  Losartan 100 mg  Spironolactone 25 mg  Furosemide 20 mg she takes as needed for edema  Metoprolol succinate 25 mg    3  Hyperlipidemia she is on Zetia 10 mg per day    4  Hypothyroidism she is on levothyroxine will check a TSH   150 mcg daily    5  Asthma is in remission she only uses the inhalers when she feels she is getting coughing or bronchitis  Which is good news    She was fully vaccinated for COVID 19 and she had the flu vaccine also    She saw the gastroenterologist yesterday  She has the history GERD and famotidine is not working well for her he has suggested omeprazole 40 mg she has normal renal function she is to be safe for her to take that she started taking that today  I encouraged her to go for lab work today    She is going to go for the mammography soon             No problem-specific Assessment & Plan notes found for this encounter  Diagnoses and all orders for this visit:    Acquired hypothyroidism    Lower esophageal ring ([place])    Thyroid nodule    Mild intermittent asthma with acute exacerbation    Essential hypertension    Pure hypercholesterolemia    Depression, recurrent (HCC)    Obesity (BMI 35 0-39 9 without comorbidity)    Continuous opioid dependence (HCC)          Subjective:      Patient ID: Brittani Montano is a 77 y o  female      No chief complaint on file         Current Outpatient Medications:     albuterol (2 5 mg/3 mL) 0 083 % nebulizer solution, Take 1 vial (2 5 mg total) by nebulization every 6 (six) hours as needed for wheezing or shortness of breath, Disp: 60 vial, Rfl: 5    albuterol (PROVENTIL HFA,VENTOLIN HFA) 90 mcg/act inhaler, Inhale 2 puffs every 6 (six) hours as needed for wheezing, Disp: , Rfl:     Alum Hydroxide-Mag Carbonate (GAVISCON PO), Take 1 Dose by mouth as needed, Disp: , Rfl:     aspirin 81 mg chewable tablet, Chew 81 mg daily, Disp: , Rfl:     BD Pen Needle Berenice 2nd Gen 32G X 4 MM MISC, USE TWICE A DAY, Disp: 100 each, Rfl: 1    Blood Glucose Monitoring Suppl (ONE TOUCH ULTRA 2) w/Device KIT, Test blood sugar 3 times daily, Disp: 1 each, Rfl: 0    budesonide-formoterol (SYMBICORT) 160-4 5 mcg/act inhaler, Inhale 2 puffs 2 (two) times a day as needed Rinse mouth after use  , Disp: , Rfl:     clonazePAM (KlonoPIN) 0 125 mg disintegrating tablet, Take 0 125 mg by mouth as needed for anxiety, Disp: , Rfl:     docusate sodium (COLACE) 100 mg capsule, Take 1 capsule (100 mg total) by mouth 2 (two) times a day, Disp: 60 capsule, Rfl: 0    ezetimibe (ZETIA) 10 mg tablet, Take 1 tablet (10 mg total) by mouth daily, Disp: 90 tablet, Rfl: 1    fluticasone (FLONASE) 50 mcg/act nasal spray, 1 spray into each nostril daily as needed , Disp: , Rfl:     furosemide (LASIX) 20 mg tablet, Take 20 mg by mouth 2 (two) times a day as needed, Disp: , Rfl:     glimepiride (AMARYL) 4 mg tablet, Take 1 tablet (4 mg total) by mouth daily with breakfast, Disp: 90 tablet, Rfl: 1    glucose blood test strip, Prior to each meal, Disp: 300 each, Rfl: 1    HYDROcodone-acetaminophen (NORCO) 5-325 mg per tablet, Take 1 tablet by mouth every 4 (four) hours as needed for pain for up to 10 dosesMax Daily Amount: 6 tablets, Disp: 10 tablet, Rfl: 0    hydrocortisone 2 5 % cream, Apply topically 2 (two) times a day (Patient not taking: Reported on 2/16/2022 ), Disp: 30 g, Rfl: 0    insulin degludec Laura Enrique FlexTouch) 100 units/mL injection pen, Inject 25 Units under the skin daily Pt reports currently only taking 10 UNITS IN THE EVENING,depending on BS, Disp: 15 mL, Rfl: 5    Lancets (ONETOUCH DELICA PLUS [id number]) MISC, TEST 3 TIMES, Disp: 100 each, Rfl: 1    levothyroxine 150 mcg tablet, Take 1 tablet (150 mcg total) by mouth daily, Disp: 90 tablet, Rfl: 1    losartan (COZAAR) 100 MG tablet, Take 1 tablet (100 mg total) by mouth daily, Disp: 90 tablet, Rfl: 1    metFORMIN (GLUCOPHAGE) 1000 MG tablet, Take 1 tablet (1,000 mg total) by mouth 2 (two) times a day with meals, Disp: 180 tablet, Rfl: 1    metoprolol succinate (TOPROL-XL) 25 mg 24 hr tablet, Take 1 tablet (25 mg total) by mouth daily, Disp: 90 tablet, Rfl: 1    montelukast (SINGULAIR) 10 mg tablet, Take 1 tablet (10 mg total) by mouth daily, Disp: 90 tablet, Rfl: 1    omeprazole (PriLOSEC) 40 MG capsule, Take 1 capsule (40 mg total) by mouth daily, Disp: 90 capsule, Rfl: 0    spironolactone (ALDACTONE) 25 mg tablet, Take 1 tablet (25 mg total) by mouth daily, Disp: 90 tablet, Rfl: 1    VITAMIN D PO, Take by mouth, Disp: , Rfl:     HPI    The following portions of the patient's history were reviewed and updated as appropriate: allergies, current medications, past family history, past medical history, past social history, past surgical history and problem list     Review of Systems   Constitutional: Negative  Negative for activity change, appetite change, fatigue, fever and unexpected weight change  HENT: Negative for congestion, ear pain, hearing loss, mouth sores, postnasal drip, rhinorrhea, sore throat, trouble swallowing and voice change  Eyes: Negative for pain, redness and visual disturbance  Respiratory: Negative for cough, chest tightness, shortness of breath and wheezing  Cardiovascular: Negative for chest pain, palpitations and leg swelling     Gastrointestinal: Negative for abdominal distention, abdominal pain, blood in stool, constipation, diarrhea and nausea  Endocrine: Negative for cold intolerance, heat intolerance, polydipsia, polyphagia and polyuria  Genitourinary: Negative for difficulty urinating, dysuria, flank pain, frequency, hematuria and urgency  Musculoskeletal: Negative for arthralgias, back pain, gait problem, joint swelling and myalgias  Skin: Negative for color change and pallor  Neurological: Negative for dizziness, tremors, seizures, syncope, weakness, numbness and headaches  Hematological: Negative for adenopathy  Does not bruise/bleed easily  Psychiatric/Behavioral: Negative  Negative for sleep disturbance  The patient is not nervous/anxious  Objective: There were no vitals taken for this visit  Physical Exam  Vitals and nursing note reviewed  Constitutional:       Appearance: She is well-developed  HENT:      Head: Normocephalic  Right Ear: External ear normal       Left Ear: External ear normal       Nose: Nose normal       Mouth/Throat:      Pharynx: No oropharyngeal exudate  Eyes:      Conjunctiva/sclera: Conjunctivae normal       Pupils: Pupils are equal, round, and reactive to light  Neck:      Thyroid: No thyromegaly  Cardiovascular:      Rate and Rhythm: Normal rate and regular rhythm  Heart sounds: Normal heart sounds  No murmur heard  No friction rub  No gallop  Comments: S1-S2 regular rhythm  Extremities no edema  Blood pressure 140/78  Pulmonary:      Effort: Pulmonary effort is normal  No respiratory distress  Breath sounds: Normal breath sounds  No wheezing or rales  Comments: Lungs are clear no wheezing rales or rhonchi  Abdominal:      General: Bowel sounds are normal  There is no distension  Palpations: Abdomen is soft  There is no mass  Tenderness: There is no abdominal tenderness  There is no guarding or rebound        Comments: Abdomen obese soft nontender Musculoskeletal:         General: Normal range of motion  Cervical back: Normal range of motion and neck supple  Lymphadenopathy:      Cervical: No cervical adenopathy  Skin:     General: Skin is warm and dry  Neurological:      Mental Status: She is alert and oriented to person, place, and time     Psychiatric:         Behavior: Behavior normal          Judgment: Judgment normal

## 2022-02-21 ENCOUNTER — APPOINTMENT (OUTPATIENT)
Dept: LAB | Facility: HOSPITAL | Age: 67
End: 2022-02-21
Attending: INTERNAL MEDICINE
Payer: MEDICARE

## 2022-02-21 DIAGNOSIS — E78.00 PURE HYPERCHOLESTEROLEMIA: ICD-10-CM

## 2022-02-21 DIAGNOSIS — E03.9 ACQUIRED HYPOTHYROIDISM: ICD-10-CM

## 2022-02-21 DIAGNOSIS — J45.21 MILD INTERMITTENT ASTHMA WITH ACUTE EXACERBATION: ICD-10-CM

## 2022-02-21 DIAGNOSIS — I10 ESSENTIAL HYPERTENSION: ICD-10-CM

## 2022-02-21 DIAGNOSIS — E11.65 UNCONTROLLED TYPE 2 DIABETES MELLITUS WITH HYPERGLYCEMIA (HCC): ICD-10-CM

## 2022-02-21 DIAGNOSIS — E66.9 OBESITY (BMI 35.0-39.9 WITHOUT COMORBIDITY): ICD-10-CM

## 2022-02-21 DIAGNOSIS — F33.9 DEPRESSION, RECURRENT (HCC): ICD-10-CM

## 2022-02-21 DIAGNOSIS — K22.2 LOWER ESOPHAGEAL RING (SCHATZKI): ICD-10-CM

## 2022-02-21 LAB
25(OH)D3 SERPL-MCNC: 25.4 NG/ML (ref 30–100)
ALBUMIN SERPL BCP-MCNC: 3.3 G/DL (ref 3.5–5)
ALP SERPL-CCNC: 77 U/L (ref 46–116)
ALT SERPL W P-5'-P-CCNC: 22 U/L (ref 12–78)
ANION GAP SERPL CALCULATED.3IONS-SCNC: 6 MMOL/L (ref 4–13)
AST SERPL W P-5'-P-CCNC: 10 U/L (ref 5–45)
BACTERIA UR QL AUTO: ABNORMAL /HPF
BASOPHILS # BLD AUTO: 0.02 THOUSANDS/ΜL (ref 0–0.1)
BASOPHILS NFR BLD AUTO: 0 % (ref 0–1)
BILIRUB SERPL-MCNC: 0.44 MG/DL (ref 0.2–1)
BILIRUB UR QL STRIP: NEGATIVE
BUN SERPL-MCNC: 15 MG/DL (ref 5–25)
CALCIUM ALBUM COR SERPL-MCNC: 9.5 MG/DL (ref 8.3–10.1)
CALCIUM SERPL-MCNC: 8.9 MG/DL (ref 8.3–10.1)
CHLORIDE SERPL-SCNC: 107 MMOL/L (ref 100–108)
CHOLEST SERPL-MCNC: 188 MG/DL
CLARITY UR: ABNORMAL
CO2 SERPL-SCNC: 31 MMOL/L (ref 21–32)
COLOR UR: YELLOW
CREAT SERPL-MCNC: 0.77 MG/DL (ref 0.6–1.3)
CREAT UR-MCNC: 151 MG/DL
EOSINOPHIL # BLD AUTO: 0.08 THOUSAND/ΜL (ref 0–0.61)
EOSINOPHIL NFR BLD AUTO: 1 % (ref 0–6)
ERYTHROCYTE [DISTWIDTH] IN BLOOD BY AUTOMATED COUNT: 12.7 % (ref 11.6–15.1)
EST. AVERAGE GLUCOSE BLD GHB EST-MCNC: 163 MG/DL
GFR SERPL CREATININE-BSD FRML MDRD: 80 ML/MIN/1.73SQ M
GLUCOSE P FAST SERPL-MCNC: 151 MG/DL (ref 65–99)
GLUCOSE UR STRIP-MCNC: ABNORMAL MG/DL
HBA1C MFR BLD: 7.3 %
HCT VFR BLD AUTO: 36.1 % (ref 34.8–46.1)
HDLC SERPL-MCNC: 58 MG/DL
HGB BLD-MCNC: 12 G/DL (ref 11.5–15.4)
HGB UR QL STRIP.AUTO: NEGATIVE
IMM GRANULOCYTES # BLD AUTO: 0.02 THOUSAND/UL (ref 0–0.2)
IMM GRANULOCYTES NFR BLD AUTO: 0 % (ref 0–2)
KETONES UR STRIP-MCNC: NEGATIVE MG/DL
LDLC SERPL CALC-MCNC: 108 MG/DL (ref 0–100)
LEUKOCYTE ESTERASE UR QL STRIP: NEGATIVE
LYMPHOCYTES # BLD AUTO: 2.61 THOUSANDS/ΜL (ref 0.6–4.47)
LYMPHOCYTES NFR BLD AUTO: 34 % (ref 14–44)
MAGNESIUM SERPL-MCNC: 1.8 MG/DL (ref 1.6–2.6)
MCH RBC QN AUTO: 29.3 PG (ref 26.8–34.3)
MCHC RBC AUTO-ENTMCNC: 33.2 G/DL (ref 31.4–37.4)
MCV RBC AUTO: 88 FL (ref 82–98)
MICROALBUMIN UR-MCNC: 10.2 MG/L (ref 0–20)
MICROALBUMIN/CREAT 24H UR: 7 MG/G CREATININE (ref 0–30)
MONOCYTES # BLD AUTO: 0.52 THOUSAND/ΜL (ref 0.17–1.22)
MONOCYTES NFR BLD AUTO: 7 % (ref 4–12)
NEUTROPHILS # BLD AUTO: 4.44 THOUSANDS/ΜL (ref 1.85–7.62)
NEUTS SEG NFR BLD AUTO: 58 % (ref 43–75)
NITRITE UR QL STRIP: POSITIVE
NON-SQ EPI CELLS URNS QL MICRO: ABNORMAL /HPF
NRBC BLD AUTO-RTO: 0 /100 WBCS
PH UR STRIP.AUTO: 5.5 [PH]
PLATELET # BLD AUTO: 250 THOUSANDS/UL (ref 149–390)
PMV BLD AUTO: 10.5 FL (ref 8.9–12.7)
POTASSIUM SERPL-SCNC: 4.2 MMOL/L (ref 3.5–5.3)
PROT SERPL-MCNC: 7.2 G/DL (ref 6.4–8.2)
PROT UR STRIP-MCNC: NEGATIVE MG/DL
RBC # BLD AUTO: 4.09 MILLION/UL (ref 3.81–5.12)
RBC #/AREA URNS AUTO: ABNORMAL /HPF
SODIUM SERPL-SCNC: 144 MMOL/L (ref 136–145)
SP GR UR STRIP.AUTO: >=1.03 (ref 1–1.03)
T4 FREE SERPL-MCNC: 1.97 NG/DL (ref 0.76–1.46)
T4 FREE SERPL-MCNC: 2.06 NG/DL (ref 0.76–1.46)
TRIGL SERPL-MCNC: 110 MG/DL
TSH SERPL DL<=0.05 MIU/L-ACNC: 0.06 UIU/ML (ref 0.36–3.74)
UROBILINOGEN UR QL STRIP.AUTO: 0.2 E.U./DL
WBC # BLD AUTO: 7.69 THOUSAND/UL (ref 4.31–10.16)
WBC #/AREA URNS AUTO: ABNORMAL /HPF

## 2022-02-21 PROCEDURE — 83735 ASSAY OF MAGNESIUM: CPT

## 2022-02-21 PROCEDURE — 83036 HEMOGLOBIN GLYCOSYLATED A1C: CPT

## 2022-02-21 PROCEDURE — 84443 ASSAY THYROID STIM HORMONE: CPT

## 2022-02-21 PROCEDURE — 85025 COMPLETE CBC W/AUTO DIFF WBC: CPT

## 2022-02-21 PROCEDURE — 82306 VITAMIN D 25 HYDROXY: CPT

## 2022-02-21 PROCEDURE — 80061 LIPID PANEL: CPT

## 2022-02-21 PROCEDURE — 82570 ASSAY OF URINE CREATININE: CPT | Performed by: INTERNAL MEDICINE

## 2022-02-21 PROCEDURE — 80053 COMPREHEN METABOLIC PANEL: CPT

## 2022-02-21 PROCEDURE — 81001 URINALYSIS AUTO W/SCOPE: CPT | Performed by: INTERNAL MEDICINE

## 2022-02-21 PROCEDURE — 84439 ASSAY OF FREE THYROXINE: CPT

## 2022-02-21 PROCEDURE — 82043 UR ALBUMIN QUANTITATIVE: CPT | Performed by: INTERNAL MEDICINE

## 2022-02-21 PROCEDURE — 36415 COLL VENOUS BLD VENIPUNCTURE: CPT

## 2022-02-22 ENCOUNTER — TELEPHONE (OUTPATIENT)
Dept: INTERNAL MEDICINE CLINIC | Facility: CLINIC | Age: 67
End: 2022-02-22

## 2022-02-22 DIAGNOSIS — E03.9 ACQUIRED HYPOTHYROIDISM: Primary | ICD-10-CM

## 2022-02-22 NOTE — TELEPHONE ENCOUNTER
----- Message from Mar Hill MD sent at 2/21/2022 12:46 PM EST -----  Please call the patient regarding her abnormal result    Levothyroxine now is over replace her T4 Mayur 1 97 the TSH is suppressed she is on levothyroxine 150 mcg per day it looks like she is not consistent taking the thyroid medication was low before so tell her to take 6 and half tablets per week so 1 day in the week she only takes half a tablet and check a TSH and T4 in 4 weeks she has to take it on empty stomach and be consistent with the thyroid medication I suspect that she was forgetting to take the medication before but now she is over replaced

## 2022-02-22 NOTE — TELEPHONE ENCOUNTER
Spoke to patient, gave result  Patient states she takes levothyroxine 150 mcg as directed once daily, rarely forgets to take, did mention she takes with coffee not water, will start drinking water instead  Patient verbalized understanding to take 1 tablet daily Monday-Saturday, 1/2 tablet Sunday   Will repeat Tsh T4 in 4 weeks/

## 2022-03-21 ENCOUNTER — APPOINTMENT (EMERGENCY)
Dept: CT IMAGING | Facility: HOSPITAL | Age: 67
End: 2022-03-21
Payer: MEDICARE

## 2022-03-21 ENCOUNTER — HOSPITAL ENCOUNTER (EMERGENCY)
Facility: HOSPITAL | Age: 67
Discharge: HOME | End: 2022-03-21
Attending: EMERGENCY MEDICINE
Payer: MEDICARE

## 2022-03-21 VITALS
RESPIRATION RATE: 18 BRPM | SYSTOLIC BLOOD PRESSURE: 168 MMHG | BODY MASS INDEX: 37.58 KG/M2 | WEIGHT: 205.47 LBS | DIASTOLIC BLOOD PRESSURE: 72 MMHG | OXYGEN SATURATION: 99 % | HEART RATE: 67 BPM | TEMPERATURE: 97.7 F

## 2022-03-21 DIAGNOSIS — R51.9 HEADACHE: Primary | ICD-10-CM

## 2022-03-21 DIAGNOSIS — G44.209 TENSION-TYPE HEADACHE, NOT INTRACTABLE, UNSPECIFIED CHRONICITY PATTERN: Primary | ICD-10-CM

## 2022-03-21 DIAGNOSIS — R00.2 PALPITATIONS: ICD-10-CM

## 2022-03-21 LAB
ALBUMIN SERPL BCP-MCNC: 3.2 G/DL (ref 3.5–5)
ALP SERPL-CCNC: 83 U/L (ref 46–116)
ALT SERPL W P-5'-P-CCNC: 22 U/L (ref 12–78)
ANION GAP SERPL CALCULATED.3IONS-SCNC: 8 MMOL/L (ref 4–13)
AST SERPL W P-5'-P-CCNC: 13 U/L (ref 5–45)
ATRIAL RATE: 67 BPM
ATRIAL RATE: 77 BPM
BASOPHILS # BLD AUTO: 0.03 THOUSANDS/ΜL (ref 0–0.1)
BASOPHILS NFR BLD AUTO: 0 % (ref 0–1)
BILIRUB SERPL-MCNC: 0.41 MG/DL (ref 0.2–1)
BILIRUB UR QL STRIP: NEGATIVE
BUN SERPL-MCNC: 15 MG/DL (ref 5–25)
CALCIUM ALBUM COR SERPL-MCNC: 9.5 MG/DL (ref 8.3–10.1)
CALCIUM SERPL-MCNC: 8.9 MG/DL (ref 8.3–10.1)
CARDIAC TROPONIN I PNL SERPL HS: <2 NG/L
CHLORIDE SERPL-SCNC: 107 MMOL/L (ref 100–108)
CLARITY UR: CLEAR
CO2 SERPL-SCNC: 29 MMOL/L (ref 21–32)
COLOR UR: NORMAL
CREAT SERPL-MCNC: 0.89 MG/DL (ref 0.6–1.3)
EOSINOPHIL # BLD AUTO: 0.06 THOUSAND/ΜL (ref 0–0.61)
EOSINOPHIL NFR BLD AUTO: 1 % (ref 0–6)
ERYTHROCYTE [DISTWIDTH] IN BLOOD BY AUTOMATED COUNT: 12.6 % (ref 11.6–15.1)
GFR SERPL CREATININE-BSD FRML MDRD: 67 ML/MIN/1.73SQ M
GLUCOSE SERPL-MCNC: 114 MG/DL (ref 65–140)
GLUCOSE SERPL-MCNC: 119 MG/DL (ref 65–140)
GLUCOSE UR STRIP-MCNC: NEGATIVE MG/DL
HCT VFR BLD AUTO: 34.4 % (ref 34.8–46.1)
HGB BLD-MCNC: 12.1 G/DL (ref 11.5–15.4)
HGB UR QL STRIP.AUTO: NEGATIVE
IMM GRANULOCYTES # BLD AUTO: 0.03 THOUSAND/UL (ref 0–0.2)
IMM GRANULOCYTES NFR BLD AUTO: 0 % (ref 0–2)
KETONES UR STRIP-MCNC: NEGATIVE MG/DL
LEUKOCYTE ESTERASE UR QL STRIP: NEGATIVE
LYMPHOCYTES # BLD AUTO: 1.93 THOUSANDS/ΜL (ref 0.6–4.47)
LYMPHOCYTES NFR BLD AUTO: 21 % (ref 14–44)
MAGNESIUM SERPL-MCNC: 1.6 MG/DL (ref 1.6–2.6)
MCH RBC QN AUTO: 29.6 PG (ref 26.8–34.3)
MCHC RBC AUTO-ENTMCNC: 35.2 G/DL (ref 31.4–37.4)
MCV RBC AUTO: 84 FL (ref 82–98)
MONOCYTES # BLD AUTO: 0.51 THOUSAND/ΜL (ref 0.17–1.22)
MONOCYTES NFR BLD AUTO: 6 % (ref 4–12)
NEUTROPHILS # BLD AUTO: 6.51 THOUSANDS/ΜL (ref 1.85–7.62)
NEUTS SEG NFR BLD AUTO: 72 % (ref 43–75)
NITRITE UR QL STRIP: NEGATIVE
NRBC BLD AUTO-RTO: 0 /100 WBCS
NT-PROBNP SERPL-MCNC: 145 PG/ML
P AXIS: 36 DEGREES
P AXIS: 59 DEGREES
PH UR STRIP.AUTO: 5.5 [PH]
PLATELET # BLD AUTO: 262 THOUSANDS/UL (ref 149–390)
PMV BLD AUTO: 10.2 FL (ref 8.9–12.7)
POTASSIUM SERPL-SCNC: 4.1 MMOL/L (ref 3.5–5.3)
PR INTERVAL: 128 MS
PR INTERVAL: 134 MS
PROT SERPL-MCNC: 6.9 G/DL (ref 6.4–8.2)
PROT UR STRIP-MCNC: NEGATIVE MG/DL
QRS AXIS: 26 DEGREES
QRS AXIS: 5 DEGREES
QRSD INTERVAL: 88 MS
QRSD INTERVAL: 90 MS
QT INTERVAL: 396 MS
QT INTERVAL: 414 MS
QTC INTERVAL: 437 MS
QTC INTERVAL: 448 MS
RBC # BLD AUTO: 4.09 MILLION/UL (ref 3.81–5.12)
SODIUM SERPL-SCNC: 144 MMOL/L (ref 136–145)
SP GR UR STRIP.AUTO: 1.01 (ref 1–1.03)
T WAVE AXIS: 45 DEGREES
T WAVE AXIS: 66 DEGREES
TSH SERPL DL<=0.05 MIU/L-ACNC: 0.39 UIU/ML (ref 0.36–3.74)
UROBILINOGEN UR QL STRIP.AUTO: 0.2 E.U./DL
VENTRICULAR RATE: 67 BPM
VENTRICULAR RATE: 77 BPM
WBC # BLD AUTO: 9.07 THOUSAND/UL (ref 4.31–10.16)

## 2022-03-21 PROCEDURE — 83735 ASSAY OF MAGNESIUM: CPT | Performed by: PHYSICIAN ASSISTANT

## 2022-03-21 PROCEDURE — 99284 EMERGENCY DEPT VISIT MOD MDM: CPT | Performed by: PHYSICIAN ASSISTANT

## 2022-03-21 PROCEDURE — 96375 TX/PRO/DX INJ NEW DRUG ADDON: CPT

## 2022-03-21 PROCEDURE — 83880 ASSAY OF NATRIURETIC PEPTIDE: CPT | Performed by: PHYSICIAN ASSISTANT

## 2022-03-21 PROCEDURE — 93010 ELECTROCARDIOGRAM REPORT: CPT | Performed by: INTERNAL MEDICINE

## 2022-03-21 PROCEDURE — 84484 ASSAY OF TROPONIN QUANT: CPT | Performed by: PHYSICIAN ASSISTANT

## 2022-03-21 PROCEDURE — 84443 ASSAY THYROID STIM HORMONE: CPT | Performed by: PHYSICIAN ASSISTANT

## 2022-03-21 PROCEDURE — 96361 HYDRATE IV INFUSION ADD-ON: CPT

## 2022-03-21 PROCEDURE — 82948 REAGENT STRIP/BLOOD GLUCOSE: CPT

## 2022-03-21 PROCEDURE — 36415 COLL VENOUS BLD VENIPUNCTURE: CPT | Performed by: PHYSICIAN ASSISTANT

## 2022-03-21 PROCEDURE — 93005 ELECTROCARDIOGRAM TRACING: CPT

## 2022-03-21 PROCEDURE — 81003 URINALYSIS AUTO W/O SCOPE: CPT | Performed by: PHYSICIAN ASSISTANT

## 2022-03-21 PROCEDURE — 99284 EMERGENCY DEPT VISIT MOD MDM: CPT

## 2022-03-21 PROCEDURE — 85025 COMPLETE CBC W/AUTO DIFF WBC: CPT | Performed by: PHYSICIAN ASSISTANT

## 2022-03-21 PROCEDURE — 70450 CT HEAD/BRAIN W/O DYE: CPT

## 2022-03-21 PROCEDURE — 80053 COMPREHEN METABOLIC PANEL: CPT | Performed by: PHYSICIAN ASSISTANT

## 2022-03-21 PROCEDURE — 96374 THER/PROPH/DIAG INJ IV PUSH: CPT

## 2022-03-21 RX ORDER — METOCLOPRAMIDE HYDROCHLORIDE 5 MG/ML
10 INJECTION INTRAMUSCULAR; INTRAVENOUS ONCE
Status: COMPLETED | OUTPATIENT
Start: 2022-03-21 | End: 2022-03-21

## 2022-03-21 RX ORDER — DIPHENHYDRAMINE HYDROCHLORIDE 50 MG/ML
25 INJECTION INTRAMUSCULAR; INTRAVENOUS ONCE
Status: COMPLETED | OUTPATIENT
Start: 2022-03-21 | End: 2022-03-21

## 2022-03-21 RX ADMIN — SODIUM CHLORIDE 1000 ML: 0.9 INJECTION, SOLUTION INTRAVENOUS at 11:32

## 2022-03-21 RX ADMIN — DIPHENHYDRAMINE HYDROCHLORIDE 25 MG: 50 INJECTION, SOLUTION INTRAMUSCULAR; INTRAVENOUS at 11:34

## 2022-03-21 RX ADMIN — METOCLOPRAMIDE 10 MG: 5 INJECTION, SOLUTION INTRAMUSCULAR; INTRAVENOUS at 11:34

## 2022-03-21 NOTE — ED PROVIDER NOTES
History  Chief Complaint   Patient presents with    Headache     Pt reports headache and rash on her neck x 1 day  Pt also reports sob and heart palpatations  Pt denies fevers  Pt reports tingling in hands since last night as well  Pt has full rom and sensation  NO facial droop noted  Headache  Pain location:  Generalized  Severity currently:  5/10  Severity at highest:  6/10  Onset quality:  Sudden  Duration:  2 days  Timing:  Intermittent  Progression:  Waxing and waning  Chronicity:  New  Context: not activity, not exposure to bright light, not caffeine, not coughing, not defecating, not eating, not stress, not exposure to cold air, not intercourse, not loud noise and not straining    Relieved by:  Nothing  Worsened by:  Nothing  Ineffective treatments:  None tried  Associated symptoms: dizziness, fatigue and nausea    Associated symptoms: no abdominal pain, no back pain, no cough, no diarrhea, no ear pain, no eye pain, no fever, no focal weakness, no hearing loss, no loss of balance, no neck pain, no neck stiffness, no seizures, no sore throat, no syncope, no URI, no vomiting and no weakness        Prior to Admission Medications   Prescriptions Last Dose Informant Patient Reported? Taking?    Alum Hydroxide-Mag Carbonate (GAVISCON PO)   Yes No   Sig: Take 1 Dose by mouth as needed   BD Pen Needle Berenice 2nd Gen 32G X 4 MM MISC  Self No No   Sig: USE TWICE A DAY   Blood Glucose Monitoring Suppl (ONE TOUCH ULTRA 2) w/Device KIT  Self No No   Sig: Test blood sugar 3 times daily   HYDROcodone-acetaminophen (NORCO) 5-325 mg per tablet   No No   Sig: Take 1 tablet by mouth every 4 (four) hours as needed for pain for up to 10 dosesMax Daily Amount: 6 tablets   Lancets (ONETOUCH DELICA PLUS [id number]) MISC  Self No No   Sig: TEST 3 TIMES   VITAMIN D PO   Yes No   Sig: Take by mouth   albuterol (2 5 mg/3 mL) 0 083 % nebulizer solution  Self No No   Sig: Take 1 vial (2 5 mg total) by nebulization every 6 (six) hours as needed for wheezing or shortness of breath   albuterol (PROVENTIL HFA,VENTOLIN HFA) 90 mcg/act inhaler  Self Yes No   Sig: Inhale 2 puffs every 6 (six) hours as needed for wheezing   aspirin 81 mg chewable tablet  Self Yes No   Sig: Chew 81 mg daily   budesonide-formoterol (SYMBICORT) 160-4 5 mcg/act inhaler  Self Yes No   Sig: Inhale 2 puffs 2 (two) times a day as needed Rinse mouth after use      clonazePAM (KlonoPIN) 0 125 mg disintegrating tablet   Yes No   Sig: Take 0 125 mg by mouth as needed for anxiety   docusate sodium (COLACE) 100 mg capsule   No No   Sig: Take 1 capsule (100 mg total) by mouth 2 (two) times a day   ezetimibe (ZETIA) 10 mg tablet   No No   Sig: Take 1 tablet (10 mg total) by mouth daily   fluticasone (FLONASE) 50 mcg/act nasal spray  Self Yes No   Si spray into each nostril daily as needed    furosemide (LASIX) 20 mg tablet  Self Yes No   Sig: Take 20 mg by mouth 2 (two) times a day as needed   glimepiride (AMARYL) 4 mg tablet   No No   Sig: Take 1 tablet (4 mg total) by mouth daily with breakfast   glucose blood test strip  Self No No   Sig: Prior to each meal   hydrocortisone 2 5 % cream  Self No No   Sig: Apply topically 2 (two) times a day   insulin degludec Trinidad Marquez FlexTouch) 100 units/mL injection pen   No No   Sig: Inject 25 Units under the skin daily Pt reports currently only taking 10 UNITS IN THE EVENING,depending on BS   levothyroxine 150 mcg tablet   No No   Sig: Take 1 tablet (150 mcg total) by mouth daily   losartan (COZAAR) 100 MG tablet   No No   Sig: Take 1 tablet (100 mg total) by mouth daily   metFORMIN (GLUCOPHAGE) 1000 MG tablet   No No   Sig: Take 1 tablet (1,000 mg total) by mouth 2 (two) times a day with meals   metoprolol succinate (TOPROL-XL) 25 mg 24 hr tablet   No No   Sig: Take 1 tablet (25 mg total) by mouth daily   montelukast (SINGULAIR) 10 mg tablet   No No   Sig: Take 1 tablet (10 mg total) by mouth daily   omeprazole (PriLOSEC) 40 MG capsule   No No   Sig: Take 1 capsule (40 mg total) by mouth daily   spironolactone (ALDACTONE) 25 mg tablet   No No   Sig: Take 1 tablet (25 mg total) by mouth daily      Facility-Administered Medications: None       Past Medical History:   Diagnosis Date    Abdominal pain     Asthma     Back pain     occas due to "disc problem"    Balance problems     "right side"    Cholelithiasis     lap aviva today 3/10/2021    Colon polyp     Cracked tooth     3    Depression     Diabetes mellitus ([address] )     Difficulty swallowing     "food feels like stuck sometimes'    Exercise involving walking     steps and cleaning    Fatty liver     GERD (gastroesophageal reflux disease)     Hiatal hernia     History of pneumonia     History of stomach ulcers     Hyperlipidemia     Hypertension     Obesity     Pneumonia     Refusal of blood transfusions as patient is Jainism     Risk for falls     Stroke [facility])     Uses Djib[place]ian as primary spoken language     Wears glasses        Past Surgical History:   Procedure Laterality Date    CHOLECYSTECTOMY      COLONOSCOPY  02/09/2021    4 polyps tubular adenomas by Dr Alanna Gonzalez EGD  03/31/2021    nonobstructing Schatzki's ring dilated with [address] Hernandez, sliding hiatal hernia otherwise normal   Biopsies stomach  negative for H  pylori biopsy esophagus negative for eosinophilic esophagitis by Dr Zev Pantoja EGD AND COLONOSCOPY  02/09/2021    3 YEAR RECOMMENDED = COLONO    MN LAP,CHOLECYSTECTOMY N/A 3/10/2021    Procedure: CHOLECYSTECTOMY LAPAROSCOPIC W/ ROBOTICS;  Surgeon: Celena Rutledge MD;  Location: [facility];  Service: General    ROTATOR CUFF REPAIR Bilateral     screws implanted"    UPPER GASTROINTESTINAL ENDOSCOPY  02/09/2021    4 cm hiatal hernia without Alejandro lesions  Possibly shortened esophagus    Biopsy of the stomach negative for H  pylori, biopsy of the EG junction negative for Kaplan's, biopsied duodenum negative for celiac by Dr Chava Hartmann Family History   Problem Relation Age of Onset    Diabetes Mother     Hypertension Mother     Hypertension Father     No Known Problems Sister     No Known Problems Daughter     No Known Problems Maternal Grandmother     No Known Problems Maternal Grandfather     No Known Problems Paternal Grandmother     No Known Problems Paternal Grandfather     No Known Problems Son     No Known Problems Son     No Known Problems Daughter     No Known Problems Daughter     Colon cancer Brother 71     I have reviewed and agree with the history as documented  E-Cigarette/Vaping    E-Cigarette Use Never User      E-Cigarette/Vaping Substances    Nicotine No     THC No     CBD No     Flavoring No     Other No     Unknown No      Social History     Tobacco Use    Smoking status: Never Smoker    Smokeless tobacco: Never Used   Vaping Use    Vaping Use: Never used   Substance Use Topics    Alcohol use: Never    Drug use: Never       Review of Systems   Constitutional: Positive for fatigue  Negative for chills and fever  HENT: Negative for ear pain, hearing loss and sore throat  Eyes: Negative for pain and visual disturbance  Respiratory: Negative for cough and shortness of breath  Cardiovascular: Negative for chest pain, palpitations and syncope  Gastrointestinal: Positive for nausea  Negative for abdominal pain, diarrhea and vomiting  Genitourinary: Negative for dysuria and hematuria  Musculoskeletal: Negative for arthralgias, back pain, neck pain and neck stiffness  Skin: Negative for color change and rash  Neurological: Positive for dizziness and headaches  Negative for focal weakness, seizures, syncope, weakness and loss of balance  Psychiatric/Behavioral: Negative for behavioral problems, confusion and sleep disturbance  All other systems reviewed and are negative  Physical Exam  Physical Exam  Vitals and nursing note reviewed     Constitutional:       General: She is not in acute distress  Appearance: Normal appearance  She is not ill-appearing, toxic-appearing or diaphoretic  HENT:      Head: Normocephalic and atraumatic  Mouth/Throat:      Mouth: Mucous membranes are moist    Eyes:      General: No scleral icterus  Pupils: Pupils are equal, round, and reactive to light  Cardiovascular:      Rate and Rhythm: Normal rate and regular rhythm  Pulses: Normal pulses  Heart sounds: Normal heart sounds  Pulmonary:      Effort: Pulmonary effort is normal       Breath sounds: Normal breath sounds  Abdominal:      General: Abdomen is flat  There is no distension  Palpations: Abdomen is soft  Tenderness: There is no abdominal tenderness  There is no guarding or rebound  Hernia: No hernia is present  Musculoskeletal:         General: No swelling or tenderness  Normal range of motion  Cervical back: Normal range of motion  Skin:     General: Skin is warm  Capillary Refill: Capillary refill takes less than 2 seconds  Neurological:      General: No focal deficit present  Mental Status: She is alert and oriented to person, place, and time     Psychiatric:         Mood and Affect: Mood normal          Behavior: Behavior normal          Vital Signs  ED Triage Vitals   Temperature Pulse Respirations Blood Pressure SpO2   03/21/22 1042 03/21/22 1042 03/21/22 1042 03/21/22 1042 03/21/22 1042   97 7 °F (36 5 °C) 75 18 (!) 196/84 99 %      Temp Source Heart Rate Source Patient Position - Orthostatic VS BP Location FiO2 (%)   03/21/22 1042 03/21/22 1042 03/21/22 1042 03/21/22 1042 --   Oral Monitor Sitting Right arm       Pain Score       03/21/22 1137       8           Vitals:    03/21/22 1042 03/21/22 1234 03/21/22 1351   BP: (!) 196/84 (!) 193/74 168/72   Pulse: 75 61 67   Patient Position - Orthostatic VS: Sitting Lying Sitting         Visual Acuity      ED Medications  Medications   sodium chloride 0 9 % bolus 1,000 mL (0 mL Intravenous Stopped 3/21/22 1234)   metoclopramide (REGLAN) injection 10 mg (10 mg Intravenous Given 3/21/22 1134)   diphenhydrAMINE (BENADRYL) injection 25 mg (25 mg Intravenous Given 3/21/22 1134)       Diagnostic Studies  Results Reviewed     Procedure Component Value Units Date/Time    UA w Reflex to Microscopic w Reflex to Culture [[id number]] Collected: 03/21/22 1242    Lab Status: Final result Specimen: Urine, Clean Catch Updated: 03/21/22 1249     Color, UA Light Yellow     Clarity, UA Clear     Specific Gravity, UA 1 015     pH, UA 5 5     Leukocytes, UA Negative     Nitrite, UA Negative     Protein, UA Negative mg/dl      Glucose, UA Negative mg/dl      Ketones, UA Negative mg/dl      Urobilinogen, UA 0 2 E U /dl      Bilirubin, UA Negative     Blood, UA Negative    TSH [[id number]]  (Normal) Collected: 03/21/22 1130    Lab Status: Final result Specimen: Blood from Arm, Right Updated: 03/21/22 1219     TSH 3RD GENERATON 0 386 uIU/mL     Narrative:      Patients undergoing fluorescein dye angiography may retain small amounts of fluorescein in the body for 48-72 hours post procedure  Samples containing fluorescein can produce falsely depressed TSH values  If the patient had this procedure,a specimen should be resubmitted post fluorescein clearance        Magnesium [[id number]]  (Normal) Collected: 03/21/22 1130    Lab Status: Final result Specimen: Blood from Arm, Right Updated: 03/21/22 1219     Magnesium 1 6 mg/dL     NT-BNP PRO [[id number]]  (Abnormal) Collected: 03/21/22 1130    Lab Status: Final result Specimen: Blood from Arm, Right Updated: 03/21/22 1219     NT-proBNP 145 pg/mL     HS Troponin 0hr (reflex protocol) [[id number]]  (Normal) Collected: 03/21/22 1130    Lab Status: Final result Specimen: Blood from Arm, Right Updated: 03/21/22 1210     hs TnI 0hr <2 ng/L     Comprehensive metabolic panel [[id number]]  (Abnormal) Collected: 03/21/22 1130    Lab Status: Final result Specimen: Blood from Arm, Right Updated: 03/21/22 1208     Sodium 144 mmol/L      Potassium 4 1 mmol/L      Chloride 107 mmol/L      CO2 29 mmol/L      ANION GAP 8 mmol/L      BUN 15 mg/dL      Creatinine 0 89 mg/dL      Glucose 119 mg/dL      Calcium 8 9 mg/dL      Corrected Calcium 9 5 mg/dL      AST 13 U/L      ALT 22 U/L      Alkaline Phosphatase 83 U/L      Total Protein 6 9 g/dL      Albumin 3 2 g/dL      Total Bilirubin 0 41 mg/dL      eGFR 67 ml/min/1 73sq m     Narrative:      National Kidney Disease Foundation guidelines for Chronic Kidney Disease (CKD):     Stage 1 with normal or high GFR (GFR > 90 mL/min/1 73 square meters)    Stage 2 Mild CKD (GFR = 60-89 mL/min/1 73 square meters)    Stage 3A Moderate CKD (GFR = 45-59 mL/min/1 73 square meters)    Stage 3B Moderate CKD (GFR = 30-44 mL/min/1 73 square meters)    Stage 4 Severe CKD (GFR = 15-29 mL/min/1 73 square meters)    Stage 5 End Stage CKD (GFR <15 mL/min/1 73 square meters)  Note: GFR calculation is accurate only with a steady state creatinine    Fingerstick Glucose (POCT) [[id number]]  (Normal) Collected: 03/21/22 1150    Lab Status: Final result Updated: 03/21/22 1151     POC Glucose 114 mg/dl     CBC and differential [[id number]]  (Abnormal) Collected: 03/21/22 1130    Lab Status: Final result Specimen: Blood from Arm, Right Updated: 03/21/22 1148     WBC 9 07 Thousand/uL      RBC 4 09 Million/uL      Hemoglobin 12 1 g/dL      Hematocrit 34 4 %      MCV 84 fL      MCH 29 6 pg      MCHC 35 2 g/dL      RDW 12 6 %      MPV 10 2 fL      Platelets 293 Thousands/uL      nRBC 0 /100 WBCs      Neutrophils Relative 72 %      Immat GRANS % 0 %      Lymphocytes Relative 21 %      Monocytes Relative 6 %      Eosinophils Relative 1 %      Basophils Relative 0 %      Neutrophils Absolute 6 51 Thousands/µL      Immature Grans Absolute 0 03 Thousand/uL      Lymphocytes Absolute 1 93 Thousands/µL      Monocytes Absolute 0 51 Thousand/µL      Eosinophils Absolute 0 06 Thousand/µL Basophils Absolute 0 03 Thousands/µL                  CT head without contrast   Final Result by Sekou Pavon MD (03/21 1322)      No acute intracranial abnormality  Trace bilateral sphenoid fluid levels in right maxillary retention cyst                   Workstation performed: [id number]                    Procedures  Procedures         ED Course  ED Course as of 03/22/22 1121   Mon Mar 21, 2022   1333 CT head: IMPRESSION:     No acute intracranial abnormality        Trace bilateral sphenoid fluid levels in right maxillary retention cyst    1344 Patient reassessed  Feeling better  The test results were discussed with the patient/family  All questions were answered to patient/family's satisfaction  Anticipatory guidance and return precautions were discussed at length  They verbalized understanding and agreement with the plan  The patient remained stable while under my care in the Emergency Department  SBIRT 20yo+      Most Recent Value   SBIRT (24 yo +)    In order to provide better care to our patients, we are screening all of our patients for alcohol and drug use  Would it be okay to ask you these screening questions?  No Filed at: 03/21/2022 1140                    MDM  Number of Diagnoses or Management Options  Headache: new and does not require workup  Palpitations: new and does not require workup     Amount and/or Complexity of Data Reviewed  Clinical lab tests: ordered and reviewed  Tests in the radiology section of CPT®: ordered and reviewed    Risk of Complications, Morbidity, and/or Mortality  Presenting problems: moderate  Diagnostic procedures: moderate  Management options: moderate    Patient Progress  Patient progress: stable      Disposition  Final diagnoses:   Headache   Palpitations     Time reflects when diagnosis was documented in both MDM as applicable and the Disposition within this note     Time User Action Codes Description Comment    3/21/2022 1:45 PM Jere Martinez Add [R51 9] Headache     3/21/2022  1:46 PM GarrettSugar Drew Add [R00 2] Palpitations       ED Disposition     ED Disposition Condition Date/Time Comment    Discharge Stable Mon Mar 21, 2022  1:45 PM Tyler Pako discharge to home/self care  Follow-up Information     Follow up With Specialties Details Why [address] Annette Mehta MD Internal Medicine Schedule an appointment as soon as possible for a visit   [address] [facility]            Discharge Medication List as of 3/21/2022  1:46 PM      CONTINUE these medications which have NOT CHANGED    Details   albuterol (2 5 mg/3 mL) 0 083 % nebulizer solution Take 1 vial (2 5 mg total) by nebulization every 6 (six) hours as needed for wheezing or shortness of breath, Starting Tue 3/31/2020, Normal      albuterol (PROVENTIL HFA,VENTOLIN HFA) 90 mcg/act inhaler Inhale 2 puffs every 6 (six) hours as needed for wheezing, Historical Med      Alum Hydroxide-Mag Carbonate (GAVISCON PO) Take 1 Dose by mouth as needed, Historical Med      aspirin 81 mg chewable tablet Chew 81 mg daily, Historical Med      BD Pen Needle Berenice 2nd Gen 32G X 4 MM MISC USE TWICE A DAY, Normal      Blood Glucose Monitoring Suppl (ONE TOUCH ULTRA 2) w/Device KIT Test blood sugar 3 times daily, Normal      budesonide-formoterol (SYMBICORT) 160-4 5 mcg/act inhaler Inhale 2 puffs 2 (two) times a day as needed Rinse mouth after use  , Historical Med      clonazePAM (KlonoPIN) 0 125 mg disintegrating tablet Take 0 125 mg by mouth as needed for anxiety, Historical Med      docusate sodium (COLACE) 100 mg capsule Take 1 capsule (100 mg total) by mouth 2 (two) times a day, Starting Wed 3/10/2021, Until Fri 4/9/2021, Normal      ezetimibe (ZETIA) 10 mg tablet Take 1 tablet (10 mg total) by mouth daily, Starting Thu 7/1/2021, Normal      fluticasone (FLONASE) 50 mcg/act nasal spray 1 spray into each nostril daily as needed , Historical Med      furosemide (LASIX) 20 mg tablet Take 20 mg by mouth 2 (two) times a day as needed, Historical Med      glimepiride (AMARYL) 4 mg tablet Take 1 tablet (4 mg total) by mouth daily with breakfast, Starting Wed 9/29/2021, Normal      glucose blood test strip Prior to each meal, Normal      HYDROcodone-acetaminophen (NORCO) 5-325 mg per tablet Take 1 tablet by mouth every 4 (four) hours as needed for pain for up to 10 dosesMax Daily Amount: 6 tablets, Starting Wed 3/10/2021, Normal      hydrocortisone 2 5 % cream Apply topically 2 (two) times a day, Starting Mon 5/11/2020, Normal      insulin degludec Velinda Ring FlexTouch) 100 units/mL injection pen Inject 25 Units under the skin daily Pt reports currently only taking 10 UNITS IN THE EVENING,depending on BS, Starting Tue 3/23/2021, Normal      Lancets (ONETOUCH DELICA PLUS [id number]) MISC TEST 3 TIMES, Normal      levothyroxine 150 mcg tablet Take 1 tablet (150 mcg total) by mouth daily, Starting Fri 11/5/2021, Normal      losartan (COZAAR) 100 MG tablet Take 1 tablet (100 mg total) by mouth daily, Starting Wed 9/29/2021, Until Tue 12/28/2021, Normal      metFORMIN (GLUCOPHAGE) 1000 MG tablet Take 1 tablet (1,000 mg total) by mouth 2 (two) times a day with meals, Starting Wed 9/29/2021, Normal      metoprolol succinate (TOPROL-XL) 25 mg 24 hr tablet Take 1 tablet (25 mg total) by mouth daily, Starting u 7/1/2021, Until Sat 7/31/2021, Phone In      montelukast (SINGULAIR) 10 mg tablet Take 1 tablet (10 mg total) by mouth daily, Starting Wed 9/29/2021, Normal      omeprazole (PriLOSEC) 40 MG capsule Take 1 capsule (40 mg total) by mouth daily, Starting Wed 2/16/2022, Normal      spironolactone (ALDACTONE) 25 mg tablet Take 1 tablet (25 mg total) by mouth daily, Starting Tue 3/23/2021, Until Mon 6/21/2021, Normal      VITAMIN D PO Take by mouth, Historical Med             No discharge procedures on file      PDMP Review       Value Time User PDMP Reviewed  Yes 3/10/2021  7:09 AM Tyler Hernandez PA-C          ED Provider  Electronically Signed by           Marino Angelo PA-C  03/22/22 1129

## 2022-03-21 NOTE — DISCHARGE INSTRUCTIONS
Results for orders placed or performed during the hospital encounter of 03/21/22   CBC and differential   Result Value Ref Range    WBC 9 07 4 31 - 10 16 Thousand/uL    RBC 4 09 3 81 - 5 12 Million/uL    Hemoglobin 12 1 11 5 - 15 4 g/dL    Hematocrit 34 4 (L) 34 8 - 46 1 %    MCV 84 82 - 98 fL    MCH 29 6 26 8 - 34 3 pg    MCHC 35 2 31 4 - 37 4 g/dL    RDW 12 6 11 6 - 15 1 %    MPV 10 2 8 9 - 12 7 fL    Platelets 519 627 - 447 Thousands/uL    nRBC 0 /100 WBCs    Neutrophils Relative 72 43 - 75 %    Immat GRANS % 0 0 - 2 %    Lymphocytes Relative 21 14 - 44 %    Monocytes Relative 6 4 - 12 %    Eosinophils Relative 1 0 - 6 %    Basophils Relative 0 0 - 1 %    Neutrophils Absolute 6 51 1 85 - 7 62 Thousands/µL    Immature Grans Absolute 0 03 0 00 - 0 20 Thousand/uL    Lymphocytes Absolute 1 93 0 60 - 4 47 Thousands/µL    Monocytes Absolute 0 51 0 17 - 1 22 Thousand/µL    Eosinophils Absolute 0 06 0 00 - 0 61 Thousand/µL    Basophils Absolute 0 03 0 00 - 0 10 Thousands/µL   Comprehensive metabolic panel   Result Value Ref Range    Sodium 144 136 - 145 mmol/L    Potassium 4 1 3 5 - 5 3 mmol/L    Chloride 107 100 - 108 mmol/L    CO2 29 21 - 32 mmol/L    ANION GAP 8 4 - 13 mmol/L    BUN 15 5 - 25 mg/dL    Creatinine 0 89 0 60 - 1 30 mg/dL    Glucose 119 65 - 140 mg/dL    Calcium 8 9 8 3 - 10 1 mg/dL    Corrected Calcium 9 5 8 3 - 10 1 mg/dL    AST 13 5 - 45 U/L    ALT 22 12 - 78 U/L    Alkaline Phosphatase 83 46 - 116 U/L    Total Protein 6 9 6 4 - 8 2 g/dL    Albumin 3 2 (L) 3 5 - 5 0 g/dL    Total Bilirubin 0 41 0 20 - 1 00 mg/dL    eGFR 67 ml/min/1 73sq m   TSH   Result Value Ref Range    TSH 3RD GENERATON 0 386 0 358 - 3 740 uIU/mL   Magnesium   Result Value Ref Range    Magnesium 1 6 1 6 - 2 6 mg/dL   HS Troponin 0hr (reflex protocol)   Result Value Ref Range    hs TnI 0hr <2 "Refer to ACS Flowchart"- see link ng/L   NT-BNP PRO   Result Value Ref Range    NT-proBNP 145 (H) <125 pg/mL   UA w Reflex to Microscopic w Reflex to Culture    Specimen: Urine, Clean Catch   Result Value Ref Range    Color, UA Light Yellow     Clarity, UA Clear     Specific Lat[place], UA 1 015 1 003 - 1 030    pH, UA 5 5 4 5, 5 0, 5 5, 6 0, 6 5, 7 0, 7 5, 8 0    Leukocytes, UA Negative Negative    Nitrite, UA Negative Negative    Protein, UA Negative Negative mg/dl    Glucose, UA Negative Negative mg/dl    Ketones, UA Negative Negative mg/dl    Urobilinogen, UA 0 2 0 2, 1 0 E U /dl E U /dl    Bilirubin, UA Negative Negative    Blood, UA Negative Negative   ECG 12 lead   Result Value Ref Range    Ventricular Rate 77 BPM    Atrial Rate 77 BPM    MT Interval 128 ms    QRSD Interval 90 ms    QT Interval 396 ms    QTC Interval 448 ms    P [place] 59 degrees    QRS Axis 5 degrees    T Wave Axis 45 degrees   ECG 12 lead   Result Value Ref Range    Ventricular Rate 67 BPM    Atrial Rate 67 BPM    MT Interval 134 ms    QRSD Interval 88 ms    QT Interval 414 ms    QTC Interval 437 ms    P Axis 36 degrees    QRS Axis 26 degrees    T Wave Axis 66 degrees   Fingerstick Glucose (POCT)   Result Value Ref Range    POC Glucose 114 65 - 140 mg/dl     CT head without contrast   Final Result      No acute intracranial abnormality          Trace bilateral sphenoid fluid levels in right maxillary retention cyst                   Workstation performed: [id number]

## 2022-03-23 ENCOUNTER — OFFICE VISIT (OUTPATIENT)
Dept: INTERNAL MEDICINE CLINIC | Facility: CLINIC | Age: 67
End: 2022-03-23
Payer: MEDICARE

## 2022-03-23 VITALS
HEIGHT: 62 IN | BODY MASS INDEX: 38.09 KG/M2 | DIASTOLIC BLOOD PRESSURE: 70 MMHG | TEMPERATURE: 98 F | HEART RATE: 80 BPM | WEIGHT: 207 LBS | RESPIRATION RATE: 12 BRPM | SYSTOLIC BLOOD PRESSURE: 154 MMHG

## 2022-03-23 DIAGNOSIS — I10 ESSENTIAL HYPERTENSION: ICD-10-CM

## 2022-03-23 DIAGNOSIS — E11.65 UNCONTROLLED TYPE 2 DIABETES MELLITUS WITH HYPERGLYCEMIA (HCC): ICD-10-CM

## 2022-03-23 DIAGNOSIS — E03.9 ACQUIRED HYPOTHYROIDISM: Primary | ICD-10-CM

## 2022-03-23 DIAGNOSIS — J45.21 MILD INTERMITTENT ASTHMA WITH ACUTE EXACERBATION: ICD-10-CM

## 2022-03-23 DIAGNOSIS — F41.9 ANXIETY: ICD-10-CM

## 2022-03-23 DIAGNOSIS — K22.2 LOWER ESOPHAGEAL RING (SCHATZKI): ICD-10-CM

## 2022-03-23 DIAGNOSIS — E66.9 OBESITY (BMI 35.0-39.9 WITHOUT COMORBIDITY): ICD-10-CM

## 2022-03-23 DIAGNOSIS — R00.2 PALPITATIONS: ICD-10-CM

## 2022-03-23 DIAGNOSIS — E78.00 PURE HYPERCHOLESTEROLEMIA: ICD-10-CM

## 2022-03-23 PROCEDURE — 99214 OFFICE O/P EST MOD 30 MIN: CPT | Performed by: INTERNAL MEDICINE

## 2022-03-23 RX ORDER — CLONAZEPAM 0.12 MG/1
TABLET, ORALLY DISINTEGRATING ORAL
Qty: 30 TABLET | Refills: 1 | Status: SHIPPED | OUTPATIENT
Start: 2022-03-23

## 2022-03-23 RX ORDER — METOPROLOL SUCCINATE 50 MG/1
50 TABLET, EXTENDED RELEASE ORAL DAILY
Qty: 30 TABLET | Refills: 5 | Status: SHIPPED | OUTPATIENT
Start: 2022-03-23

## 2022-03-23 RX ORDER — ESCITALOPRAM OXALATE 10 MG/1
10 TABLET ORAL DAILY
Qty: 30 TABLET | Refills: 2 | Status: SHIPPED | OUTPATIENT
Start: 2022-03-23 | End: 2022-06-21

## 2022-03-23 NOTE — PATIENT INSTRUCTIONS
Will going to start her Lexapro 10 mg half a tablet daily for the 1st 5 or 7 days and then take 1 tablet daily    You have the clonazepam he can take gets needed when you have increase in anxiety or palpitations    We increase the metoprolol succinate to 50 mg per day and you can take it daily 1 pill

## 2022-03-25 ENCOUNTER — HOSPITAL ENCOUNTER (OUTPATIENT)
Dept: MAMMOGRAPHY | Facility: CLINIC | Age: 67
Discharge: HOME | End: 2022-03-25
Payer: MEDICARE

## 2022-03-25 VITALS — WEIGHT: 207 LBS | HEIGHT: 62 IN | BODY MASS INDEX: 38.09 KG/M2

## 2022-03-25 DIAGNOSIS — Z78.9 HEALTH MAINTENANCE ALTERATION: ICD-10-CM

## 2022-03-25 DIAGNOSIS — Z12.31 ENCOUNTER FOR SCREENING MAMMOGRAM FOR MALIGNANT NEOPLASM OF BREAST: ICD-10-CM

## 2022-03-25 PROCEDURE — 77063 BREAST TOMOSYNTHESIS BI: CPT

## 2022-03-25 PROCEDURE — 77067 SCR MAMMO BI INCL CAD: CPT

## 2022-04-11 DIAGNOSIS — I10 ESSENTIAL HYPERTENSION: Primary | ICD-10-CM

## 2022-04-11 RX ORDER — METOPROLOL SUCCINATE 25 MG/1
TABLET, EXTENDED RELEASE ORAL
Qty: 90 TABLET | Refills: 1 | Status: SHIPPED | OUTPATIENT
Start: 2022-04-11 | End: 2022-06-14

## 2022-04-21 DIAGNOSIS — E11.9 TYPE 2 DIABETES MELLITUS WITHOUT COMPLICATION, WITHOUT LONG-TERM CURRENT USE OF INSULIN (HCC): ICD-10-CM

## 2022-04-21 RX ORDER — INSULIN DEGLUDEC INJECTION 100 U/ML
INJECTION, SOLUTION SUBCUTANEOUS
Qty: 15 ML | Refills: 5 | Status: SHIPPED | OUTPATIENT
Start: 2022-04-21

## 2022-04-25 DIAGNOSIS — E03.9 ACQUIRED HYPOTHYROIDISM: ICD-10-CM

## 2022-04-25 DIAGNOSIS — I10 ESSENTIAL HYPERTENSION: ICD-10-CM

## 2022-04-25 DIAGNOSIS — K21.9 GASTROESOPHAGEAL REFLUX DISEASE WITHOUT ESOPHAGITIS: ICD-10-CM

## 2022-04-25 DIAGNOSIS — E78.00 PURE HYPERCHOLESTEROLEMIA: ICD-10-CM

## 2022-04-25 DIAGNOSIS — E11.9 TYPE 2 DIABETES MELLITUS WITHOUT COMPLICATION, WITHOUT LONG-TERM CURRENT USE OF INSULIN (HCC): ICD-10-CM

## 2022-04-25 DIAGNOSIS — J45.20 MILD INTERMITTENT ASTHMA WITHOUT COMPLICATION: ICD-10-CM

## 2022-04-25 DIAGNOSIS — R07.89 ATYPICAL CHEST PAIN: ICD-10-CM

## 2022-04-25 RX ORDER — EZETIMIBE 10 MG/1
10 TABLET ORAL DAILY
Qty: 90 TABLET | Refills: 0 | Status: SHIPPED | OUTPATIENT
Start: 2022-04-25

## 2022-04-25 RX ORDER — OMEPRAZOLE 40 MG/1
40 CAPSULE, DELAYED RELEASE ORAL DAILY
Qty: 90 CAPSULE | Refills: 0 | Status: SHIPPED | OUTPATIENT
Start: 2022-04-25 | End: 2022-05-26

## 2022-04-25 RX ORDER — GLIMEPIRIDE 4 MG/1
4 TABLET ORAL
Qty: 90 TABLET | Refills: 0 | Status: SHIPPED | OUTPATIENT
Start: 2022-04-25 | End: 2022-07-21 | Stop reason: SDUPTHER

## 2022-04-27 DIAGNOSIS — J45.20 MILD INTERMITTENT ASTHMA WITHOUT COMPLICATION: ICD-10-CM

## 2022-04-27 DIAGNOSIS — I10 ESSENTIAL HYPERTENSION: ICD-10-CM

## 2022-04-27 DIAGNOSIS — R07.89 ATYPICAL CHEST PAIN: ICD-10-CM

## 2022-04-27 DIAGNOSIS — E03.9 ACQUIRED HYPOTHYROIDISM: ICD-10-CM

## 2022-04-27 DIAGNOSIS — E11.9 TYPE 2 DIABETES MELLITUS WITHOUT COMPLICATION, WITHOUT LONG-TERM CURRENT USE OF INSULIN (HCC): ICD-10-CM

## 2022-04-27 DIAGNOSIS — E78.00 PURE HYPERCHOLESTEROLEMIA: ICD-10-CM

## 2022-04-27 RX ORDER — LEVOTHYROXINE SODIUM 0.15 MG/1
150 TABLET ORAL DAILY
Qty: 90 TABLET | Refills: 0 | Status: SHIPPED | OUTPATIENT
Start: 2022-04-27 | End: 2022-07-21 | Stop reason: SDUPTHER

## 2022-05-18 ENCOUNTER — RA CDI HCC (OUTPATIENT)
Dept: OTHER | Facility: HOSPITAL | Age: 67
End: 2022-05-18

## 2022-05-18 NOTE — PROGRESS NOTES
[facility]  coding opportunities     [id number]     Chart Reviewed number of suggestions sent to Provider: 1     Patients Insurance     Medicare Insurance: [employer]

## 2022-05-25 ENCOUNTER — APPOINTMENT (OUTPATIENT)
Dept: LAB | Facility: HOSPITAL | Age: 67
End: 2022-05-25
Payer: MEDICARE

## 2022-05-25 DIAGNOSIS — G44.209 TENSION-TYPE HEADACHE, NOT INTRACTABLE, UNSPECIFIED CHRONICITY PATTERN: ICD-10-CM

## 2022-05-25 LAB
CRP SERPL QL: 8.6 MG/L
ERYTHROCYTE [SEDIMENTATION RATE] IN BLOOD: 11 MM/HOUR (ref 0–29)

## 2022-05-25 PROCEDURE — 36415 COLL VENOUS BLD VENIPUNCTURE: CPT

## 2022-05-25 PROCEDURE — 85652 RBC SED RATE AUTOMATED: CPT

## 2022-05-25 PROCEDURE — 86140 C-REACTIVE PROTEIN: CPT

## 2022-05-26 ENCOUNTER — OFFICE VISIT (OUTPATIENT)
Dept: GASTROENTEROLOGY | Facility: MEDICAL CENTER | Age: 67
End: 2022-05-26
Payer: MEDICARE

## 2022-05-26 VITALS
BODY MASS INDEX: 38.15 KG/M2 | WEIGHT: 208.6 LBS | HEART RATE: 66 BPM | TEMPERATURE: 99 F | DIASTOLIC BLOOD PRESSURE: 78 MMHG | SYSTOLIC BLOOD PRESSURE: 148 MMHG

## 2022-05-26 DIAGNOSIS — R14.0 BLOATING: ICD-10-CM

## 2022-05-26 DIAGNOSIS — K21.9 GASTROESOPHAGEAL REFLUX DISEASE WITHOUT ESOPHAGITIS: Primary | ICD-10-CM

## 2022-05-26 PROCEDURE — 99214 OFFICE O/P EST MOD 30 MIN: CPT | Performed by: INTERNAL MEDICINE

## 2022-05-26 RX ORDER — OMEPRAZOLE 20 MG/1
20 CAPSULE, DELAYED RELEASE ORAL DAILY
Qty: 90 CAPSULE | Refills: 3 | Status: SHIPPED | OUTPATIENT
Start: 2022-05-26

## 2022-05-26 NOTE — PROGRESS NOTES
[facility] - Outpatient Follow-up Note  Tammie Epley 79 y o  female MRN: [id number]  Encounter: [id number]          ASSESSMENT AND PLAN:    Tammie Epley is a 79 y o  female with GERD  This is well controlled on daily omeprazole  We are going to lower the dose to 20 mg and she will update me on how she is doing in a few weeks  She is also complaining of bloating  She does not have constipation  I discussed the low FODMAP diet with her today and went over it in detail  She is willing to try it  She will return in 4 months  1  Gastroesophageal reflux disease without esophagitis    2  Bloating        No orders of the defined types were placed in this encounter     ______________________________________________________________________    SUBJECTIVE:    Tammie Epley is a 79 y o  female who presents for follow-up of GERD and dysphagia  After her last visit, we switched her from famotidine to omeprazole 40 mg daily  Her GERD is now under control  She was worried about potential kidney damage from omeprazole so we checked her kidney function which was normal   She still has epigastric pain but it is not bothering her  No dysphagia  She had EGD in March of 2021 as documented in my previous note  She is due for colonoscopy in 2024  Today, she is complaining of bloating  She is not constipated  REVIEW OF SYSTEMS IS OTHERWISE NEGATIVE        Historical Information   Past Medical History:   Diagnosis Date    Abdominal pain     Asthma     Back pain     occas due to "disc problem"    Balance problems     "right side"    Cholelithiasis     lap aviva today 3/10/2021    Colon polyp     Cracked tooth     3    Depression     Diabetes mellitus (HCC)     Difficulty swallowing     "food feels like stuck sometimes'    Exercise involving walking     steps and cleaning    Fatty liver     GERD (gastroesophageal reflux disease)     Hiatal hernia     History of pneumonia  History of stomach ulcers     Hyperlipidemia     Hypertension     Obesity     Pneumonia     Refusal of blood transfusions as patient is Cheondoism     Risk for falls     Stroke [facility])     Uses Polish as primary spoken language     Wears glasses      Past Surgical History:   Procedure Laterality Date    CHOLECYSTECTOMY      COLONOSCOPY  02/09/2021    4 polyps tubular adenomas by Dr Roberto Mcghee EGD  03/31/2021    nonobstructing Schatzki's ring dilated with [address], sliding hiatal hernia otherwise normal   Biopsies stomach  negative for H  pylori biopsy esophagus negative for eosinophilic esophagitis by Dr Celena Parada EGD AND COLONOSCOPY  02/09/2021    3 YEAR RECOMMENDED = COLONO    NV LAP,CHOLECYSTECTOMY N/A 3/10/2021    Procedure: CHOLECYSTECTOMY LAPAROSCOPIC W/ ROBOTICS;  Surgeon: Liz Dempsey MD;  Location: [facility];  Service: General    [address] Bilateral     screws implanted"    UPPER GASTROINTESTINAL ENDOSCOPY  02/09/2021    4 cm hiatal hernia without Alejandro lesions  Possibly shortened esophagus    Biopsy of the stomach negative for H  pylori, biopsy of the EG junction negative for Kaplan's, biopsied duodenum negative for celiac by Dr Ronda Colunga History   Social History     Substance and Sexual Activity   Alcohol Use Never     Social History     Substance and Sexual Activity   Drug Use Never     Social History     Tobacco Use   Smoking Status Never Smoker   Smokeless Tobacco Never Used     Family History   Problem Relation Age of Onset    Diabetes Mother     Hypertension Mother     Hypertension Father     No Known Problems Sister     No Known Problems Daughter     No Known Problems Maternal Grandmother     No Known Problems Maternal Grandfather     No Known Problems Paternal Grandmother     No Known Problems Paternal Grandfather     No Known Problems Son     No Known Problems Son     No Known Problems Daughter     No Known Problems Daughter     Colon cancer Brother 71       Meds/Allergies       Current Outpatient Medications:     albuterol (2 5 mg/3 mL) 0 083 % nebulizer solution    albuterol (PROVENTIL HFA,VENTOLIN HFA) 90 mcg/act inhaler    aspirin 81 mg chewable tablet    BD Pen Needle Berenice 2nd Gen 32G X 4 MM MISC    Blood Glucose Monitoring Suppl (ONE TOUCH ULTRA 2) w/Device KIT    budesonide-formoterol (SYMBICORT) 160-4 5 mcg/act inhaler    clonazePAM (KlonoPIN) 0 125 mg disintegrating tablet    escitalopram (Lexapro) 10 mg tablet    ezetimibe (ZETIA) 10 mg tablet    glimepiride (AMARYL) 4 mg tablet    glucose blood test strip    Lancets (ONETOUCH DELICA PLUS [id number]) MISC    levothyroxine 150 mcg tablet    metFORMIN (GLUCOPHAGE) 1000 MG tablet    metoprolol succinate (Toprol XL) 50 mg 24 hr tablet    metoprolol succinate (TOPROL-XL) 25 mg 24 hr tablet    omeprazole (PriLOSEC) 20 mg delayed release capsule    Lennox Nick FlexTouch 100 units/mL injection pen    VITAMIN D PO    Alum Hydroxide-Mag Carbonate (GAVISCON PO)    docusate sodium (COLACE) 100 mg capsule    fluticasone (FLONASE) 50 mcg/act nasal spray    furosemide (LASIX) 20 mg tablet    hydrocortisone 2 5 % cream    losartan (COZAAR) 100 MG tablet    montelukast (SINGULAIR) 10 mg tablet    spironolactone (ALDACTONE) 25 mg tablet    Allergies   Allergen Reactions    Etodolac Shortness Of Breath    Iodinated Diagnostic Agents Shortness Of Breath and Wheezing    Simvastatin Other (See Comments)     elevated liver function     Latex Rash    Morphine Palpitations    Penicillins Rash           Objective     Blood pressure 148/78, pulse 66, temperature 99 °F (37 2 °C), weight 94 6 kg (208 lb 9 6 oz)  Body mass index is 38 15 kg/m²  PHYSICAL EXAM:      General Appearance:   Alert, cooperative, no distress   HEENT:   Normocephalic, atraumatic, anicteric       Neck:  Supple, symmetrical, trachea midline   Lungs:   Clear to auscultation bilaterally; no rales, rhonchi or wheezing; respirations unlabored    Heart[de-identified]   Regular rate and rhythm; no murmur, rub, or gallop  Abdomen:   Soft, epigastric ttp, non-distended; normal bowel sounds; no masses, no organomegaly    Genitalia:   Deferred    Rectal:   Deferred    Extremities:  No cyanosis, clubbing or edema    Pulses:  2+ and symmetric    Skin:  No jaundice, rashes, or lesions    Lymph nodes:  No palpable cervical lymphadenopathy        Lab Results:   No visits with results within 1 Day(s) from this visit  Latest known visit with results is:   Appointment on 05/25/2022   Component Date Value    CRP 05/25/2022 8 6 (A)    Sed Rate 05/25/2022 11        Lab Results   Component Value Date    WBC 9 07 03/21/2022    HGB 12 1 03/21/2022    HCT 34 4 (L) 03/21/2022    MCV 84 03/21/2022     03/21/2022       Lab Results   Component Value Date     03/26/2014    SODIUM 144 03/21/2022    K 4 1 03/21/2022     03/21/2022    CO2 29 03/21/2022    ANIONGAP 8 03/26/2014    AGAP 8 03/21/2022    BUN 15 03/21/2022    CREATININE 0 89 03/21/2022    GLUC 119 03/21/2022    GLUF 151 (H) 02/21/2022    CALCIUM 8 9 03/21/2022    AST 13 03/21/2022    ALT 22 03/21/2022    ALKPHOS 83 03/21/2022    PROT 7 3 03/26/2014    TP 6 9 03/21/2022    BILITOT 0 3 03/26/2014    TBILI 0 41 03/21/2022    EGFR 67 03/21/2022       Lab Results   Component Value Date    CRP 8 6 (H) 05/25/2022       Lab Results   Component Value Date    LNE7WWLPEHIG 0 386 03/21/2022       No results found for: IRON, TIBC, FERRITIN    Radiology Results:   No results found

## 2022-06-13 ENCOUNTER — TELEPHONE (OUTPATIENT)
Dept: INTERNAL MEDICINE CLINIC | Facility: CLINIC | Age: 67
End: 2022-06-13

## 2022-06-13 DIAGNOSIS — E11.9 TYPE 2 DIABETES MELLITUS WITHOUT COMPLICATION, WITHOUT LONG-TERM CURRENT USE OF INSULIN (HCC): Primary | ICD-10-CM

## 2022-06-13 NOTE — PROGRESS NOTES
[facility]  [place]    Recommendation: Consider prescribing a high-intensity statin such as atorvastatin to mitigate the risk of serious cardiovascular events  Patient with allergy to simvastatin listed  However, she was prescribed atorvastatin 40mg daily from 2019 - 2021  Reason for Documentation: Patient has been identified as having a history of Type 2 Diabetes, aged 43-69 years without ASCVD with LDL- C  mg/dL, and not currently being on statin therapy  The ADA Standards of Care recommend moderate-intensity statin therapy in patients with diabetes, or high-intensity statin therapy in those at higher risk (with 10-year ASCVD risk of 20% or greater)      The 10-year ASCVD risk score (Oziel Marion et al , 2013) is: 21 3%    Values used to calculate the score:      Age: 79 years      Sex: Female      Is Non- : No      Diabetic: Yes      Tobacco smoker: No      Systolic Blood Pressure: 541 mmHg      Is BP treated: Yes      HDL Cholesterol: 58 mg/dL      Total Cholesterol: 188 mg/dL    Previous statin trial: atorvastatin    Lab Results   Component Value Date    CHOLESTEROL 188 02/21/2022    CHOLESTEROL 147 10/09/2020    CHOLESTEROL 196 06/29/2020     Lab Results   Component Value Date    HDL 58 02/21/2022    HDL 59 10/09/2020    HDL 61 06/29/2020     Lab Results   Component Value Date    TRIG 110 02/21/2022    TRIG 105 10/09/2020    TRIG 119 06/29/2020     No results found for: [place]  Lab Results   Component Value Date    LDLCALC 108 (H) 02/21/2022    LDLCALC 67 10/09/2020    LDLCALC 111 (H) 06/29/2020     No results found for: LDLDIRECT    Lab Results   Component Value Date    ALT 22 03/21/2022    AST 13 03/21/2022    GGT 22 10/09/2020    ALKPHOS 83 03/21/2022    BILITOT 0 3 03/26/2014     Reason For [address] Pharmacist    Demographics  Interaction Method: Chart Review (EMR)  Type of Intervention: New    Topic(s) Addressed  Statin Use (SUPD)    Intervention(s) Made    Pharmacologic:    -- Medication Initiation    Non-Pharmacologic:    -- Preventive care gap closure recommendation    Tool(s) Used  Payer Portal    Time Spent:     Time Spent in Care Coordination: 15 minutes    Recommendations  Recipient: Provider  Outcome:  All Accepted

## 2022-06-14 ENCOUNTER — OFFICE VISIT (OUTPATIENT)
Dept: INTERNAL MEDICINE CLINIC | Facility: CLINIC | Age: 67
End: 2022-06-14
Payer: MEDICARE

## 2022-06-14 VITALS
RESPIRATION RATE: 18 BRPM | SYSTOLIC BLOOD PRESSURE: 124 MMHG | WEIGHT: 206.6 LBS | TEMPERATURE: 97.6 F | OXYGEN SATURATION: 98 % | BODY MASS INDEX: 38.02 KG/M2 | HEART RATE: 66 BPM | HEIGHT: 62 IN | DIASTOLIC BLOOD PRESSURE: 70 MMHG

## 2022-06-14 DIAGNOSIS — F33.9 DEPRESSION, RECURRENT (HCC): ICD-10-CM

## 2022-06-14 DIAGNOSIS — E11.65 UNCONTROLLED TYPE 2 DIABETES MELLITUS WITH HYPERGLYCEMIA (HCC): ICD-10-CM

## 2022-06-14 DIAGNOSIS — J45.21 MILD INTERMITTENT ASTHMA WITH ACUTE EXACERBATION: ICD-10-CM

## 2022-06-14 DIAGNOSIS — E03.9 ACQUIRED HYPOTHYROIDISM: ICD-10-CM

## 2022-06-14 DIAGNOSIS — I10 ESSENTIAL HYPERTENSION: Primary | ICD-10-CM

## 2022-06-14 DIAGNOSIS — E78.00 PURE HYPERCHOLESTEROLEMIA: ICD-10-CM

## 2022-06-14 DIAGNOSIS — K22.2 LOWER ESOPHAGEAL RING (SCHATZKI): ICD-10-CM

## 2022-06-14 PROCEDURE — G0439 PPPS, SUBSEQ VISIT: HCPCS | Performed by: INTERNAL MEDICINE

## 2022-06-14 PROCEDURE — 99214 OFFICE O/P EST MOD 30 MIN: CPT | Performed by: INTERNAL MEDICINE

## 2022-06-14 RX ORDER — LANCETS 33 GAUGE
EACH MISCELLANEOUS
Qty: 200 EACH | Refills: 3 | Status: SHIPPED | OUTPATIENT
Start: 2022-06-14

## 2022-06-14 RX ORDER — BLOOD SUGAR DIAGNOSTIC
STRIP MISCELLANEOUS
Qty: 200 EACH | Refills: 3 | Status: SHIPPED | OUTPATIENT
Start: 2022-06-14

## 2022-06-14 RX ORDER — ATORVASTATIN CALCIUM 20 MG/1
20 TABLET, FILM COATED ORAL DAILY
Qty: 30 TABLET | Refills: 5 | Status: SHIPPED | OUTPATIENT
Start: 2022-06-14 | End: 2022-07-14 | Stop reason: SDUPTHER

## 2022-06-14 RX ORDER — BLOOD-GLUCOSE METER
KIT MISCELLANEOUS
Qty: 1 KIT | Refills: 0 | Status: SHIPPED | OUTPATIENT
Start: 2022-06-14

## 2022-06-14 NOTE — PROGRESS NOTES
Assessment/Plan:      Clinical pharmacologist review the chart recommended atorvastatin 40 mg for cardiovascular protection she was allergic to simvastatin will discuss that in this office visit    Atorvastatin 20 mg per day she tolerated before without any problems and will check liver function studies  If he tolerates that and her lipids are at goal with all have to increase to 40 mg    Blood pressure is very well control no chest palpitation shortness of breath she is on the following medications  Losartan 100 mg  Metoprolol succinate 50 mg daily    Diabetes she is on the following medications  Metformin a g twice a day  Glimepiride being 4 mg prior to breakfast  Tresiba 25 units daily a m  For asthma she is in remission she is taking Symbicort 160/4 52 puffs twice a day and she takes the albuterol inhaler and she has the mini neb treatments with albuterol at home          No problem-specific Assessment & Plan notes found for this encounter  Diagnoses and all orders for this visit:    Essential hypertension    Acquired hypothyroidism    Uncontrolled type 2 diabetes mellitus with hyperglycemia ([address] )    Lower esophageal ring ([place])    Mild intermittent asthma with acute exacerbation    Pure hypercholesterolemia    Depression, recurrent ([facility])          Subjective:      Patient ID: Doe Avery is a 79 y o  female  No chief complaint on file          Current Outpatient Medications:     albuterol (2 5 mg/3 mL) 0 083 % nebulizer solution, Take 1 vial (2 5 mg total) by nebulization every 6 (six) hours as needed for wheezing or shortness of breath, Disp: 60 vial, Rfl: 5    albuterol (PROVENTIL HFA,VENTOLIN HFA) 90 mcg/act inhaler, Inhale 2 puffs every 6 (six) hours as needed for wheezing, Disp: , Rfl:     Alum Hydroxide-Mag Carbonate (GAVISCON PO), Take 1 Dose by mouth as needed (Patient not taking: Reported on 5/26/2022), Disp: , Rfl:     aspirin 81 mg chewable tablet, Chew 81 mg daily, Disp: , Rfl:     BD Pen Needle Berenice 2nd Gen 32G X 4 MM MISC, USE TWICE A DAY, Disp: 100 each, Rfl: 1    Blood Glucose Monitoring Suppl (ONE TOUCH ULTRA 2) w/Device KIT, Test blood sugar 3 times daily, Disp: 1 each, Rfl: 0    budesonide-formoterol (SYMBICORT) 160-4 5 mcg/act inhaler, Inhale 2 puffs 2 (two) times a day as needed Rinse mouth after use , Disp: , Rfl:     clonazePAM (KlonoPIN) 0 125 mg disintegrating tablet, One p r n  as needed for anxiety, Disp: 30 tablet, Rfl: 1    docusate sodium (COLACE) 100 mg capsule, Take 1 capsule (100 mg total) by mouth 2 (two) times a day, Disp: 60 capsule, Rfl: 0    escitalopram (Lexapro) 10 mg tablet, Take 1 tablet (10 mg total) by mouth daily, Disp: 30 tablet, Rfl: 2    ezetimibe (ZETIA) 10 mg tablet, Take 1 tablet (10 mg total) by mouth daily, Disp: 90 tablet, Rfl: 0    fluticasone (FLONASE) 50 mcg/act nasal spray, 1 spray into each nostril daily as needed  (Patient not taking: Reported on 5/26/2022), Disp: , Rfl:     furosemide (LASIX) 20 mg tablet, Take 20 mg by mouth 2 (two) times a day as needed (Patient not taking: Reported on 5/26/2022), Disp: , Rfl:     glimepiride (AMARYL) 4 mg tablet, Take 1 tablet (4 mg total) by mouth daily with breakfast, Disp: 90 tablet, Rfl: 0    glucose blood test strip, Prior to each meal, Disp: 300 each, Rfl: 1    hydrocortisone 2 5 % cream, Apply topically 2 (two) times a day (Patient not taking: Reported on 5/26/2022), Disp: 30 g, Rfl: 0    Lancets (ONETOUCH DELICA PLUS [id number]) MISC, TEST 3 TIMES, Disp: 100 each, Rfl: 1    levothyroxine 150 mcg tablet, Take 1 tablet (150 mcg total) by mouth daily, Disp: 90 tablet, Rfl: 0    losartan (COZAAR) 100 MG tablet, Take 1 tablet (100 mg total) by mouth daily, Disp: 90 tablet, Rfl: 1    metFORMIN (GLUCOPHAGE) 1000 MG tablet, Take 1 tablet (1,000 mg total) by mouth 2 (two) times a day with meals, Disp: 180 tablet, Rfl: 0    metoprolol succinate (Toprol XL) 50 mg 24 hr tablet, Take 1 tablet (50 mg total) by mouth daily, Disp: 30 tablet, Rfl: 5    metoprolol succinate (TOPROL-XL) 25 mg 24 hr tablet, take 1 by mouth once daily, Disp: 90 tablet, Rfl: 1    montelukast (SINGULAIR) 10 mg tablet, Take 1 tablet (10 mg total) by mouth daily (Patient not taking: Reported on 5/26/2022), Disp: 90 tablet, Rfl: 1    omeprazole (PriLOSEC) 20 mg delayed release capsule, Take 1 capsule (20 mg total) by mouth daily, Disp: 90 capsule, Rfl: 3    spironolactone (ALDACTONE) 25 mg tablet, Take 1 tablet (25 mg total) by mouth daily, Disp: 90 tablet, Rfl: 1    Tresiba FlexTouch 100 units/mL injection pen, inject 25 units subcutaneously once daily as directed, Disp: 15 mL, Rfl: 5    VITAMIN D PO, Take by mouth, Disp: , Rfl:     HPI    The following portions of the patient's history were reviewed and updated as appropriate: allergies, current medications, past family history, past medical history, past social history, past surgical history and problem list     Review of Systems   Constitutional: Negative  Negative for activity change, appetite change, fatigue, fever and unexpected weight change  HENT: Negative for congestion, ear pain, hearing loss, mouth sores, postnasal drip, rhinorrhea, sore throat, trouble swallowing and voice change  Eyes: Negative for pain, redness and visual disturbance  Respiratory: Negative for cough, chest tightness, shortness of breath and wheezing  Cardiovascular: Negative for chest pain, palpitations and leg swelling  Gastrointestinal: Negative for abdominal distention, abdominal pain, blood in stool, constipation, diarrhea and nausea  Endocrine: Negative for cold intolerance, heat intolerance, polydipsia, polyphagia and polyuria  Genitourinary: Negative for difficulty urinating, dysuria, flank pain, frequency, hematuria and urgency  Musculoskeletal: Negative for arthralgias, back pain, gait problem, joint swelling and myalgias     Skin: Negative for color change and pallor  Neurological: Negative for dizziness, tremors, seizures, syncope, weakness, numbness and headaches  Hematological: Negative for adenopathy  Does not bruise/bleed easily  Psychiatric/Behavioral: Negative  Negative for sleep disturbance  The patient is not nervous/anxious  Objective:    Diabetic Foot Exam    Patient's shoes and socks removed  Right Foot/Ankle   Right Foot Inspection  Skin Exam: skin normal  Skin not intact, no dry skin, no warmth, no callus, no erythema, no maceration, no abnormal color, no pre-ulcer, no ulcer and no callus  Toe Exam: ROM and strength within normal limits  No swelling, no tenderness, erythema and  no right toe deformity    Sensory   Vibration: intact  Proprioception: intact  Monofilament testing: intact    Vascular  The right DP pulse is 2+  The right PT pulse is 2+  Left Foot/Ankle  Left Foot Inspection  Skin Exam: skin normal  Skin not intact, no dry skin, no warmth, no erythema, no maceration, normal color, no pre-ulcer, no ulcer and no callus  Toe Exam: ROM and strength within normal limits  No swelling, no tenderness, no erythema and no left toe deformity  Sensory   Vibration: intact  Proprioception: intact  Monofilament testing: intact    Vascular  The left DP pulse is 2+  The left PT pulse is 2+  Assign Risk Category  No deformity present  No loss of protective sensation  No weak pulses  Risk: 0      There were no vitals taken for this visit  Physical Exam  Vitals and nursing note reviewed  Constitutional:       Appearance: She is well-developed  HENT:      Head: Normocephalic  Right Ear: External ear normal       Left Ear: External ear normal       Nose: Nose normal       Mouth/Throat:      Pharynx: No oropharyngeal exudate  Eyes:      Conjunctiva/sclera: Conjunctivae normal       Pupils: Pupils are equal, round, and reactive to light  Neck:      Thyroid: No thyromegaly     Cardiovascular:      Rate and Rhythm: Normal rate and regular rhythm  Pulses: no weak pulses          Dorsalis pedis pulses are 2+ on the right side and 2+ on the left side  Posterior tibial pulses are 2+ on the right side and 2+ on the left side  Heart sounds: Normal heart sounds  No murmur heard  No friction rub  No gallop  Comments: S1-S2 regular rhythm  Extremities no edema calf tenderness  Pulmonary:      Effort: Pulmonary effort is normal  No respiratory distress  Breath sounds: Normal breath sounds  No wheezing or rales  Comments: Lungs are clear no wheezing rales or rhonchi  Abdominal:      General: Bowel sounds are normal  There is no distension  Palpations: Abdomen is soft  There is no mass  Tenderness: There is no abdominal tenderness  There is no guarding or rebound  Comments: Abdomen soft nontender   Musculoskeletal:         General: Normal range of motion  Cervical back: Normal range of motion and neck supple  Feet:      Right foot:      Skin integrity: No ulcer, skin breakdown, erythema, warmth, callus or dry skin  Left foot:      Skin integrity: No ulcer, skin breakdown, erythema, warmth, callus or dry skin  Lymphadenopathy:      Cervical: No cervical adenopathy  Skin:     General: Skin is warm and dry  Neurological:      Mental Status: She is alert and oriented to person, place, and time     Psychiatric:         Behavior: Behavior normal          Judgment: Judgment normal

## 2022-06-14 NOTE — PATIENT INSTRUCTIONS
Medicare Preventive Visit Patient Instructions  Thank you for completing your Welcome to Medicare Visit or Medicare Annual Wellness Visit today  Your next wellness visit will be due in one year (6/15/2023)  The screening/preventive services that you may require over the next 5-10 years are detailed below  Some tests may not apply to you based off risk factors and/or age  Screening tests ordered at today's visit but not completed yet may show as past due  Also, please note that scanned in results may not display below  Preventive Screenings:  Service Recommendations Previous Testing/Comments   Colorectal Cancer Screening  * Colonoscopy    * Fecal Occult Blood Test (FOBT)/Fecal Immunochemical Test (FIT)  * Fecal DNA/Cologuard Test  * Flexible Sigmoidoscopy Age: 54-65 years old   Colonoscopy: every 10 years (may be performed more frequently if at higher risk)  OR  FOBT/FIT: every 1 year  OR  Cologuard: every 3 years  OR  Sigmoidoscopy: every 5 years  Screening may be recommended earlier than age 48 if at higher risk for colorectal cancer  Also, an individualized decision between you and your healthcare provider will decide whether screening between the ages of 74-80 would be appropriate  Colonoscopy: 02/09/2021  FOBT/FIT: Not on file  Cologuard: Not on file  Sigmoidoscopy: Not on file    Screening Current     Breast Cancer Screening Age: 36 years old  Frequency: every 1-2 years  Not required if history of left and right mastectomy Mammogram: 03/25/2022    Screening Current   Cervical Cancer Screening Between the ages of 21-29, pap smear recommended once every 3 years  Between the ages of 33-67, can perform pap smear with HPV co-testing every 5 years     Recommendations may differ for women with a history of total hysterectomy, cervical cancer, or abnormal pap smears in past  Pap Smear: 12/23/2021    Screening Not Indicated   Hepatitis C Screening Once for adults born between 1945 and 1965  More frequently in patients at high risk for Hepatitis C Hep C Antibody: 01/28/2021    Screening Current   Diabetes Screening 1-2 times per year if you're at risk for diabetes or have pre-diabetes Fasting glucose: 151 mg/dL   A1C: 7 3 %    Screening Not Indicated  History Diabetes   Cholesterol Screening Once every 5 years if you don't have a lipid disorder  May order more often based on risk factors  Lipid panel: 02/21/2022    Screening Not Indicated  History Lipid Disorder     Other Preventive Screenings Covered by Medicare:  Abdominal Aortic Aneurysm (AAA) Screening: covered once if your at risk  You're considered to be at risk if you have a family history of AAA  Lung Cancer Screening: covers low dose CT scan once per year if you meet all of the following conditions: (1) Age 50-69; (2) No signs or symptoms of lung cancer; (3) Current smoker or have quit smoking within the last 15 years; (4) You have a tobacco smoking history of at least 30 pack years (packs per day multiplied by number of years you smoked); (5) You get a written order from a healthcare provider  Glaucoma Screening: covered annually if you're considered high risk: (1) You have diabetes OR (2) Family history of glaucoma OR (3)  aged 48 and older OR (3)  American aged 72 and older  Osteoporosis Screening: covered every 2 years if you meet one of the following conditions: (1) You're estrogen deficient and at risk for osteoporosis based off medical history and other findings; (2) Have a vertebral abnormality; (3) On glucocorticoid therapy for more than 3 months; (4) Have primary hyperparathyroidism; (5) On osteoporosis medications and need to assess response to drug therapy  Last bone density test (DXA Scan): 07/07/2021  HIV Screening: covered annually if you're between the age of 12-76  Also covered annually if you are younger than 13 and older than 72 with risk factors for HIV infection   For pregnant patients, it is covered up to 3 times per pregnancy  Immunizations:  Immunization Recommendations   Influenza Vaccine Annual influenza vaccination during flu season is recommended for all persons aged >= 6 months who do not have contraindications   Pneumococcal Vaccine (Prevnar and Pneumovax)  * Prevnar = PCV13  * Pneumovax = PPSV23   Adults 25-60 years old: 1-3 doses may be recommended based on certain risk factors  Adults 72 years old: Prevnar (PCV13) vaccine recommended followed by Pneumovax (PPSV23) vaccine  If already received PPSV23 since turning 65, then PCV13 recommended at least one year after PPSV23 dose  Hepatitis B Vaccine 3 dose series if at intermediate or high risk (ex: diabetes, end stage renal disease, liver disease)   Tetanus (Td) Vaccine - COST NOT COVERED BY MEDICARE PART B Following completion of primary series, a booster dose should be given every 10 years to maintain immunity against tetanus  Td may also be given as tetanus wound prophylaxis  Tdap Vaccine - COST NOT COVERED BY MEDICARE PART B Recommended at least once for all adults  For pregnant patients, recommended with each pregnancy  Shingles Vaccine (Shingrix) - COST NOT COVERED BY MEDICARE PART B  2 shot series recommended in those aged 48 and above     Health Maintenance Due:      Topic Date Due    Breast Cancer Screening: Mammogram  03/25/2023    DXA SCAN  07/07/2023    Colorectal Cancer Screening  02/09/2024    Cervical Cancer Screening  12/23/2024    Hepatitis C Screening  Completed     Immunizations Due:      Topic Date Due    DTaP,Tdap,and Td Vaccines (1 - Tdap) Never done    COVID-19 Vaccine (4 - Booster for Moderna series) 04/01/2022    Influenza Vaccine (Season Ended) 09/01/2022     Advance Directives   What are advance directives? Advance directives are legal documents that state your wishes and plans for medical care  These plans are made ahead of time in case you lose your ability to make decisions for yourself   Advance directives can apply to any medical decision, such as the treatments you want, and if you want to donate organs  What are the types of advance directives? There are many types of advance directives, and each state has rules about how to use them  You may choose a combination of any of the following:  Living will: This is a written record of the treatment you want  You can also choose which treatments you do not want, which to limit, and which to stop at a certain time  This includes surgery, medicine, IV fluid, and tube feedings  Durable power of  for healthcare [facility]): This is a written record that states who you want to make healthcare choices for you when you are unable to make them for yourself  This person, called a proxy, is usually a family member or a friend  You may choose more than 1 proxy  Do not resuscitate (DNR) order:  A DNR order is used in case your heart stops beating or you stop breathing  It is a request not to have certain forms of treatment, such as CPR  A DNR order may be included in other types of advance directives  Medical directive: This covers the care that you want if you are in a coma, near death, or unable to make decisions for yourself  You can list the treatments you want for each condition  Treatment may include pain medicine, surgery, blood transfusions, dialysis, IV or tube feedings, and a ventilator (breathing machine)  Values history: This document has questions about your views, beliefs, and how you feel and think about life  This information can help others choose the care that you would choose  Why are advance directives important? An advance directive helps you control your care  Although spoken wishes may be used, it is better to have your wishes written down  Spoken wishes can be misunderstood, or not followed  Treatments may be given even if you do not want them  An advance directive may make it easier for your family to make difficult choices about your care     Weight Management   Why it is important to manage your weight:  Being overweight increases your risk of health conditions such as heart disease, high blood pressure, type 2 diabetes, and certain types of cancer  It can also increase your risk for osteoarthritis, sleep apnea, and other respiratory problems  Aim for a slow, steady weight loss  Even a small amount of weight loss can lower your risk of health problems  How to lose weight safely:  A safe and healthy way to lose weight is to eat fewer calories and get regular exercise  You can lose up about 1 pound a week by decreasing the number of calories you eat by 500 calories each day  Healthy meal plan for weight management:  A healthy meal plan includes a variety of foods, contains fewer calories, and helps you stay healthy  A healthy meal plan includes the following:  Eat whole-grain foods more often  A healthy meal plan should contain fiber  Fiber is the part of grains, fruits, and vegetables that is not broken down by your body  Whole-grain foods are healthy and provide extra fiber in your diet  Some examples of whole-grain foods are whole-wheat breads and pastas, oatmeal, brown rice, and bulgur  Eat a variety of vegetables every day  Include dark, leafy greens such as spinach, kale, tal greens, and mustard greens  Eat yellow and orange vegetables such as carrots, sweet potatoes, and winter squash  Eat a variety of fruits every day  Choose fresh or canned fruit (canned in its own juice or light syrup) instead of juice  Fruit juice has very little or no fiber  Eat low-fat dairy foods  Drink fat-free (skim) milk or 1% milk  Eat fat-free yogurt and low-fat cottage cheese  Try low-fat cheeses such as mozzarella and other reduced-fat cheeses  Choose meat and other protein foods that are low in fat  Choose beans or other legumes such as split peas or lentils  Choose fish, skinless poultry (chicken or turkey), or lean cuts of red meat (beef or pork)   Before you cook meat or poultry, cut off any visible fat  Use less fat and oil  Try baking foods instead of frying them  Add less fat, such as margarine, sour cream, regular salad dressing and mayonnaise to foods  Eat fewer high-fat foods  Some examples of high-fat foods include french fries, doughnuts, ice cream, and cakes  Eat fewer sweets  Limit foods and drinks that are high in sugar  This includes candy, cookies, regular soda, and sweetened drinks  Exercise:  Exercise at least 30 minutes per day on most days of the week  Some examples of exercise include walking, biking, dancing, and swimming  You can also fit in more physical activity by taking the stairs instead of the elevator or parking farther away from stores  Ask your healthcare provider about the best exercise plan for you  © Copyright [employer] 2018 Information is for End User's use only and may not be sold, redistributed or otherwise used for commercial purposes  All illustrations and images included in CareNotes® are the copyrighted property of [employer]  or [facility] Isis Johnson     As per guidelines will going to start atorvastatin 20 mg per day will check a lipid profile when you come back    Not related is simvastatin that gave you liver function studies elevation

## 2022-06-14 NOTE — PROGRESS NOTES
Assessment and Plan:     Problem List Items Addressed This Visit        Digestive    Lower esophageal ring ([place])       Endocrine    Acquired hypothyroidism    Uncontrolled type 2 diabetes mellitus with hyperglycemia ([facility] [address] )       Respiratory    Mild intermittent asthma with acute exacerbation       Cardiovascular and Mediastinum    Essential hypertension - Primary       Other    Pure hypercholesterolemia    Depression, recurrent ([facility] )        BMI Counseling: Body mass index is 37 79 kg/m²  The BMI is above normal  Nutrition recommendations include decreasing portion sizes, encouraging healthy choices of fruits and vegetables, decreasing fast food intake, consuming healthier snacks, limiting drinks that contain sugar, moderation in carbohydrate intake, increasing intake of lean protein, reducing intake of saturated and trans fat and reducing intake of cholesterol  Exercise recommendations include moderate physical activity 150 minutes/week  No pharmacotherapy was ordered  Patient referred to PCP  Rationale for BMI follow-up plan is due to patient being overweight or obese  Preventive health issues were discussed with patient, and age appropriate screening tests were ordered as noted in patient's After Visit Summary  Personalized health advice and appropriate referrals for health education or preventive services given if needed, as noted in patient's After Visit Summary       History of Present Illness:     Patient presents for a Medicare Wellness Visit    HPI   Patient Care Team:  Clary Waters MD as PCP - General (Internal Medicine)  Clary Waters MD as PCP - [address] [place], [place] [id number] [facility] ([facility])     Review of Systems:     Review of Systems     Problem List:     Patient Active Problem List   Diagnosis    Acquired hypothyroidism    Essential hypertension    Mild intermittent asthma with acute exacerbation    Pure hypercholesterolemia    Atypical chest pain    Influenza A    Lung infiltrate    Severe sepsis ([facility] [address] )    Irritant contact dermatitis    Depression, recurrent ([facility] [address] )    Uncontrolled type 2 diabetes mellitus with hyperglycemia (HCC)    Spondylolisthesis of lumbar region    Preop examination    History of orthopnea    Obesity (BMI 35 0-39 9 without comorbidity)    Plantar fasciitis of left foot    Thyroid nodule    Lower esophageal ring ([place])    Continuous opioid dependence (HCC)      Past Medical and Surgical History:     Past Medical History:   Diagnosis Date    Abdominal pain     Asthma     Back pain     occas due to "disc problem"    Balance problems     "right side"    Cholelithiasis     lap aviva today 3/10/2021    Colon polyp     Cracked tooth     3    Depression     Diabetes mellitus ([facility] [address] )     Difficulty swallowing     "food feels like stuck sometimes'    Exercise involving walking     steps and cleaning    Fatty liver     GERD (gastroesophageal reflux disease)     Hiatal hernia     History of pneumonia     History of stomach ulcers     Hyperlipidemia     Hypertension     Obesity     Pneumonia     Refusal of blood transfusions as patient is Anabaptism     Risk for falls     Stroke [facility])     Uses Kazakh as primary spoken language     Wears glasses      Past Surgical History:   Procedure Laterality Date    CHOLECYSTECTOMY      COLONOSCOPY  02/09/2021    4 polyps tubular adenomas by Dr Urszula Lizarraga EGD  03/31/2021    nonobstructing Schatzki's ring dilated with 47 [place] Hernandez, sliding hiatal hernia otherwise normal   Biopsies stomach  negative for H  pylori biopsy esophagus negative for eosinophilic esophagitis by Dr Prabhakar Brown EGD AND COLONOSCOPY  02/09/2021    3 YEAR RECOMMENDED = COLONO    MT LAP,CHOLECYSTECTOMY N/A 3/10/2021    Procedure: CHOLECYSTECTOMY LAPAROSCOPIC W/ ROBOTICS;  Surgeon: Griffin Funes MD;  Location: [facility];  Service: General    ROTATOR CUFF REPAIR Bilateral     screws implanted"    UPPER GASTROINTESTINAL ENDOSCOPY 02/09/2021    4 cm hiatal hernia without Alejandro lesions  Possibly shortened esophagus    Biopsy of the stomach negative for H  pylori, biopsy of the EG junction negative for Kaplan's, biopsied duodenum negative for celiac by Dr Steve Leroy      Family History:     Family History   Problem Relation Age of Onset    Diabetes Mother     Hypertension Mother     Hypertension Father     No Known Problems Sister     No Known Problems Daughter     No Known Problems Maternal Grandmother     No Known Problems Maternal Grandfather     No Known Problems Paternal Grandmother     No Known Problems Paternal Grandfather     No Known Problems Son     No Known Problems Son     No Known Problems Daughter     No Known Problems Daughter     Colon cancer Brother 71      Social History:     Social History     Socioeconomic History    Marital status: /Civil Union     Spouse name: None    Number of children: None    Years of education: None    Highest education level: None   Occupational History    Occupation: House wife   Tobacco Use    Smoking status: Never Smoker    Smokeless tobacco: Never Used   Vaping Use    Vaping Use: Never used   Substance and Sexual Activity    Alcohol use: Never    Drug use: Never    Sexual activity: Not Currently   Other Topics Concern    None   Social History Narrative    None     Social Determinants of Health     Financial Resource Strain: Not on file   Food Insecurity: Not on file   Transportation Needs: Not on file   Physical Activity: Not on file   Stress: Not on file   Social Connections: Not on file   Intimate Partner Violence: Not on file   Housing Stability: Not on file      Medications and Allergies:     Current Outpatient Medications   Medication Sig Dispense Refill    albuterol (2 5 mg/3 mL) 0 083 % nebulizer solution Take 1 vial (2 5 mg total) by nebulization every 6 (six) hours as needed for wheezing or shortness of breath 60 vial 5    albuterol (PROVENTIL HFA,VENTOLIN HFA) 90 mcg/act inhaler Inhale 2 puffs every 6 (six) hours as needed for wheezing      aspirin 81 mg chewable tablet Chew 81 mg daily      BD Pen Needle Berenice 2nd Gen 32G X 4 MM MISC USE TWICE A  each 1    Blood Glucose Monitoring Suppl (ONE TOUCH ULTRA 2) w/Device KIT Test blood sugar 3 times daily 1 each 0    budesonide-formoterol (SYMBICORT) 160-4 5 mcg/act inhaler Inhale 2 puffs 2 (two) times a day as needed Rinse mouth after use        clonazePAM (KlonoPIN) 0 125 mg disintegrating tablet One p r n  as needed for anxiety 30 tablet 1    escitalopram (Lexapro) 10 mg tablet Take 1 tablet (10 mg total) by mouth daily 30 tablet 2    ezetimibe (ZETIA) 10 mg tablet Take 1 tablet (10 mg total) by mouth daily 90 tablet 0    furosemide (LASIX) 20 mg tablet Take 20 mg by mouth 2 (two) times a day as needed      glimepiride (AMARYL) 4 mg tablet Take 1 tablet (4 mg total) by mouth daily with breakfast 90 tablet 0    glucose blood test strip Prior to each meal 300 each 1    Lancets (ONETOUCH DELICA PLUS [id number]) MISC TEST 3 TIMES 100 each 1    levothyroxine 150 mcg tablet Take 1 tablet (150 mcg total) by mouth daily 90 tablet 0    losartan (COZAAR) 100 MG tablet Take 1 tablet (100 mg total) by mouth daily 90 tablet 1    metFORMIN (GLUCOPHAGE) 1000 MG tablet Take 1 tablet (1,000 mg total) by mouth 2 (two) times a day with meals 180 tablet 0    metoprolol succinate (Toprol XL) 50 mg 24 hr tablet Take 1 tablet (50 mg total) by mouth daily 30 tablet 5    metoprolol succinate (TOPROL-XL) 25 mg 24 hr tablet take 1 by mouth once daily 90 tablet 1    montelukast (SINGULAIR) 10 mg tablet Take 1 tablet (10 mg total) by mouth daily 90 tablet 1    omeprazole (PriLOSEC) 20 mg delayed release capsule Take 1 capsule (20 mg total) by mouth daily 90 capsule 3    spironolactone (ALDACTONE) 25 mg tablet Take 1 tablet (25 mg total) by mouth daily 90 tablet 1    Tresiba FlexTouch 100 units/mL injection pen inject 25 units subcutaneously once daily as directed 15 mL 5    VITAMIN D PO Take by mouth      Alum Hydroxide-Mag Carbonate (GAVISCON PO) Take 1 Dose by mouth as needed (Patient not taking: No sig reported)      docusate sodium (COLACE) 100 mg capsule Take 1 capsule (100 mg total) by mouth 2 (two) times a day (Patient not taking: Reported on 6/14/2022) 60 capsule 0    fluticasone (FLONASE) 50 mcg/act nasal spray 1 spray into each nostril daily as needed  (Patient not taking: No sig reported)      hydrocortisone 2 5 % cream Apply topically 2 (two) times a day (Patient not taking: No sig reported) 30 g 0     No current facility-administered medications for this visit  Allergies   Allergen Reactions    Etodolac Shortness Of Breath    Iodinated Diagnostic Agents Shortness Of Breath and Wheezing    Simvastatin Other (See Comments)     elevated liver function     Latex Rash    Morphine Palpitations    Penicillins Rash      Immunizations:     Immunization History   Administered Date(s) Administered    COVID-19 MODERNA VACC 0 5 ML IM 03/25/2021, 04/26/2021, 12/01/2021    Influenza, high dose seasonal 0 7 mL 11/19/2020    Influenza, seasonal, injectable 11/16/2010, 10/03/2012    Pneumococcal Conjugate 13-Valent 03/11/2020    Pneumococcal Polysaccharide PPV23 07/01/2021      Health Maintenance:         Topic Date Due    Breast Cancer Screening: Mammogram  03/25/2023    DXA SCAN  07/07/2023    Colorectal Cancer Screening  02/09/2024    Cervical Cancer Screening  12/23/2024    Hepatitis C Screening  Completed         Topic Date Due    DTaP,Tdap,and Td Vaccines (1 - Tdap) Never done    COVID-19 Vaccine (4 - Booster for Moderna series) 04/01/2022    Influenza Vaccine (Season Ended) 09/01/2022      Medicare Screening Tests and Risk Assessments:     Harriet Quevedo is here for her Subsequent Wellness visit  Health Risk Assessment:   Patient rates overall health as good   Patient feels that their physical health rating is same  Patient is satisfied with their life  Eyesight was rated as same  Hearing was rated as same  Patient feels that their emotional and mental health rating is same  Patients states they are sometimes angry  Patient states they are sometimes unusually tired/fatigued  Pain experienced in the last 7 days has been none  Patient states that she has experienced no weight loss or gain in last 6 months  Follow-up with podiatry concerning the diabetic foot exam we did that today    Depression Screening:   PHQ-9 Score: 2      Fall Risk Screening: In the past year, patient has experienced: no history of falling in past year      Urinary Incontinence Screening:   Patient has not leaked urine accidently in the last six months  Home Safety:  Patient does not have trouble with stairs inside or outside of their home  Patient has no working smoke alarms and has working carbon monoxide detector  Home safety hazards include: loose rugs on the floor and unsecured electrical cords  Nutrition:   Current diet is Regular and Limited junk food  Medications:   Patient is currently taking over-the-counter supplements  OTC medications include: see medication list  Patient is able to manage medications  Activities of Daily Living (ADLs)/Instrumental Activities of Daily Living (IADLs):   Walk and transfer into and out of bed and chair?: Yes  Dress and groom yourself?: Yes    Bathe or shower yourself?: Yes    Feed yourself? Yes  Do your laundry/housekeeping?: Yes  Manage your money, pay your bills and track your expenses?: Yes  Make your own meals?: Yes    Do your own shopping?: Yes    Previous Hospitalizations:   Any hospitalizations or ED visits within the last 12 months?: No      Advance Care Planning:   Living will: No    Durable POA for healthcare:  Yes    Advanced directive: No    Advanced directive counseling given: Yes    Five wishes given: Yes    End of Life Decisions reviewed with patient: Yes Provider agrees with end of life decisions: Yes      Comments: Advanced directive planning information given to the patient    PREVENTIVE SCREENINGS      Cardiovascular Screening:    General: Screening Not Indicated and History Lipid Disorder      Diabetes Screening:     General: Screening Not Indicated and History Diabetes      Colorectal Cancer Screening:     General: Screening Current      Breast Cancer Screening:     General: Screening Current      Cervical Cancer Screening:    General: Screening Not Indicated      Osteoporosis Screening:    General: Screening Not Indicated and History Osteoporosis      Abdominal Aortic Aneurysm (AAA) Screening:        General: Screening Not Indicated      Lung Cancer Screening:     General: Screening Not Indicated      Hepatitis C Screening:    General: Screening Current    Screening, Brief Intervention, and Referral to Treatment (SBIRT)    Screening  Typical number of drinks in a day: 0  Typical number of drinks in a week: 0  Interpretation: Low risk drinking behavior  Single Item Drug Screening:  How often have you used an illegal drug (including marijuana) or a prescription medication for non-medical reasons in the past year? never    Single Item Drug Screen Score: 0  Interpretation: Negative screen for possible drug use disorder    Other Counseling Topics:   Car/seat belt/driving safety, skin self-exam, sunscreen and calcium and vitamin D intake and regular weightbearing exercise       No exam data present     Physical Exam:     Temp 97 6 °F (36 4 °C)   Ht 5' 2" (1 575 m)   Wt 93 7 kg (206 lb 9 6 oz)   BMI 37 79 kg/m²     Physical Exam     Sharlene Najera MD

## 2022-07-06 DIAGNOSIS — I10 ESSENTIAL HYPERTENSION: ICD-10-CM

## 2022-07-06 DIAGNOSIS — J45.20 MILD INTERMITTENT ASTHMA WITHOUT COMPLICATION: ICD-10-CM

## 2022-07-06 RX ORDER — LOSARTAN POTASSIUM 100 MG/1
100 TABLET ORAL DAILY
Qty: 90 TABLET | Refills: 0 | Status: SHIPPED | OUTPATIENT
Start: 2022-07-06 | End: 2022-10-21 | Stop reason: SDUPTHER

## 2022-07-06 RX ORDER — MONTELUKAST SODIUM 10 MG/1
10 TABLET ORAL DAILY
Qty: 90 TABLET | Refills: 0 | Status: SHIPPED | OUTPATIENT
Start: 2022-07-06 | End: 2022-10-21 | Stop reason: SDUPTHER

## 2022-07-14 DIAGNOSIS — E78.00 PURE HYPERCHOLESTEROLEMIA: ICD-10-CM

## 2022-07-14 RX ORDER — ATORVASTATIN CALCIUM 20 MG/1
20 TABLET, FILM COATED ORAL DAILY
Qty: 30 TABLET | Refills: 0 | Status: SHIPPED | OUTPATIENT
Start: 2022-07-14 | End: 2022-09-16 | Stop reason: SDUPTHER

## 2022-07-16 ENCOUNTER — APPOINTMENT (EMERGENCY)
Dept: CT IMAGING | Facility: HOSPITAL | Age: 67
End: 2022-07-16
Payer: MEDICARE

## 2022-07-16 ENCOUNTER — HOSPITAL ENCOUNTER (EMERGENCY)
Facility: HOSPITAL | Age: 67
Discharge: HOME | End: 2022-07-17
Attending: EMERGENCY MEDICINE | Admitting: EMERGENCY MEDICINE
Payer: MEDICARE

## 2022-07-16 DIAGNOSIS — K52.9 ENTERITIS: Primary | ICD-10-CM

## 2022-07-16 LAB
ALBUMIN SERPL BCP-MCNC: 3.6 G/DL (ref 3.5–5)
ALP SERPL-CCNC: 109 U/L (ref 46–116)
ALT SERPL W P-5'-P-CCNC: 31 U/L (ref 12–78)
ANION GAP SERPL CALCULATED.3IONS-SCNC: 9 MMOL/L (ref 4–13)
AST SERPL W P-5'-P-CCNC: 18 U/L (ref 5–45)
ATRIAL RATE: 102 BPM
BASOPHILS # BLD AUTO: 0.04 THOUSANDS/ΜL (ref 0–0.1)
BASOPHILS NFR BLD AUTO: 0 % (ref 0–1)
BILIRUB SERPL-MCNC: 0.37 MG/DL (ref 0.2–1)
BUN SERPL-MCNC: 16 MG/DL (ref 5–25)
CALCIUM SERPL-MCNC: 9.6 MG/DL (ref 8.3–10.1)
CHLORIDE SERPL-SCNC: 101 MMOL/L (ref 100–108)
CO2 SERPL-SCNC: 30 MMOL/L (ref 21–32)
CREAT SERPL-MCNC: 1.1 MG/DL (ref 0.6–1.3)
EOSINOPHIL # BLD AUTO: 0.01 THOUSAND/ΜL (ref 0–0.61)
EOSINOPHIL NFR BLD AUTO: 0 % (ref 0–6)
ERYTHROCYTE [DISTWIDTH] IN BLOOD BY AUTOMATED COUNT: 13.1 % (ref 11.6–15.1)
GFR SERPL CREATININE-BSD FRML MDRD: 52 ML/MIN/1.73SQ M
GLUCOSE SERPL-MCNC: 238 MG/DL (ref 65–140)
HCT VFR BLD AUTO: 40.7 % (ref 34.8–46.1)
HGB BLD-MCNC: 13.5 G/DL (ref 11.5–15.4)
IMM GRANULOCYTES # BLD AUTO: 0.04 THOUSAND/UL (ref 0–0.2)
IMM GRANULOCYTES NFR BLD AUTO: 0 % (ref 0–2)
LIPASE SERPL-CCNC: 53 U/L (ref 73–393)
LYMPHOCYTES # BLD AUTO: 1.67 THOUSANDS/ΜL (ref 0.6–4.47)
LYMPHOCYTES NFR BLD AUTO: 12 % (ref 14–44)
MCH RBC QN AUTO: 28.7 PG (ref 26.8–34.3)
MCHC RBC AUTO-ENTMCNC: 33.2 G/DL (ref 31.4–37.4)
MCV RBC AUTO: 86 FL (ref 82–98)
MONOCYTES # BLD AUTO: 0.41 THOUSAND/ΜL (ref 0.17–1.22)
MONOCYTES NFR BLD AUTO: 3 % (ref 4–12)
NEUTROPHILS # BLD AUTO: 11.35 THOUSANDS/ΜL (ref 1.85–7.62)
NEUTS SEG NFR BLD AUTO: 85 % (ref 43–75)
NRBC BLD AUTO-RTO: 0 /100 WBCS
P AXIS: 66 DEGREES
PLATELET # BLD AUTO: 331 THOUSANDS/UL (ref 149–390)
PMV BLD AUTO: 10.1 FL (ref 8.9–12.7)
POTASSIUM SERPL-SCNC: 4.4 MMOL/L (ref 3.5–5.3)
PR INTERVAL: 132 MS
PROT SERPL-MCNC: 8 G/DL (ref 6.4–8.2)
QRS AXIS: 61 DEGREES
QRSD INTERVAL: 84 MS
QT INTERVAL: 362 MS
QTC INTERVAL: 471 MS
RBC # BLD AUTO: 4.71 MILLION/UL (ref 3.81–5.12)
SODIUM SERPL-SCNC: 140 MMOL/L (ref 136–145)
T WAVE AXIS: 59 DEGREES
VENTRICULAR RATE: 102 BPM
WBC # BLD AUTO: 13.52 THOUSAND/UL (ref 4.31–10.16)

## 2022-07-16 PROCEDURE — 85025 COMPLETE CBC W/AUTO DIFF WBC: CPT | Performed by: EMERGENCY MEDICINE

## 2022-07-16 PROCEDURE — 80053 COMPREHEN METABOLIC PANEL: CPT | Performed by: EMERGENCY MEDICINE

## 2022-07-16 PROCEDURE — 93010 ELECTROCARDIOGRAM REPORT: CPT | Performed by: INTERNAL MEDICINE

## 2022-07-16 PROCEDURE — 99284 EMERGENCY DEPT VISIT MOD MDM: CPT | Performed by: EMERGENCY MEDICINE

## 2022-07-16 PROCEDURE — 93005 ELECTROCARDIOGRAM TRACING: CPT

## 2022-07-16 PROCEDURE — G1004 CDSM NDSC: HCPCS

## 2022-07-16 PROCEDURE — 74176 CT ABD & PELVIS W/O CONTRAST: CPT

## 2022-07-16 PROCEDURE — 83690 ASSAY OF LIPASE: CPT | Performed by: EMERGENCY MEDICINE

## 2022-07-16 PROCEDURE — 99284 EMERGENCY DEPT VISIT MOD MDM: CPT

## 2022-07-16 PROCEDURE — 36415 COLL VENOUS BLD VENIPUNCTURE: CPT

## 2022-07-16 RX ORDER — FAMOTIDINE 10 MG/ML
20 INJECTION, SOLUTION INTRAVENOUS ONCE
Status: COMPLETED | OUTPATIENT
Start: 2022-07-16 | End: 2022-07-17

## 2022-07-16 RX ORDER — ONDANSETRON 2 MG/ML
4 INJECTION INTRAMUSCULAR; INTRAVENOUS ONCE
Status: COMPLETED | OUTPATIENT
Start: 2022-07-16 | End: 2022-07-17

## 2022-07-17 VITALS
RESPIRATION RATE: 16 BRPM | OXYGEN SATURATION: 98 % | SYSTOLIC BLOOD PRESSURE: 185 MMHG | WEIGHT: 206.79 LBS | HEART RATE: 92 BPM | HEIGHT: 62 IN | BODY MASS INDEX: 38.05 KG/M2 | TEMPERATURE: 98.6 F | DIASTOLIC BLOOD PRESSURE: 86 MMHG

## 2022-07-17 LAB
CARDIAC TROPONIN I PNL SERPL HS: 3 NG/L
HOLD SPECIMEN: NORMAL

## 2022-07-17 PROCEDURE — 36415 COLL VENOUS BLD VENIPUNCTURE: CPT | Performed by: EMERGENCY MEDICINE

## 2022-07-17 PROCEDURE — 96361 HYDRATE IV INFUSION ADD-ON: CPT

## 2022-07-17 PROCEDURE — 84484 ASSAY OF TROPONIN QUANT: CPT | Performed by: EMERGENCY MEDICINE

## 2022-07-17 PROCEDURE — 96375 TX/PRO/DX INJ NEW DRUG ADDON: CPT

## 2022-07-17 PROCEDURE — 96374 THER/PROPH/DIAG INJ IV PUSH: CPT

## 2022-07-17 RX ORDER — ONDANSETRON 4 MG/1
4 TABLET, ORALLY DISINTEGRATING ORAL EVERY 6 HOURS PRN
Qty: 20 TABLET | Refills: 0 | Status: SHIPPED | OUTPATIENT
Start: 2022-07-17 | End: 2022-09-09 | Stop reason: ALTCHOICE

## 2022-07-17 RX ORDER — HYOSCYAMINE SULFATE 0.125 MG
0.12 TABLET ORAL EVERY 4 HOURS PRN
Qty: 30 TABLET | Refills: 0 | Status: SHIPPED | OUTPATIENT
Start: 2022-07-17 | End: 2022-09-09

## 2022-07-17 RX ORDER — FAMOTIDINE 20 MG/1
20 TABLET, FILM COATED ORAL 2 TIMES DAILY
Qty: 30 TABLET | Refills: 0 | Status: SHIPPED | OUTPATIENT
Start: 2022-07-17 | End: 2022-09-09 | Stop reason: ALTCHOICE

## 2022-07-17 RX ADMIN — SODIUM CHLORIDE 1000 ML: 0.9 INJECTION, SOLUTION INTRAVENOUS at 00:33

## 2022-07-17 RX ADMIN — FAMOTIDINE 20 MG: 10 INJECTION INTRAVENOUS at 00:35

## 2022-07-17 RX ADMIN — ONDANSETRON 4 MG: 2 INJECTION INTRAMUSCULAR; INTRAVENOUS at 00:35

## 2022-07-17 RX ADMIN — HYOSCYAMINE SULFATE 0.25 MG: 0.12 TABLET SUBLINGUAL at 01:40

## 2022-07-17 NOTE — ED NOTES
EKG at charge     [facility] [address] [place]  07/16/22 2120

## 2022-07-17 NOTE — DISCHARGE INSTRUCTIONS
Take the Pepcid twice daily for the next 5 days  Take the zofran as needed for nausea, this can be placed under the tongue to dissolve if you are vomiting  You can try the Levsin for additional pain  These papers include recommendations on a clear liquid diet, you should follow this for the next 24 hours, then slowly progress to a bland diet  Call your family doctor, you should be seen in the office to make sure your symptoms are improving  [place] el [employer] veces al día kady los próximos 5 días  [place] el zofran según sea necesario para las náuseas, puede colocarse debajo de la lengua para disolverlo si está vomitando  Puede probar el Levsin para el dolor adicional     Estos documentos incluyen recomendaciones sobre maria luz dieta de líquidos waqar, debe seguir esto kady las próximas 24 horas y luego progresar lentamente a maria luz Jennifer Lemme  [place] médico de reinier, debe verlo en el consultorio para asegurarse de que zohaib síntomas estén mejorando

## 2022-07-17 NOTE — ED PROVIDER NOTES
History  Chief Complaint   Patient presents with    Abdominal Pain     Epigastric pain today, nausea  Patient reports pain increased after eating  78 YO female presents with abdominal pain beginning to day  Significant other assists with Hx  States Pt had some leftover stew this afternoon, after which she developed epigastric pain which has been mostly constant, waxing and waning in severity, this has been twisting and cramping in quality, radiating diffusely, worse with eating  Pt denies similar in the past  She has had nausea without vomiting denies diarrhea or constipation, no known sick contacts  Pt denies CP/SOB/F/C/D/C, no dysuria, burning on urination or blood in urine  History provided by:  Patient and significant other   used: No    Abdominal Pain  Pain location:  Epigastric  Pain quality: cramping    Pain severity:  Moderate  Onset quality:  Gradual  Duration:  7 hours  Timing:  Constant  Progression:  Waxing and waning  Chronicity:  New  Context: eating    Relieved by:  Nothing  Worsened by:  Eating  Ineffective treatments:  None tried  Associated symptoms: nausea    Associated symptoms: no chest pain, no chills, no diarrhea, no dysuria, no fatigue, no fever, no shortness of breath and no vomiting        Prior to Admission Medications   Prescriptions Last Dose Informant Patient Reported? Taking? Alum Hydroxide-Mag Carbonate (GAVISCON PO)   Yes No   Sig: Take 1 Dose by mouth as needed   Patient not taking: No sig reported   BD Pen Needle Berenice 2nd Gen 32G X 4 MM MISC  Self No No   Sig: USE TWICE A DAY   Blood Glucose Monitoring Suppl (ONE TOUCH ULTRA 2) w/Device KIT  Self No No   Sig: Test blood sugar 3 times daily   Blood Glucose Monitoring Suppl (OneTouch Verio Reflect) w/Device KIT   No No   Sig: Check blood sugars twice daily  Please substitute with appropriate alternative as covered by patient's insurance   Dx: E11 65   Lancets ([address]) [address] [facility] Self No No   Sig: TEST 3 TIMES   OneTouch Delica Lancets 66G MISC   No No   Sig: Check blood sugars twice daily  Please substitute with appropriate alternative as covered by patient's insurance  Dx: E11 65   Adolfo Razo FlexTouch 100 units/mL injection pen   No No   Sig: inject 25 units subcutaneously once daily as directed   VITAMIN D PO   Yes No   Sig: Take by mouth   albuterol (2 5 mg/3 mL) 0 083 % nebulizer solution  Self No No   Sig: Take 1 vial (2 5 mg total) by nebulization every 6 (six) hours as needed for wheezing or shortness of breath   albuterol (PROVENTIL HFA,VENTOLIN HFA) 90 mcg/act inhaler  Self Yes No   Sig: Inhale 2 puffs every 6 (six) hours as needed for wheezing   aspirin 81 mg chewable tablet  Self Yes No   Sig: Chew 81 mg daily   atorvastatin (LIPITOR) 20 mg tablet   No No   Sig: Take 1 tablet (20 mg total) by mouth daily   budesonide-formoterol (SYMBICORT) 160-4 5 mcg/act inhaler  Self Yes No   Sig: Inhale 2 puffs 2 (two) times a day as needed Rinse mouth after use  clonazePAM (KlonoPIN) 0 125 mg disintegrating tablet   No No   Sig: One p r n  as needed for anxiety   docusate sodium (COLACE) 100 mg capsule   No No   Sig: Take 1 capsule (100 mg total) by mouth 2 (two) times a day   Patient not taking: Reported on 2022   escitalopram (Lexapro) 10 mg tablet   No No   Sig: Take 1 tablet (10 mg total) by mouth daily   ezetimibe (ZETIA) 10 mg tablet   No No   Sig: Take 1 tablet (10 mg total) by mouth daily   fluticasone (FLONASE) 50 mcg/act nasal spray   Yes No   Si spray into each nostril daily as needed    Patient not taking: No sig reported   furosemide (LASIX) 20 mg tablet   Yes No   Sig: Take 20 mg by mouth 2 (two) times a day as needed   glimepiride (AMARYL) 4 mg tablet   No No   Sig: Take 1 tablet (4 mg total) by mouth daily with breakfast   glucose blood (OneTouch Verio) test strip   No No   Sig: Check blood sugars twice daily   Please substitute with appropriate alternative as covered by patient's insurance   Dx: E11 65   glucose blood test strip  Self No No   Sig: Prior to each meal   hydrocortisone 2 5 % cream   No No   Sig: Apply topically 2 (two) times a day   Patient not taking: No sig reported   levothyroxine 150 mcg tablet   No No   Sig: Take 1 tablet (150 mcg total) by mouth daily   losartan (COZAAR) 100 MG tablet   No No   Sig: Take 1 tablet (100 mg total) by mouth daily   metFORMIN (GLUCOPHAGE) 1000 MG tablet   No No   Sig: Take 1 tablet (1,000 mg total) by mouth 2 (two) times a day with meals   metoprolol succinate (Toprol XL) 50 mg 24 hr tablet   No No   Sig: Take 1 tablet (50 mg total) by mouth daily   montelukast (SINGULAIR) 10 mg tablet   No No   Sig: Take 1 tablet (10 mg total) by mouth daily   omeprazole (PriLOSEC) 20 mg delayed release capsule   No No   Sig: Take 1 capsule (20 mg total) by mouth daily   spironolactone (ALDACTONE) 25 mg tablet   No No   Sig: Take 1 tablet (25 mg total) by mouth daily      Facility-Administered Medications: None       Past Medical History:   Diagnosis Date    Abdominal pain     Asthma     Back pain     occas due to "disc problem"    Balance problems     "right side"    Cholelithiasis     lap aviva today 3/10/2021    Colon polyp     Cracked tooth     3    Depression     Diabetes mellitus (HCC)     Difficulty swallowing     "food feels like stuck sometimes'    Exercise involving walking     steps and cleaning    Fatty liver     GERD (gastroesophageal reflux disease)     Hiatal hernia     History of pneumonia     History of stomach ulcers     Hyperlipidemia     Hypertension     Obesity     Pneumonia     Refusal of blood transfusions as patient is Hinduism     Risk for falls     Stroke [facility])     Uses [place]ese as primary spoken language     Wears glasses        Past Surgical History:   Procedure Laterality Date    CHOLECYSTECTOMY      COLONOSCOPY  02/09/2021    4 polyps tubular adenomas by Dr Mayelin Phelps EGD 03/31/2021    nonobstructing Schatzki's ring dilated with 47 Azeri Hernandez, sliding hiatal hernia otherwise normal   Biopsies stomach  negative for H  pylori biopsy esophagus negative for eosinophilic esophagitis by Dr Opal Mackay EGD AND COLONOSCOPY  02/09/2021    3 YEAR RECOMMENDED = COLONO    NE LAP,CHOLECYSTECTOMY N/A 3/10/2021    Procedure: CHOLECYSTECTOMY LAPAROSCOPIC W/ ROBOTICS;  Surgeon: Shimon Willard MD;  Location: [facility];  Service: General    ROTATOR CUFF REPAIR Bilateral     screws implanted"    UPPER GASTROINTESTINAL ENDOSCOPY  02/09/2021    4 cm hiatal hernia without Alejandro lesions  Possibly shortened esophagus  Biopsy of the stomach negative for H  pylori, biopsy of the EG junction negative for Kaplan's, biopsied duodenum negative for celiac by Dr Aparna Graf       Family History   Problem Relation Age of Onset    Diabetes Mother     Hypertension Mother     Hypertension Father     No Known Problems Sister     No Known Problems Daughter     No Known Problems Maternal Grandmother     No Known Problems Maternal Grandfather     No Known Problems Paternal Grandmother     No Known Problems Paternal Grandfather     No Known Problems Son     No Known Problems Son     No Known Problems Daughter     No Known Problems Daughter     Colon cancer Brother 71     I have reviewed and agree with the history as documented  E-Cigarette/Vaping    E-Cigarette Use Never User      E-Cigarette/Vaping Substances    Nicotine No     THC No     CBD No     Flavoring No     Other No     Unknown No      Social History     Tobacco Use    Smoking status: Never Smoker    Smokeless tobacco: Never Used   Vaping Use    Vaping Use: Never used   Substance Use Topics    Alcohol use: Never    Drug use: Never       Review of Systems   Constitutional: Negative for chills, fatigue and fever  HENT: Negative for dental problem  Eyes: Negative for visual disturbance     Respiratory: Negative for shortness of breath  Cardiovascular: Negative for chest pain  Gastrointestinal: Positive for abdominal pain and nausea  Negative for diarrhea and vomiting  Genitourinary: Negative for dysuria and frequency  Musculoskeletal: Negative for arthralgias  Skin: Negative for rash  Neurological: Negative for dizziness, weakness and light-headedness  Psychiatric/Behavioral: Negative for agitation, behavioral problems and confusion  All other systems reviewed and are negative  Physical Exam  Physical Exam  Vitals and nursing note reviewed  Constitutional:       Appearance: Normal appearance  HENT:      Head: Normocephalic and atraumatic  Eyes:      Extraocular Movements: Extraocular movements intact  Conjunctiva/sclera: Conjunctivae normal    Cardiovascular:      Rate and Rhythm: Normal rate  Pulmonary:      Effort: Pulmonary effort is normal    Abdominal:      General: There is no distension  Tenderness: There is abdominal tenderness in the epigastric area  There is no guarding or rebound  Musculoskeletal:         General: Normal range of motion  Cervical back: Normal range of motion  Skin:     Findings: No rash  Neurological:      General: No focal deficit present  Mental Status: She is alert  Cranial Nerves: No cranial nerve deficit     Psychiatric:         Mood and Affect: Mood normal          Vital Signs  ED Triage Vitals [07/16/22 2048]   Temperature Pulse Respirations Blood Pressure SpO2   98 6 °F (37 °C) 102 20 (!) 208/90 98 %      Temp Source Heart Rate Source Patient Position - Orthostatic VS BP Location FiO2 (%)   Oral Monitor Sitting Right arm --      Pain Score       10 - Worst Possible Pain           Vitals:    07/16/22 2048   BP: (!) 208/90   Pulse: 102   Patient Position - Orthostatic VS: Sitting         Visual Acuity      ED Medications  Medications   sodium chloride 0 9 % bolus 1,000 mL (1,000 mL Intravenous New Bag 7/17/22 0033)   hyoscyamine (LEVSIN/SL) SL tablet 0 25 mg (has no administration in time range)   Famotidine (PF) (PEPCID) injection 20 mg (20 mg Intravenous Given 7/17/22 0035)   ondansetron (ZOFRAN) injection 4 mg (4 mg Intravenous Given 7/17/22 0035)       Diagnostic Studies  Results Reviewed     Procedure Component Value Units Date/Time    HS Troponin 0hr (reflex protocol) [[id number]]  (Normal) Collected: 07/17/22 0031    Lab Status: Final result Specimen: Blood from Arm, Right Updated: 07/17/22 0114     hs TnI 0hr 3 ng/L     HS Troponin I 2hr [[id number]]     Lab Status: No result Specimen: Blood     [place] draw [[id number]] Collected: 07/16/22 2253    Lab Status: Final result Specimen: Blood from Arm, Right Updated: 07/17/22 0103    Narrative: The following orders were created for panel order [place] draw  Procedure                               Abnormality         Status                     ---------                               -----------         ------                     Iven [place] / Black tube on RCXP[[id number]]                       Final result                 Please view results for these tests on the individual orders      Lipase [[id number]]  (Abnormal) Collected: 07/16/22 2222    Lab Status: Final result Specimen: Blood from Arm, Left Updated: 07/16/22 2258     Lipase 53 u/L     Comprehensive metabolic panel [[id number]]  (Abnormal) Collected: 07/16/22 2222    Lab Status: Final result Specimen: Blood from Arm, Left Updated: 07/16/22 2258     Sodium 140 mmol/L      Potassium 4 4 mmol/L      Chloride 101 mmol/L      CO2 30 mmol/L      ANION GAP 9 mmol/L      BUN 16 mg/dL      Creatinine 1 10 mg/dL      Glucose 238 mg/dL      Calcium 9 6 mg/dL      AST 18 U/L      ALT 31 U/L      Alkaline Phosphatase 109 U/L      Total Protein 8 0 g/dL      Albumin 3 6 g/dL      Total Bilirubin 0 37 mg/dL      eGFR 52 ml/min/1 73sq m     Narrative:      [place] guidelines for Chronic Kidney Disease (CKD):     Stage 1 with normal or high GFR (GFR > 90 mL/min/1 73 square meters)    Stage 2 Mild CKD (GFR = 60-89 mL/min/1 73 square meters)    Stage 3A Moderate CKD (GFR = 45-59 mL/min/1 73 square meters)    Stage 3B Moderate CKD (GFR = 30-44 mL/min/1 73 square meters)    Stage 4 Severe CKD (GFR = 15-29 mL/min/1 73 square meters)    Stage 5 End Stage CKD (GFR <15 mL/min/1 73 square meters)  Note: GFR calculation is accurate only with a steady state creatinine    CBC and differential [[id number]]  (Abnormal) Collected: 07/16/22 2222    Lab Status: Final result Specimen: Blood from Arm, Left Updated: 07/16/22 2250     WBC 13 52 Thousand/uL      RBC 4 71 Million/uL      Hemoglobin 13 5 g/dL      Hematocrit 40 7 %      MCV 86 fL      MCH 28 7 pg      MCHC 33 2 g/dL      RDW 13 1 %      MPV 10 1 fL      Platelets 864 Thousands/uL      nRBC 0 /100 WBCs      Neutrophils Relative 85 %      Immat GRANS % 0 %      Lymphocytes Relative 12 %      Monocytes Relative 3 %      Eosinophils Relative 0 %      Basophils Relative 0 %      Neutrophils Absolute 11 35 Thousands/µL      Immature Grans Absolute 0 04 Thousand/uL      Lymphocytes Absolute 1 67 Thousands/µL      Monocytes Absolute 0 41 Thousand/µL      Eosinophils Absolute 0 01 Thousand/µL      Basophils Absolute 0 04 Thousands/µL                  CT abdomen pelvis wo contrast   Final Result by Jorge Em MD (07/17 0057)      Numerous fluid distended loops of small bowel with engorgement of the vasa recta and surrounding interloop mesenteric fluid suggesting enteritis  Workstation performed: [id number]                    Procedures  Procedures         ED Course                                             MDM  Number of Diagnoses or Management Options  Enteritis: new and requires workup  Diagnosis management comments: 1  Abdominal pain - Pt with constant pain for hours, nausea without vomiting   Will check CBC for leukocytosis, metabolic panel for electrolyte abnormalities and dehydration,  LFT's to assess GB dysfunction, lipase for pancreatitis, will CT abdomen, give fluids, antiemetics, try Pepcid  Amount and/or Complexity of Data Reviewed  Clinical lab tests: ordered and reviewed  Tests in the radiology section of CPT®: ordered and reviewed  Obtain history from someone other than the patient: yes  Independent visualization of images, tracings, or specimens: yes    Patient Progress  Patient progress: stable      Disposition  Final diagnoses:   Enteritis     Time reflects when diagnosis was documented in both MDM as applicable and the Disposition within this note     Time User Action Codes Description Comment    7/17/2022  1:19 AM Tuandeidre Grosser Add [K52 9] Enteritis       ED Disposition     ED Disposition   Discharge    Condition   Stable    Date/Time   Sun Jul 17, 2022  1:16 AM    Comment   Candance Gone discharge to home/self care  Follow-up Information     Follow up With Specialties Details Why [address] Conde MD Internal Medicine Schedule an appointment as soon as possible for a visit   [address] [place] [address] 300  [address] [facility]  [phone]            Patient's Medications   Discharge Prescriptions    FAMOTIDINE (PEPCID) 20 MG TABLET    Take 1 tablet (20 mg total) by mouth 2 (two) times a day       Start Date: 7/17/2022 End Date: --       Order Dose: 20 mg       Quantity: 30 tablet    Refills: 0    HYOSCYAMINE (ANASPAZ,LEVSIN) 0 125 MG TABLET    Take 1 tablet (0 125 mg total) by mouth every 4 (four) hours as needed for cramping       Start Date: 7/17/2022 End Date: --       Order Dose: 0 125 mg       Quantity: 30 tablet    Refills: 0    ONDANSETRON (ZOFRAN-ODT) 4 MG DISINTEGRATING TABLET    Take 1 tablet (4 mg total) by mouth every 6 (six) hours as needed for nausea       Start Date: 7/17/2022 End Date: --       Order Dose: 4 mg       Quantity: 20 tablet    Refills: 0       No discharge procedures on file      PDMP Review Value Time User    PDMP Reviewed  Yes 3/10/2021  7:09 AM Mercedes Lyons PA-C          ED Provider  Electronically Signed by           Kenzie Juarez MD  07/17/22 [id number]       Kenzie Juarez MD  07/17/22 [id number]

## 2022-07-21 DIAGNOSIS — I10 ESSENTIAL HYPERTENSION: ICD-10-CM

## 2022-07-21 DIAGNOSIS — E78.00 PURE HYPERCHOLESTEROLEMIA: ICD-10-CM

## 2022-07-21 DIAGNOSIS — J45.20 MILD INTERMITTENT ASTHMA WITHOUT COMPLICATION: ICD-10-CM

## 2022-07-21 DIAGNOSIS — E03.9 ACQUIRED HYPOTHYROIDISM: ICD-10-CM

## 2022-07-21 DIAGNOSIS — E11.9 TYPE 2 DIABETES MELLITUS WITHOUT COMPLICATION, WITHOUT LONG-TERM CURRENT USE OF INSULIN (HCC): ICD-10-CM

## 2022-07-21 DIAGNOSIS — R07.89 ATYPICAL CHEST PAIN: ICD-10-CM

## 2022-07-21 RX ORDER — GLIMEPIRIDE 4 MG/1
4 TABLET ORAL
Qty: 90 TABLET | Refills: 0 | Status: SHIPPED | OUTPATIENT
Start: 2022-07-21 | End: 2022-10-21 | Stop reason: SDUPTHER

## 2022-07-21 RX ORDER — LEVOTHYROXINE SODIUM 0.15 MG/1
150 TABLET ORAL DAILY
Qty: 90 TABLET | Refills: 0 | Status: SHIPPED | OUTPATIENT
Start: 2022-07-21

## 2022-09-09 ENCOUNTER — APPOINTMENT (EMERGENCY)
Dept: CT IMAGING | Facility: HOSPITAL | Age: 67
End: 2022-09-09
Payer: MEDICARE

## 2022-09-09 ENCOUNTER — HOSPITAL ENCOUNTER (EMERGENCY)
Facility: HOSPITAL | Age: 67
Discharge: HOME | End: 2022-09-09
Attending: EMERGENCY MEDICINE
Payer: MEDICARE

## 2022-09-09 VITALS
TEMPERATURE: 98.3 F | SYSTOLIC BLOOD PRESSURE: 187 MMHG | HEART RATE: 77 BPM | RESPIRATION RATE: 18 BRPM | DIASTOLIC BLOOD PRESSURE: 81 MMHG | OXYGEN SATURATION: 99 %

## 2022-09-09 DIAGNOSIS — I10 HYPERTENSION: ICD-10-CM

## 2022-09-09 DIAGNOSIS — R10.9 ABDOMINAL PAIN: Primary | ICD-10-CM

## 2022-09-09 LAB
ALBUMIN SERPL BCP-MCNC: 3.2 G/DL (ref 3.5–5)
ALP SERPL-CCNC: 103 U/L (ref 46–116)
ALT SERPL W P-5'-P-CCNC: 21 U/L (ref 12–78)
ANION GAP SERPL CALCULATED.3IONS-SCNC: 7 MMOL/L (ref 4–13)
AST SERPL W P-5'-P-CCNC: 13 U/L (ref 5–45)
BASOPHILS # BLD AUTO: 0.03 THOUSANDS/ΜL (ref 0–0.1)
BASOPHILS NFR BLD AUTO: 0 % (ref 0–1)
BILIRUB SERPL-MCNC: 0.38 MG/DL (ref 0.2–1)
BILIRUB UR QL STRIP: NEGATIVE
BUN SERPL-MCNC: 12 MG/DL (ref 5–25)
CALCIUM ALBUM COR SERPL-MCNC: 9.4 MG/DL (ref 8.3–10.1)
CALCIUM SERPL-MCNC: 8.8 MG/DL (ref 8.3–10.1)
CHLORIDE SERPL-SCNC: 105 MMOL/L (ref 96–108)
CLARITY UR: CLEAR
CO2 SERPL-SCNC: 31 MMOL/L (ref 21–32)
COLOR UR: YELLOW
CREAT SERPL-MCNC: 0.82 MG/DL (ref 0.6–1.3)
EOSINOPHIL # BLD AUTO: 0.12 THOUSAND/ΜL (ref 0–0.61)
EOSINOPHIL NFR BLD AUTO: 2 % (ref 0–6)
ERYTHROCYTE [DISTWIDTH] IN BLOOD BY AUTOMATED COUNT: 12.9 % (ref 11.6–15.1)
GFR SERPL CREATININE-BSD FRML MDRD: 74 ML/MIN/1.73SQ M
GLUCOSE SERPL-MCNC: 196 MG/DL (ref 65–140)
GLUCOSE UR STRIP-MCNC: ABNORMAL MG/DL
HCT VFR BLD AUTO: 34.1 % (ref 34.8–46.1)
HGB BLD-MCNC: 11.5 G/DL (ref 11.5–15.4)
HGB UR QL STRIP.AUTO: NEGATIVE
IMM GRANULOCYTES # BLD AUTO: 0.03 THOUSAND/UL (ref 0–0.2)
IMM GRANULOCYTES NFR BLD AUTO: 0 % (ref 0–2)
KETONES UR STRIP-MCNC: NEGATIVE MG/DL
LEUKOCYTE ESTERASE UR QL STRIP: NEGATIVE
LIPASE SERPL-CCNC: 72 U/L (ref 73–393)
LYMPHOCYTES # BLD AUTO: 2.14 THOUSANDS/ΜL (ref 0.6–4.47)
LYMPHOCYTES NFR BLD AUTO: 28 % (ref 14–44)
MCH RBC QN AUTO: 29 PG (ref 26.8–34.3)
MCHC RBC AUTO-ENTMCNC: 33.7 G/DL (ref 31.4–37.4)
MCV RBC AUTO: 86 FL (ref 82–98)
MONOCYTES # BLD AUTO: 0.45 THOUSAND/ΜL (ref 0.17–1.22)
MONOCYTES NFR BLD AUTO: 6 % (ref 4–12)
NEUTROPHILS # BLD AUTO: 5.02 THOUSANDS/ΜL (ref 1.85–7.62)
NEUTS SEG NFR BLD AUTO: 64 % (ref 43–75)
NITRITE UR QL STRIP: NEGATIVE
NRBC BLD AUTO-RTO: 0 /100 WBCS
PH UR STRIP.AUTO: 5 [PH]
PLATELET # BLD AUTO: 252 THOUSANDS/UL (ref 149–390)
PMV BLD AUTO: 9.9 FL (ref 8.9–12.7)
POTASSIUM SERPL-SCNC: 3.9 MMOL/L (ref 3.5–5.3)
PROT SERPL-MCNC: 7.1 G/DL (ref 6.4–8.4)
PROT UR STRIP-MCNC: NEGATIVE MG/DL
RBC # BLD AUTO: 3.96 MILLION/UL (ref 3.81–5.12)
SODIUM SERPL-SCNC: 143 MMOL/L (ref 135–147)
SP GR UR STRIP.AUTO: >=1.03 (ref 1–1.03)
UROBILINOGEN UR QL STRIP.AUTO: 0.2 E.U./DL
WBC # BLD AUTO: 7.79 THOUSAND/UL (ref 4.31–10.16)

## 2022-09-09 PROCEDURE — 96360 HYDRATION IV INFUSION INIT: CPT

## 2022-09-09 PROCEDURE — 99284 EMERGENCY DEPT VISIT MOD MDM: CPT

## 2022-09-09 PROCEDURE — 80053 COMPREHEN METABOLIC PANEL: CPT

## 2022-09-09 PROCEDURE — 74176 CT ABD & PELVIS W/O CONTRAST: CPT

## 2022-09-09 PROCEDURE — 81003 URINALYSIS AUTO W/O SCOPE: CPT

## 2022-09-09 PROCEDURE — 83690 ASSAY OF LIPASE: CPT

## 2022-09-09 PROCEDURE — 36415 COLL VENOUS BLD VENIPUNCTURE: CPT

## 2022-09-09 PROCEDURE — G1004 CDSM NDSC: HCPCS

## 2022-09-09 PROCEDURE — 99282 EMERGENCY DEPT VISIT SF MDM: CPT | Performed by: EMERGENCY MEDICINE

## 2022-09-09 PROCEDURE — 85025 COMPLETE CBC W/AUTO DIFF WBC: CPT

## 2022-09-09 RX ORDER — FAMOTIDINE 20 MG/1
20 TABLET, FILM COATED ORAL 2 TIMES DAILY
Qty: 60 TABLET | Refills: 0 | Status: SHIPPED | OUTPATIENT
Start: 2022-09-09 | End: 2022-10-09

## 2022-09-09 RX ORDER — ONDANSETRON 4 MG/1
4 TABLET, ORALLY DISINTEGRATING ORAL EVERY 6 HOURS PRN
Qty: 30 TABLET | Refills: 0 | Status: SHIPPED | OUTPATIENT
Start: 2022-09-09

## 2022-09-09 RX ORDER — DICYCLOMINE HCL 20 MG
20 TABLET ORAL 2 TIMES DAILY
Qty: 60 TABLET | Refills: 0 | Status: SHIPPED | OUTPATIENT
Start: 2022-09-09 | End: 2022-10-14

## 2022-09-09 RX ADMIN — SODIUM CHLORIDE 1000 ML: 0.9 INJECTION, SOLUTION INTRAVENOUS at 08:49

## 2022-09-09 NOTE — ED ATTENDING ATTESTATION
9/9/2022  ITata MD, saw and evaluated the patient  I have discussed the patient with the resident/non-physician practitioner and agree with the resident's/non-physician practitioner's findings, Plan of Care, and MDM as documented in the resident's/non-physician practitioner's note, except where noted  All available labs and Radiology studies were reviewed  I was present for key portions of any procedure(s) performed by the resident/non-physician practitioner and I was immediately available to provide assistance  At this point I agree with the current assessment done in the Emergency Department  I have conducted an independent evaluation of this patient a history and physical is as follows:      51-year-old female presents for evaluation of right lower quadrant abdominal pain which radiates into the suprapubic area and anterior thigh  Pain is been present since Monday, is constant, unchanged without modifying factors  No nausea vomiting fevers chills diarrhea, constipation  Ten systems reviewed otherwise negative  On exam no distress, lungs normal cardiac normal abdomen under palpation of the right lower quadrant without rebound or guarding, pelvis stable, nontender palpation,    Medical decision-will do abdominal workup, urine dip, treat symptoms      ED Course         Critical Care Time  Procedures

## 2022-09-09 NOTE — ED PROVIDER NOTES
History  Chief Complaint   Patient presents with    Abdominal Pain     Pt c/o right lower abdominal pain radiating down her right leg  Reports nausea and diarrhea  Oseas Perla is a 68yo F with a PMH of hypertension and T2DM on insulin who presents today with recurrent RLQ abdominal pain  She states that she has had episodes like this before as recently as 7/2022  She states the pain started near her umbilicus and was a throbbing, cramping pain  The pain then migrated to her RLQ and now radiates down the lateral aspect of her R leg  She denies diarrhea, constipation, or blood in her stool  She is able to pass BMs  She is experiencing nausea without vomiting  She does not have CP, SOB, diaphoresis  Per chart review she was seen on 7/16/2022 with similar symptoms  Her CBC, CMP, Lipase were unremarkable  Her CT abdomen was notable for enteritis  She was given famotidine, Hyoscyamine, and zofran on discharge  She states she has been taking the hyoscyamine for abdominal cramping without relief  History provided by:  Patient   used: No    Abdominal Pain  Pain location:  RLQ  Pain quality: squeezing and throbbing    Pain radiates to:  R leg  Pain severity:  Moderate  Onset quality:  Gradual  Duration:  3 days  Timing:  Constant  Progression:  Worsening  Chronicity:  Recurrent  Relieved by:  Nothing  Worsened by:  Palpation  Associated symptoms: anorexia and nausea    Associated symptoms: no chest pain, no chills, no constipation, no cough, no diarrhea, no dysuria, no fever, no hematemesis, no hematuria, no shortness of breath, no sore throat and no vomiting    Risk factors: aspirin        Prior to Admission Medications   Prescriptions Last Dose Informant Patient Reported? Taking?    Alum Hydroxide-Mag Carbonate (GAVISCON PO)   Yes No   Sig: Take 1 Dose by mouth as needed   Patient not taking: No sig reported   BD Pen Needle Berenice 2nd Gen 32G X 4 MM MISC  Self No No   Sig: USE TWICE A DAY   Blood Glucose Monitoring Suppl (ONE TOUCH ULTRA 2) w/Device KIT  Self No No   Sig: Test blood sugar 3 times daily   Blood Glucose Monitoring Suppl (OneTouch Verio Reflect) w/Device KIT   No No   Sig: Check blood sugars twice daily  Please substitute with appropriate alternative as covered by patient's insurance  Dx: E11 65   Lancets (ONETOUCH DELICA PLUS [id number]) MISC  Self No No   Sig: TEST 3 TIMES   OneTouch Delica Lancets 61Z MISC   No No   Sig: Check blood sugars twice daily  Please substitute with appropriate alternative as covered by patient's insurance  Dx: E11 65   Daylin Pascual FlexTouch 100 units/mL injection pen   No No   Sig: inject 25 units subcutaneously once daily as directed   VITAMIN D PO   Yes No   Sig: Take by mouth   albuterol (2 5 mg/3 mL) 0 083 % nebulizer solution  Self No No   Sig: Take 1 vial (2 5 mg total) by nebulization every 6 (six) hours as needed for wheezing or shortness of breath   albuterol (PROVENTIL HFA,VENTOLIN HFA) 90 mcg/act inhaler  Self Yes No   Sig: Inhale 2 puffs every 6 (six) hours as needed for wheezing   aspirin 81 mg chewable tablet  Self Yes No   Sig: Chew 81 mg daily   atorvastatin (LIPITOR) 20 mg tablet   No No   Sig: Take 1 tablet (20 mg total) by mouth daily   budesonide-formoterol (SYMBICORT) 160-4 5 mcg/act inhaler  Self Yes No   Sig: Inhale 2 puffs 2 (two) times a day as needed Rinse mouth after use     clonazePAM (KlonoPIN) 0 125 mg disintegrating tablet   No No   Sig: One p r n  as needed for anxiety   docusate sodium (COLACE) 100 mg capsule   No No   Sig: Take 1 capsule (100 mg total) by mouth 2 (two) times a day   Patient not taking: Reported on 6/14/2022   escitalopram (Lexapro) 10 mg tablet   No No   Sig: Take 1 tablet (10 mg total) by mouth daily   ezetimibe (ZETIA) 10 mg tablet   No No   Sig: Take 1 tablet (10 mg total) by mouth daily   famotidine (PEPCID) 20 mg tablet   No No   Sig: Take 1 tablet (20 mg total) by mouth 2 (two) times a day   fluticasone (FLONASE) 50 mcg/act nasal spray   Yes No   Si spray into each nostril daily as needed    Patient not taking: No sig reported   furosemide (LASIX) 20 mg tablet   Yes No   Sig: Take 20 mg by mouth 2 (two) times a day as needed   glimepiride (AMARYL) 4 mg tablet   No No   Sig: Take 1 tablet (4 mg total) by mouth daily with breakfast   glucose blood (OneTouch Verio) test strip   No No   Sig: Check blood sugars twice daily  Please substitute with appropriate alternative as covered by patient's insurance   Dx: E11 65   glucose blood test strip  Self No No   Sig: Prior to each meal   hydrocortisone 2 5 % cream   No No   Sig: Apply topically 2 (two) times a day   Patient not taking: No sig reported   hyoscyamine (ANASPAZ,LEVSIN) 0 125 MG tablet   No No   Sig: Take 1 tablet (0 125 mg total) by mouth every 4 (four) hours as needed for cramping   levothyroxine 150 mcg tablet   No No   Sig: Take 1 tablet (150 mcg total) by mouth daily   losartan (COZAAR) 100 MG tablet   No No   Sig: Take 1 tablet (100 mg total) by mouth daily   metFORMIN (GLUCOPHAGE) 1000 MG tablet   No No   Sig: Take 1 tablet (1,000 mg total) by mouth 2 (two) times a day with meals   metoprolol succinate (Toprol XL) 50 mg 24 hr tablet   No No   Sig: Take 1 tablet (50 mg total) by mouth daily   montelukast (SINGULAIR) 10 mg tablet   No No   Sig: Take 1 tablet (10 mg total) by mouth daily   omeprazole (PriLOSEC) 20 mg delayed release capsule   No No   Sig: Take 1 capsule (20 mg total) by mouth daily   ondansetron (ZOFRAN-ODT) 4 mg disintegrating tablet   No No   Sig: Take 1 tablet (4 mg total) by mouth every 6 (six) hours as needed for nausea   spironolactone (ALDACTONE) 25 mg tablet   No No   Sig: Take 1 tablet (25 mg total) by mouth daily      Facility-Administered Medications: None       Past Medical History:   Diagnosis Date    Abdominal pain     Asthma     Back pain     occas due to "disc problem"    Balance problems     "right side"    Cholelithiasis lap aviva today 3/10/2021    Colon polyp     Cracked tooth     3    Depression     Diabetes mellitus (HCC)     Difficulty swallowing     "food feels like stuck sometimes'    Exercise involving walking     steps and cleaning    Fatty liver     GERD (gastroesophageal reflux disease)     Hiatal hernia     History of pneumonia     History of stomach ulcers     Hyperlipidemia     Hypertension     Obesity     Pneumonia     Refusal of blood transfusions as patient is Religious     Risk for falls     Stroke [facility])     Uses Tajik as primary spoken language     Wears glasses        Past Surgical History:   Procedure Laterality Date    CHOLECYSTECTOMY      COLONOSCOPY  02/09/2021    4 polyps tubular adenomas by Dr Travis Fernandez EGD  03/31/2021    nonobstructing Schatzki's ring dilated with 47 [address] Hernandez, sliding hiatal hernia otherwise normal   Biopsies stomach  negative for H  pylori biopsy esophagus negative for eosinophilic esophagitis by Dr Jefe Lackey EGD AND COLONOSCOPY  02/09/2021    3 YEAR RECOMMENDED = COLONO    [facility] LAP,CHOLECYSTECTOMY N/A 3/10/2021    Procedure: CHOLECYSTECTOMY LAPAROSCOPIC W/ ROBOTICS;  Surgeon: Sanaz Salinas MD;  Location: [facility];  Service: General    ROTATOR CUFF REPAIR Bilateral     screws implanted"    UPPER GASTROINTESTINAL ENDOSCOPY  02/09/2021    4 cm hiatal hernia without Alejandro lesions  Possibly shortened esophagus    Biopsy of the stomach negative for H  pylori, biopsy of the EG junction negative for Kaplan's, biopsied duodenum negative for celiac by Dr Ann Johnston       Family History   Problem Relation Age of Onset    Diabetes Mother     Hypertension Mother     Hypertension Father     No Known Problems Sister     No Known Problems Daughter     No Known Problems Maternal Grandmother     No Known Problems Maternal Grandfather     No Known Problems Paternal Grandmother     No Known Problems Paternal Grandfather     No Known Problems Son  No Known Problems Son     No Known Problems Daughter     No Known Problems Daughter     Colon cancer Brother 71     I have reviewed and agree with the history as documented  E-Cigarette/Vaping    E-Cigarette Use Never User      E-Cigarette/Vaping Substances    Nicotine No     THC No     CBD No     Flavoring No     Other No     Unknown No      Social History     Tobacco Use    Smoking status: Never Smoker    Smokeless tobacco: Never Used   Vaping Use    Vaping Use: Never used   Substance Use Topics    Alcohol use: Never    Drug use: Never        Review of Systems   Constitutional: Negative for chills and fever  HENT: Negative for sore throat  Respiratory: Negative for cough, chest tightness and shortness of breath  Cardiovascular: Negative for chest pain and palpitations  Gastrointestinal: Positive for abdominal pain, anorexia and nausea  Negative for blood in stool, constipation, diarrhea, hematemesis and vomiting  Genitourinary: Negative for dysuria, flank pain, hematuria and urgency  Musculoskeletal: Negative for back pain  Neurological: Negative for weakness, light-headedness and headaches  Psychiatric/Behavioral: Negative for agitation and behavioral problems  Physical Exam  ED Triage Vitals [09/09/22 0800]   Temperature Pulse Respirations Blood Pressure SpO2   98 3 °F (36 8 °C) 77 18 (!) 187/81 99 %      Temp Source Heart Rate Source Patient Position - Orthostatic VS BP Location FiO2 (%)   Oral Monitor -- -- --      Pain Score       9             Orthostatic Vital Signs  Vitals:    09/09/22 0800   BP: (!) 187/81   Pulse: 77       Physical Exam  Vitals reviewed  Constitutional:       Appearance: Normal appearance  HENT:      Head: Normocephalic and atraumatic  Nose: Nose normal       Mouth/Throat:      Mouth: Mucous membranes are moist       Pharynx: Oropharynx is clear     Eyes:      Conjunctiva/sclera: Conjunctivae normal    Cardiovascular:      Rate and Rhythm: Normal rate and regular rhythm  Heart sounds: Normal heart sounds  No murmur heard  No friction rub  No gallop  Pulmonary:      Effort: Pulmonary effort is normal       Breath sounds: No wheezing, rhonchi or rales  Abdominal:      General: Abdomen is flat and protuberant  Bowel sounds are normal       Palpations: Abdomen is soft  There is no mass  Tenderness: There is abdominal tenderness in the right lower quadrant and periumbilical area  Hernia: No hernia is present  Musculoskeletal:         General: Normal range of motion  Cervical back: Neck supple  Skin:     General: Skin is warm and dry  Neurological:      General: No focal deficit present  Mental Status: She is alert and oriented to person, place, and time  Psychiatric:         Mood and Affect: Mood normal          Behavior: Behavior normal          ED Medications  Medications - No data to display    Diagnostic Studies  Results Reviewed     None                 No orders to display         Procedures  Procedures      ED Course  ED Course as of 09/09/22 0932   Fri Sep 09, 2022   0857 CBC unremarkable   0902 Urine remarkable for glucose but otherwise normal   0926 Lipase unremarkable    0929 CMP remarkable for a glucose of 196, otherwise unremarkable            SBIRT 22yo+    Flowsheet Row Most Recent Value   SBIRT (23 yo +)    In order to provide better care to our patients, we are screening all of our patients for alcohol and drug use  Would it be okay to ask you these screening questions? Yes Filed at: 09/09/2022 0820   Initial Alcohol Screen: US AUDIT-C     1  How often do you have a drink containing alcohol? 0 Filed at: 09/09/2022 0820   2  How many drinks containing alcohol do you have on a typical day you are drinking? 0 Filed at: 09/09/2022 0820   3a  Male UNDER 65: How often do you have five or more drinks on one occasion? 0 Filed at: 09/09/2022 0820   3b  FEMALE Any Age, or MALE 65+:  How often do you have 4 or more drinks on one occassion? 0 Filed at: 09/09/2022 0820   Audit-C Score 0 Filed at: 09/09/2022 5898   ARA: How many times in the past year have you    Used an illegal drug or used a prescription medication for non-medical reasons? Never Filed at: 09/09/2022 0820                MDM  Number of Diagnoses or Management Options  Abdominal pain  Diagnosis management comments: 78 yo F with PMH of hypertension and diabetes returns for recurrent abdominal pain  CBC, CMP, Lipase   CT abdomen   Labs and imagining unremarkable, will have pt follow up with her PCP as well as gave a referral for gastroenterology to discuss recurrent abdominal pain  Pt is stable for discharge  Amount and/or Complexity of Data Reviewed  Clinical lab tests: ordered and reviewed  Tests in the radiology section of CPT®: ordered and reviewed  Tests in the medicine section of CPT®: ordered and reviewed  Review and summarize past medical records: yes  Discuss the patient with other providers: yes  Independent visualization of images, tracings, or specimens: yes    Risk of Complications, Morbidity, and/or Mortality  Presenting problems: low  Diagnostic procedures: low  Management options: low    Patient Progress  Patient progress: stable      Disposition  Final diagnoses:   None     ED Disposition     None      Follow-up Information    None         Patient's Medications   Discharge Prescriptions    No medications on file     No discharge procedures on file  PDMP Review       Value Time User    PDMP Reviewed  Yes 3/10/2021  7:09 AM Gela Emanuel PA-C           ED Provider  Attending physically available and evaluated Sulma Mohan  I managed the patient along with the ED Attending      Electronically Signed by  Jhoan Nath, [address],   09/09/22 3623

## 2022-09-09 NOTE — DISCHARGE INSTRUCTIONS
Please follow up with your PCP to discuss sugar as well as recurrent abdominal pain  Please make an appointment with the Gastroenterologist to discuss recurrent abdominal pain

## 2022-09-16 DIAGNOSIS — E03.9 ACQUIRED HYPOTHYROIDISM: ICD-10-CM

## 2022-09-16 DIAGNOSIS — R07.89 ATYPICAL CHEST PAIN: ICD-10-CM

## 2022-09-16 DIAGNOSIS — E11.9 TYPE 2 DIABETES MELLITUS WITHOUT COMPLICATION, WITHOUT LONG-TERM CURRENT USE OF INSULIN (HCC): ICD-10-CM

## 2022-09-16 DIAGNOSIS — E78.00 PURE HYPERCHOLESTEROLEMIA: ICD-10-CM

## 2022-09-16 DIAGNOSIS — J45.20 MILD INTERMITTENT ASTHMA WITHOUT COMPLICATION: ICD-10-CM

## 2022-09-16 DIAGNOSIS — I10 ESSENTIAL HYPERTENSION: ICD-10-CM

## 2022-09-16 RX ORDER — ATORVASTATIN CALCIUM 20 MG/1
20 TABLET, FILM COATED ORAL DAILY
Qty: 30 TABLET | Refills: 0 | Status: SHIPPED | OUTPATIENT
Start: 2022-09-16 | End: 2022-10-21 | Stop reason: SDUPTHER

## 2022-09-21 ENCOUNTER — OFFICE VISIT (OUTPATIENT)
Dept: INTERNAL MEDICINE CLINIC | Facility: CLINIC | Age: 67
End: 2022-09-21
Payer: MEDICARE

## 2022-09-21 VITALS
HEIGHT: 62 IN | WEIGHT: 206 LBS | HEART RATE: 80 BPM | SYSTOLIC BLOOD PRESSURE: 146 MMHG | BODY MASS INDEX: 37.91 KG/M2 | DIASTOLIC BLOOD PRESSURE: 66 MMHG | TEMPERATURE: 98.2 F | RESPIRATION RATE: 13 BRPM

## 2022-09-21 DIAGNOSIS — K22.2 LOWER ESOPHAGEAL RING (SCHATZKI): ICD-10-CM

## 2022-09-21 DIAGNOSIS — E11.65 UNCONTROLLED TYPE 2 DIABETES MELLITUS WITH HYPERGLYCEMIA (HCC): ICD-10-CM

## 2022-09-21 DIAGNOSIS — J45.21 MILD INTERMITTENT ASTHMA WITH ACUTE EXACERBATION: ICD-10-CM

## 2022-09-21 DIAGNOSIS — E03.9 ACQUIRED HYPOTHYROIDISM: Primary | ICD-10-CM

## 2022-09-21 DIAGNOSIS — R07.81 RIB PAIN: ICD-10-CM

## 2022-09-21 DIAGNOSIS — I10 ESSENTIAL HYPERTENSION: ICD-10-CM

## 2022-09-21 DIAGNOSIS — E66.9 OBESITY (BMI 35.0-39.9 WITHOUT COMORBIDITY): ICD-10-CM

## 2022-09-21 DIAGNOSIS — M54.9 BACK PAIN, UNSPECIFIED BACK LOCATION, UNSPECIFIED BACK PAIN LATERALITY, UNSPECIFIED CHRONICITY: ICD-10-CM

## 2022-09-21 DIAGNOSIS — E78.00 PURE HYPERCHOLESTEROLEMIA: ICD-10-CM

## 2022-09-21 DIAGNOSIS — F33.9 DEPRESSION, RECURRENT (HCC): ICD-10-CM

## 2022-09-21 DIAGNOSIS — R10.11 RIGHT UPPER QUADRANT ABDOMINAL PAIN: ICD-10-CM

## 2022-09-21 DIAGNOSIS — Z23 ENCOUNTER FOR IMMUNIZATION: ICD-10-CM

## 2022-09-21 LAB
SL AMB POCT GLUCOSE BLD: 180
SL AMB POCT HEMOGLOBIN AIC: 9.1 (ref ?–6.5)

## 2022-09-21 PROCEDURE — G0008 ADMIN INFLUENZA VIRUS VAC: HCPCS | Performed by: INTERNAL MEDICINE

## 2022-09-21 PROCEDURE — 90662 IIV NO PRSV INCREASED AG IM: CPT | Performed by: INTERNAL MEDICINE

## 2022-09-21 PROCEDURE — 83036 HEMOGLOBIN GLYCOSYLATED A1C: CPT | Performed by: INTERNAL MEDICINE

## 2022-09-21 PROCEDURE — 99214 OFFICE O/P EST MOD 30 MIN: CPT | Performed by: INTERNAL MEDICINE

## 2022-09-21 PROCEDURE — 82948 REAGENT STRIP/BLOOD GLUCOSE: CPT | Performed by: INTERNAL MEDICINE

## 2022-09-21 NOTE — PATIENT INSTRUCTIONS
Use the lidocaine patch  Follow-up with the pain management  See the endocrinologist  Bring her blood sugars to the next office visit

## 2022-09-21 NOTE — PROGRESS NOTES
Assessment/Plan:      1  Diabetes poorly controlled hemoglobin A1c is 9 did not bring the blood sugars did not take the medication for his the diabetes this morning because she was afraid she did not eat when she came for the office I will refer her to diabetic teaching and endocrinology to improve her diabetic control her hemoglobin A1c used to be 7 went up to 9 I did not make any medication changes she is taking the following  Metformin a 1000 mg twice a day  Glimepiride 4 mg daily  Tresiba 25 units in the morning    2  Right upper quadrant pain I believe is coming from the rib she has exquisite tender during the examination she has had 2 CT scans when she went to the ER which were unremarkable specially the 2nd 1 she had enteritis on the 1st 1 will have GI review again she should be on omeprazole and a FODMAP diet    3  Back pain with the rib pain the back pain sometimes radiates to the right leg will refer her back to pain management for further evaluation and treatment and suggestions    4  Obesity she needs to lose some weight to improve her diabetes and her overall health  5  Her asthma is in remission she is not wheezing coughing or shortness of breath which is good news    6  Depression anxiety she is on Lexapro and clonazepam    Labs review    Results for orders placed or performed in visit on 09/21/22   POCT blood glucose   Result Value Ref Range    GLUCOSE     POCT hemoglobin A1c   Result Value Ref Range    Hemoglobin A1C 9 1 (A) 6 5       No problem-specific Assessment & Plan notes found for this encounter  Diagnoses and all orders for this visit:    Acquired hypothyroidism  -     CBC and differential; Future  -     Comprehensive metabolic panel; Future  -     Lipid Panel with Direct LDL reflex; Future  -     TSH, 3rd generation with Free T4 reflex; Future  -     UA (URINE) with reflex to Scope  -     Magnesium;  Future  -     Microalbumin / creatinine urine ratio  -     Gamma GT; Future  -     Hemoglobin A1C; Future    Uncontrolled type 2 diabetes mellitus with hyperglycemia (HCC)  -     POCT blood glucose  -     POCT hemoglobin A1c  -     Ambulatory referral to Endocrinology; Future  -     Ambulatory Referral to Diabetic Education; Future  -     CBC and differential; Future  -     Comprehensive metabolic panel; Future  -     Lipid Panel with Direct LDL reflex; Future  -     TSH, 3rd generation with Free T4 reflex; Future  -     UA (URINE) with reflex to Scope  -     Magnesium; Future  -     Microalbumin / creatinine urine ratio  -     Gamma GT; Future  -     Hemoglobin A1C; Future    Mild intermittent asthma with acute exacerbation  -     CBC and differential; Future  -     Comprehensive metabolic panel; Future  -     Lipid Panel with Direct LDL reflex; Future  -     TSH, 3rd generation with Free T4 reflex; Future  -     UA (URINE) with reflex to Scope  -     Magnesium; Future  -     Microalbumin / creatinine urine ratio  -     Gamma GT; Future  -     Hemoglobin A1C; Future    Essential hypertension  -     Ambulatory referral to Endocrinology; Future  -     Ambulatory Referral to Diabetic Education; Future  -     CBC and differential; Future  -     Comprehensive metabolic panel; Future  -     Lipid Panel with Direct LDL reflex; Future  -     TSH, 3rd generation with Free T4 reflex; Future  -     UA (URINE) with reflex to Scope  -     Magnesium; Future  -     Microalbumin / creatinine urine ratio  -     Gamma GT; Future  -     Hemoglobin A1C; Future    Lower esophageal ring ([place]atz[place])  -     CBC and differential; Future  -     Comprehensive metabolic panel; Future  -     Lipid Panel with Direct LDL reflex; Future  -     TSH, 3rd generation with Free T4 reflex; Future  -     UA (URINE) with reflex to Scope  -     Magnesium;  Future  -     Microalbumin / creatinine urine ratio  -     Gamma GT; Future  -     Hemoglobin A1C; Future    Pure hypercholesterolemia  -     CBC and differential; Future  -     Comprehensive metabolic panel; Future  -     Lipid Panel with Direct LDL reflex; Future  -     TSH, 3rd generation with Free T4 reflex; Future  -     UA (URINE) with reflex to Scope  -     Magnesium; Future  -     Microalbumin / creatinine urine ratio  -     Gamma GT; Future  -     Hemoglobin A1C; Future    Depression, recurrent (HCC)  -     CBC and differential; Future  -     Comprehensive metabolic panel; Future  -     Lipid Panel with Direct LDL reflex; Future  -     TSH, 3rd generation with Free T4 reflex; Future  -     UA (URINE) with reflex to Scope  -     Magnesium; Future  -     Microalbumin / creatinine urine ratio  -     Gamma GT; Future  -     Hemoglobin A1C; Future    Obesity (BMI 35 0-39 9 without comorbidity)  -     CBC and differential; Future  -     Comprehensive metabolic panel; Future  -     Lipid Panel with Direct LDL reflex; Future  -     TSH, 3rd generation with Free T4 reflex; Future  -     UA (URINE) with reflex to Scope  -     Magnesium; Future  -     Microalbumin / creatinine urine ratio  -     Gamma GT; Future  -     Hemoglobin A1C; Future    Encounter for immunization  -     FLUZONE HIGH-DOSE: influenza vaccine, high-dose, preservative-free 0 7 mL    Right upper quadrant abdominal pain  -     Ambulatory referral to Gastroenterology; Future  -     CBC and differential; Future  -     Comprehensive metabolic panel; Future  -     Lipid Panel with Direct LDL reflex; Future  -     TSH, 3rd generation with Free T4 reflex; Future  -     UA (URINE) with reflex to Scope  -     Magnesium; Future  -     Microalbumin / creatinine urine ratio  -     Gamma GT; Future  -     Hemoglobin A1C; Future    Back pain, unspecified back location, unspecified back pain laterality, unspecified chronicity  -     Ambulatory Referral to Pain Management; Future  -     CBC and differential; Future  -     Comprehensive metabolic panel; Future  -     Lipid Panel with Direct LDL reflex;  Future  -     TSH, 3rd generation with Free T4 reflex; Future  -     UA (URINE) with reflex to Scope  -     Magnesium; Future  -     Microalbumin / creatinine urine ratio  -     Gamma GT; Future  -     Hemoglobin A1C; Future    Rib pain  -     Ambulatory Referral to Pain Management; Future  -     CBC and differential; Future  -     Comprehensive metabolic panel; Future  -     Lipid Panel with Direct LDL reflex; Future  -     TSH, 3rd generation with Free T4 reflex; Future  -     UA (URINE) with reflex to Scope  -     Magnesium; Future  -     Microalbumin / creatinine urine ratio  -     Gamma GT; Future  -     Hemoglobin A1C; Future          Subjective:      Patient ID: Jojo Rosales is a 79 y o  female  Chief Complaint   Patient presents with    Follow-up     Overdue appt  Meds not verified  She did not take diabetic meds today  Current Outpatient Medications:     albuterol (2 5 mg/3 mL) 0 083 % nebulizer solution, Take 1 vial (2 5 mg total) by nebulization every 6 (six) hours as needed for wheezing or shortness of breath, Disp: 60 vial, Rfl: 5    albuterol (PROVENTIL HFA,VENTOLIN HFA) 90 mcg/act inhaler, Inhale 2 puffs every 6 (six) hours as needed for wheezing, Disp: , Rfl:     aspirin 81 mg chewable tablet, Chew 81 mg daily, Disp: , Rfl:     atorvastatin (LIPITOR) 20 mg tablet, Take 1 tablet (20 mg total) by mouth daily, Disp: 30 tablet, Rfl: 0    BD Pen Needle Berenice 2nd Gen 32G X 4 MM MISC, USE TWICE A DAY, Disp: 100 each, Rfl: 1    Blood Glucose Monitoring Suppl (ONE TOUCH ULTRA 2) w/Device KIT, Test blood sugar 3 times daily, Disp: 1 each, Rfl: 0    Blood Glucose Monitoring Suppl (OneTouch Verio Reflect) w/Device KIT, Check blood sugars twice daily  Please substitute with appropriate alternative as covered by patient's insurance   Dx: E11 65, Disp: 1 kit, Rfl: 0    budesonide-formoterol (SYMBICORT) 160-4 5 mcg/act inhaler, Inhale 2 puffs 2 (two) times a day as needed Rinse mouth after use , Disp: , Rfl:    clonazePAM (KlonoPIN) 0 125 mg disintegrating tablet, One p r n  as needed for anxiety, Disp: 30 tablet, Rfl: 1    dicyclomine (BENTYL) 20 mg tablet, Take 1 tablet (20 mg total) by mouth 2 (two) times a day, Disp: 60 tablet, Rfl: 0    escitalopram (Lexapro) 10 mg tablet, Take 1 tablet (10 mg total) by mouth daily, Disp: 30 tablet, Rfl: 2    ezetimibe (ZETIA) 10 mg tablet, Take 1 tablet (10 mg total) by mouth daily, Disp: 90 tablet, Rfl: 0    famotidine (PEPCID) 20 mg tablet, Take 1 tablet (20 mg total) by mouth 2 (two) times a day, Disp: 60 tablet, Rfl: 0    furosemide (LASIX) 20 mg tablet, Take 20 mg by mouth 2 (two) times a day as needed, Disp: , Rfl:     glimepiride (AMARYL) 4 mg tablet, Take 1 tablet (4 mg total) by mouth daily with breakfast, Disp: 90 tablet, Rfl: 0    glucose blood (OneTouch Verio) test strip, Check blood sugars twice daily  Please substitute with appropriate alternative as covered by patient's insurance   Dx: E11 65, Disp: 200 each, Rfl: 3    glucose blood test strip, Prior to each meal, Disp: 300 each, Rfl: 1    Lancets (ONETOUCH DELICA PLUS [id number]) MISC, TEST 3 TIMES, Disp: 100 each, Rfl: 1    levothyroxine 150 mcg tablet, Take 1 tablet (150 mcg total) by mouth daily, Disp: 90 tablet, Rfl: 0    losartan (COZAAR) 100 MG tablet, Take 1 tablet (100 mg total) by mouth daily, Disp: 90 tablet, Rfl: 0    metFORMIN (GLUCOPHAGE) 1000 MG tablet, Take 1 tablet (1,000 mg total) by mouth 2 (two) times a day with meals, Disp: 180 tablet, Rfl: 0    metoprolol succinate (Toprol XL) 50 mg 24 hr tablet, Take 1 tablet (50 mg total) by mouth daily, Disp: 30 tablet, Rfl: 5    montelukast (SINGULAIR) 10 mg tablet, Take 1 tablet (10 mg total) by mouth daily, Disp: 90 tablet, Rfl: 0    omeprazole (PriLOSEC) 20 mg delayed release capsule, Take 1 capsule (20 mg total) by mouth daily, Disp: 90 capsule, Rfl: 3    ondansetron (Zofran ODT) 4 mg disintegrating tablet, Take 1 tablet (4 mg total) by mouth every 6 (six) hours as needed for nausea for up to 30 doses, Disp: 30 tablet, Rfl: 0    OneTouch Delica Lancets 31V MISC, Check blood sugars twice daily  Please substitute with appropriate alternative as covered by patient's insurance  Dx: E11 65, Disp: 200 each, Rfl: 3    spironolactone (ALDACTONE) 25 mg tablet, Take 1 tablet (25 mg total) by mouth daily (Patient not taking: Reported on 9/9/2022), Disp: 90 tablet, Rfl: 1    Tresiba FlexTouch 100 units/mL injection pen, inject 25 units subcutaneously once daily as directed, Disp: 15 mL, Rfl: 5    VITAMIN D PO, Take by mouth, Disp: , Rfl:     HPI    The following portions of the patient's history were reviewed and updated as appropriate: allergies, current medications, past family history, past medical history, past social history, past surgical history and problem list     Review of Systems   Constitutional: Negative  Negative for activity change, appetite change, fatigue, fever and unexpected weight change  HENT: Negative for congestion, ear pain, hearing loss, mouth sores, postnasal drip, rhinorrhea, sore throat, trouble swallowing and voice change  Eyes: Negative for pain, redness and visual disturbance  Respiratory: Negative for cough, chest tightness, shortness of breath and wheezing  Cardiovascular: Negative for chest pain, palpitations and leg swelling  Gastrointestinal: Positive for abdominal pain  Negative for abdominal distention, blood in stool, constipation, diarrhea and nausea  Endocrine: Negative for cold intolerance, heat intolerance, polydipsia, polyphagia and polyuria  Genitourinary: Negative for difficulty urinating, dysuria, flank pain, frequency, hematuria and urgency  Musculoskeletal: Positive for back pain  Negative for arthralgias, gait problem, joint swelling and myalgias  Skin: Negative for color change and pallor  Neurological: Negative for dizziness, tremors, seizures, syncope, weakness, numbness and headaches  Hematological: Negative for adenopathy  Does not bruise/bleed easily  Psychiatric/Behavioral: Negative for sleep disturbance  The patient is nervous/anxious  Objective:    /66 (BP Location: Left arm, Patient Position: Sitting, Cuff Size: Large)   Pulse 80   Temp 98 2 °F (36 8 °C)   Resp 13   Ht 5' 2" (1 575 m)   Wt 93 4 kg (206 lb)   BMI 37 68 kg/m²      Physical Exam  Vitals and nursing note reviewed  Constitutional:       General: She is not in acute distress  Appearance: She is well-developed  She is obese  She is not ill-appearing  HENT:      Head: Normocephalic  Right Ear: External ear normal       Left Ear: External ear normal       Nose: Nose normal       Mouth/Throat:      Pharynx: No oropharyngeal exudate  Eyes:      Conjunctiva/sclera: Conjunctivae normal       Pupils: Pupils are equal, round, and reactive to light  Neck:      Thyroid: No thyromegaly  Cardiovascular:      Rate and Rhythm: Normal rate and regular rhythm  Heart sounds: Normal heart sounds  No murmur heard  No friction rub  No gallop  Comments: S1-S2 regular rhythm  Extremities no edema  Pulmonary:      Effort: Pulmonary effort is normal  No respiratory distress  Breath sounds: Normal breath sounds  No wheezing or rales  Comments: Lungs are clear no wheezing rales or rhonchi    She has rib tenderness on palpation is specially on the right side  Abdominal:      General: Bowel sounds are normal  There is no distension  Palpations: Abdomen is soft  There is no mass  Tenderness: There is no abdominal tenderness  There is no guarding or rebound  Comments: Abdomen obese soft nontender   Musculoskeletal:         General: Normal range of motion  Cervical back: Normal range of motion and neck supple  Lymphadenopathy:      Cervical: No cervical adenopathy  Skin:     General: Skin is warm and dry     Neurological:      Mental Status: She is alert and oriented to person, place, and time     Psychiatric:         Behavior: Behavior normal          Judgment: Judgment normal

## 2022-10-14 ENCOUNTER — OFFICE VISIT (OUTPATIENT)
Dept: PAIN MEDICINE | Facility: MEDICAL CENTER | Age: 67
End: 2022-10-14
Payer: MEDICARE

## 2022-10-14 VITALS
OXYGEN SATURATION: 98 % | SYSTOLIC BLOOD PRESSURE: 142 MMHG | HEART RATE: 71 BPM | DIASTOLIC BLOOD PRESSURE: 70 MMHG | BODY MASS INDEX: 37.91 KG/M2 | WEIGHT: 206 LBS | HEIGHT: 62 IN

## 2022-10-14 DIAGNOSIS — M47.816 LUMBAR SPONDYLOSIS: Primary | ICD-10-CM

## 2022-10-14 DIAGNOSIS — M54.9 BACK PAIN, UNSPECIFIED BACK LOCATION, UNSPECIFIED BACK PAIN LATERALITY, UNSPECIFIED CHRONICITY: ICD-10-CM

## 2022-10-14 PROCEDURE — 99204 OFFICE O/P NEW MOD 45 MIN: CPT | Performed by: PHYSICAL MEDICINE & REHABILITATION

## 2022-10-14 NOTE — LETTER
October 17, 2022     Tra Lincoln MD  [address] [place] [address]    Patient: Rios Khan   YOB: 1955   Date of Visit: 10/14/2022       Dear Dr Estevan Fuentes: Thank you for referring Miguel Angel Ponce to me for evaluation  Below are my notes for this consultation  If you have questions, please do not hesitate to call me  I look forward to following your patient along with you  Sincerely,        Margy Poole DO        CC: No Recipients  Margy Poole DO  10/14/2022  9:04 AM  Signed  Assessment  1  Lumbar spondylosis    2  Back pain, unspecified back location, unspecified back pain laterality, unspecified chronicity        Plan  1  At this time we will initiate physical therapy for the patient's lower back pain  2  She should follow-up with us at the conclusion of physical therapy if she continues to have pain  At that point would recommend bilateral L3-5 medial branch nerve blocks with the intention of moving forward with radiofrequency ablation if she has a positive diagnostic response  My impressions and treatment recommendations were discussed in detail with the patient who verbalized understanding and had no further questions  Discharge instructions were provided  I personally saw and examined the patient and I agree with the above discussed plan of care  Orders Placed This Encounter   Procedures   • Ambulatory referral to Physical Therapy     Standing Status:   Future     Standing Expiration Date:   10/14/2023     Referral Priority:   Routine     Referral Type:   Physical Therapy     Referral Reason:   Specialty Services Required     Requested Specialty:   Physical Therapy     Number of Visits Requested:   1     Expiration Date:   10/14/2023     No orders of the defined types were placed in this encounter        History of Present Illness    Rios Khan is a 79 y o  female seen in consultation at the request of Dr Estevan Fuentes regarding chronic lower back pain  Patient has been experiencing symptoms for a number of years without any inciting event or trauma and describing moderate to severe intensity pain currently rated as an 8/10  This is constant throughout the entirety of the day and described as cramping in her lower back with some referred pain to the lateral hip region  She does describe subjective lower extremity weakness but does not ambulate with an assistive device  Aggravating factors include walking  Alleviating factors include standing and bending  Diagnostic studies include x-rays of lumbar spine  This does reveal some degenerative changes of lower lumbar facet joints likely accounting for her symptoms  She has not tried physical therapy yet and is interested in conservative care prior to considering interventional approaches with us  Currently using acetaminophen and aspirin as well as Lidoderm patches for pain relief in seeking further benefit  I have personally reviewed and/or updated the patient's past medical history, past surgical history, family history, social history, current medications, allergies, and vital signs today  Review of Systems   Constitutional: Negative for fever and unexpected weight change  HENT: Negative for trouble swallowing  Eyes: Negative for visual disturbance  Respiratory: Negative for shortness of breath and wheezing  Cardiovascular: Negative for chest pain and palpitations  Gastrointestinal: Negative for constipation, diarrhea, nausea and vomiting  Endocrine: Negative for cold intolerance, heat intolerance and polydipsia  Genitourinary: Negative for difficulty urinating and frequency  Musculoskeletal: Positive for gait problem and joint swelling  Negative for arthralgias and myalgias  Skin: Negative for rash  Neurological: Negative for dizziness, seizures, syncope, weakness and headaches  Hematological: Does not bruise/bleed easily     Psychiatric/Behavioral: Negative for dysphoric mood  All other systems reviewed and are negative        Patient Active Problem List   Diagnosis   • Acquired hypothyroidism   • Essential hypertension   • Mild intermittent asthma with acute exacerbation   • Pure hypercholesterolemia   • Atypical chest pain   • Influenza A   • Lung infiltrate   • Severe sepsis (HCC)   • Irritant contact dermatitis   • Depression, recurrent (HCC)   • Uncontrolled type 2 diabetes mellitus with hyperglycemia (HCC)   • Spondylolisthesis of lumbar region   • Preop examination   • History of orthopnea   • Obesity (BMI 35 0-39 9 without comorbidity)   • Plantar fasciitis of left foot   • Thyroid nodule   • Lower esophageal ring (Schatzki)   • Continuous opioid dependence (HCC)       Past Medical History:   Diagnosis Date   • Abdominal pain    • Asthma    • Back pain     occas due to "disc problem"   • Balance problems     "right side"   • Cholelithiasis     lap aviva today 3/10/2021   • Colon polyp    • Cracked tooth     3   • Depression    • Diabetes mellitus (HCC)    • Difficulty swallowing     "food feels like stuck sometimes'   • Exercise involving walking     steps and cleaning   • Fatty liver    • GERD (gastroesophageal reflux disease)    • Hiatal hernia    • History of pneumonia    • History of stomach ulcers    • Hyperlipidemia    • Hypertension    • Obesity    • Pneumonia    • Refusal of blood transfusions as patient is Confucianist    • Risk for falls    • Stroke [facility])    • Uses Sami as primary spoken language    • Wears glasses        Past Surgical History:   Procedure Laterality Date   • CHOLECYSTECTOMY     • COLONOSCOPY  02/09/2021    4 polyps tubular adenomas by Dr Rollo Sever   • EGD  03/31/2021    nonobstructing Schatzki's ring dilated with [address], sliding hiatal hernia otherwise normal   Biopsies stomach  negative for H  pylori biopsy esophagus negative for eosinophilic esophagitis by Dr Ar Zarate   • EGD AND COLONOSCOPY  02/09/2021 3 YEAR RECOMMENDED = COLONO   • [place] LAP,CHOLECYSTECTOMY N/A 3/10/2021    Procedure: CHOLECYSTECTOMY LAPAROSCOPIC W/ ROBOTICS;  Surgeon: Alexandria Garcia MD;  Location: [facility];  Service: General   • ROTATOR CUFF REPAIR Bilateral     screws implanted"   • UPPER GASTROINTESTINAL ENDOSCOPY  02/09/2021    4 cm hiatal hernia without Alejandro lesions  Possibly shortened esophagus    Biopsy of the stomach negative for H  pylori, biopsy of the EG junction negative for Kaplan's, biopsied duodenum negative for celiac by Dr Raad Rodriguez       Family History   Problem Relation Age of Onset   • Diabetes Mother    • Hypertension Mother    • Hypertension Father    • No Known Problems Sister    • No Known Problems Daughter    • No Known Problems Maternal Grandmother    • No Known Problems Maternal Grandfather    • No Known Problems Paternal Grandmother    • No Known Problems Paternal Grandfather    • No Known Problems Son    • No Known Problems Son    • No Known Problems Daughter    • No Known Problems Daughter    • Colon cancer Brother 71       Social History     Occupational History   • Occupation: House wife   Tobacco Use   • Smoking status: Never Smoker   • Smokeless tobacco: Never Used   Vaping Use   • Vaping Use: Never used   Substance and Sexual Activity   • Alcohol use: Never   • Drug use: Never   • Sexual activity: Not Currently       Current Outpatient Medications on File Prior to Visit   Medication Sig   • albuterol (2 5 mg/3 mL) 0 083 % nebulizer solution Take 1 vial (2 5 mg total) by nebulization every 6 (six) hours as needed for wheezing or shortness of breath   • albuterol (PROVENTIL HFA,VENTOLIN HFA) 90 mcg/act inhaler Inhale 2 puffs every 6 (six) hours as needed for wheezing   • aspirin 81 mg chewable tablet Chew 81 mg daily   • atorvastatin (LIPITOR) 20 mg tablet Take 1 tablet (20 mg total) by mouth daily   • budesonide-formoterol (SYMBICORT) 160-4 5 mcg/act inhaler Inhale 2 puffs 2 (two) times a day as needed Rinse mouth after use  • clonazePAM (KlonoPIN) 0 125 mg disintegrating tablet One p r n  as needed for anxiety   • dicyclomine (BENTYL) 20 mg tablet Take 1 tablet (20 mg total) by mouth 2 (two) times a day   • escitalopram (Lexapro) 10 mg tablet Take 1 tablet (10 mg total) by mouth daily   • ezetimibe (ZETIA) 10 mg tablet Take 1 tablet (10 mg total) by mouth daily   • furosemide (LASIX) 20 mg tablet Take 20 mg by mouth 2 (two) times a day as needed   • glimepiride (AMARYL) 4 mg tablet Take 1 tablet (4 mg total) by mouth daily with breakfast   • levothyroxine 150 mcg tablet Take 1 tablet (150 mcg total) by mouth daily   • losartan (COZAAR) 100 MG tablet Take 1 tablet (100 mg total) by mouth daily   • metFORMIN (GLUCOPHAGE) 1000 MG tablet Take 1 tablet (1,000 mg total) by mouth 2 (two) times a day with meals   • metoprolol succinate (Toprol XL) 50 mg 24 hr tablet Take 1 tablet (50 mg total) by mouth daily   • montelukast (SINGULAIR) 10 mg tablet Take 1 tablet (10 mg total) by mouth daily   • omeprazole (PriLOSEC) 20 mg delayed release capsule Take 1 capsule (20 mg total) by mouth daily   • ondansetron (Zofran ODT) 4 mg disintegrating tablet Take 1 tablet (4 mg total) by mouth every 6 (six) hours as needed for nausea for up to 30 doses   • VITAMIN D PO Take by mouth   • BD Pen Needle Berenice 2nd Gen 32G X 4 MM MISC USE TWICE A DAY (Patient not taking: Reported on 10/14/2022)   • Blood Glucose Monitoring Suppl (ONE TOUCH ULTRA 2) w/Device KIT Test blood sugar 3 times daily (Patient not taking: Reported on 10/14/2022)   • Blood Glucose Monitoring Suppl (OneTouch Verio Reflect) w/Device KIT Check blood sugars twice daily  Please substitute with appropriate alternative as covered by patient's insurance   Dx: E11 65 (Patient not taking: Reported on 10/14/2022)   • famotidine (PEPCID) 20 mg tablet Take 1 tablet (20 mg total) by mouth 2 (two) times a day (Patient not taking: Reported on 10/14/2022)   • glucose blood (OneTouch Verio) test strip Check blood sugars twice daily  Please substitute with appropriate alternative as covered by patient's insurance  Dx: E11 65 (Patient not taking: Reported on 10/14/2022)   • glucose blood test strip Prior to each meal (Patient not taking: Reported on 10/14/2022)   • Lancets (ONETOUCH DELICA PLUS SEDAYH35S) MISC TEST 3 TIMES (Patient not taking: Reported on 10/14/2022)   • OneTouch Delica Lancets 31E MISC Check blood sugars twice daily  Please substitute with appropriate alternative as covered by patient's insurance  Dx: E11 65 (Patient not taking: Reported on 10/14/2022)   • spironolactone (ALDACTONE) 25 mg tablet Take 1 tablet (25 mg total) by mouth daily (Patient not taking: Reported on 9/9/2022)   • Tresiba FlexTouch 100 units/mL injection pen inject 25 units subcutaneously once daily as directed (Patient not taking: Reported on 10/14/2022)     No current facility-administered medications on file prior to visit  Allergies   Allergen Reactions   • Etodolac Shortness Of Breath   • Iodinated Diagnostic Agents Shortness Of Breath and Wheezing   • Simvastatin Other (See Comments)     elevated liver function    • Latex Rash   • Morphine Palpitations   • Penicillins Rash       Physical Exam    /70   Pulse 71   Ht 5' 2" (1 575 m)   Wt 93 4 kg (206 lb)   SpO2 98%   BMI 37 68 kg/m²     Constitutional: normal, well developed, well nourished, alert, in no distress and non-toxic and no overt pain behavior    Eyes: anicteric  HEENT: grossly intact  Neck: symmetric, trachea midline and no masses   Pulmonary:even and unlabored  Cardiovascular:No edema or pitting edema present  Skin:Normal without rashes or lesions and well hydrated  Psychiatric:Mood and affect appropriate  Neurologic:Cranial Nerves II-XII grossly intact, bilateral lower extremity muscle stretch reflexes are physiologic and symmetric, bilateral lower extremity strength is normal, negative straight leg raise test from a seated position  Musculoskeletal:normal, except for pain in the axial lumbar spine, pain is reproduced with lumbar extension and rotation    Imaging

## 2022-10-14 NOTE — PROGRESS NOTES
Assessment  1  Lumbar spondylosis    2  Back pain, unspecified back location, unspecified back pain laterality, unspecified chronicity        Plan  1  At this time we will initiate physical therapy for the patient's lower back pain  2  She should follow-up with us at the conclusion of physical therapy if she continues to have pain  At that point would recommend bilateral L3-5 medial branch nerve blocks with the intention of moving forward with radiofrequency ablation if she has a positive diagnostic response  My impressions and treatment recommendations were discussed in detail with the patient who verbalized understanding and had no further questions  Discharge instructions were provided  I personally saw and examined the patient and I agree with the above discussed plan of care  Orders Placed This Encounter   Procedures   • Ambulatory referral to Physical Therapy     Standing Status:   Future     Standing Expiration Date:   10/14/2023     Referral Priority:   Routine     Referral Type:   Physical Therapy     Referral Reason:   Specialty Services Required     Requested Specialty:   Physical Therapy     Number of Visits Requested:   1     Expiration Date:   10/14/2023     No orders of the defined types were placed in this encounter  History of Present Illness    Jo Downing is a 79 y o  female seen in consultation at the request of Dr Ramirez Agent regarding chronic lower back pain  Patient has been experiencing symptoms for a number of years without any inciting event or trauma and describing moderate to severe intensity pain currently rated as an 8/10  This is constant throughout the entirety of the day and described as cramping in her lower back with some referred pain to the lateral hip region  She does describe subjective lower extremity weakness but does not ambulate with an assistive device  Aggravating factors include walking    Alleviating factors include standing and bending  Diagnostic studies include x-rays of lumbar spine  This does reveal some degenerative changes of lower lumbar facet joints likely accounting for her symptoms  She has not tried physical therapy yet and is interested in conservative care prior to considering interventional approaches with us  Currently using acetaminophen and aspirin as well as Lidoderm patches for pain relief in seeking further benefit  I have personally reviewed and/or updated the patient's past medical history, past surgical history, family history, social history, current medications, allergies, and vital signs today  Review of Systems   Constitutional: Negative for fever and unexpected weight change  HENT: Negative for trouble swallowing  Eyes: Negative for visual disturbance  Respiratory: Negative for shortness of breath and wheezing  Cardiovascular: Negative for chest pain and palpitations  Gastrointestinal: Negative for constipation, diarrhea, nausea and vomiting  Endocrine: Negative for cold intolerance, heat intolerance and polydipsia  Genitourinary: Negative for difficulty urinating and frequency  Musculoskeletal: Positive for gait problem and joint swelling  Negative for arthralgias and myalgias  Skin: Negative for rash  Neurological: Negative for dizziness, seizures, syncope, weakness and headaches  Hematological: Does not bruise/bleed easily  Psychiatric/Behavioral: Negative for dysphoric mood  All other systems reviewed and are negative        Patient Active Problem List   Diagnosis   • Acquired hypothyroidism   • Essential hypertension   • Mild intermittent asthma with acute exacerbation   • Pure hypercholesterolemia   • Atypical chest pain   • Influenza A   • Lung infiltrate   • Severe sepsis (HCC)   • Irritant contact dermatitis   • Depression, recurrent ([address] )   • Uncontrolled type 2 diabetes mellitus with hyperglycemia ([address] )   • Spondylolisthesis of lumbar region   • Preop examination   • History of orthopnea   • Obesity (BMI 35 0-39 9 without comorbidity)   • Plantar fasciitis of left foot   • Thyroid nodule   • Lower esophageal ring (Schatzki)   • Continuous opioid dependence (HCC)       Past Medical History:   Diagnosis Date   • Abdominal pain    • Asthma    • Back pain     occas due to "disc problem"   • Balance problems     "right side"   • Cholelithiasis     lap aviva today 3/10/2021   • Colon polyp    • Cracked tooth     3   • Depression    • Diabetes mellitus (HCC)    • Difficulty swallowing     "food feels like stuck sometimes'   • Exercise involving walking     steps and cleaning   • Fatty liver    • GERD (gastroesophageal reflux disease)    • Hiatal hernia    • History of pneumonia    • History of stomach ulcers    • Hyperlipidemia    • Hypertension    • Obesity    • Pneumonia    • Refusal of blood transfusions as patient is Christian    • Risk for falls    • Stroke [facility])    • Uses Belarusian as primary spoken language    • Wears glasses        Past Surgical History:   Procedure Laterality Date   • CHOLECYSTECTOMY     • COLONOSCOPY  02/09/2021    4 polyps tubular adenomas by Dr Chava Hartmann   • EGD  03/31/2021    nonobstructing Schatzki's ring dilated with [address], sliding hiatal hernia otherwise normal   Biopsies stomach  negative for H  pylori biopsy esophagus negative for eosinophilic esophagitis by Dr Mer Sinclair   • EGD AND COLONOSCOPY  02/09/2021    3 YEAR RECOMMENDED = COLONO   • [place] LAP,CHOLECYSTECTOMY N/A 3/10/2021    Procedure: CHOLECYSTECTOMY LAPAROSCOPIC W/ ROBOTICS;  Surgeon: Celena Rutledge MD;  Location: [facility];  Service: General   • ROTATOR CUFF REPAIR Bilateral     screws implanted"   • UPPER GASTROINTESTINAL ENDOSCOPY  02/09/2021    4 cm hiatal hernia without Alejandro lesions  Possibly shortened esophagus    Biopsy of the stomach negative for H  pylori, biopsy of the EG junction negative for Kaplan's, biopsied duodenum negative for celiac by Dr Danny Muñiz       Family History   Problem Relation Age of Onset   • Diabetes Mother    • Hypertension Mother    • Hypertension Father    • No Known Problems Sister    • No Known Problems Daughter    • No Known Problems Maternal Grandmother    • No Known Problems Maternal Grandfather    • No Known Problems Paternal Grandmother    • No Known Problems Paternal Grandfather    • No Known Problems Son    • No Known Problems Son    • No Known Problems Daughter    • No Known Problems Daughter    • Colon cancer Brother 71       Social History     Occupational History   • Occupation: House wife   Tobacco Use   • Smoking status: Never Smoker   • Smokeless tobacco: Never Used   Vaping Use   • Vaping Use: Never used   Substance and Sexual Activity   • Alcohol use: Never   • Drug use: Never   • Sexual activity: Not Currently       Current Outpatient Medications on File Prior to Visit   Medication Sig   • albuterol (2 5 mg/3 mL) 0 083 % nebulizer solution Take 1 vial (2 5 mg total) by nebulization every 6 (six) hours as needed for wheezing or shortness of breath   • albuterol (PROVENTIL HFA,VENTOLIN HFA) 90 mcg/act inhaler Inhale 2 puffs every 6 (six) hours as needed for wheezing   • aspirin 81 mg chewable tablet Chew 81 mg daily   • atorvastatin (LIPITOR) 20 mg tablet Take 1 tablet (20 mg total) by mouth daily   • budesonide-formoterol (SYMBICORT) 160-4 5 mcg/act inhaler Inhale 2 puffs 2 (two) times a day as needed Rinse mouth after use     • clonazePAM (KlonoPIN) 0 125 mg disintegrating tablet One p r n  as needed for anxiety   • dicyclomine (BENTYL) 20 mg tablet Take 1 tablet (20 mg total) by mouth 2 (two) times a day   • escitalopram (Lexapro) 10 mg tablet Take 1 tablet (10 mg total) by mouth daily   • ezetimibe (ZETIA) 10 mg tablet Take 1 tablet (10 mg total) by mouth daily   • furosemide (LASIX) 20 mg tablet Take 20 mg by mouth 2 (two) times a day as needed   • glimepiride (AMARYL) 4 mg tablet Take 1 tablet (4 mg total) by mouth daily with breakfast   • levothyroxine 150 mcg tablet Take 1 tablet (150 mcg total) by mouth daily   • losartan (COZAAR) 100 MG tablet Take 1 tablet (100 mg total) by mouth daily   • metFORMIN (GLUCOPHAGE) 1000 MG tablet Take 1 tablet (1,000 mg total) by mouth 2 (two) times a day with meals   • metoprolol succinate (Toprol XL) 50 mg 24 hr tablet Take 1 tablet (50 mg total) by mouth daily   • montelukast (SINGULAIR) 10 mg tablet Take 1 tablet (10 mg total) by mouth daily   • omeprazole (PriLOSEC) 20 mg delayed release capsule Take 1 capsule (20 mg total) by mouth daily   • ondansetron (Zofran ODT) 4 mg disintegrating tablet Take 1 tablet (4 mg total) by mouth every 6 (six) hours as needed for nausea for up to 30 doses   • VITAMIN D PO Take by mouth   • BD Pen Needle Berenice 2nd Gen 32G X 4 MM MISC USE TWICE A DAY (Patient not taking: Reported on 10/14/2022)   • Blood Glucose Monitoring Suppl (ONE TOUCH ULTRA 2) w/Device KIT Test blood sugar 3 times daily (Patient not taking: Reported on 10/14/2022)   • Blood Glucose Monitoring Suppl (OneTouch Verio Reflect) w/Device KIT Check blood sugars twice daily  Please substitute with appropriate alternative as covered by patient's insurance  Dx: E11 65 (Patient not taking: Reported on 10/14/2022)   • famotidine (PEPCID) 20 mg tablet Take 1 tablet (20 mg total) by mouth 2 (two) times a day (Patient not taking: Reported on 10/14/2022)   • glucose blood (OneTouch Verio) test strip Check blood sugars twice daily  Please substitute with appropriate alternative as covered by patient's insurance  Dx: E11 65 (Patient not taking: Reported on 10/14/2022)   • glucose blood test strip Prior to each meal (Patient not taking: Reported on 10/14/2022)   • Lancets (ONETOUCH DELICA PLUS IBXBOS20B) MISC TEST 3 TIMES (Patient not taking: Reported on 10/14/2022)   • OneTouch Delica Lancets 49X MISC Check blood sugars twice daily   Please substitute with appropriate alternative as covered by patient's insurance  Dx: E11 65 (Patient not taking: Reported on 10/14/2022)   • spironolactone (ALDACTONE) 25 mg tablet Take 1 tablet (25 mg total) by mouth daily (Patient not taking: Reported on 9/9/2022)   • Tresiba FlexTouch 100 units/mL injection pen inject 25 units subcutaneously once daily as directed (Patient not taking: Reported on 10/14/2022)     No current facility-administered medications on file prior to visit  Allergies   Allergen Reactions   • Etodolac Shortness Of Breath   • Iodinated Diagnostic Agents Shortness Of Breath and Wheezing   • Simvastatin Other (See Comments)     elevated liver function    • Latex Rash   • Morphine Palpitations   • Penicillins Rash       Physical Exam    /70   Pulse 71   Ht 5' 2" (1 575 m)   Wt 93 4 kg (206 lb)   SpO2 98%   BMI 37 68 kg/m²     Constitutional: normal, well developed, well nourished, alert, in no distress and non-toxic and no overt pain behavior    Eyes: anicteric  HEENT: grossly intact  Neck: symmetric, trachea midline and no masses   Pulmonary:even and unlabored  Cardiovascular:No edema or pitting edema present  Skin:Normal without rashes or lesions and well hydrated  Psychiatric:Mood and affect appropriate  Neurologic:Cranial Nerves II-XII grossly intact, bilateral lower extremity muscle stretch reflexes are physiologic and symmetric, bilateral lower extremity strength is normal, negative straight leg raise test from a seated position  Musculoskeletal:normal, except for pain in the axial lumbar spine, pain is reproduced with lumbar extension and rotation    Imaging

## 2022-10-14 NOTE — PATIENT INSTRUCTIONS
Arthritis   WHAT YOU NEED TO KNOW:   Arthritis is pain or disease in one or more joints  There are many types of arthritis  Types such as rheumatoid arthritis cause inflammation in the joints  Other types wear away the cartilage between joints, such as osteoarthritis  This makes the bones of the joint rub together when you move the joint  An infection from bacteria, a virus, or a fungus can also cause arthritis  Your symptoms may be constant, or symptoms may come and go  Arthritis often gets worse over time and can cause permanent joint damage  DISCHARGE INSTRUCTIONS:   Call your doctor or rheumatologist if:   You have a fever and severe joint pain or swelling  You cannot move the affected joint  You have severe joint pain you cannot tolerate  You have a new or worsening rash  Your pain or swelling does not get better with treatment  You have questions or concerns about your condition or care  Medicines:   Acetaminophen  decreases pain and fever  It is available without a doctor's order  Ask how much to take and how often to take it  Follow directions  Read the labels of all other medicines you are using to see if they also contain acetaminophen, or ask your doctor or pharmacist  Acetaminophen can cause liver damage if not taken correctly  Do not use more than 4 grams (4,000 milligrams) total of acetaminophen in one day  NSAIDs , such as ibuprofen, help decrease swelling, pain, and fever  This medicine is available with or without a doctor's order  NSAIDs can cause stomach bleeding or kidney problems in certain people  If you take blood thinner medicine, always ask your healthcare provider if NSAIDs are safe for you  Always read the medicine label and follow directions  Steroids  reduce swelling and pain  Prescription pain medicine  may be given  Ask your healthcare provider how to take this medicine safely  Some prescription pain medicines contain acetaminophen   Do not take other medicines that contain acetaminophen without talking to your healthcare provider  Too much acetaminophen may cause liver damage  Prescription pain medicine may cause constipation  Ask your healthcare provider how to prevent or treat constipation  Take your medicine as directed  Contact your healthcare provider if you think your medicine is not helping or if you have side effects  Tell him of her if you are allergic to any medicine  Keep a list of the medicines, vitamins, and herbs you take  Include the amounts, and when and why you take them  Bring the list or the pill bottles to follow-up visits  Carry your medicine list with you in case of an emergency  Manage your symptoms:   Rest your painful joint so it can heal   Your healthcare provider may recommend crutches or a walker if the affected joint is in a leg  Apply ice or heat to the joint  Both can help decrease swelling and pain  Ice may also help prevent tissue damage  Use an ice pack, or put crushed ice in a plastic bag  Cover it with a towel and place it on your joint for 15 to 20 minutes every hour or as directed  You can apply heat for 20 minutes every 2 hours  Heat treatment includes hot packs or heat lamps  Elevate your joint  Elevation helps reduce swelling and pain  Raise your joint above the level of your heart as often as you can  Prop your painful joint on pillows to keep it above your heart comfortably  Manage arthritis:   Talk to your healthcare providers about your arthritis medicines  Some medicines may only be needed when you have arthritis pain  You may need to take other medicines every day to prevent arthritis from getting worse  Your healthcare providers will help you understand all your medicines and when to take them  It is important to take the medicines as directed, even if you start to feel better  You can continue to have joint damage and inflammation even if you do not feel it      Eat a variety of healthy foods   Healthy foods include fruits, vegetables, whole-grain breads, low-fat dairy products, beans, lean meats, and fish  Ask if you need to be on a special diet  A diet rich in calcium and vitamin D may decrease your risk of osteoporosis  Foods high in calcium include milk, cheese, broccoli, and tofu  Vitamin D may be found in meat, fish, fortified milk, cereal and bread  Ask if you need calcium or vitamin D supplements  Go to physical or occupational therapy as directed  A physical therapist can teach you exercises to improve flexibility and range of motion  You may also be shown non-weight-bearing exercises that are safe for your joints, such as swimming  Exercise can help keep your joints flexible and reduce pain  An occupational therapist can help you learn to do your daily activities when your joints are stiff or sore  Maintain a healthy weight  Extra weight puts increased pressure on your joints  Ask your healthcare provider what you should weigh  If you need to lose weight, he or she can help you create a weight loss program  Weight loss can help reduce pain and increase your ability to do your activities  Wear flat or low-heeled shoes  This will help decrease pain and reduce pressure on your ankle, knee, and hip joints  Do not smoke  Nicotine and other chemicals in cigarettes and cigars can damage your bones and joints  Ask your healthcare provider for information if you currently smoke and need help to quit  E-cigarettes or smokeless tobacco still contain nicotine  Talk to your healthcare provider before you use these products  Support devices:   Orthotic shoes or insoles  help support your feet when you walk  Crutches, a cane, or a walker  may help decrease your risk for falling  They also decrease stress on affected joints  Devices to prevent falls  include raised toilet seats and bathtub bars to help you get up from sitting   Handrails can be placed in areas where you need balance and support  Devices to help with support and rest  include splints to wear on your hands and a firm pillow while you sleep  Use a pillow that is firm enough to support your neck and head  Follow up with your healthcare provider or rheumatologist as directed:  Write down your questions so you remember to ask them during your visits  © Copyright [employer] 2022 Information is for End User's use only and may not be sold, redistributed or otherwise used for commercial purposes  All illustrations and images included in CareNotes® are the copyrighted property of [employer]  or [facility]   The above information is an  only  It is not intended as medical advice for individual conditions or treatments  Talk to your doctor, nurse or pharmacist before following any medical regimen to see if it is safe and effective for you

## 2022-10-21 DIAGNOSIS — R00.2 PALPITATIONS: ICD-10-CM

## 2022-10-21 DIAGNOSIS — J45.20 MILD INTERMITTENT ASTHMA WITHOUT COMPLICATION: ICD-10-CM

## 2022-10-21 DIAGNOSIS — E03.9 ACQUIRED HYPOTHYROIDISM: ICD-10-CM

## 2022-10-21 DIAGNOSIS — E78.00 PURE HYPERCHOLESTEROLEMIA: ICD-10-CM

## 2022-10-21 DIAGNOSIS — I10 ESSENTIAL HYPERTENSION: ICD-10-CM

## 2022-10-21 DIAGNOSIS — K21.9 GASTROESOPHAGEAL REFLUX DISEASE WITHOUT ESOPHAGITIS: ICD-10-CM

## 2022-10-21 DIAGNOSIS — E11.9 TYPE 2 DIABETES MELLITUS WITHOUT COMPLICATION, WITHOUT LONG-TERM CURRENT USE OF INSULIN (HCC): ICD-10-CM

## 2022-10-21 DIAGNOSIS — R07.89 ATYPICAL CHEST PAIN: ICD-10-CM

## 2022-10-21 RX ORDER — MONTELUKAST SODIUM 10 MG/1
10 TABLET ORAL DAILY
Qty: 90 TABLET | Refills: 0 | Status: SHIPPED | OUTPATIENT
Start: 2022-10-21

## 2022-10-21 RX ORDER — METOPROLOL SUCCINATE 50 MG/1
50 TABLET, EXTENDED RELEASE ORAL DAILY
Qty: 30 TABLET | Refills: 5 | Status: SHIPPED | OUTPATIENT
Start: 2022-10-21

## 2022-10-21 RX ORDER — EZETIMIBE 10 MG/1
10 TABLET ORAL DAILY
Qty: 90 TABLET | Refills: 0 | Status: SHIPPED | OUTPATIENT
Start: 2022-10-21

## 2022-10-21 RX ORDER — INSULIN DEGLUDEC INJECTION 100 U/ML
25 INJECTION, SOLUTION SUBCUTANEOUS DAILY
Qty: 15 ML | Refills: 1 | Status: SHIPPED | OUTPATIENT
Start: 2022-10-21

## 2022-10-21 RX ORDER — LOSARTAN POTASSIUM 100 MG/1
100 TABLET ORAL DAILY
Qty: 90 TABLET | Refills: 0 | Status: SHIPPED | OUTPATIENT
Start: 2022-10-21 | End: 2023-01-19

## 2022-10-21 RX ORDER — GLIMEPIRIDE 4 MG/1
4 TABLET ORAL
Qty: 90 TABLET | Refills: 0 | Status: SHIPPED | OUTPATIENT
Start: 2022-10-21

## 2022-10-21 RX ORDER — ATORVASTATIN CALCIUM 20 MG/1
20 TABLET, FILM COATED ORAL DAILY
Qty: 30 TABLET | Refills: 0 | Status: SHIPPED | OUTPATIENT
Start: 2022-10-21

## 2022-10-21 RX ORDER — OMEPRAZOLE 20 MG/1
20 CAPSULE, DELAYED RELEASE ORAL DAILY
Qty: 90 CAPSULE | Refills: 0 | Status: SHIPPED | OUTPATIENT
Start: 2022-10-21

## 2022-10-21 NOTE — TELEPHONE ENCOUNTER
Patient requesting new script to be send to new [facility]

## 2022-11-03 ENCOUNTER — CONSULT (OUTPATIENT)
Dept: ENDOCRINOLOGY | Facility: CLINIC | Age: 67
End: 2022-11-03

## 2022-11-03 VITALS
SYSTOLIC BLOOD PRESSURE: 140 MMHG | BODY MASS INDEX: 38.09 KG/M2 | DIASTOLIC BLOOD PRESSURE: 80 MMHG | WEIGHT: 207 LBS | HEIGHT: 62 IN | HEART RATE: 65 BPM

## 2022-11-03 DIAGNOSIS — I10 ESSENTIAL HYPERTENSION: ICD-10-CM

## 2022-11-03 DIAGNOSIS — G47.33 OBSTRUCTIVE SLEEP APNEA: Primary | ICD-10-CM

## 2022-11-03 DIAGNOSIS — E11.65 UNCONTROLLED TYPE 2 DIABETES MELLITUS WITH HYPERGLYCEMIA (HCC): ICD-10-CM

## 2022-11-03 DIAGNOSIS — E11.65 TYPE 2 DIABETES MELLITUS WITH HYPERGLYCEMIA, WITH LONG-TERM CURRENT USE OF INSULIN (HCC): ICD-10-CM

## 2022-11-03 DIAGNOSIS — Z79.4 TYPE 2 DIABETES MELLITUS WITH HYPERGLYCEMIA, WITH LONG-TERM CURRENT USE OF INSULIN (HCC): ICD-10-CM

## 2022-11-03 RX ORDER — INSULIN DEGLUDEC INJECTION 100 U/ML
20 INJECTION, SOLUTION SUBCUTANEOUS DAILY
Qty: 15 ML | Refills: 1 | Status: SHIPPED | OUTPATIENT
Start: 2022-11-03

## 2022-11-03 RX ORDER — INSULIN LISPRO 100 [IU]/ML
7 INJECTION, SOLUTION INTRAVENOUS; SUBCUTANEOUS
Qty: 15 ML | Refills: 0 | Status: SHIPPED | OUTPATIENT
Start: 2022-11-03

## 2022-11-03 NOTE — PROGRESS NOTES
New Patient Progress Note      Chief complaint: Establish care for T2DM    Referring Provider  Dallas Mohamud  [address] [facility] [place],  [address]      History of Present Illness:   Consuelo Decker is a 79 y o  female with a history of type 2 diabetes diagnosed 15 years ago complicated history of TIA, neuropathy seen in initial consultation at the request of PCP Dr Moody Eddy to establish care for type 2 diabetes with uncontrolled hyperglycemia  She recalls being initially started on metformin at diagnosis and subsequently started on insulin about 5 years ago  Currently on metformin 1000 mg b i d , glimepiride 4 mg daily with breakfast and Tresiba 25 units daily in the morning  Within the past year, glucose has been uncontrolled with most recent A1c of 9 1 which she attributed to dietary indiscretion  Also reports associated weight gain  Denies ongoing polyuria, polydipsia or blurry vision  Denies any recent illness or hospitalizations for hypo or hyperglycemia  Presents to the office with a sugar log which showed rare episodes of hypoglycemia with glucose in the 60s usually in a m  with symptoms of lightheadedness  Current regimen: Metformin 1000 mg bid, Glimepiride 4 mg daily with breakfast, Tresiba 25 units daily with bedtime  A1c: 9 1 (09/2022)  MicroalbCr ratio: 7    Injects in: lower abdomen Rotates sites: Yes    Home blood glucose readings:   Before breakfast: 68 - 208  Before lunch: 107 - 280s    Diabetes education: NO   Diet: 3 meals per day, 2 - 3 snacks per day  Opthamology: Follows with Ophthalmology yearly, history of cataract surgery and laser procedures done in the past   Last eye exam was earlier this year  Podiatry: Yet to establish care with Podiatry    Has hypertension: followed by PCP; ARB - losartan 100 mg daily  Has hyperlipidemia: followed by PCP; on statin - tolerating well, no myalgias      Thyroid disorders:  History of hyperthyroidism (likely Grave's disease) status post radioactive iodine tx complicated by hypothyroidism - on levothyroxine 150 mcg daily managed by PCP    History of pancreatitis: None    Patient Active Problem List   Diagnosis   • Acquired hypothyroidism   • Essential hypertension   • Mild intermittent asthma with acute exacerbation   • Pure hypercholesterolemia   • Atypical chest pain   • Influenza A   • Lung infiltrate   • Severe sepsis (HCC)   • Irritant contact dermatitis   • Depression, recurrent (HCC)   • Uncontrolled type 2 diabetes mellitus with hyperglycemia ([facility] )   • Spondylolisthesis of lumbar region   • Preop examination   • History of orthopnea   • Obesity (BMI 35 0-39 9 without comorbidity)   • Plantar fasciitis of left foot   • Thyroid nodule   • Lower esophageal ring (Schatzki)   • Continuous opioid dependence (HCC)      Past Medical History:   Diagnosis Date   • Abdominal pain    • Asthma    • Back pain     occas due to "disc problem"   • Balance problems     "right side"   • Cholelithiasis     lap aviva today 3/10/2021   • Colon polyp    • Cracked tooth     3   • Depression    • Diabetes mellitus (HCC)    • Difficulty swallowing     "food feels like stuck sometimes'   • Exercise involving walking     steps and cleaning   • Fatty liver    • GERD (gastroesophageal reflux disease)    • Hiatal hernia    • History of pneumonia    • History of stomach ulcers    • Hyperlipidemia    • Hypertension    • Obesity    • Pneumonia    • Refusal of blood transfusions as patient is [employer]    • Risk for falls    • Stroke [facility])    • Uses Bengali as primary spoken language    • Wears glasses       Past Surgical History:   Procedure Laterality Date   • CHOLECYSTECTOMY     • COLONOSCOPY  02/09/2021    4 polyps tubular adenomas by Dr Sharonda Negrete   • EGD  03/31/2021    nonobstructing Schatzki's ring dilated with 47 Vietnamese Hernandez, sliding hiatal hernia otherwise normal   Biopsies stomach  negative for H  pylori biopsy esophagus negative for eosinophilic esophagitis by Dr Isa Thomas   • EGD AND COLONOSCOPY  02/09/2021    3 YEAR RECOMMENDED = COLONO   • GA LAP,CHOLECYSTECTOMY N/A 3/10/2021    Procedure: CHOLECYSTECTOMY LAPAROSCOPIC W/ ROBOTICS;  Surgeon: Bora Mejias MD;  Location: [facility];  Service: General   • ROTATOR CUFF REPAIR Bilateral     screws implanted"   • UPPER GASTROINTESTINAL ENDOSCOPY  02/09/2021    4 cm hiatal hernia without Alejandro lesions  Possibly shortened esophagus    Biopsy of the stomach negative for H  pylori, biopsy of the EG junction negative for Kaplan's, biopsied duodenum negative for celiac by Dr Bushra Ureña      Family History   Problem Relation Age of Onset   • Diabetes Mother    • Hypertension Mother    • Hypertension Father    • No Known Problems Sister    • No Known Problems Daughter    • No Known Problems Maternal Grandmother    • No Known Problems Maternal Grandfather    • No Known Problems Paternal Grandmother    • No Known Problems Paternal Grandfather    • No Known Problems Son    • No Known Problems Son    • No Known Problems Daughter    • No Known Problems Daughter    • Colon cancer Brother 71     Social History     Tobacco Use   • Smoking status: Never Smoker   • Smokeless tobacco: Never Used   Substance Use Topics   • Alcohol use: Never     Allergies   Allergen Reactions   • Etodolac Shortness Of Breath   • Iodinated Diagnostic Agents Shortness Of Breath and Wheezing   • Simvastatin Other (See Comments)     elevated liver function    • Latex Rash   • Morphine Palpitations   • Penicillins Rash         Current Outpatient Medications:   •  albuterol (2 5 mg/3 mL) 0 083 % nebulizer solution, Take 1 vial (2 5 mg total) by nebulization every 6 (six) hours as needed for wheezing or shortness of breath, Disp: 60 vial, Rfl: 5  •  albuterol (PROVENTIL HFA,VENTOLIN HFA) 90 mcg/act inhaler, Inhale 2 puffs every 6 (six) hours as needed for wheezing, Disp: , Rfl:   •  aspirin 81 mg chewable tablet, Chew 81 mg daily, Disp: , Rfl:   •  atorvastatin (LIPITOR) 20 mg tablet, Take 1 tablet (20 mg total) by mouth daily, Disp: 30 tablet, Rfl: 0  •  budesonide-formoterol (SYMBICORT) 160-4 5 mcg/act inhaler, Inhale 2 puffs 2 (two) times a day as needed Rinse mouth after use , Disp: , Rfl:   •  clonazePAM (KlonoPIN) 0 125 mg disintegrating tablet, One p r n  as needed for anxiety, Disp: 30 tablet, Rfl: 1  •  ezetimibe (ZETIA) 10 mg tablet, Take 1 tablet (10 mg total) by mouth daily, Disp: 90 tablet, Rfl: 0  •  furosemide (LASIX) 20 mg tablet, Take 20 mg by mouth 2 (two) times a day as needed, Disp: , Rfl:   •  insulin degludec [facility] FlexTouch) 100 units/mL injection pen, Inject 20 Units under the skin daily, Disp: 15 mL, Rfl: 1  •  insulin lispro (HumaLOG KwikPen) 100 units/mL injection pen, Inject 7 Units under the skin 3 (three) times a day with meals, Disp: 15 mL, Rfl: 0  •  Insulin Pen Needle 31G X 4 MM MISC, Use 4 (four) times a day, Disp: 100 each, Rfl: 3  •  levothyroxine 150 mcg tablet, Take 1 tablet (150 mcg total) by mouth daily, Disp: 90 tablet, Rfl: 0  •  losartan (COZAAR) 100 MG tablet, Take 1 tablet (100 mg total) by mouth daily, Disp: 90 tablet, Rfl: 0  •  metFORMIN (GLUCOPHAGE) 1000 MG tablet, Take 1 tablet (1,000 mg total) by mouth 2 (two) times a day with meals, Disp: 180 tablet, Rfl: 0  •  metoprolol succinate (Toprol XL) 50 mg 24 hr tablet, Take 1 tablet (50 mg total) by mouth daily, Disp: 30 tablet, Rfl: 5  •  montelukast (SINGULAIR) 10 mg tablet, Take 1 tablet (10 mg total) by mouth daily, Disp: 90 tablet, Rfl: 0  •  omeprazole (PriLOSEC) 20 mg delayed release capsule, Take 1 capsule (20 mg total) by mouth daily, Disp: 90 capsule, Rfl: 0  •  VITAMIN D PO, Take by mouth, Disp: , Rfl:   •  BD Pen Needle Berenice 2nd Gen 32G X 4 MM MISC, USE TWICE A DAY (Patient not taking: Reported on 10/14/2022), Disp: 100 each, Rfl: 1  •  Blood Glucose Monitoring Suppl (ONE TOUCH ULTRA 2) w/Device KIT, Test blood sugar 3 times daily (Patient not taking: Reported on 10/14/2022), Disp: 1 each, Rfl: 0  •  Blood Glucose Monitoring Suppl (OneTouch Verio Reflect) w/Device KIT, Check blood sugars twice daily  Please substitute with appropriate alternative as covered by patient's insurance  Dx: E11 65 (Patient not taking: Reported on 10/14/2022), Disp: 1 kit, Rfl: 0  •  dicyclomine (BENTYL) 20 mg tablet, Take 1 tablet (20 mg total) by mouth 2 (two) times a day, Disp: 60 tablet, Rfl: 0  •  escitalopram (Lexapro) 10 mg tablet, Take 1 tablet (10 mg total) by mouth daily, Disp: 30 tablet, Rfl: 2  •  famotidine (PEPCID) 20 mg tablet, Take 1 tablet (20 mg total) by mouth 2 (two) times a day (Patient not taking: Reported on 10/14/2022), Disp: 60 tablet, Rfl: 0  •  glucose blood (OneTouch Verio) test strip, Check blood sugars twice daily  Please substitute with appropriate alternative as covered by patient's insurance  Dx: E11 65 (Patient not taking: Reported on 10/14/2022), Disp: 200 each, Rfl: 3  •  glucose blood test strip, Prior to each meal (Patient not taking: Reported on 10/14/2022), Disp: 300 each, Rfl: 1  •  Lancets (ONETOUCH DELICA PLUS CONLEA26Y) MISC, TEST 3 TIMES (Patient not taking: Reported on 10/14/2022), Disp: 100 each, Rfl: 1  •  ondansetron (Zofran ODT) 4 mg disintegrating tablet, Take 1 tablet (4 mg total) by mouth every 6 (six) hours as needed for nausea for up to 30 doses (Patient not taking: Reported on 11/3/2022), Disp: 30 tablet, Rfl: 0  •  OneTouch Delica Lancets 25M MISC, Check blood sugars twice daily  Please substitute with appropriate alternative as covered by patient's insurance  Dx: E11 65 (Patient not taking: Reported on 10/14/2022), Disp: 200 each, Rfl: 3  •  spironolactone (ALDACTONE) 25 mg tablet, Take 1 tablet (25 mg total) by mouth daily (Patient not taking: Reported on 9/9/2022), Disp: 90 tablet, Rfl: 1     ROS  Constitutional:  Weight gain +, Negative for appetite change   Negative for activity change, fatigue  Respiratory: Negative for shortness of breath, wheezing, cough  Cardiovascular: Negative for chest pain and palpitations  Gastrointestinal: Negative for abdominal pain, nausea and vomiting  Negative for diarrhea or constipation  Musculoskeletal: Negative for arthralgias  Neurological: Negative for dizziness, light-headedness and headaches  All other ROS reviewed and negative  Physical Exam:  Body mass index is 37 86 kg/m²  /80   Pulse 65   Ht 5' 2" (1 575 m)   Wt 93 9 kg (207 lb)   BMI 37 86 kg/m²    Wt Readings from Last 3 Encounters:   11/03/22 93 9 kg (207 lb)   10/14/22 93 4 kg (206 lb)   09/21/22 93 4 kg (206 lb)       GEN:  Well appearing, not in acute distress  Eyes: no stare or proptosis, EOMI  Neck: trachea midline, thyroid NT to palpation, nl in size, no nodules or neck masses noted, no cervical LAD  CV; heart reg rate s1s2 nl, no m/r/g appreciated, no PITA  Resp: CTAB, good effort  Ab+BS  Neuro: no tremor, 2+ DTRs in BUE  MS: no c/c in digits, moves all 4 ext, nl muscle bulk, gait nl  Skin: warm and dry, no palmar erythema  Foot exam: deferred till next visit- had long stockings on difficult to take off  Psych: nl mood and affect, no gross lapses in memory    Labs:   HbA1c: 9 1 (09/2022)    Lab Results   Component Value Date    CREATININE 0 82 09/09/2022    CREATININE 1 10 07/16/2022    CREATININE 0 89 03/21/2022    BUN 12 09/09/2022     03/26/2014    K 3 9 09/09/2022     09/09/2022    CO2 31 09/09/2022     eGFR   Date Value Ref Range Status   09/09/2022 74 ml/min/1 73sq m Final       Lab Results   Component Value Date    CHOL 168 03/26/2014    HDL 58 02/21/2022    TRIG 110 02/21/2022       Lab Results   Component Value Date    ALT 21 09/09/2022    AST 13 09/09/2022    GGT 22 10/09/2020    ALKPHOS 103 09/09/2022    BILITOT 0 3 03/26/2014       Lab Results   Component Value Date    FREET4 1 97 (H) 02/21/2022    FREET4 2 06 (H) 02/21/2022       Impression:  1  Uncontrolled type 2 diabetes mellitus with hyperglycemia ([place] [id number] )    2  Essential hypertension    3  Type 2 diabetes mellitus with hyperglycemia, with long-term current use of insulin ([facility])           Plan:    Diagnoses and all orders for this visit:    Uncontrolled type 2 diabetes mellitus with hyperglycemia ([place] [id number] )  -     Ambulatory referral to Endocrinology  -     insulin lispro (HumaLOG KwikPen) 100 units/mL injection pen; Inject 7 Units under the skin 3 (three) times a day with meals  -     HEMOGLOBIN A1C W/ EAG ESTIMATION Lab Collect; Future  -     Insulin Pen Needle 31G X 4 MM MISC; Use 4 (four) times a day  -     Ambulatory Referral to Diabetic Education; Future  -     Continous glucose monitoring dexcom placement; Future  -     Continous glucose monitoring dexcom intrepretation; Future    Essential hypertension  -     Ambulatory referral to Endocrinology    Type 2 diabetes mellitus with hyperglycemia, with long-term current use of insulin ([facility])  -     insulin degludec [facility] FlexTouch) 100 units/mL injection pen; Inject 20 Units under the skin daily    #T2DM with uncontrolled hyperglycemia   Will start on premeal insulin Humalog 7 units tid with meals and continue on long acting insulin - decrease tresiba to 20 units daily  Discontinue Glimepiride  Continue on Metformin 1000 mg twice a day  Will start on incremental doses of semaglutide, 0 25 mg weekly X 2 doses, then increase to 0 5 mg weekly X2, then maintain on 1mg weekly afterwards  Advised checking Bgs at least 2 - 3 daily with logs for the next 2 weeks and upload glucose logs via [facility]  Patient is on daily multiple insulin injections, she will benefit from CGM and has been ordered for [facility] pending insurance authorization  Referral to Diabetes education for MNT and CGM teaching  Office follow up in 6 weeks  #Obesity  Recommend sleep study to r/o JOSELYN  Recommend dietary and lifestyle modifications      #Hypothyroidism  Continue on levothyroxine, follows with PCP    #HLD:   Continue on statin    #HTN  Continue on ARB  Discussed with the patient and all questioned fully answered  She will call me if any problems arise      Counseled patient on diagnostic results, prognosis, risk and benefit of treatment options, instruction for management, importance of treatment compliance, Risk  factor reduction and impressions      Hannah Staff, MD

## 2022-11-03 NOTE — PATIENT INSTRUCTIONS
You have been started on premeal insulin (Humalog) 7 units tid with meals  Do not take Humalog if you skip a meal  Continue Tresiba 20 units once daily  with correctional insulin as highlighted below  In addition, please use humalog insulin per the following sliding scale to correct high blood sugars:  BG  151-200: 1 unit  201-250: 2 units  251-300: 3 units  301-350: 4 units  > 350: 5 units  Do not correct bed time highs unless > 200 mg/dl    Stop taking Glimepiride  Continue Metformin 1000 mg twice daily  Sample ozempic given during office visit today- start with 0 25 mg weekly for 2 weeks and increase further to 0 5 mg for another 2 weeks  Ordered for CGM for home glucose monitoring pending insurance auth  In the meantime continue fingerstick's check with sugarlog sent in for review in 2- 3 weeks  Office follow up in 6 weeks

## 2022-11-04 LAB
DME PARACHUTE DELIVERY DATE REQUESTED: NORMAL
DME PARACHUTE ITEM DESCRIPTION: NORMAL
DME PARACHUTE ORDER STATUS: NORMAL
DME PARACHUTE SUPPLIER NAME: NORMAL
DME PARACHUTE SUPPLIER PHONE: NORMAL

## 2022-11-04 NOTE — PROGRESS NOTES
I called [facility], and spoke to Pharmacy. The pharmacy said the novolog 70/30 was only $56, not 90. The other Novolin R is covered, but he will still have to take the Levemir. and the Novolin R is only in vials, so he will have to get syringes for that. VIVIAN for him regarding this. Also informed the Pharmacy of this and told them it was okay to give the Nololog 70/30 mix for $56.00. Or he needs the Levemir and Novolin R which will probably cost more than that. The will see what he wants and give him that. [facility]  [facility] opportunities       Chart reviewed, no opportunity found: CHART REVIEWED, NO OPPORTUNITY FOUND                        Patients insurance company:  [employer] [facility] (Medicare Advantage and Commercial)

## 2022-11-04 NOTE — PROGRESS NOTES
MD Marva Chang Dr assist with Dexcom CGM for [facility]  Thank you

## 2022-11-07 LAB
LEFT EYE DIABETIC RETINOPATHY: POSITIVE
RIGHT EYE DIABETIC RETINOPATHY: POSITIVE

## 2022-11-09 LAB
DME PARACHUTE DELIVERY DATE REQUESTED: NORMAL
DME PARACHUTE DELIVERY DATE REQUESTED: NORMAL
DME PARACHUTE ITEM DESCRIPTION: NORMAL
DME PARACHUTE ORDER STATUS: NORMAL
DME PARACHUTE ORDER STATUS: NORMAL
DME PARACHUTE SUPPLIER NAME: NORMAL
DME PARACHUTE SUPPLIER NAME: NORMAL
DME PARACHUTE SUPPLIER PHONE: NORMAL
DME PARACHUTE SUPPLIER PHONE: NORMAL

## 2022-11-17 DIAGNOSIS — I10 ESSENTIAL HYPERTENSION: ICD-10-CM

## 2022-11-17 DIAGNOSIS — E03.9 ACQUIRED HYPOTHYROIDISM: ICD-10-CM

## 2022-11-17 DIAGNOSIS — R07.89 ATYPICAL CHEST PAIN: ICD-10-CM

## 2022-11-17 DIAGNOSIS — J45.20 MILD INTERMITTENT ASTHMA WITHOUT COMPLICATION: ICD-10-CM

## 2022-11-17 DIAGNOSIS — E78.00 PURE HYPERCHOLESTEROLEMIA: ICD-10-CM

## 2022-11-17 DIAGNOSIS — E11.9 TYPE 2 DIABETES MELLITUS WITHOUT COMPLICATION, WITHOUT LONG-TERM CURRENT USE OF INSULIN (HCC): ICD-10-CM

## 2022-11-17 RX ORDER — LEVOTHYROXINE SODIUM 0.15 MG/1
150 TABLET ORAL DAILY
Qty: 90 TABLET | Refills: 0 | Status: SHIPPED | OUTPATIENT
Start: 2022-11-17

## 2022-12-12 DIAGNOSIS — E78.00 PURE HYPERCHOLESTEROLEMIA: ICD-10-CM

## 2022-12-12 DIAGNOSIS — R07.89 ATYPICAL CHEST PAIN: ICD-10-CM

## 2022-12-12 DIAGNOSIS — E03.9 ACQUIRED HYPOTHYROIDISM: ICD-10-CM

## 2022-12-12 DIAGNOSIS — I10 ESSENTIAL HYPERTENSION: ICD-10-CM

## 2022-12-12 DIAGNOSIS — E11.9 TYPE 2 DIABETES MELLITUS WITHOUT COMPLICATION, WITHOUT LONG-TERM CURRENT USE OF INSULIN (HCC): ICD-10-CM

## 2022-12-12 DIAGNOSIS — J45.20 MILD INTERMITTENT ASTHMA WITHOUT COMPLICATION: ICD-10-CM

## 2022-12-28 ENCOUNTER — TELEPHONE (OUTPATIENT)
Dept: OBGYN CLINIC | Facility: MEDICAL CENTER | Age: 67
End: 2022-12-28

## 2023-01-03 NOTE — ED NOTES
HPI:  Leisa Gannon is a 46 Patient transported to [address] [place] [address]  01/20/21 1945 appointment as soon as possible for reevaluation if symptoms worsen  -Increase fluids orally  Return in about 1 week (around 1/10/2023), or if symptoms worsen or fail to improve, for with PCP.

## 2023-01-12 ENCOUNTER — RA CDI HCC (OUTPATIENT)
Dept: OTHER | Facility: HOSPITAL | Age: 68
End: 2023-01-12

## 2023-01-12 NOTE — PROGRESS NOTES
[facility] 75  coding opportunities          Chart Reviewed number of suggestions sent to Provider: [phone] [id number]     Patients Insurance     Medicare Insurance: [facility] Advantage

## 2023-01-19 ENCOUNTER — OFFICE VISIT (OUTPATIENT)
Dept: INTERNAL MEDICINE CLINIC | Facility: CLINIC | Age: 68
End: 2023-01-19

## 2023-01-19 VITALS
TEMPERATURE: 96.4 F | OXYGEN SATURATION: 100 % | BODY MASS INDEX: 37.36 KG/M2 | RESPIRATION RATE: 16 BRPM | DIASTOLIC BLOOD PRESSURE: 60 MMHG | WEIGHT: 203 LBS | HEART RATE: 84 BPM | HEIGHT: 62 IN | SYSTOLIC BLOOD PRESSURE: 132 MMHG

## 2023-01-19 DIAGNOSIS — R00.2 PALPITATIONS: ICD-10-CM

## 2023-01-19 DIAGNOSIS — F33.9 DEPRESSION, RECURRENT (HCC): ICD-10-CM

## 2023-01-19 DIAGNOSIS — K22.2 LOWER ESOPHAGEAL RING (SCHATZKI): ICD-10-CM

## 2023-01-19 DIAGNOSIS — J45.21 MILD INTERMITTENT ASTHMA WITH ACUTE EXACERBATION: Primary | ICD-10-CM

## 2023-01-19 DIAGNOSIS — M43.16 SPONDYLOLISTHESIS OF LUMBAR REGION: ICD-10-CM

## 2023-01-19 DIAGNOSIS — F11.20 CONTINUOUS OPIOID DEPENDENCE (HCC): ICD-10-CM

## 2023-01-19 DIAGNOSIS — E66.9 OBESITY (BMI 35.0-39.9 WITHOUT COMORBIDITY): ICD-10-CM

## 2023-01-19 DIAGNOSIS — I10 ESSENTIAL HYPERTENSION: ICD-10-CM

## 2023-01-19 DIAGNOSIS — E11.65 TYPE 2 DIABETES MELLITUS WITH HYPERGLYCEMIA, WITH LONG-TERM CURRENT USE OF INSULIN (HCC): ICD-10-CM

## 2023-01-19 DIAGNOSIS — E03.9 ACQUIRED HYPOTHYROIDISM: ICD-10-CM

## 2023-01-19 DIAGNOSIS — E78.00 PURE HYPERCHOLESTEROLEMIA: ICD-10-CM

## 2023-01-19 DIAGNOSIS — Z79.4 TYPE 2 DIABETES MELLITUS WITH HYPERGLYCEMIA, WITH LONG-TERM CURRENT USE OF INSULIN (HCC): ICD-10-CM

## 2023-01-19 DIAGNOSIS — J45.20 MILD INTERMITTENT ASTHMA WITHOUT COMPLICATION: ICD-10-CM

## 2023-01-19 RX ORDER — EMPAGLIFLOZIN AND METFORMIN HYDROCHLORIDE 12.5; 1 MG/1; MG/1
TABLET ORAL
COMMUNITY

## 2023-01-19 RX ORDER — ALBUTEROL SULFATE 90 UG/1
2 AEROSOL, METERED RESPIRATORY (INHALATION) EVERY 6 HOURS PRN
Qty: 18 G | Refills: 2 | Status: SHIPPED | OUTPATIENT
Start: 2023-01-19

## 2023-01-19 RX ORDER — INSULIN ASPART 100 [IU]/ML
INJECTION, SOLUTION INTRAVENOUS; SUBCUTANEOUS
COMMUNITY

## 2023-01-19 RX ORDER — ATORVASTATIN CALCIUM 20 MG/1
20 TABLET, FILM COATED ORAL DAILY
Qty: 30 TABLET | Refills: 0 | Status: SHIPPED | OUTPATIENT
Start: 2023-01-19

## 2023-01-19 RX ORDER — LOSARTAN POTASSIUM 100 MG/1
100 TABLET ORAL DAILY
Qty: 90 TABLET | Refills: 0 | Status: SHIPPED | OUTPATIENT
Start: 2023-01-19 | End: 2023-04-19

## 2023-01-19 RX ORDER — BUDESONIDE AND FORMOTEROL FUMARATE DIHYDRATE 160; 4.5 UG/1; UG/1
2 AEROSOL RESPIRATORY (INHALATION) 2 TIMES DAILY PRN
Qty: 10.2 G | Refills: 3 | Status: SHIPPED | OUTPATIENT
Start: 2023-01-19

## 2023-01-19 RX ORDER — FUROSEMIDE 20 MG/1
20 TABLET ORAL DAILY
Qty: 30 TABLET | Refills: 1 | Status: SHIPPED | OUTPATIENT
Start: 2023-01-19

## 2023-01-19 RX ORDER — EZETIMIBE 10 MG/1
10 TABLET ORAL DAILY
Qty: 90 TABLET | Refills: 0 | Status: SHIPPED | OUTPATIENT
Start: 2023-01-19

## 2023-01-19 RX ORDER — MONTELUKAST SODIUM 10 MG/1
10 TABLET ORAL DAILY
Qty: 90 TABLET | Refills: 0 | Status: SHIPPED | OUTPATIENT
Start: 2023-01-19

## 2023-01-19 RX ORDER — METOPROLOL SUCCINATE 50 MG/1
50 TABLET, EXTENDED RELEASE ORAL DAILY
Qty: 30 TABLET | Refills: 5 | Status: SHIPPED | OUTPATIENT
Start: 2023-01-19

## 2023-01-19 NOTE — PROGRESS NOTES
Assessment/Plan:      #1 diabetes of the endocrinologist she is on Ozempic she is losing weight she looks much better she denies any complaint no polyuria polydipsia continue the same diabetic plan need to go for lab work    2  Blood pressure well controlled no chest palpitations shortness of breath she is on the following medications  Losartan 100 mg daily  Metoprolol succinate 50 mg daily  She takes Lasix as needed for edema    3  History of reactive airway disease and asthma is asymptomatic only uses the inhalers as needed  I reviewed the inhalers she is on the following Symbicort 160/4 5 to take 2 puff twice a day as needed  Singulair 10 mg/day  She has albuterol rescue 2 puffs every 6 hours as needed    4  Anxiety depression she is not anxious now does not appear depressed she is on Lexapro 10 mg/day continue she is on Klonopin as needed is 0 125 mg    Labs are overdue she will go for labs tomorrow    Ultrasound of the thyroid ordered by endocrinology is pending        No problem-specific Assessment & Plan notes found for this encounter  Diagnoses and all orders for this visit:    Mild intermittent asthma with acute exacerbation    Acquired hypothyroidism    Lower esophageal ring (Schatzki)    Depression, recurrent (HCC)    Obesity (BMI 35 0-39 9 without comorbidity)    Pure hypercholesterolemia    Essential hypertension    Spondylolisthesis of lumbar region          Subjective:      Patient ID: Amber Barbosa is a 79 y o  female  No chief complaint on file          Current Outpatient Medications:   •  albuterol (2 5 mg/3 mL) 0 083 % nebulizer solution, Take 1 vial (2 5 mg total) by nebulization every 6 (six) hours as needed for wheezing or shortness of breath, Disp: 60 vial, Rfl: 5  •  albuterol (PROVENTIL HFA,VENTOLIN HFA) 90 mcg/act inhaler, Inhale 2 puffs every 6 (six) hours as needed for wheezing, Disp: , Rfl:   •  aspirin 81 mg chewable tablet, Chew 81 mg daily, Disp: , Rfl:   •  atorvastatin (LIPITOR) 20 mg tablet, Take 1 tablet (20 mg total) by mouth daily, Disp: 30 tablet, Rfl: 0  •  BD Pen Needle Berenice 2nd Gen 32G X 4 MM MISC, USE TWICE A DAY (Patient not taking: Reported on 10/14/2022), Disp: 100 each, Rfl: 1  •  Blood Glucose Monitoring Suppl (ONE TOUCH ULTRA 2) w/Device KIT, Test blood sugar 3 times daily (Patient not taking: Reported on 10/14/2022), Disp: 1 each, Rfl: 0  •  Blood Glucose Monitoring Suppl (OneTouch Verio Reflect) w/Device KIT, Check blood sugars twice daily  Please substitute with appropriate alternative as covered by patient's insurance  Dx: E11 65 (Patient not taking: Reported on 10/14/2022), Disp: 1 kit, Rfl: 0  •  budesonide-formoterol (SYMBICORT) 160-4 5 mcg/act inhaler, Inhale 2 puffs 2 (two) times a day as needed Rinse mouth after use , Disp: , Rfl:   •  clonazePAM (KlonoPIN) 0 125 mg disintegrating tablet, One p r n  as needed for anxiety, Disp: 30 tablet, Rfl: 1  •  dicyclomine (BENTYL) 20 mg tablet, Take 1 tablet (20 mg total) by mouth 2 (two) times a day, Disp: 60 tablet, Rfl: 0  •  escitalopram (Lexapro) 10 mg tablet, Take 1 tablet (10 mg total) by mouth daily, Disp: 30 tablet, Rfl: 2  •  ezetimibe (ZETIA) 10 mg tablet, Take 1 tablet (10 mg total) by mouth daily, Disp: 90 tablet, Rfl: 0  •  famotidine (PEPCID) 20 mg tablet, Take 1 tablet (20 mg total) by mouth 2 (two) times a day (Patient not taking: Reported on 10/14/2022), Disp: 60 tablet, Rfl: 0  •  furosemide (LASIX) 20 mg tablet, Take 20 mg by mouth 2 (two) times a day as needed, Disp: , Rfl:   •  glucose blood (OneTouch Verio) test strip, Check blood sugars twice daily  Please substitute with appropriate alternative as covered by patient's insurance   Dx: E11 65 (Patient not taking: Reported on 10/14/2022), Disp: 200 each, Rfl: 3  •  glucose blood test strip, Prior to each meal (Patient not taking: Reported on 10/14/2022), Disp: 300 each, Rfl: 1  •  insulin degludec Darcy Khoury) 100 units/mL injection pen, Inject 20 Units under the skin daily, Disp: 15 mL, Rfl: 1  •  insulin lispro (HumaLOG KwikPen) 100 units/mL injection pen, Inject 7 Units under the skin 3 (three) times a day with meals, Disp: 15 mL, Rfl: 0  •  Insulin Pen Needle 31G X 4 MM MISC, Use 4 (four) times a day, Disp: 100 each, Rfl: 3  •  Lancets (ONETOUCH DELICA PLUS YQCFFG54B) MISC, TEST 3 TIMES (Patient not taking: Reported on 10/14/2022), Disp: 100 each, Rfl: 1  •  levothyroxine 150 mcg tablet, Take 1 tablet (150 mcg total) by mouth daily, Disp: 90 tablet, Rfl: 0  •  losartan (COZAAR) 100 MG tablet, Take 1 tablet (100 mg total) by mouth daily, Disp: 90 tablet, Rfl: 0  •  metFORMIN (GLUCOPHAGE) 1000 MG tablet, Take 1 tablet (1,000 mg total) by mouth 2 (two) times a day with meals, Disp: 180 tablet, Rfl: 0  •  metoprolol succinate (Toprol XL) 50 mg 24 hr tablet, Take 1 tablet (50 mg total) by mouth daily, Disp: 30 tablet, Rfl: 5  •  montelukast (SINGULAIR) 10 mg tablet, Take 1 tablet (10 mg total) by mouth daily, Disp: 90 tablet, Rfl: 0  •  omeprazole (PriLOSEC) 20 mg delayed release capsule, Take 1 capsule (20 mg total) by mouth daily, Disp: 90 capsule, Rfl: 0  •  ondansetron (Zofran ODT) 4 mg disintegrating tablet, Take 1 tablet (4 mg total) by mouth every 6 (six) hours as needed for nausea for up to 30 doses (Patient not taking: Reported on 11/3/2022), Disp: 30 tablet, Rfl: 0  •  OneTouch Delica Lancets 16X MISC, Check blood sugars twice daily  Please substitute with appropriate alternative as covered by patient's insurance   Dx: E11 65 (Patient not taking: Reported on 10/14/2022), Disp: 200 each, Rfl: 3  •  spironolactone (ALDACTONE) 25 mg tablet, Take 1 tablet (25 mg total) by mouth daily (Patient not taking: Reported on 9/9/2022), Disp: 90 tablet, Rfl: 1  •  VITAMIN D PO, Take by mouth, Disp: , Rfl:     HPI    The following portions of the patient's history were reviewed and updated as appropriate: allergies, current medications, past family history, past medical history, past social history, past surgical history and problem list     Review of Systems   Constitutional: Negative  Negative for activity change, appetite change, fatigue, fever and unexpected weight change  HENT: Negative for congestion, ear pain, hearing loss, mouth sores, postnasal drip, rhinorrhea, sore throat, trouble swallowing and voice change  Eyes: Negative for pain, redness and visual disturbance  Respiratory: Negative for cough, chest tightness, shortness of breath and wheezing  Cardiovascular: Negative for chest pain, palpitations and leg swelling  Gastrointestinal: Negative for abdominal distention, abdominal pain, blood in stool, constipation, diarrhea and nausea  Endocrine: Negative for cold intolerance, heat intolerance, polydipsia, polyphagia and polyuria  Genitourinary: Negative for difficulty urinating, dysuria, flank pain, frequency, hematuria and urgency  Musculoskeletal: Negative for arthralgias, back pain, gait problem, joint swelling and myalgias  Skin: Negative for color change and pallor  Neurological: Negative for dizziness, tremors, seizures, syncope, weakness, numbness and headaches  Hematological: Negative for adenopathy  Does not bruise/bleed easily  Psychiatric/Behavioral: Negative  Negative for sleep disturbance  The patient is not nervous/anxious  Objective: There were no vitals taken for this visit  Physical Exam  Vitals and nursing note reviewed  Constitutional:       Appearance: She is well-developed  HENT:      Head: Normocephalic  Right Ear: External ear normal       Left Ear: External ear normal       Nose: Nose normal       Mouth/Throat:      Pharynx: No oropharyngeal exudate  Eyes:      Conjunctiva/sclera: Conjunctivae normal       Pupils: Pupils are equal, round, and reactive to light  Neck:      Thyroid: No thyromegaly  Cardiovascular:      Rate and Rhythm: Normal rate and regular rhythm  Heart sounds: Normal heart sounds  No murmur heard  No friction rub  No gallop  Comments: S1-S2 regular rhythm  Extremities no edema  Pulmonary:      Effort: Pulmonary effort is normal  No respiratory distress  Breath sounds: Normal breath sounds  No wheezing or rales  Comments: 's are clear no wheezing rales or rhonchi  Abdominal:      General: Bowel sounds are normal  There is no distension  Palpations: Abdomen is soft  There is no mass  Tenderness: There is no abdominal tenderness  There is no guarding or rebound  Comments: Abdomen obese soft nontender   Musculoskeletal:         General: Normal range of motion  Cervical back: Normal range of motion and neck supple  Lymphadenopathy:      Cervical: No cervical adenopathy  Skin:     General: Skin is warm and dry  Neurological:      Mental Status: She is alert and oriented to person, place, and time     Psychiatric:         Behavior: Behavior normal          Judgment: Judgment normal

## 2023-01-25 ENCOUNTER — ANNUAL EXAM (OUTPATIENT)
Dept: OBGYN CLINIC | Facility: MEDICAL CENTER | Age: 68
End: 2023-01-25

## 2023-01-25 VITALS
BODY MASS INDEX: 37.36 KG/M2 | WEIGHT: 203 LBS | HEIGHT: 62 IN | SYSTOLIC BLOOD PRESSURE: 130 MMHG | DIASTOLIC BLOOD PRESSURE: 60 MMHG

## 2023-01-25 DIAGNOSIS — Z12.31 ENCOUNTER FOR SCREENING MAMMOGRAM FOR MALIGNANT NEOPLASM OF BREAST: ICD-10-CM

## 2023-01-25 DIAGNOSIS — M81.0 OSTEOPOROSIS, UNSPECIFIED OSTEOPOROSIS TYPE, UNSPECIFIED PATHOLOGICAL FRACTURE PRESENCE: ICD-10-CM

## 2023-01-25 DIAGNOSIS — Z01.419 ENCOUNTER FOR GYNECOLOGICAL EXAMINATION: Primary | ICD-10-CM

## 2023-01-25 NOTE — PROGRESS NOTES
ASSESSMENT & PLAN: Dillon Salinas is a 79 y o  [id number] with normal gynecologic exam     1   Routine well woman exam done today  2  Pap and HPV:  The patient's last pap  was 2021  It was normal     Pap with cotesting was not done today  Current ASCCP Guidelines reviewed  3   Mammogram ordered  4  Colorectal cancer screening was notordered  5   The following were reviewed in today's visit: breast self exam, mammography screening ordered, osteoporosis, adequate intake of calcium and vitamin D and DEXA ordered  6  We briefly discussed management options for urinary urgency  / desires to hold of on medication for now    CC: Annual Gynecologic Examination    HPI: Dillon Salinas is a 79 y o  [id number] who presents for annual gynecologic examination  She has the following concerns:  Urinary urgency      Health Maintenance:    She wears her seatbelt routinely  She does perform regular monthly self breast exams  She feels safe at home         Past Medical History:   Diagnosis Date   • Abdominal pain    • Asthma    • Back pain     occas due to "disc problem"   • Balance problems     "right side"   • Cholelithiasis     lap aviva today 3/10/2021   • Colon polyp    • Cracked tooth     3   • Depression    • Diabetes mellitus (HCC)    • Difficulty swallowing     "food feels like stuck sometimes'   • Exercise involving walking     steps and cleaning   • Fatty liver    • GERD (gastroesophageal reflux disease)    • Hiatal hernia    • History of pneumonia    • History of stomach ulcers    • Hyperlipidemia    • Hypertension    • Obesity    • Pneumonia    • Refusal of blood transfusions as patient is [employer]    • Risk for falls    • Stroke [facility])    • Uses Vietnamese as primary spoken language    • Wears glasses        Past Surgical History:   Procedure Laterality Date   • CHOLECYSTECTOMY     • COLONOSCOPY  02/09/2021    4 polyps tubular adenomas by Dr Jos Daniels   • EGD  03/31/2021    nonobstructing Soraya's ring dilated with 47 Latvian Hernandez, sliding hiatal hernia otherwise normal   Biopsies stomach  negative for H  pylori biopsy esophagus negative for eosinophilic esophagitis by Dr Arnaldo Hernandez   • EGD AND COLONOSCOPY  2021    3 YEAR RECOMMENDED = COLONO   • [place] LAPAROSCOPY SURG CHOLECYSTECTOMY N/A 3/10/2021    Procedure: CHOLECYSTECTOMY LAPAROSCOPIC W/ ROBOTICS;  Surgeon: Jocelyne Watson MD;  Location: [facility];  Service: General   • ROTATOR CUFF REPAIR Bilateral     screws implanted"   • UPPER GASTROINTESTINAL ENDOSCOPY  2021    4 cm hiatal hernia without Alejandro lesions  Possibly shortened esophagus    Biopsy of the stomach negative for H  pylori, biopsy of the EG junction negative for Kaplan's, biopsied duodenum negative for celiac by Dr Rickey Brewster       Past OB/Gyn History:  OB History        4    Para   4    Term   4            AB        Living   4       SAB        IAB        Ectopic        Multiple   1    Live Births   4                   Family History   Problem Relation Age of Onset   • Diabetes Mother    • Hypertension Mother    • Hypertension Father    • No Known Problems Sister    • No Known Problems Daughter    • No Known Problems Maternal Grandmother    • No Known Problems Maternal Grandfather    • No Known Problems Paternal Grandmother    • No Known Problems Paternal Grandfather    • No Known Problems Son    • No Known Problems Son    • No Known Problems Daughter    • No Known Problems Daughter    • Colon cancer Brother 71       Social History:  Social History     Socioeconomic History   • Marital status: /Civil Union     Spouse name: Not on file   • Number of children: Not on file   • Years of education: Not on file   • Highest education level: Not on file   Occupational History   • Occupation: House wife   Tobacco Use   • Smoking status: Never   • Smokeless tobacco: Never   Vaping Use   • Vaping Use: Never used   Substance and Sexual Activity   • Alcohol use: Never   • Drug use: Never   • Sexual activity: Not Currently   Other Topics Concern   • Not on file   Social History Narrative   • Not on file     Social Determinants of Health     Financial Resource Strain: Not on file   Food Insecurity: Not on file   Transportation Needs: Not on file   Physical Activity: Not on file   Stress: Not on file   Social Connections: Not on file   Intimate Partner Violence: Not on file   Housing Stability: Not on file         Allergies   Allergen Reactions   • Etodolac Shortness Of Breath   • Iodinated Contrast Media Shortness Of Breath and Wheezing   • Simvastatin Other (See Comments)     elevated liver function    • Latex Rash   • Morphine Palpitations   • Penicillins Rash         Current Outpatient Medications:   •  albuterol (2 5 mg/3 mL) 0 083 % nebulizer solution, Take 1 vial (2 5 mg total) by nebulization every 6 (six) hours as needed for wheezing or shortness of breath, Disp: 60 vial, Rfl: 5  •  albuterol (PROVENTIL HFA,VENTOLIN HFA) 90 mcg/act inhaler, Inhale 2 puffs every 6 (six) hours as needed for wheezing, Disp: 18 g, Rfl: 2  •  aspirin 81 mg chewable tablet, Chew 81 mg daily, Disp: , Rfl:   •  atorvastatin (LIPITOR) 20 mg tablet, Take 1 tablet (20 mg total) by mouth daily, Disp: 30 tablet, Rfl: 0  •  budesonide-formoterol (SYMBICORT) 160-4 5 mcg/act inhaler, Inhale 2 puffs 2 (two) times a day as needed (1) Rinse mouth after use , Disp: 10 2 g, Rfl: 3  •  clonazePAM (KlonoPIN) 0 125 mg disintegrating tablet, One p r n  as needed for anxiety, Disp: 30 tablet, Rfl: 1  •  dicyclomine (BENTYL) 20 mg tablet, Take 1 tablet (20 mg total) by mouth 2 (two) times a day, Disp: 60 tablet, Rfl: 0  •  Empagliflozin-metFORMIN HCl (Synjardy) 12 5-1000 MG TABS, Take by mouth Take 2 tablets by mouth daily, Disp: , Rfl:   •  escitalopram (Lexapro) 10 mg tablet, Take 1 tablet (10 mg total) by mouth daily, Disp: 30 tablet, Rfl: 2  •  ezetimibe (ZETIA) 10 mg tablet, Take 1 tablet (10 mg total) by mouth daily, Disp: 90 tablet, Rfl: 0  •  furosemide (LASIX) 20 mg tablet, Take 1 tablet (20 mg total) by mouth daily As needed for edema, Disp: 30 tablet, Rfl: 1  •  Insulin Aspart (NovoLOG) 100 units/mL injection, Inject under the skin Take 6 units for breakfast /7 unit for lunch/10 units at night, Disp: , Rfl:   •  insulin degludec Koby Kale FlexTouch) 100 units/mL injection pen, Inject 20 Units under the skin daily, Disp: 15 mL, Rfl: 1  •  Insulin Pen Needle 31G X 4 MM MISC, Use 4 (four) times a day, Disp: 100 each, Rfl: 3  •  levothyroxine 150 mcg tablet, Take 1 tablet (150 mcg total) by mouth daily, Disp: 90 tablet, Rfl: 0  •  losartan (COZAAR) 100 MG tablet, Take 1 tablet (100 mg total) by mouth daily, Disp: 90 tablet, Rfl: 0  •  metoprolol succinate (Toprol XL) 50 mg 24 hr tablet, Take 1 tablet (50 mg total) by mouth daily, Disp: 30 tablet, Rfl: 5  •  montelukast (SINGULAIR) 10 mg tablet, Take 1 tablet (10 mg total) by mouth daily, Disp: 90 tablet, Rfl: 0  •  omeprazole (PriLOSEC) 20 mg delayed release capsule, Take 1 capsule (20 mg total) by mouth daily, Disp: 90 capsule, Rfl: 0  •  VITAMIN D PO, Take by mouth, Disp: , Rfl:   •  insulin lispro (HumaLOG KwikPen) 100 units/mL injection pen, Inject 7 Units under the skin 3 (three) times a day with meals (Patient not taking: Reported on 1/19/2023), Disp: 15 mL, Rfl: 0  •  ondansetron (Zofran ODT) 4 mg disintegrating tablet, Take 1 tablet (4 mg total) by mouth every 6 (six) hours as needed for nausea for up to 30 doses (Patient not taking: Reported on 11/3/2022), Disp: 30 tablet, Rfl: 0  •  OneTouch Delica Lancets 09Q MISC, Check blood sugars twice daily  Please substitute with appropriate alternative as covered by patient's insurance   Dx: E11 65 (Patient not taking: Reported on 10/14/2022), Disp: 200 each, Rfl: 3    Review of Systems  Constitutional :no fever, feels well, no tiredness, no recent weight gain or loss  ENT: no ear ache, no loss of hearing, no nosebleeds or nasal discharge, no sore throat or hoarseness  Cardiovascular: no complaints of slow or fast heart beat, no chest pain, no palpitations, no leg claudication or lower extremity edema  Respiratory: no complaints of shortness of shortness of breath, no SAEZ  Breasts:no complaints of breast pain, breast lump, or nipple discharge  Gastrointestinal: no complaints of abdominal pain, constipation, nausea, vomiting, or diarrhea or bloody stools  Genitourinary :  as noted in HPI  Musculoskeletal: no complaints of arthralgia, no myalgia, no joint swelling or stiffness, no limb pain or swelling  Integumentary: no complaints of skin rash or lesion, itching or dry skin  Neurological: no complaints of headache, no confusion, no numbness or tingling, no dizziness or fainting    Objective      /60   Ht 5' 2" (1 575 m)   Wt 92 1 kg (203 lb)   BMI 37 13 kg/m²   General:   appears stated age, cooperative, alert normal mood and affect   Neck: normal, supple,trachea midline, no masses   Heart: regular rate and rhythm, S1, S2 normal, no murmur, click, rub or gallop   Lungs: clear to auscultation bilaterally   Breasts: normal appearance, no masses or tenderness   Abdomen: soft, non-tender, without masses or organomegaly   Vulva: normal   Vagina: normal vagina, no discharge, exudate, lesion, or erythema   Urethra: normal   Cervix: Normal, no discharge  Nontender     Uterus: normal size, contour, position, consistency, mobility, non-tender   Adnexa: no mass, fullness, tenderness   Psychiatric orientation to person, place, and time: normal  mood and affect: normal

## 2023-02-03 ENCOUNTER — TELEPHONE (OUTPATIENT)
Dept: RADIOLOGY | Facility: HOSPITAL | Age: 68
End: 2023-02-03

## 2023-02-06 ENCOUNTER — TELEPHONE (OUTPATIENT)
Dept: RADIOLOGY | Facility: HOSPITAL | Age: 68
End: 2023-02-06

## 2023-02-06 DIAGNOSIS — Z79.4 TYPE 2 DIABETES MELLITUS WITH HYPERGLYCEMIA, WITH LONG-TERM CURRENT USE OF INSULIN (HCC): ICD-10-CM

## 2023-02-06 DIAGNOSIS — R00.2 PALPITATIONS: ICD-10-CM

## 2023-02-06 DIAGNOSIS — J45.20 MILD INTERMITTENT ASTHMA WITHOUT COMPLICATION: ICD-10-CM

## 2023-02-06 DIAGNOSIS — R07.89 ATYPICAL CHEST PAIN: ICD-10-CM

## 2023-02-06 DIAGNOSIS — K21.9 GASTROESOPHAGEAL REFLUX DISEASE WITHOUT ESOPHAGITIS: ICD-10-CM

## 2023-02-06 DIAGNOSIS — F41.9 ANXIETY: ICD-10-CM

## 2023-02-06 DIAGNOSIS — E03.9 ACQUIRED HYPOTHYROIDISM: ICD-10-CM

## 2023-02-06 DIAGNOSIS — E11.65 TYPE 2 DIABETES MELLITUS WITH HYPERGLYCEMIA, WITH LONG-TERM CURRENT USE OF INSULIN (HCC): ICD-10-CM

## 2023-02-06 DIAGNOSIS — E78.00 PURE HYPERCHOLESTEROLEMIA: ICD-10-CM

## 2023-02-06 DIAGNOSIS — E11.9 TYPE 2 DIABETES MELLITUS WITHOUT COMPLICATION, WITHOUT LONG-TERM CURRENT USE OF INSULIN (HCC): ICD-10-CM

## 2023-02-06 DIAGNOSIS — J45.21 MILD INTERMITTENT ASTHMA WITH ACUTE EXACERBATION: ICD-10-CM

## 2023-02-06 DIAGNOSIS — I10 ESSENTIAL HYPERTENSION: ICD-10-CM

## 2023-02-06 RX ORDER — FUROSEMIDE 20 MG/1
20 TABLET ORAL DAILY
Qty: 90 TABLET | Refills: 1 | Status: SHIPPED | OUTPATIENT
Start: 2023-02-06

## 2023-02-06 RX ORDER — EZETIMIBE 10 MG/1
10 TABLET ORAL DAILY
Qty: 90 TABLET | Refills: 1 | Status: SHIPPED | OUTPATIENT
Start: 2023-02-06

## 2023-02-06 RX ORDER — METOPROLOL SUCCINATE 50 MG/1
50 TABLET, EXTENDED RELEASE ORAL DAILY
Qty: 90 TABLET | Refills: 1 | Status: SHIPPED | OUTPATIENT
Start: 2023-02-06

## 2023-02-06 RX ORDER — ALBUTEROL SULFATE 90 UG/1
2 AEROSOL, METERED RESPIRATORY (INHALATION) EVERY 6 HOURS PRN
Qty: 18 G | Refills: 2 | Status: SHIPPED | OUTPATIENT
Start: 2023-02-06

## 2023-02-06 RX ORDER — MONTELUKAST SODIUM 10 MG/1
10 TABLET ORAL DAILY
Qty: 90 TABLET | Refills: 1 | Status: SHIPPED | OUTPATIENT
Start: 2023-02-06

## 2023-02-06 RX ORDER — LOSARTAN POTASSIUM 100 MG/1
100 TABLET ORAL DAILY
Qty: 90 TABLET | Refills: 1 | Status: SHIPPED | OUTPATIENT
Start: 2023-02-06 | End: 2023-05-07

## 2023-02-06 RX ORDER — LEVOTHYROXINE SODIUM 0.15 MG/1
150 TABLET ORAL DAILY
Qty: 90 TABLET | Refills: 1 | Status: SHIPPED | OUTPATIENT
Start: 2023-02-06

## 2023-02-06 RX ORDER — ASPIRIN 81 MG/1
81 TABLET, CHEWABLE ORAL DAILY
Qty: 90 TABLET | Refills: 1 | Status: SHIPPED | OUTPATIENT
Start: 2023-02-06

## 2023-02-06 RX ORDER — OMEPRAZOLE 20 MG/1
20 CAPSULE, DELAYED RELEASE ORAL DAILY
Qty: 90 CAPSULE | Refills: 1 | Status: SHIPPED | OUTPATIENT
Start: 2023-02-06 | End: 2023-02-13 | Stop reason: SDUPTHER

## 2023-02-06 RX ORDER — INSULIN DEGLUDEC INJECTION 100 U/ML
20 INJECTION, SOLUTION SUBCUTANEOUS DAILY
Qty: 18 ML | Refills: 1 | Status: SHIPPED | OUTPATIENT
Start: 2023-02-06

## 2023-02-06 NOTE — NURSING NOTE
Call placed to pt to discuss upcoming appointment at [facility] Radiology Department and consultation completed  Pt is having a Thyroid Biopsy with Trina completed on 2/10/2023  Allergies reviewed and verified pt does not currently take any anticoagulant medications other than ASA which pt may continue taking  Pre procedure instructions including diet and taking own medications discussed with pt  Pt instructed that she may eat normally and take medications as usual before the procedure  Pt verbalized understanding of instructions given  Reminded pt of the location, date and time of procedure  My number was given to call if any questions or concerns arise pre or post procedure

## 2023-02-09 DIAGNOSIS — R00.2 PALPITATIONS: ICD-10-CM

## 2023-02-09 DIAGNOSIS — F41.9 ANXIETY: ICD-10-CM

## 2023-02-09 RX ORDER — CLONAZEPAM 0.12 MG/1
TABLET, ORALLY DISINTEGRATING ORAL
Qty: 30 TABLET | Refills: 1 | Status: SHIPPED | OUTPATIENT
Start: 2023-02-09

## 2023-02-10 ENCOUNTER — APPOINTMENT (OUTPATIENT)
Dept: LAB | Facility: HOSPITAL | Age: 68
End: 2023-02-10

## 2023-02-10 ENCOUNTER — HOSPITAL ENCOUNTER (OUTPATIENT)
Dept: ULTRASOUND IMAGING | Facility: HOSPITAL | Age: 68
Discharge: HOME | End: 2023-02-10

## 2023-02-10 DIAGNOSIS — R07.81 RIB PAIN: ICD-10-CM

## 2023-02-10 DIAGNOSIS — E04.1 THYROID NODULE: ICD-10-CM

## 2023-02-10 DIAGNOSIS — E66.9 OBESITY (BMI 35.0-39.9 WITHOUT COMORBIDITY): ICD-10-CM

## 2023-02-10 DIAGNOSIS — R10.11 RIGHT UPPER QUADRANT ABDOMINAL PAIN: ICD-10-CM

## 2023-02-10 DIAGNOSIS — E11.65 UNCONTROLLED TYPE 2 DIABETES MELLITUS WITH HYPERGLYCEMIA (HCC): ICD-10-CM

## 2023-02-10 DIAGNOSIS — F33.9 DEPRESSION, RECURRENT (HCC): ICD-10-CM

## 2023-02-10 DIAGNOSIS — J45.21 MILD INTERMITTENT ASTHMA WITH ACUTE EXACERBATION: ICD-10-CM

## 2023-02-10 DIAGNOSIS — I10 ESSENTIAL HYPERTENSION: ICD-10-CM

## 2023-02-10 DIAGNOSIS — E78.00 PURE HYPERCHOLESTEROLEMIA: ICD-10-CM

## 2023-02-10 DIAGNOSIS — E03.9 ACQUIRED HYPOTHYROIDISM: ICD-10-CM

## 2023-02-10 DIAGNOSIS — M54.9 BACK PAIN, UNSPECIFIED BACK LOCATION, UNSPECIFIED BACK PAIN LATERALITY, UNSPECIFIED CHRONICITY: ICD-10-CM

## 2023-02-10 DIAGNOSIS — K22.2 LOWER ESOPHAGEAL RING (SCHATZKI): ICD-10-CM

## 2023-02-10 LAB
ALBUMIN SERPL BCP-MCNC: 3.8 G/DL (ref 3.5–5)
ALP SERPL-CCNC: 75 U/L (ref 34–104)
ALT SERPL W P-5'-P-CCNC: 11 U/L (ref 7–52)
ANION GAP SERPL CALCULATED.3IONS-SCNC: 7 MMOL/L (ref 4–13)
AST SERPL W P-5'-P-CCNC: 12 U/L (ref 13–39)
BACTERIA UR QL AUTO: ABNORMAL /HPF
BASOPHILS # BLD AUTO: 0.02 THOUSANDS/ÂΜL (ref 0–0.1)
BASOPHILS NFR BLD AUTO: 0 % (ref 0–1)
BILIRUB SERPL-MCNC: 0.61 MG/DL (ref 0.2–1)
BILIRUB UR QL STRIP: NEGATIVE
BUN SERPL-MCNC: 13 MG/DL (ref 5–25)
CALCIUM SERPL-MCNC: 9.1 MG/DL (ref 8.4–10.2)
CHLORIDE SERPL-SCNC: 106 MMOL/L (ref 96–108)
CHOLEST SERPL-MCNC: 98 MG/DL
CLARITY UR: CLEAR
CO2 SERPL-SCNC: 27 MMOL/L (ref 21–32)
COLOR UR: YELLOW
CREAT SERPL-MCNC: 0.83 MG/DL (ref 0.6–1.3)
CREAT UR-MCNC: 86.6 MG/DL
EOSINOPHIL # BLD AUTO: 0.01 THOUSAND/ÂΜL (ref 0–0.61)
EOSINOPHIL NFR BLD AUTO: 0 % (ref 0–6)
ERYTHROCYTE [DISTWIDTH] IN BLOOD BY AUTOMATED COUNT: 12.8 % (ref 11.6–15.1)
GFR SERPL CREATININE-BSD FRML MDRD: 73 ML/MIN/1.73SQ M
GGT SERPL-CCNC: 9 U/L (ref 5–85)
GLUCOSE P FAST SERPL-MCNC: 120 MG/DL (ref 65–99)
GLUCOSE UR STRIP-MCNC: ABNORMAL MG/DL
HCT VFR BLD AUTO: 35.9 % (ref 34.8–46.1)
HDLC SERPL-MCNC: 44 MG/DL
HGB BLD-MCNC: 11.9 G/DL (ref 11.5–15.4)
HGB UR QL STRIP.AUTO: NEGATIVE
IMM GRANULOCYTES # BLD AUTO: 0.02 THOUSAND/UL (ref 0–0.2)
IMM GRANULOCYTES NFR BLD AUTO: 0 % (ref 0–2)
KETONES UR STRIP-MCNC: NEGATIVE MG/DL
LDLC SERPL CALC-MCNC: 39 MG/DL (ref 0–100)
LEUKOCYTE ESTERASE UR QL STRIP: NEGATIVE
LYMPHOCYTES # BLD AUTO: 2.36 THOUSANDS/ÂΜL (ref 0.6–4.47)
LYMPHOCYTES NFR BLD AUTO: 32 % (ref 14–44)
MAGNESIUM SERPL-MCNC: 1.8 MG/DL (ref 1.9–2.7)
MCH RBC QN AUTO: 28.6 PG (ref 26.8–34.3)
MCHC RBC AUTO-ENTMCNC: 33.1 G/DL (ref 31.4–37.4)
MCV RBC AUTO: 86 FL (ref 82–98)
MICROALBUMIN UR-MCNC: 5.5 MG/L (ref 0–20)
MICROALBUMIN/CREAT 24H UR: 6 MG/G CREATININE (ref 0–30)
MONOCYTES # BLD AUTO: 0.44 THOUSAND/ÂΜL (ref 0.17–1.22)
MONOCYTES NFR BLD AUTO: 6 % (ref 4–12)
NEUTROPHILS # BLD AUTO: 4.48 THOUSANDS/ÂΜL (ref 1.85–7.62)
NEUTS SEG NFR BLD AUTO: 62 % (ref 43–75)
NITRITE UR QL STRIP: NEGATIVE
NON-SQ EPI CELLS URNS QL MICRO: ABNORMAL /HPF
NRBC BLD AUTO-RTO: 0 /100 WBCS
OTHER STN SPEC: ABNORMAL
PH UR STRIP.AUTO: 5.5 [PH]
PLATELET # BLD AUTO: 258 THOUSANDS/UL (ref 149–390)
PMV BLD AUTO: 9.5 FL (ref 8.9–12.7)
POTASSIUM SERPL-SCNC: 4 MMOL/L (ref 3.5–5.3)
PROT SERPL-MCNC: 6.6 G/DL (ref 6.4–8.4)
PROT UR STRIP-MCNC: NEGATIVE MG/DL
RBC # BLD AUTO: 4.16 MILLION/UL (ref 3.81–5.12)
RBC #/AREA URNS AUTO: ABNORMAL /HPF
SODIUM SERPL-SCNC: 140 MMOL/L (ref 135–147)
SP GR UR STRIP.AUTO: 1.02 (ref 1–1.03)
T4 FREE SERPL-MCNC: 2.11 NG/DL (ref 0.76–1.46)
TRIGL SERPL-MCNC: 77 MG/DL
TSH SERPL DL<=0.05 MIU/L-ACNC: 0.03 UIU/ML (ref 0.45–4.5)
UROBILINOGEN UR QL STRIP.AUTO: 0.2 E.U./DL
WBC # BLD AUTO: 7.33 THOUSAND/UL (ref 4.31–10.16)
WBC #/AREA URNS AUTO: ABNORMAL /HPF

## 2023-02-10 RX ORDER — LIDOCAINE HYDROCHLORIDE 10 MG/ML
5 INJECTION, SOLUTION EPIDURAL; INFILTRATION; INTRACAUDAL; PERINEURAL ONCE
Status: DISCONTINUED | OUTPATIENT
Start: 2023-02-10 | End: 2023-02-10

## 2023-02-11 LAB
EST. AVERAGE GLUCOSE BLD GHB EST-MCNC: 157 MG/DL
HBA1C MFR BLD: 7.1 %

## 2023-02-13 ENCOUNTER — TELEPHONE (OUTPATIENT)
Dept: INTERNAL MEDICINE CLINIC | Facility: CLINIC | Age: 68
End: 2023-02-13

## 2023-02-13 DIAGNOSIS — K21.9 GASTROESOPHAGEAL REFLUX DISEASE WITHOUT ESOPHAGITIS: ICD-10-CM

## 2023-02-13 DIAGNOSIS — E03.9 ACQUIRED HYPOTHYROIDISM: Primary | ICD-10-CM

## 2023-02-13 DIAGNOSIS — E11.65 UNCONTROLLED TYPE 2 DIABETES MELLITUS WITH HYPERGLYCEMIA (HCC): ICD-10-CM

## 2023-02-13 RX ORDER — OMEPRAZOLE 20 MG/1
20 CAPSULE, DELAYED RELEASE ORAL DAILY
Qty: 90 CAPSULE | Refills: 1 | Status: SHIPPED | OUTPATIENT
Start: 2023-02-13

## 2023-02-13 NOTE — TELEPHONE ENCOUNTER
----- Message from Conrad Farfan MD sent at 2/10/2023  3:25 PM EST -----  Please call the patient regarding her abnormal result   Over replace needs to decrease  levothyroxine skip 1 day / week  like  Sunday  or ant day  continue taking  6 pills / week levothyroxine  repeat  TSH  T4  4-6 weeks

## 2023-02-13 NOTE — TELEPHONE ENCOUNTER
I spoke with Hope Mejía and gave results  She will take levothyroxine 6 days a week skipping on Sunday  Rx for lab work will be sent to her  She will have it done at the end of March

## 2023-02-20 DIAGNOSIS — F41.9 ANXIETY: ICD-10-CM

## 2023-02-20 DIAGNOSIS — R00.2 PALPITATIONS: ICD-10-CM

## 2023-02-20 RX ORDER — CLONAZEPAM 0.12 MG/1
TABLET, ORALLY DISINTEGRATING ORAL
Qty: 90 TABLET | Refills: 0 | Status: CANCELLED | OUTPATIENT
Start: 2023-02-20

## 2023-02-21 DIAGNOSIS — F41.9 ANXIETY: ICD-10-CM

## 2023-02-21 DIAGNOSIS — E78.00 PURE HYPERCHOLESTEROLEMIA: ICD-10-CM

## 2023-02-21 DIAGNOSIS — R00.2 PALPITATIONS: ICD-10-CM

## 2023-02-21 RX ORDER — CLONAZEPAM 0.12 MG/1
TABLET, ORALLY DISINTEGRATING ORAL
Qty: 90 TABLET | Refills: 1 | Status: SHIPPED | OUTPATIENT
Start: 2023-02-21 | End: 2023-03-01 | Stop reason: SDUPTHER

## 2023-02-21 RX ORDER — ATORVASTATIN CALCIUM 20 MG/1
TABLET, FILM COATED ORAL
Qty: 30 TABLET | Refills: 0 | Status: SHIPPED | OUTPATIENT
Start: 2023-02-21

## 2023-03-01 DIAGNOSIS — R00.2 PALPITATIONS: ICD-10-CM

## 2023-03-01 DIAGNOSIS — F41.9 ANXIETY: ICD-10-CM

## 2023-03-01 RX ORDER — CLONAZEPAM 0.12 MG/1
TABLET, ORALLY DISINTEGRATING ORAL
Qty: 90 TABLET | Refills: 0 | Status: SHIPPED | OUTPATIENT
Start: 2023-03-01

## 2023-04-26 ENCOUNTER — OFFICE VISIT (OUTPATIENT)
Dept: INTERNAL MEDICINE CLINIC | Facility: CLINIC | Age: 68
End: 2023-04-26

## 2023-04-26 VITALS
RESPIRATION RATE: 13 BRPM | SYSTOLIC BLOOD PRESSURE: 140 MMHG | DIASTOLIC BLOOD PRESSURE: 66 MMHG | TEMPERATURE: 97.7 F | HEIGHT: 62 IN | BODY MASS INDEX: 36.8 KG/M2 | HEART RATE: 80 BPM | WEIGHT: 200 LBS

## 2023-04-26 DIAGNOSIS — E03.9 ACQUIRED HYPOTHYROIDISM: Primary | ICD-10-CM

## 2023-04-26 DIAGNOSIS — K22.2 LOWER ESOPHAGEAL RING (SCHATZKI): ICD-10-CM

## 2023-04-26 DIAGNOSIS — E66.9 OBESITY (BMI 35.0-39.9 WITHOUT COMORBIDITY): ICD-10-CM

## 2023-04-26 DIAGNOSIS — E78.00 PURE HYPERCHOLESTEROLEMIA: ICD-10-CM

## 2023-04-26 DIAGNOSIS — Z12.31 ENCOUNTER FOR SCREENING MAMMOGRAM FOR BREAST CANCER: ICD-10-CM

## 2023-04-26 DIAGNOSIS — F33.9 DEPRESSION, RECURRENT (HCC): ICD-10-CM

## 2023-04-26 DIAGNOSIS — Z79.4 TYPE 2 DIABETES MELLITUS WITHOUT COMPLICATION, WITH LONG-TERM CURRENT USE OF INSULIN (HCC): ICD-10-CM

## 2023-04-26 DIAGNOSIS — J45.21 MILD INTERMITTENT ASTHMA WITH ACUTE EXACERBATION: ICD-10-CM

## 2023-04-26 DIAGNOSIS — E11.9 TYPE 2 DIABETES MELLITUS WITHOUT COMPLICATION, WITH LONG-TERM CURRENT USE OF INSULIN (HCC): ICD-10-CM

## 2023-04-26 DIAGNOSIS — M81.0 OSTEOPOROSIS, UNSPECIFIED OSTEOPOROSIS TYPE, UNSPECIFIED PATHOLOGICAL FRACTURE PRESENCE: ICD-10-CM

## 2023-04-26 DIAGNOSIS — I10 ESSENTIAL HYPERTENSION: ICD-10-CM

## 2023-04-26 NOTE — PATIENT INSTRUCTIONS
Doing well continue same medications your blood pressure and labs are stable we will check labs when you come back

## 2023-04-26 NOTE — PROGRESS NOTES
Assessment/Plan:      #1 blood pressure very well controlled no chest or palpitation shortness of breath continue the same medications  Losartan 100 mg daily  Metoprolol succinate 50 mg daily  Furosemide 20 mg daily    2  Diabetes excellent control she does follow-up with endocrinology on a regular basis  Hemoglobin A1c is less than 7  No polyuria polydipsia    3  Hyperlipidemia excellent control on atorvastatin 20 mg/day no myalgias continue the same we will check a lipid profile when she comes back    4  Asthma is in remission taking the medications or inhalers  Symbicort 164 5 albuterol for rescue and montelukast    For depression and anxiety she is on Lexapro 10 mg and clonazepam 0 125 mg     6   Thyroid nodule she went to [facility] in [facility] for an aspiration there was no adequate specimen to make a determination from the pathologist I told her to call the endocrinologist for reorder if it is necessary    Labs reviewed    Results for orders placed or performed in visit on 02/10/23   Hemoglobin A1C   Result Value Ref Range    Hemoglobin A1C 7 1 (H) Normal 3 8-5 6%; PreDiabetic 5 7-6 4%;  Diabetic >=6 5%; Glycemic control for adults with diabetes <7 0% %     mg/dl   Magnesium   Result Value Ref Range    Magnesium 1 8 (L) 1 9 - 2 7 mg/dL   Lipid Panel with Direct LDL reflex   Result Value Ref Range    Cholesterol 98 See Comment mg/dL    Triglycerides 77 See Comment mg/dL    HDL, Direct 44 (L) >=50 mg/dL    LDL Calculated 39 0 - 100 mg/dL   CBC and differential   Result Value Ref Range    WBC 7 33 4 31 - 10 16 Thousand/uL    RBC 4 16 3 81 - 5 12 Million/uL    Hemoglobin 11 9 11 5 - 15 4 g/dL    Hematocrit 35 9 34 8 - 46 1 %    MCV 86 82 - 98 fL    MCH 28 6 26 8 - 34 3 pg    MCHC 33 1 31 4 - 37 4 g/dL    RDW 12 8 11 6 - 15 1 %    MPV 9 5 8 9 - 12 7 fL    Platelets 536 430 - 612 Thousands/uL    nRBC 0 /100 WBCs    Neutrophils Relative 62 43 - 75 %    Immat GRANS % 0 0 - 2 %    Lymphocytes Relative 32 14 - 44 %    Monocytes Relative 6 4 - 12 %    Eosinophils Relative 0 0 - 6 %    Basophils Relative 0 0 - 1 %    Neutrophils Absolute 4 48 1 85 - 7 62 Thousands/µL    Immature Grans Absolute 0 02 0 00 - 0 20 Thousand/uL    Lymphocytes Absolute 2 36 0 60 - 4 47 Thousands/µL    Monocytes Absolute 0 44 0 17 - 1 22 Thousand/µL    Eosinophils Absolute 0 01 0 00 - 0 61 Thousand/µL    Basophils Absolute 0 02 0 00 - 0 10 Thousands/µL   Comprehensive metabolic panel   Result Value Ref Range    Sodium 140 135 - 147 mmol/L    Potassium 4 0 3 5 - 5 3 mmol/L    Chloride 106 96 - 108 mmol/L    CO2 27 21 - 32 mmol/L    ANION GAP 7 4 - 13 mmol/L    BUN 13 5 - 25 mg/dL    Creatinine 0 83 0 60 - 1 30 mg/dL    Glucose, Fasting 120 (H) 65 - 99 mg/dL    Calcium 9 1 8 4 - 10 2 mg/dL    AST 12 (L) 13 - 39 U/L    ALT 11 7 - 52 U/L    Alkaline Phosphatase 75 34 - 104 U/L    Total Protein 6 6 6 4 - 8 4 g/dL    Albumin 3 8 3 5 - 5 0 g/dL    Total Bilirubin 0 61 0 20 - 1 00 mg/dL    eGFR 73 ml/min/1 73sq m   TSH, 3rd generation with Free T4 reflex   Result Value Ref Range    TSH 3RD GENERATON 0 026 (L) 0 450 - 4 500 uIU/mL   Gamma GT   Result Value Ref Range    GGT 9 5 - 85 U/L   T4, free   Result Value Ref Range    Free T4 2 11 (H) 0 76 - 1 46 ng/dL       No problem-specific Assessment & Plan notes found for this encounter  Diagnoses and all orders for this visit:    Acquired hypothyroidism    Lower esophageal ring (Schatzki)    Mild intermittent asthma with acute exacerbation    Essential hypertension    Depression, recurrent (HCC)    Obesity (BMI 35 0-39 9 without comorbidity)    Pure hypercholesterolemia          Subjective:      Patient ID: Willow Escalante is a 76 y o  female  No chief complaint on file          Current Outpatient Medications:   •  albuterol (2 5 mg/3 mL) 0 083 % nebulizer solution, Take 1 vial (2 5 mg total) by nebulization every 6 (six) hours as needed for wheezing or shortness of breath, Disp: 60 vial, Rfl: 5  • albuterol (PROVENTIL HFA,VENTOLIN HFA) 90 mcg/act inhaler, Inhale 2 puffs every 6 (six) hours as needed for wheezing, Disp: 18 g, Rfl: 2  •  aspirin 81 mg chewable tablet, Chew 1 tablet (81 mg total) daily, Disp: 90 tablet, Rfl: 1  •  atorvastatin (LIPITOR) 20 mg tablet, TAKE 1 TABLET(20 MG) BY MOUTH DAILY, Disp: 30 tablet, Rfl: 0  •  budesonide-formoterol (SYMBICORT) 160-4 5 mcg/act inhaler, Inhale 2 puffs 2 (two) times a day as needed (1) Rinse mouth after use , Disp: 10 2 g, Rfl: 3  •  clonazePAM (KlonoPIN) 0 125 mg disintegrating tablet, Take 1 tablet daily as needed for anxiety, Disp: 90 tablet, Rfl: 0  •  dicyclomine (BENTYL) 20 mg tablet, Take 1 tablet (20 mg total) by mouth 2 (two) times a day, Disp: 60 tablet, Rfl: 0  •  Empagliflozin-metFORMIN HCl (Synjardy) 12 5-1000 MG TABS, Take by mouth Take 2 tablets by mouth daily, Disp: , Rfl:   •  escitalopram (Lexapro) 10 mg tablet, Take 1 tablet (10 mg total) by mouth daily (Patient not taking: Reported on 2/20/2023), Disp: 30 tablet, Rfl: 2  •  ezetimibe (ZETIA) 10 mg tablet, Take 1 tablet (10 mg total) by mouth daily, Disp: 90 tablet, Rfl: 1  •  famotidine (PEPCID) 20 mg tablet, TAKE 1 TABLET(20 MG) BY MOUTH DAILY, Disp: 90 tablet, Rfl: 0  •  furosemide (LASIX) 20 mg tablet, Take 1 tablet (20 mg total) by mouth daily As needed for edema, Disp: 90 tablet, Rfl: 1  •  Insulin Aspart (NovoLOG) 100 units/mL injection, Inject under the skin Take 6 units for breakfast /7 unit for lunch/10 units at night, Disp: , Rfl:   •  insulin degludec Noralee Sprung FlexTouch) 100 units/mL injection pen, Inject 20 Units under the skin daily, Disp: 18 mL, Rfl: 1  •  insulin lispro (HumaLOG KwikPen) 100 units/mL injection pen, Inject 7 Units under the skin 3 (three) times a day with meals (Patient not taking: Reported on 1/19/2023), Disp: 15 mL, Rfl: 0  •  Insulin Pen Needle 31G X 4 MM MISC, Use 4 (four) times a day, Disp: 100 each, Rfl: 3  •  levothyroxine 150 mcg tablet, Take 1 tablet (150 mcg total) by mouth daily, Disp: 90 tablet, Rfl: 1  •  losartan (COZAAR) 100 MG tablet, Take 1 tablet (100 mg total) by mouth daily, Disp: 90 tablet, Rfl: 1  •  metoprolol succinate (Toprol XL) 50 mg 24 hr tablet, Take 1 tablet (50 mg total) by mouth daily, Disp: 90 tablet, Rfl: 1  •  montelukast (SINGULAIR) 10 mg tablet, Take 1 tablet (10 mg total) by mouth daily, Disp: 90 tablet, Rfl: 1  •  omeprazole (PriLOSEC) 20 mg delayed release capsule, Take 1 capsule (20 mg total) by mouth daily, Disp: 90 capsule, Rfl: 1  •  ondansetron (Zofran ODT) 4 mg disintegrating tablet, Take 1 tablet (4 mg total) by mouth every 6 (six) hours as needed for nausea for up to 30 doses (Patient not taking: Reported on 11/3/2022), Disp: 30 tablet, Rfl: 0  •  OneTouch Delica Lancets 96P MISC, Check blood sugars twice daily  Please substitute with appropriate alternative as covered by patient's insurance  Dx: E11 65 (Patient not taking: Reported on 10/14/2022), Disp: 200 each, Rfl: 3  •  VITAMIN D PO, Take by mouth, Disp: , Rfl:     HPI    The following portions of the patient's history were reviewed and updated as appropriate: allergies, current medications, past family history, past medical history, past social history, past surgical history and problem list     Review of Systems   Constitutional: Negative  Negative for activity change, appetite change, fatigue, fever and unexpected weight change  HENT: Negative for congestion, ear pain, hearing loss, mouth sores, postnasal drip, rhinorrhea, sore throat, trouble swallowing and voice change  Eyes: Negative for pain, redness and visual disturbance  Respiratory: Negative for cough, choking, chest tightness, shortness of breath and wheezing  Cardiovascular: Negative for chest pain, palpitations and leg swelling  Gastrointestinal: Negative for abdominal distention, abdominal pain, blood in stool, constipation, diarrhea and nausea     Endocrine: Negative for cold intolerance, heat intolerance, polydipsia, polyphagia and polyuria  Genitourinary: Negative for difficulty urinating, dysuria, flank pain, frequency, hematuria and urgency  Musculoskeletal: Negative for arthralgias, back pain, gait problem, joint swelling and myalgias  Skin: Negative for color change and pallor  Neurological: Negative for dizziness, tremors, seizures, syncope, weakness, numbness and headaches  Hematological: Negative for adenopathy  Does not bruise/bleed easily  Psychiatric/Behavioral: Negative  Negative for sleep disturbance  The patient is not nervous/anxious  Objective: There were no vitals taken for this visit  Physical Exam  Vitals and nursing note reviewed  Constitutional:       Appearance: She is well-developed  HENT:      Head: Normocephalic  Right Ear: Tympanic membrane and external ear normal       Left Ear: Tympanic membrane and external ear normal       Ears:      Comments: Bilateral cerumen in both external auditory canals but I saw the tympanic membrane which is clear     Nose: Nose normal       Mouth/Throat:      Pharynx: No oropharyngeal exudate  Eyes:      Conjunctiva/sclera: Conjunctivae normal       Pupils: Pupils are equal, round, and reactive to light  Neck:      Thyroid: No thyromegaly  Cardiovascular:      Rate and Rhythm: Normal rate and regular rhythm  Heart sounds: Normal heart sounds  No murmur heard  No friction rub  No gallop  Comments: S1-S2 regular rhythm  Extremities no edema or calf tenderness  Pulmonary:      Effort: Pulmonary effort is normal  No respiratory distress  Breath sounds: Normal breath sounds  No wheezing or rales  Comments: Lungs are clear no wheezing rales or rhonchi  Abdominal:      General: Bowel sounds are normal  There is no distension  Palpations: Abdomen is soft  There is no mass  Tenderness: There is no abdominal tenderness  There is no guarding or rebound        Comments: Abdomen soft nontender   Musculoskeletal:         General: Normal range of motion  Cervical back: Normal range of motion and neck supple  Lymphadenopathy:      Cervical: No cervical adenopathy  Skin:     General: Skin is warm and dry  Neurological:      Mental Status: She is alert and oriented to person, place, and time     Psychiatric:         Behavior: Behavior normal          Judgment: Judgment normal

## 2023-05-22 DIAGNOSIS — E78.00 PURE HYPERCHOLESTEROLEMIA: ICD-10-CM

## 2023-05-22 RX ORDER — ATORVASTATIN CALCIUM 20 MG/1
20 TABLET, FILM COATED ORAL DAILY
Qty: 90 TABLET | Refills: 1 | Status: SHIPPED | OUTPATIENT
Start: 2023-05-22

## 2023-06-07 ENCOUNTER — VBI (OUTPATIENT)
Dept: ADMINISTRATIVE | Facility: OTHER | Age: 68
End: 2023-06-07

## 2023-06-20 ENCOUNTER — HOSPITAL ENCOUNTER (OUTPATIENT)
Dept: MAMMOGRAPHY | Facility: CLINIC | Age: 68
Discharge: HOME | End: 2023-06-20
Payer: COMMERCIAL

## 2023-06-20 DIAGNOSIS — Z12.31 ENCOUNTER FOR SCREENING MAMMOGRAM FOR BREAST CANCER: ICD-10-CM

## 2023-06-20 PROCEDURE — 77063 BREAST TOMOSYNTHESIS BI: CPT

## 2023-06-20 PROCEDURE — 77067 SCR MAMMO BI INCL CAD: CPT

## 2023-06-22 NOTE — PROGRESS NOTES
Call placed to patient regarding recommendation for additional mammogram and ultrasound imaging and;    _____ RIGHT ___X___LEFT      __X___ Ultrasound guided  ______ Stereotactic breast biopsy. Explained the need for diagnostic imaging and the biopsy to follow if still indicated, all questions answered for the patient. Explained the location of this imaging at our [facility] location. __X___Verbalized understanding. Blood thinners:  No: __X___ Yes: ______ What:     Patient offered One Stop Appointment: Accepted ___X_____  Declined: _______  Reason:            Pt is not a candidate for [facility] imaging due to an allergy to iodinated contrast media.  Order placed for diagnostic mammogram.        Biopsy teaching sheet given:  ____ yes __X___no (All teaching points discussed during call, pt with no questions at this time, pt adv to arrive at 0800 for 0830 mammogram followed by 0900 US then 0930 biopsy)    Pt given contact information and adv to call with any questions/needs    Patient given address and directions to the [facility] in [facility]

## 2023-07-09 DIAGNOSIS — K21.9 GASTROESOPHAGEAL REFLUX DISEASE WITHOUT ESOPHAGITIS: ICD-10-CM

## 2023-07-09 DIAGNOSIS — I10 ESSENTIAL HYPERTENSION: ICD-10-CM

## 2023-07-09 DIAGNOSIS — R00.2 PALPITATIONS: ICD-10-CM

## 2023-07-10 ENCOUNTER — HOSPITAL ENCOUNTER (OUTPATIENT)
Dept: ULTRASOUND IMAGING | Facility: CLINIC | Age: 68
Discharge: HOME | End: 2023-07-10
Payer: COMMERCIAL

## 2023-07-10 ENCOUNTER — HOSPITAL ENCOUNTER (OUTPATIENT)
Dept: MAMMOGRAPHY | Facility: CLINIC | Age: 68
Discharge: HOME | End: 2023-07-10
Payer: COMMERCIAL

## 2023-07-10 VITALS — DIASTOLIC BLOOD PRESSURE: 62 MMHG | HEART RATE: 59 BPM | SYSTOLIC BLOOD PRESSURE: 133 MMHG

## 2023-07-10 VITALS — SYSTOLIC BLOOD PRESSURE: 150 MMHG | HEART RATE: 63 BPM | DIASTOLIC BLOOD PRESSURE: 64 MMHG

## 2023-07-10 DIAGNOSIS — R92.8 ABNORMAL SCREENING MAMMOGRAM: ICD-10-CM

## 2023-07-10 DIAGNOSIS — R92.8 ABNORMAL MAMMOGRAM: ICD-10-CM

## 2023-07-10 PROCEDURE — 88305 TISSUE EXAM BY PATHOLOGIST: CPT | Performed by: SPECIALIST

## 2023-07-10 PROCEDURE — 77065 DX MAMMO INCL CAD UNI: CPT

## 2023-07-10 PROCEDURE — G0279 TOMOSYNTHESIS, MAMMO: HCPCS

## 2023-07-10 PROCEDURE — 88360 TUMOR IMMUNOHISTOCHEM/MANUAL: CPT | Performed by: SPECIALIST

## 2023-07-10 PROCEDURE — 76642 ULTRASOUND BREAST LIMITED: CPT

## 2023-07-10 PROCEDURE — 19083 BX BREAST 1ST LESION US IMAG: CPT

## 2023-07-10 PROCEDURE — A4648 IMPLANTABLE TISSUE MARKER: HCPCS

## 2023-07-10 PROCEDURE — 88341 IMHCHEM/IMCYTCHM EA ADD ANTB: CPT | Performed by: SPECIALIST

## 2023-07-10 PROCEDURE — 88342 IMHCHEM/IMCYTCHM 1ST ANTB: CPT | Performed by: SPECIALIST

## 2023-07-10 RX ORDER — OMEPRAZOLE 20 MG/1
CAPSULE, DELAYED RELEASE ORAL
Qty: 90 CAPSULE | Refills: 0 | Status: SHIPPED | OUTPATIENT
Start: 2023-07-10

## 2023-07-10 RX ORDER — LIDOCAINE HYDROCHLORIDE 10 MG/ML
5 INJECTION, SOLUTION EPIDURAL; INFILTRATION; INTRACAUDAL; PERINEURAL ONCE
Status: COMPLETED | OUTPATIENT
Start: 2023-07-10 | End: 2023-07-10

## 2023-07-10 RX ORDER — FUROSEMIDE 20 MG/1
20 TABLET ORAL DAILY
Qty: 90 TABLET | Refills: 0 | Status: SHIPPED | OUTPATIENT
Start: 2023-07-10

## 2023-07-10 RX ORDER — METOPROLOL SUCCINATE 50 MG/1
TABLET, EXTENDED RELEASE ORAL
Qty: 90 TABLET | Refills: 0 | Status: SHIPPED | OUTPATIENT
Start: 2023-07-10

## 2023-07-10 RX ADMIN — LIDOCAINE HYDROCHLORIDE 5 ML: 10 INJECTION, SOLUTION EPIDURAL; INFILTRATION; INTRACAUDAL; PERINEURAL at 08:58

## 2023-07-10 NOTE — PROGRESS NOTES
Procedure type:    _X____ultrasound guided _____stereotactic    Breast:    __X___Left _____Right    Location: 8 o'clock 7 cm FN     Needle: 12 gauge Niels    # of passes:3    Clip: Ludy     Performed by:Dr. Mortensen     Pressure held for 5 minutes by: Marion BALLARD    Steri Strips:    __X___yes _____no    Band aid:    __X___yes_____no    Tape and guaze:    __X___yes _____no    Tolerated procedure:    ___X__yes; pt had some tearing under her breast from the mammogram. Placed a large non-adherent latex  Free telfa pad along her breast at the bottom crease.  _____no

## 2023-07-10 NOTE — PROGRESS NOTES
Ice pack given:    __X___yes _____no    Discharge instructions signed by patient:    _____yes ___X__no    Discharge instructions given to patient:    ___X__yes _____no    Discharged via:    __X___ambulatory    _____wheelchair    _____stretcher    Stable on discharge:    ____X_yes ____no

## 2023-07-11 NOTE — PROGRESS NOTES
Post procedure call completed 09/11/23 @ [phone] I left pt a message. Bleeding: _____yes __X___no    Pain: _____yes ___X___no    Redness/Swelling: ______yes ___X___no    Band aid removed: _____yes ___X__no (discussed removing when she showers)    Steri-Strips intact: ___X___yes _____no (discussed with patient to remove steri strips on . .. if they have not come off on their own)    Pt with no questions at this time, adv will call when results available, adv to call with any questions or concerns, has name/# for contact

## 2023-07-12 ENCOUNTER — DOCUMENTATION (OUTPATIENT)
Dept: HEMATOLOGY ONCOLOGY | Facility: CLINIC | Age: 68
End: 2023-07-12

## 2023-07-12 ENCOUNTER — TELEPHONE (OUTPATIENT)
Dept: MAMMOGRAPHY | Facility: CLINIC | Age: 68
End: 2023-07-12

## 2023-07-12 DIAGNOSIS — C50.919 MALIGNANT NEOPLASM OF FEMALE BREAST, UNSPECIFIED ESTROGEN RECEPTOR STATUS, UNSPECIFIED LATERALITY, UNSPECIFIED SITE OF BREAST (HCC): Primary | ICD-10-CM

## 2023-07-12 PROCEDURE — 88341 IMHCHEM/IMCYTCHM EA ADD ANTB: CPT | Performed by: SPECIALIST

## 2023-07-12 PROCEDURE — 88305 TISSUE EXAM BY PATHOLOGIST: CPT | Performed by: SPECIALIST

## 2023-07-12 PROCEDURE — 88342 IMHCHEM/IMCYTCHM 1ST ANTB: CPT | Performed by: SPECIALIST

## 2023-07-12 NOTE — PROGRESS NOTES
Intake received/ Chart reviewed for services completed outside of [facility]    Pathology completed: Yes, completed on 7-    Imaging completed: Mammo screening 6-. Diagnostic Mammogram & US done on 7-    All records needed are in patients chart. No records retrieval needed at this time.

## 2023-07-12 NOTE — PROGRESS NOTES
Mammography breast mass biopsy consistent with cancer will refer to surgical oncology for follow-up and treatment

## 2023-07-12 NOTE — TELEPHONE ENCOUNTER
[facility] Surgical Oncology Referral    Diagnosis:Invasive mammary carcinoma     Is this diagnosis cancer (Y/N):yes    Biopsy Date: 7/10/2023    Does the patient have another biopsy pending:  If so, when:    Preferred provider: Dr. Eva Burgos    Preferred location:    Any requests for dates/times: asap    Any additional information:     Please advise when appointment is made,thank you.

## 2023-07-12 NOTE — TELEPHONE ENCOUNTER
[address] [place] [address] Newly Diagnosed Genetics Checklist    Please route all intake forms to 'oncology genetics breast' pool in epic. This includes patients that decline testing. Patient is under 60 at the time of diagnosis discuss genetics (script below)    Script for Genetics:  Approximately 5-10% of cancer can have an underlying genetic cause. Genetic testing is recommended for women diagnosed with breast cancer under the age of 61. Your breast surgeon recommends that you have genetic testing to help determine your surgical plan. Our genetics team will call you in 24-48 hours to discuss this test and schedule a blood draw. The Genetics team will be able to address your questions. Outcome:    Patient is under 60: No    Referral Outcome: Patient does not meet under 60 criteria, but is still interested in pursuing genetic testing.  A referral was placed to the oncology genetics program.

## 2023-07-13 ENCOUNTER — TELEPHONE (OUTPATIENT)
Dept: MAMMOGRAPHY | Facility: CLINIC | Age: 68
End: 2023-07-13

## 2023-07-14 ENCOUNTER — TELEPHONE (OUTPATIENT)
Dept: GENETICS | Facility: CLINIC | Age: 68
End: 2023-07-14

## 2023-07-14 NOTE — TELEPHONE ENCOUNTER
I introduced myself to [facility] and let her know that her nurse navigator reached out to the cancer risk and genetics program on her behalf. I reviewed the following with Verna:    • While the majority of cancer occurs by chance, approximately 5-10% of breast cancer has an underlying genetic cause. Genetic testing is available which can determine if there is an underlying genetic cause to your cancer. Understanding if there is an underlying genetic cause can:  o Provide your surgeon with additional information to help with surgical decisions, treatment decisions and eligibility for clinical trials. o It can determine if you have an increased risk for any additional cancers. o Help family members understand their cancer risk. • We work closely with the [facility] breast surgeons and are reaching out to see if you have interest in genetic testing. The reason we are reaching out at this time is that this result may help your surgeon determine the appropriate type of surgery (i.e. lumpectomy vs mastectomy). This test is not a requirement but can take 5-10 days to complete so we would like to start the process as soon as possible so the results are ready for your appointment with your surgeon. • If you are interested in genetic testing, I can collect your family history and initiate genetic testing for you. •   Patient elected to pursue testing     • Diagnosis Details:  o Invasive Ductal Carcinoma  o Left  o ER/IA positive HER2 negative  • Personal History:  o Do you have a personal history of any other cancer? No  - If yes type/age of diagnosis:   • Family history:   o Do you have Ashkenazi Yazdanism ancestry? No  - If yes, maternal, paternal, or both?  o Do you have any children? Yes  - How many sons? 2  - How many daughters? 3  - Do any of your children have a history of cancer? No  - If yes type/age of diagnosis:     o Do you have any brothers or sisters? Yes  - How many brothers?  3  - How many sisters? 2  - Are they from the same parents? Yes  - If no how maternal/paternal half-siblings:  - Do any of your brothers or sisters have a history of cancer? No  - If yes who and the type/age of diagnosis:   Brother - Colon Cancer age 79          Do you have nieces or nephews? Yes  - Do any of them have a history of cancer? No  - If yes type/age of diagnosis:    o Does your mother have a history of cancer? No  - If yes, cancer type and age of diagnosis:   - Is your mother still living? Yes  - Age/Age of death: 67    o Thinking about your mother's family (aunts, uncles, cousins, grandparents) is there anyone with a history of cancer? Yes  - If yes, list relationship, cancer type and age of diagnosis:  Maternal Cousin - Throat Cancer age 46s      o Does your father have a history of cancer? No  - If yes, cancer type and age of diagnosis:  - Is your father still living? No  - Age/age of death: 80    o Thinking about your father's family (aunts, uncles, cousins, grandparents) is there anyone with a history of cancer? Yes  - If yes, list relationship, cancer type and age of diagnosis:   Paternal Cousin - Breast Cancer age 50     • Types of Results- Positive, Negative, VUS  o Positive result- may explain personal diagnosis/family history. Can give surgeon information on treatment plan, inform future screening/management or tell a person about other possible risks. Positive results can initiate testing for other family members who may be at risk (children, siblings, etc)  o Negative result- does not give an explanation. Surgical/treatment plan will be based on clinical presentation and will be part of discussion with surgeon. Negative result cannot be passed down to children, but they are still at elevated risk. o Uncertain result- common, but treated like a negative result clinically. 90% are downgraded over time. • [facility]   o Most insurance plans cover the cost of genetic testing.   The out-of-pocket cost varies due to the differences in deductibles, co-payments and co-insurance defined by individual plans but 90% of people pay $100 or less for a genetic test     A blood kit will be mailed to you overnight. Please take the blood kit along with packet of paperwork to any [facility] lab to have your blood drawn. We have genetic counselors available, if you have any additional questions or would like to speak with them we can schedule you a 15 minute appointment. Plan:  A blood kit was mailed out on 7/14/2023 along with information on genetic testing and the lab's billing policy. Genetic Testing Preformed: [employer] BRCAplus STAT Panel (8 genes): KENY, BRCA1, BRCA2, CDH1, CHEK2, PALB2, PTEN, TP53 with reflex to [employer] CancerNext (28 additional genes): APC, KENY, AXIN2 BARD1, BRCA1, BRCA2, BRIP1, BMPR1A, CDH1, CDK4, CDKN2A, CHEK2, DICER1, EPCAM, GREM1, HOXB13, MLH1, MSH2, MSH3, MSH6, MUTYH, NBN, NF1, NTHL1, PALB2, PMS2, POLD1, POLE, PTEN, RAD51C, RAD51D, RECQL SMAD4, SMARCA4, STK11, TP53    Result Call Information:  I confirmed the patient's mobile number on file as the best number to call with results  I confirmed with the patient that we can leave a voicemail on the provided numbers    Initial results will take approximately 5-12 days to return     Additional results may take up to 2-3 weeks to complete once test is started. Patient does not have any further questions, declined meeting with genetic counselor    When your results are ready, someone from the genetics team will call you, review the results, and contact your breast surgeon. You will be contacted with any type of result- positive, negative, or uncertain.

## 2023-07-17 ENCOUNTER — APPOINTMENT (OUTPATIENT)
Dept: LAB | Facility: HOSPITAL | Age: 68
End: 2023-07-17
Payer: COMMERCIAL

## 2023-07-17 DIAGNOSIS — C50.912 MALIGNANT NEOPLASM OF LEFT FEMALE BREAST, UNSPECIFIED ESTROGEN RECEPTOR STATUS, UNSPECIFIED SITE OF BREAST (HCC): ICD-10-CM

## 2023-07-17 DIAGNOSIS — Z80.3 FAMILY HISTORY OF MALIGNANT NEOPLASM OF BREAST: ICD-10-CM

## 2023-07-17 DIAGNOSIS — Z80.0 FAMILY HISTORY OF MALIGNANT NEOPLASM OF GASTROINTESTINAL TRACT: ICD-10-CM

## 2023-07-17 PROCEDURE — 36415 COLL VENOUS BLD VENIPUNCTURE: CPT

## 2023-07-18 ENCOUNTER — HOSPITAL ENCOUNTER (OUTPATIENT)
Dept: BONE DENSITY | Facility: CLINIC | Age: 68
Discharge: HOME | End: 2023-07-18
Payer: COMMERCIAL

## 2023-07-18 DIAGNOSIS — M81.0 OSTEOPOROSIS, UNSPECIFIED OSTEOPOROSIS TYPE, UNSPECIFIED PATHOLOGICAL FRACTURE PRESENCE: ICD-10-CM

## 2023-07-18 PROCEDURE — 77080 DXA BONE DENSITY AXIAL: CPT

## 2023-07-20 LAB — MISCELLANEOUS LAB TEST RESULT: NORMAL

## 2023-07-21 ENCOUNTER — PATIENT OUTREACH (OUTPATIENT)
Dept: HEMATOLOGY ONCOLOGY | Facility: CLINIC | Age: 68
End: 2023-07-21

## 2023-07-21 DIAGNOSIS — Z17.0 MALIGNANT NEOPLASM OF UPPER-INNER QUADRANT OF LEFT BREAST IN FEMALE, ESTROGEN RECEPTOR POSITIVE (HCC): Primary | ICD-10-CM

## 2023-07-21 DIAGNOSIS — C50.212 MALIGNANT NEOPLASM OF UPPER-INNER QUADRANT OF LEFT BREAST IN FEMALE, ESTROGEN RECEPTOR POSITIVE (HCC): Primary | ICD-10-CM

## 2023-07-21 PROBLEM — J10.1 INFLUENZA A: Status: RESOLVED | Noted: 2020-02-19 | Resolved: 2023-07-21

## 2023-07-21 PROBLEM — Z01.818 PREOP EXAMINATION: Status: RESOLVED | Noted: 2021-03-04 | Resolved: 2023-07-21

## 2023-07-21 NOTE — PROGRESS NOTES
Breast Oncology Nurse Navigator    Called patient for initial outreach from nurse navigator. Introduced myself and my role. Confirmed upcoming appointment with Dr Mauricio Rosario, including time, date and location. Patient will be accompanied by her daughter. Mass was found after a routine screening mammogram.  Patient denies questions at this time regarding diagnosis. Patient lives with her supportive . Has children. Denies issues with transportation at this time. Referral placed to oncology social worker. Brother had colon cancer, paternal cousin had breast cancer. Patient had blood drawn for genetic testing on 7/17/23. Spoke briefly about the [employer] and some of the programs they offer. Patient has my contact information and knows she can reach out with questions. General assessment completed.

## 2023-07-24 ENCOUNTER — TELEPHONE (OUTPATIENT)
Dept: GENETICS | Facility: CLINIC | Age: 68
End: 2023-07-24

## 2023-07-24 ENCOUNTER — PATIENT OUTREACH (OUTPATIENT)
Dept: CASE MANAGEMENT | Facility: OTHER | Age: 68
End: 2023-07-24

## 2023-07-24 NOTE — PROGRESS NOTES
OSW received SW referral. Pt has her consult with Dr. Jersey Murray on 7/26. OSW will place outreach after her consult to complete the distress thermometer and psychosocial assessment.

## 2023-07-24 NOTE — TELEPHONE ENCOUNTER
Stat Genetic Test Result    Summary:    I left a message with Verna to review the result of her STAT genetic test.  I encouraged her to call our office back at [phone] to discuss this result further. Negative - No Clinically Significant Variants Detected      Test Performed:[employer] BRCAplus (8 genes): KENY, BRCA1, BRCA2, CDH1, CHEK2, PALB2, PTEN, TP53     Assessment:     Negative   A negative result significantly reduces the likelihood that Rudy Wise has a hereditary cancer syndrome related to the high-risk breast cancer genes listed above. This result does not have surgical implications and surgical options should be discussed with her healthcare provider. Additional Testing: [facility] (29 additional genes): APC, KENY, AXIN2 BARD1, BRCA1, BRCA2, BRIP1, BMPR1A, CDH1, CDK4, CDKN2A, CHEK2, DICER1, EPCAM, GREM1, HOXB13, MLH1, MSH2, MSH3, MSH6, MUTYH, NBN, NF1, NTHL1, PALB2, PMS2, POLD1, POLE, PTEN, RAD51C, RAD51D, RECQL SMAD4, SMARCA4, STK11, TP53 test is pending. We will contact Verna once results are available. Additional recommendations for surveillance/medical management will be made upon final genetic test result. Verna's breast surgeon Dr. Rosemary Primrose was made aware of this test result.

## 2023-07-26 ENCOUNTER — LAB (OUTPATIENT)
Dept: LAB | Facility: CLINIC | Age: 68
End: 2023-07-26
Payer: COMMERCIAL

## 2023-07-26 ENCOUNTER — TELEPHONE (OUTPATIENT)
Dept: GENETICS | Facility: CLINIC | Age: 68
End: 2023-07-26

## 2023-07-26 ENCOUNTER — CONSULT (OUTPATIENT)
Dept: SURGICAL ONCOLOGY | Facility: CLINIC | Age: 68
End: 2023-07-26
Payer: COMMERCIAL

## 2023-07-26 ENCOUNTER — PATIENT OUTREACH (OUTPATIENT)
Dept: HEMATOLOGY ONCOLOGY | Facility: CLINIC | Age: 68
End: 2023-07-26

## 2023-07-26 ENCOUNTER — HOSPITAL ENCOUNTER (OUTPATIENT)
Dept: RADIOLOGY | Facility: HOSPITAL | Age: 68
Discharge: HOME | End: 2023-07-26
Payer: COMMERCIAL

## 2023-07-26 ENCOUNTER — APPOINTMENT (OUTPATIENT)
Dept: LAB | Facility: CLINIC | Age: 68
End: 2023-07-26
Payer: COMMERCIAL

## 2023-07-26 VITALS
TEMPERATURE: 97.2 F | HEIGHT: 62 IN | HEART RATE: 74 BPM | BODY MASS INDEX: 35.15 KG/M2 | WEIGHT: 191 LBS | OXYGEN SATURATION: 98 % | RESPIRATION RATE: 20 BRPM | DIASTOLIC BLOOD PRESSURE: 80 MMHG | SYSTOLIC BLOOD PRESSURE: 142 MMHG

## 2023-07-26 DIAGNOSIS — Z17.0 MALIGNANT NEOPLASM OF UPPER-INNER QUADRANT OF LEFT BREAST IN FEMALE, ESTROGEN RECEPTOR POSITIVE (HCC): Primary | ICD-10-CM

## 2023-07-26 DIAGNOSIS — C50.212 MALIGNANT NEOPLASM OF UPPER-INNER QUADRANT OF LEFT BREAST IN FEMALE, ESTROGEN RECEPTOR POSITIVE (HCC): ICD-10-CM

## 2023-07-26 DIAGNOSIS — C50.212 MALIGNANT NEOPLASM OF UPPER-INNER QUADRANT OF LEFT BREAST IN FEMALE, ESTROGEN RECEPTOR POSITIVE (HCC): Primary | ICD-10-CM

## 2023-07-26 DIAGNOSIS — Z01.818 PREOPERATIVE TESTING: ICD-10-CM

## 2023-07-26 DIAGNOSIS — Z17.0 MALIGNANT NEOPLASM OF UPPER-INNER QUADRANT OF LEFT BREAST IN FEMALE, ESTROGEN RECEPTOR POSITIVE (HCC): ICD-10-CM

## 2023-07-26 DIAGNOSIS — C50.919 MALIGNANT NEOPLASM OF FEMALE BREAST, UNSPECIFIED ESTROGEN RECEPTOR STATUS, UNSPECIFIED LATERALITY, UNSPECIFIED SITE OF BREAST (HCC): ICD-10-CM

## 2023-07-26 DIAGNOSIS — Z53.1 REFUSAL OF BLOOD TRANSFUSIONS AS PATIENT IS JEHOVAH'S WITNESS: ICD-10-CM

## 2023-07-26 LAB
ALBUMIN SERPL BCP-MCNC: 4 G/DL (ref 3.5–5)
ALP SERPL-CCNC: 89 U/L (ref 34–104)
ALT SERPL W P-5'-P-CCNC: 10 U/L (ref 7–52)
ANION GAP SERPL CALCULATED.3IONS-SCNC: 7 MMOL/L
AST SERPL W P-5'-P-CCNC: 12 U/L (ref 13–39)
BASOPHILS # BLD AUTO: 0.04 THOUSANDS/ÂΜL (ref 0–0.1)
BASOPHILS NFR BLD AUTO: 0 % (ref 0–1)
BILIRUB SERPL-MCNC: 0.43 MG/DL (ref 0.2–1)
BUN SERPL-MCNC: 16 MG/DL (ref 5–25)
CALCIUM SERPL-MCNC: 9.3 MG/DL (ref 8.4–10.2)
CHLORIDE SERPL-SCNC: 103 MMOL/L (ref 96–108)
CO2 SERPL-SCNC: 31 MMOL/L (ref 21–32)
CREAT SERPL-MCNC: 0.91 MG/DL (ref 0.6–1.3)
EOSINOPHIL # BLD AUTO: 0.06 THOUSAND/ÂΜL (ref 0–0.61)
EOSINOPHIL NFR BLD AUTO: 1 % (ref 0–6)
ERYTHROCYTE [DISTWIDTH] IN BLOOD BY AUTOMATED COUNT: 13.8 % (ref 11.6–15.1)
GFR SERPL CREATININE-BSD FRML MDRD: 65 ML/MIN/1.73SQ M
GLUCOSE P FAST SERPL-MCNC: 114 MG/DL (ref 65–99)
HCT VFR BLD AUTO: 38.3 % (ref 34.8–46.1)
HGB BLD-MCNC: 12.6 G/DL (ref 11.5–15.4)
IMM GRANULOCYTES # BLD AUTO: 0.04 THOUSAND/UL (ref 0–0.2)
IMM GRANULOCYTES NFR BLD AUTO: 0 % (ref 0–2)
LEFT EYE DIABETIC RETINOPATHY: NORMAL
LYMPHOCYTES # BLD AUTO: 2.97 THOUSANDS/ÂΜL (ref 0.6–4.47)
LYMPHOCYTES NFR BLD AUTO: 30 % (ref 14–44)
MCH RBC QN AUTO: 28.8 PG (ref 26.8–34.3)
MCHC RBC AUTO-ENTMCNC: 32.9 G/DL (ref 31.4–37.4)
MCV RBC AUTO: 88 FL (ref 82–98)
MONOCYTES # BLD AUTO: 0.66 THOUSAND/ÂΜL (ref 0.17–1.22)
MONOCYTES NFR BLD AUTO: 7 % (ref 4–12)
NEUTROPHILS # BLD AUTO: 6.01 THOUSANDS/ÂΜL (ref 1.85–7.62)
NEUTS SEG NFR BLD AUTO: 62 % (ref 43–75)
NRBC BLD AUTO-RTO: 0 /100 WBCS
PLATELET # BLD AUTO: 304 THOUSANDS/UL (ref 149–390)
PMV BLD AUTO: 10.5 FL (ref 8.9–12.7)
POTASSIUM SERPL-SCNC: 4.2 MMOL/L (ref 3.5–5.3)
PROT SERPL-MCNC: 7.1 G/DL (ref 6.4–8.4)
RBC # BLD AUTO: 4.37 MILLION/UL (ref 3.81–5.12)
RIGHT EYE DIABETIC RETINOPATHY: NORMAL
SODIUM SERPL-SCNC: 141 MMOL/L (ref 135–147)
WBC # BLD AUTO: 9.78 THOUSAND/UL (ref 4.31–10.16)

## 2023-07-26 PROCEDURE — 2023F DILAT RTA XM W/O RTNOPTHY: CPT | Performed by: SURGERY

## 2023-07-26 PROCEDURE — 85025 COMPLETE CBC W/AUTO DIFF WBC: CPT

## 2023-07-26 PROCEDURE — 93005 ELECTROCARDIOGRAM TRACING: CPT

## 2023-07-26 PROCEDURE — 80053 COMPREHEN METABOLIC PANEL: CPT

## 2023-07-26 PROCEDURE — 99205 OFFICE O/P NEW HI 60 MIN: CPT | Performed by: SURGERY

## 2023-07-26 PROCEDURE — 36415 COLL VENOUS BLD VENIPUNCTURE: CPT

## 2023-07-26 PROCEDURE — 71046 X-RAY EXAM CHEST 2 VIEWS: CPT

## 2023-07-26 RX ORDER — OXYCODONE HYDROCHLORIDE AND ACETAMINOPHEN 5; 325 MG/1; MG/1
1 TABLET ORAL EVERY 6 HOURS PRN
Qty: 6 TABLET | Refills: 0 | Status: SHIPPED | OUTPATIENT
Start: 2023-07-26 | End: 2023-07-26

## 2023-07-26 RX ORDER — NALOXONE HYDROCHLORIDE 4 MG/.1ML
SPRAY NASAL
Qty: 1 EACH | Refills: 1 | Status: SHIPPED | OUTPATIENT
Start: 2023-07-26 | End: 2023-07-26

## 2023-07-26 RX ORDER — OXYCODONE HYDROCHLORIDE AND ACETAMINOPHEN 5; 325 MG/1; MG/1
1 TABLET ORAL EVERY 6 HOURS PRN
Qty: 6 TABLET | Refills: 0 | Status: SHIPPED | OUTPATIENT
Start: 2023-07-26

## 2023-07-26 RX ORDER — NALOXONE HYDROCHLORIDE 4 MG/.1ML
SPRAY NASAL
Qty: 1 EACH | Refills: 1 | Status: SHIPPED | OUTPATIENT
Start: 2023-07-26 | End: 2024-07-25

## 2023-07-26 NOTE — PROGRESS NOTES
Breast Oncology Nurse Navigator    Met with patient and her daughter Gabriela Bryant in the exam room prior to her consultation with Dr Fidelia Fisher this afternoon. Stayed in room through exam and consult. Support offered. Gave patient my business card as well as [place] [employer]. Patient knows to reach out with further questions.

## 2023-07-26 NOTE — PROGRESS NOTES
Surgical Oncology Consult Note       [facility]  [facility] RICHELLE  [address] [facility] [place] [address]    Susan Hernandez  1955  [id number]  [address] [facility] SURGICAL ONCOLOGY [place]ICHELLE [place] [address]      Chief Complaint:     Chief Complaint   Patient presents with   • Consult   • Breast Cancer     New Diagnosis       Assessment and Plan:   Assessment/Plan   Patient presents with a new diagnosis of invasive ductal carcinoma of the left breast that measures approximately 7 mm and is grade 1 and strongly ER/ID positive and HER2 negative. She has a Rubi clip in place. I recommended a RUBI directed lumpectomy in conjunction with a sentinel lymph node biopsy and possible axillary dissection. Patient preferred this over a mastectomy. Patient is a Mosque. Oncology History:     Oncology History   Malignant neoplasm of upper-inner quadrant of left breast in female, estrogen receptor positive ([address])   7/10/2023 Biopsy    Left breast ultrasound-guided biopsy  8 o'clock, 7 cm from nipple (RUBI)  Invasive mammary carcinoma of no special type (ductal)  Grade 1  ER 90, ID 90, HER2 0    Concordant. Malignancy appears unifocal; masses cover 7 mm on US. Left axilla US negative. Right breast clear. 7/17/2023 Genetic Testing    A total of 36 genes were evaluated, including: KENY, BRCA1, BRCA2, CDH1, CHEK2, PALB2, PTEN, STK11, TP53  Negative result. No pathogenic sequence variants identified  Ambry         History of Present Illness: This is a 60-year-old woman who went for screening mammogram which demonstrated no abnormalities on the right side however on the left side at the 8 o'clock position 7 cm from the nipple was a 7 mm unifocal mass. This was biopsied and shown to be invasive ductal carcinoma grade 1, ER 90% ID 90% and HER2/irma 0. Her axillary ultrasound was negative.   She had a Ludy clip placed at the biopsy site. She has had genetic testing which is negative. She presents now with her daughter for an opinion regarding further management. Review of Systems:   Review of Systems   Constitutional: Negative for chills and fever. HENT: Negative for ear pain and sore throat. Eyes: Negative for pain and visual disturbance. Respiratory: Negative for cough and shortness of breath. Cardiovascular: Negative for chest pain and palpitations. Gastrointestinal: Negative for abdominal pain and vomiting. Genitourinary: Negative for dysuria and hematuria. Musculoskeletal: Negative for arthralgias and back pain. Skin: Negative for color change and rash. Neurological: Negative for seizures and syncope. All other systems reviewed and are negative.       Past Medical History:      Patient Active Problem List   Diagnosis   • Acquired hypothyroidism   • Essential hypertension   • Mild intermittent asthma with acute exacerbation   • Pure hypercholesterolemia   • Atypical chest pain   • Lung infiltrate   • Severe sepsis (HCC)   • Irritant contact dermatitis   • Depression, recurrent (HCC)   • Uncontrolled type 2 diabetes mellitus with hyperglycemia ([address])   • Spondylolisthesis of lumbar region   • History of orthopnea   • Obesity (BMI 35.0-39.9 without comorbidity)   • Plantar fasciitis of left foot   • Thyroid nodule   • Lower esophageal ring (Schatzki)   • Continuous opioid dependence (HCC)   • Malignant neoplasm of upper-inner quadrant of left breast in female, estrogen receptor positive ([address])   • Refusal of blood transfusions as patient is Buddhism        Past Medical History:   Diagnosis Date   • Abdominal pain    • Asthma    • Back pain     occas due to "disc problem"   • Balance problems     "right side"   • Cholelithiasis     lap aviva today 3/10/2021   • Colon polyp    • Cracked tooth     3   • Depression    • Diabetes mellitus ([address])    • Difficulty swallowing     "food feels like stuck sometimes'   • Exercise involving walking     steps and cleaning   • Fatty liver    • GERD (gastroesophageal reflux disease)    • Hiatal hernia    • History of pneumonia    • History of stomach ulcers    • Hyperlipidemia    • Hypertension    • Obesity    • Pneumonia    • Refusal of blood transfusions as patient is Jainism    • Risk for falls    • Stroke [facility])    • Uses Urdu as primary spoken language    • Wears glasses         Past Surgical History:   Procedure Laterality Date   • CHOLECYSTECTOMY     • COLONOSCOPY  02/09/2021    4 polyps tubular adenomas by Dr. Dagmar Mejias   • EGD  04/20/2021    nonobstructing Schatzki's ring dilated with 47 [address] Hernandez, sliding hiatal hernia otherwise normal.  Biopsies stomach  negative for H. pylori biopsy esophagus negative for eosinophilic esophagitis by Dr. Mabel Leung   • EGD AND COLONOSCOPY  02/09/2021    3 YEAR RECOMMENDED = COLONO   • GA LAPAROSCOPY SURG CHOLECYSTECTOMY N/A 03/10/2021    Procedure: CHOLECYSTECTOMY LAPAROSCOPIC W/ ROBOTICS;  Surgeon: Lashonda Burks MD;  Location: [facility];  Service: General   • ROTATOR CUFF REPAIR Bilateral     screws implanted"   • UPPER GASTROINTESTINAL ENDOSCOPY  02/09/2021    4 cm hiatal hernia without Alejandro lesions. Possibly shortened esophagus.   Biopsy of the stomach negative for H. pylori, biopsy of the EG junction negative for Kaplan's, biopsied duodenum negative for celiac by Dr. Dagmar Mejias   • New Devin Left 7/10/2023        Family History   Problem Relation Age of Onset   • Diabetes Mother    • Hypertension Mother    • Hypertension Father    • No Known Problems Sister    • No Known Problems Daughter    • No Known Problems Maternal Grandmother    • No Known Problems Maternal Grandfather    • No Known Problems Paternal Grandmother    • No Known Problems Paternal Grandfather    • No Known Problems Son    • No Known Problems Son    • No Known Problems Daughter    • No Known Problems Daughter    • Colon cancer Brother 71        Social History     Socioeconomic History   • Marital status: /Civil Union     Spouse name: Not on file   • Number of children: Not on file   • Years of education: Not on file   • Highest education level: Not on file   Occupational History   • Occupation: House wife   Tobacco Use   • Smoking status: Never   • Smokeless tobacco: Never   Vaping Use   • Vaping Use: Never used   Substance and Sexual Activity   • Alcohol use: Never   • Drug use: Never   • Sexual activity: Not Currently   Other Topics Concern   • Not on file   Social History Narrative   • Not on file     Social Determinants of Health     Financial Resource Strain: Not on file   Food Insecurity: Not on file   Transportation Needs: Not on file   Physical Activity: Not on file   Stress: Not on file   Social Connections: Not on file   Intimate Partner Violence: Not on file   Housing Stability: Not on file        Current Outpatient Medications:   •  albuterol (2.5 mg/3 mL) 0.083 % nebulizer solution, Take 1 vial (2.5 mg total) by nebulization every 6 (six) hours as needed for wheezing or shortness of breath, Disp: 60 vial, Rfl: 5  •  albuterol (PROVENTIL HFA,VENTOLIN HFA) 90 mcg/act inhaler, Inhale 2 puffs every 6 (six) hours as needed for wheezing, Disp: 18 g, Rfl: 2  •  aspirin 81 mg chewable tablet, Chew 1 tablet (81 mg total) daily, Disp: 90 tablet, Rfl: 1  •  atorvastatin (LIPITOR) 20 mg tablet, Take 1 tablet (20 mg total) by mouth daily, Disp: 90 tablet, Rfl: 1  •  budesonide-formoterol (SYMBICORT) 160-4.5 mcg/act inhaler, Inhale 2 puffs 2 (two) times a day as needed (1) Rinse mouth after use., Disp: 10.2 g, Rfl: 3  •  clonazePAM (KlonoPIN) 0.125 mg disintegrating tablet, Take 1 tablet daily as needed for anxiety, Disp: 90 tablet, Rfl: 0  •  dicyclomine (BENTYL) 20 mg tablet, Take 1 tablet (20 mg total) by mouth 2 (two) times a day, Disp: 60 tablet, Rfl: 0  •  escitalopram (Lexapro) 10 mg tablet, Take 1 tablet (10 mg total) by mouth daily, Disp: 30 tablet, Rfl: 2  •  ezetimibe (ZETIA) 10 mg tablet, Take 1 tablet (10 mg total) by mouth daily, Disp: 90 tablet, Rfl: 1  •  furosemide (LASIX) 20 mg tablet, TAKE 1 TABLET (20 MG TOTAL) BY MOUTH DAILY AS NEEDED FOR EDEMA, Disp: 90 tablet, Rfl: 0  •  Insulin Aspart (NovoLOG) 100 units/mL injection, Inject under the skin Take 6 units for breakfast /7 unit for lunch/10 units at night, Disp: , Rfl:   •  insulin degludec Skipper Prude FlexTouch) 100 units/mL injection pen, Inject 20 Units under the skin daily, Disp: 18 mL, Rfl: 1  •  Insulin Pen Needle 31G X 4 MM MISC, Use 4 (four) times a day, Disp: 100 each, Rfl: 3  •  levothyroxine 150 mcg tablet, Take 1 tablet (150 mcg total) by mouth daily, Disp: 90 tablet, Rfl: 1  •  losartan (COZAAR) 100 MG tablet, Take 1 tablet (100 mg total) by mouth daily, Disp: 90 tablet, Rfl: 1  •  metoprolol succinate (TOPROL-XL) 50 mg 24 hr tablet, TAKE 1 TABLET EVERY DAY, Disp: 90 tablet, Rfl: 0  •  montelukast (SINGULAIR) 10 mg tablet, Take 1 tablet (10 mg total) by mouth daily, Disp: 90 tablet, Rfl: 1  •  naloxone (NARCAN) 4 mg/0.1 mL nasal spray, Administer 1 spray into a nostril.  If no response after 2-3 minutes, give another dose in the other nostril using a new spray., Disp: 1 each, Rfl: 1  •  omeprazole (PriLOSEC) 20 mg delayed release capsule, TAKE 1 CAPSULE EVERY DAY, Disp: 90 capsule, Rfl: 0  •  oxyCODONE-acetaminophen (Percocet) 5-325 mg per tablet, Take 1 tablet by mouth every 6 (six) hours as needed for moderate pain Max Daily Amount: 4 tablets, Disp: 6 tablet, Rfl: 0  •  VITAMIN D PO, Take by mouth, Disp: , Rfl:   •  Empagliflozin-metFORMIN HCl (Synjardy) 12.5-1000 MG TABS, Take by mouth Take 2 tablets by mouth daily (Patient not taking: Reported on 7/26/2023), Disp: , Rfl:   •  famotidine (PEPCID) 20 mg tablet, TAKE 1 TABLET(20 MG) BY MOUTH DAILY (Patient not taking: Reported on 7/26/2023), Disp: 90 tablet, Rfl: 0  • insulin lispro (HumaLOG KwikPen) 100 units/mL injection pen, Inject 7 Units under the skin 3 (three) times a day with meals (Patient not taking: Reported on 1/19/2023), Disp: 15 mL, Rfl: 0  •  ondansetron (Zofran ODT) 4 mg disintegrating tablet, Take 1 tablet (4 mg total) by mouth every 6 (six) hours as needed for nausea for up to 30 doses (Patient not taking: Reported on 11/3/2022), Disp: 30 tablet, Rfl: 0  •  OneTouch Delica Lancets 33I MISC, Check blood sugars twice daily. Please substitute with appropriate alternative as covered by patient's insurance. Dx: E11.65 (Patient not taking: Reported on 10/14/2022), Disp: 200 each, Rfl: 3     Allergies   Allergen Reactions   • Etodolac Shortness Of Breath   • Iodinated Contrast Media Shortness Of Breath and Wheezing   • Simvastatin Other (See Comments)     elevated liver function    • Latex Rash   • Morphine Palpitations   • Penicillins Rash       Physical Exam:     Vitals:    07/26/23 1502   BP: 142/80   Pulse: 74   Resp: 20   Temp: (!) 97.2 °F (36.2 °C)   SpO2: 98%     Physical Exam  Vitals reviewed. Constitutional:       Appearance: She is well-developed. HENT:      Head: Normocephalic and atraumatic. Eyes:      Pupils: Pupils are equal, round, and reactive to light. Neck:      Thyroid: No thyromegaly. Vascular: No JVD. Trachea: No tracheal deviation. Cardiovascular:      Rate and Rhythm: Normal rate and regular rhythm. Heart sounds: Normal heart sounds. No murmur heard. No friction rub. No gallop. Pulmonary:      Effort: Pulmonary effort is normal. No respiratory distress. Breath sounds: Normal breath sounds. No wheezing or rales. Chest:      Comments: Both breasts were examined in the sitting and supine position. There are no worrisome skin lesions, no nipple retraction and no nipple discharge. There are no dominant masses, axillary adenopathy or supraclavicular adenopathy on either side.   Abdominal:      General: There is no distension. Palpations: Abdomen is soft. There is no hepatomegaly or mass. Tenderness: There is no abdominal tenderness. There is no guarding or rebound. Musculoskeletal:         General: No tenderness. Normal range of motion. Cervical back: Normal range of motion and neck supple. Lymphadenopathy:      Cervical: No cervical adenopathy. Skin:     General: Skin is warm and dry. Findings: No erythema or rash. Neurological:      Mental Status: She is alert and oriented to person, place, and time. Cranial Nerves: No cranial nerve deficit. Psychiatric:         Behavior: Behavior normal.         Results:   I reviewed her mammogram pre and post biopsy. Her Ludy  clip is in good position. Discussion/Summary:   Clinically this is a stage I T1 N0 M0 grade 1 strongly ER/FL positive and HER2 negative left breast cancer. Patient is a good candidate for breast conserving surgery. Based on its location she is not a good candidate for intraoperative radiation therapy so I recommended whole breast radiation therapy or some form of external beam radiation treatment postoperatively. We also talked about adjuvant therapy being hormonal therapy I explained I think it is unlikely she would need hormonal therapy. I did not order a MammaPrint. I clarified that all of her adjuvant therapies will be determined at her postoperative visits. The patient is a Catholic she is agreeable to Cell Saver blood systems but not external blood products. The patient and I underwent the process of consent for [facility] directed lumpectomy, intraoperative lymphatic mapping with blue and radioactive dye, sentinel lymph node biopsy, possible axillary dissection.   The complications outlined on the consent including relatively minor problems (wound infection, wound healing problems, hematomas, scarring, chronic discomfort/pain), moderate problems (injury to nerves or blood vessels, allergic reactions) and major complications (extensive blood loss requiring transfusions or possible addtional surgeries, cardiac arrest, stroke and possible death). We also reviewed specific complications as outlined on the consent form including possible cancer recurrence, arm swelling need for adjuvant therapies or surgery. All questions were answered to the patient's satisfaction. We will coordinate the surgery at our next mutual convience    Advance Care Planning/Advance Directives:  I discussed the disease status, treatment plans and follow-up with the patient.

## 2023-07-26 NOTE — TELEPHONE ENCOUNTER
Genetic Test Result Note:    Summary:    Result Disclosure: Today I left a voicemail for Verna reviewing the results of her genetic test for hereditary cancer. She underwent genetic testing through our program on 7/14/2023 due to her recent diagnosis of breast cancer. I encouraged her to call [phone] to discuss this result further. A copy of this consult note and test result will be shared with the patient. Her results will also be sent to her breast surgeon Anirudh Riojas MD.    A separate report of her STAT genetic test results were disclosed to her on 7/24/2023. This result includes new genes and does not change her initial genetic test result. Test Performed: [facility] BRCAplus STAT Panel (8 genes): KENY, BRCA1, BRCA2, CDH1, CHEK2, PALB2, PTEN, TP53 with reflex to [facility] CancerNext (28 additional genes): APC, AXIN2 BARD1, BRIP1, BMPR1A, CDK4, CDKN2A, DICER1, EPCAM, GREM1, HOXB13, MLH1, MSH2, MSH3, MSH6, MUTYH, NBN, NF1, NTHL1, PMS2, POLD1, POLE, RAD51C, RAD51D, RECQL SMAD4, SMARCA4, STK11    Result: Negative - No Clinically Significant Variants Detected     Assessment:   A negative result significantly reduces the likelihood that Zurdo Akins has a hereditary cancer syndrome. However, this testing is unable to completely rule out the presence of hereditary cancer. It remains possible that:  - There is a variant in an area of a gene which was not tested or there is a variant not detectable due to technical limitations of this test.     - There is a variant in another gene that was not included in this test or in a gene not known to be linked to cancer or tumors. - A family member has a genetic variant that the patient did not inherit. - The cancer in the family is sporadic and is related to non-hereditary factors.      Risks and Testing for Family Members:  Zurdo Akins was made aware that all of her first-degree relatives are at increased risk to develop breast cancer based on her recent diagnosis and family history of breast cancer. Her family members may also be at increased risk to develop other cancers in her family history, specifically:    · Paternal Cousin - Breast Cancer age 50    We recommend that her first-degree relatives make their healthcare providers aware of the family history and discuss their options for screening and risk-reduction. At this time we do not recommend testing for Dionicio Chavez 's children based on her negative test result. Dionicio Chavez 's children still need to consider the history of cancer on the other side of their family when determining their risks. If Dionicio Chavez has any affected family members with a cancer diagnosis, especially at a young age, they may still consider genetic testing. Relatives who wish to pursue genetic testing can reach out to the [employer] at [phone] to schedule an appointment or visit [email or web address] to identify a local genetic counselor. Plan:     Negative Result: This result does not have surgical implications and surgical options should be discussed with her healthcare provider.  Verna's breast surgeon, Dr. Adelita Hill will be made aware of her results

## 2023-07-26 NOTE — TELEPHONE ENCOUNTER
Spoke to patient and gave normal result of US  Propranolol Pregnancy And Lactation Text: This medication is Pregnancy Category C and it isn't known if it is safe during pregnancy. It is excreted in breast milk.

## 2023-07-26 NOTE — H&P (VIEW-ONLY)
Surgical Oncology Consult Note       [address] [facility]  CANCER [facility] [place]  [address] [facility] [place] [address]    Augie Brand  1955  [id number]  [address] [facility] SURGICAL ONCOLOGY [place] [address]      Chief Complaint:     Chief Complaint   Patient presents with   • Consult   • Breast Cancer     New Diagnosis       Assessment and Plan:   Assessment/Plan   Patient presents with a new diagnosis of invasive ductal carcinoma of the left breast that measures approximately 7 mm and is grade 1 and strongly ER/WA positive and HER2 negative. She has a Rubi clip in place. I recommended a RUBI directed lumpectomy in conjunction with a sentinel lymph node biopsy and possible axillary dissection. Patient preferred this over a mastectomy. Patient is a Yazidism. Oncology History:     Oncology History   Malignant neoplasm of upper-inner quadrant of left breast in female, estrogen receptor positive ([address])   7/10/2023 Biopsy    Left breast ultrasound-guided biopsy  8 o'clock, 7 cm from nipple (RUBI)  Invasive mammary carcinoma of no special type (ductal)  Grade 1  ER 90, WA 90, HER2 0    Concordant. Malignancy appears unifocal; masses cover 7 mm on US. Left axilla US negative. Right breast clear. 7/17/2023 Genetic Testing    A total of 36 genes were evaluated, including: KENY, BRCA1, BRCA2, CDH1, CHEK2, PALB2, PTEN, STK11, TP53  Negative result. No pathogenic sequence variants identified  Ambry         History of Present Illness: This is a 55-year-old woman who went for screening mammogram which demonstrated no abnormalities on the right side however on the left side at the 8 o'clock position 7 cm from the nipple was a 7 mm unifocal mass. This was biopsied and shown to be invasive ductal carcinoma grade 1, ER 90% WA 90% and HER2/irma 0. Her axillary ultrasound was negative.   She had a Ludy clip placed at the biopsy site. She has had genetic testing which is negative. She presents now with her daughter for an opinion regarding further management. Review of Systems:   Review of Systems   Constitutional: Negative for chills and fever. HENT: Negative for ear pain and sore throat. Eyes: Negative for pain and visual disturbance. Respiratory: Negative for cough and shortness of breath. Cardiovascular: Negative for chest pain and palpitations. Gastrointestinal: Negative for abdominal pain and vomiting. Genitourinary: Negative for dysuria and hematuria. Musculoskeletal: Negative for arthralgias and back pain. Skin: Negative for color change and rash. Neurological: Negative for seizures and syncope. All other systems reviewed and are negative.       Past Medical History:      Patient Active Problem List   Diagnosis   • Acquired hypothyroidism   • Essential hypertension   • Mild intermittent asthma with acute exacerbation   • Pure hypercholesterolemia   • Atypical chest pain   • Lung infiltrate   • Severe sepsis (HCC)   • Irritant contact dermatitis   • Depression, recurrent (HCC)   • Uncontrolled type 2 diabetes mellitus with hyperglycemia ([address])   • Spondylolisthesis of lumbar region   • History of orthopnea   • Obesity (BMI 35.0-39.9 without comorbidity)   • Plantar fasciitis of left foot   • Thyroid nodule   • Lower esophageal ring (Schatzki)   • Continuous opioid dependence (HCC)   • Malignant neoplasm of upper-inner quadrant of left breast in female, estrogen receptor positive ([address])   • Refusal of blood transfusions as patient is Bahai        Past Medical History:   Diagnosis Date   • Abdominal pain    • Asthma    • Back pain     occas due to "disc problem"   • Balance problems     "right side"   • Cholelithiasis     lap aviva today 3/10/2021   • Colon polyp    • Cracked tooth     3   • Depression    • Diabetes mellitus ([address])    • Difficulty swallowing     "food feels like stuck sometimes'   • Exercise involving walking     steps and cleaning   • Fatty liver    • GERD (gastroesophageal reflux disease)    • Hiatal hernia    • History of pneumonia    • History of stomach ulcers    • Hyperlipidemia    • Hypertension    • Obesity    • Pneumonia    • Refusal of blood transfusions as patient is Buddhist    • Risk for falls    • Stroke [facility])    • Uses Occitan as primary spoken language    • Wears glasses         Past Surgical History:   Procedure Laterality Date   • CHOLECYSTECTOMY     • COLONOSCOPY  02/09/2021    4 polyps tubular adenomas by Dr. Pavel Scott   • EGD  04/20/2021    nonobstructing Schatzki's ring dilated with 47 Danish Hernandez, sliding hiatal hernia otherwise normal.  Biopsies stomach  negative for H. pylori biopsy esophagus negative for eosinophilic esophagitis by Dr. Ania Kaye   • EGD AND COLONOSCOPY  02/09/2021    3 YEAR RECOMMENDED = COLONO   • CA LAPAROSCOPY SURG CHOLECYSTECTOMY N/A 03/10/2021    Procedure: CHOLECYSTECTOMY LAPAROSCOPIC W/ ROBOTICS;  Surgeon: Chaparro Mathews MD;  Location: [facility];  Service: General   • ROTATOR CUFF REPAIR Bilateral     screws implanted"   • UPPER GASTROINTESTINAL ENDOSCOPY  02/09/2021    4 cm hiatal hernia without Alejandro lesions. Possibly shortened esophagus.   Biopsy of the stomach negative for H. pylori, biopsy of the EG junction negative for Kaplan's, biopsied duodenum negative for celiac by Dr. Pavel Scott   • Nikhil Beltran Left 7/10/2023        Family History   Problem Relation Age of Onset   • Diabetes Mother    • Hypertension Mother    • Hypertension Father    • No Known Problems Sister    • No Known Problems Daughter    • No Known Problems Maternal Grandmother    • No Known Problems Maternal Grandfather    • No Known Problems Paternal Grandmother    • No Known Problems Paternal Grandfather    • No Known Problems Son    • No Known Problems Son    • No Known Problems Daughter    • No Known Problems Daughter    • Colon cancer Brother 71        Social History     Socioeconomic History   • Marital status: /Civil Union     Spouse name: Not on file   • Number of children: Not on file   • Years of education: Not on file   • Highest education level: Not on file   Occupational History   • Occupation: House wife   Tobacco Use   • Smoking status: Never   • Smokeless tobacco: Never   Vaping Use   • Vaping Use: Never used   Substance and Sexual Activity   • Alcohol use: Never   • Drug use: Never   • Sexual activity: Not Currently   Other Topics Concern   • Not on file   Social History Narrative   • Not on file     Social Determinants of Health     Financial Resource Strain: Not on file   Food Insecurity: Not on file   Transportation Needs: Not on file   Physical Activity: Not on file   Stress: Not on file   Social Connections: Not on file   Intimate Partner Violence: Not on file   Housing Stability: Not on file        Current Outpatient Medications:   •  albuterol (2.5 mg/3 mL) 0.083 % nebulizer solution, Take 1 vial (2.5 mg total) by nebulization every 6 (six) hours as needed for wheezing or shortness of breath, Disp: 60 vial, Rfl: 5  •  albuterol (PROVENTIL HFA,VENTOLIN HFA) 90 mcg/act inhaler, Inhale 2 puffs every 6 (six) hours as needed for wheezing, Disp: 18 g, Rfl: 2  •  aspirin 81 mg chewable tablet, Chew 1 tablet (81 mg total) daily, Disp: 90 tablet, Rfl: 1  •  atorvastatin (LIPITOR) 20 mg tablet, Take 1 tablet (20 mg total) by mouth daily, Disp: 90 tablet, Rfl: 1  •  budesonide-formoterol (SYMBICORT) 160-4.5 mcg/act inhaler, Inhale 2 puffs 2 (two) times a day as needed (1) Rinse mouth after use., Disp: 10.2 g, Rfl: 3  •  clonazePAM (KlonoPIN) 0.125 mg disintegrating tablet, Take 1 tablet daily as needed for anxiety, Disp: 90 tablet, Rfl: 0  •  dicyclomine (BENTYL) 20 mg tablet, Take 1 tablet (20 mg total) by mouth 2 (two) times a day, Disp: 60 tablet, Rfl: 0  •  escitalopram (Lexapro) 10 mg tablet, Take 1 tablet (10 mg total) by mouth daily, Disp: 30 tablet, Rfl: 2  •  ezetimibe (ZETIA) 10 mg tablet, Take 1 tablet (10 mg total) by mouth daily, Disp: 90 tablet, Rfl: 1  •  furosemide (LASIX) 20 mg tablet, TAKE 1 TABLET (20 MG TOTAL) BY MOUTH DAILY AS NEEDED FOR EDEMA, Disp: 90 tablet, Rfl: 0  •  Insulin Aspart (NovoLOG) 100 units/mL injection, Inject under the skin Take 6 units for breakfast /7 unit for lunch/10 units at night, Disp: , Rfl:   •  insulin degludec More Blayne FlexTouch) 100 units/mL injection pen, Inject 20 Units under the skin daily, Disp: 18 mL, Rfl: 1  •  Insulin Pen Needle 31G X 4 MM MISC, Use 4 (four) times a day, Disp: 100 each, Rfl: 3  •  levothyroxine 150 mcg tablet, Take 1 tablet (150 mcg total) by mouth daily, Disp: 90 tablet, Rfl: 1  •  losartan (COZAAR) 100 MG tablet, Take 1 tablet (100 mg total) by mouth daily, Disp: 90 tablet, Rfl: 1  •  metoprolol succinate (TOPROL-XL) 50 mg 24 hr tablet, TAKE 1 TABLET EVERY DAY, Disp: 90 tablet, Rfl: 0  •  montelukast (SINGULAIR) 10 mg tablet, Take 1 tablet (10 mg total) by mouth daily, Disp: 90 tablet, Rfl: 1  •  naloxone (NARCAN) 4 mg/0.1 mL nasal spray, Administer 1 spray into a nostril.  If no response after 2-3 minutes, give another dose in the other nostril using a new spray., Disp: 1 each, Rfl: 1  •  omeprazole (PriLOSEC) 20 mg delayed release capsule, TAKE 1 CAPSULE EVERY DAY, Disp: 90 capsule, Rfl: 0  •  oxyCODONE-acetaminophen (Percocet) 5-325 mg per tablet, Take 1 tablet by mouth every 6 (six) hours as needed for moderate pain Max Daily Amount: 4 tablets, Disp: 6 tablet, Rfl: 0  •  VITAMIN D PO, Take by mouth, Disp: , Rfl:   •  Empagliflozin-metFORMIN HCl (Synjardy) 12.5-1000 MG TABS, Take by mouth Take 2 tablets by mouth daily (Patient not taking: Reported on 7/26/2023), Disp: , Rfl:   •  famotidine (PEPCID) 20 mg tablet, TAKE 1 TABLET(20 MG) BY MOUTH DAILY (Patient not taking: Reported on 7/26/2023), Disp: 90 tablet, Rfl: 0  • insulin lispro (HumaLOG KwikPen) 100 units/mL injection pen, Inject 7 Units under the skin 3 (three) times a day with meals (Patient not taking: Reported on 1/19/2023), Disp: 15 mL, Rfl: 0  •  ondansetron (Zofran ODT) 4 mg disintegrating tablet, Take 1 tablet (4 mg total) by mouth every 6 (six) hours as needed for nausea for up to 30 doses (Patient not taking: Reported on 11/3/2022), Disp: 30 tablet, Rfl: 0  •  OneTouch Delica Lancets 83Z MISC, Check blood sugars twice daily. Please substitute with appropriate alternative as covered by patient's insurance. Dx: E11.65 (Patient not taking: Reported on 10/14/2022), Disp: 200 each, Rfl: 3     Allergies   Allergen Reactions   • Etodolac Shortness Of Breath   • Iodinated Contrast Media Shortness Of Breath and Wheezing   • Simvastatin Other (See Comments)     elevated liver function    • Latex Rash   • Morphine Palpitations   • Penicillins Rash       Physical Exam:     Vitals:    07/26/23 1502   BP: 142/80   Pulse: 74   Resp: 20   Temp: (!) 97.2 °F (36.2 °C)   SpO2: 98%     Physical Exam  Vitals reviewed. Constitutional:       Appearance: She is well-developed. HENT:      Head: Normocephalic and atraumatic. Eyes:      Pupils: Pupils are equal, round, and reactive to light. Neck:      Thyroid: No thyromegaly. Vascular: No JVD. Trachea: No tracheal deviation. Cardiovascular:      Rate and Rhythm: Normal rate and regular rhythm. Heart sounds: Normal heart sounds. No murmur heard. No friction rub. No gallop. Pulmonary:      Effort: Pulmonary effort is normal. No respiratory distress. Breath sounds: Normal breath sounds. No wheezing or rales. Chest:      Comments: Both breasts were examined in the sitting and supine position. There are no worrisome skin lesions, no nipple retraction and no nipple discharge. There are no dominant masses, axillary adenopathy or supraclavicular adenopathy on either side.   Abdominal:      General: There is no distension. Palpations: Abdomen is soft. There is no hepatomegaly or mass. Tenderness: There is no abdominal tenderness. There is no guarding or rebound. Musculoskeletal:         General: No tenderness. Normal range of motion. Cervical back: Normal range of motion and neck supple. Lymphadenopathy:      Cervical: No cervical adenopathy. Skin:     General: Skin is warm and dry. Findings: No erythema or rash. Neurological:      Mental Status: She is alert and oriented to person, place, and time. Cranial Nerves: No cranial nerve deficit. Psychiatric:         Behavior: Behavior normal.         Results:   I reviewed her mammogram pre and post biopsy. Her Ludy  clip is in good position. Discussion/Summary:   Clinically this is a stage I T1 N0 M0 grade 1 strongly ER/KY positive and HER2 negative left breast cancer. Patient is a good candidate for breast conserving surgery. Based on its location she is not a good candidate for intraoperative radiation therapy so I recommended whole breast radiation therapy or some form of external beam radiation treatment postoperatively. We also talked about adjuvant therapy being hormonal therapy I explained I think it is unlikely she would need hormonal therapy. I did not order a MammaPrint. I clarified that all of her adjuvant therapies will be determined at her postoperative visits. The patient is a Amish she is agreeable to Cell Saver blood systems but not external blood products. The patient and I underwent the process of consent for [facility] directed lumpectomy, intraoperative lymphatic mapping with blue and radioactive dye, sentinel lymph node biopsy, possible axillary dissection.   The complications outlined on the consent including relatively minor problems (wound infection, wound healing problems, hematomas, scarring, chronic discomfort/pain), moderate problems (injury to nerves or blood vessels, allergic reactions) and major complications (extensive blood loss requiring transfusions or possible addtional surgeries, cardiac arrest, stroke and possible death). We also reviewed specific complications as outlined on the consent form including possible cancer recurrence, arm swelling need for adjuvant therapies or surgery. All questions were answered to the patient's satisfaction. We will coordinate the surgery at our next mutual convience    Advance Care Planning/Advance Directives:  I discussed the disease status, treatment plans and follow-up with the patient.

## 2023-07-28 ENCOUNTER — PATIENT OUTREACH (OUTPATIENT)
Dept: HEMATOLOGY ONCOLOGY | Facility: CLINIC | Age: 68
End: 2023-07-28

## 2023-07-28 LAB
ATRIAL RATE: 58 BPM
P AXIS: 31 DEGREES
PR INTERVAL: 130 MS
QRS AXIS: -5 DEGREES
QRSD INTERVAL: 94 MS
QT INTERVAL: 442 MS
QTC INTERVAL: 433 MS
T WAVE AXIS: 64 DEGREES
VENTRICULAR RATE: 58 BPM

## 2023-07-28 PROCEDURE — 93010 ELECTROCARDIOGRAM REPORT: CPT | Performed by: INTERNAL MEDICINE

## 2023-07-28 NOTE — PROGRESS NOTES
Oncology Care Coordinator attempted to outreach patient to schedule her for radiation and medical oncology consult appointment's. A voicemail was left stating a reason for my call and my contact information. Requested a return call.

## 2023-07-31 ENCOUNTER — TELEPHONE (OUTPATIENT)
Dept: INTERNAL MEDICINE CLINIC | Facility: CLINIC | Age: 68
End: 2023-07-31

## 2023-08-01 ENCOUNTER — PATIENT OUTREACH (OUTPATIENT)
Dept: CASE MANAGEMENT | Facility: OTHER | Age: 68
End: 2023-08-01

## 2023-08-01 ENCOUNTER — PATIENT OUTREACH (OUTPATIENT)
Dept: HEMATOLOGY ONCOLOGY | Facility: CLINIC | Age: 68
End: 2023-08-01

## 2023-08-01 DIAGNOSIS — Z17.0 MALIGNANT NEOPLASM OF UPPER-INNER QUADRANT OF LEFT BREAST IN FEMALE, ESTROGEN RECEPTOR POSITIVE (HCC): Primary | ICD-10-CM

## 2023-08-01 DIAGNOSIS — C50.212 MALIGNANT NEOPLASM OF UPPER-INNER QUADRANT OF LEFT BREAST IN FEMALE, ESTROGEN RECEPTOR POSITIVE (HCC): Primary | ICD-10-CM

## 2023-08-01 NOTE — PROGRESS NOTES
Contacted patient as directed by surgical oncology nurse Zaida to schedule medical and radiation oncology appointment's . Referrals placed to medical oncology and radiation oncology. Introduced myself and my role. Scheduled patient for medical oncology. Type of appointment-Medical Oncology  Provider-Dr. Mee Gilford  CHCT-2-  Time-11:20am   Location-[place]        I informed patient she would be getting a separate call to have radiation consult scheduled. Patient had no other questions or concerns at this time. I provided my contact information in case any should arise.

## 2023-08-01 NOTE — PROGRESS NOTES
Biopsychosocial and Barriers Assessment    Cancer Diagnosis: Breast  Home/Cell Phone: [phone]  Emergency Contact: Saurav Briscoe- [phone]  Marital Status:   Interpretation concerns, speaks another language, preferred language: English  Cultural concerns: none  Ability to read or write: yes    Caregiver/Support: Strong support from spouse and daughter  Children: 5 children- 1 who lives locally  Child/Elder care: n/a    Housing: [place]  Home Setup: 6 steps to enter  Lives With: Spouse  Daily Living Activities: independent  [address]: none  Ambulation: independent    Preferred Pharmacy: [facility] co-pays with insurance: [employer]  High co-pays with medication coverage: none  No medication coverage: n/a    Primary Care Provider: Dr. Yosef Cueva  Hx of [address] [facility] [address]: none  Hx of Short term rehab: none  Mental Health Hx: none  Substance Abuse Hx: none  Employment: retired   Status/Location: [address] [place] [address] to pay bills: yes- managing at this time, however they are on a fixed income  POA/LW/AD: Has ACP  Transportation Plan/Concerns: self      What do you know about your Cancer Diagnosis    What has your doctor told you about your cancer diagnosis: Breast Cancer. What has your doctor told you about your cancer treatment: Pt is scheduled for a lumpectomy on 8/15. She will then have medical oncology and radiation oncology consults. What specific concerns do you have about your diagnosis and treatment: None at this time. Have you been made aware of any hair loss associated with treatment: Not at this time. Additional Comments:  OSW placed outreach TC to pt this morning. OSW introduced self and role. Pt is a pleasant woman with a new dx of breast cancer. She is scheduled for a lumpectomy on 8/15. She completed a Dt, where she scored a 2/10. She did not report any concerns at this time. She is supported by her  and daughter.  She has four other children, however they reside in [place]. She shared that her spouse had open heart surgery 8-9 months ago. She states that they are on a very limited income, but are currently managing. OSW encouraged her to call her utility companies and apply for any assistance programs they may offer. She was unaware of these programs and was appreciative of the information. OSW educated on the [facility] and encouraged her to utilize. She states that she had already received their information. At this time she states she is acting as if her life hasn't changed. She is keeping herself busy because she does not want to stress out about it. Pt does not have any SW needs at this time, therefore the referral will be closed. She was encouraged to call with any questions/concerns.

## 2023-08-10 NOTE — PRE-PROCEDURE INSTRUCTIONS
Pre-Surgery Instructions:   Medication Instructions   • albuterol (2.5 mg/3 mL) 0.083 % nebulizer solution Uses PRN- OK to take day of surgery   • albuterol (PROVENTIL HFA,VENTOLIN HFA) 90 mcg/act inhaler Uses PRN- OK to take day of surgery   • aspirin 81 mg chewable tablet Last dose 8/1   • atorvastatin (LIPITOR) 20 mg tablet Take day of surgery. • budesonide-formoterol (SYMBICORT) 160-4.5 mcg/act inhaler Take day of surgery. • dicyclomine (BENTYL) 20 mg tablet Take day of surgery. • Empagliflozin-metFORMIN HCl (Synjardy) 12.5-1000 MG TABS Hold day of surgery. • ezetimibe (ZETIA) 10 mg tablet Take day of surgery. • furosemide (LASIX) 20 mg tablet Hold day of surgery. • Insulin Aspart (NovoLOG) 100 units/mL injection Hold day of surgery. • insulin degludec Skipper Prude FlexTouch) 100 units/mL injection pen Take day of surgery. • levothyroxine 150 mcg tablet Take day of surgery. • losartan (COZAAR) 100 MG tablet Hold day of surgery. • metoprolol succinate (TOPROL-XL) 50 mg 24 hr tablet Take day of surgery. • montelukast (SINGULAIR) 10 mg tablet Take night before surgery   • omeprazole (PriLOSEC) 20 mg delayed release capsule Take day of surgery. • VITAMIN D PO Weekly     Medication instructions for day surgery reviewed. Please use only a sip of water to take your instructed medications. Avoid all over the counter vitamins, supplements and NSAIDS for one week prior to surgery per anesthesia guidelines. Tylenol is ok to take as needed. You will receive a call one business day prior to surgery with an arrival time and hospital directions. If your surgery is scheduled on a Monday, the hospital will be calling you on the Friday prior to your surgery. If you have not heard from anyone by 8pm, please call the hospital supervisor through the hospital  at [phone]. Junito Pappas [phone]).     Do not eat or drink anything after midnight the night before your surgery, including candy, mints, lifesavers, or chewing gum. Do not drink alcohol 24hrs before your surgery. Try not to smoke at least 24hrs before your surgery. Follow the pre surgery showering instructions as listed in the [facility] Surgical Experience Booklet” or otherwise provided by your surgeon's office. Do not shave the surgical area 24 hours before surgery. Do not apply any lotions, creams, including makeup, cologne, deodorant, or perfumes after showering on the day of your surgery. No contact lenses, eye make-up, or artificial eyelashes. Remove nail polish, including gel polish, and any artificial, gel, or acrylic nails if possible. Remove all jewelry including rings and body piercing jewelry. Wear causal clothing that is easy to take on and off. Consider your type of surgery. Keep any valuables, jewelry, piercings at home. Please bring any specially ordered equipment (sling, braces) if indicated. Arrange for a responsible person to drive you to and from the hospital on the day of your surgery. Visitor Guidelines discussed. Call the surgeon's office with any new illnesses, exposures, or additional questions prior to surgery. Please reference your [facility] Surgical Experience Booklet” for additional information to prepare for your upcoming surgery.

## 2023-08-15 ENCOUNTER — ANESTHESIA EVENT (OUTPATIENT)
Dept: PERIOP | Facility: HOSPITAL | Age: 68
End: 2023-08-15
Payer: COMMERCIAL

## 2023-08-15 ENCOUNTER — ANESTHESIA (OUTPATIENT)
Dept: PERIOP | Facility: HOSPITAL | Age: 68
End: 2023-08-15
Payer: COMMERCIAL

## 2023-08-15 ENCOUNTER — HOSPITAL ENCOUNTER (OUTPATIENT)
Facility: HOSPITAL | Age: 68
Setting detail: OUTPATIENT SURGERY
Discharge: HOME | End: 2023-08-15
Attending: SURGERY | Admitting: SURGERY
Payer: COMMERCIAL

## 2023-08-15 ENCOUNTER — HOSPITAL ENCOUNTER (OUTPATIENT)
Dept: NUCLEAR MEDICINE | Facility: HOSPITAL | Age: 68
Discharge: HOME | End: 2023-08-15
Attending: SURGERY
Payer: COMMERCIAL

## 2023-08-15 ENCOUNTER — APPOINTMENT (OUTPATIENT)
Dept: RADIOLOGY | Facility: HOSPITAL | Age: 68
End: 2023-08-15
Payer: COMMERCIAL

## 2023-08-15 VITALS
RESPIRATION RATE: 18 BRPM | WEIGHT: 191 LBS | HEIGHT: 62 IN | HEART RATE: 74 BPM | SYSTOLIC BLOOD PRESSURE: 150 MMHG | BODY MASS INDEX: 35.15 KG/M2 | DIASTOLIC BLOOD PRESSURE: 66 MMHG | TEMPERATURE: 97 F | OXYGEN SATURATION: 97 %

## 2023-08-15 DIAGNOSIS — Z17.0 MALIGNANT NEOPLASM OF UPPER-INNER QUADRANT OF LEFT BREAST IN FEMALE, ESTROGEN RECEPTOR POSITIVE (HCC): ICD-10-CM

## 2023-08-15 DIAGNOSIS — C50.212 MALIGNANT NEOPLASM OF UPPER-INNER QUADRANT OF LEFT BREAST IN FEMALE, ESTROGEN RECEPTOR POSITIVE (HCC): ICD-10-CM

## 2023-08-15 LAB
GLUCOSE SERPL-MCNC: 135 MG/DL (ref 65–140)
GLUCOSE SERPL-MCNC: 136 MG/DL (ref 65–140)

## 2023-08-15 PROCEDURE — 38900 IO MAP OF SENT LYMPH NODE: CPT | Performed by: SURGERY

## 2023-08-15 PROCEDURE — 38792 RA TRACER ID OF SENTINL NODE: CPT

## 2023-08-15 PROCEDURE — 88341 IMHCHEM/IMCYTCHM EA ADD ANTB: CPT | Performed by: PATHOLOGY

## 2023-08-15 PROCEDURE — 19301 PARTIAL MASTECTOMY: CPT | Performed by: SURGERY

## 2023-08-15 PROCEDURE — 38525 BIOPSY/REMOVAL LYMPH NODES: CPT | Performed by: SURGERY

## 2023-08-15 PROCEDURE — 88307 TISSUE EXAM BY PATHOLOGIST: CPT | Performed by: PATHOLOGY

## 2023-08-15 PROCEDURE — 82948 REAGENT STRIP/BLOOD GLUCOSE: CPT

## 2023-08-15 PROCEDURE — A9541 TC99M SULFUR COLLOID: HCPCS

## 2023-08-15 PROCEDURE — 76098 X-RAY EXAM SURGICAL SPECIMEN: CPT | Performed by: SURGERY

## 2023-08-15 PROCEDURE — 38792 RA TRACER ID OF SENTINL NODE: CPT | Performed by: SURGERY

## 2023-08-15 PROCEDURE — 88342 IMHCHEM/IMCYTCHM 1ST ANTB: CPT | Performed by: PATHOLOGY

## 2023-08-15 RX ORDER — SODIUM CHLORIDE, SODIUM LACTATE, POTASSIUM CHLORIDE, CALCIUM CHLORIDE 600; 310; 30; 20 MG/100ML; MG/100ML; MG/100ML; MG/100ML
INJECTION, SOLUTION INTRAVENOUS CONTINUOUS PRN
Status: DISCONTINUED | OUTPATIENT
Start: 2023-08-15 | End: 2023-08-15

## 2023-08-15 RX ORDER — MIDAZOLAM HYDROCHLORIDE 2 MG/2ML
INJECTION, SOLUTION INTRAMUSCULAR; INTRAVENOUS AS NEEDED
Status: DISCONTINUED | OUTPATIENT
Start: 2023-08-15 | End: 2023-08-15

## 2023-08-15 RX ORDER — FENTANYL CITRATE 50 UG/ML
INJECTION, SOLUTION INTRAMUSCULAR; INTRAVENOUS AS NEEDED
Status: DISCONTINUED | OUTPATIENT
Start: 2023-08-15 | End: 2023-08-15

## 2023-08-15 RX ORDER — HYDROMORPHONE HCL/PF 1 MG/ML
0.25 SYRINGE (ML) INJECTION
Status: DISCONTINUED | OUTPATIENT
Start: 2023-08-15 | End: 2023-08-15 | Stop reason: HOSPADM

## 2023-08-15 RX ORDER — FENTANYL CITRATE/PF 50 MCG/ML
50 SYRINGE (ML) INJECTION
Status: DISCONTINUED | OUTPATIENT
Start: 2023-08-15 | End: 2023-08-15 | Stop reason: HOSPADM

## 2023-08-15 RX ORDER — ONDANSETRON 2 MG/ML
INJECTION INTRAMUSCULAR; INTRAVENOUS AS NEEDED
Status: DISCONTINUED | OUTPATIENT
Start: 2023-08-15 | End: 2023-08-15

## 2023-08-15 RX ORDER — SODIUM CHLORIDE, SODIUM LACTATE, POTASSIUM CHLORIDE, CALCIUM CHLORIDE 600; 310; 30; 20 MG/100ML; MG/100ML; MG/100ML; MG/100ML
50 INJECTION, SOLUTION INTRAVENOUS CONTINUOUS
Status: CANCELLED | OUTPATIENT
Start: 2023-08-15

## 2023-08-15 RX ORDER — MAGNESIUM HYDROXIDE 1200 MG/15ML
LIQUID ORAL AS NEEDED
Status: DISCONTINUED | OUTPATIENT
Start: 2023-08-15 | End: 2023-08-15 | Stop reason: HOSPADM

## 2023-08-15 RX ORDER — HYDROMORPHONE HCL/PF 1 MG/ML
SYRINGE (ML) INJECTION AS NEEDED
Status: DISCONTINUED | OUTPATIENT
Start: 2023-08-15 | End: 2023-08-15

## 2023-08-15 RX ORDER — DEXAMETHASONE SODIUM PHOSPHATE 10 MG/ML
INJECTION, SOLUTION INTRAMUSCULAR; INTRAVENOUS AS NEEDED
Status: DISCONTINUED | OUTPATIENT
Start: 2023-08-15 | End: 2023-08-15

## 2023-08-15 RX ORDER — OXYCODONE HYDROCHLORIDE AND ACETAMINOPHEN 5; 325 MG/1; MG/1
1 TABLET ORAL EVERY 4 HOURS PRN
Status: CANCELLED | OUTPATIENT
Start: 2023-08-15

## 2023-08-15 RX ORDER — PROPOFOL 10 MG/ML
INJECTION, EMULSION INTRAVENOUS AS NEEDED
Status: DISCONTINUED | OUTPATIENT
Start: 2023-08-15 | End: 2023-08-15

## 2023-08-15 RX ORDER — LIDOCAINE HYDROCHLORIDE 10 MG/ML
INJECTION, SOLUTION EPIDURAL; INFILTRATION; INTRACAUDAL; PERINEURAL AS NEEDED
Status: DISCONTINUED | OUTPATIENT
Start: 2023-08-15 | End: 2023-08-15

## 2023-08-15 RX ORDER — ONDANSETRON 2 MG/ML
4 INJECTION INTRAMUSCULAR; INTRAVENOUS ONCE AS NEEDED
Status: DISCONTINUED | OUTPATIENT
Start: 2023-08-15 | End: 2023-08-15 | Stop reason: HOSPADM

## 2023-08-15 RX ORDER — CLINDAMYCIN PHOSPHATE 900 MG/50ML
900 INJECTION INTRAVENOUS ONCE
Status: DISCONTINUED | OUTPATIENT
Start: 2023-08-15 | End: 2023-08-15

## 2023-08-15 RX ORDER — PROPOFOL 10 MG/ML
INJECTION, EMULSION INTRAVENOUS CONTINUOUS PRN
Status: DISCONTINUED | OUTPATIENT
Start: 2023-08-15 | End: 2023-08-15

## 2023-08-15 RX ORDER — GLYCOPYRROLATE 0.2 MG/ML
INJECTION INTRAMUSCULAR; INTRAVENOUS AS NEEDED
Status: DISCONTINUED | OUTPATIENT
Start: 2023-08-15 | End: 2023-08-15

## 2023-08-15 RX ORDER — EPHEDRINE SULFATE 50 MG/ML
INJECTION INTRAVENOUS AS NEEDED
Status: DISCONTINUED | OUTPATIENT
Start: 2023-08-15 | End: 2023-08-15

## 2023-08-15 RX ORDER — BUPIVACAINE HYDROCHLORIDE AND EPINEPHRINE 2.5; 5 MG/ML; UG/ML
INJECTION, SOLUTION INFILTRATION; PERINEURAL AS NEEDED
Status: DISCONTINUED | OUTPATIENT
Start: 2023-08-15 | End: 2023-08-15 | Stop reason: HOSPADM

## 2023-08-15 RX ORDER — CEFAZOLIN SODIUM 2 G/50ML
2000 SOLUTION INTRAVENOUS ONCE
Status: COMPLETED | OUTPATIENT
Start: 2023-08-15 | End: 2023-08-15

## 2023-08-15 RX ADMIN — PROPOFOL 50 MCG/KG/MIN: 10 INJECTION, EMULSION INTRAVENOUS at 12:59

## 2023-08-15 RX ADMIN — MIDAZOLAM HYDROCHLORIDE 2 MG: 1 INJECTION, SOLUTION INTRAMUSCULAR; INTRAVENOUS at 12:51

## 2023-08-15 RX ADMIN — SODIUM CHLORIDE, SODIUM LACTATE, POTASSIUM CHLORIDE, AND CALCIUM CHLORIDE: .6; .31; .03; .02 INJECTION, SOLUTION INTRAVENOUS at 12:42

## 2023-08-15 RX ADMIN — DEXAMETHASONE SODIUM PHOSPHATE 10 MG: 10 INJECTION, SOLUTION INTRAMUSCULAR; INTRAVENOUS at 12:59

## 2023-08-15 RX ADMIN — ONDANSETRON 4 MG: 2 INJECTION INTRAMUSCULAR; INTRAVENOUS at 12:59

## 2023-08-15 RX ADMIN — LIDOCAINE HYDROCHLORIDE 50 MG: 10 INJECTION, SOLUTION EPIDURAL; INFILTRATION; INTRACAUDAL; PERINEURAL at 12:58

## 2023-08-15 RX ADMIN — CEFAZOLIN SODIUM 2000 MG: 2 SOLUTION INTRAVENOUS at 12:59

## 2023-08-15 RX ADMIN — PROPOFOL 150 MG: 10 INJECTION, EMULSION INTRAVENOUS at 12:58

## 2023-08-15 RX ADMIN — GLYCOPYRROLATE 0.1 MG: 0.2 INJECTION INTRAMUSCULAR; INTRAVENOUS at 12:51

## 2023-08-15 RX ADMIN — EPHEDRINE SULFATE 5 MG: 50 INJECTION INTRAVENOUS at 13:44

## 2023-08-15 RX ADMIN — FENTANYL CITRATE 50 MCG: 50 INJECTION, SOLUTION INTRAMUSCULAR; INTRAVENOUS at 12:58

## 2023-08-15 RX ADMIN — EPHEDRINE SULFATE 5 MG: 50 INJECTION INTRAVENOUS at 13:23

## 2023-08-15 RX ADMIN — PHENYLEPHRINE HYDROCHLORIDE 40 MCG/MIN: 10 INJECTION INTRAVENOUS at 12:59

## 2023-08-15 RX ADMIN — HYDROMORPHONE HYDROCHLORIDE 0.5 MG: 1 INJECTION, SOLUTION INTRAMUSCULAR; INTRAVENOUS; SUBCUTANEOUS at 13:47

## 2023-08-15 RX ADMIN — HYDROMORPHONE HYDROCHLORIDE 0.5 MG: 1 INJECTION, SOLUTION INTRAMUSCULAR; INTRAVENOUS; SUBCUTANEOUS at 13:31

## 2023-08-15 RX ADMIN — FENTANYL CITRATE 50 MCG: 50 INJECTION, SOLUTION INTRAMUSCULAR; INTRAVENOUS at 13:07

## 2023-08-15 RX ADMIN — SODIUM CHLORIDE, SODIUM LACTATE, POTASSIUM CHLORIDE, AND CALCIUM CHLORIDE: .6; .31; .03; .02 INJECTION, SOLUTION INTRAVENOUS at 13:28

## 2023-08-15 NOTE — OP NOTE
OPERATIVE REPORT  PATIENT NAME: Geovanna Santos    :  1955  MRN: [id number]  Pt Location: AN OR ROOM 03    SURGERY DATE: 8/15/2023    Surgeon(s) and Role:     * Jonathan Mahajan MD - Primary     * Gracie Kim MD - Assisting    Preop Diagnosis:  Malignant neoplasm of upper-inner quadrant of left breast in female, estrogen receptor positive ([address]) [C50.212, Z17.0]    Post-Op Diagnosis Codes:     * Malignant neoplasm of upper-inner quadrant of left breast in female, estrogen receptor positive ([address]) [C50.212, Z17.0]    Procedure(s):  Left - BREAST RUBI  DIRECTED LUMPECTOMY  Left - LYMPHATIC MAPPING WITH BLUE AND RADIOACTIVE DYES.  SENTINEL LYMPH NODE BIOPSY (INJECT AT 1200 BY DR TORRES IN THE OR)  Left - POSSIBLE AXILLARY DISSECTION    Specimen(s):  ID Type Source Tests Collected by Time Destination   1 : LEFT BREAST LUMPECTOMY - MARKED PER PROTOCOL Tissue Breast, Left TISSUE EXAM Jonathan Mahajan MD 8/15/2023 1329    2 : LEFT AXILLARY SENTINEL NODE Tissue Lymph Node, [place] TISSUE EXAM Jonathan Mahajan MD 8/15/2023 1329    3 : LEFT BREAST LUMPECTOMY - MEDIAL MARGIN YELLOW Tissue Breast, Left TISSUE EXAM Jonathan Mahajan MD 8/15/2023 1332    4 : LEFT BREAST LUMPECTOMY - INFERIOR MARGIN BLUE Tissue Breast, Left TISSUE Jesus Lyon MD 8/15/2023 1352    5 : LEFT BREAST LUMPECTOMY - LATERAL MARGIN RED Tissue Breast, Left TISSUE Jesus Lyon MD 8/15/2023 1353    6 : LEFT BREAST LUMPECTOMY - SUPERIOR MARGIN ORANGE Tissue Breast, Left TISSUE Jesus Lyon MD 8/15/2023 1354        Estimated Blood Loss:   Minimal    Drains:  * No LDAs found *    Anesthesia Type:   General    Operative Indications:  Malignant neoplasm of upper-inner quadrant of left breast in female, estrogen receptor positive ([address]) [C50.212, Z17.0]      Operative Findings:  Good specimen radiograph, one SLN radioactive, mildly blue, no gross disease    Complications:   None    Procedure and Technique:    The prior post biopsy images were reviewed prior to the procedure. Lumpectomy  The patient was brought to the operation room and placed under general anesthesia. Attention was paid to ensure appropriate padding and correct positioning. The RUBI  detector was then used to localize the [employer]. The breast skin was marked in this area. The left breast was injected intradermally with technetium sulfur colloid in the casey-areolar region and 0.3cc of methylene blue was injected intradermally over the tumor. These areas were vigorously massaged to facilitate identification of the sentinel lymph node (SLN). The breast and axillary region were then prepped and draped in a sterile fashion. I initiated a time-out, identifying the patient, the correct side and the above procedure. All parties agreed with the time out. The planned incision was then injected with 0.25% Marcaine for local anesthesia. The incision was sharply incised encompassing the blue dye. The RUBI detector was used to guide the dissection. Superior, inferior, medial and lateral planes were developed around the RUBI deflector and the specimen truncated beyond the RUBI deflector. The specimen was then inked for orientation purposes. A specimen radiograph was taken in two dimensions. Posterior was muscle, anterior was skin. Additional margins were removed to optimize complete removal of the tumor. The additional margins were inked on the most distal area. All specimens were sent to pathology for permanent analysis. Superficial bleeding controlled with electrocautery and the wound was extensively irrigated. SLN Biopsy  The previously marked location of the sentinel lymph node was injected with 0.25% Marcaine. A curivilinear incision was made and dissection was taken down into the axillar proper with electrocautery. The sky counter was used to help localize the SLN. The sentinel lymph node was identified and removed with electrocautery.        SLN Findings  The SLN was mildly blue and strongly radioactive. The lymph node was grossly normal and sent for permanent pathologic analysis. This wound also was irrigated extensively: superficial bleeding was controlled with electrocautery and then Both wounds were closed in 2 layers - an inner layer of 3-0 Monocryl and a subcuticular layer of 4-0 PDS. Exofin was applied. I was present for the entire procedure. Patient Disposition:  PACU     [place] Node Biopsy for Breast Cancer - Left  Operation performed with curative intent. Yes   Tracer(s) used to identify sentinel nodes in the upfront surgery (non-neoadjuvant) setting (select all that apply). Dye and Radioactive tracer   Tracer(s) used to identify sentinel nodes in the neoadjuvant setting (select all that apply). N/A   All nodes (colored or non-colored) present at the end of a dye-filled lymphatic channel were removed. Yes   All significantly radioactive nodes were removed. Yes   All palpably suspicious nodes were removed. Yes   Biopsy-proven positive nodes marked with clips prior to chemotherapy were identified and removed.  N/A            SIGNATURE: Yue Mejias MD  DATE: August 15, 2023  TIME: 1:59 PM

## 2023-08-15 NOTE — INTERVAL H&P NOTE
H&P reviewed. After examining the patient I find no changes in the patients condition since the H&P had been written.   Ludy functional  Vitals:    08/15/23 1121   BP: (!) 196/80   Pulse: 69   Resp: 18   Temp: 98.2 °F (36.8 °C)   SpO2: 98%

## 2023-08-15 NOTE — DISCHARGE INSTR - AVS FIRST PAGE
POST-OPERATIVE BREAST CARE INSTRUCTIONS    Wound Closure/Dressing: Your wound is closed with:   surgical glue, it will come off after 2-3 weeks. Dissolvable sutures-underneath the skin    Wound care: If you have gauze over your wound you may remove it the day after surgery. Leave the Steri-strips on, they will fall off on their own in 1-2 weeks  You may shower using soap and water to clean your wound. Gently pat dry. Drain Care: If you do not have a drain, disregard this section. Visiting Nursing should have been arranged upon discharge from hospital.  A nurse will call your home to schedule an initial visit. Please call the number below if you have not received a call within 48 hours of hospital discharge. You may shower with the THIAGO drains. Wash area around the drains with soap and water and pat dry. Record drain outputs on chart given to you at pre op visit and bring that chart to office at post op appt  THIAGO drains can be removed in the office by a nurse either at post op visit OR when drain output is 30 cc’s or less in a 24 hour period for three days in a row. If you had plastic surgery or reconstructive surgery, the plastic surgeon will manage the drains. Other:      You can begin wearing your own bra when it feels comfortable. You may resume using deodorant the day after surgery                 Post-op visit:  your post-op visit is scheduled for:  August 28, 2023 @ 10:30 AM    Call our office at [phone] if you have any  of the following:    Redness, swelling, heat, unusual drainage or heavy bleeding from wound. Fever greater than 101.°  Pain not relieved by medication.

## 2023-08-15 NOTE — ANESTHESIA POSTPROCEDURE EVALUATION
Post-Op Assessment Note    CV Status:  Stable    Pain management: adequate     Mental Status:  Alert and awake   Hydration Status:  Euvolemic   PONV Controlled:  Controlled   Airway Patency:  Patent      Post Op Vitals Reviewed: Yes      Staff: CRNA   Comments: VSS report RN        No notable events documented.     BP  160/70   Temp      Pulse  85   Resp      SpO2   95 RA

## 2023-08-15 NOTE — ANESTHESIA PREPROCEDURE EVALUATION
Medical History    History Comments   Asthma    Diabetes mellitus ([address])    Hypertension    Stroke ([address])    Obesity    Uses [place] as primary spoken language    Hyperlipidemia    Balance problems "right side"   Risk for falls    Exercise involving walking steps and cleaning   Depression    Fatty liver    Wears glasses    Cracked tooth 3   History of stomach ulcers    GERD (gastroesophageal reflux disease)    Hiatal hernia    Colon polyp    History of pneumonia    Abdominal pain    Difficulty swallowing "food feels like stuck sometimes'   Back pain occas due to "disc problem"   Refusal of blood transfusions as patient is Worship    Cholelithiasis lap aviva today 3/10/2021   Pneumonia      Procedure:  BREAST RUBI  DIRECTED LUMPECTOMY (Left: Breast)  LYMPHATIC MAPPING WITH BLUE AND RADIOACTIVE DYES, SENTINEL LYMPH NODE BIOPSY (INJECT AT 1200 BY DR TORRES IN THE OR) (Left: Axilla)  POSSIBLE AXILLARY DISSECTION (Left: Axilla)    Relevant Problems   ANESTHESIA (within normal limits)      CARDIO   (+) Atypical chest pain   (+) Essential hypertension   (+) Pure hypercholesterolemia      ENDO   (+) Acquired hypothyroidism      GYN   (+) Malignant neoplasm of upper-inner quadrant of left breast in female, estrogen receptor positive (HCC)      NEURO/PSYCH   (+) Continuous opioid dependence (HCC)   (+) Depression, recurrent (HCC)      PULMONARY   (+) Mild intermittent asthma with acute exacerbation        Physical Exam    Airway    Mallampati score: II  TM Distance: >3 FB  Neck ROM: full     Dental       Cardiovascular  Rate: normal,     Pulmonary  Pulmonary exam normal     Other Findings  Per pt denies anything remaining that is loose or removeable      Anesthesia Plan  ASA Score- 3     Anesthesia Type- general with ASA Monitors. Additional Monitors:   Airway Plan: LMA. Plan Factors-Exercise tolerance (METS): >4 METS. Chart reviewed. Patient summary reviewed.     Patient is not a current smoker. Induction- intravenous. Postoperative Plan- Plan for postoperative opioid use. Informed Consent- Anesthetic plan and risks discussed with patient. I personally reviewed this patient with the CRNA. Discussed and agreed on the Anesthesia Plan with the CRNA. Melina Gould

## 2023-08-18 ENCOUNTER — TELEPHONE (OUTPATIENT)
Dept: SURGICAL ONCOLOGY | Facility: CLINIC | Age: 68
End: 2023-08-18

## 2023-08-18 NOTE — TELEPHONE ENCOUNTER
Called the patient to follow up from her recent breast surgery with Dr. Rosemary Primrose. There was no answer so a message was left requesting a call back if the patient had any questions, concerns, or issues regarding her surgery. A direct phone number was provided.

## 2023-08-25 PROCEDURE — 88341 IMHCHEM/IMCYTCHM EA ADD ANTB: CPT | Performed by: PATHOLOGY

## 2023-08-25 PROCEDURE — 88307 TISSUE EXAM BY PATHOLOGIST: CPT | Performed by: PATHOLOGY

## 2023-08-25 PROCEDURE — 88342 IMHCHEM/IMCYTCHM 1ST ANTB: CPT | Performed by: PATHOLOGY

## 2023-08-28 ENCOUNTER — OFFICE VISIT (OUTPATIENT)
Dept: SURGICAL ONCOLOGY | Facility: CLINIC | Age: 68
End: 2023-08-28

## 2023-08-28 VITALS
WEIGHT: 194 LBS | BODY MASS INDEX: 35.7 KG/M2 | SYSTOLIC BLOOD PRESSURE: 124 MMHG | TEMPERATURE: 97.6 F | HEART RATE: 65 BPM | DIASTOLIC BLOOD PRESSURE: 76 MMHG | RESPIRATION RATE: 16 BRPM | OXYGEN SATURATION: 98 % | HEIGHT: 62 IN

## 2023-08-28 DIAGNOSIS — C50.212 MALIGNANT NEOPLASM OF UPPER-INNER QUADRANT OF LEFT BREAST IN FEMALE, ESTROGEN RECEPTOR POSITIVE (HCC): ICD-10-CM

## 2023-08-28 DIAGNOSIS — Z17.0 MALIGNANT NEOPLASM OF UPPER-INNER QUADRANT OF LEFT BREAST IN FEMALE, ESTROGEN RECEPTOR POSITIVE (HCC): ICD-10-CM

## 2023-08-28 DIAGNOSIS — Z71.89 OTHER SPECIFIED COUNSELING: Primary | ICD-10-CM

## 2023-08-28 DIAGNOSIS — Z98.890 STATUS POST LEFT BREAST LUMPECTOMY: ICD-10-CM

## 2023-08-28 PROCEDURE — 99024 POSTOP FOLLOW-UP VISIT: CPT | Performed by: SURGERY

## 2023-09-01 ENCOUNTER — RADIATION ONCOLOGY CONSULT (OUTPATIENT)
Dept: RADIATION ONCOLOGY | Facility: CLINIC | Age: 68
End: 2023-09-01
Attending: INTERNAL MEDICINE
Payer: COMMERCIAL

## 2023-09-01 ENCOUNTER — CONSULT (OUTPATIENT)
Dept: HEMATOLOGY ONCOLOGY | Facility: CLINIC | Age: 68
End: 2023-09-01
Payer: COMMERCIAL

## 2023-09-01 VITALS
OXYGEN SATURATION: 98 % | RESPIRATION RATE: 16 BRPM | HEART RATE: 77 BPM | SYSTOLIC BLOOD PRESSURE: 169 MMHG | BODY MASS INDEX: 35.9 KG/M2 | DIASTOLIC BLOOD PRESSURE: 69 MMHG | WEIGHT: 195.11 LBS | TEMPERATURE: 97.7 F | HEIGHT: 62 IN

## 2023-09-01 VITALS
WEIGHT: 191 LBS | DIASTOLIC BLOOD PRESSURE: 72 MMHG | BODY MASS INDEX: 35.15 KG/M2 | HEIGHT: 62 IN | HEART RATE: 80 BPM | SYSTOLIC BLOOD PRESSURE: 128 MMHG | RESPIRATION RATE: 18 BRPM | OXYGEN SATURATION: 99 % | TEMPERATURE: 97.4 F

## 2023-09-01 DIAGNOSIS — C50.212 MALIGNANT NEOPLASM OF UPPER-INNER QUADRANT OF LEFT BREAST IN FEMALE, ESTROGEN RECEPTOR POSITIVE (HCC): ICD-10-CM

## 2023-09-01 DIAGNOSIS — Z17.0 MALIGNANT NEOPLASM OF UPPER-INNER QUADRANT OF LEFT BREAST IN FEMALE, ESTROGEN RECEPTOR POSITIVE (HCC): ICD-10-CM

## 2023-09-01 DIAGNOSIS — Z17.0 MALIGNANT NEOPLASM OF UPPER-INNER QUADRANT OF LEFT BREAST IN FEMALE, ESTROGEN RECEPTOR POSITIVE (HCC): Primary | ICD-10-CM

## 2023-09-01 DIAGNOSIS — C50.212 MALIGNANT NEOPLASM OF UPPER-INNER QUADRANT OF LEFT BREAST IN FEMALE, ESTROGEN RECEPTOR POSITIVE (HCC): Primary | ICD-10-CM

## 2023-09-01 PROCEDURE — 99211 OFF/OP EST MAY X REQ PHY/QHP: CPT | Performed by: INTERNAL MEDICINE

## 2023-09-01 PROCEDURE — 99205 OFFICE O/P NEW HI 60 MIN: CPT | Performed by: INTERNAL MEDICINE

## 2023-09-01 RX ORDER — ANASTROZOLE 1 MG/1
1 TABLET ORAL DAILY
Qty: 90 TABLET | Refills: 1 | Status: ON HOLD | OUTPATIENT
Start: 2023-09-01

## 2023-09-01 NOTE — PROGRESS NOTES
Hematology / Oncology Outpatient Consult Note    Alex Keita 76 y.o. female [id number]         Date:  9/1/2023    Assessment / Plan: A 61-year-old postmenopausal woman with newly diagnosed stage IA left breast cancer, grade 1, ER 90% positive, UT 90% positive, HER2 0 by IHC. She is negative for BRCA gene mutation. She underwent lumpectomy and sentinel lymph node biopsy, resulting in TRISH. She presents today to discuss adjuvant treatment options. We had extensive discussion regarding the diagnosis, staging, tumor phenotype, low-grade disease, good prognosis as well as treatment options. Based on her age, stage, grade as well as tumor phenotype, adjuvant chemotherapy is not indicated. I recommended her to have adjuvant hormonal therapy with anastrozole for 5 years. Side effects of anastrozole were thoroughly discussed, including but not limited to to hot flashes, musculoskeletal symptoms and bone mineral density loss. She understood and wished to proceed. She will come back in 6 months for follow-up. Subjective:     HPI: A 61-year-old postmenopausal woman was found to have abnormality in her left breast, based on screening mammography. She underwent biopsy in July 10, 2023 which showed invasive ductal carcinoma, grade 1. This was ER 90% positive, UT 90% positive, HER2 0 by IHC. Her genetic testing was negative. She underwent lumpectomy and sentinel lymph node biopsy in August 15, 2023. She had 11 mm of invasive ductal carcinoma, grade 1. There is no evidence of lymphovascular invasion. 1 sentinel lymph node was negative for metastatic disease. She presents today for routine follow-up. She has some soreness in her left breast.  Otherwise, she is asymptomatic from hematology oncology standpoint. She has no weight loss. She denied any respiratory symptoms. She has diabetes, hypertension as well as hypercholesterolemia as a past medical history.   She has no strong family history of breast cancer. She is a lifetime never smoker. Her performance status is normal.        Interval History:          Objective:     Primary Diagnosis:    1. Left breast cancer, stage IA (pT1c, pN0, M0) grade 1, ER 90% positive, DE 90% positive, HER2 0 by IHC. Diagnosed in August 2023.    2. BRCA gene mutation negative. Cancer Staging:  Cancer Staging   Malignant neoplasm of upper-inner quadrant of left breast in female, estrogen receptor positive ([address])  Staging form: Breast, AJCC 8th Edition  - Pathologic stage from 8/15/2023: Stage IA (pT1c, pN0(sn), cM0, G1, ER+, DE+, HER2-) - Signed by Dwight Ramirez MD on 8/28/2023  Stage prefix: Initial diagnosis  Method of lymph node assessment: [place] lymph node biopsy  Multigene prognostic tests performed: None  Histologic grading system: 3 grade system        Previous Hematologic/ Oncologic Treatment:         Current Hematologic/ Oncologic Treatment:      Adjuvant hormonal therapy with anastrozole to be started in September 2023. Disease Status:     TRISH status postlumpectomy and sentinel lymph node biopsy. Test Results:    Pathology:    11 mm of invasive ductal carcinoma, grade 1. No evidence of lymphovascular invasion. 1 sentinel lymph node was negative for metastatic disease. ER 90% positive, DE 90% positive, HER2 0 by IHC. Radiology:    Chest x-ray was negative for pulmonary disease. DEXA scan in July 2023 showed normal bone density. Laboratory:    See below. Physical Exam:      General Appearance:    Alert, oriented        Eyes:    PERRL   Ears:    Normal external ear canals, both ears   Nose:   Nares normal, septum midline   Throat:   Mucosa moist. Pharynx without injection.     Neck:   Supple       Lungs:     Clear to auscultation bilaterally   Chest Wall:    No tenderness or deformity    Heart:    Regular rate and rhythm       Abdomen:     Soft, non-tender, bowel sounds +, no organomegaly           Extremities:   Extremities no cyanosis or edema       Skin:   no rash or icterus. Lymph nodes:   Cervical, supraclavicular, and axillary nodes normal   Neurologic:   CNII-XII intact, normal strength, sensation and reflexes     Throughout          Breast exam:  Lumpectomy scar at 9 o'clock position in her left breast with no palpable abnormality. Right breast exam is negative. ROS: Review of Systems   All other systems reviewed and are negative. Imaging: Mammo reyes  breast specimen (No Charge)    Result Date: 8/16/2023  Narrative: Imaging of the left breast specimen confirms successful removal of the [facility]. NM sentinal node inj    Result Date: 8/16/2023  Narrative: SENTINEL NODE LYMPHOSCINTIGRAPHY INDICATION:  C50.212: Malignant neoplasm of upper-inner quadrant of left female breast Z17.0: Estrogen receptor positive status (ER+) FINDINGS: 0.50 mCi Tc-99m filtered sulfur colloid (0.6 cc volume) was administered intradermally into the left breast by Dr. Gus Howell in the OR. No imaging was performed. Impression: Injection of 0.50  mCi Tc-99mfiltered sulfur colloid into the left breast in the OR. Workstation performed: [id number]         Labs:   Lab Results   Component Value Date    WBC 9.78 07/26/2023    HGB 12.6 07/26/2023    HCT 38.3 07/26/2023    MCV 88 07/26/2023     07/26/2023     Lab Results   Component Value Date     03/26/2014    K 4.2 07/26/2023     07/26/2023    CO2 31 07/26/2023    ANIONGAP 8 03/26/2014    BUN 16 07/26/2023    CREATININE 0.91 07/26/2023    GLUCOSE 231 (H) 03/26/2014    GLUF 114 (H) 07/26/2023    CALCIUM 9.3 07/26/2023    CORRECTEDCA 9.4 09/09/2022    AST 12 (L) 07/26/2023    ALT 10 07/26/2023    ALKPHOS 89 07/26/2023    PROT 7.3 03/26/2014    BILITOT 0.3 03/26/2014    EGFR 65 07/26/2023             Vital Sign:    Body surface area is 1.87 meters squared.     Wt Readings from Last 3 Encounters:   09/01/23 86.6 kg (191 lb)   08/28/23 88 kg (194 lb)   08/15/23 86.6 kg (191 lb)        Temp Readings from Last 3 Encounters:   09/01/23 (!) 97.4 °F (36.3 °C) (Tympanic)   08/28/23 97.6 °F (36.4 °C) (Temporal)   08/15/23 (!) 97 °F (36.1 °C) (Temporal)        BP Readings from Last 3 Encounters:   09/01/23 128/72   08/28/23 124/76   08/15/23 150/66         Pulse Readings from Last 3 Encounters:   09/01/23 80   08/28/23 65   08/15/23 74     @LASTSAO2(3)@    Active Problems:   Patient Active Problem List   Diagnosis   • Acquired hypothyroidism   • Essential hypertension   • Mild intermittent asthma with acute exacerbation   • Pure hypercholesterolemia   • Atypical chest pain   • Lung infiltrate   • Severe sepsis (HCC)   • Irritant contact dermatitis   • Depression, recurrent (HCC)   • Uncontrolled type 2 diabetes mellitus with hyperglycemia (HCC)   • Spondylolisthesis of lumbar region   • History of orthopnea   • Obesity (BMI 35.0-39.9 without comorbidity)   • Plantar fasciitis of left foot   • Thyroid nodule   • Lower esophageal ring (Schatzki)   • Continuous opioid dependence (HCC)   • Malignant neoplasm of upper-inner quadrant of left breast in female, estrogen receptor positive (HCC)   • Refusal of blood transfusions as patient is Druze       Past Medical History:   Past Medical History:   Diagnosis Date   • Abdominal pain    • Asthma    • Back pain     occas due to "disc problem"   • Balance problems     "right side"   • Cholelithiasis     lap aviva today 3/10/2021   • Colon polyp    • Cracked tooth     3   • Depression    • Diabetes mellitus ([address])    • Difficulty swallowing     "food feels like stuck sometimes'   • Exercise involving walking     steps and cleaning   • Fatty liver    • GERD (gastroesophageal reflux disease)    • Hiatal hernia    • History of pneumonia    • History of stomach ulcers    • Hyperlipidemia    • Hypertension    • Obesity    • Pneumonia    • Refusal of blood transfusions as patient is Druze    • Risk for falls    • Stroke ([address]) • Uses Frisian as primary spoken language    • Wears glasses        Surgical History:   Past Surgical History:   Procedure Laterality Date   • BREAST LUMPECTOMY Left 8/15/2023    Procedure: BREAST RUBI  DIRECTED LUMPECTOMY;  Surgeon: Magdy Green MD;  Location: [facility];  Service: Surgical Oncology   • CHOLECYSTECTOMY     • COLONOSCOPY  02/09/2021    4 polyps tubular adenomas by Dr. Karen Payne   • EGD  04/20/2021    nonobstructing Schatzki's ring dilated with 47 [place] Hernandez, sliding hiatal hernia otherwise normal.  Biopsies stomach  negative for H. pylori biopsy esophagus negative for eosinophilic esophagitis by Dr. Atilio Broderick   • EGD AND COLONOSCOPY  02/09/2021    3 YEAR RECOMMENDED = COLONO   • LYMPH NODE BIOPSY Left 8/15/2023    Procedure: LYMPHATIC MAPPING WITH BLUE AND RADIOACTIVE DYES, SENTINEL LYMPH NODE BIOPSY (INJECT AT 1200 BY DR TORRES IN THE OR); Surgeon: Magdy Green MD;  Location: [facility];  Service: Surgical Oncology   • AZ LAPAROSCOPY SURG CHOLECYSTECTOMY N/A 03/10/2021    Procedure: CHOLECYSTECTOMY LAPAROSCOPIC W/ ROBOTICS;  Surgeon: Alvarado Martinez MD;  Location: [facility];  Service: General   • ROTATOR CUFF REPAIR Bilateral     screws implanted"   • UPPER GASTROINTESTINAL ENDOSCOPY  02/09/2021    4 cm hiatal hernia without Alejandro lesions. Possibly shortened esophagus.   Biopsy of the stomach negative for H. pylori, biopsy of the EG junction negative for Kaplan's, biopsied duodenum negative for celiac by Dr. Karen Payne   • Nikhil Beltran Left 7/10/2023   • [address] THYROID BIOPSY  3/7/2023       Family History:    Family History   Problem Relation Age of Onset   • Diabetes Mother    • Hypertension Mother    • Hypertension Father    • No Known Problems Sister    • No Known Problems Daughter    • No Known Problems Maternal Grandmother    • No Known Problems Maternal Grandfather    • No Known Problems Paternal Grandmother    • No Known Problems Paternal Grandfather    • No Known Problems Son    • No Known Problems Son    • No Known Problems Daughter    • No Known Problems Daughter    • Colon cancer Brother 71       Cancer-related family history includes Colon cancer (age of onset: 71) in her brother. Social History:   Social History     Socioeconomic History   • Marital status: /Civil Union     Spouse name: Not on file   • Number of children: Not on file   • Years of education: Not on file   • Highest education level: Not on file   Occupational History   • Occupation: House wife   Tobacco Use   • Smoking status: Never   • Smokeless tobacco: Never   Vaping Use   • Vaping Use: Never used   Substance and Sexual Activity   • Alcohol use: Never   • Drug use: Never   • Sexual activity: Not Currently   Other Topics Concern   • Not on file   Social History Narrative   • Not on file     Social Determinants of Health     Financial Resource Strain: Not on file   Food Insecurity: Not on file   Transportation Needs: Not on file   Physical Activity: Not on file   Stress: Not on file   Social Connections: Not on file   Intimate Partner Violence: Not on file   Housing Stability: Not on file       Current Medications:   Current Outpatient Medications   Medication Sig Dispense Refill   • albuterol (2.5 mg/3 mL) 0.083 % nebulizer solution Take 1 vial (2.5 mg total) by nebulization every 6 (six) hours as needed for wheezing or shortness of breath 60 vial 5   • albuterol (PROVENTIL HFA,VENTOLIN HFA) 90 mcg/act inhaler Inhale 2 puffs every 6 (six) hours as needed for wheezing 18 g 2   • aspirin 81 mg chewable tablet Chew 1 tablet (81 mg total) daily 90 tablet 1   • atorvastatin (LIPITOR) 20 mg tablet Take 1 tablet (20 mg total) by mouth daily 90 tablet 1   • budesonide-formoterol (SYMBICORT) 160-4.5 mcg/act inhaler Inhale 2 puffs 2 (two) times a day as needed (1) Rinse mouth after use.  10.2 g 3   • clonazePAM (KlonoPIN) 0.125 mg disintegrating tablet Take 1 tablet daily as needed for anxiety 90 tablet 0   • Empagliflozin-metFORMIN HCl (Synjardy) 12.5-1000 MG TABS Take by mouth Take 2 tablets by mouth daily     • ezetimibe (ZETIA) 10 mg tablet Take 1 tablet (10 mg total) by mouth daily 90 tablet 1   • furosemide (LASIX) 20 mg tablet TAKE 1 TABLET (20 MG TOTAL) BY MOUTH DAILY AS NEEDED FOR EDEMA 90 tablet 0   • Insulin Aspart (NovoLOG) 100 units/mL injection Inject under the skin Take 6 units for breakfast /7 unit for lunch/10 units at night     • Insulin Pen Needle 31G X 4 MM MISC Use 4 (four) times a day 100 each 3   • levothyroxine 150 mcg tablet Take 1 tablet (150 mcg total) by mouth daily (Patient taking differently: Take 120 mcg by mouth daily) 90 tablet 1   • montelukast (SINGULAIR) 10 mg tablet Take 1 tablet (10 mg total) by mouth daily 90 tablet 1   • naloxone (NARCAN) 4 mg/0.1 mL nasal spray Administer 1 spray into a nostril. If no response after 2-3 minutes, give another dose in the other nostril using a new spray. 1 each 1   • omeprazole (PriLOSEC) 20 mg delayed release capsule TAKE 1 CAPSULE EVERY DAY 90 capsule 0   • OneTouch Delica Lancets 86D MISC Check blood sugars twice daily. Please substitute with appropriate alternative as covered by patient's insurance.  Dx: E11.65 200 each 3   • oxyCODONE-acetaminophen (Percocet) 5-325 mg per tablet Take 1 tablet by mouth every 6 (six) hours as needed for moderate pain Max Daily Amount: 4 tablets 6 tablet 0   • dicyclomine (BENTYL) 20 mg tablet Take 1 tablet (20 mg total) by mouth 2 (two) times a day 60 tablet 0   • escitalopram (Lexapro) 10 mg tablet Take 1 tablet (10 mg total) by mouth daily 30 tablet 2   • insulin degludec [place] FlexTouch) 100 units/mL injection pen Inject 20 Units under the skin daily (Patient not taking: Reported on 9/1/2023) 18 mL 1   • insulin lispro (HumaLOG KwikPen) 100 units/mL injection pen Inject 7 Units under the skin 3 (three) times a day with meals (Patient not taking: Reported on 1/19/2023) 15 mL 0   • losartan (COZAAR) 100 MG tablet Take 1 tablet (100 mg total) by mouth daily 90 tablet 1   • metoprolol succinate (TOPROL-XL) 50 mg 24 hr tablet TAKE 1 TABLET EVERY DAY 90 tablet 0   • VITAMIN D PO Take by mouth once a week       No current facility-administered medications for this visit. Allergies:    Allergies   Allergen Reactions   • Etodolac Shortness Of Breath   • Iodinated Contrast Media Shortness Of Breath and Wheezing   • Simvastatin Other (See Comments)     elevated liver function    • Latex Rash   • Morphine Palpitations   • Penicillins Rash

## 2023-09-01 NOTE — LETTER
2023     Cheyenne Mojica MD  [address]  [address] [facility]    Patient: Jerrell Mccoy   YOB: 1955   Date of Visit: 2023       Dear Dr. William Mead:    Thank you for referring Caitlyn Chapman to me for evaluation. Below are my notes for this consultation. If you have questions, please do not hesitate to call me. I look forward to following your patient along with you. Sincerely,        Junior Freeman MD        CC: No Recipients    Junior Freeman MD  2023  6:07 PM  Sign when Signing Visit  Consultation - Radiation Oncology      Patient Name: Jerrell Mccoy RMB:[id number] : 1955  Encounter: [id number]  Referring Provider: Cheyenne Mojica MD    ASSESSMENT  Cancer Staging   Malignant neoplasm of upper-inner quadrant of left breast in female, estrogen receptor positive ([address])  Staging form: Breast, AJCC 8th Edition  - Pathologic stage from 8/15/2023: Stage IA (pT1c, pN0(sn), cM0, G1, ER+, ID+, HER2-) - Signed by Cheyenne Mojica MD on 2023  Stage prefix: Initial diagnosis  Method of lymph node assessment: [place] lymph node biopsy  Multigene prognostic tests performed: None  Histologic grading system: 3 grade system    PLAN  Jerrell Mccoy is a 76 y.o. female with pT1cN0 (IA)) well differentiated invasive ductal carcinoma of the left breast (LLIQ,8:00, N7), ER/ID positive, HER-2 non-amplified, detected radiographically s/p lumpectomy and SLNB on 8/15/2023, notable for a 1.1cm primary, 12mm DCIS, no LVI, negative margins and 0/1 SLN involved. Recommended adjuvant endocrine therapy. I had a long and detailed discussion with the patient today regarding the cancer diagnosis. We have reviewed the findings of all tests including the pathology, imaging, and laboratory studies.  With regard to treating the breast, we discussed that based on a multitude of data including the Early Breast Cancer Trialists Group meta-analysis, adjuvant breast irradiation is considered standard of care following breast conservation surgery in the treatment of early stage breast cancer. I explained that adjuvant radiation reduces her risk of local recurrence and breast cancer specific mortality. We discussed the options of whole breast irradiation without a boost (16fx) and accelerated partial breast RT (5fx). After thorough discussion, she prefers whole breast RT. The benefits, risks, and alternatives of radiation therapy were explained to the patient. Short term side effects were discussed including but not limited to dermatitis, erythema, hyperpigmentation, and fatigue. Long term side effects were discussed including but not limited to radiation pneumonitis, lymphedema, cardiotoxicity, fibrosis, suboptimal cosmesis, telangiectasias, and a very low risk of secondary malignancy. We also discussed that, even with appropriate therapy, there is still a risk of recurrence. The patient agreed to proceed with radiation therapy, and informed consent was signed. CT simulation to be scheduled at [facility]. Turkmen Video  #[id number] utilized for entirety of visit. Thank you for the opportunity to participate in the care of this patient. Kathleen Johnson MD  Department of [address] [facility]    No orders of the defined types were placed in this encounter. 1. Malignant neoplasm of upper-inner quadrant of left breast in female, estrogen receptor positive ([address] [facility])  Ambulatory referral to Radiation Oncology          Total Time Spent  56 minutes spent reviewing EMR in preparation for visit, with the patient, coordination with other providers, review of pathology, and documentation.      CHIEF COMPLAINT  Chief Complaint   Patient presents with   • Consult     Radiation Oncology       History of Present Illness  Marlon Vail 1955 is a 76 y.o. female referred to radiation oncology by Dr. Celestine Stringer  to discuss radiation therapy for newly diagnosed invasive ductal carcinoma of left breast, measuring approximately 7 mm, grade 1 and strongly ER/GA +, HER 2 -. Left Breast mass was found on routine screening mammogram.  Family history positive for paternal cousin with breast cancer. 6/20/23 Screening Mammogram:   FINDINGS:   LEFT  A) MASS: There is an irregularly shaped mass seen in the lower inner quadrant of the left breast in the posterior depth. Right  There are no suspicious masses, grouped microcalcifications or areas of unexplained architectural distortion. The skin and nipple areolar complex are unremarkable. There are scattered calcifications present. IMPRESSION:  Additional imaging required. A breast health care nurse from our facility will be contacting the patient regarding the need for additional imaging. ASSESSMENT/BI-RADS CATEGORY:  Left: 0 - Incomplete: Needs Additional Imaging Evaluation  Right: 2 - Benign  Overall: 0 - Incomplete: Needs Additional Imaging Evaluation     RECOMMENDATION:       - Diagnostic mammogram at the current time for the left breast.       - Ultrasound at the current time for the left breast.       - Routine screening mammogram in 1 year for the right breast.     7/10/23 Mammo diagnostic left w 3d & cad with US breast left limited (diagnostic)  FINDINGS:   LEFT  A) MASS  Mammo diagnostic left w 3d & cad: There is an irregularly shaped mass with microlobulated margins seen in the lower inner quadrant of the left breast in the posterior depth. The mass correlates with the prior mammogram finding. Compared to the previous study, there are no significant changes. US breast left limited (diagnostic): There is a 7 mm x 5 mm x 5 mm irregularly shaped, heterogeneous mass with indistinct margins with shadowing seen in the lower inner quadrant of the left breast at 8 o'clock in the posterior depth, 7 cm from the nipple. The mass correlates with the prior mammogram finding.   Ultrasound-guided core biopsy recommended. Evaluation of the axilla confirms no pathologic lymphadenopathy. IMPRESSION:  Ultrasound-guided core biopsy recommended for moderately suspicious mass 8:00 left breast.   ASSESSMENT/BI-RADS CATEGORY:  Left: 4B - Moderate Suspicion for Malignancy  Overall: 4 - Suspicious   RECOMMENDATION:       - Ultrasound-guided breast biopsy for the left breast.     7/10/23 US guided biopsy left breast  7/10/23 Tissue Exam:       Synoptic Checklist    INVASIVE CARCINOMA OF THE BREAST: Biopsy  Protocol posted: 9/21/2022  INVASIVE CARCINOMA OF THE BREAST: BIOPSY - A       SPECIMEN   Procedure   Needle biopsy    Specimen Laterality   Left    TUMOR   Tumor Site   Clock position        8 o'clock    Tumor Site   Distance from nipple (Centimeters): 7 cm   Histologic Type   Invasive carcinoma of no special type (ductal)    Histologic Grade ([place] Histologic Score)       Glandular (Acinar) / Tubular Differentiation   Score 3    Nuclear Pleomorphism   Score 1    Mitotic Rate   Score 1    Overall Grade   Grade 1 (scores of 3, 4 or 5)    Tumor Size   Greatest dimension of largest invasive focus (Millimeters): 11 mm   Ductal Carcinoma In Situ (DCIS)   Not identified    Microcalcifications   Present in invasive carcinoma    Breast Biomarker Studies (pending)   ER/KS/HER2       BREAST TUMOR PROGNOSTIC PROFILE     Performed on block A2:  Test Description Result Prognostic Interpretation   Estrogen Receptor/ER  Primary Antibody: SP-1  Internal control: Positive   External control: Positive 90-95%  Staining Intensity: Strong  Milena Score*: 7 Positive   Progesterone Receptor/PgR  Primary Antibody:1E2  Internal control: Positive  External control: Positive 90-95%  Staining Intensity: Strong  Milena Score*: 7 Positive    HER2 by IHC   Primary Antibody: 4B5 0 Negative      7/17/23 City Grade BRCAplus 8 genes: Negative-no clinically significant variants detected.        7/26/23 Surgical oncology consult note,  Dr. Wendy Staley:  Grisel Obregon diagnosis of invasive ductal carcinoma of left breast. Grade 1, strongly ER/ND + and HER2 negative. Recommendation was RUBI directed lumpectomy in conjunction with a sentinel  lymph node biopsy and possible axillary dissection. This was the preferred procedure over mastectomy by the patient. EXAM:  Both breasts were examined in the sitting and supine position. There are no worrisome skin lesions, no nipple retraction and no nipple discharge. There are no dominant masses, axillary adenopathy or supraclavicular adenopathy on either side. Stage I T1 N0 M0 grade 1 strongly ER/ND positive and HER2 negative left breast cancer. Good candidate for breast conserving surgery. Based on tumor location she is not a good candidate for IORT. She was recommended for post op whole breast RT. Discussion also about adjuvant hormonal therapy. All adjuvant therapies will be determined at her post op visit. 8/15/23 Dr. Delbert Lynch, OP report:   Preop Diagnosis:  Malignant neoplasm of upper-inner quadrant of left breast in female, estrogen receptor positive ([address]) [C50.212, Z17.0]     Post-Op Diagnosis Codes:     * Malignant neoplasm of upper-inner quadrant of left breast in female, estrogen receptor positive ([address]) [C50.212, Z17.0]     Procedure(s):  Left - BREAST RUBI  DIRECTED LUMPECTOMY  Left - LYMPHATIC MAPPING WITH BLUE AND RADIOACTIVE DYES.  SENTINEL LYMPH NODE BIOPSY (INJECT AT 1200 BY DR TORRES IN THE OR)  Left - POSSIBLE AXILLARY DISSECTION     8/15/23 Tissue Exam:       Synoptic Checklist    INVASIVE CARCINOMA OF THE BREAST: Resection  8th Edition - Protocol posted: 9/21/2022  INVASIVE CARCINOMA OF THE BREAST: COMPLETE EXCISION - All Specimens       SPECIMEN   Procedure   Excision (less than total mastectomy)    Specimen Laterality   Left    TUMOR   Tumor Site   Lower inner quadrant        Clock position        8 o'clock    Tumor Site   Distance from nipple (Centimeters): 7 cm   Histologic Type   Invasive carcinoma of no special type (ductal)    Histologic Grade ([place] Histologic Score)       Glandular (Acinar) / Tubular Differentiation   Score 2    Nuclear Pleomorphism   Score 2    Mitotic Rate   Score 1    Overall Grade   Grade 1 (scores of 3, 4 or 5)    Tumor Size   Greatest dimension of largest invasive focus (Millimeters): 11 mm   Tumor Focality   Single focus of invasive carcinoma    Ductal Carcinoma In Situ (DCIS)   Present        Negative for extensive intraductal component (EIC)    Size (Extent) of DCIS   Estimated size (extent) of DCIS is at least JITENDRA COM HSPTL): 12 mm   Number of Blocks with DCIS   3    Number of Blocks Examined   34    Architectural Patterns   Cribriform        Solid    Nuclear Grade   Grade II (intermediate)    Necrosis   Not identified    Lobular Carcinoma In Situ (LCIS)   Not identified    Lymphovascular Invasion   Not identified    Dermal Lymphovascular Invasion   Not identified    Microcalcifications   Present in DCIS        Present in invasive carcinoma        Present in non-neoplastic tissue    Treatment Effect in the Breast   No known presurgical therapy    MARGINS   Margin Status for Invasive Carcinoma   All margins negative for invasive carcinoma    Distance from Invasive Carcinoma to Closest Margin   Greater than: 2 (nearest inferior and medial lumpectomy margins; final inferior/specimen D and medial/specimen C margins negative for invasive carcinoma) mm   Closest Margin(s) to Invasive Carcinoma   Inferior        Medial    Margin Status for DCIS   All margins negative for DCIS    Distance from DCIS to Closest Margin   Greater than: 3 (nearest medial lumpectomy margin; final medial margin/specimen C negative for DCIS) mm   Closest Margin(s) to DCIS   Medial    REGIONAL LYMPH NODES   Regional Lymph Node Status   All regional lymph nodes negative for tumor    Total Number of Lymph Nodes Examined (sentinel and non-sentinel)   1    Number of [place] Nodes Examined   1    PATHOLOGIC STAGE CLASSIFICATION (pTNM, AJCC 8th Edition)   Reporting of pT, pN, and (when applicable) pM categories is based on information available to the pathologist at the time the report is issued. As per the AJCC (Chapter 1, 8th Ed.) it is the managing physician's responsibility to establish the final pathologic stage based upon all pertinent information, including but potentially not limited to this pathology report. pT Category   pT1c    Regional Lymph Nodes Modifier   (sn): [place] node(s) evaluated. pN Category   pN0    ADDITIONAL FINDINGS   Additional Findings   Atypical ductal hyperplasia. Estrogen Receptor (ER) Status   Positive (greater than 10% of cells demonstrate nuclear positivity)    Percentage of Cells with Nuclear Positivity   90-95 %           Progesterone Receptor (PgR) Status   Positive    Percentage of Cells with Nuclear Positivity   90-95 %           HER2 (by immunohistochemistry)   Negative (Score 0)    Testing Performed on Case Number   [id number]    .            23 Surgical oncology Dr. Arline Zaman:   Ruchi Cuenca op Breast follow up visit:  wounds healing well. Pathology demonstrated tumor was completely excised, sentinel lymph node was negative. She has appointments with medical and radiation oncology. She will f/u back in Surgical oncology office in 3 months and then at 6 month intervals. 23 Medical Oncology, Dr. Kenan Erwin:   Based on her age, stage, grade as well as tumor phenotype, adjuvant chemotherapy is not indicated. I recommended her to have adjuvant hormonal therapy with anastrozole for 5 years     Up comin23 Dr. João Brown PCP  23 Surgical oncology : BERYL Wooten Founds    Today, the patient feels well overall. She notes mild SOB, some tenderness in the left axilla, and adequate ROM. She is still healing from one of her incisions.     Oncology History   Malignant neoplasm of upper-inner quadrant of left breast in female, estrogen receptor positive ([address]) 7/10/2023 Biopsy    Left breast ultrasound-guided biopsy  8 o'clock, 7 cm from nipple (RUBI)  Invasive mammary carcinoma of no special type (ductal)  Grade 1  ER 90, CA 90, HER2 0    Concordant. Malignancy appears unifocal; masses cover 7 mm on US. Left axilla US negative. Right breast clear. 7/17/2023 Genetic Testing    A total of 36 genes were evaluated, including: KENY, BRCA1, BRCA2, CDH1, CHEK2, PALB2, PTEN, STK11, TP53  Negative result.  No pathogenic sequence variants identified  [place]     8/15/2023 Surgery    Left breast RUBI  directed lumpectomy with SLN biopsy  Invasive mammary carcinoma of no special type (ductal)  Grade 1  1.1 cm  Margins negative  0/1 Lymph nodes  Anatomic/Prognostic Stage IA     8/15/2023 -  Cancer Staged    Staging form: Breast, AJCC 8th Edition  - Pathologic stage from 8/15/2023: Stage IA (pT1c, pN0(sn), cM0, G1, ER+, CA+, HER2-) - Signed by Cheyenne Mojica MD on 8/28/2023  Stage prefix: Initial diagnosis  Method of lymph node assessment: [place] lymph node biopsy  Multigene prognostic tests performed: None  Histologic grading system: 3 grade system         Historical Information   Past Medical History:   Diagnosis Date   • Abdominal pain    • Asthma    • Back pain     occas due to "disc problem"   • Balance problems     "right side"   • Brain cancer ([address])    • Cholelithiasis     lap aviva today 3/10/2021   • Colon polyp    • Cracked tooth     3   • Depression    • Diabetes mellitus ([address])    • Difficulty swallowing     "food feels like stuck sometimes'   • Exercise involving walking     steps and cleaning   • Fatty liver    • GERD (gastroesophageal reflux disease)    • Hiatal hernia    • History of pneumonia    • History of stomach ulcers    • Hyperlipidemia    • Hypertension    • Obesity    • Pneumonia    • Refusal of blood transfusions as patient is Caodaism    • Risk for falls    • Stroke [facility]) 2013   • Uses Turkmen as primary spoken language    • Wears glasses Past Surgical History:   Procedure Laterality Date   • BREAST LUMPECTOMY Left 08/15/2023    Procedure: BREAST RUBI  DIRECTED LUMPECTOMY;  Surgeon: Toni Lang MD;  [facility]: [facility];  Service: Surgical Oncology   • CHOLECYSTECTOMY     • COLONOSCOPY  02/09/2021    4 polyps tubular adenomas by Dr. Aurora Severino   • EGD  04/20/2021    nonobstructing Schatzki's ring dilated with 47 Setswana Hernandez, sliding hiatal hernia otherwise normal.  Biopsies stomach  negative for H. pylori biopsy esophagus negative for eosinophilic esophagitis by Dr. Fly Romano   • EGD AND COLONOSCOPY  02/09/2021    3 YEAR RECOMMENDED = COLONO   • LYMPH NODE BIOPSY Left 08/15/2023    Procedure: LYMPHATIC MAPPING WITH BLUE AND RADIOACTIVE DYES, SENTINEL LYMPH NODE BIOPSY (INJECT AT 1200 BY DR TORRES IN THE OR); Surgeon: Toni Lang MD;  Location: [facility];  Service: Surgical Oncology   • WA LAPAROSCOPY SURG CHOLECYSTECTOMY N/A 03/10/2021    Procedure: CHOLECYSTECTOMY LAPAROSCOPIC W/ ROBOTICS;  Surgeon: Brigid Douglas MD;  Location: [facility];  Service: General   • ROTATOR CUFF REPAIR Bilateral     screws implanted"   • UPPER GASTROINTESTINAL ENDOSCOPY  02/09/2021    4 cm hiatal hernia without Alejandro lesions. Possibly shortened esophagus.   Biopsy of the stomach negative for H. pylori, biopsy of the EG junction negative for Kaplan's, biopsied duodenum negative for celiac by Dr. Aurora Severino   • Nikhil Beltran Left 07/10/2023   • [address] THYROID BIOPSY  03/07/2023     Family History   Problem Relation Age of Onset   • Diabetes Mother    • Hypertension Mother    • Hypertension Father    • No Known Problems Sister    • Colon cancer Brother 71   • No Known Problems Son    • No Known Problems Son    • No Known Problems Daughter    • No Known Problems Daughter    • No Known Problems Daughter    • No Known Problems Maternal Grandmother    • No Known Problems Maternal Grandfather    • No Known Problems Paternal Grandmother    • No Known Problems Paternal Grandfather    • Breast cancer Other      Social History   Social History     Substance and Sexual Activity   Alcohol Use Never     Social History     Substance and Sexual Activity   Drug Use Never     Social History     Tobacco Use   Smoking Status Never   Smokeless Tobacco Never     Meds/Allergies     Current Outpatient Medications:   •  albuterol (2.5 mg/3 mL) 0.083 % nebulizer solution, Take 1 vial (2.5 mg total) by nebulization every 6 (six) hours as needed for wheezing or shortness of breath, Disp: 60 vial, Rfl: 5  •  albuterol (PROVENTIL HFA,VENTOLIN HFA) 90 mcg/act inhaler, Inhale 2 puffs every 6 (six) hours as needed for wheezing, Disp: 18 g, Rfl: 2  •  aspirin 81 mg chewable tablet, Chew 1 tablet (81 mg total) daily, Disp: 90 tablet, Rfl: 1  •  atorvastatin (LIPITOR) 20 mg tablet, Take 1 tablet (20 mg total) by mouth daily, Disp: 90 tablet, Rfl: 1  •  budesonide-formoterol (SYMBICORT) 160-4.5 mcg/act inhaler, Inhale 2 puffs 2 (two) times a day as needed (1) Rinse mouth after use., Disp: 10.2 g, Rfl: 3  •  clonazePAM (KlonoPIN) 0.125 mg disintegrating tablet, Take 1 tablet daily as needed for anxiety, Disp: 90 tablet, Rfl: 0  •  dicyclomine (BENTYL) 20 mg tablet, Take 1 tablet (20 mg total) by mouth 2 (two) times a day, Disp: 60 tablet, Rfl: 0  •  Empagliflozin-metFORMIN HCl (Synjardy) 12.5-1000 MG TABS, Take by mouth Take 2 tablets by mouth daily, Disp: , Rfl:   •  ezetimibe (ZETIA) 10 mg tablet, Take 1 tablet (10 mg total) by mouth daily, Disp: 90 tablet, Rfl: 1  •  furosemide (LASIX) 20 mg tablet, TAKE 1 TABLET (20 MG TOTAL) BY MOUTH DAILY AS NEEDED FOR EDEMA, Disp: 90 tablet, Rfl: 0  •  Insulin Aspart (NovoLOG) 100 units/mL injection, Inject under the skin Take 6 units for breakfast /7 unit for lunch/10 units at night, Disp: , Rfl:   •  insulin degludec [place] FlexTouch) 100 units/mL injection pen, Inject 20 Units under the skin daily, Disp: 18 mL, Rfl: 1  •  Insulin Pen Needle 31G X 4 MM MISC, Use 4 (four) times a day, Disp: 100 each, Rfl: 3  •  levothyroxine 150 mcg tablet, Take 1 tablet (150 mcg total) by mouth daily (Patient taking differently: Take 120 mcg by mouth daily), Disp: 90 tablet, Rfl: 1  •  losartan (COZAAR) 100 MG tablet, Take 1 tablet (100 mg total) by mouth daily, Disp: 90 tablet, Rfl: 1  •  metoprolol succinate (TOPROL-XL) 50 mg 24 hr tablet, TAKE 1 TABLET EVERY DAY, Disp: 90 tablet, Rfl: 0  •  montelukast (SINGULAIR) 10 mg tablet, Take 1 tablet (10 mg total) by mouth daily, Disp: 90 tablet, Rfl: 1  •  omeprazole (PriLOSEC) 20 mg delayed release capsule, TAKE 1 CAPSULE EVERY DAY, Disp: 90 capsule, Rfl: 0  •  OneTouch Delica Lancets 76K MISC, Check blood sugars twice daily. Please substitute with appropriate alternative as covered by patient's insurance. Dx: E11.65, Disp: 200 each, Rfl: 3  •  VITAMIN D PO, Take by mouth once a week, Disp: , Rfl:   •  anastrozole (ARIMIDEX) 1 mg tablet, Take 1 tablet (1 mg total) by mouth daily (Patient not taking: Reported on 9/1/2023), Disp: 90 tablet, Rfl: 1  •  insulin lispro (HumaLOG KwikPen) 100 units/mL injection pen, Inject 7 Units under the skin 3 (three) times a day with meals (Patient not taking: Reported on 1/19/2023), Disp: 15 mL, Rfl: 0  •  naloxone (NARCAN) 4 mg/0.1 mL nasal spray, Administer 1 spray into a nostril. If no response after 2-3 minutes, give another dose in the other nostril using a new spray.  (Patient not taking: Reported on 9/1/2023), Disp: 1 each, Rfl: 1  •  oxyCODONE-acetaminophen (Percocet) 5-325 mg per tablet, Take 1 tablet by mouth every 6 (six) hours as needed for moderate pain Max Daily Amount: 4 tablets (Patient not taking: Reported on 9/1/2023), Disp: 6 tablet, Rfl: 0  Allergies   Allergen Reactions   • Etodolac Shortness Of Breath   • Iodinated Contrast Media Shortness Of Breath and Wheezing   • Simvastatin Other (See Comments)     elevated liver function    • Latex Rash   • Morphine Palpitations   • Penicillins Rash      Pathology:  See above. Review of Systems  Refer to nursing note    OBJECTIVE:   /69 (BP Location: Right arm, Patient Position: Sitting, Cuff Size: Standard)   Pulse 77   Temp 97.7 °F (36.5 °C) (Temporal)   Resp 16   Ht 5' 2" (1.575 m)   Wt 88.5 kg (195 lb 1.7 oz)   SpO2 98%   BMI 35.69 kg/m²   Pain Assessment:  0  Performance Status: ECO - Asymptomatic    Physical Exam  General Appearance:  Alert, cooperative, no distress, appears stated age  Breast Exam:  Left  Breast: Surgical incisions noted in the breast and axilla. Incision(s) clean/dry/intact with surgical glue still present. Palpable change at surgical sites. No edema. No suspicious abnormalities in the breast, axilla, or supraclavicular region. Right Breast: Benign exam. No palpable abnormalities in the breast, axilla, or supraclavicular region  Exam chaperoned by female nursing staff. Lungs: Respirations unlabored, no cyanosis, able to speak in complete sentences without dyspnea. Skin: No generalized rash or dermatitis  Neurologic: ANOx3, speech and cognition intact. Portions of the record may have been created with voice recognition software. Occasional wrong word or "sound a like" substitutions may have occurred due to the inherent limitations of voice recognition software. Read the chart carefully and recognize, using context, where substitutions have occurred.

## 2023-09-01 NOTE — PROGRESS NOTES
Consultation - Radiation Oncology      Patient Name: Areli Nowak CAD:[id number] : 1955  Encounter: [id number]  Referring Provider: Maribell Rollins MD    ASSESSMENT  Cancer Staging   Malignant neoplasm of upper-inner quadrant of left breast in female, estrogen receptor positive ([address])  Staging form: Breast, AJCC 8th Edition  - Pathologic stage from 8/15/2023: Stage IA (pT1c, pN0(sn), cM0, G1, ER+, MD+, HER2-) - Signed by Maribell Rollins MD on 2023  Stage prefix: Initial diagnosis  Method of lymph node assessment: [place] lymph node biopsy  Multigene prognostic tests performed: None  Histologic grading system: 3 grade system    PLAN  Areli Nowak is a 76 y.o. female with pT1cN0 (IA)) well differentiated invasive ductal carcinoma of the left breast (LLIQ,8:00, N7), ER/MD positive, HER-2 non-amplified, detected radiographically s/p lumpectomy and SLNB on 8/15/2023, notable for a 1.1cm primary, 12mm DCIS, no LVI, negative margins and 0/1 SLN involved. Recommended adjuvant endocrine therapy. I had a long and detailed discussion with the patient today regarding the cancer diagnosis. We have reviewed the findings of all tests including the pathology, imaging, and laboratory studies. With regard to treating the breast, we discussed that based on a multitude of data including the Early Breast [employer] meta-analysis, adjuvant breast irradiation is considered standard of care following breast conservation surgery in the treatment of early stage breast cancer. I explained that adjuvant radiation reduces her risk of local recurrence and breast cancer specific mortality. We discussed the options of whole breast irradiation without a boost (16fx) and accelerated partial breast RT (5fx). After thorough discussion, she prefers whole breast RT. The benefits, risks, and alternatives of radiation therapy were explained to the patient.  Short term side effects were discussed including but not limited to dermatitis, erythema, hyperpigmentation, and fatigue. Long term side effects were discussed including but not limited to radiation pneumonitis, lymphedema, cardiotoxicity, fibrosis, suboptimal cosmesis, telangiectasias, and a very low risk of secondary malignancy. We also discussed that, even with appropriate therapy, there is still a risk of recurrence. The patient agreed to proceed with radiation therapy, and informed consent was signed. CT simulation to be scheduled at [facility]. Filipino Video  #[id number] utilized for entirety of visit. Thank you for the opportunity to participate in the care of this patient. Brissa Palmer MD  Department of [address] [facility]    No orders of the defined types were placed in this encounter. 1. Malignant neoplasm of upper-inner quadrant of left breast in female, estrogen receptor positive ([address])  Ambulatory referral to Radiation Oncology          Total Time Spent  56 minutes spent reviewing EMR in preparation for visit, with the patient, coordination with other providers, review of pathology, and documentation. CHIEF COMPLAINT  Chief Complaint   Patient presents with   • Consult     Radiation Oncology       History of Present Illness  Xiomy Villarreal 1955 is a 76 y.o. female referred to radiation oncology by Dr. Concetta Brizuela  to discuss radiation therapy for newly diagnosed invasive ductal carcinoma of left breast, measuring approximately 7 mm, grade 1 and strongly ER/RI +, HER 2 -.       Left Breast mass was found on routine screening mammogram.  Family history positive for paternal cousin with breast cancer.      6/20/23 Screening Mammogram:   FINDINGS:   LEFT  A) MASS: There is an irregularly shaped mass seen in the lower inner quadrant of the left breast in the posterior depth.      Right  There are no suspicious masses, grouped microcalcifications or areas of unexplained architectural distortion.  The skin and nipple areolar complex are unremarkable.  There are scattered calcifications present.   IMPRESSION:  Additional imaging required. A breast health care nurse from our facility will be contacting the patient regarding the need for additional imaging.   ASSESSMENT/BI-RADS CATEGORY:  Left: 0 - Incomplete: Needs Additional Imaging Evaluation  Right: 2 - Benign  Overall: 0 - Incomplete: Needs Additional Imaging Evaluation     RECOMMENDATION:       - Diagnostic mammogram at the current time for the left breast.       - Ultrasound at the current time for the left breast.       - Routine screening mammogram in 1 year for the right breast.     7/10/23 Mammo diagnostic left w 3d & cad with US breast left limited (diagnostic)  FINDINGS:   LEFT  A) MASS  Mammo diagnostic left w 3d & cad: There is an irregularly shaped mass with microlobulated margins seen in the lower inner quadrant of the left breast in the posterior depth. The mass correlates with the prior mammogram finding. Compared to the previous study, there are no significant changes. US breast left limited (diagnostic): There is a 7 mm x 5 mm x 5 mm irregularly shaped, heterogeneous mass with indistinct margins with shadowing seen in the lower inner quadrant of the left breast at 8 o'clock in the posterior depth, 7 cm from the nipple.  The mass correlates with the prior mammogram finding.  Ultrasound-guided core biopsy recommended.   Evaluation of the axilla confirms no pathologic lymphadenopathy.   IMPRESSION:  Ultrasound-guided core biopsy recommended for moderately suspicious mass 8:00 left breast.   ASSESSMENT/BI-RADS CATEGORY:  Left: 4B - Moderate Suspicion for Malignancy  Overall: 4 - Suspicious   RECOMMENDATION:       - Ultrasound-guided breast biopsy for the left breast.     7/10/23 US guided biopsy left breast  7/10/23 Tissue Exam:       Synoptic Checklist    INVASIVE CARCINOMA OF THE BREAST: Biopsy  Protocol posted: 9/21/2022  INVASIVE CARCINOMA OF THE BREAST: BIOPSY - A       SPECIMEN   Procedure   Needle biopsy    Specimen Laterality   Left    TUMOR   Tumor Site   Clock position        8 o'clock    Tumor Site   Distance from nipple (Centimeters): 7 cm   Histologic Type   Invasive carcinoma of no special type (ductal)    Histologic Grade (Diana Histologic Score)       Glandular (Acinar) / Tubular Differentiation   Score 3    Nuclear Pleomorphism   Score 1    Mitotic Rate   Score 1    Overall Grade   Grade 1 (scores of 3, 4 or 5)    Tumor Size   Greatest dimension of largest invasive focus (Millimeters): 11 mm   Ductal Carcinoma In Situ (DCIS)   Not identified    Microcalcifications   Present in invasive carcinoma    Breast Biomarker Studies (pending)   ER/MI/HER2       BREAST TUMOR PROGNOSTIC PROFILE     Performed on block A2:  Test Description Result Prognostic Interpretation   Estrogen Receptor/ER  Primary Antibody: SP-1  Internal control: Positive   External control: Positive 90-95%  Staining Intensity: Strong  Milena Score*: 7 Positive   Progesterone Receptor/PgR  Primary Antibody:1E2  Internal control: Positive  External control: Positive 90-95%  Staining Intensity: Strong  Milena Score*: 7 Positive    HER2 by [facility]  Primary Antibody: 4B5 0 Negative      7/17/23 Akredo BRCAplus 8 genes: Negative-no clinically significant variants detected.       7/26/23 Surgical oncology consult note,  Dr. Rafi Howell:  New diagnosis of invasive ductal carcinoma of left breast. Grade 1, strongly ER/MI + and HER2 negative. Recommendation was RUBI directed lumpectomy in conjunction with a sentinel  lymph node biopsy and possible axillary dissection. This was the preferred procedure over mastectomy by the patient. EXAM:  Both breasts were examined in the sitting and supine position. There are no worrisome skin lesions, no nipple retraction and no nipple discharge. There are no dominant masses, axillary adenopathy or supraclavicular adenopathy on either side.   Stage I T1 N0 M0 grade 1 strongly ER/ME positive and HER2 negative left breast cancer. Good candidate for breast conserving surgery. Based on tumor location she is not a good candidate for IORT. She was recommended for post op whole breast RT. Discussion also about adjuvant hormonal therapy. All adjuvant therapies will be determined at her post op visit. 8/15/23 Dr. Zuleyma Odonnell, OP report:   Preop Diagnosis:  Malignant neoplasm of upper-inner quadrant of left breast in female, estrogen receptor positive ([address]) [C50.212, Z17.0]     Post-Op Diagnosis Codes:     * Malignant neoplasm of upper-inner quadrant of left breast in female, estrogen receptor positive ([address]) [C50.212, Z17.0]     Procedure(s):  Left - BREAST RUBI  DIRECTED LUMPECTOMY  Left - LYMPHATIC MAPPING WITH BLUE AND RADIOACTIVE DYES.  SENTINEL LYMPH NODE BIOPSY (INJECT AT 1200 BY DR TORRES IN THE OR)  Left - POSSIBLE AXILLARY DISSECTION     8/15/23 Tissue Exam:       Synoptic Checklist    INVASIVE CARCINOMA OF THE BREAST: Resection  8th Edition - Protocol posted: 9/21/2022  INVASIVE CARCINOMA OF THE BREAST: COMPLETE EXCISION - All Specimens       SPECIMEN   Procedure   Excision (less than total mastectomy)    Specimen Laterality   Left    TUMOR   Tumor Site   Lower inner quadrant        Clock position        8 o'clock    Tumor Site   Distance from nipple (Centimeters): 7 cm   Histologic Type   Invasive carcinoma of no special type (ductal)    Histologic Grade (Letart Histologic Score)       Glandular (Acinar) / Tubular Differentiation   Score 2    Nuclear Pleomorphism   Score 2    Mitotic Rate   Score 1    Overall Grade   Grade 1 (scores of 3, 4 or 5)    Tumor Size   Greatest dimension of largest invasive focus (Millimeters): 11 mm   Tumor Focality   Single focus of invasive carcinoma    Ductal Carcinoma In Situ (DCIS)   Present        Negative for extensive intraductal component (EIC)    Size (Extent) of DCIS   Estimated size (extent) of DCIS is at least (Millimeters): 12 mm   Number of Blocks with DCIS   3    Number of Blocks Examined   34    Architectural Patterns   Cribriform        Solid    Nuclear Grade   Grade II (intermediate)    Necrosis   Not identified    Lobular Carcinoma In Situ (LCIS)   Not identified    Lymphovascular Invasion   Not identified    Dermal Lymphovascular Invasion   Not identified    Microcalcifications   Present in DCIS        Present in invasive carcinoma        Present in non-neoplastic tissue    Treatment Effect in the Breast   No known presurgical therapy    MARGINS   Margin Status for Invasive Carcinoma   All margins negative for invasive carcinoma    Distance from Invasive Carcinoma to Closest Margin   Greater than: 2 (nearest inferior and medial lumpectomy margins; final inferior/specimen D and medial/specimen C margins negative for invasive carcinoma) mm   Closest Margin(s) to Invasive Carcinoma   Inferior        Medial    Margin Status for DCIS   All margins negative for DCIS    Distance from DCIS to Closest Margin   Greater than: 3 (nearest medial lumpectomy margin; final medial margin/specimen C negative for DCIS) mm   Closest Margin(s) to DCIS   Medial    REGIONAL LYMPH NODES   Regional Lymph Node Status   All regional lymph nodes negative for tumor    Total Number of Lymph Nodes Examined (sentinel and non-sentinel)   1    Number of [place] Nodes Examined   1    PATHOLOGIC STAGE CLASSIFICATION (pTNM, AJCC 8th Edition)   Reporting of pT, pN, and (when applicable) pM categories is based on information available to the pathologist at the time the report is issued. As per the AJCC (Chapter 1, 8th Ed.) it is the managing physician's responsibility to establish the final pathologic stage based upon all pertinent information, including but potentially not limited to this pathology report. pT Category   pT1c    Regional Lymph Nodes Modifier   (sn): [place] node(s) evaluated.     pN Category   pN0    ADDITIONAL FINDINGS   Additional Findings   Atypical ductal hyperplasia.            Estrogen Receptor (ER) Status   Positive (greater than 10% of cells demonstrate nuclear positivity)    Percentage of Cells with Nuclear Positivity   90-95 %           Progesterone Receptor (PgR) Status   Positive    Percentage of Cells with Nuclear Positivity   90-95 %           HER2 (by immunohistochemistry)   Negative (Score 0)    Testing Performed on Case Number   [id number]    .            23 Surgical oncology Dr. Arline Zaman:   Ruchi Cuenca op Breast follow up visit:  wounds healing well. Pathology demonstrated tumor was completely excised, sentinel lymph node was negative. She has appointments with medical and radiation oncology. She will f/u back in Surgical oncology office in 3 months and then at 6 month intervals.     23 Medical Oncology, Dr. Kenan Erwin:   Based on her age, stage, grade as well as tumor phenotype, adjuvant chemotherapy is not indicated. I recommended her to have adjuvant hormonal therapy with anastrozole for 5 years     Up comin23 Dr. João Brown PCP  23 Surgical oncology : BERYL Wooten Founds    Today, the patient feels well overall. She notes mild SOB, some tenderness in the left axilla, and adequate ROM. She is still healing from one of her incisions. Oncology History   Malignant neoplasm of upper-inner quadrant of left breast in female, estrogen receptor positive ([address])   7/10/2023 Biopsy    Left breast ultrasound-guided biopsy  8 o'clock, 7 cm from nipple (RUBI)  Invasive mammary carcinoma of no special type (ductal)  Grade 1  ER 90, IL 90, HER2 0    Concordant. Malignancy appears unifocal; masses cover 7 mm on US. Left axilla US negative. Right breast clear. 2023 Genetic Testing    A total of 36 genes were evaluated, including: KENY, BRCA1, BRCA2, CDH1, CHEK2, PALB2, PTEN, STK11, TP53  Negative result.  No pathogenic sequence variants identified  Amb     8/15/2023 Surgery    Left breast RUBI  directed lumpectomy with SLN biopsy  Invasive mammary carcinoma of no special type (ductal)  Grade 1  1.1 cm  Margins negative  0/1 Lymph nodes  Anatomic/Prognostic Stage IA     8/15/2023 -  Cancer Staged    Staging form: Breast, AJCC 8th Edition  - Pathologic stage from 8/15/2023: Stage IA (pT1c, pN0(sn), cM0, G1, ER+, PA+, HER2-) - Signed by Shahriar Plummer MD on 8/28/2023  Stage prefix: Initial diagnosis  Method of lymph node assessment: [place] lymph node biopsy  Multigene prognostic tests performed: None  Histologic grading system: 3 grade system         Historical Information   Past Medical History:   Diagnosis Date   • Abdominal pain    • Asthma    • Back pain     occas due to "disc problem"   • Balance problems     "right side"   • Brain cancer ([address])    • Cholelithiasis     lap aviva today 3/10/2021   • Colon polyp    • Cracked tooth     3   • Depression    • Diabetes mellitus ([address])    • Difficulty swallowing     "food feels like stuck sometimes'   • Exercise involving walking     steps and cleaning   • Fatty liver    • GERD (gastroesophageal reflux disease)    • Hiatal hernia    • History of pneumonia    • History of stomach ulcers    • Hyperlipidemia    • Hypertension    • Obesity    • Pneumonia    • Refusal of blood transfusions as patient is Rastafari    • Risk for falls    • Stroke [facility]) 2013   • Uses Indonesian as primary spoken language    • Wears glasses      Past Surgical History:   Procedure Laterality Date   • BREAST LUMPECTOMY Left 08/15/2023    Procedure: BREAST RUBI  DIRECTED LUMPECTOMY;  Surgeon: Shahriar Plummer MD;  [facility];  Service: Surgical Oncology   • CHOLECYSTECTOMY     • COLONOSCOPY  02/09/2021    4 polyps tubular adenomas by Dr. Zoë Marin   • EGD  04/20/2021    nonobstructing Schatzki's ring dilated with 47 Chinese Hernandez, sliding hiatal hernia otherwise normal.  Biopsies stomach  negative for H. pylori biopsy esophagus negative for eosinophilic esophagitis by Dr. Monisha Mendes   • EGD AND COLONOSCOPY 02/09/2021    3 YEAR RECOMMENDED = COLONO   • LYMPH NODE BIOPSY Left 08/15/2023    Procedure: LYMPHATIC MAPPING WITH BLUE AND RADIOACTIVE DYES, SENTINEL LYMPH NODE BIOPSY (INJECT AT 1200 BY DR TORRES IN THE OR); Surgeon: Shahriar Plummer MD;  Location: [facility];  Service: Surgical Oncology   • GA LAPAROSCOPY SURG CHOLECYSTECTOMY N/A 03/10/2021    Procedure: CHOLECYSTECTOMY LAPAROSCOPIC W/ ROBOTICS;  Surgeon: Rosio Polanco MD;  Location: [facility];  Service: General   • ROTATOR CUFF REPAIR Bilateral     screws implanted"   • UPPER GASTROINTESTINAL ENDOSCOPY  02/09/2021    4 cm hiatal hernia without Alejandro lesions. Possibly shortened esophagus.   Biopsy of the stomach negative for H. pylori, biopsy of the EG junction negative for Kaplan's, biopsied duodenum negative for celiac by Dr. Zoë Marin   • Nikhil Beltran Left 07/10/2023   • [address] THYROID BIOPSY  03/07/2023     Family History   Problem Relation Age of Onset   • Diabetes Mother    • Hypertension Mother    • Hypertension Father    • No Known Problems Sister    • Colon cancer Brother 71   • No Known Problems Son    • No Known Problems Son    • No Known Problems Daughter    • No Known Problems Daughter    • No Known Problems Daughter    • No Known Problems Maternal Grandmother    • No Known Problems Maternal Grandfather    • No Known Problems Paternal Grandmother    • No Known Problems Paternal Grandfather    • Breast cancer Other      Social History   Social History     Substance and Sexual Activity   Alcohol Use Never     Social History     Substance and Sexual Activity   Drug Use Never     Social History     Tobacco Use   Smoking Status Never   Smokeless Tobacco Never     Meds/Allergies     Current Outpatient Medications:   •  albuterol (2.5 mg/3 mL) 0.083 % nebulizer solution, Take 1 vial (2.5 mg total) by nebulization every 6 (six) hours as needed for wheezing or shortness of breath, Disp: 60 vial, Rfl: 5  •  albuterol (PROVENTIL HFA,VENTOLIN HFA) 90 mcg/act inhaler, Inhale 2 puffs every 6 (six) hours as needed for wheezing, Disp: 18 g, Rfl: 2  •  aspirin 81 mg chewable tablet, Chew 1 tablet (81 mg total) daily, Disp: 90 tablet, Rfl: 1  •  atorvastatin (LIPITOR) 20 mg tablet, Take 1 tablet (20 mg total) by mouth daily, Disp: 90 tablet, Rfl: 1  •  budesonide-formoterol (SYMBICORT) 160-4.5 mcg/act inhaler, Inhale 2 puffs 2 (two) times a day as needed (1) Rinse mouth after use., Disp: 10.2 g, Rfl: 3  •  clonazePAM (KlonoPIN) 0.125 mg disintegrating tablet, Take 1 tablet daily as needed for anxiety, Disp: 90 tablet, Rfl: 0  •  dicyclomine (BENTYL) 20 mg tablet, Take 1 tablet (20 mg total) by mouth 2 (two) times a day, Disp: 60 tablet, Rfl: 0  •  Empagliflozin-metFORMIN HCl (Synjardy) 12.5-1000 MG TABS, Take by mouth Take 2 tablets by mouth daily, Disp: , Rfl:   •  ezetimibe (ZETIA) 10 mg tablet, Take 1 tablet (10 mg total) by mouth daily, Disp: 90 tablet, Rfl: 1  •  furosemide (LASIX) 20 mg tablet, TAKE 1 TABLET (20 MG TOTAL) BY MOUTH DAILY AS NEEDED FOR EDEMA, Disp: 90 tablet, Rfl: 0  •  Insulin Aspart (NovoLOG) 100 units/mL injection, Inject under the skin Take 6 units for breakfast /7 unit for lunch/10 units at night, Disp: , Rfl:   •  insulin degludec Lugosaurabh Pacheco FlexTouch) 100 units/mL injection pen, Inject 20 Units under the skin daily, Disp: 18 mL, Rfl: 1  •  Insulin Pen Needle 31G X 4 MM MISC, Use 4 (four) times a day, Disp: 100 each, Rfl: 3  •  levothyroxine 150 mcg tablet, Take 1 tablet (150 mcg total) by mouth daily (Patient taking differently: Take 120 mcg by mouth daily), Disp: 90 tablet, Rfl: 1  •  losartan (COZAAR) 100 MG tablet, Take 1 tablet (100 mg total) by mouth daily, Disp: 90 tablet, Rfl: 1  •  metoprolol succinate (TOPROL-XL) 50 mg 24 hr tablet, TAKE 1 TABLET EVERY DAY, Disp: 90 tablet, Rfl: 0  •  montelukast (SINGULAIR) 10 mg tablet, Take 1 tablet (10 mg total) by mouth daily, Disp: 90 tablet, Rfl: 1  •  omeprazole (PriLOSEC) 20 mg delayed release capsule, TAKE 1 CAPSULE EVERY DAY, Disp: 90 capsule, Rfl: 0  •  OneTouch Delica Lancets 13Y MISC, Check blood sugars twice daily. Please substitute with appropriate alternative as covered by patient's insurance. Dx: E11.65, Disp: 200 each, Rfl: 3  •  VITAMIN D PO, Take by mouth once a week, Disp: , Rfl:   •  anastrozole (ARIMIDEX) 1 mg tablet, Take 1 tablet (1 mg total) by mouth daily (Patient not taking: Reported on 2023), Disp: 90 tablet, Rfl: 1  •  insulin lispro (HumaLOG KwikPen) 100 units/mL injection pen, Inject 7 Units under the skin 3 (three) times a day with meals (Patient not taking: Reported on 2023), Disp: 15 mL, Rfl: 0  •  naloxone (NARCAN) 4 mg/0.1 mL nasal spray, Administer 1 spray into a nostril. If no response after 2-3 minutes, give another dose in the other nostril using a new spray. (Patient not taking: Reported on 2023), Disp: 1 each, Rfl: 1  •  oxyCODONE-acetaminophen (Percocet) 5-325 mg per tablet, Take 1 tablet by mouth every 6 (six) hours as needed for moderate pain Max Daily Amount: 4 tablets (Patient not taking: Reported on 2023), Disp: 6 tablet, Rfl: 0  Allergies   Allergen Reactions   • Etodolac Shortness Of Breath   • Iodinated Contrast Media Shortness Of Breath and Wheezing   • Simvastatin Other (See Comments)     elevated liver function    • Latex Rash   • Morphine Palpitations   • Penicillins Rash       Pathology:  See above.     Review of Systems  Refer to nursing note    OBJECTIVE:   /69 (BP Location: Right arm, Patient Position: Sitting, Cuff Size: Standard)   Pulse 77   Temp 97.7 °F (36.5 °C) (Temporal)   Resp 16   Ht 5' 2" (1.575 m)   Wt 88.5 kg (195 lb 1.7 oz)   SpO2 98%   BMI 35.69 kg/m²   Pain Assessment:  0  Performance Status: ECO - Asymptomatic    Physical Exam  General Appearance:  Alert, cooperative, no distress, appears stated age  Breast Exam:  Left  Breast: Surgical incisions noted in the breast and axilla. Incision(s) clean/dry/intact with surgical glue still present. Palpable change at surgical sites. No edema. No suspicious abnormalities in the breast, axilla, or supraclavicular region. Right Breast: Benign exam. No palpable abnormalities in the breast, axilla, or supraclavicular region  Exam chaperoned by female nursing staff. Lungs: Respirations unlabored, no cyanosis, able to speak in complete sentences without dyspnea. Skin: No generalized rash or dermatitis  Neurologic: ANOx3, speech and cognition intact. Portions of the record may have been created with voice recognition software. Occasional wrong word or "sound a like" substitutions may have occurred due to the inherent limitations of voice recognition software. Read the chart carefully and recognize, using context, where substitutions have occurred.

## 2023-09-01 NOTE — PROGRESS NOTES
Christine Swain 1955 is a 76 y.o. female referred to radiation oncology by Dr. Liss Arroyo  to discuss radiation therapy for newly diagnosed invasive ductal carcinoma of left breast, measuring approximately 7 mm, grade 1 and strongly ER/VA +, HER 2 -. Left Breast mass was found on routine screening mammogram.  Family history positive for paternal cousin with breast cancer. 6/20/23 Screening Mammogram:   FINDINGS:   LEFT  A) MASS: There is an irregularly shaped mass seen in the lower inner quadrant of the left breast in the posterior depth. Right  There are no suspicious masses, grouped microcalcifications or areas of unexplained architectural distortion. The skin and nipple areolar complex are unremarkable. There are scattered calcifications present. IMPRESSION:  Additional imaging required. A breast health care nurse from our facility will be contacting the patient regarding the need for additional imaging. ASSESSMENT/BI-RADS CATEGORY:  Left: 0 - Incomplete: Needs Additional Imaging Evaluation  Right: 2 - Benign  Overall: 0 - Incomplete: Needs Additional Imaging Evaluation     RECOMMENDATION:       - Diagnostic mammogram at the current time for the left breast.       - Ultrasound at the current time for the left breast.       - Routine screening mammogram in 1 year for the right breast.    7/10/23 Mammo diagnostic left w 3d & cad with US breast left limited (diagnostic)  FINDINGS:   LEFT  A) MASS  Mammo diagnostic left w 3d & cad: There is an irregularly shaped mass with microlobulated margins seen in the lower inner quadrant of the left breast in the posterior depth. The mass correlates with the prior mammogram finding. Compared to the previous study, there are no significant changes. US breast left limited (diagnostic):  There is a 7 mm x 5 mm x 5 mm irregularly shaped, heterogeneous mass with indistinct margins with shadowing seen in the lower inner quadrant of the left breast at 8 o'clock in the posterior depth, 7 cm from the nipple. The mass correlates with the prior mammogram finding. Ultrasound-guided core biopsy recommended. Evaluation of the axilla confirms no pathologic lymphadenopathy.    IMPRESSION:  Ultrasound-guided core biopsy recommended for moderately suspicious mass 8:00 left breast.   ASSESSMENT/BI-RADS CATEGORY:  Left: 4B - Moderate Suspicion for Malignancy  Overall: 4 - Suspicious   RECOMMENDATION:       - Ultrasound-guided breast biopsy for the left breast.    7/10/23 US guided biopsy left breast  7/10/23 Tissue Exam:   Synoptic Checklist   INVASIVE CARCINOMA OF THE BREAST: Biopsy  Protocol posted: 9/21/2022  INVASIVE CARCINOMA OF THE BREAST: BIOPSY - A  SPECIMEN   Procedure  Needle biopsy    Specimen Laterality  Left    TUMOR   Tumor Site  Clock position      8 o'clock    Tumor Site  Distance from nipple (Centimeters): 7 cm   Histologic Type  Invasive carcinoma of no special type (ductal)    Histologic Grade (Zoe Histologic Score)     Glandular (Acinar) / Tubular Differentiation  Score 3    Nuclear Pleomorphism  Score 1    Mitotic Rate  Score 1    Overall Grade  Grade 1 (scores of 3, 4 or 5)    Tumor Size  Greatest dimension of largest invasive focus (Millimeters): 11 mm   Ductal Carcinoma In Situ (DCIS)  Not identified    Microcalcifications  Present in invasive carcinoma    Breast Biomarker Studies (pending)  ER/TX/HER2       BREAST TUMOR PROGNOSTIC PROFILE     Performed on block A2:  Test Description Result Prognostic Interpretation   Estrogen Receptor/ER  Primary Antibody: SP-1  Internal control: Positive   External control: Positive 90-95%  Staining Intensity: Strong  Milena Score*: 7 Positive   Progesterone Receptor/PgR  Primary Antibody:1E2  Internal control: Positive  External control: Positive 90-95%  Staining Intensity: Strong  Milena Score*: 7 Positive    HER2 by IHC   Primary Antibody: 4B5 0 Negative      ER/PgR Interpretation   Negative or < 1% Negative   >=1% Positive           HER2 IHC Interpretation   Staining Pattern Score Assessment   No observable staining, or membrane staining that is incomplete and is faint/barely perceptible in ?10% of invasive tumor cells. 0 Negative   Incomplete membrane staining that is faint/barely perceptible in >10% of invasive tumor cells. 1+ Negative   Weak to moderate complete membrane staining observed in >10% of tumor cells. 2+ Weakly Positive / Equivocal   Homogeneous, dark, circumferential (chicken wire) pattern in >10% of invasive tumor cells. 3+ Positive       7/17/23 [employer] BRCAplus 8 genes: Negative-no clinically significant variants detected. 7/26/23 Surgical oncology consult note,  Dr. Crista Piper:  New diagnosis of invasive ductal carcinoma of left breast. Grade 1, strongly ER/OR + and HER2 negative. Recommendation was RUBI directed lumpectomy in conjunction with a sentinel  lymph node biopsy and possible axillary dissection. This was the preferred procedure over mastectomy by the patient. EXAM:  Both breasts were examined in the sitting and supine position. There are no worrisome skin lesions, no nipple retraction and no nipple discharge. There are no dominant masses, axillary adenopathy or supraclavicular adenopathy on either side. Stage I T1 N0 M0 grade 1 strongly ER/OR positive and HER2 negative left breast cancer. Good candidate for breast conserving surgery. Based on tumor location she is not a good candidate for IORT. She was recommended for post op whole breast RT. Discussion also about adjuvant hormonal therapy. All adjuvant therapies will be determined at her post op visit.       8/15/23 Dr. Crista Piper, OP report:   Preop Diagnosis:  Malignant neoplasm of upper-inner quadrant of left breast in female, estrogen receptor positive ([address]) [C50.212, Z17.0]     Post-Op Diagnosis Codes:     * Malignant neoplasm of upper-inner quadrant of left breast in female, estrogen receptor positive ([address]) [C50.212, Z17.0] Procedure(s):  Left - BREAST RUBI  DIRECTED LUMPECTOMY  Left - LYMPHATIC MAPPING WITH BLUE AND RADIOACTIVE DYES.  SENTINEL LYMPH NODE BIOPSY (INJECT AT 1200 BY DR TORRES IN THE OR)  Left - POSSIBLE AXILLARY DISSECTION    8/15/23 Tissue Exam:   Synoptic Checklist   INVASIVE CARCINOMA OF THE BREAST: Resection  8th Edition - Protocol posted: 9/21/2022  INVASIVE CARCINOMA OF THE BREAST: COMPLETE EXCISION - All Specimens  SPECIMEN   Procedure  Excision (less than total mastectomy)    Specimen Laterality  Left    TUMOR   Tumor Site  Lower inner quadrant      Clock position      8 o'clock    Tumor Site  Distance from nipple (Centimeters): 7 cm   Histologic Type  Invasive carcinoma of no special type (ductal)    Histologic Grade ([place] Histologic Score)     Glandular (Acinar) / Tubular Differentiation  Score 2    Nuclear Pleomorphism  Score 2    Mitotic Rate  Score 1    Overall Grade  Grade 1 (scores of 3, 4 or 5)    Tumor Size  Greatest dimension of largest invasive focus (Millimeters): 11 mm   Tumor Focality  Single focus of invasive carcinoma    Ductal Carcinoma In Situ (DCIS)  Present      Negative for extensive intraductal component (EIC)    Size (Extent) of DCIS  Estimated size (extent) of DCIS is at least (Millimeters): 12 mm   Number of Blocks with DCIS  3    Number of Blocks Examined  34    Architectural Patterns  Cribriform      Solid    Nuclear Grade  Grade II (intermediate)    Necrosis  Not identified    Lobular Carcinoma In Situ (LCIS)  Not identified    Lymphovascular Invasion  Not identified    Dermal Lymphovascular Invasion  Not identified    Microcalcifications  Present in DCIS      Present in invasive carcinoma      Present in non-neoplastic tissue    Treatment Effect in the Breast  No known presurgical therapy    MARGINS   Margin Status for Invasive Carcinoma  All margins negative for invasive carcinoma    Distance from Invasive Carcinoma to Closest Margin  Greater than: 2 (nearest inferior and medial lumpectomy margins; final inferior/specimen D and medial/specimen C margins negative for invasive carcinoma) mm   Closest Margin(s) to Invasive Carcinoma  Inferior      Medial    Margin Status for DCIS  All margins negative for DCIS    Distance from DCIS to Closest Margin  Greater than: 3 (nearest medial lumpectomy margin; final medial margin/specimen C negative for DCIS) mm   Closest Margin(s) to DCIS  Medial    REGIONAL LYMPH NODES   Regional Lymph Node Status  All regional lymph nodes negative for tumor    Total Number of Lymph Nodes Examined (sentinel and non-sentinel)  1    Number of [place] Nodes Examined  1    PATHOLOGIC STAGE CLASSIFICATION (pTNM, AJCC 8th Edition)   Reporting of pT, pN, and (when applicable) pM categories is based on information available to the pathologist at the time the report is issued. As per the AJCC (Chapter 1, 8th Ed.) it is the managing physician's responsibility to establish the final pathologic stage based upon all pertinent information, including but potentially not limited to this pathology report. pT Category  pT1c    Regional Lymph Nodes Modifier  (sn): [place] node(s) evaluated. pN Category  pN0    ADDITIONAL FINDINGS   Additional Findings  Atypical ductal hyperplasia. Estrogen Receptor (ER) Status  Positive (greater than 10% of cells demonstrate nuclear positivity)    Percentage of Cells with Nuclear Positivity  90-95 %        Progesterone Receptor (PgR) Status  Positive    Percentage of Cells with Nuclear Positivity  90-95 %        HER2 (by immunohistochemistry)  Negative (Score 0)    Testing Performed on Case Number  [id number]    .          8/28/23 Surgical oncology Dr. Monica Gómez:   Joseph Arboleda op Breast follow up visit:  wounds healing well. Pathology demonstrated tumor was completely excised, sentinel lymph node was negative. She has appointments with medical and radiation oncology.   She will f/u back in Surgical oncology office in 3 months and then at 6 month intervals. 23 Medical Oncology, Dr. Jaz Rivera:   Had extensive discussion regarding the diagnosis, staging, tumor phenotype, low-grade disease, good prognosis as well as treatment options. Based on her age, stage, grade as well as tumor phenotype, adjuvant chemotherapy is not indicated. Recommendation adjuvant hormonal therapy with anastrozole for 5 years. Up comin23 Dr. Kathy Byers PCP  23 Surgical oncology : BERYL Mendoza [place]            Oncology History   Malignant neoplasm of upper-inner quadrant of left breast in female, estrogen receptor positive ([address])   7/10/2023 Biopsy    Left breast ultrasound-guided biopsy  8 o'clock, 7 cm from nipple (RUBI)  Invasive mammary carcinoma of no special type (ductal)  Grade 1  ER 90, IN 90, HER2 0    Concordant. Malignancy appears unifocal; masses cover 7 mm on US. Left axilla US negative. Right breast clear. 2023 Genetic Testing    A total of 36 genes were evaluated, including: KENY, BRCA1, BRCA2, CDH1, CHEK2, PALB2, PTEN, STK11, TP53  Negative result. No pathogenic sequence variants identified  [facility]     8/15/2023 Surgery    Left breast RUBI  directed lumpectomy with SLN biopsy  Invasive mammary carcinoma of no special type (ductal)  Grade 1  1.1 cm  Margins negative  0/1 Lymph nodes  Anatomic/Prognostic Stage IA     8/15/2023 -  Cancer Staged    Staging form: Breast, AJCC 8th Edition  - Pathologic stage from 8/15/2023: Stage IA (pT1c, pN0(sn), cM0, G1, ER+, IN+, HER2-) - Signed by Carlos Higginbotham MD on 2023  Stage prefix: Initial diagnosis  Method of lymph node assessment: [place] lymph node biopsy  Multigene prognostic tests performed: None  Histologic grading system: 3 grade system           Review of Systems:  Review of Systems   Constitutional: Negative for appetite change, chills, fatigue, fever and unexpected weight change. HENT: Negative. Eyes: Negative. Negative for visual disturbance.         Reading glasses Respiratory: Positive for shortness of breath (at times with exertion). Negative for cough. H/o asthma   Cardiovascular: Negative. Negative for chest pain and leg swelling. Gastrointestinal: Negative for abdominal pain, constipation, diarrhea, nausea and vomiting. Endocrine: Positive for cold intolerance (reports tightness at times to left arm and coldness to arm). Negative for heat intolerance. Genitourinary: Positive for dysuria (at times with elevated sugars). Negative for difficulty urinating, hematuria, vaginal bleeding and vaginal discharge. States when sugars go up gets infections and she follows with PCP   Musculoskeletal: Positive for back pain (lower). Left axilla pain/tenderness with some sharp pains at times. Denies drainage, still has surgical glue to incisions-pink. Denies swelling to left arm. ROM adequate but at times tight to left arm. Skin: Positive for wound (Incisions to left breast still healing). Allergic/Immunologic: Negative. Neurological: Negative for dizziness, weakness, light-headedness, numbness and headaches. Hematological: Does not bruise/bleed easily. Psychiatric/Behavioral: Positive for sleep disturbance (on and off). Clinical Trial: no    OB/GYN History:  The patient underwent menarche at 15 years  Menopause Status Post  No LMP recorded. Patient is postmenopausal.  Menopause at 54 years. Menopause Reason natural  Hormone replacement therapy: no.  Years used n/a   4   Para 4   Age at first delivery being 15 years. Nursing: yes.    Birth control pills: no.  Years used n/a    Pregnancy test needed:  n/a    ONCOTYPE/MAMMOPRINT results no    PFT n/a    Prior Radiation no    Teaching NCI packet reviewed and given    MST completed    Implantable Devices (Port, Pacemaker, pain stimulator) no    Hip Replacement no        Health Maintenance   Topic Date Due   • PT PLAN OF CARE  2020   • COVID-19 Vaccine (4 - Booster for [facility] series) 01/26/2022   • Fall Risk  06/14/2023   • Urinary Incontinence Screening  06/14/2023   • Medicare Annual Wellness Visit (AWV)  06/14/2023   • BMI: Followup Plan  06/14/2023   • Diabetic Foot Exam  06/14/2023   • Depression Remission PHQ  07/19/2023   • Influenza Vaccine (1) 09/01/2023   • HEMOGLOBIN A1C  11/21/2023   • Colorectal Cancer Screening  02/09/2024   • Kidney Health Evaluation: Albumin/Creatinine Ratio  02/10/2024   • Breast Cancer Screening: Mammogram  06/20/2024   • Kidney Health Evaluation: GFR  08/21/2024   • BMI: Adult  09/01/2024   • Cervical Cancer Screening  12/23/2024   • DXA SCAN  07/18/2025   • DM Eye Exam  07/26/2025   • Hepatitis C Screening  Completed   • Osteoporosis Screening  Completed   • Pneumococcal Vaccine: 65+ Years  Completed   • HIB Vaccine  Aged Out   • IPV Vaccine  Aged Out   • Hepatitis A Vaccine  Aged Out   • Meningococcal ACWY Vaccine  Aged Out   • HPV Vaccine  Aged Out     Past Medical History:   Diagnosis Date   • Abdominal pain    • Asthma    • Back pain     occas due to "disc problem"   • Balance problems     "right side"   • Brain cancer ([address])    • Cholelithiasis     lap aviva today 3/10/2021   • Colon polyp    • Cracked tooth     3   • Depression    • Diabetes mellitus ([address])    • Difficulty swallowing     "food feels like stuck sometimes'   • Exercise involving walking     steps and cleaning   • Fatty liver    • GERD (gastroesophageal reflux disease)    • Hiatal hernia    • History of pneumonia    • History of stomach ulcers    • Hyperlipidemia    • Hypertension    • Obesity    • Pneumonia    • Refusal of blood transfusions as patient is Protestant    • Risk for falls    • Stroke [facility] 2013   • Uses [place] citizen as primary spoken language    • Wears glasses      Past Surgical History:   Procedure Laterality Date   • BREAST LUMPECTOMY Left 08/15/2023    Procedure: BREAST RUBI  DIRECTED LUMPECTOMY;  Surgeon: Radha Segundo MD;  [facility];  Service: Surgical Oncology   • CHOLECYSTECTOMY     • COLONOSCOPY  02/09/2021    4 polyps tubular adenomas by Dr. Nidhi Cui   • EGD  04/20/2021    nonobstructing Schatzki's ring dilated with 47 [place] Hernandez, sliding hiatal hernia otherwise normal.  Biopsies stomach  negative for H. pylori biopsy esophagus negative for eosinophilic esophagitis by Dr. Crescencio Bennett   • EGD AND COLONOSCOPY  02/09/2021    3 YEAR RECOMMENDED = COLONO   • LYMPH NODE BIOPSY Left 08/15/2023    Procedure: LYMPHATIC MAPPING WITH BLUE AND RADIOACTIVE DYES, SENTINEL LYMPH NODE BIOPSY (INJECT AT 1200 BY DR TORRES IN THE OR); Surgeon: Neel Brandon MD;  Location[facility];  Service: Surgical Oncology   • MD LAPAROSCOPY SURG CHOLECYSTECTOMY N/A 03/10/2021    Procedure: CHOLECYSTECTOMY LAPAROSCOPIC W/ ROBOTICS;  Surgeon: Anthony Varela MD;  Location: [facility];  Service: General   • ROTATOR CUFF REPAIR Bilateral     screws implanted"   • UPPER GASTROINTESTINAL ENDOSCOPY  02/09/2021    4 cm hiatal hernia without Alejandro lesions. Possibly shortened esophagus.   Biopsy of the stomach negative for H. pylori, biopsy of the EG junction negative for Kaplan's, biopsied duodenum negative for celiac by Dr. Nidhi Cui   • [place] Left 07/10/2023   • [address] THYROID BIOPSY  03/07/2023     Family History   Problem Relation Age of Onset   • Diabetes Mother    • Hypertension Mother    • Hypertension Father    • No Known Problems Sister    • Colon cancer Brother 71   • No Known Problems Son    • No Known Problems Son    • No Known Problems Daughter    • No Known Problems Daughter    • No Known Problems Daughter    • No Known Problems Maternal Grandmother    • No Known Problems Maternal Grandfather    • No Known Problems Paternal Grandmother    • No Known Problems Paternal Grandfather    • Breast cancer Other      Social History     Tobacco Use   • Smoking status: Never   • Smokeless tobacco: Never   Vaping Use   • Vaping Use: Never used Substance Use Topics   • Alcohol use: Never   • Drug use: Never        Current Outpatient Medications:   •  albuterol (2.5 mg/3 mL) 0.083 % nebulizer solution, Take 1 vial (2.5 mg total) by nebulization every 6 (six) hours as needed for wheezing or shortness of breath, Disp: 60 vial, Rfl: 5  •  albuterol (PROVENTIL HFA,VENTOLIN HFA) 90 mcg/act inhaler, Inhale 2 puffs every 6 (six) hours as needed for wheezing, Disp: 18 g, Rfl: 2  •  aspirin 81 mg chewable tablet, Chew 1 tablet (81 mg total) daily, Disp: 90 tablet, Rfl: 1  •  atorvastatin (LIPITOR) 20 mg tablet, Take 1 tablet (20 mg total) by mouth daily, Disp: 90 tablet, Rfl: 1  •  budesonide-formoterol (SYMBICORT) 160-4.5 mcg/act inhaler, Inhale 2 puffs 2 (two) times a day as needed (1) Rinse mouth after use., Disp: 10.2 g, Rfl: 3  •  clonazePAM (KlonoPIN) 0.125 mg disintegrating tablet, Take 1 tablet daily as needed for anxiety, Disp: 90 tablet, Rfl: 0  •  dicyclomine (BENTYL) 20 mg tablet, Take 1 tablet (20 mg total) by mouth 2 (two) times a day, Disp: 60 tablet, Rfl: 0  •  Empagliflozin-metFORMIN HCl (Synjardy) 12.5-1000 MG TABS, Take by mouth Take 2 tablets by mouth daily, Disp: , Rfl:   •  ezetimibe (ZETIA) 10 mg tablet, Take 1 tablet (10 mg total) by mouth daily, Disp: 90 tablet, Rfl: 1  •  furosemide (LASIX) 20 mg tablet, TAKE 1 TABLET (20 MG TOTAL) BY MOUTH DAILY AS NEEDED FOR EDEMA, Disp: 90 tablet, Rfl: 0  •  Insulin Aspart (NovoLOG) 100 units/mL injection, Inject under the skin Take 6 units for breakfast /7 unit for lunch/10 units at night, Disp: , Rfl:   •  insulin degludec [place] FlexTouch) 100 units/mL injection pen, Inject 20 Units under the skin daily, Disp: 18 mL, Rfl: 1  •  Insulin Pen Needle 31G X 4 MM MISC, Use 4 (four) times a day, Disp: 100 each, Rfl: 3  •  levothyroxine 150 mcg tablet, Take 1 tablet (150 mcg total) by mouth daily (Patient taking differently: Take 120 mcg by mouth daily), Disp: 90 tablet, Rfl: 1  •  losartan (COZAAR) 100 MG tablet, Take 1 tablet (100 mg total) by mouth daily, Disp: 90 tablet, Rfl: 1  •  metoprolol succinate (TOPROL-XL) 50 mg 24 hr tablet, TAKE 1 TABLET EVERY DAY, Disp: 90 tablet, Rfl: 0  •  montelukast (SINGULAIR) 10 mg tablet, Take 1 tablet (10 mg total) by mouth daily, Disp: 90 tablet, Rfl: 1  •  omeprazole (PriLOSEC) 20 mg delayed release capsule, TAKE 1 CAPSULE EVERY DAY, Disp: 90 capsule, Rfl: 0  •  OneTouch Delica Lancets 99F MISC, Check blood sugars twice daily. Please substitute with appropriate alternative as covered by patient's insurance. Dx: E11.65, Disp: 200 each, Rfl: 3  •  VITAMIN D PO, Take by mouth once a week, Disp: , Rfl:   •  anastrozole (ARIMIDEX) 1 mg tablet, Take 1 tablet (1 mg total) by mouth daily (Patient not taking: Reported on 9/1/2023), Disp: 90 tablet, Rfl: 1  •  insulin lispro (HumaLOG KwikPen) 100 units/mL injection pen, Inject 7 Units under the skin 3 (three) times a day with meals (Patient not taking: Reported on 1/19/2023), Disp: 15 mL, Rfl: 0  •  naloxone (NARCAN) 4 mg/0.1 mL nasal spray, Administer 1 spray into a nostril. If no response after 2-3 minutes, give another dose in the other nostril using a new spray.  (Patient not taking: Reported on 9/1/2023), Disp: 1 each, Rfl: 1  •  oxyCODONE-acetaminophen (Percocet) 5-325 mg per tablet, Take 1 tablet by mouth every 6 (six) hours as needed for moderate pain Max Daily Amount: 4 tablets (Patient not taking: Reported on 9/1/2023), Disp: 6 tablet, Rfl: 0  Allergies   Allergen Reactions   • Etodolac Shortness Of Breath   • Iodinated Contrast Media Shortness Of Breath and Wheezing   • Simvastatin Other (See Comments)     elevated liver function    • Latex Rash   • Morphine Palpitations   • Penicillins Rash      Vitals:    09/01/23 1139   BP: 169/69   BP Location: Right arm   Patient Position: Sitting   Cuff Size: Standard   Pulse: 77   Resp: 16   Temp: 97.7 °F (36.5 °C)   TempSrc: Temporal   SpO2: 98%   Weight: 88.5 kg (195 lb 1.7 oz) Height: 5' 2" (1.575 m)     Pain Score:   5

## 2023-09-06 DIAGNOSIS — E78.00 PURE HYPERCHOLESTEROLEMIA: ICD-10-CM

## 2023-09-06 RX ORDER — ATORVASTATIN CALCIUM 20 MG/1
20 TABLET, FILM COATED ORAL DAILY
Qty: 90 TABLET | Refills: 0 | Status: SHIPPED | OUTPATIENT
Start: 2023-09-06

## 2023-09-10 ENCOUNTER — HOSPITAL ENCOUNTER (OUTPATIENT)
Facility: HOSPITAL | Age: 68
Setting detail: OBSERVATION
Discharge: HOME | End: 2023-09-11
Attending: EMERGENCY MEDICINE | Admitting: INTERNAL MEDICINE
Payer: COMMERCIAL

## 2023-09-10 ENCOUNTER — APPOINTMENT (EMERGENCY)
Dept: RADIOLOGY | Facility: HOSPITAL | Age: 68
End: 2023-09-10
Payer: COMMERCIAL

## 2023-09-10 DIAGNOSIS — R06.02 SHORTNESS OF BREATH: ICD-10-CM

## 2023-09-10 DIAGNOSIS — R07.89 ATYPICAL CHEST PAIN: ICD-10-CM

## 2023-09-10 DIAGNOSIS — E11.9 TYPE 2 DIABETES MELLITUS WITHOUT COMPLICATION, WITHOUT LONG-TERM CURRENT USE OF INSULIN (HCC): ICD-10-CM

## 2023-09-10 DIAGNOSIS — E78.00 PURE HYPERCHOLESTEROLEMIA: ICD-10-CM

## 2023-09-10 DIAGNOSIS — U07.1 COVID-19: Primary | ICD-10-CM

## 2023-09-10 DIAGNOSIS — J45.20 MILD INTERMITTENT ASTHMA WITHOUT COMPLICATION: ICD-10-CM

## 2023-09-10 DIAGNOSIS — I10 ESSENTIAL HYPERTENSION: ICD-10-CM

## 2023-09-10 DIAGNOSIS — E03.9 ACQUIRED HYPOTHYROIDISM: ICD-10-CM

## 2023-09-10 LAB
2HR DELTA HS TROPONIN: 0 NG/L
4HR DELTA HS TROPONIN: 0 NG/L
ALBUMIN SERPL BCP-MCNC: 3.9 G/DL (ref 3.5–5)
ALP SERPL-CCNC: 89 U/L (ref 34–104)
ALT SERPL W P-5'-P-CCNC: 27 U/L (ref 7–52)
ANION GAP SERPL CALCULATED.3IONS-SCNC: 10 MMOL/L
AST SERPL W P-5'-P-CCNC: 30 U/L (ref 13–39)
ATRIAL RATE: 70 BPM
BASOPHILS # BLD AUTO: 0.02 THOUSANDS/ÂΜL (ref 0–0.1)
BASOPHILS NFR BLD AUTO: 0 % (ref 0–1)
BILIRUB SERPL-MCNC: 0.41 MG/DL (ref 0.2–1)
BNP SERPL-MCNC: 164 PG/ML (ref 0–100)
BUN SERPL-MCNC: 15 MG/DL (ref 5–25)
CALCIUM SERPL-MCNC: 9 MG/DL (ref 8.4–10.2)
CARDIAC TROPONIN I PNL SERPL HS: 3 NG/L
CHLORIDE SERPL-SCNC: 103 MMOL/L (ref 96–108)
CO2 SERPL-SCNC: 28 MMOL/L (ref 21–32)
CREAT SERPL-MCNC: 1.26 MG/DL (ref 0.6–1.3)
D DIMER PPP FEU-MCNC: 1.18 UG/ML FEU
EOSINOPHIL # BLD AUTO: 0 THOUSAND/ÂΜL (ref 0–0.61)
EOSINOPHIL NFR BLD AUTO: 0 % (ref 0–6)
ERYTHROCYTE [DISTWIDTH] IN BLOOD BY AUTOMATED COUNT: 13.5 % (ref 11.6–15.1)
FLUAV RNA RESP QL NAA+PROBE: NEGATIVE
FLUBV RNA RESP QL NAA+PROBE: NEGATIVE
GFR SERPL CREATININE-BSD FRML MDRD: 43 ML/MIN/1.73SQ M
GLUCOSE SERPL-MCNC: 157 MG/DL (ref 65–140)
GLUCOSE SERPL-MCNC: 176 MG/DL (ref 65–140)
GLUCOSE SERPL-MCNC: 235 MG/DL (ref 65–140)
HCT VFR BLD AUTO: 39.4 % (ref 34.8–46.1)
HGB BLD-MCNC: 12.6 G/DL (ref 11.5–15.4)
IMM GRANULOCYTES # BLD AUTO: 0.08 THOUSAND/UL (ref 0–0.2)
IMM GRANULOCYTES NFR BLD AUTO: 1 % (ref 0–2)
LYMPHOCYTES # BLD AUTO: 0.98 THOUSANDS/ÂΜL (ref 0.6–4.47)
LYMPHOCYTES NFR BLD AUTO: 12 % (ref 14–44)
MCH RBC QN AUTO: 28.5 PG (ref 26.8–34.3)
MCHC RBC AUTO-ENTMCNC: 32 G/DL (ref 31.4–37.4)
MCV RBC AUTO: 89 FL (ref 82–98)
MONOCYTES # BLD AUTO: 0.35 THOUSAND/ÂΜL (ref 0.17–1.22)
MONOCYTES NFR BLD AUTO: 4 % (ref 4–12)
NEUTROPHILS # BLD AUTO: 6.57 THOUSANDS/ÂΜL (ref 1.85–7.62)
NEUTS SEG NFR BLD AUTO: 83 % (ref 43–75)
NRBC BLD AUTO-RTO: 0 /100 WBCS
P AXIS: 20 DEGREES
PLATELET # BLD AUTO: 236 THOUSANDS/UL (ref 149–390)
PLATELET # BLD AUTO: 249 THOUSANDS/UL (ref 149–390)
PMV BLD AUTO: 10.3 FL (ref 8.9–12.7)
PMV BLD AUTO: 10.3 FL (ref 8.9–12.7)
POTASSIUM SERPL-SCNC: 4.2 MMOL/L (ref 3.5–5.3)
PR INTERVAL: 138 MS
PROT SERPL-MCNC: 6.6 G/DL (ref 6.4–8.4)
QRS AXIS: -13 DEGREES
QRSD INTERVAL: 90 MS
QT INTERVAL: 430 MS
QTC INTERVAL: 464 MS
RBC # BLD AUTO: 4.42 MILLION/UL (ref 3.81–5.12)
RSV RNA RESP QL NAA+PROBE: NEGATIVE
SARS-COV-2 RNA RESP QL NAA+PROBE: POSITIVE
SODIUM SERPL-SCNC: 141 MMOL/L (ref 135–147)
T WAVE AXIS: 40 DEGREES
VENTRICULAR RATE: 70 BPM
WBC # BLD AUTO: 8 THOUSAND/UL (ref 4.31–10.16)

## 2023-09-10 PROCEDURE — 99285 EMERGENCY DEPT VISIT HI MDM: CPT

## 2023-09-10 PROCEDURE — 36415 COLL VENOUS BLD VENIPUNCTURE: CPT

## 2023-09-10 PROCEDURE — 94640 AIRWAY INHALATION TREATMENT: CPT

## 2023-09-10 PROCEDURE — 85025 COMPLETE CBC W/AUTO DIFF WBC: CPT

## 2023-09-10 PROCEDURE — 99222 1ST HOSP IP/OBS MODERATE 55: CPT | Performed by: INTERNAL MEDICINE

## 2023-09-10 PROCEDURE — 94760 N-INVAS EAR/PLS OXIMETRY 1: CPT

## 2023-09-10 PROCEDURE — 93010 ELECTROCARDIOGRAM REPORT: CPT | Performed by: STUDENT IN AN ORGANIZED HEALTH CARE EDUCATION/TRAINING PROGRAM

## 2023-09-10 PROCEDURE — 85049 AUTOMATED PLATELET COUNT: CPT | Performed by: INTERNAL MEDICINE

## 2023-09-10 PROCEDURE — 83880 ASSAY OF NATRIURETIC PEPTIDE: CPT

## 2023-09-10 PROCEDURE — 80053 COMPREHEN METABOLIC PANEL: CPT

## 2023-09-10 PROCEDURE — 85379 FIBRIN DEGRADATION QUANT: CPT

## 2023-09-10 PROCEDURE — 82948 REAGENT STRIP/BLOOD GLUCOSE: CPT

## 2023-09-10 PROCEDURE — 71046 X-RAY EXAM CHEST 2 VIEWS: CPT

## 2023-09-10 PROCEDURE — 0241U HB NFCT DS VIR RESP RNA 4 TRGT: CPT

## 2023-09-10 PROCEDURE — 84484 ASSAY OF TROPONIN QUANT: CPT

## 2023-09-10 PROCEDURE — 93005 ELECTROCARDIOGRAM TRACING: CPT

## 2023-09-10 RX ORDER — INSULIN LISPRO 100 [IU]/ML
1-6 INJECTION, SOLUTION INTRAVENOUS; SUBCUTANEOUS
Status: DISCONTINUED | OUTPATIENT
Start: 2023-09-10 | End: 2023-09-11 | Stop reason: HOSPADM

## 2023-09-10 RX ORDER — BUDESONIDE AND FORMOTEROL FUMARATE DIHYDRATE 160; 4.5 UG/1; UG/1
2 AEROSOL RESPIRATORY (INHALATION) 2 TIMES DAILY PRN
Status: DISCONTINUED | OUTPATIENT
Start: 2023-09-10 | End: 2023-09-11 | Stop reason: HOSPADM

## 2023-09-10 RX ORDER — IPRATROPIUM BROMIDE AND ALBUTEROL SULFATE 2.5; .5 MG/3ML; MG/3ML
3 SOLUTION RESPIRATORY (INHALATION)
Status: DISCONTINUED | OUTPATIENT
Start: 2023-09-10 | End: 2023-09-11

## 2023-09-10 RX ORDER — LOSARTAN POTASSIUM 50 MG/1
100 TABLET ORAL DAILY
Status: DISCONTINUED | OUTPATIENT
Start: 2023-09-11 | End: 2023-09-11 | Stop reason: HOSPADM

## 2023-09-10 RX ORDER — SIMETHICONE 80 MG
80 TABLET,CHEWABLE ORAL 4 TIMES DAILY PRN
Status: DISCONTINUED | OUTPATIENT
Start: 2023-09-10 | End: 2023-09-11 | Stop reason: HOSPADM

## 2023-09-10 RX ORDER — ANASTROZOLE 1 MG/1
1 TABLET ORAL DAILY
Status: DISCONTINUED | OUTPATIENT
Start: 2023-09-11 | End: 2023-09-11 | Stop reason: HOSPADM

## 2023-09-10 RX ORDER — ATORVASTATIN CALCIUM 20 MG/1
20 TABLET, FILM COATED ORAL DAILY
Status: DISCONTINUED | OUTPATIENT
Start: 2023-09-11 | End: 2023-09-11 | Stop reason: HOSPADM

## 2023-09-10 RX ORDER — ALBUTEROL SULFATE 2.5 MG/3ML
2.5 SOLUTION RESPIRATORY (INHALATION) EVERY 6 HOURS PRN
Status: DISCONTINUED | OUTPATIENT
Start: 2023-09-10 | End: 2023-09-11 | Stop reason: HOSPADM

## 2023-09-10 RX ORDER — INSULIN LISPRO 100 [IU]/ML
7 INJECTION, SOLUTION INTRAVENOUS; SUBCUTANEOUS
Status: DISCONTINUED | OUTPATIENT
Start: 2023-09-10 | End: 2023-09-11 | Stop reason: HOSPADM

## 2023-09-10 RX ORDER — INSULIN GLARGINE 100 [IU]/ML
20 INJECTION, SOLUTION SUBCUTANEOUS
Status: DISCONTINUED | OUTPATIENT
Start: 2023-09-10 | End: 2023-09-11 | Stop reason: HOSPADM

## 2023-09-10 RX ORDER — METOPROLOL SUCCINATE 50 MG/1
50 TABLET, EXTENDED RELEASE ORAL DAILY
Status: DISCONTINUED | OUTPATIENT
Start: 2023-09-11 | End: 2023-09-11 | Stop reason: HOSPADM

## 2023-09-10 RX ORDER — ACETAMINOPHEN 325 MG/1
650 TABLET ORAL EVERY 6 HOURS PRN
Status: DISCONTINUED | OUTPATIENT
Start: 2023-09-10 | End: 2023-09-11 | Stop reason: HOSPADM

## 2023-09-10 RX ORDER — ASPIRIN 81 MG/1
81 TABLET, CHEWABLE ORAL DAILY
Status: DISCONTINUED | OUTPATIENT
Start: 2023-09-11 | End: 2023-09-11 | Stop reason: HOSPADM

## 2023-09-10 RX ORDER — ALBUTEROL SULFATE 90 UG/1
2 AEROSOL, METERED RESPIRATORY (INHALATION) EVERY 6 HOURS PRN
Status: DISCONTINUED | OUTPATIENT
Start: 2023-09-10 | End: 2023-09-11

## 2023-09-10 RX ORDER — METHYLPREDNISOLONE SODIUM SUCCINATE 40 MG/ML
40 INJECTION, POWDER, LYOPHILIZED, FOR SOLUTION INTRAMUSCULAR; INTRAVENOUS EVERY 8 HOURS SCHEDULED
Status: DISCONTINUED | OUTPATIENT
Start: 2023-09-10 | End: 2023-09-11 | Stop reason: HOSPADM

## 2023-09-10 RX ORDER — PANTOPRAZOLE SODIUM 40 MG/1
40 TABLET, DELAYED RELEASE ORAL
Status: DISCONTINUED | OUTPATIENT
Start: 2023-09-11 | End: 2023-09-11 | Stop reason: HOSPADM

## 2023-09-10 RX ORDER — ONDANSETRON 2 MG/ML
4 INJECTION INTRAMUSCULAR; INTRAVENOUS EVERY 6 HOURS PRN
Status: DISCONTINUED | OUTPATIENT
Start: 2023-09-10 | End: 2023-09-11 | Stop reason: HOSPADM

## 2023-09-10 RX ORDER — POLYETHYLENE GLYCOL 3350 17 G/17G
17 POWDER, FOR SOLUTION ORAL DAILY PRN
Status: DISCONTINUED | OUTPATIENT
Start: 2023-09-10 | End: 2023-09-11 | Stop reason: HOSPADM

## 2023-09-10 RX ORDER — CLONAZEPAM 0.5 MG/1
0.25 TABLET ORAL DAILY PRN
Status: DISCONTINUED | OUTPATIENT
Start: 2023-09-10 | End: 2023-09-11 | Stop reason: HOSPADM

## 2023-09-10 RX ORDER — HEPARIN SODIUM 5000 [USP'U]/ML
5000 INJECTION, SOLUTION INTRAVENOUS; SUBCUTANEOUS EVERY 8 HOURS SCHEDULED
Status: DISCONTINUED | OUTPATIENT
Start: 2023-09-10 | End: 2023-09-11 | Stop reason: HOSPADM

## 2023-09-10 RX ORDER — EZETIMIBE 10 MG/1
10 TABLET ORAL DAILY
Status: DISCONTINUED | OUTPATIENT
Start: 2023-09-11 | End: 2023-09-11 | Stop reason: HOSPADM

## 2023-09-10 RX ORDER — GUAIFENESIN/DEXTROMETHORPHAN 100-10MG/5
10 SYRUP ORAL EVERY 4 HOURS PRN
Status: DISCONTINUED | OUTPATIENT
Start: 2023-09-10 | End: 2023-09-11 | Stop reason: HOSPADM

## 2023-09-10 RX ORDER — MONTELUKAST SODIUM 10 MG/1
10 TABLET ORAL DAILY
Status: DISCONTINUED | OUTPATIENT
Start: 2023-09-11 | End: 2023-09-11 | Stop reason: HOSPADM

## 2023-09-10 RX ADMIN — INSULIN LISPRO 7 UNITS: 100 INJECTION, SOLUTION INTRAVENOUS; SUBCUTANEOUS at 16:59

## 2023-09-10 RX ADMIN — IPRATROPIUM BROMIDE AND ALBUTEROL SULFATE 3 ML: 2.5; .5 SOLUTION RESPIRATORY (INHALATION) at 14:24

## 2023-09-10 RX ADMIN — METHYLPREDNISOLONE SODIUM SUCCINATE 40 MG: 40 INJECTION, POWDER, FOR SOLUTION INTRAMUSCULAR; INTRAVENOUS at 17:00

## 2023-09-10 RX ADMIN — HEPARIN SODIUM 5000 UNITS: 5000 INJECTION INTRAVENOUS; SUBCUTANEOUS at 17:00

## 2023-09-10 RX ADMIN — INSULIN GLARGINE 20 UNITS: 100 INJECTION, SOLUTION SUBCUTANEOUS at 21:40

## 2023-09-10 RX ADMIN — INSULIN LISPRO 1 UNITS: 100 INJECTION, SOLUTION INTRAVENOUS; SUBCUTANEOUS at 16:59

## 2023-09-10 RX ADMIN — IPRATROPIUM BROMIDE AND ALBUTEROL SULFATE 3 ML: 2.5; .5 SOLUTION RESPIRATORY (INHALATION) at 20:26

## 2023-09-10 RX ADMIN — METHYLPREDNISOLONE SODIUM SUCCINATE 40 MG: 40 INJECTION, POWDER, FOR SOLUTION INTRAMUSCULAR; INTRAVENOUS at 21:40

## 2023-09-10 RX ADMIN — HEPARIN SODIUM 5000 UNITS: 5000 INJECTION INTRAVENOUS; SUBCUTANEOUS at 21:40

## 2023-09-10 RX ADMIN — REMDESIVIR 200 MG: 100 INJECTION, POWDER, LYOPHILIZED, FOR SOLUTION INTRAVENOUS at 17:01

## 2023-09-10 NOTE — H&P
[address] [facility]  H&P  Name: Gaudencio Teran 76 y.o. female I MRN: [id number]  Unit/Bed#: ED 08 I Date of Admission: 9/10/2023   Date of Service: 9/10/2023 I Hospital Day: 0      Assessment/Plan   * COVID-19  Assessment & Plan  Presented with shortness of breath, cough and wheezing  • Mild COVID pathway as below   • COVID19- positive on 9/10/2023  • Patient is currently on room air  o D-dimer elevated  • CXR official report pending   • Unable to get chest CTA due to contrast allergy. Ordered VQ scan  • Blood type and screen pending  • IV remdesivir due to immunocompromised condition in the given setting of cancer  • OOB TID; ambulation and mobilization strongly encouraged  • Self proning strongly encouraged  • Supportive care      Malignant neoplasm of upper-inner quadrant of left breast in female, estrogen receptor positive ([address])  Assessment & Plan  · Patient had history of lumpectomy and is on chemotherapy  · Continue anastrozole    Uncontrolled type 2 diabetes mellitus with hyperglycemia ([address])  Assessment & Plan  Lab Results   Component Value Date    HGBA1C 7.7 (H) 08/21/2023       No results for input(s): "POCGLU" in the last 72 hours.     Blood Sugar Average: Last 72 hrs:     · Hold home medication  · Lantus 20 units, lispro 7 units 3 times daily AC  · Sliding scale insulin, Accu-Chek  · Diabetic diet    Pure hypercholesterolemia  Assessment & Plan  · Continue prehospital Lipitor, ezetimibe    Mild intermittent asthma with acute exacerbation  Assessment & Plan  · Continue prehospital albuterol, Symbicort  · Added DuoNeb every 6h  · Since patient has wheezing, ordered Solu-Medrol IV 40 mg every 8  · Continue montelukast 10 mg daily    Essential hypertension  Assessment & Plan  · BP acceptable  · Continue prehospital Cozaar, Toprol-XL    Acquired hypothyroidism  Assessment & Plan  · Continue prehospital Synthroid 120 mcg daily         VTE Prophylaxis: Enoxaparin (Lovenox)  / sequential compression device   Code Status: Full code  POLST: There is no POLST form on file for this patient (pre-hospital)  Discussion with family: Discussed with patient, patient said she will call her     Anticipated Length of Stay:  Patient will be admitted on an Observation basis with an anticipated length of stay of less than 2 midnights. Justification for Hospital Stay: COVID infection    Total Time for Visit, including Counseling / Coordination of Care: 45 minutes. Greater than 50% of this total time spent on direct patient counseling and coordination of care. Chief Complaint:   Shortness of breath    History of Present Illness:    Kathya Mirza is a 76 y.o. female with PMH of type II DM, breast cancer who presents with shortness of breath, wheezing and dry cough since Thursday which has been progressively worse. It was associated with congestion and runny nose. She used her home inhaler without relief in symptoms. She took a 60 mg prednisone today. Patient had history of breast cancer and underwent lumpectomy and sentinel lymph node biopsy. Patient follows outpatient heme-onc who recommended hormonal therapy with anastrozole for 5 years. Review of Systems:    Review of Systems Patient was seen and examined at bedside. The patient has shortness of breath, wheezing and dry cough but denies any chest pain, palpitation, shortness of breath, N/V, abd pain.       Past Medical and Surgical History:     Past Medical History:   Diagnosis Date   • Abdominal pain    • Asthma    • Back pain     occas due to "disc problem"   • Balance problems     "right side"   • Breast cancer ([address])    • Cholelithiasis     lap aviva today 3/10/2021   • Colon polyp    • Cracked tooth     3   • Depression    • Diabetes mellitus ([address])    • Difficulty swallowing     "food feels like stuck sometimes'   • Exercise involving walking     steps and cleaning   • Fatty liver    • GERD (gastroesophageal reflux disease)    • Hiatal hernia    • History of pneumonia    • History of stomach ulcers    • Hyperlipidemia    • Hypertension    • Obesity    • Pneumonia    • Refusal of blood transfusions as patient is Restorationist    • Risk for falls    • Stroke [facility] 2013   • Uses Urdu as primary spoken language    • Wears glasses        Past Surgical History:   Procedure Laterality Date   • BREAST LUMPECTOMY Left 08/15/2023    Procedure: BREAST RUBI  DIRECTED LUMPECTOMY;  Surgeon: Jes Tee MD;  [facility]: [facility];  Service: Surgical Oncology   • CHOLECYSTECTOMY     • COLONOSCOPY  02/09/2021    4 polyps tubular adenomas by Dr. Earnest Powell   • EGD  04/20/2021    nonobstructing Schatzki's ring dilated with 47 [address] Hernandez, sliding hiatal hernia otherwise normal.  Biopsies stomach  negative for H. pylori biopsy esophagus negative for eosinophilic esophagitis by Dr. Marylu Valenzuela   • EGD AND COLONOSCOPY  02/09/2021    3 YEAR RECOMMENDED = COLONO   • LYMPH NODE BIOPSY Left 08/15/2023    Procedure: LYMPHATIC MAPPING WITH BLUE AND RADIOACTIVE DYES, SENTINEL LYMPH NODE BIOPSY (INJECT AT 1200 BY DR TORRES IN THE OR); Surgeon: Jes Tee MD;  Location: [facility];  Service: Surgical Oncology   • AZ LAPAROSCOPY SURG CHOLECYSTECTOMY N/A 03/10/2021    Procedure: CHOLECYSTECTOMY LAPAROSCOPIC W/ ROBOTICS;  Surgeon: Adarsh Jones MD;  Location: [facility];  Service: General   • ROTATOR CUFF REPAIR Bilateral     screws implanted"   • UPPER GASTROINTESTINAL ENDOSCOPY  02/09/2021    4 cm hiatal hernia without Alejandro lesions. Possibly shortened esophagus. Biopsy of the stomach negative for H. pylori, biopsy of the EG junction negative for Kaplan's, biopsied duodenum negative for celiac by Dr. Merari He Left 07/10/2023   • [address] THYROID BIOPSY  03/07/2023       Meds/Allergies:    Prior to Admission medications    Medication Sig Start Date End Date Taking?  Authorizing Provider   albuterol (2.5 mg/3 mL) 0.083 % nebulizer solution Take 1 vial (2.5 mg total) by nebulization every 6 (six) hours as needed for wheezing or shortness of breath 3/31/20   Eleanor Yoo MD   albuterol (PROVENTIL HFA,[facility]) 90 mcg/act inhaler Inhale 2 puffs every 6 (six) hours as needed for wheezing 2/6/23   Eleanor Yoo MD   anastrozole (ARIMIDEX) 1 mg tablet Take 1 tablet (1 mg total) by mouth daily  Patient not taking: Reported on 9/1/2023 9/1/23   Anthony Park MD   aspirin 81 mg chewable tablet Chew 1 tablet (81 mg total) daily 2/6/23   Eleanor Yoo MD   atorvastatin (LIPITOR) 20 mg tablet TAKE 1 TABLET (20 MG TOTAL) BY MOUTH DAILY 9/6/23   Eleanor Yoo MD   budesonide-formoterol [facility]) 160-4.5 mcg/act inhaler Inhale 2 puffs 2 (two) times a day as needed (1) Rinse mouth after use.  1/19/23   Eleanor Yoo MD   clonazePAM (KlonoPIN) 0.125 mg disintegrating tablet Take 1 tablet daily as needed for anxiety 3/1/23   Eleanor Yoo MD   dicyclomine (BENTYL) 20 mg tablet Take 1 tablet (20 mg total) by mouth 2 (two) times a day 9/9/22 9/1/23  Kimberly Nath DO   Empagliflozin-metFORMIN HCl (Synjardy) 12.5-1000 MG TABS Take by mouth Take 2 tablets by mouth daily    Historical Provider, MD   ezetimibe (ZETIA) 10 mg tablet Take 1 tablet (10 mg total) by mouth daily 2/6/23   Eleanor Yoo MD   furosemide (LASIX) 20 mg tablet TAKE 1 TABLET (20 MG TOTAL) BY MOUTH DAILY AS NEEDED FOR EDEMA 7/10/23   Eleanor Yoo MD   Insulin Aspart (NovoLOG) 100 units/mL injection Inject under the skin Take 6 units for breakfast /7 unit for lunch/10 units at night    Historical Provider, MD   insulin degludec [place] [facility] FlexTouch) 100 units/mL injection pen Inject 20 Units under the skin daily 2/6/23   Eleanor Yoo MD   insulin lispro (HumaLOG KwikPen) 100 units/mL injection pen Inject 7 Units under the skin 3 (three) times a day with meals  Patient not taking: Reported on 1/19/2023 11/3/22   Glenn Cuellar MD Arun   Insulin Pen Needle 31G X 4 MM MISC Use 4 (four) times a day 11/3/22   Oluw[place]isin Dilma Sanders MD   levothyroxine 150 mcg tablet Take 1 tablet (150 mcg total) by mouth daily  Patient taking differently: Take 120 mcg by mouth daily 2/6/23   Catherin Claude, MD   losartan (COZAAR) 100 MG tablet Take 1 tablet (100 mg total) by mouth daily 2/6/23 9/1/23  Catherin Claude, MD   metoprolol succinate (TOPROL-XL) 50 mg 24 hr tablet TAKE 1 TABLET EVERY DAY 7/10/23   Catherin Claude, MD   montelukast (SINGULAIR) 10 mg tablet Take 1 tablet (10 mg total) by mouth daily 2/6/23   Catherin Claude, MD   naloxone [facility]) 4 mg/0.1 mL nasal spray Administer 1 spray into a nostril. If no response after 2-3 minutes, give another dose in the other nostril using a new spray. Patient not taking: Reported on 9/1/2023 7/26/23 7/25/24  Marcha Cowden, MD   omeprazole (PriLOSEC) 20 mg delayed release capsule TAKE 1 CAPSULE EVERY DAY 7/10/23   Catherin Claude, MD   OneTouch Delica Lancets 28G MISC Check blood sugars twice daily. Please substitute with appropriate alternative as covered by patient's insurance. Dx: E11.65 6/14/22   Catherin Claude, MD   oxyCODONE-acetaminophen (Percocet) 5-325 mg per tablet Take 1 tablet by mouth every 6 (six) hours as needed for moderate pain Max Daily Amount: 4 tablets  Patient not taking: Reported on 9/1/2023 7/26/23   Marcha Cowden, MD   VITAMIN D PO Take by mouth once a week    Historical Provider, MD     I have reviewed home medications with patient personally. Allergies:    Allergies   Allergen Reactions   • Etodolac Shortness Of Breath   • Iodinated Contrast Media Shortness Of Breath and Wheezing   • Simvastatin Other (See Comments)     elevated liver function    • Latex Rash   • Morphine Palpitations   • Penicillins Rash       Social History:     Marital Status: /Civil Union   Occupation: Retired  Patient Pre-hospital Living Situation: Lives with  at home  Patient Pre-hospital Level of Mobility: Independent  Patient Pre-hospital Diet Restrictions: No restrictions  Substance Use History:   Social History     Substance and Sexual Activity   Alcohol Use Never     Social History     Tobacco Use   Smoking Status Never   Smokeless Tobacco Never     Social History     Substance and Sexual Activity   Drug Use Never       Family History:    Family History   Problem Relation Age of Onset   • Diabetes Mother    • Hypertension Mother    • Hypertension Father    • No Known Problems Sister    • Colon cancer Brother 71   • No Known Problems Son    • No Known Problems Son    • No Known Problems Daughter    • No Known Problems Daughter    • No Known Problems Daughter    • No Known Problems Maternal Grandmother    • No Known Problems Maternal Grandfather    • No Known Problems Paternal Grandmother    • No Known Problems Paternal Grandfather    • Breast cancer Other        Physical Exam:     Vitals:   Blood Pressure: 149/61 (09/10/23 1603)  Pulse: 64 (09/10/23 1603)  Temperature: 98.2 °F (36.8 °C) (09/10/23 1332)  Temp Source: Oral (09/10/23 1332)  Respirations: 15 (09/10/23 1603)  Weight - Scale: 89.2 kg (196 lb 9.6 oz) (09/10/23 1332)  SpO2: 95 % (09/10/23 1603)    Physical Exam    General: no acute distress  HEENT: NC/AT, PERRL, EOM - normal  Neck: Supple  Pulm/Chest: Normal chest wall expansion, diminished breath sounds on both side, bilateral diffuse wheezing  CVS: normal S1&S2, capillary refill <2s  Abd: soft, non tender, non distended, bowel sounds +  MSK: move all 4 extremities spontaneously  Skin: warm  CNS: no acute focal neuro deficit      Additional Data:     Lab Results: I have personally reviewed pertinent reports.       Results from last 7 days   Lab Units 09/10/23  1409   WBC Thousand/uL 8.00   HEMOGLOBIN g/dL 12.6   HEMATOCRIT % 39.4   PLATELETS Thousands/uL 249   NEUTROS PCT % 83*   LYMPHS PCT % 12*   MONOS PCT % 4   EOS PCT % 0     Results from last 7 days   Lab Units 09/10/23  1409   SODIUM mmol/L 141   POTASSIUM mmol/L 4.2   CHLORIDE mmol/L 103   CO2 mmol/L 28   BUN mg/dL 15   CREATININE mg/dL 1.26   ANION GAP mmol/L 10   CALCIUM mg/dL 9.0   ALBUMIN g/dL 3.9   TOTAL BILIRUBIN mg/dL 0.41   ALK PHOS U/L 89   ALT U/L 27   AST U/L 30   GLUCOSE RANDOM mg/dL 157*                       Imaging: I have personally reviewed pertinent reports. XR chest 2 views    (Results Pending)   NM inpatient order    (Results Pending)       EKG, Pathology, and Other Studies Reviewed on Admission:   · EKG: NSR    Allscripts / Epic Records Reviewed: Yes     ** Please Note: This note has been constructed using a voice recognition system.  **

## 2023-09-10 NOTE — PLAN OF CARE
Problem: PAIN - ADULT  Goal: Verbalizes/displays adequate comfort level or baseline comfort level  Description: Interventions:  - Encourage patient to monitor pain and request assistance  - Assess pain using appropriate pain scale  - Administer analgesics based on type and severity of pain and evaluate response  - Implement non-pharmacological measures as appropriate and evaluate response  - Consider cultural and social influences on pain and pain management  - Notify physician/advanced practitioner if interventions unsuccessful or patient reports new pain  Outcome: Progressing     Problem: INFECTION - ADULT  Goal: Absence or prevention of progression during hospitalization  Description: INTERVENTIONS:  - Assess and monitor for signs and symptoms of infection  - Monitor lab/diagnostic results  - Monitor all insertion sites, i.e. indwelling lines, tubes, and drains  - Monitor endotracheal if appropriate and nasal secretions for changes in amount and color  - [place] appropriate cooling/warming therapies per order  - Administer medications as ordered  - Instruct and encourage patient and family to use good hand hygiene technique  - Identify and instruct in appropriate isolation precautions for identified infection/condition  Outcome: Progressing  Goal: Absence of fever/infection during neutropenic period  Description: INTERVENTIONS:  - Monitor WBC    Outcome: Progressing     Problem: SAFETY ADULT  Goal: Patient will remain free of falls  Description: INTERVENTIONS:  - Educate patient/family on patient safety including physical limitations  - Instruct patient to call for assistance with activity   - Consult OT/PT to assist with strengthening/mobility   - Keep Call bell within reach  - Keep bed low and locked with side rails adjusted as appropriate  - Keep care items and personal belongings within reach  - Initiate and maintain comfort rounds  - Make Fall Risk Sign visible to staff  - Offer Toileting every 2 Hours, in advance of need  - Apply yellow socks and bracelet for high fall risk patients  - Consider moving patient to room near nurses station  Outcome: Progressing  Goal: Maintain or return to baseline ADL function  Description: INTERVENTIONS:  -  Assess patient's ability to carry out ADLs; assess patient's baseline for ADL function and identify physical deficits which impact ability to perform ADLs (bathing, care of mouth/teeth, toileting, grooming, dressing, etc.)  - Assess/evaluate cause of self-care deficits   - Assess range of motion  - Assess patient's mobility; develop plan if impaired  - Assess patient's need for assistive devices and provide as appropriate  - Encourage maximum independence but intervene and supervise when necessary  - Involve family in performance of ADLs  - Assess for home care needs following discharge   - Consider OT consult to assist with ADL evaluation and planning for discharge  - Provide patient education as appropriate  Outcome: Progressing  Goal: Maintains/Returns to pre admission functional level  Description: INTERVENTIONS:  - Perform BMAT or MOVE assessment daily.   - Set and communicate daily mobility goal to care team and patient/family/caregiver. - Collaborate with rehabilitation services on mobility goals if consulted  - Perform Range of Motion 2 times a day. - Reposition patient every 2 hours.   - Dangle patient 2 times a day  - Stand patient 2 times a day  - Ambulate patient 2 times a day  - Out of bed to chair 2 times a day   - Out of bed for meals 2 times a day  - Out of bed for toileting  - Record patient progress and toleration of activity level   Outcome: Progressing     Problem: DISCHARGE PLANNING  Goal: Discharge to home or other facility with appropriate resources  Description: INTERVENTIONS:  - Identify barriers to discharge w/patient and caregiver  - Arrange for needed discharge resources and transportation as appropriate  - Identify discharge learning needs (meds, wound care, etc.)  - Arrange for interpretive services to assist at discharge as needed  - Refer to Case Management Department for coordinating discharge planning if the patient needs post-hospital services based on physician/advanced practitioner order or complex needs related to functional status, cognitive ability, or social support system  Outcome: Progressing     Problem: Knowledge Deficit  Goal: Patient/family/caregiver demonstrates understanding of disease process, treatment plan, medications, and discharge instructions  Description: Complete learning assessment and assess knowledge base.   Interventions:  - Provide teaching at level of understanding  - Provide teaching via preferred learning methods  Outcome: Progressing

## 2023-09-10 NOTE — ASSESSMENT & PLAN NOTE
Lab Results   Component Value Date    HGBA1C 7.7 (H) 08/21/2023       No results for input(s): "POCGLU" in the last 72 hours.     Blood Sugar Average: Last 72 hrs:     · Hold home medication  · Lantus 20 units, lispro 7 units 3 times daily AC  · Sliding scale insulin, Accu-Chek  · Diabetic diet

## 2023-09-10 NOTE — ASSESSMENT & PLAN NOTE
Presented with shortness of breath, cough and wheezing  • Mild COVID pathway as below   • COVID19- positive on 9/10/2023  • Patient is currently on room air  o D-dimer elevated  • CXR official report pending   • Unable to get chest CTA due to contrast allergy.   Ordered VQ scan  • Blood type and screen pending  • IV remdesivir due to immunocompromised condition in the given setting of cancer  • OOB TID; ambulation and mobilization strongly encouraged  • Self proning strongly encouraged  • Supportive care

## 2023-09-10 NOTE — ASSESSMENT & PLAN NOTE
· Continue prehospital albuterol, Symbicort  · Added DuoNeb every 6h  · Since patient has wheezing, ordered Solu-Medrol IV 40 mg every 8  · Continue montelukast 10 mg daily

## 2023-09-10 NOTE — ED PROVIDER NOTES
History  Chief Complaint   Patient presents with   • Shortness of Breath     SOB, congestion, runny nose since Wednesday. States that she took prednisone 3 times and used her inhaler this morning     40-year-old female past medical history of breast cancer, asthma, diabetes mellitus, hypertension, hyperlipidemia, GERD, CVA, fatty liver, presents to emergency department complaining of sob x 3 days. Dry cough and wheezing for 2 days. Took 60 mg of prednisone today. She is also complaining of intermittent chest pain, SAEZ for the past 3 days. Doesn't know if chest pain or breast pain from the operation less than 1 month ago    Currently undergoing treatment for breast cancer, taking Chemo pills currently. Has not yet started radiation. History provided by:  Patient  Shortness of Breath  Associated symptoms: chest pain (started on wednesday coming and going. pinching. worse with walking. felt a little pain this morning. ), cough, fever and wheezing    Associated symptoms: no abdominal pain, no diaphoresis, no headaches, no hemoptysis, no neck pain, no rash, no sore throat, no sputum production, no syncope and no vomiting    Associated symptoms comment:  +palpitations (when walking since Thursday). + congestion. Fever:     Duration:  1 day (thursday)    Temp source:  Subjective    Progression:  Resolved  Risk factors: hx of cancer and recent surgery    Risk factors comment:   sick       Prior to Admission Medications   Prescriptions Last Dose Informant Patient Reported? Taking?    Empagliflozin-metFORMIN HCl (Synjardy) 12.5-1000 MG TABS 9/9/2023 Self Yes Yes   Sig: Take by mouth Take 2 tablets by mouth daily   Insulin Aspart (NovoLOG) 100 units/mL injection  Self Yes No   Sig: Inject under the skin Take 6 units for breakfast /7 unit for lunch/10 units at night   Insulin Pen Needle 31G X 4 MM MISC  Self No No   Sig: Use 4 (four) times a day   OneTouch Delica Lancets 62A MISC  Self No No   Sig: Check blood sugars twice daily. Please substitute with appropriate alternative as covered by patient's insurance. Dx: E11.65   VITAMIN D PO  Self Yes No   Sig: Take by mouth once a week   albuterol (2.5 mg/3 mL) 0.083 % nebulizer solution  Self No No   Sig: Take 1 vial (2.5 mg total) by nebulization every 6 (six) hours as needed for wheezing or shortness of breath   albuterol (PROVENTIL HFA,VENTOLIN HFA) 90 mcg/act inhaler 9/10/2023 Self No Yes   Sig: Inhale 2 puffs every 6 (six) hours as needed for wheezing   anastrozole (ARIMIDEX) 1 mg tablet   No No   Sig: Take 1 tablet (1 mg total) by mouth daily   Patient not taking: Reported on 9/1/2023   aspirin 81 mg chewable tablet 9/10/2023 Self No Yes   Sig: Chew 1 tablet (81 mg total) daily   atorvastatin (LIPITOR) 20 mg tablet 9/10/2023  No Yes   Sig: TAKE 1 TABLET (20 MG TOTAL) BY MOUTH DAILY   budesonide-formoterol (SYMBICORT) 160-4.5 mcg/act inhaler  Self No No   Sig: Inhale 2 puffs 2 (two) times a day as needed (1) Rinse mouth after use.    clonazePAM (KlonoPIN) 0.125 mg disintegrating tablet  Self No No   Sig: Take 1 tablet daily as needed for anxiety   dicyclomine (BENTYL) 20 mg tablet   No No   Sig: Take 1 tablet (20 mg total) by mouth 2 (two) times a day   ezetimibe (ZETIA) 10 mg tablet 9/9/2023 Self No Yes   Sig: Take 1 tablet (10 mg total) by mouth daily   furosemide (LASIX) 20 mg tablet  Self No No   Sig: TAKE 1 TABLET (20 MG TOTAL) BY MOUTH DAILY AS NEEDED FOR EDEMA   insulin degludec [place] [employer] FlexTouch) 100 units/mL injection pen  Self No No   Sig: Inject 20 Units under the skin daily   insulin lispro (HumaLOG KwikPen) 100 units/mL injection pen  Self No No   Sig: Inject 7 Units under the skin 3 (three) times a day with meals   Patient not taking: Reported on 1/19/2023   levothyroxine 150 mcg tablet  Self No No   Sig: Take 1 tablet (150 mcg total) by mouth daily   Patient taking differently: Take 120 mcg by mouth daily   losartan (COZAAR) 100 MG tablet   No No   Sig: Take 1 tablet (100 mg total) by mouth daily   metoprolol succinate (TOPROL-XL) 50 mg 24 hr tablet 9/10/2023 Self No Yes   Sig: TAKE 1 TABLET EVERY DAY   montelukast (SINGULAIR) 10 mg tablet 9/10/2023 Self No Yes   Sig: Take 1 tablet (10 mg total) by mouth daily   naloxone (NARCAN) 4 mg/0.1 mL nasal spray  Self No No   Sig: Administer 1 spray into a nostril. If no response after 2-3 minutes, give another dose in the other nostril using a new spray.    Patient not taking: Reported on 9/1/2023   omeprazole (PriLOSEC) 20 mg delayed release capsule 9/10/2023 Self No Yes   Sig: TAKE 1 CAPSULE EVERY DAY   oxyCODONE-acetaminophen (Percocet) 5-325 mg per tablet  Self No No   Sig: Take 1 tablet by mouth every 6 (six) hours as needed for moderate pain Max Daily Amount: 4 tablets   Patient not taking: Reported on 9/1/2023      Facility-Administered Medications: None       Past Medical History:   Diagnosis Date   • Abdominal pain    • Asthma    • Back pain     occas due to "disc problem"   • Balance problems     "right side"   • Breast cancer ([address])    • Cholelithiasis     lap aviva today 3/10/2021   • Colon polyp    • Cracked tooth     3   • Depression    • Diabetes mellitus ([address])    • Difficulty swallowing     "food feels like stuck sometimes'   • Exercise involving walking     steps and cleaning   • Fatty liver    • GERD (gastroesophageal reflux disease)    • Hiatal hernia    • History of pneumonia    • History of stomach ulcers    • Hyperlipidemia    • Hypertension    • Obesity    • Pneumonia    • Refusal of blood transfusions as patient is [employer]ist    • Risk for falls    • Stroke [facility]) 2013   • Uses Pashto as primary spoken language    • Wears glasses        Past Surgical History:   Procedure Laterality Date   • BREAST LUMPECTOMY Left 08/15/2023    Procedure: BREAST RUBI  DIRECTED LUMPECTOMY;  Surgeon: Toni Lang MD;  [facility]: [facility];  Service: Surgical Oncology   • CHOLECYSTECTOMY     • COLONOSCOPY 02/09/2021    4 polyps tubular adenomas by Dr. Nathanael Evans   • EGD  04/20/2021    nonobstructing Schatzki's ring dilated with 47 Thai Hernandez, sliding hiatal hernia otherwise normal.  Biopsies stomach  negative for H. pylori biopsy esophagus negative for eosinophilic esophagitis by Dr. Lani Santos   • EGD AND COLONOSCOPY  02/09/2021    3 YEAR RECOMMENDED = COLONO   • LYMPH NODE BIOPSY Left 08/15/2023    Procedure: LYMPHATIC MAPPING WITH BLUE AND RADIOACTIVE DYES, SENTINEL LYMPH NODE BIOPSY (INJECT AT 1200 BY DR TORRES IN THE OR); Surgeon: Consuelo Barber MD;  Location: [facility];  Service: Surgical Oncology   • ID LAPAROSCOPY SURG CHOLECYSTECTOMY N/A 03/10/2021    Procedure: CHOLECYSTECTOMY LAPAROSCOPIC W/ ROBOTICS;  Surgeon: Bravo Fowler MD;  Location: [facility];  Service: General   • ROTATOR CUFF REPAIR Bilateral     screws implanted"   • UPPER GASTROINTESTINAL ENDOSCOPY  02/09/2021    4 cm hiatal hernia without Alejandro lesions. Possibly shortened esophagus. Biopsy of the stomach negative for H. pylori, biopsy of the EG junction negative for Kaplan's, biopsied duodenum negative for celiac by Dr. Nathanael Evans   • [place] Left 07/10/2023   • [address] THYROID BIOPSY  03/07/2023       Family History   Problem Relation Age of Onset   • Diabetes Mother    • Hypertension Mother    • Hypertension Father    • No Known Problems Sister    • Colon cancer Brother 71   • No Known Problems Son    • No Known Problems Son    • No Known Problems Daughter    • No Known Problems Daughter    • No Known Problems Daughter    • No Known Problems Maternal Grandmother    • No Known Problems Maternal Grandfather    • No Known Problems Paternal Grandmother    • No Known Problems Paternal Grandfather    • Breast cancer Other      I have reviewed and agree with the history as documented.     E-Cigarette/Vaping   • E-Cigarette Use Never User      E-Cigarette/Vaping Substances   • Nicotine No    • THC No    • CBD No    • Flavoring No    • Other No    • Unknown No      Social History     Tobacco Use   • Smoking status: Never   • Smokeless tobacco: Never   Vaping Use   • Vaping Use: Never used   Substance Use Topics   • Alcohol use: Never   • Drug use: Never       Review of Systems   Constitutional: Positive for fatigue and fever. Negative for diaphoresis. HENT: Positive for congestion. Negative for rhinorrhea and sore throat. Eyes: Negative for visual disturbance. Respiratory: Positive for cough, shortness of breath and wheezing. Negative for hemoptysis and sputum production. Cardiovascular: Positive for chest pain (started on wednesday coming and going. pinching. worse with walking. felt a little pain this morning. ) and palpitations. Negative for leg swelling and syncope. Gastrointestinal: Negative for abdominal distention, abdominal pain, diarrhea, nausea and vomiting. Musculoskeletal: Negative for back pain and neck pain. Skin: Negative for rash. Neurological: Negative for dizziness, syncope, weakness, numbness and headaches. All other systems reviewed and are negative. Physical Exam  Physical Exam  Vitals and nursing note reviewed. Constitutional:       General: She is awake. She is not in acute distress. Appearance: Normal appearance. She is well-developed. She is not toxic-appearing or diaphoretic. HENT:      Head: Normocephalic. Mouth/Throat:      Lips: Pink. Mouth: Mucous membranes are moist.      Pharynx: Oropharynx is clear. Eyes:      General: Vision grossly intact. Cardiovascular:      Rate and Rhythm: Normal rate and regular rhythm. Heart sounds: Normal heart sounds. Pulmonary:      Effort: Pulmonary effort is normal. No tachypnea, accessory muscle usage or respiratory distress. Breath sounds: No stridor. Wheezing present. No decreased breath sounds or rales. Chest:      Chest wall: No tenderness. Abdominal:      General: There is no distension. Palpations: Abdomen is soft. Skin:     General: Skin is warm and dry. Neurological:      Mental Status: She is alert.          Vital Signs  ED Triage Vitals   Temperature Pulse Respirations Blood Pressure SpO2   09/10/23 1332 09/10/23 1332 09/10/23 1332 09/10/23 1332 09/10/23 1332   98.2 °F (36.8 °C) 65 20 152/85 95 %      Temp Source Heart Rate Source Patient Position - Orthostatic VS BP Location FiO2 (%)   09/10/23 1332 09/10/23 1332 09/10/23 1332 09/10/23 1332 --   Oral Monitor Lying Left arm       Pain Score       09/10/23 1727       No Pain           Vitals:    09/10/23 1332 09/10/23 1603 09/10/23 1705   BP: 152/85 149/61 136/68   Pulse: 65 64 68   Patient Position - Orthostatic VS: Lying Lying Lying         Visual Acuity      ED Medications  Medications   ipratropium-albuterol (DUO-NEB) 0.5-2.5 mg/3 mL inhalation solution 3 mL (3 mL Nebulization Given 9/10/23 1424)   acetaminophen (TYLENOL) tablet 650 mg (has no administration in time range)   polyethylene glycol (MIRALAX) packet 17 g (has no administration in time range)   ondansetron (ZOFRAN) injection 4 mg (has no administration in time range)   simethicone (MYLICON) chewable tablet 80 mg (has no administration in time range)   heparin (porcine) subcutaneous injection 5,000 Units (5,000 Units Subcutaneous Given 9/10/23 1700)   remdesivir (Veklury) 200 mg in sodium chloride 0.9 % 290 mL IVPB (200 mg Intravenous New Bag 9/10/23 1701)     Followed by   remdesivir [employer]) 100 mg in sodium chloride 0.9 % 270 mL IVPB (has no administration in time range)   albuterol inhalation solution 2.5 mg (has no administration in time range)   albuterol (PROVENTIL HFA,VENTOLIN HFA) inhaler 2 puff (has no administration in time range)   anastrozole (ARIMIDEX) tablet 1 mg (has no administration in time range)   aspirin chewable tablet 81 mg (has no administration in time range)   atorvastatin (LIPITOR) tablet 20 mg (has no administration in time range) budesonide-formoterol (SYMBICORT) 160-4.5 mcg/act inhaler 2 puff (has no administration in time range)   clonazePAM (KlonoPIN) tablet 0.25 mg (has no administration in time range)   ezetimibe (ZETIA) tablet 10 mg (has no administration in time range)   insulin lispro (HumaLOG) 100 units/mL subcutaneous injection 7 Units (7 Units Subcutaneous Given 9/10/23 1659)   insulin glargine (LANTUS) subcutaneous injection 20 Units 0.2 mL (has no administration in time range)   levothyroxine tablet 119 mcg (has no administration in time range)   losartan (COZAAR) tablet 100 mg (has no administration in time range)   metoprolol succinate (TOPROL-XL) 24 hr tablet 50 mg (has no administration in time range)   montelukast (SINGULAIR) tablet 10 mg (has no administration in time range)   pantoprazole (PROTONIX) EC tablet 40 mg (has no administration in time range)   insulin lispro (HumaLOG) 100 units/mL subcutaneous injection 1-6 Units (1 Units Subcutaneous Given 9/10/23 1659)   methylPREDNISolone sodium succinate (Solu-MEDROL) injection 40 mg (40 mg Intravenous Given 9/10/23 1700)   dextromethorphan-guaiFENesin (ROBITUSSIN DM) oral syrup 10 mL (has no administration in time range)       Diagnostic Studies  Results Reviewed     Procedure Component Value Units Date/Time    Platelet count [[id number]] Collected: 09/10/23 1808    Lab Status: No result Specimen: Blood from Arm, Left     HS Troponin I 4hr [[id number]] Collected: 09/10/23 1808    Lab Status: No result Specimen: Blood from Arm, Left     HS Troponin I 2hr [[id number]]  (Normal) Collected: 09/10/23 1602    Lab Status: Final result Specimen: Blood from Arm, Right Updated: 09/10/23 1636     hs TnI 2hr 3 ng/L      Delta 2hr hsTnI 0 ng/L     FLU/RSV/COVID - if FLU/RSV clinically relevant [[id number]]  (Abnormal) Collected: 09/10/23 1409    Lab Status: Final result Specimen: Nares from Nose Updated: 09/10/23 3964     SARS-CoV-2 Positive     INFLUENZA A PCR Negative     INFLUENZA B PCR Negative     RSV PCR Negative    Narrative:      FOR PEDIATRIC PATIENTS - copy/paste COVID Guidelines URL to browser: [email or web address]    SARS-CoV-2 assay is a Nucleic Acid Amplification assay intended for the  qualitative detection of nucleic acid from SARS-CoV-2 in nasopharyngeal  swabs. Results are for the presumptive identification of SARS-CoV-2 RNA. Positive results are indicative of infection with SARS-CoV-2, the virus  causing COVID-19, but do not rule out bacterial infection or co-infection  with other viruses. Laboratories within the [facility] and its  territories are required to report all positive results to the appropriate  public health authorities. Negative results do not preclude SARS-CoV-2  infection and should not be used as the sole basis for treatment or other  patient management decisions. Negative results must be combined with  clinical observations, patient history, and epidemiological information. This test has not been FDA cleared or approved. This test has been authorized by FDA under an Emergency Use Authorization  (EUA). This test is only authorized for the duration of time the  declaration that circumstances exist justifying the authorization of the  emergency use of an in vitro diagnostic tests for detection of SARS-CoV-2  virus and/or diagnosis of COVID-19 infection under section 564(b)(1) of  the Act, 21 U. S.C. 588ZYY-5(R)(6), unless the authorization is terminated  or revoked sooner. The test has been validated but independent review by FDA  and CLIA is pending. Test performed using [place] [facility]: This RT-PCR assay targets N2,  a region unique to SARS-CoV-2. A conserved region in the E-gene was chosen  for pan-Sarbecovirus detection which includes SARS-CoV-2. According to CMS-2020-01-R, this platform meets the definition of high-throughput technology.     HS Troponin 0hr (reflex protocol) [[id number]] (Normal) Collected: 09/10/23 1409    Lab Status: Final result Specimen: Blood from Arm, Right Updated: 09/10/23 1446     hs TnI 0hr 3 ng/L     B-Type Natriuretic Peptide(BNP) [[id number]]  (Abnormal) Collected: 09/10/23 1409    Lab Status: Final result Specimen: Blood from Arm, Right Updated: 09/10/23 1445      pg/mL     D-dimer, quantitative [[id number]]  (Abnormal) Collected: 09/10/23 1409    Lab Status: Final result Specimen: Blood from Arm, Right Updated: 09/10/23 1443     D-Dimer, Quant 1.18 ug/ml FEU     Narrative: In the evaluation for possible pulmonary embolism, in the appropriate (Well's Score of 4 or less) patient, the age adjusted d-dimer cutoff for this patient can be calculated as:    Age x 0.01 (in ug/mL) for Age-adjusted D-dimer exclusion threshold for a patient over 50 years.     Comprehensive metabolic panel [[id number]]  (Abnormal) Collected: 09/10/23 1409    Lab Status: Final result Specimen: Blood from Arm, Right Updated: 09/10/23 1441     Sodium 141 mmol/L      Potassium 4.2 mmol/L      Chloride 103 mmol/L      CO2 28 mmol/L      ANION GAP 10 mmol/L      BUN 15 mg/dL      Creatinine 1.26 mg/dL      Glucose 157 mg/dL      Calcium 9.0 mg/dL      AST 30 U/L      ALT 27 U/L      Alkaline Phosphatase 89 U/L      Total Protein 6.6 g/dL      Albumin 3.9 g/dL      Total Bilirubin 0.41 mg/dL      eGFR 43 ml/min/1.73sq m     Narrative:      [place] guidelines for Chronic Kidney Disease (CKD):   •  Stage 1 with normal or high GFR (GFR > 90 mL/min/1.73 square meters)  •  Stage 2 Mild CKD (GFR = 60-89 mL/min/1.73 square meters)  •  Stage 3A Moderate CKD (GFR = 45-59 mL/min/1.73 square meters)  •  Stage 3B Moderate CKD (GFR = 30-44 mL/min/1.73 square meters)  •  Stage 4 Severe CKD (GFR = 15-29 mL/min/1.73 square meters)  •  Stage 5 End Stage CKD (GFR <15 mL/min/1.73 square meters)  Note: GFR calculation is accurate only with a steady state creatinine    CBC and differential [[id number]]  (Abnormal) Collected: 09/10/23 1409    Lab Status: Final result Specimen: Blood from Arm, Right Updated: 09/10/23 1427     WBC 8.00 Thousand/uL      RBC 4.42 Million/uL      Hemoglobin 12.6 g/dL      Hematocrit 39.4 %      MCV 89 fL      MCH 28.5 pg      MCHC 32.0 g/dL      RDW 13.5 %      MPV 10.3 fL      Platelets 452 Thousands/uL      nRBC 0 /100 WBCs      Neutrophils Relative 83 %      Immat GRANS % 1 %      Lymphocytes Relative 12 %      Monocytes Relative 4 %      Eosinophils Relative 0 %      Basophils Relative 0 %      Neutrophils Absolute 6.57 Thousands/µL      Immature Grans Absolute 0.08 Thousand/uL      Lymphocytes Absolute 0.98 Thousands/µL      Monocytes Absolute 0.35 Thousand/µL      Eosinophils Absolute 0.00 Thousand/µL      Basophils Absolute 0.02 Thousands/µL                  XR chest 2 views    (Results Pending)   NM inpatient order    (Results Pending)              Procedures  Procedures         ED Course  ED Course as of 09/10/23 1820   Sun Sep 10, 2023   1420 Procedure Note: EKG  Date/Time: 09/10/23 2:20 PM   Performed by: Mary Palma   Authorized by: Mary Palma  ECG interpreted by me, the ED Provider: yes   The EKG demonstrates:  Rate 70 bpm   Rhythm NSR   QTc 464 ms   No ST elevations/depressions     1459 Contrast allergy will not do PE study at this time. Tested positive for COVID-19 which could be the reason for her elevated D-dimer.   Will discuss with son for possibility of admission for later VQ scan                       [facility] Rule for PE    Flowsheet Row Most Recent Value   PERC Rule for PE    Age >=50 1 Filed at: 09/10/2023 1817   HR >=100 0 Filed at: 09/10/2023 1817   O2 Sat on room air < 95% 0 Filed at: 09/10/2023 1817   History of PE or DVT 0 Filed at: 09/10/2023 1817   Recent trauma or surgery 1 Filed at: 09/10/2023 1817   Hemoptysis 0 Filed at: 09/10/2023 1817   Exogenous estrogen 0 Filed at: 09/10/2023 1817   Unilateral leg swelling 0 Filed at: 09/10/2023 2681 PERC Rule for PE Results 2 Filed at: 09/10/2023 1817              SBIRT 22yo+    Flowsheet Row Most Recent Value   Initial Alcohol Screen: US AUDIT-C     1. How often do you have a drink containing alcohol? 0 Filed at: 09/10/2023 1335   2. How many drinks containing alcohol do you have on a typical day you are drinking? 0 Filed at: 09/10/2023 1335   3b. FEMALE Any Age, or MALE 65+: How often do you have 4 or more drinks on one occassion? 0 Filed at: 09/10/2023 1335   Audit-C Score 0 Filed at: 09/10/2023 1335   ARA: How many times in the past year have you. .. Used an illegal drug or used a prescription medication for non-medical reasons? Never Filed at: 09/10/2023 1335          Wells' Criteria for PE    Flowsheet Row Most Recent Value   Wells' Criteria for PE    Clinical signs and symptoms of DVT 0 Filed at: 09/10/2023 1818   PE is primary diagnosis or equally likely 3 Filed at: 09/10/2023 1818   HR >100 0 Filed at: 09/10/2023 1818   Immobilization at least 3 days or Surgery in the previous 4 weeks 1.5 Filed at: 09/10/2023 1818   Previous, objectively diagnosed PE or DVT 0 Filed at: 09/10/2023 1818   Hemoptysis 0 Filed at: 09/10/2023 1818   Malignancy with treatment within 6 months or palliative 1 Filed at: 09/10/2023 1818   Wells' Criteria Total 5.5 Filed at: 09/10/2023 1818                Medical Decision Making  Background: 76 y.o. female with sob, cough, cp, fatigue. Active CA undergoing chemotherapy. Surgery <1month ago. Asthma. Differential DX includes but is not limited to: acs/mi, pe, pleurisy, dissection, pneumonia, musculoskeletal chest pain, bronchitis, PNA, influenza, Covid-19, allergies, asthma exacerbation     VSS, Afebrile. No acute respiratory distress. Diffuse wheezing- took steroids today, duo neb ordered with improvement of symptom. ECG without acute ischemic changes or dysrhythmia. No leukocytosis.   BNP mildly elevated 164, chest x-ray without congestion, focal consolidation, ptx, or effusion on my wet read. Troponin 3, will consult for ACS at this time. Elevated D-dimer. Given positive PERC and moderate Wells, additional workup recommended. Due to contrast allergy, discussed admission with [facility] for v/q scan. Found to be covid-19 positive, is immunocompromised. Admit to obs for v/q scan, supportive care for covid-19. All imaging and/or lab testing discussed with patient. Patient and/or family members verbalizes understanding and agrees with plan for admission. Patient is stable for admission.     Portions of the record may have been created with voice recognition software. Occasional wrong word or "sound a like" substitutions may have occurred due to the inherent limitations of voice recognition software. Read the chart carefully and recognize, using context, where substitutions have occurred. Amount and/or Complexity of Data Reviewed  Labs: ordered. Radiology: ordered. Risk  Prescription drug management. Decision regarding hospitalization. Disposition  Final diagnoses:   COVID-19   Shortness of breath     Time reflects when diagnosis was documented in both MDM as applicable and the Disposition within this note     Time User Action Codes Description Comment    9/10/2023  3:35 PM Louis Norris Add [U07.1] COVID-19     9/10/2023  3:36 PM Louis Norris Add [R06.02] Shortness of breath       ED Disposition     ED Disposition   Admit    Condition   Stable    Date/Time   Sun Sep 10, 2023  3:36 PM    Comment   Case was discussed with Dr. Rhonda Patel and the patient's admission status was agreed to be Admission Status: observation status to the service of Dr. Rhonda Patel .            Follow-up Information    None         Current Discharge Medication List      CONTINUE these medications which have NOT CHANGED    Details   albuterol (PROVENTIL HFA,VENTOLIN HFA) 90 mcg/act inhaler Inhale 2 puffs every 6 (six) hours as needed for wheezing  Qty: 18 g, Refills: 2    Comments: Substitution to a formulary equivalent within the same pharmaceutical class is authorized. Associated Diagnoses: Mild intermittent asthma with acute exacerbation      aspirin 81 mg chewable tablet Chew 1 tablet (81 mg total) daily  Qty: 90 tablet, Refills: 1    Associated Diagnoses: Palpitations; Atypical chest pain      atorvastatin (LIPITOR) 20 mg tablet TAKE 1 TABLET (20 MG TOTAL) BY MOUTH DAILY  Qty: 90 tablet, Refills: 0    Associated Diagnoses: Pure hypercholesterolemia      Empagliflozin-metFORMIN HCl (Synjardy) 12.5-1000 MG TABS Take by mouth Take 2 tablets by mouth daily      ezetimibe (ZETIA) 10 mg tablet Take 1 tablet (10 mg total) by mouth daily  Qty: 90 tablet, Refills: 1    Associated Diagnoses: Pure hypercholesterolemia      metoprolol succinate (TOPROL-XL) 50 mg 24 hr tablet TAKE 1 TABLET EVERY DAY  Qty: 90 tablet, Refills: 0    Associated Diagnoses: Essential hypertension; Palpitations      montelukast (SINGULAIR) 10 mg tablet Take 1 tablet (10 mg total) by mouth daily  Qty: 90 tablet, Refills: 1    Associated Diagnoses: Mild intermittent asthma without complication      omeprazole (PriLOSEC) 20 mg delayed release capsule TAKE 1 CAPSULE EVERY DAY  Qty: 90 capsule, Refills: 0    Associated Diagnoses: Gastroesophageal reflux disease without esophagitis      albuterol (2.5 mg/3 mL) 0.083 % nebulizer solution Take 1 vial (2.5 mg total) by nebulization every 6 (six) hours as needed for wheezing or shortness of breath  Qty: 60 vial, Refills: 5    Associated Diagnoses: Mild intermittent asthma without complication      anastrozole (ARIMIDEX) 1 mg tablet Take 1 tablet (1 mg total) by mouth daily  Qty: 90 tablet, Refills: 1    Associated Diagnoses: Malignant neoplasm of upper-inner quadrant of left breast in female, estrogen receptor positive (HCC)      budesonide-formoterol (SYMBICORT) 160-4.5 mcg/act inhaler Inhale 2 puffs 2 (two) times a day as needed (1) Rinse mouth after use.   Qty: 10.2 g, Refills: 3    Associated Diagnoses: Mild intermittent asthma with acute exacerbation      clonazePAM (KlonoPIN) 0.125 mg disintegrating tablet Take 1 tablet daily as needed for anxiety  Qty: 90 tablet, Refills: 0    Associated Diagnoses: Palpitations; Anxiety      dicyclomine (BENTYL) 20 mg tablet Take 1 tablet (20 mg total) by mouth 2 (two) times a day  Qty: 60 tablet, Refills: 0    Associated Diagnoses: Abdominal pain      furosemide (LASIX) 20 mg tablet TAKE 1 TABLET (20 MG TOTAL) BY MOUTH DAILY AS NEEDED FOR EDEMA  Qty: 90 tablet, Refills: 0    Associated Diagnoses: Essential hypertension      Insulin Aspart (NovoLOG) 100 units/mL injection Inject under the skin Take 6 units for breakfast /7 unit for lunch/10 units at night      insulin degludec [facility] FlexTouch) 100 units/mL injection pen Inject 20 Units under the skin daily  Qty: 18 mL, Refills: 1    Associated Diagnoses: Type 2 diabetes mellitus with hyperglycemia, with long-term current use of insulin ([facility])      insulin lispro (HumaLOG KwikPen) 100 units/mL injection pen Inject 7 Units under the skin 3 (three) times a day with meals  Qty: 15 mL, Refills: 0    Associated Diagnoses: Uncontrolled type 2 diabetes mellitus with hyperglycemia ([facility])      Insulin Pen Needle 31G X 4 MM MISC Use 4 (four) times a day  Qty: 100 each, Refills: 3    Associated Diagnoses: Uncontrolled type 2 diabetes mellitus with hyperglycemia ([facility])      levothyroxine 150 mcg tablet Take 1 tablet (150 mcg total) by mouth daily  Qty: 90 tablet, Refills: 1    Associated Diagnoses: Essential hypertension; Mild intermittent asthma without complication; Type 2 diabetes mellitus without complication, without long-term current use of insulin ([address]); Acquired hypothyroidism;  Atypical chest pain; Pure hypercholesterolemia      losartan (COZAAR) 100 MG tablet Take 1 tablet (100 mg total) by mouth daily  Qty: 90 tablet, Refills: 1    Associated Diagnoses: Essential hypertension      naloxone (NARCAN) 4 mg/0.1 mL nasal spray Administer 1 spray into a nostril. If no response after 2-3 minutes, give another dose in the other nostril using a new spray. Qty: 1 each, Refills: 1    Associated Diagnoses: Malignant neoplasm of upper-inner quadrant of left breast in female, estrogen receptor positive (HCC)      OneTouch Delica Lancets 76Y MISC Check blood sugars twice daily. Please substitute with appropriate alternative as covered by patient's insurance. Dx: E11.65  Qty: 200 each, Refills: 3    Associated Diagnoses: Essential hypertension; Uncontrolled type 2 diabetes mellitus with hyperglycemia (HCC)      oxyCODONE-acetaminophen (Percocet) 5-325 mg per tablet Take 1 tablet by mouth every 6 (six) hours as needed for moderate pain Max Daily Amount: 4 tablets  Qty: 6 tablet, Refills: 0    Associated Diagnoses: Malignant neoplasm of upper-inner quadrant of left breast in female, estrogen receptor positive ([address])      VITAMIN D PO Take by mouth once a week             No discharge procedures on file.     PDMP Review       Value Time User    PDMP Reviewed  Yes 7/26/2023  4:08 PM Gus Howell MD          ED Provider  Electronically Signed by           Brandi Gibbs PA-C  09/10/23 Asha Salamanca

## 2023-09-11 ENCOUNTER — APPOINTMENT (OUTPATIENT)
Dept: RADIOLOGY | Facility: HOSPITAL | Age: 68
End: 2023-09-11
Payer: COMMERCIAL

## 2023-09-11 VITALS
WEIGHT: 194 LBS | HEART RATE: 89 BPM | OXYGEN SATURATION: 96 % | SYSTOLIC BLOOD PRESSURE: 131 MMHG | RESPIRATION RATE: 20 BRPM | TEMPERATURE: 96.3 F | HEIGHT: 62 IN | DIASTOLIC BLOOD PRESSURE: 62 MMHG | BODY MASS INDEX: 35.7 KG/M2

## 2023-09-11 LAB
ALBUMIN SERPL BCP-MCNC: 4 G/DL (ref 3.5–5)
ALP SERPL-CCNC: 86 U/L (ref 34–104)
ALT SERPL W P-5'-P-CCNC: 24 U/L (ref 7–52)
ANION GAP SERPL CALCULATED.3IONS-SCNC: 11 MMOL/L
AST SERPL W P-5'-P-CCNC: 23 U/L (ref 13–39)
BASOPHILS # BLD AUTO: 0.01 THOUSANDS/ÂΜL (ref 0–0.1)
BASOPHILS NFR BLD AUTO: 0 % (ref 0–1)
BILIRUB SERPL-MCNC: 0.33 MG/DL (ref 0.2–1)
BUN SERPL-MCNC: 20 MG/DL (ref 5–25)
CALCIUM SERPL-MCNC: 9 MG/DL (ref 8.4–10.2)
CHLORIDE SERPL-SCNC: 103 MMOL/L (ref 96–108)
CO2 SERPL-SCNC: 26 MMOL/L (ref 21–32)
CREAT SERPL-MCNC: 0.92 MG/DL (ref 0.6–1.3)
EOSINOPHIL # BLD AUTO: 0 THOUSAND/ÂΜL (ref 0–0.61)
EOSINOPHIL NFR BLD AUTO: 0 % (ref 0–6)
ERYTHROCYTE [DISTWIDTH] IN BLOOD BY AUTOMATED COUNT: 13.3 % (ref 11.6–15.1)
GFR SERPL CREATININE-BSD FRML MDRD: 64 ML/MIN/1.73SQ M
GLUCOSE P FAST SERPL-MCNC: 179 MG/DL (ref 65–99)
GLUCOSE SERPL-MCNC: 172 MG/DL (ref 65–140)
GLUCOSE SERPL-MCNC: 179 MG/DL (ref 65–140)
GLUCOSE SERPL-MCNC: 368 MG/DL (ref 65–140)
HCT VFR BLD AUTO: 39.5 % (ref 34.8–46.1)
HGB BLD-MCNC: 12.6 G/DL (ref 11.5–15.4)
IMM GRANULOCYTES # BLD AUTO: 0.04 THOUSAND/UL (ref 0–0.2)
IMM GRANULOCYTES NFR BLD AUTO: 1 % (ref 0–2)
LYMPHOCYTES # BLD AUTO: 1.12 THOUSANDS/ÂΜL (ref 0.6–4.47)
LYMPHOCYTES NFR BLD AUTO: 13 % (ref 14–44)
MCH RBC QN AUTO: 28.5 PG (ref 26.8–34.3)
MCHC RBC AUTO-ENTMCNC: 31.9 G/DL (ref 31.4–37.4)
MCV RBC AUTO: 89 FL (ref 82–98)
MONOCYTES # BLD AUTO: 0.11 THOUSAND/ÂΜL (ref 0.17–1.22)
MONOCYTES NFR BLD AUTO: 1 % (ref 4–12)
NEUTROPHILS # BLD AUTO: 7.1 THOUSANDS/ÂΜL (ref 1.85–7.62)
NEUTS SEG NFR BLD AUTO: 85 % (ref 43–75)
NRBC BLD AUTO-RTO: 0 /100 WBCS
PLATELET # BLD AUTO: 252 THOUSANDS/UL (ref 149–390)
PMV BLD AUTO: 10.6 FL (ref 8.9–12.7)
POTASSIUM SERPL-SCNC: 4.1 MMOL/L (ref 3.5–5.3)
PROT SERPL-MCNC: 6.6 G/DL (ref 6.4–8.4)
RBC # BLD AUTO: 4.42 MILLION/UL (ref 3.81–5.12)
SODIUM SERPL-SCNC: 140 MMOL/L (ref 135–147)
WBC # BLD AUTO: 8.38 THOUSAND/UL (ref 4.31–10.16)

## 2023-09-11 PROCEDURE — A9540 TC99M MAA: HCPCS

## 2023-09-11 PROCEDURE — 94760 N-INVAS EAR/PLS OXIMETRY 1: CPT

## 2023-09-11 PROCEDURE — 85025 COMPLETE CBC W/AUTO DIFF WBC: CPT | Performed by: INTERNAL MEDICINE

## 2023-09-11 PROCEDURE — 82948 REAGENT STRIP/BLOOD GLUCOSE: CPT

## 2023-09-11 PROCEDURE — 94640 AIRWAY INHALATION TREATMENT: CPT

## 2023-09-11 PROCEDURE — 78580 LUNG PERFUSION IMAGING: CPT

## 2023-09-11 PROCEDURE — G1004 CDSM NDSC: HCPCS

## 2023-09-11 PROCEDURE — 80053 COMPREHEN METABOLIC PANEL: CPT | Performed by: INTERNAL MEDICINE

## 2023-09-11 PROCEDURE — 99239 HOSP IP/OBS DSCHRG MGMT >30: CPT | Performed by: INTERNAL MEDICINE

## 2023-09-11 RX ORDER — PREDNISONE 20 MG/1
40 TABLET ORAL DAILY
Qty: 10 TABLET | Refills: 0 | Status: SHIPPED | OUTPATIENT
Start: 2023-09-11 | End: 2023-09-16

## 2023-09-11 RX ORDER — ALBUTEROL SULFATE 90 UG/1
2 AEROSOL, METERED RESPIRATORY (INHALATION) EVERY 6 HOURS SCHEDULED
Status: DISCONTINUED | OUTPATIENT
Start: 2023-09-11 | End: 2023-09-11 | Stop reason: HOSPADM

## 2023-09-11 RX ORDER — GUAIFENESIN/DEXTROMETHORPHAN 100-10MG/5
10 SYRUP ORAL EVERY 4 HOURS PRN
Qty: 473 ML | Refills: 0 | Status: SHIPPED | OUTPATIENT
Start: 2023-09-11

## 2023-09-11 RX ADMIN — INSULIN LISPRO 6 UNITS: 100 INJECTION, SOLUTION INTRAVENOUS; SUBCUTANEOUS at 12:25

## 2023-09-11 RX ADMIN — ASPIRIN 81 MG 81 MG: 81 TABLET ORAL at 09:45

## 2023-09-11 RX ADMIN — HEPARIN SODIUM 5000 UNITS: 5000 INJECTION INTRAVENOUS; SUBCUTANEOUS at 05:44

## 2023-09-11 RX ADMIN — LOSARTAN POTASSIUM 100 MG: 50 TABLET, FILM COATED ORAL at 09:44

## 2023-09-11 RX ADMIN — ANASTROZOLE 1 MG: 1 TABLET, COATED ORAL at 09:45

## 2023-09-11 RX ADMIN — EZETIMIBE 10 MG: 10 TABLET ORAL at 09:45

## 2023-09-11 RX ADMIN — LEVOTHYROXINE SODIUM 119 MCG: 88 TABLET ORAL at 09:44

## 2023-09-11 RX ADMIN — METOPROLOL SUCCINATE 50 MG: 50 TABLET, EXTENDED RELEASE ORAL at 09:45

## 2023-09-11 RX ADMIN — BUDESONIDE AND FORMOTEROL FUMARATE DIHYDRATE 2 PUFF: 160; 4.5 AEROSOL RESPIRATORY (INHALATION) at 09:46

## 2023-09-11 RX ADMIN — INSULIN LISPRO 7 UNITS: 100 INJECTION, SOLUTION INTRAVENOUS; SUBCUTANEOUS at 09:50

## 2023-09-11 RX ADMIN — ALBUTEROL SULFATE 2 PUFF: 90 AEROSOL, METERED RESPIRATORY (INHALATION) at 09:52

## 2023-09-11 RX ADMIN — INSULIN LISPRO 1 UNITS: 100 INJECTION, SOLUTION INTRAVENOUS; SUBCUTANEOUS at 09:49

## 2023-09-11 RX ADMIN — PANTOPRAZOLE SODIUM 40 MG: 40 TABLET, DELAYED RELEASE ORAL at 05:44

## 2023-09-11 RX ADMIN — IPRATROPIUM BROMIDE AND ALBUTEROL SULFATE 3 ML: 2.5; .5 SOLUTION RESPIRATORY (INHALATION) at 02:16

## 2023-09-11 RX ADMIN — MONTELUKAST 10 MG: 10 TABLET, FILM COATED ORAL at 09:45

## 2023-09-11 RX ADMIN — ATORVASTATIN CALCIUM 20 MG: 20 TABLET, FILM COATED ORAL at 09:45

## 2023-09-11 RX ADMIN — METHYLPREDNISOLONE SODIUM SUCCINATE 40 MG: 40 INJECTION, POWDER, FOR SOLUTION INTRAMUSCULAR; INTRAVENOUS at 05:44

## 2023-09-11 RX ADMIN — INSULIN LISPRO 7 UNITS: 100 INJECTION, SOLUTION INTRAVENOUS; SUBCUTANEOUS at 12:27

## 2023-09-11 NOTE — ASSESSMENT & PLAN NOTE
Lab Results   Component Value Date    HGBA1C 7.7 (H) 08/21/2023       Recent Labs     09/10/23  1655 09/10/23  2000 09/11/23  0550   POCGLU 176* 235* 172*       Blood Sugar Average: Last 72 hrs:  (P) [id number]   · Hold home medication  · Lantus 20 units, lispro 7 units 3 times daily AC  · Sliding scale insulin, Accu-Chek  · Diabetic diet

## 2023-09-11 NOTE — UTILIZATION REVIEW
Initial Clinical Review    Admission: Date/Time/Statement:   Admission Orders (From admission, onward)     Ordered        09/10/23 1536  Place in Observation  Once                      Orders Placed This Encounter   Procedures   • Place in Observation     Standing Status:   Standing     Number of Occurrences:   1     Order Specific Question:   Level of Care     Answer:   Med Surg [16]     ED Arrival Information     Expected   -    Arrival   9/10/2023 12:52    Acuity   Urgent            Means of arrival   Walk-In    Escorted by   Self    Service   Hospitalist    Admission type   Emergency            Arrival complaint   cough, Congestion, Fever           Chief Complaint   Patient presents with   • Shortness of Breath     SOB, congestion, runny nose since Wednesday. States that she took prednisone 3 times and used her inhaler this morning       Initial Presentation: 76 y.o. female , presented to the ED @ [address]  [facility] home via walk in. Admitted as Observation due to  COVID 19. PMH of type II DM, breast cancer   Date: 09/10/2023     Presents with shortness of breath, wheezing and dry cough since Thursday which has been progressively worse. It was associated with congestion and runny nose. She used her home inhaler without relief in symptoms. She took a 60 mg prednisone today. COVID19- positive on 9/10/2023. Patient is currently on room air. D-dimer elevated. CXR - NAD. Unable to get chest CTA due to contrast allergy. Ordered VQ scan. IV remdesivir due to immunocompromised condition in the given setting of cancer. OOB TID; ambulation and mobilization strongly encouraged. Self proning strongly encouraged.       Day 2: 09/11/2023    ED Triage Vitals   Temperature Pulse Respirations Blood Pressure SpO2   09/10/23 1332 09/10/23 1332 09/10/23 1332 09/10/23 1332 09/10/23 1332   98.2 °F (36.8 °C) 65 20 152/85 95 %      Temp Source Heart Rate Source Patient Position - Orthostatic VS BP Location FiO2 (%) 09/10/23 1332 09/10/23 1332 09/10/23 1332 09/10/23 1332 --   Oral Monitor Lying Left arm       Pain Score       09/10/23 1727       No Pain          Wt Readings from Last 1 Encounters:   09/10/23 88 kg (194 lb)     Additional Vital Signs:   Date/Time Temp Pulse Resp BP MAP (mmHg) SpO2 O2 Device Patient Position - Orthostatic VS   09/11/23 0216 -- -- -- -- -- 95 % -- --   09/10/23 2300 98.6 °F (37 °C) 70 18 134/62 100 94 % None (Room air) Lying   09/10/23 2030 -- -- -- -- -- 97 % -- --   09/10/23 1705 99.3 °F (37.4 °C) 68 18 136/68 98 96 % None (Room air) Lying   09/10/23 1603 -- 64 15 149/61 -- 95 % None (Room air) Lying         Pertinent Labs/Diagnostic Test Results:   XR chest 2 views   Final Result by Elaine Le MD (09/10 2258)   No acute cardiopulmonary disease.       NM lung ventilation / perfusion    (Results Pending)     Results from last 7 days   Lab Units 09/10/23  1409   SARS-COV-2  Positive*     Results from last 7 days   Lab Units 09/11/23  0548 09/10/23  1808 09/10/23  1409   WBC Thousand/uL 8.38  --  8.00   HEMOGLOBIN g/dL 12.6  --  12.6   HEMATOCRIT % 39.5  --  39.4   PLATELETS Thousands/uL 252 236 249   NEUTROS ABS Thousands/µL 7.10  --  6.57     Results from last 7 days   Lab Units 09/11/23  0548 09/10/23  1409   SODIUM mmol/L 140 141   POTASSIUM mmol/L 4.1 4.2   CHLORIDE mmol/L 103 103   CO2 mmol/L 26 28   ANION GAP mmol/L 11 10   BUN mg/dL 20 15   CREATININE mg/dL 0.92 1.26   EGFR ml/min/1.73sq m 64 43   CALCIUM mg/dL 9.0 9.0     Results from last 7 days   Lab Units 09/11/23  0548 09/10/23  1409   AST U/L 23 30   ALT U/L 24 27   ALK PHOS U/L 86 89   TOTAL PROTEIN g/dL 6.6 6.6   ALBUMIN g/dL 4.0 3.9   TOTAL BILIRUBIN mg/dL 0.33 0.41     Results from last 7 days   Lab Units 09/11/23  0550 09/10/23  2000 09/10/23  1655   POC GLUCOSE mg/dl 172* 235* 176*     Results from last 7 days   Lab Units 09/11/23  0548 09/10/23  1409   GLUCOSE RANDOM mg/dL 179* 157*     Results from last 7 days Lab Units 09/10/23  1808 09/10/23  1602 09/10/23  1409   HS TNI 0HR ng/L  --   --  3   HS TNI 2HR ng/L  --  3  --    HSTNI D2 ng/L  --  0  --    HS TNI 4HR ng/L 3  --   --    HSTNI D4 ng/L 0  --   --      Results from last 7 days   Lab Units 09/10/23  1409   D-DIMER QUANTITATIVE ug/ml FEU 1.18*     Results from last 7 days   Lab Units 09/10/23  1409   BNP pg/mL 164*     Results from last 7 days   Lab Units 09/10/23  1409   INFLUENZA A PCR  Negative   INFLUENZA B PCR  Negative   RSV PCR  Negative     ED Treatment:   Medication Administration from 09/10/2023 1252 to 09/10/2023 1650       Date/Time Order Dose Route Action     09/10/2023 1424 EDT ipratropium-albuterol (DUO-NEB) 0.5-2.5 mg/3 mL inhalation solution 3 mL 3 mL Nebulization Given        Past Medical History:   Diagnosis Date   • Abdominal pain    • Asthma    • Back pain     occas due to "disc problem"   • Balance problems     "right side"   • Breast cancer (HCC)    • Cholelithiasis     lap aviva today 3/10/2021   • Colon polyp    • Cracked tooth     3   • Depression    • Diabetes mellitus ([address])    • Difficulty swallowing     "food feels like stuck sometimes'   • Exercise involving walking     steps and cleaning   • Fatty liver    • GERD (gastroesophageal reflux disease)    • Hiatal hernia    • History of pneumonia    • History of stomach ulcers    • Hyperlipidemia    • Hypertension    • Obesity    • Pneumonia    • Refusal of blood transfusions as patient is [facility]    • Risk for falls    • Stroke [facility]) 2013   • Uses Luxem[place] as primary spoken language    • Wears glasses      Present on Admission:  • Uncontrolled type 2 diabetes mellitus with hyperglycemia ([address])  • Pure hypercholesterolemia  • Mild intermittent asthma with acute exacerbation  • Essential hypertension  • Acquired hypothyroidism      Admitting Diagnosis: Shortness of breath [R06.02]  COVID-19 [U07.1]  Age/Sex: 76 y.o. female  Admission Orders:  Kristian/CHO controlled diet  Up & OOB as tolerated / Activity as tolerated / Ambulate q8h  Leonides SCDs    Scheduled Medications:  albuterol, 2 puff, Inhalation, Q6H MARISOL  anastrozole, 1 mg, Oral, Daily  aspirin, 81 mg, Oral, Daily  atorvastatin, 20 mg, Oral, Daily  ezetimibe, 10 mg, Oral, Daily  heparin (porcine), 5,000 Units, Subcutaneous, Q8H MARISOL  insulin glargine, 20 Units, Subcutaneous, HS  insulin lispro, 1-6 Units, Subcutaneous, TID AC  insulin lispro, 7 Units, Subcutaneous, TID With Meals  levothyroxine, 119 mcg, Oral, Daily  losartan, 100 mg, Oral, Daily  methylPREDNISolone sodium succinate, 40 mg, Intravenous, Q8H MARISOL  metoprolol succinate, 50 mg, Oral, Daily  montelukast, 10 mg, Oral, Daily  pantoprazole, 40 mg, Oral, Early Morning  remdesivir, 100 mg, Intravenous, Q24H      Continuous IV Infusions:     PRN Meds:  acetaminophen, 650 mg, Oral, Q6H PRN  albuterol, 2.5 mg, Nebulization, Q6H PRN  budesonide-formoterol, 2 puff, Inhalation, BID PRN  clonazePAM, 0.25 mg, Oral, Daily PRN  dextromethorphan-guaiFENesin, 10 mL, Oral, Q4H PRN  ondansetron, 4 mg, Intravenous, Q6H PRN  polyethylene glycol, 17 g, Oral, Daily PRN  simethicone, 80 mg, Oral, 4x Daily PRN            Network Utilization Review Department  ATTENTION: Please call with any questions or concerns to [phone] and carefully listen to the prompts so that you are directed to the right person. All voicemails are confidential.  Damian Harris all requests for admission clinical reviews, approved or denied determinations and any other requests to dedicated fax number below belonging to the campus where the patient is receiving treatment.  List of dedicated fax numbers for the Facilities:  [place] DENIALS (Administrative/Medical Necessity) [phone]   [address] [facility] (Maternity/NICU/Pediatrics) [phone]   [address] [place] [address] [phone]   [address] [phone] [address] [address] [facility] [address] [place] [address] [place] [phone]

## 2023-09-11 NOTE — NURSING NOTE
IV removed with tip intact. Pressure held to control bleeding. Discharge instructions discussed with Sebastián Nino and she verbalizes understanding. Home medications returned from Pharmacy. Patient left with all her belongings, prescriptions and discharge instructions.

## 2023-09-11 NOTE — ASSESSMENT & PLAN NOTE
Presented with shortness of breath, cough and wheezing  • Mild COVID pathway as below   • COVID19- positive on 9/10/2023  • Patient is currently on room air  o D-dimer elevated  • CXR showed no acute cardiopulmonary disease   • unable to get chest CTA due to contrast allergy.     • VQ scan showed probability of PE is low  • IV remdesivir due to immunocompromised condition in the given setting of cancer  • OOB TID; ambulation and mobilization strongly encouraged  • Self proning strongly encouraged  • Supportive care

## 2023-09-11 NOTE — PLAN OF CARE
Problem: PAIN - ADULT  Goal: Verbalizes/displays adequate comfort level or baseline comfort level  Description: Interventions:  - Encourage patient to monitor pain and request assistance  - Assess pain using appropriate pain scale  - Administer analgesics based on type and severity of pain and evaluate response  - Implement non-pharmacological measures as appropriate and evaluate response  - Consider cultural and social influences on pain and pain management  - Notify physician/advanced practitioner if interventions unsuccessful or patient reports new pain  Outcome: Progressing     Problem: INFECTION - ADULT  Goal: Absence or prevention of progression during hospitalization  Description: INTERVENTIONS:  - Assess and monitor for signs and symptoms of infection  - Monitor lab/diagnostic results  - Monitor all insertion sites, i.e. indwelling lines, tubes, and drains  - Monitor endotracheal if appropriate and nasal secretions for changes in amount and color  - [place] appropriate cooling/warming therapies per order  - Administer medications as ordered  - Instruct and encourage patient and family to use good hand hygiene technique  - Identify and instruct in appropriate isolation precautions for identified infection/condition  Outcome: Progressing  Goal: Absence of fever/infection during neutropenic period  Description: INTERVENTIONS:  - Monitor WBC    Outcome: Progressing     Problem: SAFETY ADULT  Goal: Patient will remain free of falls  Description: INTERVENTIONS:  - Educate patient/family on patient safety including physical limitations  - Instruct patient to call for assistance with activity   - Consult OT/PT to assist with strengthening/mobility   - Keep Call bell within reach  - Keep bed low and locked with side rails adjusted as appropriate  - Keep care items and personal belongings within reach  - Initiate and maintain comfort rounds  - Make Fall Risk Sign visible to staff  - Apply yellow socks and bracelet for high fall risk patients  - Consider moving patient to room near nurses station  Outcome: Progressing  Goal: Maintain or return to baseline ADL function  Description: INTERVENTIONS:  -  Assess patient's ability to carry out ADLs; assess patient's baseline for ADL function and identify physical deficits which impact ability to perform ADLs (bathing, care of mouth/teeth, toileting, grooming, dressing, etc.)  - Assess/evaluate cause of self-care deficits   - Assess range of motion  - Assess patient's mobility; develop plan if impaired  - Assess patient's need for assistive devices and provide as appropriate  - Encourage maximum independence but intervene and supervise when necessary  - Involve family in performance of ADLs  - Assess for home care needs following discharge   - Consider OT consult to assist with ADL evaluation and planning for discharge  - Provide patient education as appropriate  Outcome: Progressing  Goal: Maintains/Returns to pre admission functional level  Description: INTERVENTIONS:  - Perform BMAT or MOVE assessment daily.   - Set and communicate daily mobility goal to care team and patient/family/caregiver.    - Collaborate with rehabilitation services on mobility goals if consulted  - Out of bed for toileting  - Record patient progress and toleration of activity level   Outcome: Progressing     Problem: DISCHARGE PLANNING  Goal: Discharge to home or other facility with appropriate resources  Description: INTERVENTIONS:  - Identify barriers to discharge w/patient and caregiver  - Arrange for needed discharge resources and transportation as appropriate  - Identify discharge learning needs (meds, wound care, etc.)  - Arrange for interpretive services to assist at discharge as needed  - Refer to Case Management Department for coordinating discharge planning if the patient needs post-hospital services based on physician/advanced practitioner order or complex needs related to functional status, cognitive ability, or social support system  Outcome: Progressing     Problem: Knowledge Deficit  Goal: Patient/family/caregiver demonstrates understanding of disease process, treatment plan, medications, and discharge instructions  Description: Complete learning assessment and assess knowledge base.   Interventions:  - Provide teaching at level of understanding  - Provide teaching via preferred learning methods  Outcome: Progressing

## 2023-09-11 NOTE — DISCHARGE SUMMARY
[facility]  Discharge- Low Weiss 1955, 76 y.o. female MRN: [id number]  Unit/Bed#: 7T [facility] 718-01 Encounter: [id number]  Primary Care Provider: Rock Magdalene MD   Date and time admitted to hospital: 9/10/2023  1:22 PM    * COVID-19  Assessment & Plan  Presented with shortness of breath, cough and wheezing  • Mild COVID pathway as below   • COVID19- positive on 9/10/2023  • Patient is currently on room air  o D-dimer elevated  • CXR showed no acute cardiopulmonary disease   • unable to get chest CTA due to contrast allergy.     • VQ scan showed probability of PE is low  • IV remdesivir due to immunocompromised condition in the given setting of cancer  • OOB TID; ambulation and mobilization strongly encouraged  • Self proning strongly encouraged  • Supportive care      Malignant neoplasm of upper-inner quadrant of left breast in female, estrogen receptor positive ([address])  Assessment & Plan  · Patient had history of lumpectomy and is on chemotherapy  · Continue anastrozole    Uncontrolled type 2 diabetes mellitus with hyperglycemia [facility])  Assessment & Plan  Lab Results   Component Value Date    HGBA1C 7.7 (H) 08/21/2023       Recent Labs     09/10/23  1655 09/10/23  2000 09/11/23  0550   POCGLU 176* 235* 172*       Blood Sugar Average: Last 72 hrs:  (P) 533.2475327900082829   · Hold home medication  · Lantus 20 units, lispro 7 units 3 times daily AC  · Sliding scale insulin, Accu-Chek  · Diabetic diet    Pure hypercholesterolemia  Assessment & Plan  · Continue prehospital Lipitor, ezetimibe    Mild intermittent asthma with acute exacerbation  Assessment & Plan  · Continue prehospital albuterol, Symbicort  · Added DuoNeb every 6h  · Wheezing improved, will be discharged on prednisone 40 mg daily for 5 days  · Continue montelukast 10 mg daily    Essential hypertension  Assessment & Plan  · BP acceptable  · Continue prehospital Cozaar, Toprol-XL    Acquired hypothyroidism  Assessment & Plan  · Continue prehospital Synthroid 120 mcg daily        Discharging Physician / Practitioner: Chiqui Riley MD  PCP: Escobar Quinonez MD  Admission Date:   Admission Orders (From admission, onward)     Ordered        09/10/23 1536  Place in Observation  Once                      Discharge Date: 09/11/23    Medical Problems     Resolved Problems  Date Reviewed: 9/11/2023   None         Consultations During Hospital Stay:  · None    Procedures Performed:   · None    Significant Findings / Test Results:   NM lung perfusion imaging (particulate) Result Date: 9/11/2023  Impression: The probability for pulmonary embolus is low. Workstation performed: [id number]     XR chest 2 views Result Date: 9/10/2023  Impression: No acute cardiopulmonary disease. Workstation performed: [id number]     Incidental Findings:   · See above    Test Results Pending at Discharge (will require follow up): · None     Outpatient Tests Requested:  · None    Complications: None    Reason for Admission: Mild shortness of breath    Hospital Course:     Darius Thomas is a 76 y.o. female patient with PMH of type II DM, breast cancer, mild intermittent asthma who originally presented to the hospital on 9/10/2023 due to mild shortness of breath, wheezing and dry cough. Patient was found to be COVID-positive on 9/10/2023. Chest x-ray showed no acute cardiopulmonary disease. Patient has been saturating well on room air. D-dimer was elevated therefore patient was admitted to get VQ scan. Patient was not able to get CTA PE study due to contrast allergy. Today, patient is clinically improved, saturating well on room air. VQ scan showed low probability of PE. Patient is recommended to follow-up with PCP in 1 week. Patient is clinically and hemodynamically stable to be discharged today. Please see above list of diagnoses and related plan for additional information.      Condition at Discharge: stable     Discharge Day Visit / Exam:     Subjective:  Patient was seen and examined at bedside. The patient stated she feels better this morning. She denies any fever, chills, chest pain, palpitation, shortness of breath, N/V, abd pain. Vitals: Blood Pressure: 131/62 (09/11/23 0826)  Pulse: 89 (09/11/23 0826)  Temperature: (!) 96.3 °F (35.7 °C) (09/11/23 0826)  Temp Source: Temporal (09/11/23 0826)  Respirations: 20 (09/11/23 0826)  Height: 5' 2" (157.5 cm) (09/10/23 1705)  Weight - Scale: 88 kg (194 lb) (09/10/23 1705)  SpO2: 96 % (09/11/23 0826)  Exam:   Physical Exam  General: no acute distress  HEENT: NC/AT, PERRL, EOM - normal  Neck: Supple  Pulm/Chest: Normal chest wall expansion, clear breath sounds on both side  CVS: normal S1&S2, capillary refill <2s  Abd: soft, non tender, non distended, bowel sounds +  MSK: move all 4 extremities spontaneously  Skin: warm  CNS: no acute focal neuro deficit    Discussion with Family: Discussed with patient. Patient said she will call the family. Discharge instructions/Information to patient and family:   See after visit summary for information provided to patient and family. Provisions for Follow-Up Care:  See after visit summary for information related to follow-up care and any pertinent home health orders. Disposition:     Home    For Discharges to [facility] SNF:   · Not Applicable to this Patient - Not Applicable to this Patient    Planned Readmission: No     Discharge Statement:  I spent 45 minutes discharging the patient. This time was spent on the day of discharge. I had direct contact with the patient on the day of discharge. Greater than 50% of the total time was spent examining patient, answering all patient questions, arranging and discussing plan of care with patient as well as directly providing post-discharge instructions. Additional time then spent on discharge activities.     Discharge Medications:  See after visit summary for reconciled discharge medications provided to patient and family.       ** Please Note: This note has been constructed using a voice recognition system **

## 2023-09-11 NOTE — ASSESSMENT & PLAN NOTE
· Continue prehospital albuterol, Symbicort  · Added DuoNeb every 6h  · Wheezing improved, will be discharged on prednisone 40 mg daily for 5 days  · Continue montelukast 10 mg daily

## 2023-09-12 ENCOUNTER — TRANSITIONAL CARE MANAGEMENT (OUTPATIENT)
Dept: INTERNAL MEDICINE CLINIC | Facility: CLINIC | Age: 68
End: 2023-09-12

## 2023-09-13 ENCOUNTER — APPOINTMENT (EMERGENCY)
Dept: RADIOLOGY | Facility: HOSPITAL | Age: 68
End: 2023-09-13
Payer: COMMERCIAL

## 2023-09-13 ENCOUNTER — RA CDI HCC (OUTPATIENT)
Dept: OTHER | Facility: HOSPITAL | Age: 68
End: 2023-09-13

## 2023-09-13 ENCOUNTER — HOSPITAL ENCOUNTER (EMERGENCY)
Facility: HOSPITAL | Age: 68
Discharge: HOME | End: 2023-09-13
Attending: EMERGENCY MEDICINE
Payer: COMMERCIAL

## 2023-09-13 ENCOUNTER — TELEPHONE (OUTPATIENT)
Dept: INTERNAL MEDICINE CLINIC | Facility: CLINIC | Age: 68
End: 2023-09-13

## 2023-09-13 VITALS
HEART RATE: 74 BPM | OXYGEN SATURATION: 96 % | RESPIRATION RATE: 19 BRPM | TEMPERATURE: 98.4 F | SYSTOLIC BLOOD PRESSURE: 125 MMHG | DIASTOLIC BLOOD PRESSURE: 59 MMHG

## 2023-09-13 DIAGNOSIS — J40 BRONCHITIS: ICD-10-CM

## 2023-09-13 DIAGNOSIS — U07.1 COVID-19: Primary | ICD-10-CM

## 2023-09-13 LAB
2HR DELTA HS TROPONIN: 0 NG/L
ALBUMIN SERPL BCP-MCNC: 3.4 G/DL (ref 3.5–5)
ALP SERPL-CCNC: 70 U/L (ref 34–104)
ALT SERPL W P-5'-P-CCNC: 21 U/L (ref 7–52)
ANION GAP SERPL CALCULATED.3IONS-SCNC: 5 MMOL/L
APTT PPP: 25 SECONDS (ref 23–37)
AST SERPL W P-5'-P-CCNC: 27 U/L (ref 13–39)
ATRIAL RATE: 53 BPM
ATRIAL RATE: 96 BPM
BASOPHILS # BLD AUTO: 0.01 THOUSANDS/ÂΜL (ref 0–0.1)
BASOPHILS NFR BLD AUTO: 0 % (ref 0–1)
BILIRUB SERPL-MCNC: 0.4 MG/DL (ref 0.2–1)
BNP SERPL-MCNC: 381 PG/ML (ref 0–100)
BUN SERPL-MCNC: 27 MG/DL (ref 5–25)
CALCIUM ALBUM COR SERPL-MCNC: 9 MG/DL (ref 8.3–10.1)
CALCIUM SERPL-MCNC: 8.5 MG/DL (ref 8.4–10.2)
CARDIAC TROPONIN I PNL SERPL HS: 3 NG/L
CARDIAC TROPONIN I PNL SERPL HS: 3 NG/L
CHLORIDE SERPL-SCNC: 102 MMOL/L (ref 96–108)
CO2 SERPL-SCNC: 30 MMOL/L (ref 21–32)
CREAT SERPL-MCNC: 1.09 MG/DL (ref 0.6–1.3)
EOSINOPHIL # BLD AUTO: 0 THOUSAND/ÂΜL (ref 0–0.61)
EOSINOPHIL NFR BLD AUTO: 0 % (ref 0–6)
ERYTHROCYTE [DISTWIDTH] IN BLOOD BY AUTOMATED COUNT: 13.5 % (ref 11.6–15.1)
GFR SERPL CREATININE-BSD FRML MDRD: 52 ML/MIN/1.73SQ M
GLUCOSE SERPL-MCNC: 206 MG/DL (ref 65–140)
HCT VFR BLD AUTO: 38.5 % (ref 34.8–46.1)
HGB BLD-MCNC: 12.5 G/DL (ref 11.5–15.4)
IMM GRANULOCYTES # BLD AUTO: 0.1 THOUSAND/UL (ref 0–0.2)
IMM GRANULOCYTES NFR BLD AUTO: 1 % (ref 0–2)
INR PPP: 1.01 (ref 0.84–1.19)
LYMPHOCYTES # BLD AUTO: 1.77 THOUSANDS/ÂΜL (ref 0.6–4.47)
LYMPHOCYTES NFR BLD AUTO: 15 % (ref 14–44)
MAGNESIUM SERPL-MCNC: 1.9 MG/DL (ref 1.9–2.7)
MCH RBC QN AUTO: 28.9 PG (ref 26.8–34.3)
MCHC RBC AUTO-ENTMCNC: 32.5 G/DL (ref 31.4–37.4)
MCV RBC AUTO: 89 FL (ref 82–98)
MONOCYTES # BLD AUTO: 0.4 THOUSAND/ÂΜL (ref 0.17–1.22)
MONOCYTES NFR BLD AUTO: 3 % (ref 4–12)
NEUTROPHILS # BLD AUTO: 9.49 THOUSANDS/ÂΜL (ref 1.85–7.62)
NEUTS SEG NFR BLD AUTO: 81 % (ref 43–75)
NRBC BLD AUTO-RTO: 0 /100 WBCS
P AXIS: 100 DEGREES
P AXIS: 58 DEGREES
PLATELET # BLD AUTO: 260 THOUSANDS/UL (ref 149–390)
PMV BLD AUTO: 10.2 FL (ref 8.9–12.7)
POTASSIUM SERPL-SCNC: 4 MMOL/L (ref 3.5–5.3)
PR INTERVAL: 116 MS
PR INTERVAL: 124 MS
PROT SERPL-MCNC: 6 G/DL (ref 6.4–8.4)
PROTHROMBIN TIME: 13.3 SECONDS (ref 11.6–14.5)
QRS AXIS: -9 DEGREES
QRS AXIS: 25 DEGREES
QRSD INTERVAL: 80 MS
QRSD INTERVAL: 82 MS
QT INTERVAL: 406 MS
QT INTERVAL: 444 MS
QTC INTERVAL: 416 MS
QTC INTERVAL: 493 MS
RBC # BLD AUTO: 4.33 MILLION/UL (ref 3.81–5.12)
SODIUM SERPL-SCNC: 137 MMOL/L (ref 135–147)
T WAVE AXIS: 17 DEGREES
T WAVE AXIS: 88 DEGREES
VENTRICULAR RATE: 53 BPM
VENTRICULAR RATE: 89 BPM
WBC # BLD AUTO: 11.77 THOUSAND/UL (ref 4.31–10.16)

## 2023-09-13 PROCEDURE — 94640 AIRWAY INHALATION TREATMENT: CPT

## 2023-09-13 PROCEDURE — 96374 THER/PROPH/DIAG INJ IV PUSH: CPT

## 2023-09-13 PROCEDURE — 83880 ASSAY OF NATRIURETIC PEPTIDE: CPT | Performed by: EMERGENCY MEDICINE

## 2023-09-13 PROCEDURE — 99285 EMERGENCY DEPT VISIT HI MDM: CPT

## 2023-09-13 PROCEDURE — 99285 EMERGENCY DEPT VISIT HI MDM: CPT | Performed by: EMERGENCY MEDICINE

## 2023-09-13 PROCEDURE — 93005 ELECTROCARDIOGRAM TRACING: CPT

## 2023-09-13 PROCEDURE — 93010 ELECTROCARDIOGRAM REPORT: CPT | Performed by: INTERNAL MEDICINE

## 2023-09-13 PROCEDURE — 36415 COLL VENOUS BLD VENIPUNCTURE: CPT

## 2023-09-13 PROCEDURE — 71045 X-RAY EXAM CHEST 1 VIEW: CPT

## 2023-09-13 PROCEDURE — 85730 THROMBOPLASTIN TIME PARTIAL: CPT | Performed by: EMERGENCY MEDICINE

## 2023-09-13 PROCEDURE — 83735 ASSAY OF MAGNESIUM: CPT | Performed by: EMERGENCY MEDICINE

## 2023-09-13 PROCEDURE — 84484 ASSAY OF TROPONIN QUANT: CPT | Performed by: EMERGENCY MEDICINE

## 2023-09-13 PROCEDURE — 85025 COMPLETE CBC W/AUTO DIFF WBC: CPT | Performed by: EMERGENCY MEDICINE

## 2023-09-13 PROCEDURE — 80053 COMPREHEN METABOLIC PANEL: CPT | Performed by: EMERGENCY MEDICINE

## 2023-09-13 PROCEDURE — 85610 PROTHROMBIN TIME: CPT | Performed by: EMERGENCY MEDICINE

## 2023-09-13 RX ORDER — METHYLPREDNISOLONE SODIUM SUCCINATE 125 MG/2ML
80 INJECTION, POWDER, LYOPHILIZED, FOR SOLUTION INTRAMUSCULAR; INTRAVENOUS ONCE
Status: COMPLETED | OUTPATIENT
Start: 2023-09-13 | End: 2023-09-13

## 2023-09-13 RX ORDER — ALBUTEROL SULFATE 2.5 MG/3ML
5 SOLUTION RESPIRATORY (INHALATION) ONCE
Status: COMPLETED | OUTPATIENT
Start: 2023-09-13 | End: 2023-09-13

## 2023-09-13 RX ADMIN — ALBUTEROL SULFATE 5 MG: 2.5 SOLUTION RESPIRATORY (INHALATION) at 14:46

## 2023-09-13 RX ADMIN — METHYLPREDNISOLONE SODIUM SUCCINATE 80 MG: 125 INJECTION, POWDER, FOR SOLUTION INTRAMUSCULAR; INTRAVENOUS at 14:46

## 2023-09-13 NOTE — PROGRESS NOTES
[address] [facility] coding opportunities          Chart Reviewed number of suggestions sent to Provider: 1  [phone]  E11.36     Patients Insurance     Medicare Insurance: [facility] Advantage

## 2023-09-13 NOTE — TELEPHONE ENCOUNTER
Patient called stated was in ED from 9/10/23-9/11/23 and was diagnosised with COVID. Patient is complaining for chest pain and very short of breath. I advised patient she is to go back to ED to be evaluated.  Patient understood

## 2023-09-13 NOTE — ED PROVIDER NOTES
History  Chief Complaint   Patient presents with   • Shortness of Breath     Pt is covid positive as of 9/10 and c/o SOB starting this morning with CP. Pt sob is worsening with ambulation     HPI  Patient coming in with chest discomfort and shortness of breath with ambulation. Diagnosed with COVID several days ago was admitted to the [facility] on the 10th and spent 1 day in the hospital.  She was on the mild COVID pathway. She had an elevated D-dimer, they were unable to get a chest CT due to contrast allergy and the VQ scan showed low probability of PE. She was given IV red Dermasphere due to immunocompromise condition. She was discharged. She has a history of asthma and I find that her nebulizer machine is broken. She was also sent out on steroids. 40 mg twice daily. She called her primary care doctor today with symptoms and they told her to come to the emergency department for evaluation. No nausea or vomiting or diarrhea. She is eating and drinking. Prior to Admission Medications   Prescriptions Last Dose Informant Patient Reported? Taking? Empagliflozin-metFORMIN HCl (Synjardy) 12.5-1000 MG TABS  Self Yes No   Sig: Take by mouth Take 2 tablets by mouth daily   Insulin Aspart (NovoLOG) 100 units/mL injection  Self Yes No   Sig: Inject under the skin Take 6 units for breakfast /7 unit for lunch/10 units at night   Insulin Pen Needle 31G X 4 MM MISC  Self No No   Sig: Use 4 (four) times a day   OneTouch Delica Lancets 39S MISC  Self No No   Sig: Check blood sugars twice daily. Please substitute with appropriate alternative as covered by patient's insurance.  Dx: E11.65   VITAMIN D PO  Self Yes No   Sig: Take by mouth once a week   albuterol (2.5 mg/3 mL) 0.083 % nebulizer solution  Self No No   Sig: Take 1 vial (2.5 mg total) by nebulization every 6 (six) hours as needed for wheezing or shortness of breath   albuterol (PROVENTIL HFA,VENTOLIN HFA) 90 mcg/act inhaler  Self No No   Sig: Inhale 2 puffs every 6 (six) hours as needed for wheezing   anastrozole (ARIMIDEX) 1 mg tablet   No No   Sig: Take 1 tablet (1 mg total) by mouth daily   Patient not taking: Reported on 9/1/2023   aspirin 81 mg chewable tablet  Self No No   Sig: Chew 1 tablet (81 mg total) daily   atorvastatin (LIPITOR) 20 mg tablet   No No   Sig: TAKE 1 TABLET (20 MG TOTAL) BY MOUTH DAILY   budesonide-formoterol (SYMBICORT) 160-4.5 mcg/act inhaler  Self No No   Sig: Inhale 2 puffs 2 (two) times a day as needed (1) Rinse mouth after use. clonazePAM (KlonoPIN) 0.125 mg disintegrating tablet  Self No No   Sig: Take 1 tablet daily as needed for anxiety   dextromethorphan-guaiFENesin (ROBITUSSIN DM)  mg/5 mL syrup   No No   Sig: Take 10 mL by mouth every 4 (four) hours as needed for cough   dicyclomine (BENTYL) 20 mg tablet   No No   Sig: Take 1 tablet (20 mg total) by mouth 2 (two) times a day   ezetimibe (ZETIA) 10 mg tablet  Self No No   Sig: Take 1 tablet (10 mg total) by mouth daily   furosemide (LASIX) 20 mg tablet  Self No No   Sig: TAKE 1 TABLET (20 MG TOTAL) BY MOUTH DAILY AS NEEDED FOR EDEMA   insulin degludec Bro Cookey FlexTouch) 100 units/mL injection pen  Self No No   Sig: Inject 20 Units under the skin daily   insulin lispro (HumaLOG KwikPen) 100 units/mL injection pen  Self No No   Sig: Inject 7 Units under the skin 3 (three) times a day with meals   Patient not taking: Reported on 1/19/2023   levothyroxine 150 mcg tablet  Self No No   Sig: Take 1 tablet (150 mcg total) by mouth daily   Patient taking differently: Take 120 mcg by mouth daily   losartan (COZAAR) 100 MG tablet   No No   Sig: Take 1 tablet (100 mg total) by mouth daily   metoprolol succinate (TOPROL-XL) 50 mg 24 hr tablet  Self No No   Sig: TAKE 1 TABLET EVERY DAY   montelukast (SINGULAIR) 10 mg tablet  Self No No   Sig: Take 1 tablet (10 mg total) by mouth daily   naloxone (NARCAN) 4 mg/0.1 mL nasal spray  Self No No   Sig: Administer 1 spray into a nostril. If no response after 2-3 minutes, give another dose in the other nostril using a new spray.    Patient not taking: Reported on 9/1/2023   omeprazole (PriLOSEC) 20 mg delayed release capsule  Self No No   Sig: TAKE 1 CAPSULE EVERY DAY   oxyCODONE-acetaminophen (Percocet) 5-325 mg per tablet  Self No No   Sig: Take 1 tablet by mouth every 6 (six) hours as needed for moderate pain Max Daily Amount: 4 tablets   Patient not taking: Reported on 9/1/2023   predniSONE 20 mg tablet   No No   Sig: Take 2 tablets (40 mg total) by mouth daily for 5 days      Facility-Administered Medications: None       Past Medical History:   Diagnosis Date   • Abdominal pain    • Asthma    • Back pain     occas due to "disc problem"   • Balance problems     "right side"   • Breast cancer ([address])    • Cholelithiasis     lap aviva today 3/10/2021   • Colon polyp    • Cracked tooth     3   • Depression    • Diabetes mellitus ([address])    • Difficulty swallowing     "food feels like stuck sometimes'   • Exercise involving walking     steps and cleaning   • Fatty liver    • GERD (gastroesophageal reflux disease)    • Hiatal hernia    • History of pneumonia    • History of stomach ulcers    • Hyperlipidemia    • Hypertension    • Obesity    • Pneumonia    • Refusal of blood transfusions as patient is Yarsani    • Risk for falls    • Stroke [facility]) 2013   • Uses Kyrgyz as primary spoken language    • Wears glasses        Past Surgical History:   Procedure Laterality Date   • BREAST LUMPECTOMY Left 08/15/2023    Procedure: BREAST RUBI  DIRECTED LUMPECTOMY;  Surgeon: Es Johnson MD;  [facility];  Service: Surgical Oncology   • CHOLECYSTECTOMY     • COLONOSCOPY  02/09/2021    4 polyps tubular adenomas by Dr. Dimas Suárez   • EGD  04/20/2021    nonobstructing Schatzki's ring dilated with 47 French Hernandez, sliding hiatal hernia otherwise normal.  Biopsies stomach  negative for H. pylori biopsy esophagus negative for eosinophilic esophagitis by Dr. Sydnee Ocampo   • EGD AND COLONOSCOPY  02/09/2021    3 YEAR RECOMMENDED = COLONO   • LYMPH NODE BIOPSY Left 08/15/2023    Procedure: LYMPHATIC MAPPING WITH BLUE AND RADIOACTIVE DYES, SENTINEL LYMPH NODE BIOPSY (INJECT AT 1200 BY DR TORRES IN THE OR); Surgeon: Radha Segundo MD;  Location[facility];  Service: Surgical Oncology   • IA LAPAROSCOPY SURG CHOLECYSTECTOMY N/A 03/10/2021    Procedure: CHOLECYSTECTOMY LAPAROSCOPIC W/ ROBOTICS;  Surgeon: Sherif Jenkins MD;  Location: [facility];  Service: General   • ROTATOR CUFF REPAIR Bilateral     screws implanted"   • UPPER GASTROINTESTINAL ENDOSCOPY  02/09/2021    4 cm hiatal hernia without Alejandro lesions. Possibly shortened esophagus. Biopsy of the stomach negative for H. pylori, biopsy of the EG junction negative for Kaplan's, biopsied duodenum negative for celiac by Dr. Bartholomew Trace   • Nikhil Beltran Left 07/10/2023   • [address] THYROID BIOPSY  03/07/2023       Family History   Problem Relation Age of Onset   • Diabetes Mother    • Hypertension Mother    • Hypertension Father    • No Known Problems Sister    • Colon cancer Brother 71   • No Known Problems Son    • No Known Problems Son    • No Known Problems Daughter    • No Known Problems Daughter    • No Known Problems Daughter    • No Known Problems Maternal Grandmother    • No Known Problems Maternal Grandfather    • No Known Problems Paternal Grandmother    • No Known Problems Paternal Grandfather    • Breast cancer Other      I have reviewed and agree with the history as documented.     E-Cigarette/Vaping   • E-Cigarette Use Never User      E-Cigarette/Vaping Substances   • Nicotine No    • THC No    • CBD No    • Flavoring No    • Other No    • Unknown No      Social History     Tobacco Use   • Smoking status: Never   • Smokeless tobacco: Never   Vaping Use   • Vaping Use: Never used   Substance Use Topics   • Alcohol use: Never   • Drug use: Never       Review of Systems    Physical Exam  Physical Exam  Vitals and nursing note reviewed. Constitutional:       General: She is not in acute distress. Appearance: Normal appearance. She is well-developed. She is not ill-appearing, toxic-appearing or diaphoretic. HENT:      Head: Normocephalic and atraumatic. Right Ear: Hearing normal. No drainage or swelling. Left Ear: Hearing normal. No drainage or swelling. Eyes:      General: Lids are normal.         Right eye: No discharge. Left eye: No discharge. Conjunctiva/sclera: Conjunctivae normal.   Neck:      Vascular: No JVD. Trachea: Trachea normal.   Cardiovascular:      Rate and Rhythm: Normal rate and regular rhythm. Pulses: Normal pulses. Heart sounds: Normal heart sounds. No murmur heard. No friction rub. No gallop. Pulmonary:      Effort: Pulmonary effort is normal. No accessory muscle usage or respiratory distress. Breath sounds: No stridor. Wheezing present. No rales. Comments: Mild wheezing speaking in full sentences no accessory muscle usage. Room air and ambulatory saturation 97%. Chest:      Chest wall: No tenderness. Abdominal:      Palpations: Abdomen is soft. Tenderness: There is no abdominal tenderness. There is no guarding or rebound. Musculoskeletal:         General: No tenderness or deformity. Normal range of motion. Cervical back: Normal range of motion. Lymphadenopathy:      Cervical: No cervical adenopathy. Skin:     General: Skin is warm and dry. Coloration: Skin is not pale. Findings: No rash. Neurological:      Mental Status: She is alert. GCS: GCS eye subscore is 4. GCS verbal subscore is 5. GCS motor subscore is 6. Cranial Nerves: No cranial nerve deficit. Sensory: No sensory deficit. Motor: No abnormal muscle tone. Psychiatric:         Speech: Speech normal.         Behavior: Behavior is cooperative.          Vital Signs  ED Triage Vitals   Temperature Pulse Respirations Blood Pressure SpO2   09/13/23 1324 09/13/23 1324 09/13/23 1324 09/13/23 1324 09/13/23 1324   98.1 °F (36.7 °C) (!) 45 20 (!) 183/102 99 %      Temp src Heart Rate Source Patient Position - Orthostatic VS BP Location FiO2 (%)   -- 09/13/23 1430 09/13/23 1324 09/13/23 1324 --    Monitor Sitting Right arm       Pain Score       --                  Vitals:    09/13/23 1324 09/13/23 1430 09/13/23 1600   BP: (!) 183/102 138/65 125/59   Pulse: (!) 45 (!) 45 74   Patient Position - Orthostatic VS: Sitting Lying          Visual Acuity      ED Medications  Medications   albuterol inhalation solution 5 mg (5 mg Nebulization Given 9/13/23 1446)   methylPREDNISolone sodium succinate (Solu-MEDROL) injection 80 mg (80 mg Intravenous Given 9/13/23 1446)       Diagnostic Studies  Results Reviewed     Procedure Component Value Units Date/Time    HS Troponin I 2hr [[id number]]  (Normal) Collected: 09/13/23 1618    Lab Status: Final result Specimen: Blood from Arm, Right Updated: 09/13/23 1644     hs TnI 2hr 3 ng/L      Delta 2hr hsTnI 0 ng/L     HS Troponin I 4hr [[id number]]     Lab Status: No result Specimen: Blood     B-Type Natriuretic Peptide(BNP) [[id number]]  (Abnormal) Collected: 09/13/23 1354    Lab Status: Final result Specimen: Blood from Arm, Right Updated: 09/13/23 1553      pg/mL     Magnesium [[id number]]  (Normal) Collected: 09/13/23 1354    Lab Status: Final result Specimen: Blood from Arm, Right Updated: 09/13/23 1516     Magnesium 1.9 mg/dL     Protime-INR [[id number]]  (Normal) Collected: 09/13/23 1354    Lab Status: Final result Specimen: Blood from Arm, Right Updated: 09/13/23 1459     Protime 13.3 seconds      INR 1.01    APTT [[id number]]  (Normal) Collected: 09/13/23 1354    Lab Status: Final result Specimen: Blood from Arm, Right Updated: 09/13/23 1459     PTT 25 seconds     HS Troponin 0hr (reflex protocol) [[id number]]  (Normal) Collected: 09/13/23 1354    Lab Status: Final result Specimen: Blood from Arm, Right Updated: 09/13/23 1426     hs TnI 0hr 3 ng/L     Comprehensive metabolic panel [[id number]]  (Abnormal) Collected: 09/13/23 1354    Lab Status: Final result Specimen: Blood from Arm, Right Updated: 09/13/23 1421     Sodium 137 mmol/L      Potassium 4.0 mmol/L      Chloride 102 mmol/L      CO2 30 mmol/L      ANION GAP 5 mmol/L      BUN 27 mg/dL      Creatinine 1.09 mg/dL      Glucose 206 mg/dL      Calcium 8.5 mg/dL      Corrected Calcium 9.0 mg/dL      AST 27 U/L      ALT 21 U/L      Alkaline Phosphatase 70 U/L      Total Protein 6.0 g/dL      Albumin 3.4 g/dL      Total Bilirubin 0.40 mg/dL      eGFR 52 ml/min/1.73sq m     Narrative:      [place] guidelines for Chronic Kidney Disease (CKD):   •  Stage 1 with normal or high GFR (GFR > 90 mL/min/1.73 square meters)  •  Stage 2 Mild CKD (GFR = 60-89 mL/min/1.73 square meters)  •  Stage 3A Moderate CKD (GFR = 45-59 mL/min/1.73 square meters)  •  Stage 3B Moderate CKD (GFR = 30-44 mL/min/1.73 square meters)  •  Stage 4 Severe CKD (GFR = 15-29 mL/min/1.73 square meters)  •  Stage 5 End Stage CKD (GFR <15 mL/min/1.73 square meters)  Note: GFR calculation is accurate only with a steady state creatinine    CBC and differential [[id number]]  (Abnormal) Collected: 09/13/23 1354    Lab Status: Final result Specimen: Blood from Arm, Right Updated: 09/13/23 1401     WBC 11.77 Thousand/uL      RBC 4.33 Million/uL      Hemoglobin 12.5 g/dL      Hematocrit 38.5 %      MCV 89 fL      MCH 28.9 pg      MCHC 32.5 g/dL      RDW 13.5 %      MPV 10.2 fL      Platelets 467 Thousands/uL      nRBC 0 /100 WBCs      Neutrophils Relative 81 %      Immat GRANS % 1 %      Lymphocytes Relative 15 %      Monocytes Relative 3 %      Eosinophils Relative 0 %      Basophils Relative 0 %      Neutrophils Absolute 9.49 Thousands/µL      Immature Grans Absolute 0.10 Thousand/uL      Lymphocytes Absolute 1.77 Thousands/µL      Monocytes Absolute 0.40 Thousand/µL      Eosinophils Absolute 0.00 Thousand/µL      Basophils Absolute 0.01 Thousands/µL                  XR chest 1 view portable   ED Interpretation by Kevin Taveras MD (09/13 4254)   I have reviewed the film, per my independent interpretation : no acute infiltrate or pneumothorax or pneumomediastinum. Final Result by Beena Carranza MD (09/13 2258)      No acute cardiopulmonary disease. Workstation performed: [id number]                    Procedures  ECG 12 Lead Documentation Only    Date/Time: 9/13/2023 2:00 PM    Performed by: Kevin Taveras MD  Authorized by: Kevin Taveras MD    Indications / Diagnosis:  Cp  Rate:     ECG rate assessment: bradycardic    Rhythm:     Rhythm: sinus bradycardia    Ectopy:     Ectopy: none    QRS:     QRS axis:  Normal    QRS intervals:  Normal  Conduction:     Conduction: normal    ST segments:     ST segments:  Non-specific  T waves:     T waves: non-specific    ECG 12 Lead Documentation Only    Date/Time: 9/13/2023 4:20 PM    Performed by: Kevin Taveras MD  Authorized by: Kevin Taveras MD    Indications / Diagnosis:  Reeval  ECG reviewed by me, the ED Provider: yes    Patient location:  ED  Rate:     ECG rate:  85    ECG rate assessment: normal    Rhythm:     Rhythm: sinus rhythm    Ectopy:     Ectopy: none    QRS:     QRS axis:  Normal    QRS intervals:  Normal  Conduction:     Conduction: normal    ST segments:     ST segments:  Normal  T waves:     T waves: normal               ED Course  ED Course as of 09/13/23 1733   Wed Sep 13, 2023   1702 hs TnI 2hr: 3   1702 Delta 2hr hsTnI: 0  2-hour troponin negative will discharge since patient feeling better. Mild elevation of the white count with normal hemoglobin and normal platelets. No bandemia. BNP only 381. No major electrolyte abnormalities, normal renal function and normal LFTs.                           SBIRT 22yo+ Flowsheet Row Most Recent Value   Initial Alcohol Screen: US AUDIT-C     1. How often do you have a drink containing alcohol? 0 Filed at: 09/13/2023 1326   2. How many drinks containing alcohol do you have on a typical day you are drinking? 0 Filed at: 09/13/2023 1326   3a. Male UNDER 65: How often do you have five or more drinks on one occasion? 0 Filed at: 09/13/2023 1326   3b. FEMALE Any Age, or MALE 65+: How often do you have 4 or more drinks on one occassion? 0 Filed at: 09/13/2023 1326   Audit-C Score 0 Filed at: 09/13/2023 1326   ARA: How many times in the past year have you. .. Used an illegal drug or used a prescription medication for non-medical reasons? Never Filed at: 09/13/2023 1326                    Medical Decision Making  Patient feeling much better after breathing treatment. She is currently on steroids. Given the duration of symptoms would not be a candidate for Paxlovid at the present time. She is on steroids. We are giving her a nebulizer machine since that is broken and she does not have one at home but she does have the medications for it. I did hand her a pulse ox and instructed her that less than 90% she is to return for evaluation. She has a primary care doctor that she can call for follow-up. The fact that she feels better and her oxygen saturation is greater than 90% including a 97% ambulatory pulse ox does not need inpatient hospitalization. She appears well-hydrated at the moment. Initial bradycardia when she initially arrived resolved and she remained in normal sinus rhythm in the 70s and 80s. She was not feeling dizzy or lightheaded. Amount and/or Complexity of Data Reviewed  Labs: ordered. Decision-making details documented in ED Course. Radiology: ordered and independent interpretation performed. Risk  Prescription drug management.           Disposition  Final diagnoses:   COVID-19   Bronchitis     Time reflects when diagnosis was documented in both MDM as applicable and the Disposition within this note     Time User Action Codes Description Comment    9/13/2023  5:04 PM Iveth Milton Add [U07.1] COVID-19     9/13/2023  5:04 PM Iveth Milton Add [J40] Bronchitis       ED Disposition     ED Disposition   Discharge    Condition   Stable    Date/Time   Wed Sep 13, 2023  5:04 PM    Comment   Alex Keita discharge to home/self care. Follow-up Information     Follow up With Specialties Details Why [address] Heri Gomez MD Internal Medicine Schedule an appointment as soon as possible for a visit in 3 days reevaluation [phone]  [facility] [address]            Discharge Medication List as of 9/13/2023  5:06 PM      CONTINUE these medications which have NOT CHANGED    Details   albuterol (2.5 mg/3 mL) 0.083 % nebulizer solution Take 1 vial (2.5 mg total) by nebulization every 6 (six) hours as needed for wheezing or shortness of breath, Starting Tue 3/31/2020, Normal      albuterol (PROVENTIL HFA,VENTOLIN HFA) 90 mcg/act inhaler Inhale 2 puffs every 6 (six) hours as needed for wheezing, Starting Mon 2/6/2023, Normal      anastrozole (ARIMIDEX) 1 mg tablet Take 1 tablet (1 mg total) by mouth daily, Starting Fri 9/1/2023, Normal      aspirin 81 mg chewable tablet Chew 1 tablet (81 mg total) daily, Starting Mon 2/6/2023, Normal      atorvastatin (LIPITOR) 20 mg tablet TAKE 1 TABLET (20 MG TOTAL) BY MOUTH DAILY, Starting Wed 9/6/2023, Normal      budesonide-formoterol (SYMBICORT) 160-4.5 mcg/act inhaler Inhale 2 puffs 2 (two) times a day as needed (1) Rinse mouth after use., Starting Thu 1/19/2023, Normal      clonazePAM (KlonoPIN) 0.125 mg disintegrating tablet Take 1 tablet daily as needed for anxiety, Normal      dextromethorphan-guaiFENesin (ROBITUSSIN DM)  mg/5 mL syrup Take 10 mL by mouth every 4 (four) hours as needed for cough, Starting Mon 9/11/2023, Normal      dicyclomine (BENTYL) 20 mg tablet Take 1 tablet (20 mg total) by mouth 2 (two) times a day, Starting Fri 9/9/2022, Until Fri 9/1/2023, Normal      Empagliflozin-metFORMIN HCl (Synjardy) 12.5-1000 MG TABS Take by mouth Take 2 tablets by mouth daily, Historical Med      ezetimibe (ZETIA) 10 mg tablet Take 1 tablet (10 mg total) by mouth daily, Starting Mon 2/6/2023, Normal      furosemide (LASIX) 20 mg tablet TAKE 1 TABLET (20 MG TOTAL) BY MOUTH DAILY AS NEEDED FOR EDEMA, Starting Mon 7/10/2023, Normal      Insulin Aspart (NovoLOG) 100 units/mL injection Inject under the skin Take 6 units for breakfast /7 unit for lunch/10 units at night, Historical Med      insulin degludec Suha Leah FlexTouch) 100 units/mL injection pen Inject 20 Units under the skin daily, Starting Mon 2/6/2023, Normal      insulin lispro (HumaLOG KwikPen) 100 units/mL injection pen Inject 7 Units under the skin 3 (three) times a day with meals, Starting Thu 11/3/2022, Normal      Insulin Pen Needle 31G X 4 MM MISC Use 4 (four) times a day, Starting Thu 11/3/2022, Normal      levothyroxine 150 mcg tablet Take 1 tablet (150 mcg total) by mouth daily, Starting Mon 2/6/2023, Normal      losartan (COZAAR) 100 MG tablet Take 1 tablet (100 mg total) by mouth daily, Starting Mon 2/6/2023, Until Fri 9/1/2023, Normal      metoprolol succinate (TOPROL-XL) 50 mg 24 hr tablet TAKE 1 TABLET EVERY DAY, Normal      montelukast (SINGULAIR) 10 mg tablet Take 1 tablet (10 mg total) by mouth daily, Starting Mon 2/6/2023, Normal      naloxone (NARCAN) 4 mg/0.1 mL nasal spray Administer 1 spray into a nostril. If no response after 2-3 minutes, give another dose in the other nostril using a new spray., Normal      omeprazole (PriLOSEC) 20 mg delayed release capsule TAKE 1 CAPSULE EVERY DAY, Normal      OneTouch Delica Lancets 70D MISC Check blood sugars twice daily. Please substitute with appropriate alternative as covered by patient's insurance.  Dx: E11.65, Normal oxyCODONE-acetaminophen (Percocet) 5-325 mg per tablet Take 1 tablet by mouth every 6 (six) hours as needed for moderate pain Max Daily Amount: 4 tablets, Starting Wed 7/26/2023, Normal      predniSONE 20 mg tablet Take 2 tablets (40 mg total) by mouth daily for 5 days, Starting Mon 9/11/2023, Until Sat 9/16/2023, Normal      VITAMIN D PO Take by mouth once a week, Historical Med             No discharge procedures on file.     PDMP Review       Value Time User    PDMP Reviewed  Yes 7/26/2023  4:08 PM Shu Rosenbaum MD          ED Provider  Electronically Signed by           Jennyfer Keith MD  09/13/23 [id number]

## 2023-09-15 ENCOUNTER — TELEMEDICINE (OUTPATIENT)
Dept: INTERNAL MEDICINE CLINIC | Facility: CLINIC | Age: 68
End: 2023-09-15
Payer: COMMERCIAL

## 2023-09-15 ENCOUNTER — PATIENT OUTREACH (OUTPATIENT)
Dept: HEMATOLOGY ONCOLOGY | Facility: CLINIC | Age: 68
End: 2023-09-15

## 2023-09-15 DIAGNOSIS — U07.1 COVID-19: Primary | ICD-10-CM

## 2023-09-15 DIAGNOSIS — J45.20 MILD INTERMITTENT ASTHMA WITHOUT COMPLICATION: ICD-10-CM

## 2023-09-15 PROCEDURE — 99213 OFFICE O/P EST LOW 20 MIN: CPT | Performed by: INTERNAL MEDICINE

## 2023-09-15 RX ORDER — ALBUTEROL SULFATE 2.5 MG/3ML
2.5 SOLUTION RESPIRATORY (INHALATION) EVERY 6 HOURS PRN
Qty: 60 ML | Refills: 3 | Status: SHIPPED | OUTPATIENT
Start: 2023-09-15

## 2023-09-15 NOTE — PROGRESS NOTES
Breast Oncology Nurse Navigator    Called patient to check in. She states she is feeling better today. She was having difficulty breathing. She has dizziness upon standing. She has called her PCP 2-3 times. She had a telemedicine visit with PCP today. Reviewed upcoming appointments related to breast cancer diagnosis. She has already started anastrozole. Denies side effects from medication. Patient has my contact information and knows she can reach out with questions or concerns.

## 2023-09-15 NOTE — PROGRESS NOTES
COVID-19 Outpatient Progress Note    Assessment/Plan:    Seen today for virtual visit. Medical history of hypertension, diabetes, dyslipidemia, asthma, recently diagnosed breast cancer on Arimidex    Patient presented to the hospital on 9/10 with shortness of breath, wheezing, dry cough. COVID-positive 9/10. Chest x-ray unremarkable. Elevated D-dimer, VQ scan was obtained due to contrast allergy. VQ scan showed low probability of PE. She received IV remdesivir due to her immunocompromised condition. She was discharged home. Went back to the ER on 9/13 with chest discomfort and shortness of breath. Noted to have adequate O2 saturation on room air. Chest x-ray again unremarkable. She was given ER return precautions and advised to follow-up with PCP    Today, patient doing okay at home. She does have some ongoing shortness of breath, at times will feel dizzy when standing up requiring her to sit down. Her pulse ox is 99% measured during today's visit. She has 1 day left of prednisone. She states she needs refill for nebulizer solution    Plan:  -Recommend completing course of prednisone, may continue with inhaler regimen and albuterol nebulizers as needed  -She will continue to monitor her pulse ox at home.   We discussed that if her pulse ox is persistently below 90% or she develops progressive respiratory distress she should call the office or seek medical care immediately  -Refill for neb solution sent to pharmacy       Problem List Items Addressed This Visit        Other    COVID-19 - Primary   Other Visit Diagnoses     Mild intermittent asthma without complication        Relevant Medications    albuterol (2.5 mg/3 mL) 0.083 % nebulizer solution           COVID-19 Plan    Encounter provider: Jennifer Carrizales DO     Provider located at: [address] [id number]     Recent Visits  Date Type Provider Dept   09/13/23 Telephone King Josh  Internal [facility]   Showing recent visits within past 7 days and meeting all other requirements  Today's Visits  Date Type Provider Dept   09/15/23 Telemedicine Akshat Mahajan DO [facility]   Showing today's visits and meeting all other requirements  Future Appointments  No visits were found meeting these conditions. Showing future appointments within next 150 days and meeting all other requirements     COVID-19 HPI  Lab Results   Component Value Date    SARSCOV2 Positive (A) 09/10/2023    SARSCOV2 Negative 02/09/2021       Review of Systems  Current Outpatient Medications on File Prior to Visit   Medication Sig   • albuterol (PROVENTIL HFA,VENTOLIN HFA) 90 mcg/act inhaler Inhale 2 puffs every 6 (six) hours as needed for wheezing   • anastrozole (ARIMIDEX) 1 mg tablet Take 1 tablet (1 mg total) by mouth daily (Patient not taking: Reported on 9/1/2023)   • aspirin 81 mg chewable tablet Chew 1 tablet (81 mg total) daily   • atorvastatin (LIPITOR) 20 mg tablet TAKE 1 TABLET (20 MG TOTAL) BY MOUTH DAILY   • budesonide-formoterol (SYMBICORT) 160-4.5 mcg/act inhaler Inhale 2 puffs 2 (two) times a day as needed (1) Rinse mouth after use.    • clonazePAM (KlonoPIN) 0.125 mg disintegrating tablet Take 1 tablet daily as needed for anxiety   • dextromethorphan-guaiFENesin (ROBITUSSIN DM)  mg/5 mL syrup Take 10 mL by mouth every 4 (four) hours as needed for cough   • dicyclomine (BENTYL) 20 mg tablet Take 1 tablet (20 mg total) by mouth 2 (two) times a day   • Empagliflozin-metFORMIN HCl (Synjardy) 12.5-1000 MG TABS Take by mouth Take 2 tablets by mouth daily   • ezetimibe (ZETIA) 10 mg tablet Take 1 tablet (10 mg total) by mouth daily   • furosemide (LASIX) 20 mg tablet TAKE 1 TABLET (20 MG TOTAL) BY MOUTH DAILY AS NEEDED FOR EDEMA   • Insulin Aspart (NovoLOG) 100 units/mL injection Inject under the skin Take 6 units for breakfast /7 unit for lunch/10 units at night • insulin degludec Sharlett Gander FlexTouch) 100 units/mL injection pen Inject 20 Units under the skin daily   • insulin lispro (HumaLOG KwikPen) 100 units/mL injection pen Inject 7 Units under the skin 3 (three) times a day with meals (Patient not taking: Reported on 1/19/2023)   • Insulin Pen Needle 31G X 4 MM MISC Use 4 (four) times a day   • levothyroxine 150 mcg tablet Take 1 tablet (150 mcg total) by mouth daily (Patient taking differently: Take 120 mcg by mouth daily)   • losartan (COZAAR) 100 MG tablet Take 1 tablet (100 mg total) by mouth daily   • metoprolol succinate (TOPROL-XL) 50 mg 24 hr tablet TAKE 1 TABLET EVERY DAY   • montelukast (SINGULAIR) 10 mg tablet Take 1 tablet (10 mg total) by mouth daily   • naloxone (NARCAN) 4 mg/0.1 mL nasal spray Administer 1 spray into a nostril. If no response after 2-3 minutes, give another dose in the other nostril using a new spray. (Patient not taking: Reported on 9/1/2023)   • omeprazole (PriLOSEC) 20 mg delayed release capsule TAKE 1 CAPSULE EVERY DAY   • OneTouch Delica Lancets 34X MISC Check blood sugars twice daily. Please substitute with appropriate alternative as covered by patient's insurance. Dx: E11.65   • oxyCODONE-acetaminophen (Percocet) 5-325 mg per tablet Take 1 tablet by mouth every 6 (six) hours as needed for moderate pain Max Daily Amount: 4 tablets (Patient not taking: Reported on 9/1/2023)   • predniSONE 20 mg tablet Take 2 tablets (40 mg total) by mouth daily for 5 days   • VITAMIN D PO Take by mouth once a week   • [DISCONTINUED] albuterol (2.5 mg/3 mL) 0.083 % nebulizer solution Take 1 vial (2.5 mg total) by nebulization every 6 (six) hours as needed for wheezing or shortness of breath       Objective: There were no vitals taken for this visit.        Physical Exam  Max Eleazar Byrd, DO

## 2023-09-20 ENCOUNTER — PATIENT OUTREACH (OUTPATIENT)
Dept: HEMATOLOGY ONCOLOGY | Facility: CLINIC | Age: 68
End: 2023-09-20

## 2023-09-20 PROCEDURE — 77263 THER RADIOLOGY TX PLNG CPLX: CPT | Performed by: INTERNAL MEDICINE

## 2023-09-20 NOTE — PROGRESS NOTES
Breast Oncology Nurse Navigator    Received a message from radiation oncology. Patient did not show for her simulation appointment this morning and she is not answering her phone. Called patient at this time and left a voicemail, including a tentative date for her to see radiation oncology for a rescheduled simulation next week, my name and my contact information. Requested a return phone call. Patient returned my call at 1315. Patient thought her appointment was for next week. She did not realize she missed her appointment. Offered her 9/27/23 at 1100 per radiation oncology. Radiation oncology made aware and will schedule the appointment. Patient is anxious to start radiation treatment. Patient knows she can call with questions or concerns.

## 2023-09-27 ENCOUNTER — APPOINTMENT (OUTPATIENT)
Dept: RADIATION ONCOLOGY | Facility: CLINIC | Age: 68
End: 2023-09-27
Attending: INTERNAL MEDICINE
Payer: COMMERCIAL

## 2023-09-27 PROCEDURE — 77332 RADIATION TREATMENT AID(S): CPT | Performed by: INTERNAL MEDICINE

## 2023-09-27 PROCEDURE — 77290 THER RAD SIMULAJ FIELD CPLX: CPT | Performed by: INTERNAL MEDICINE

## 2023-10-03 ENCOUNTER — APPOINTMENT (OUTPATIENT)
Dept: RADIATION ONCOLOGY | Facility: CLINIC | Age: 68
End: 2023-10-03
Payer: COMMERCIAL

## 2023-10-03 PROCEDURE — 77300 RADIATION THERAPY DOSE PLAN: CPT | Performed by: INTERNAL MEDICINE

## 2023-10-03 PROCEDURE — 77334 RADIATION TREATMENT AID(S): CPT | Performed by: INTERNAL MEDICINE

## 2023-10-03 PROCEDURE — 77295 3-D RADIOTHERAPY PLAN: CPT | Performed by: INTERNAL MEDICINE

## 2023-10-12 PROCEDURE — 77280 THER RAD SIMULAJ FIELD SMPL: CPT | Performed by: RADIOLOGY

## 2023-10-16 ENCOUNTER — APPOINTMENT (OUTPATIENT)
Dept: RADIATION ONCOLOGY | Facility: CLINIC | Age: 68
End: 2023-10-16
Attending: INTERNAL MEDICINE
Payer: COMMERCIAL

## 2023-10-16 PROCEDURE — 77427 RADIATION TX MANAGEMENT X5: CPT | Performed by: INTERNAL MEDICINE

## 2023-10-16 PROCEDURE — 77014 CHG CT GUIDANCE RADIATION THERAPY FLDS PLACEMENT: CPT | Performed by: RADIOLOGY

## 2023-10-16 PROCEDURE — 77387 GUIDANCE FOR RADJ TX DLVR: CPT | Performed by: RADIOLOGY

## 2023-10-16 PROCEDURE — 77412 RADIATION TX DELIVERY LVL 3: CPT | Performed by: RADIOLOGY

## 2023-10-17 ENCOUNTER — APPOINTMENT (OUTPATIENT)
Dept: RADIATION ONCOLOGY | Facility: CLINIC | Age: 68
End: 2023-10-17
Attending: INTERNAL MEDICINE
Payer: COMMERCIAL

## 2023-10-18 ENCOUNTER — APPOINTMENT (OUTPATIENT)
Dept: RADIATION ONCOLOGY | Facility: CLINIC | Age: 68
End: 2023-10-18
Attending: INTERNAL MEDICINE
Payer: COMMERCIAL

## 2023-10-18 PROCEDURE — 77412 RADIATION TX DELIVERY LVL 3: CPT | Performed by: INTERNAL MEDICINE

## 2023-10-18 PROCEDURE — 77014 CHG CT GUIDANCE RADIATION THERAPY FLDS PLACEMENT: CPT | Performed by: INTERNAL MEDICINE

## 2023-10-18 PROCEDURE — 77387 GUIDANCE FOR RADJ TX DLVR: CPT | Performed by: INTERNAL MEDICINE

## 2023-10-19 ENCOUNTER — APPOINTMENT (OUTPATIENT)
Dept: RADIATION ONCOLOGY | Facility: CLINIC | Age: 68
End: 2023-10-19
Attending: INTERNAL MEDICINE
Payer: COMMERCIAL

## 2023-10-19 PROCEDURE — 77387 GUIDANCE FOR RADJ TX DLVR: CPT | Performed by: RADIOLOGY

## 2023-10-19 PROCEDURE — 77412 RADIATION TX DELIVERY LVL 3: CPT | Performed by: RADIOLOGY

## 2023-10-19 PROCEDURE — 77014 CHG CT GUIDANCE RADIATION THERAPY FLDS PLACEMENT: CPT | Performed by: RADIOLOGY

## 2023-10-20 ENCOUNTER — APPOINTMENT (OUTPATIENT)
Dept: RADIATION ONCOLOGY | Facility: CLINIC | Age: 68
End: 2023-10-20
Attending: INTERNAL MEDICINE
Payer: COMMERCIAL

## 2023-10-20 PROCEDURE — 77387 GUIDANCE FOR RADJ TX DLVR: CPT | Performed by: RADIOLOGY

## 2023-10-20 PROCEDURE — 77014 CHG CT GUIDANCE RADIATION THERAPY FLDS PLACEMENT: CPT | Performed by: RADIOLOGY

## 2023-10-20 PROCEDURE — 77412 RADIATION TX DELIVERY LVL 3: CPT | Performed by: RADIOLOGY

## 2023-10-23 ENCOUNTER — APPOINTMENT (OUTPATIENT)
Dept: RADIATION ONCOLOGY | Facility: CLINIC | Age: 68
End: 2023-10-23
Attending: INTERNAL MEDICINE
Payer: COMMERCIAL

## 2023-10-23 ENCOUNTER — APPOINTMENT (OUTPATIENT)
Dept: RADIATION ONCOLOGY | Facility: CLINIC | Age: 68
End: 2023-10-23
Payer: COMMERCIAL

## 2023-10-23 PROCEDURE — 77014 CHG CT GUIDANCE RADIATION THERAPY FLDS PLACEMENT: CPT | Performed by: RADIOLOGY

## 2023-10-23 PROCEDURE — 77387 GUIDANCE FOR RADJ TX DLVR: CPT | Performed by: RADIOLOGY

## 2023-10-23 PROCEDURE — 77336 RADIATION PHYSICS CONSULT: CPT | Performed by: INTERNAL MEDICINE

## 2023-10-23 PROCEDURE — 77412 RADIATION TX DELIVERY LVL 3: CPT | Performed by: RADIOLOGY

## 2023-10-24 ENCOUNTER — APPOINTMENT (OUTPATIENT)
Dept: RADIATION ONCOLOGY | Facility: CLINIC | Age: 68
End: 2023-10-24
Attending: INTERNAL MEDICINE
Payer: COMMERCIAL

## 2023-10-24 PROCEDURE — 77427 RADIATION TX MANAGEMENT X5: CPT | Performed by: INTERNAL MEDICINE

## 2023-10-24 PROCEDURE — 77412 RADIATION TX DELIVERY LVL 3: CPT | Performed by: RADIOLOGY

## 2023-10-24 PROCEDURE — 77387 GUIDANCE FOR RADJ TX DLVR: CPT | Performed by: RADIOLOGY

## 2023-10-24 PROCEDURE — 77014 CHG CT GUIDANCE RADIATION THERAPY FLDS PLACEMENT: CPT | Performed by: RADIOLOGY

## 2023-10-25 ENCOUNTER — APPOINTMENT (OUTPATIENT)
Dept: RADIATION ONCOLOGY | Facility: CLINIC | Age: 68
End: 2023-10-25
Attending: INTERNAL MEDICINE
Payer: COMMERCIAL

## 2023-10-25 ENCOUNTER — APPOINTMENT (OUTPATIENT)
Dept: RADIATION ONCOLOGY | Facility: CLINIC | Age: 68
End: 2023-10-25
Payer: COMMERCIAL

## 2023-10-25 PROCEDURE — 77412 RADIATION TX DELIVERY LVL 3: CPT | Performed by: INTERNAL MEDICINE

## 2023-10-25 PROCEDURE — 77014 CHG CT GUIDANCE RADIATION THERAPY FLDS PLACEMENT: CPT | Performed by: INTERNAL MEDICINE

## 2023-10-25 PROCEDURE — 77387 GUIDANCE FOR RADJ TX DLVR: CPT | Performed by: INTERNAL MEDICINE

## 2023-10-26 ENCOUNTER — APPOINTMENT (OUTPATIENT)
Dept: RADIATION ONCOLOGY | Facility: CLINIC | Age: 68
End: 2023-10-26
Attending: INTERNAL MEDICINE
Payer: COMMERCIAL

## 2023-10-26 PROCEDURE — 77014 CHG CT GUIDANCE RADIATION THERAPY FLDS PLACEMENT: CPT | Performed by: RADIOLOGY

## 2023-10-26 PROCEDURE — 77387 GUIDANCE FOR RADJ TX DLVR: CPT | Performed by: RADIOLOGY

## 2023-10-26 PROCEDURE — 77412 RADIATION TX DELIVERY LVL 3: CPT | Performed by: RADIOLOGY

## 2023-10-27 ENCOUNTER — APPOINTMENT (OUTPATIENT)
Dept: RADIATION ONCOLOGY | Facility: CLINIC | Age: 68
End: 2023-10-27
Attending: INTERNAL MEDICINE
Payer: COMMERCIAL

## 2023-10-27 PROCEDURE — 77014 CHG CT GUIDANCE RADIATION THERAPY FLDS PLACEMENT: CPT | Performed by: RADIOLOGY

## 2023-10-27 PROCEDURE — 77412 RADIATION TX DELIVERY LVL 3: CPT | Performed by: RADIOLOGY

## 2023-10-27 PROCEDURE — 77387 GUIDANCE FOR RADJ TX DLVR: CPT | Performed by: RADIOLOGY

## 2023-10-30 ENCOUNTER — APPOINTMENT (OUTPATIENT)
Dept: RADIATION ONCOLOGY | Facility: CLINIC | Age: 68
End: 2023-10-30
Attending: INTERNAL MEDICINE
Payer: COMMERCIAL

## 2023-10-30 PROCEDURE — 77412 RADIATION TX DELIVERY LVL 3: CPT | Performed by: RADIOLOGY

## 2023-10-30 PROCEDURE — 77387 GUIDANCE FOR RADJ TX DLVR: CPT | Performed by: RADIOLOGY

## 2023-10-30 PROCEDURE — 77014 CHG CT GUIDANCE RADIATION THERAPY FLDS PLACEMENT: CPT | Performed by: RADIOLOGY

## 2023-10-30 PROCEDURE — 77336 RADIATION PHYSICS CONSULT: CPT | Performed by: INTERNAL MEDICINE

## 2023-10-31 ENCOUNTER — APPOINTMENT (OUTPATIENT)
Dept: RADIATION ONCOLOGY | Facility: CLINIC | Age: 68
End: 2023-10-31
Attending: INTERNAL MEDICINE
Payer: COMMERCIAL

## 2023-10-31 PROCEDURE — 77387 GUIDANCE FOR RADJ TX DLVR: CPT | Performed by: RADIOLOGY

## 2023-10-31 PROCEDURE — 77412 RADIATION TX DELIVERY LVL 3: CPT | Performed by: RADIOLOGY

## 2023-10-31 PROCEDURE — 77014 CHG CT GUIDANCE RADIATION THERAPY FLDS PLACEMENT: CPT | Performed by: RADIOLOGY

## 2023-10-31 PROCEDURE — 77427 RADIATION TX MANAGEMENT X5: CPT | Performed by: INTERNAL MEDICINE

## 2023-11-01 ENCOUNTER — APPOINTMENT (OUTPATIENT)
Dept: RADIATION ONCOLOGY | Facility: CLINIC | Age: 68
End: 2023-11-01
Payer: COMMERCIAL

## 2023-11-01 PROCEDURE — 77412 RADIATION TX DELIVERY LVL 3: CPT | Performed by: INTERNAL MEDICINE

## 2023-11-01 PROCEDURE — 77387 GUIDANCE FOR RADJ TX DLVR: CPT | Performed by: INTERNAL MEDICINE

## 2023-11-01 PROCEDURE — 77014 CHG CT GUIDANCE RADIATION THERAPY FLDS PLACEMENT: CPT | Performed by: INTERNAL MEDICINE

## 2023-11-02 ENCOUNTER — APPOINTMENT (OUTPATIENT)
Dept: RADIATION ONCOLOGY | Facility: CLINIC | Age: 68
End: 2023-11-02
Payer: COMMERCIAL

## 2023-11-02 PROCEDURE — 77014 CHG CT GUIDANCE RADIATION THERAPY FLDS PLACEMENT: CPT | Performed by: RADIOLOGY

## 2023-11-02 PROCEDURE — 77412 RADIATION TX DELIVERY LVL 3: CPT | Performed by: RADIOLOGY

## 2023-11-02 PROCEDURE — 77387 GUIDANCE FOR RADJ TX DLVR: CPT | Performed by: RADIOLOGY

## 2023-11-03 ENCOUNTER — APPOINTMENT (OUTPATIENT)
Dept: RADIATION ONCOLOGY | Facility: CLINIC | Age: 68
End: 2023-11-03
Payer: COMMERCIAL

## 2023-11-03 PROCEDURE — 77412 RADIATION TX DELIVERY LVL 3: CPT | Performed by: RADIOLOGY

## 2023-11-03 PROCEDURE — 77014 CHG CT GUIDANCE RADIATION THERAPY FLDS PLACEMENT: CPT | Performed by: RADIOLOGY

## 2023-11-03 PROCEDURE — 77387 GUIDANCE FOR RADJ TX DLVR: CPT | Performed by: RADIOLOGY

## 2023-11-06 ENCOUNTER — APPOINTMENT (OUTPATIENT)
Dept: RADIATION ONCOLOGY | Facility: CLINIC | Age: 68
End: 2023-11-06
Payer: COMMERCIAL

## 2023-11-06 PROCEDURE — 77014 CHG CT GUIDANCE RADIATION THERAPY FLDS PLACEMENT: CPT | Performed by: RADIOLOGY

## 2023-11-06 PROCEDURE — 77412 RADIATION TX DELIVERY LVL 3: CPT | Performed by: RADIOLOGY

## 2023-11-06 PROCEDURE — 77387 GUIDANCE FOR RADJ TX DLVR: CPT | Performed by: RADIOLOGY

## 2023-11-06 PROCEDURE — 77336 RADIATION PHYSICS CONSULT: CPT | Performed by: INTERNAL MEDICINE

## 2023-11-07 ENCOUNTER — APPOINTMENT (OUTPATIENT)
Dept: RADIATION ONCOLOGY | Facility: CLINIC | Age: 68
End: 2023-11-07
Payer: COMMERCIAL

## 2023-11-07 PROCEDURE — 77412 RADIATION TX DELIVERY LVL 3: CPT | Performed by: RADIOLOGY

## 2023-11-07 PROCEDURE — 77014 CHG CT GUIDANCE RADIATION THERAPY FLDS PLACEMENT: CPT | Performed by: RADIOLOGY

## 2023-11-07 PROCEDURE — 77387 GUIDANCE FOR RADJ TX DLVR: CPT | Performed by: RADIOLOGY

## 2023-11-13 DIAGNOSIS — I10 ESSENTIAL HYPERTENSION: ICD-10-CM

## 2023-11-13 DIAGNOSIS — J45.20 MILD INTERMITTENT ASTHMA WITHOUT COMPLICATION: ICD-10-CM

## 2023-11-13 RX ORDER — MONTELUKAST SODIUM 10 MG/1
10 TABLET ORAL DAILY
Qty: 90 TABLET | Refills: 1 | Status: SHIPPED | OUTPATIENT
Start: 2023-11-13

## 2023-11-13 RX ORDER — LOSARTAN POTASSIUM 100 MG/1
100 TABLET ORAL DAILY
Qty: 90 TABLET | Refills: 1 | Status: SHIPPED | OUTPATIENT
Start: 2023-11-13

## 2023-11-14 ENCOUNTER — OFFICE VISIT (OUTPATIENT)
Dept: INTERNAL MEDICINE CLINIC | Facility: CLINIC | Age: 68
End: 2023-11-14
Payer: COMMERCIAL

## 2023-11-14 VITALS
SYSTOLIC BLOOD PRESSURE: 153 MMHG | TEMPERATURE: 97.5 F | WEIGHT: 195 LBS | BODY MASS INDEX: 35.88 KG/M2 | HEART RATE: 65 BPM | OXYGEN SATURATION: 98 % | HEIGHT: 62 IN | DIASTOLIC BLOOD PRESSURE: 83 MMHG

## 2023-11-14 DIAGNOSIS — N18.31 STAGE 3A CHRONIC KIDNEY DISEASE (HCC): ICD-10-CM

## 2023-11-14 DIAGNOSIS — J45.21 MILD INTERMITTENT ASTHMA WITH ACUTE EXACERBATION: ICD-10-CM

## 2023-11-14 DIAGNOSIS — E66.01 OBESITY, MORBID (HCC): ICD-10-CM

## 2023-11-14 DIAGNOSIS — E03.9 ACQUIRED HYPOTHYROIDISM: ICD-10-CM

## 2023-11-14 DIAGNOSIS — E11.65 UNCONTROLLED TYPE 2 DIABETES MELLITUS WITH HYPERGLYCEMIA (HCC): Primary | ICD-10-CM

## 2023-11-14 DIAGNOSIS — Z79.4 TYPE 2 DIABETES MELLITUS WITH OTHER SPECIFIED COMPLICATION, WITH LONG-TERM CURRENT USE OF INSULIN (HCC): ICD-10-CM

## 2023-11-14 DIAGNOSIS — I10 ESSENTIAL HYPERTENSION: ICD-10-CM

## 2023-11-14 DIAGNOSIS — E11.69 TYPE 2 DIABETES MELLITUS WITH OTHER SPECIFIED COMPLICATION, WITH LONG-TERM CURRENT USE OF INSULIN (HCC): ICD-10-CM

## 2023-11-14 DIAGNOSIS — E11.69 DYSLIPIDEMIA DUE TO TYPE 2 DIABETES MELLITUS: ICD-10-CM

## 2023-11-14 DIAGNOSIS — E78.5 DYSLIPIDEMIA DUE TO TYPE 2 DIABETES MELLITUS: ICD-10-CM

## 2023-11-14 PROBLEM — A41.9 SEVERE SEPSIS (HCC): Status: RESOLVED | Noted: 2020-02-19 | Resolved: 2023-11-14

## 2023-11-14 PROBLEM — I51.89 DIASTOLIC DYSFUNCTION: Status: ACTIVE | Noted: 2021-03-10

## 2023-11-14 PROBLEM — R07.89 ATYPICAL CHEST PAIN: Status: RESOLVED | Noted: 2019-12-12 | Resolved: 2023-11-14

## 2023-11-14 PROBLEM — L24.9 IRRITANT CONTACT DERMATITIS: Status: RESOLVED | Noted: 2020-05-11 | Resolved: 2023-11-14

## 2023-11-14 PROBLEM — U07.1 COVID-19: Status: RESOLVED | Noted: 2023-09-10 | Resolved: 2023-11-14

## 2023-11-14 PROBLEM — R65.20 SEVERE SEPSIS (HCC): Status: RESOLVED | Noted: 2020-02-19 | Resolved: 2023-11-14

## 2023-11-14 PROBLEM — R91.8 LUNG INFILTRATE: Status: RESOLVED | Noted: 2020-02-19 | Resolved: 2023-11-14

## 2023-11-14 PROBLEM — F11.20 CONTINUOUS OPIOID DEPENDENCE (HCC): Status: RESOLVED | Noted: 2021-11-11 | Resolved: 2023-11-14

## 2023-11-14 PROCEDURE — 99214 OFFICE O/P EST MOD 30 MIN: CPT | Performed by: STUDENT IN AN ORGANIZED HEALTH CARE EDUCATION/TRAINING PROGRAM

## 2023-11-14 PROCEDURE — G0439 PPPS, SUBSEQ VISIT: HCPCS | Performed by: STUDENT IN AN ORGANIZED HEALTH CARE EDUCATION/TRAINING PROGRAM

## 2023-11-14 RX ORDER — FUROSEMIDE 20 MG/1
20 TABLET ORAL DAILY PRN
Qty: 90 TABLET | Refills: 0
Start: 2023-11-14

## 2023-11-14 RX ORDER — LEVOTHYROXINE SODIUM 137 UG/1
137 TABLET ORAL DAILY
COMMUNITY
Start: 2023-08-31

## 2023-11-14 RX ORDER — INSULIN ASPART 100 [IU]/ML
INJECTION, SOLUTION INTRAVENOUS; SUBCUTANEOUS
COMMUNITY
Start: 2023-11-09

## 2023-11-14 NOTE — ASSESSMENT & PLAN NOTE
Following with endocrinology at [address] [place] [address] [address] her Synthroid was recently reduced from 150 to 137 mcg  -Scheduled with endocrinology next month

## 2023-11-14 NOTE — PROGRESS NOTES
Diabetic Foot Exam    Patient's shoes and socks removed. Right Foot/Ankle   Right Foot Inspection  Skin Exam: skin normal and skin intact. No dry skin, no warmth, no callus, no erythema, no maceration, no abnormal color, no pre-ulcer, no ulcer and no callus. Sensory   Vibration: intact  Proprioception: intact  Monofilament testing: intact    Vascular  The right DP pulse is 2+. The right PT pulse is 2+. Left Foot/Ankle  Left Foot Inspection  Skin Exam: skin normal and skin intact. No dry skin, no warmth, no erythema, no maceration, normal color, no pre-ulcer, no ulcer and no callus. Sensory   Vibration: intact  Proprioception: intact  Monofilament testing: intact    Vascular  The left DP pulse is 2+. The left PT pulse is 2+.      Assign Risk Category  No deformity present  Loss of protective sensation  No weak pulses  Risk: 1   Assessment and Plan:           Problem List Items Addressed This Visit          Endocrine    Acquired hypothyroidism     Following with endocrinology at [facility]  Reports her Synthroid was recently reduced from 150 to 137 mcg  -Scheduled with endocrinology next month           Relevant Medications    levothyroxine 137 mcg tablet    Dyslipidemia due to type 2 diabetes mellitus      Lab Results   Component Value Date    LDLCALC 39 02/10/2023    [facility] 108 (H) 02/21/2022   -Currently on Lipitor and Zetia  - Lipid Panel ordered           Relevant Medications    NovoLOG FlexPen 100 units/mL injection pen    Type 2 diabetes mellitus with other specified complication (HCC) - Primary       Lab Results   Component Value Date    HGBA1C 7.7 (H) 08/21/2023   Following with endocrinology at [facility]  Using a CGM  On tresiba, Novolog, sincurtis          Relevant Medications    NovoLOG FlexPen 100 units/mL injection pen    Other Relevant Orders    Hemoglobin A1C       Respiratory    Mild intermittent asthma with acute exacerbation     Controlled on Symbicort, singulair, albuterol inhaler as needed Cardiovascular and Mediastinum    Essential hypertension    Relevant Medications    furosemide (LASIX) 20 mg tablet    Other Relevant Orders    Comprehensive metabolic panel       Genitourinary    Stage 3a chronic kidney disease (HCC)    Relevant Medications    furosemide (LASIX) 20 mg tablet    Other Relevant Orders    Comprehensive metabolic panel       Other    RESOLVED: Obesity, morbid ([address])        Preventive health issues were discussed with patient, and age appropriate screening tests were ordered as noted in patient's After Visit Summary. Personalized health advice and appropriate referrals for health education or preventive services given if needed, as noted in patient's After Visit Summary.      History of Present Illness:     Patient presents for a Medicare Wellness Visit  Previously following with Dr. Ashley Acuna   Last visit on 4/2023        HPI   Patient Care Team:  Martin Melo MD as PCP - General (Internal Medicine)  Jamie Charles [facility] ([facility]  Jamie Charles MD as PCP - [facility] (RT[facility])  Jorden Gregory RN as Nurse Navigator (Oncology)  Toni Lang MD as Surgeon (Surgical Oncology)  Anselmo Baron MD (Radiation Oncology)  Joseph Herbert MA (Care Coordination)     Review of Systems:     Review of Systems     Problem List:     Patient Active Problem List   Diagnosis    Acquired hypothyroidism    Essential hypertension    Mild intermittent asthma with acute exacerbation    Dyslipidemia due to type 2 diabetes mellitus     Depression, recurrent ([address])    Type 2 diabetes mellitus with other specified complication ([address])    Spondylolisthesis of lumbar region    Diastolic dysfunction    Obesity (BMI 35.0-39.9 without comorbidity)    Plantar fasciitis of left foot    Thyroid nodule    Lower esophageal ring (Schatzki)    Malignant neoplasm of upper-inner quadrant of left breast in female, estrogen receptor positive     Refusal of blood transfusions as patient is Mosque    Stage 3a chronic kidney disease ([address])      Past Medical and Surgical History:     Past Medical History:   Diagnosis Date    Abdominal pain     Asthma     Back pain     occas due to "disc problem"    Balance problems     "right side"    Breast cancer ([address])     Cholelithiasis     lap aviva today 3/10/2021    Colon polyp     Cracked tooth     3    Depression     Diabetes mellitus ([address])     Difficulty swallowing     "food feels like stuck sometimes'    Exercise involving walking     steps and cleaning    Fatty liver     GERD (gastroesophageal reflux disease)     Hiatal hernia     History of pneumonia     History of stomach ulcers     Hyperlipidemia     Hypertension     Obesity     Pneumonia     Refusal of blood transfusions as patient is Mosque     Risk for falls     Stroke [facility]) 2013    Uses Estonian as primary spoken language     Wears glasses      Past Surgical History:   Procedure Laterality Date    BREAST LUMPECTOMY Left 08/15/2023    Procedure: BREAST RUBI  DIRECTED LUMPECTOMY;  Surgeon: Svitlana Crawford MD;  [facility]: [facility];  Service: Surgical Oncology    CHOLECYSTECTOMY      COLONOSCOPY  02/09/2021    4 polyps tubular adenomas by Dr. Alison Jc    EGD  04/20/2021    nonobstructing Schatzki's ring dilated with [address], sliding hiatal hernia otherwise normal.  Biopsies stomach  negative for H. pylori biopsy esophagus negative for eosinophilic esophagitis by Dr. Ashley Arnold    EGD AND COLONOSCOPY  02/09/2021    3 YEAR RECOMMENDED = COLONO    LYMPH NODE BIOPSY Left 08/15/2023    Procedure: LYMPHATIC MAPPING WITH BLUE AND RADIOACTIVE DYES, SENTINEL LYMPH NODE BIOPSY (INJECT AT 1200 BY DR TORRES IN THE OR);   Surgeon: Svitlana Crawford MD;  Location: [facility];  Service: Surgical Oncology    MI LAPAROSCOPY SURG CHOLECYSTECTOMY N/A 03/10/2021    Procedure: CHOLECYSTECTOMY LAPAROSCOPIC W/ ROBOTICS;  Surgeon: Mikhail Akbar MD;  Location: [facility];  Service: General    ROTATOR CUFF REPAIR Bilateral     screws implanted"    UPPER GASTROINTESTINAL ENDOSCOPY  02/09/2021    4 cm hiatal hernia without Alejandro lesions. Possibly shortened esophagus. Biopsy of the stomach negative for H. pylori, biopsy of the EG junction negative for Kaplan's, biopsied duodenum negative for celiac by Dr. George Hamilton Left 07/10/2023    US GUIDED THYROID BIOPSY  3/7/2023      Family History:     Family History   Problem Relation Age of Onset    Diabetes Mother     Hypertension Mother     Hypertension Father     No Known Problems Sister     Colon cancer Brother 71    No Known Problems Son     No Known Problems Son     No Known Problems Daughter     No Known Problems Daughter     No Known Problems Daughter     No Known Problems Maternal Grandmother     No Known Problems Maternal Grandfather     No Known Problems Paternal Grandmother     No Known Problems Paternal Grandfather     Breast cancer Other       Social History:     Social History     Socioeconomic History    Marital status: /Civil Union     Spouse name: None    Number of children: None    Years of education: None    Highest education level: None   Occupational History    Occupation: House wife   Tobacco Use    Smoking status: Never    Smokeless tobacco: Never   Vaping Use    Vaping Use: Never used   Substance and Sexual Activity    Alcohol use: Never    Drug use: Never    Sexual activity: Not Currently   Other Topics Concern    None   Social History Narrative    None     Social Determinants of Health     Financial Resource Strain: Low Risk  (11/14/2023)    Overall Financial Resource Strain (CARDIA)     Difficulty of Paying Living Expenses: Not very hard   Food Insecurity: Not on file   Transportation Needs: No Transportation Needs (11/14/2023)    PRAPARE - Transportation     Lack of Transportation (Medical): No     Lack of Transportation (Non-Medical):  No   Physical Activity: Not on file Stress: Not on file   Social Connections: Not on file   Intimate Partner Violence: Not on file   Housing Stability: Not on file      Medications and Allergies:     Current Outpatient Medications   Medication Sig Dispense Refill    albuterol (2.5 mg/3 mL) 0.083 % nebulizer solution Take 3 mL (2.5 mg total) by nebulization every 6 (six) hours as needed for wheezing or shortness of breath 60 mL 3    albuterol (PROVENTIL HFA,VENTOLIN HFA) 90 mcg/act inhaler Inhale 2 puffs every 6 (six) hours as needed for wheezing 18 g 2    anastrozole (ARIMIDEX) 1 mg tablet Take 1 tablet (1 mg total) by mouth daily 90 tablet 1    aspirin 81 mg chewable tablet Chew 1 tablet (81 mg total) daily 90 tablet 1    atorvastatin (LIPITOR) 20 mg tablet TAKE 1 TABLET (20 MG TOTAL) BY MOUTH DAILY 90 tablet 0    budesonide-formoterol (SYMBICORT) 160-4.5 mcg/act inhaler Inhale 2 puffs 2 (two) times a day as needed (1) Rinse mouth after use.  10.2 g 3    clonazePAM (KlonoPIN) 0.125 mg disintegrating tablet Take 1 tablet daily as needed for anxiety 90 tablet 0    Empagliflozin-metFORMIN HCl (Synjardy) 12.5-1000 MG TABS Take by mouth Take 2 tablets by mouth daily      ezetimibe (ZETIA) 10 mg tablet Take 1 tablet (10 mg total) by mouth daily 90 tablet 1    furosemide (LASIX) 20 mg tablet Take 1 tablet (20 mg total) by mouth daily as needed (LE edema) As needed for edema 90 tablet 0    insulin degludec Lollie Balm FlexTouch) 100 units/mL injection pen Inject 20 Units under the skin daily 18 mL 1    Insulin Pen Needle 31G X 4 MM MISC Use 4 (four) times a day 100 each 3    levothyroxine 137 mcg tablet Take 137 mcg by mouth daily      losartan (COZAAR) 100 MG tablet TAKE 1 TABLET EVERY DAY 90 tablet 1    metoprolol succinate (TOPROL-XL) 50 mg 24 hr tablet TAKE 1 TABLET EVERY DAY 90 tablet 0    montelukast (SINGULAIR) 10 mg tablet TAKE 1 TABLET EVERY DAY 90 tablet 1    NovoLOG FlexPen 100 units/mL injection pen Inject under the skin INJECT 16 UNITS FOR BREAKFAST 15 UNITS FOR LUNCH AND 14 UNITS FOR DINNER      omeprazole (PriLOSEC) 20 mg delayed release capsule TAKE 1 CAPSULE EVERY DAY 90 capsule 0    OneTouch Delica Lancets 27L MISC Check blood sugars twice daily. Please substitute with appropriate alternative as covered by patient's insurance. Dx: E11.65 200 each 3    VITAMIN D PO Take by mouth once a week      naloxone (NARCAN) 4 mg/0.1 mL nasal spray Administer 1 spray into a nostril. If no response after 2-3 minutes, give another dose in the other nostril using a new spray. (Patient not taking: Reported on 9/1/2023) 1 each 1     No current facility-administered medications for this visit. Allergies   Allergen Reactions    Etodolac Shortness Of Breath    Iodinated Contrast Media Shortness Of Breath and Wheezing    Simvastatin Other (See Comments)     elevated liver function     Latex Rash    Morphine Palpitations    Penicillins Rash      Immunizations:     Immunization History   Administered Date(s) Administered    COVID-19 MODERNA VACC 0.5 ML IM 03/25/2021, 04/26/2021, 12/01/2021    INFLUENZA 09/21/2022    Influenza, high dose seasonal 0.7 mL 11/19/2020, 09/21/2022    Influenza, seasonal, injectable 11/16/2010, 10/03/2012    Pneumococcal Conjugate 13-Valent 03/11/2020    Pneumococcal Polysaccharide PPV23 07/01/2021      Health Maintenance:         Topic Date Due    Colorectal Cancer Screening  02/09/2024    Breast Cancer Screening: Mammogram  06/20/2024    Cervical Cancer Screening  12/23/2024    DXA SCAN  07/18/2025    Hepatitis C Screening  Completed         Topic Date Due    COVID-19 Vaccine (4 - Booster for Moderna series) 01/26/2022    Influenza Vaccine (1) 09/01/2023      Medicare Screening Tests and Risk Assessments:     Hudson Son is here for her Subsequent Wellness visit. Health Risk Assessment:   Patient rates overall health as poor. Patient feels that their physical health rating is slightly worse. Patient is satisfied with their life.  Eyesight was rated as same. Hearing was rated as same. Patient feels that their emotional and mental health rating is same. Patients states they are never, rarely angry. Patient states they are sometimes unusually tired/fatigued. Pain experienced in the last 7 days has been a lot. Patient's pain rating has been 9/10. Patient states that she has experienced no weight loss or gain in last 6 months. Depression Screening:   PHQ-9 Score: 0      Fall Risk Screening: In the past year, patient has experienced: no history of falling in past year      Urinary Incontinence Screening:   Patient has not leaked urine accidently in the last six months. Home Safety:  Patient does not have trouble with stairs inside or outside of their home. Patient has working smoke alarms and has working carbon monoxide detector. Home safety hazards include: none. Nutrition:   Current diet is Regular. Medications:   Patient is currently taking over-the-counter supplements. OTC medications include: see medication list. Patient is able to manage medications. Activities of Daily Living (ADLs)/Instrumental Activities of Daily Living (IADLs):   Walk and transfer into and out of bed and chair?: Yes  Dress and groom yourself?: Yes    Bathe or shower yourself?: Yes    Feed yourself?  Yes  Do your laundry/housekeeping?: Yes  Manage your money, pay your bills and track your expenses?: Yes  Make your own meals?: Yes    Do your own shopping?: Yes    Previous Hospitalizations:   Any hospitalizations or ED visits within the last 12 months?: Yes    How many hospitalizations have you had in the last year?: 1-2    Advance Care Planning:   Living will: No    Durable POA for healthcare: No    Advanced directive: No      PREVENTIVE SCREENINGS      Cardiovascular Screening:    General: Screening Not Indicated and History Lipid Disorder      Diabetes Screening:     General: Screening Not Indicated and History Diabetes      Colorectal Cancer Screening: General: Screening Current      Breast Cancer Screening:     General: History Breast Cancer      Cervical Cancer Screening:    General: Screening Not Indicated      Osteoporosis Screening:    General: Screening Not Indicated and History Osteoporosis      Lung Cancer Screening:     General: Screening Not Indicated      Hepatitis C Screening:    General: Screening Current    Screening, Brief Intervention, and Referral to Treatment (SBIRT)    Screening  Typical number of drinks in a day: 0  Typical number of drinks in a week: 0  Interpretation: Low risk drinking behavior. Single Item Drug Screening:  How often have you used an illegal drug (including marijuana) or a prescription medication for non-medical reasons in the past year? never    Single Item Drug Screen Score: 0  Interpretation: Negative screen for possible drug use disorder    No results found. Physical Exam:     /83 (BP Location: Left arm, Patient Position: Sitting, Cuff Size: Standard)   Pulse 65   Temp 97.5 °F (36.4 °C)   Ht 5' 2" (1.575 m)   Wt 88.5 kg (195 lb)   SpO2 98%   BMI 35.67 kg/m²     Physical Exam  Cardiovascular:      Pulses: no weak pulses          Dorsalis pedis pulses are 2+ on the right side and 2+ on the left side. Posterior tibial pulses are 2+ on the right side and 2+ on the left side. Feet:      Right foot:      Skin integrity: No ulcer, skin breakdown, erythema, warmth, callus or dry skin. Left foot:      Skin integrity: No ulcer, skin breakdown, erythema, warmth, callus or dry skin. Cheryl Moore MD      BMI Counseling: Body mass index is 35.67 kg/m². The BMI is above normal. Nutrition recommendations include reducing portion sizes, decreasing overall calorie intake, 3-5 servings of fruits/vegetables daily, reducing fast food intake, and consuming healthier snacks. Exercise recommendations include moderate aerobic physical activity for 150 minutes/week.

## 2023-11-14 NOTE — ASSESSMENT & PLAN NOTE
Lab Results   Component Value Date    HGBA1C 7.7 (H) 08/21/2023     Following with endocrinology at [facility]  Using a CGM  On tresiba, Novolog, sinjardy

## 2023-11-14 NOTE — ASSESSMENT & PLAN NOTE
Lab Results   Component Value Date    LDLCALC 39 02/10/2023    LDLCALC 108 (H) 02/21/2022     -Currently on Lipitor and Zetia  - Lipid Panel ordered

## 2023-11-29 ENCOUNTER — OFFICE VISIT (OUTPATIENT)
Dept: SURGICAL ONCOLOGY | Facility: CLINIC | Age: 68
End: 2023-11-29
Payer: COMMERCIAL

## 2023-11-29 VITALS
OXYGEN SATURATION: 97 % | WEIGHT: 193 LBS | TEMPERATURE: 98.5 F | SYSTOLIC BLOOD PRESSURE: 142 MMHG | BODY MASS INDEX: 35.51 KG/M2 | HEART RATE: 78 BPM | DIASTOLIC BLOOD PRESSURE: 72 MMHG | HEIGHT: 62 IN

## 2023-11-29 DIAGNOSIS — C50.212 MALIGNANT NEOPLASM OF UPPER-INNER QUADRANT OF LEFT BREAST IN FEMALE, ESTROGEN RECEPTOR POSITIVE: Primary | ICD-10-CM

## 2023-11-29 DIAGNOSIS — Z17.0 MALIGNANT NEOPLASM OF UPPER-INNER QUADRANT OF LEFT BREAST IN FEMALE, ESTROGEN RECEPTOR POSITIVE: Primary | ICD-10-CM

## 2023-11-29 PROCEDURE — 99213 OFFICE O/P EST LOW 20 MIN: CPT

## 2023-11-29 RX ORDER — BLOOD SUGAR DIAGNOSTIC
STRIP MISCELLANEOUS
COMMUNITY
Start: 2023-11-17

## 2023-11-29 NOTE — PROGRESS NOTES
Surgical Oncology Follow Up       [facility] SURGICAL ONCOLOGY [place]  [address] [facility] [address] [place] [address]    Jeana Ramos  1955  [id number]  [address] [facility] SURGICAL ONCOLOGY [place]  [address] [place] [address]    Diagnoses and all orders for this visit:    Malignant neoplasm of upper-inner quadrant of left breast in female, estrogen receptor positive   -     Mammo diagnostic bilateral w 3d & cad; Future    Other orders  -     Accu-Chek Guide test strip; TEST DAILY BEFORE ALL MEALS/SNACKS AND ONCE BEFORE BEDTIME. Chief Complaint   Patient presents with    Follow-up       Return in about 6 months (around 5/29/2024) for Office Visit, Imaging - See orders. Oncology History   Malignant neoplasm of upper-inner quadrant of left breast in female, estrogen receptor positive    7/10/2023 Biopsy    Left breast ultrasound-guided biopsy  8 o'clock, 7 cm from nipple (RUBI)  Invasive mammary carcinoma of no special type (ductal)  Grade 1  ER 90, NE 90, HER2 0    Concordant. Malignancy appears unifocal; masses cover 7 mm on US. Left axilla US negative. Right breast clear. 7/17/2023 Genetic Testing    A total of 36 genes were evaluated, including: KENY, BRCA1, BRCA2, CDH1, CHEK2, PALB2, PTEN, STK11, TP53  Negative result.  No pathogenic sequence variants identified  Amb     8/15/2023 Surgery    Left breast RUBI  directed lumpectomy with SLN biopsy  Invasive mammary carcinoma of no special type (ductal)  Grade 1  1.1 cm  Margins negative  0/1 Lymph nodes  Anatomic/Prognostic Stage IA     8/15/2023 -  Cancer Staged    Staging form: Breast, AJCC 8th Edition  - Pathologic stage from 8/15/2023: Stage IA (pT1c, pN0(sn), cM0, G1, ER+, NE+, HER2-) - Signed by Joelle Rubi MD on 8/28/2023  Stage prefix: Initial diagnosis  Method of lymph node assessment: Morgan lymph node biopsy  Multigene prognostic tests performed: None  Histologic grading system: 3 grade system       9/1/2023 -  Hormone Therapy    Anastrazole 1mg daily           History of Present Illness: The patient returns to the office today in follow-up for her recently treated stage IA left breast invasive ductal carcinoma. She is currently TRISH at 3 1/2 months from surgery, and completed adjuvant radiation earlier this month. She states she did experience some burning and skin breakdown from the radiation, but overall it is healing well. She has not noticed any new lumps or tenderness. She denies any new SOB, cough, headaches, dizziness or bone pain. She is currently taking anastrazole and denies any side effects. Review of Systems   Constitutional:  Negative for activity change, appetite change, fatigue and unexpected weight change. HENT: Negative. Respiratory: Negative. Negative for cough and shortness of breath. Cardiovascular: Negative. Gastrointestinal: Negative. Negative for abdominal pain, nausea and vomiting. Musculoskeletal: Negative. Skin:  Positive for color change. Negative for wound. Neurological: Negative. Negative for dizziness and headaches. Hematological: Negative. Negative for adenopathy. Psychiatric/Behavioral: Negative.                Patient Active Problem List   Diagnosis    Acquired hypothyroidism    Essential hypertension    Mild intermittent asthma with acute exacerbation    Dyslipidemia due to type 2 diabetes mellitus     Depression, recurrent ([address])    Type 2 diabetes mellitus with other specified complication (HCC)    Spondylolisthesis of lumbar region    Diastolic dysfunction    Obesity (BMI 35.0-39.9 without comorbidity)    Plantar fasciitis of left foot    Thyroid nodule    Lower esophageal ring (Schatzki)    Malignant neoplasm of upper-inner quadrant of left breast in female, estrogen receptor positive     Refusal of blood transfusions as patient is Congregational    Stage 3a chronic kidney disease [facility])     Past Medical History:   Diagnosis Date    Abdominal pain     Asthma     Back pain     occas due to "disc problem"    Balance problems     "right side"    Breast cancer ([facility])     Cholelithiasis     lap aviva today 3/10/2021    Colon polyp     Cracked tooth     3    Depression     Diabetes mellitus ([address] )     Difficulty swallowing     "food feels like stuck sometimes'    Exercise involving walking     steps and cleaning    Fatty liver     GERD (gastroesophageal reflux disease)     Hiatal hernia     History of pneumonia     History of stomach ulcers     Hyperlipidemia     Hypertension     Obesity     Pneumonia     Refusal of blood transfusions as patient is Yazidism     Risk for falls     Stroke [facility]) 2013    Uses Austrian as primary spoken language     Wears glasses      Past Surgical History:   Procedure Laterality Date    BREAST LUMPECTOMY Left 08/15/2023    Procedure: BREAST RUBI  DIRECTED LUMPECTOMY;  Surgeon: Janeen Mcmillan MD;  [facility]: [facility];  Service: Surgical Oncology    CHOLECYSTECTOMY      COLONOSCOPY  02/09/2021    4 polyps tubular adenomas by Dr. Wen Garza    EGD  04/20/2021    nonobstructing Schatzki's ring dilated with [address] Hernandez, sliding hiatal hernia otherwise normal.  Biopsies stomach  negative for H. pylori biopsy esophagus negative for eosinophilic esophagitis by Dr. Gayle Estimable    EGD AND COLONOSCOPY  02/09/2021    3 YEAR RECOMMENDED = COLONO    LYMPH NODE BIOPSY Left 08/15/2023    Procedure: LYMPHATIC MAPPING WITH BLUE AND RADIOACTIVE DYES, SENTINEL LYMPH NODE BIOPSY (INJECT AT 1200 BY DR TORRES IN THE OR);   Surgeon: Janeen Mcmillan MD;  Location[facility];  Service: Surgical Oncology    AR LAPAROSCOPY SURG CHOLECYSTECTOMY N/A 03/10/2021    Procedure: CHOLECYSTECTOMY LAPAROSCOPIC W/ ROBOTICS;  Surgeon: Nandini Hernandez MD;  Location: [facility];  Service: General    ROTATOR CUFF REPAIR Bilateral     screws implanted"    UPPER GASTROINTESTINAL ENDOSCOPY  02/09/2021 4 cm hiatal hernia without Alejandro lesions. Possibly shortened esophagus. Biopsy of the stomach negative for H. pylori, biopsy of the EG junction negative for Kaplan's, biopsied duodenum negative for celiac by Dr. Beth Arvizu Left 07/10/2023    US GUIDED THYROID BIOPSY  3/7/2023     Family History   Problem Relation Age of Onset    Diabetes Mother     Hypertension Mother     Hypertension Father     No Known Problems Sister     Colon cancer Brother 71    No Known Problems Son     No Known Problems Son     No Known Problems Daughter     No Known Problems Daughter     No Known Problems Daughter     No Known Problems Maternal Grandmother     No Known Problems Maternal Grandfather     No Known Problems Paternal Grandmother     No Known Problems Paternal Grandfather     Breast cancer Other      Social History     Socioeconomic History    Marital status: /Civil Union     Spouse name: Not on file    Number of children: Not on file    Years of education: Not on file    Highest education level: Not on file   Occupational History    Occupation: House wife   Tobacco Use    Smoking status: Never    Smokeless tobacco: Never   Vaping Use    Vaping Use: Never used   Substance and Sexual Activity    Alcohol use: Never    Drug use: Never    Sexual activity: Not Currently   Other Topics Concern    Not on file   Social History Narrative    Not on file     Social Determinants of Health     Financial Resource Strain: Low Risk  (11/14/2023)    Overall Financial Resource Strain (CARDIA)     Difficulty of Paying Living Expenses: Not very hard   Food Insecurity: Not on file   Transportation Needs: No Transportation Needs (11/14/2023)    PRAPARE - Transportation     Lack of Transportation (Medical): No     Lack of Transportation (Non-Medical):  No   Physical Activity: Not on file   Stress: Not on file   Social Connections: Not on file   Intimate Partner Violence: Not on file   Housing Stability: Not on file       Current Outpatient Medications:     Accu-Chek Guide test strip, TEST DAILY BEFORE ALL MEALS/SNACKS AND ONCE BEFORE BEDTIME., Disp: , Rfl:     albuterol (2.5 mg/3 mL) 0.083 % nebulizer solution, Take 3 mL (2.5 mg total) by nebulization every 6 (six) hours as needed for wheezing or shortness of breath, Disp: 60 mL, Rfl: 3    albuterol (PROVENTIL HFA,VENTOLIN HFA) 90 mcg/act inhaler, Inhale 2 puffs every 6 (six) hours as needed for wheezing, Disp: 18 g, Rfl: 2    anastrozole (ARIMIDEX) 1 mg tablet, Take 1 tablet (1 mg total) by mouth daily, Disp: 90 tablet, Rfl: 1    aspirin 81 mg chewable tablet, Chew 1 tablet (81 mg total) daily, Disp: 90 tablet, Rfl: 1    atorvastatin (LIPITOR) 20 mg tablet, TAKE 1 TABLET (20 MG TOTAL) BY MOUTH DAILY, Disp: 90 tablet, Rfl: 0    budesonide-formoterol (SYMBICORT) 160-4.5 mcg/act inhaler, Inhale 2 puffs 2 (two) times a day as needed (1) Rinse mouth after use., Disp: 10.2 g, Rfl: 3    clonazePAM (KlonoPIN) 0.125 mg disintegrating tablet, Take 1 tablet daily as needed for anxiety, Disp: 90 tablet, Rfl: 0    Empagliflozin-metFORMIN HCl (Synjardy) 12.5-1000 MG TABS, Take by mouth Take 2 tablets by mouth daily, Disp: , Rfl:     ezetimibe (ZETIA) 10 mg tablet, Take 1 tablet (10 mg total) by mouth daily, Disp: 90 tablet, Rfl: 1    furosemide (LASIX) 20 mg tablet, Take 1 tablet (20 mg total) by mouth daily as needed (LE edema) As needed for edema, Disp: 90 tablet, Rfl: 0    insulin degludec Gabriel Lav FlexTouch) 100 units/mL injection pen, Inject 20 Units under the skin daily, Disp: 18 mL, Rfl: 1    Insulin Pen Needle 31G X 4 MM MISC, Use 4 (four) times a day, Disp: 100 each, Rfl: 3    levothyroxine 137 mcg tablet, Take 137 mcg by mouth daily, Disp: , Rfl:     losartan (COZAAR) 100 MG tablet, TAKE 1 TABLET EVERY DAY, Disp: 90 tablet, Rfl: 1    metoprolol succinate (TOPROL-XL) 50 mg 24 hr tablet, TAKE 1 TABLET EVERY DAY, Disp: 90 tablet, Rfl: 0    montelukast (SINGULAIR) 10 mg tablet, TAKE 1 TABLET EVERY DAY, Disp: 90 tablet, Rfl: 1    NovoLOG FlexPen 100 units/mL injection pen, Inject under the skin INJECT 16 UNITS FOR BREAKFAST 15 UNITS FOR LUNCH AND 14 UNITS FOR DINNER, Disp: , Rfl:     omeprazole (PriLOSEC) 20 mg delayed release capsule, TAKE 1 CAPSULE EVERY DAY, Disp: 90 capsule, Rfl: 0    OneTouch Delica Lancets 70O MISC, Check blood sugars twice daily. Please substitute with appropriate alternative as covered by patient's insurance. Dx: E11.65, Disp: 200 each, Rfl: 3    VITAMIN D PO, Take by mouth once a week, Disp: , Rfl:     naloxone (NARCAN) 4 mg/0.1 mL nasal spray, Administer 1 spray into a nostril. If no response after 2-3 minutes, give another dose in the other nostril using a new spray. (Patient not taking: Reported on 9/1/2023), Disp: 1 each, Rfl: 1  Allergies   Allergen Reactions    Etodolac Shortness Of Breath    Iodinated Contrast Media Shortness Of Breath and Wheezing    Simvastatin Other (See Comments)     elevated liver function     Latex Rash    Morphine Palpitations    Penicillins Rash     Vitals:    11/29/23 1126   BP: 142/72   Pulse: 78   Temp: 98.5 °F (36.9 °C)   SpO2: 97%       Physical Exam  Vitals reviewed. Constitutional:       General: She is not in acute distress. Appearance: Normal appearance. She is not ill-appearing or toxic-appearing. HENT:      Head: Normocephalic and atraumatic. Nose: Nose normal.      Mouth/Throat:      Mouth: Mucous membranes are moist.   Eyes:      General: No scleral icterus. Conjunctiva/sclera: Conjunctivae normal.   Cardiovascular:      Rate and Rhythm: Normal rate. Pulmonary:      Effort: Pulmonary effort is normal.   Chest:   Breasts:     Right: Normal.      Left: Skin change present. No mass or tenderness. Comments: Right breast is smooth and even.   Left breast has post-radiation changes present including hyperpigmentation of the entire breast and axilla, and skin breakdown in the medial inframammary fold. Breast incision is well-healed with mildly thickened scar. There are no dominant masses, nodules or adenopathy. Musculoskeletal:         General: Normal range of motion. Cervical back: Normal range of motion and neck supple. Lymphadenopathy:      Cervical: No cervical adenopathy. Upper Body:      Right upper body: No supraclavicular, axillary or pectoral adenopathy. Left upper body: No supraclavicular, axillary or pectoral adenopathy. Skin:     General: Skin is warm and dry. Coloration: Skin is not jaundiced. Neurological:      General: No focal deficit present. Mental Status: She is alert and oriented to person, place, and time. Psychiatric:         Mood and Affect: Mood normal.         Behavior: Behavior normal.         Thought Content: Thought content normal.         Judgment: Judgment normal.           Discussion/Summary: This is a 75 y/o female who presents today for breast cancer surveillance. She is doing well and there is no evidence of disease at this time. I have advised her to use Aquaphor on the excoriated area under her breast.  I have counseled her on expected post-radiation skin changes. I will see her again in 6 months with repeat mammogram.  She is agreeable to the plan, all questions have been answered.

## 2023-12-06 DIAGNOSIS — K21.9 GASTROESOPHAGEAL REFLUX DISEASE WITHOUT ESOPHAGITIS: ICD-10-CM

## 2023-12-06 DIAGNOSIS — I10 ESSENTIAL HYPERTENSION: ICD-10-CM

## 2023-12-06 DIAGNOSIS — R00.2 PALPITATIONS: ICD-10-CM

## 2023-12-06 RX ORDER — OMEPRAZOLE 20 MG/1
CAPSULE, DELAYED RELEASE ORAL
Qty: 90 CAPSULE | Refills: 0 | Status: SHIPPED | OUTPATIENT
Start: 2023-12-06

## 2023-12-06 RX ORDER — FUROSEMIDE 20 MG/1
TABLET ORAL
Qty: 90 TABLET | Refills: 0 | Status: SHIPPED | OUTPATIENT
Start: 2023-12-06

## 2023-12-06 RX ORDER — METOPROLOL SUCCINATE 50 MG/1
TABLET, EXTENDED RELEASE ORAL
Qty: 90 TABLET | Refills: 0 | Status: SHIPPED | OUTPATIENT
Start: 2023-12-06

## 2023-12-07 ENCOUNTER — TRANSITIONAL CARE MANAGEMENT (OUTPATIENT)
Dept: INTERNAL MEDICINE CLINIC | Facility: CLINIC | Age: 68
End: 2023-12-07

## 2023-12-08 ENCOUNTER — OFFICE VISIT (OUTPATIENT)
Dept: INTERNAL MEDICINE CLINIC | Facility: CLINIC | Age: 68
End: 2023-12-08
Payer: COMMERCIAL

## 2023-12-08 VITALS
TEMPERATURE: 97.9 F | DIASTOLIC BLOOD PRESSURE: 91 MMHG | WEIGHT: 190 LBS | HEIGHT: 62 IN | HEART RATE: 73 BPM | OXYGEN SATURATION: 98 % | SYSTOLIC BLOOD PRESSURE: 138 MMHG | BODY MASS INDEX: 34.96 KG/M2

## 2023-12-08 DIAGNOSIS — Z79.4 TYPE 2 DIABETES MELLITUS WITH OTHER SPECIFIED COMPLICATION, WITH LONG-TERM CURRENT USE OF INSULIN (HCC): ICD-10-CM

## 2023-12-08 DIAGNOSIS — E03.9 ACQUIRED HYPOTHYROIDISM: ICD-10-CM

## 2023-12-08 DIAGNOSIS — R26.2 AMBULATORY DYSFUNCTION: ICD-10-CM
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
## 2023-12-08 DIAGNOSIS — N18.31 STAGE 3A CHRONIC KIDNEY DISEASE (HCC): ICD-10-CM
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
## 2023-12-08 DIAGNOSIS — E11.69 TYPE 2 DIABETES MELLITUS WITH OTHER SPECIFIED COMPLICATION, WITH LONG-TERM CURRENT USE OF INSULIN (HCC): ICD-10-CM

## 2023-12-08 DIAGNOSIS — Z76.89 ENCOUNTER FOR SUPPORT AND COORDINATION OF TRANSITION OF CARE: Primary | ICD-10-CM

## 2023-12-08 PROCEDURE — 99496 TRANSJ CARE MGMT HIGH F2F 7D: CPT | Performed by: STUDENT IN AN ORGANIZED HEALTH CARE EDUCATION/TRAINING PROGRAM

## 2023-12-08 NOTE — ASSESSMENT & PLAN NOTE
Levothyroxine regimen recently reduced during hospitalization secondary to fall  -New regimen reviewed -continue taking 1 tablet of levothyroxine 137 mcg on Monday, Tuesday, Thursday, Friday.   Half a tablet on Wednesday, Saturday and Sunday  -She is scheduled with endocrinology at [facility] for follow-up

## 2023-12-08 NOTE — ASSESSMENT & PLAN NOTE
Lab Results   Component Value Date    HGBA1C 6.9 (H) 12/01/2023     Insulin regimen recently changed during hospitalization due to fall  Regimen reviewed. NovoLog was held secondary to episode of hypoglycemia during hospitalization  Continue with Jasmeet Corona ([place]) and Katty Trotter  -Unable to access data from CGM make further recommendations on NovoLog.   Patient advised to contact her endocrinologist to further discuss this  -

## 2023-12-08 NOTE — ASSESSMENT & PLAN NOTE
Status post mechanical fall leading to hospitalization from 1201 through 1206 at [address] refer to physical therapy

## 2023-12-08 NOTE — PROGRESS NOTES
INTERNAL MEDICINE OFFICE VISIT NOTE  [facility] Internal Medicine [place]    NAME: Alexia Giraldo  AGE: 76 y.o. SEX: female    [address]: 12/8/2023       Chief Complaint   Patient presents with    Transition of Care Management     TCM D/C [facility] 12/6 for Fall      Status post hospitalization from 12/01 through 12/06 secondary to a fall down the stairs - likely mechanical.     Review of Systems  10 point ROS negative except per HPI    OBJECTIVE:  Vitals:    12/08/23 1110   BP: 138/91   BP Location: Right arm   Patient Position: Sitting   Cuff Size: Standard   Pulse: 73   Temp: 97.9 °F (36.6 °C)   SpO2: 98%   Weight: 86.2 kg (190 lb)   Height: 5' 2" (1.575 m)       Physical Exam:   GENERAL: NAD, Normal appearance. NEUROLOGIC:  Alert/oriented x3. HEENT:  NC/AT, EOMI, MMM, no scleral icterus  CARDIAC:  RRR, +S1/S2, no S3/S4 heard, no m/g/r  PULMONARY:  non-labored breathing, CTA B/L  ABDOMEN:  Soft, NT/ND, +BS,   Extremities:  No edema,   SKIN:  No rashes or erythema    ASSESSMENT/PLAN:    1. Encounter for support and coordination of transition of care    2. Fall at home, subsequent encounter  Assessment & Plan:  Status post hospitalization from 12/01 through 12/06 secondary to a fall down the stairs likely mechanical.   Trauma imaging without evidence of fracture or dislocation  Patient was evaluated by OT and PT and recommended for inpatient rehab however patient declined. At discharge, her levothyroxine and insulin regimen was modified. See dx below for plan   Reports she has been having difficulty with ambulation secondary to pain in the right upper leg  -Will refer to physical therapy  -Continue Tylenol for pain control      3. Ambulatory dysfunction  Assessment & Plan:  Status post mechanical fall leading to hospitalization from 1201 through 1206 at [facility]  Will refer to physical therapy    Orders:  -     Referral to [address] [facility]; Future    4.  Acquired hypothyroidism  Assessment & Plan:  Levothyroxine regimen recently reduced during hospitalization secondary to fall  -New regimen reviewed -continue taking 1 tablet of levothyroxine 137 mcg on Monday, Tuesday, Thursday, Friday. Half a tablet on Wednesday, Saturday and Sunday  -She is scheduled with [facility]ocrinology at [facility] for follow-up        5. Type 2 diabetes mellitus with other specified complication, with long-term current use of insulin ([facility])  Assessment & Plan:    Lab Results   Component Value Date    HGBA1C 6.9 (H) 12/01/2023     Insulin regimen recently changed during hospitalization due to fall  Regimen reviewed. NovoLog was held secondary to episode of hypoglycemia during hospitalization  Continue with [employer] and Carrie Tomas  -Unable to access data from CGM make further recommendations on NovoLog. Patient advised to contact her endocrinologist to further discuss this  -       6. Stage 3a chronic kidney disease [facility])  Assessment & Plan:  Lab Results   Component Value Date    EGFR 52 09/13/2023    EGFR 64 09/11/2023    EGFR 43 09/10/2023    CREATININE 1.09 09/13/2023    CREATININE 0.92 09/11/2023    CREATININE 1.26 09/10/2023     Stable  -Will avoid long-term use of NSAIDs for pain control. May use Tylenol as first option. May use ibuprofen to no more than 24 hours.               Current Outpatient Medications:     Accu-Chek Guide test strip, TEST DAILY BEFORE ALL MEALS/SNACKS AND ONCE BEFORE BEDTIME., Disp: , Rfl:     albuterol (2.5 mg/3 mL) 0.083 % nebulizer solution, Take 3 mL (2.5 mg total) by nebulization every 6 (six) hours as needed for wheezing or shortness of breath, Disp: 60 mL, Rfl: 3    albuterol (PROVENTIL HFA,VENTOLIN HFA) 90 mcg/act inhaler, Inhale 2 puffs every 6 (six) hours as needed for wheezing, Disp: 18 g, Rfl: 2    anastrozole (ARIMIDEX) 1 mg tablet, Take 1 tablet (1 mg total) by mouth daily, Disp: 90 tablet, Rfl: 1    aspirin 81 mg chewable tablet, Chew 1 tablet (81 mg total) daily, Disp: 90 tablet, Rfl: 1 atorvastatin (LIPITOR) 20 mg tablet, TAKE 1 TABLET (20 MG TOTAL) BY MOUTH DAILY, Disp: 90 tablet, Rfl: 0    budesonide-formoterol (SYMBICORT) 160-4.5 mcg/act inhaler, Inhale 2 puffs 2 (two) times a day as needed (1) Rinse mouth after use., Disp: 10.2 g, Rfl: 3    clonazePAM (KlonoPIN) 0.125 mg disintegrating tablet, Take 1 tablet daily as needed for anxiety, Disp: 90 tablet, Rfl: 0    Empagliflozin-metFORMIN HCl (Synjardy) 12.5-1000 MG TABS, Take by mouth Take 2 tablets by mouth daily, Disp: , Rfl:     ezetimibe (ZETIA) 10 mg tablet, Take 1 tablet (10 mg total) by mouth daily, Disp: 90 tablet, Rfl: 1    furosemide (LASIX) 20 mg tablet, TAKE 1 TABLET DAILY AS NEEDED FOR EDEMA, Disp: 90 tablet, Rfl: 0    insulin degludec Latrelle Inch FlexTouch) 100 units/mL injection pen, Inject 20 Units under the skin daily, Disp: 18 mL, Rfl: 1    Insulin Pen Needle 31G X 4 MM MISC, Use 4 (four) times a day, Disp: 100 each, Rfl: 3    levothyroxine 137 mcg tablet, Take 137 mcg by mouth daily One tablet  M+Tu+ TH+ F 1/2 tablet W+ S+[place], Disp: , Rfl:     losartan (COZAAR) 100 MG tablet, TAKE 1 TABLET EVERY DAY, Disp: 90 tablet, Rfl: 1    metoprolol succinate (TOPROL-XL) 50 mg 24 hr tablet, TAKE 1 TABLET EVERY DAY, Disp: 90 tablet, Rfl: 0    montelukast (SINGULAIR) 10 mg tablet, TAKE 1 TABLET EVERY DAY, Disp: 90 tablet, Rfl: 1    NovoLOG FlexPen 100 units/mL injection pen, Inject under the skin INJECT 16 UNITS FOR BREAKFAST 15 UNITS FOR LUNCH AND 14 UNITS FOR DINNER, Disp: , Rfl:     omeprazole (PriLOSEC) 20 mg delayed release capsule, TAKE 1 CAPSULE EVERY DAY, Disp: 90 capsule, Rfl: 0    OneTouch Delica Lancets 21S MISC, Check blood sugars twice daily. Please substitute with appropriate alternative as covered by patient's insurance. Dx: E11.65, Disp: 200 each, Rfl: 3    VITAMIN D PO, Take by mouth once a week, Disp: , Rfl:     naloxone (NARCAN) 4 mg/0.1 mL nasal spray, Administer 1 spray into a nostril.  If no response after 2-3 minutes, give another dose in the other nostril using a new spray. (Patient not taking: Reported on 9/1/2023), Disp: 1 each, Rfl: 1    TCM Call       Date and time call was made  12/7/2023  4:51 PM    Patient was hospitialized at  Other (comment)    Comment  [facility]    Date of Admission  12/01/23    Date of discharge  12/06/23    Diagnosis  Patient lost balance at home on her stairs, leaned down and slid 14 stairs. Pain down the right arm, hip and leg. - LOC, - Blood thinners. HX of vertigo, CA    Disposition  Home    Were the patients medications reviewed and updated  Yes    Current Symptoms  None          TCM Call       Post hospital issues  None    Should patient be enrolled in anticoag monitoring? No    Scheduled for follow up? Yes    Did you obtain your prescribed medications  Yes    Do you need help managing your prescriptions or medications  No    Is transportation to your appointment needed  No    I have advised the patient to call PCP with any new or worsening symptoms  Angy Davis (MA)               Cresencio Goodell, MD  [facility] Internal Medicine [facility]    * Please Note: This note was completed in part utilizing a dictation software. Grammatical errors, random word insertions, spelling mistakes, and incomplete sentences may be an occasional consequence of this system secondary to software limitations, ambient noise, and hardware issues. If you have any questions or concerns about the content, text, or information contained within the body of this dictation, please contact the provider for clarification. **

## 2023-12-08 NOTE — ASSESSMENT & PLAN NOTE
Lab Results   Component Value Date    EGFR 52 09/13/2023    EGFR 64 09/11/2023    EGFR 43 09/10/2023    CREATININE 1.09 09/13/2023    CREATININE 0.92 09/11/2023    CREATININE 1.26 09/10/2023     Stable  -Will avoid long-term use of NSAIDs for pain control. May use Tylenol as first option. May use ibuprofen to no more than 24 hours.

## 2023-12-08 NOTE — ASSESSMENT & PLAN NOTE
Called and LMOM to call back about swelling in right foot. Status post hospitalization from 12/01 through 12/06 secondary to a fall down the stairs likely mechanical.   Trauma imaging without evidence of fracture or dislocation  Patient was evaluated by OT and PT and recommended for inpatient rehab however patient declined. At discharge, her levothyroxine and insulin regimen was modified.  See dx below for plan   Reports she has been having difficulty with ambulation secondary to pain in the right upper leg  -Will refer to physical therapy  -Continue Tylenol for pain control
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
## 2023-12-13 DIAGNOSIS — R26.2 AMBULATORY DYSFUNCTION: ICD-10-CM

## 2023-12-20 ENCOUNTER — APPOINTMENT (OUTPATIENT)
Dept: RADIATION ONCOLOGY | Facility: CLINIC | Age: 68
End: 2023-12-20
Payer: COMMERCIAL

## 2023-12-20 ENCOUNTER — CLINICAL SUPPORT (OUTPATIENT)
Dept: RADIATION ONCOLOGY | Facility: CLINIC | Age: 68
End: 2023-12-20
Attending: INTERNAL MEDICINE
Payer: COMMERCIAL

## 2023-12-20 VITALS
DIASTOLIC BLOOD PRESSURE: 83 MMHG | HEART RATE: 70 BPM | TEMPERATURE: 97.6 F | RESPIRATION RATE: 16 BRPM | OXYGEN SATURATION: 99 % | WEIGHT: 190 LBS | HEIGHT: 62 IN | SYSTOLIC BLOOD PRESSURE: 154 MMHG | BODY MASS INDEX: 34.96 KG/M2

## 2023-12-20 DIAGNOSIS — Z17.0 MALIGNANT NEOPLASM OF UPPER-INNER QUADRANT OF LEFT BREAST IN FEMALE, ESTROGEN RECEPTOR POSITIVE: Primary | ICD-10-CM

## 2023-12-20 DIAGNOSIS — C50.212 MALIGNANT NEOPLASM OF UPPER-INNER QUADRANT OF LEFT BREAST IN FEMALE, ESTROGEN RECEPTOR POSITIVE: Primary | ICD-10-CM

## 2023-12-20 PROCEDURE — 99211 OFF/OP EST MAY X REQ PHY/QHP: CPT | Performed by: INTERNAL MEDICINE

## 2023-12-20 PROCEDURE — 99024 POSTOP FOLLOW-UP VISIT: CPT | Performed by: INTERNAL MEDICINE

## 2023-12-20 NOTE — PROGRESS NOTES
Verna Poole 1955 is a 68 y.o. female  With diagnosed invasive ductal carcinoma of left breast, measuring approximately 7 mm, grade 1 and strongly ER/IL +, HER 2 -.   Left Breast mass was found on routine screening mammogram.  Family history positive for paternal cousin with breast cancer. She completed radiation to her left breast on 11/7/23, and returns today for EOT visit.   11/21/23 EGD mild Schatzki ring dilation and biopsy.     11/29/23 Surg Onc Dina CARRINGTON  She is doing well and there is no evidence of disease at this time. To use Aquaphor on the excoriated area under her breast.  Counseled on expected post-radiation skin changes.  F/u in 6 months with repeat mammogram.     12/1/23 [facility] admission for fall down stairs. CT Chest abd pelvis  IMPRESSION:  1. No evidence of a significant injury/acute trauma in the chest, abdomen or pelvis.   2. There is a spectrum of findings as detailed involving the left breast in the left axilla compatible with left breast cancer, left breast conservation therapy and other a seroma or necrotic lymph node in the left axilla as detailed. Clinical correlation is recommended. If there is no history of left breast cancer then bilateral mammography and left breast ultrasound would be indicated.   3. Other findings as noted.    Upcoming:  3/4/24 Hem Onc  5/21/24 Mammogram  5/29/24 Surg Onc          Follow up visit     Oncology History   Malignant neoplasm of upper-inner quadrant of left breast in female, estrogen receptor positive    7/10/2023 Biopsy    Left breast ultrasound-guided biopsy  8 o'clock, 7 cm from nipple (RUBI)  Invasive mammary carcinoma of no special type (ductal)  Grade 1  ER 90, IL 90, HER2 0    Concordant. Malignancy appears unifocal; masses cover 7 mm on US. Left axilla US negative. Right breast clear.     7/17/2023 Genetic Testing    A total of 36 genes were evaluated, including: KENY, BRCA1, BRCA2, CDH1, CHEK2, PALB2, PTEN, STK11, TP53  Negative  result. No pathogenic sequence variants identified  [facility]     8/15/2023 Surgery    Left breast RUBI  directed lumpectomy with SLN biopsy  Invasive mammary carcinoma of no special type (ductal)  Grade 1  1.1 cm  Margins negative  0/1 Lymph nodes  Anatomic/Prognostic Stage IA     8/15/2023 -  Cancer Staged    Staging form: Breast, AJCC 8th Edition  - Pathologic stage from 8/15/2023: Stage IA (pT1c, pN0(sn), cM0, G1, ER+, NJ+, HER2-) - Signed by Chester Sesay MD on 8/28/2023  Stage prefix: Initial diagnosis  Method of lymph node assessment: [place] lymph node biopsy  Multigene prognostic tests performed: None  Histologic grading system: 3 grade system       9/1/2023 -  Hormone Therapy    Anastrazole 1mg daily     10/16/2023 - 11/7/2023 Radiation    Treatment:  Course: C1    Plan ID Energy Fractions Dose per Fraction (cGy) Dose Correction (cGy) Total Dose Delivered (cGy) Elapsed Days   Prone L Br # 6X 16 / 16 266 0 4,256 22      Treatment dates:  C1: 10/16/2023 - 11/7/2023         Review of Systems:  Review of Systems   Constitutional: Negative.    HENT: Negative.     Eyes: Negative.         Wears glasses    Respiratory:  Positive for shortness of breath (on exertion).         Has asthma   Cardiovascular: Negative.    Gastrointestinal: Negative.    Endocrine: Negative.    Genitourinary: Negative.    Musculoskeletal:  Positive for arthralgias.   Skin: Negative.    Allergic/Immunologic: Negative.    Neurological: Negative.    Hematological: Negative.    Psychiatric/Behavioral: Negative.         Clinical Trial: no        Health Maintenance   Topic Date Due    PT PLAN OF CARE  07/19/2020    Falls: Plan of Care  11/19/2021    Influenza Vaccine (1) 09/01/2023    COVID-19 Vaccine (4 - 2023-24 season) 09/01/2023    Colorectal Cancer Screening  02/09/2024    Kidney Health Evaluation: Albumin/Creatinine Ratio  02/10/2024    HEMOGLOBIN A1C  03/01/2024    Depression Remission PHQ  06/08/2024    Breast Cancer Screening: Mammogram  " 06/20/2024    Urinary Incontinence Screening  11/14/2024    Medicare Annual Wellness Visit (AWV)  11/14/2024    BMI: Followup Plan  11/14/2024    Diabetic Foot Exam  11/14/2024    Kidney Health Evaluation: GFR  12/05/2024    Fall Risk  12/08/2024    BMI: Adult  12/08/2024    Cervical Cancer Screening  12/23/2024    DXA SCAN  07/18/2025    DM Eye Exam  07/26/2025    Hepatitis C Screening  Completed    Osteoporosis Screening  Completed    Pneumococcal Vaccine: 65+ Years  Completed    HIB Vaccine  Aged Out    IPV Vaccine  Aged Out    Hepatitis A Vaccine  Aged Out    Meningococcal ACWY Vaccine  Aged Out    HPV Vaccine  Aged Out     Patient Active Problem List   Diagnosis    Acquired hypothyroidism    Essential hypertension    Mild intermittent asthma with acute exacerbation    Dyslipidemia due to type 2 diabetes mellitus     Depression, recurrent (HCC)    Type 2 diabetes mellitus with other specified complication (HCC)    Spondylolisthesis of lumbar region    Diastolic dysfunction    Obesity (BMI 35.0-39.9 without comorbidity)    Plantar fasciitis of left foot    Thyroid nodule    Lower esophageal ring (Schatzki)    Malignant neoplasm of upper-inner quadrant of left breast in female, estrogen receptor positive     Refusal of blood transfusions as patient is Hoahaoism    Stage 3a chronic kidney disease (HCC)    Ambulatory dysfunction    Fall at home, subsequent encounter     Past Medical History:   Diagnosis Date    Abdominal pain     Asthma     Back pain     occas due to \"disc problem\"    Balance problems     \"right side\"    Breast cancer (HCC)     Cholelithiasis     lap aviva today 3/10/2021    Colon polyp     Cracked tooth     3    Depression     Diabetes mellitus (HCC)     Difficulty swallowing     \"food feels like stuck sometimes'    Exercise involving walking     steps and cleaning    Fatty liver     GERD (gastroesophageal reflux disease)     Hiatal hernia     History of pneumonia     History of stomach " "ulcers     Hyperlipidemia     Hypertension     Obesity     Pneumonia     Refusal of blood transfusions as patient is Sikh     Risk for falls     Stroke (HCC) 2013    Uses Marshallese as primary spoken language     Wears glasses      Past Surgical History:   Procedure Laterality Date    BREAST LUMPECTOMY Left 08/15/2023    Procedure: BREAST RUBI  DIRECTED LUMPECTOMY;  Surgeon: Chester Torres MD;  Location: [facility];  Service: Surgical Oncology    CHOLECYSTECTOMY      COLONOSCOPY  02/09/2021    4 polyps tubular adenomas by Dr. Luke    EGD  04/20/2021    nonobstructing Schatzki's ring dilated with 54 U[address] Hernandez, sliding hiatal hernia otherwise normal.  Biopsies stomach  negative for H. pylori biopsy esophagus negative for eosinophilic esophagitis by Dr. Calix    EGD AND COLONOSCOPY  02/09/2021    3 YEAR RECOMMENDED = COLONO    LYMPH NODE BIOPSY Left 08/15/2023    Procedure: LYMPHATIC MAPPING WITH BLUE AND RADIOACTIVE DYES, SENTINEL LYMPH NODE BIOPSY (INJECT AT 1200 BY DR TORRES IN THE OR);  Surgeon: Chester Torres MD;  Location: [facility];  Service: Surgical Oncology    TX LAPAROSCOPY SURG CHOLECYSTECTOMY N/A 03/10/2021    Procedure: CHOLECYSTECTOMY LAPAROSCOPIC W/ ROBOTICS;  Surgeon: Barbara Luke MD;  Location: [facility];  Service: General    ROTATOR CUFF REPAIR Bilateral     screws implanted\"    UPPER GASTROINTESTINAL ENDOSCOPY  02/09/2021    4 cm hiatal hernia without Alejandro lesions.  Possibly shortened esophagus.  Biopsy of the stomach negative for H. pylori, biopsy of the EG junction negative for Kaplan's, biopsied duodenum negative for celiac by Dr. Luke    US GUIDED BREAST BIOPSY LEFT COMPLETE Left 07/10/2023    US GUIDED THYROID BIOPSY  3/7/2023     Family History   Problem Relation Age of Onset    Diabetes Mother     Hypertension Mother     Hypertension Father     No Known Problems Sister     Colon cancer Brother 69    No Known Problems Son     No Known Problems Son     No Known " Problems Daughter     No Known Problems Daughter     No Known Problems Daughter     No Known Problems Maternal Grandmother     No Known Problems Maternal Grandfather     No Known Problems Paternal Grandmother     No Known Problems Paternal Grandfather     Breast cancer Other      Social History     Socioeconomic History    Marital status: /Civil Union     Spouse name: Not on file    Number of children: Not on file    Years of education: Not on file    Highest education level: Not on file   Occupational History    Occupation: House wife   Tobacco Use    Smoking status: Never    Smokeless tobacco: Never   Vaping Use    Vaping status: Never Used   Substance and Sexual Activity    Alcohol use: Never    Drug use: Never    Sexual activity: Not Currently   Other Topics Concern    Not on file   Social History Narrative    Not on file     Social Determinants of Health     Financial Resource Strain: Unknown (12/1/2023)    Received from [facility]    Overall Financial Resource Strain (CARDIA)     Difficulty of Paying Living Expenses: Patient refused   Food Insecurity: No Food Insecurity (12/1/2023)    Received from [facility]    Hunger Vital Sign     Worried About Running Out of Food in the Last Year: Never true     Ran Out of Food in the Last Year: Never true   Transportation Needs: No Transportation Needs (12/1/2023)    Received from [facility]    PRAPARE - Transportation     Lack of Transportation (Medical): No     Lack of Transportation (Non-Medical): No   Physical Activity: Not on file   Stress: Not on file   Social Connections: Not on file   Intimate Partner Violence: Not At Risk (12/1/2023)    Received from [facility]    Humiliation, Afraid, Rape, and Kick questionnaire     Fear of Current or Ex-Partner: No     Emotionally Abused: No     Physically Abused: No     Sexually Abused: No   Housing Stability: Low Risk  (12/1/2023)    Received from  [facility]    Housing Stability Vital Sign     Unable to Pay for Housing in the Last Year: No     Number of Places Lived in the Last Year: 1     Unstable Housing in the Last Year: No       Current Outpatient Medications:     Accu-Chek Guide test strip, TEST DAILY BEFORE ALL MEALS/SNACKS AND ONCE BEFORE BEDTIME., Disp: , Rfl:     albuterol (2.5 mg/3 mL) 0.083 % nebulizer solution, Take 3 mL (2.5 mg total) by nebulization every 6 (six) hours as needed for wheezing or shortness of breath, Disp: 60 mL, Rfl: 3    albuterol (PROVENTIL HFA,VENTOLIN HFA) 90 mcg/act inhaler, Inhale 2 puffs every 6 (six) hours as needed for wheezing, Disp: 18 g, Rfl: 2    anastrozole (ARIMIDEX) 1 mg tablet, Take 1 tablet (1 mg total) by mouth daily, Disp: 90 tablet, Rfl: 1    aspirin 81 mg chewable tablet, Chew 1 tablet (81 mg total) daily, Disp: 90 tablet, Rfl: 1    atorvastatin (LIPITOR) 20 mg tablet, TAKE 1 TABLET (20 MG TOTAL) BY MOUTH DAILY, Disp: 90 tablet, Rfl: 0    budesonide-formoterol (SYMBICORT) 160-4.5 mcg/act inhaler, Inhale 2 puffs 2 (two) times a day as needed (1) Rinse mouth after use., Disp: 10.2 g, Rfl: 3    clonazePAM (KlonoPIN) 0.125 mg disintegrating tablet, Take 1 tablet daily as needed for anxiety, Disp: 90 tablet, Rfl: 0    Empagliflozin-metFORMIN HCl (Synjardy) 12.5-1000 MG TABS, Take by mouth Take 2 tablets by mouth daily, Disp: , Rfl:     ezetimibe (ZETIA) 10 mg tablet, Take 1 tablet (10 mg total) by mouth daily, Disp: 90 tablet, Rfl: 1    furosemide (LASIX) 20 mg tablet, TAKE 1 TABLET DAILY AS NEEDED FOR EDEMA, Disp: 90 tablet, Rfl: 0    insulin degludec (Tresiba FlexTouch) 100 units/mL injection pen, Inject 20 Units under the skin daily, Disp: 18 mL, Rfl: 1    Insulin Pen Needle 31G X 4 MM MISC, Use 4 (four) times a day, Disp: 100 each, Rfl: 3    levothyroxine 137 mcg tablet, Take 137 mcg by mouth daily One tablet  M+Tu+ TH+ F 1/2 tablet W+ S+[place], Disp: , Rfl:     losartan (COZAAR) 100 MG tablet,  TAKE 1 TABLET EVERY DAY, Disp: 90 tablet, Rfl: 1    metoprolol succinate (TOPROL-XL) 50 mg 24 hr tablet, TAKE 1 TABLET EVERY DAY, Disp: 90 tablet, Rfl: 0    montelukast (SINGULAIR) 10 mg tablet, TAKE 1 TABLET EVERY DAY, Disp: 90 tablet, Rfl: 1    naloxone (NARCAN) 4 mg/0.1 mL nasal spray, Administer 1 spray into a nostril. If no response after 2-3 minutes, give another dose in the other nostril using a new spray. (Patient not taking: Reported on 9/1/2023), Disp: 1 each, Rfl: 1    NovoLOG FlexPen 100 units/mL injection pen, Inject under the skin INJECT 16 UNITS FOR BREAKFAST 15 UNITS FOR LUNCH AND 14 UNITS FOR DINNER, Disp: , Rfl:     omeprazole (PriLOSEC) 20 mg delayed release capsule, TAKE 1 CAPSULE EVERY DAY, Disp: 90 capsule, Rfl: 0    OneTouch Delica Lancets 33G MISC, Check blood sugars twice daily. Please substitute with appropriate alternative as covered by patient's insurance. Dx: E11.65, Disp: 200 each, Rfl: 3    VITAMIN D PO, Take by mouth once a week, Disp: , Rfl:   Allergies   Allergen Reactions    Etodolac Shortness Of Breath    Iodinated Contrast Media Shortness Of Breath and Wheezing    Simvastatin Other (See Comments)     elevated liver function     Latex Rash    Morphine Palpitations    Penicillins Rash     There were no vitals filed for this visit.

## 2023-12-20 NOTE — PROGRESS NOTES
Follow-up - Radiation Oncology   Verna Poole 1955 68 y.o. female [id number]    Cancer Staging   Malignant neoplasm of upper-inner quadrant of left breast in female, estrogen receptor positive   Staging form: Breast, AJCC 8th Edition  - Pathologic stage from 8/15/2023: Stage IA (pT1c, pN0(sn), cM0, G1, ER+, NC+, HER2-) - Signed by Chester Sesay MD on 8/28/2023  Stage prefix: Initial diagnosis  Method of lymph node assessment: [place] lymph node biopsy  Multigene prognostic tests performed: None  Histologic grading system: 3 grade system    Assessment/Plan:  Verna Poole is a 68 y.o. female with pT1cN0 (IA)) well differentiated invasive ductal carcinoma of the left breast (LLIQ,8:00, N7), ER/NC positive, HER-2 non-amplified, detected radiographically s/p lumpectomy and SLNB on 8/15/2023, notable for a 1.1cm primary, 12mm DCIS, no LVI, negative margins and 0/1 SLN involved.  She completed adjuvant radiation therapy on 11/7/2023.    She presents for routine 1-1.5 month end of treatment follow-up visit. She has recovered from usual expected side effects of therapy.  She has minimal residual hyperpigmentation in the breast.  Discussed continued skin care, and clinical and radiographic surveillance.  3/4/24 Hem Onc  5/21/24 Mammogram  5/29/24 Surg Onc  RTC in 3 months after surgical oncology    Darien Banda MD  Department of Radiation Oncology  [facility]    Total Time Spent  11 minutes spent reviewing EMR in preparation for visit, with the patient, coordination with other providers, and documentation.    No orders of the defined types were placed in this encounter.       History of Present Illness   Interval History:  Verna Poole 1955 is a 68 y.o. female  With diagnosed invasive ductal carcinoma of left breast, measuring approximately 7 mm, grade 1 and strongly ER/NC +, HER 2 -.   Left Breast mass was found on routine screening mammogram.  Family history positive for paternal  cousin with breast cancer. She completed radiation to her left breast on 11/7/23, and returns today for EOT visit.   11/21/23 EGD mild Schatzki ring dilation and biopsy.      11/29/23 Surg Onc Dina CARRINGTON  She is doing well and there is no evidence of disease at this time. To use Aquaphor on the excoriated area under her breast.  Counseled on expected post-radiation skin changes.  F/u in 6 months with repeat mammogram.      [facility] CT Chest abd pelvis  IMPRESSION:  1. No evidence of a significant injury/acute trauma in the chest, abdomen or pelvis.   2. There is a spectrum of findings as detailed involving the left breast in the left axilla compatible with left breast cancer, left breast conservation therapy and other a seroma or necrotic lymph node in the left axilla as detailed. Clinical correlation is recommended. If there is no history of left breast cancer then bilateral mammography and left breast ultrasound would be indicated.   3. Other findings as noted.     Upcoming:  3/4/24 Hem Onc  5/21/24 Mammogram  5/29/24 Surg Onc    She feels well overall.  She denies pain or any significant complaints in the breast.    Historical Information   Oncology History   Malignant neoplasm of upper-inner quadrant of left breast in female, estrogen receptor positive    7/10/2023 Biopsy    Left breast ultrasound-guided biopsy  8 o'clock, 7 cm from nipple (RUBI)  Invasive mammary carcinoma of no special type (ductal)  Grade 1  ER 90, AZ 90, HER2 0    Concordant. Malignancy appears unifocal; masses cover 7 mm on US. Left axilla US negative. Right breast clear.     7/17/2023 Genetic Testing    A total of 36 genes were evaluated, including: KENY, BRCA1, BRCA2, CDH1, CHEK2, PALB2, PTEN, STK11, TP53  Negative result. No pathogenic sequence variants identified  [place]     8/15/2023 Surgery    Left breast RUBI  directed lumpectomy with SLN biopsy  Invasive mammary carcinoma of no special  "type (ductal)  Grade 1  1.1 cm  Margins negative  0/1 Lymph nodes  Anatomic/Prognostic Stage IA     8/15/2023 -  Cancer Staged    Staging form: Breast, AJCC 8th Edition  - Pathologic stage from 8/15/2023: Stage IA (pT1c, pN0(sn), cM0, G1, ER+, MS+, HER2-) - Signed by Chester Sesay MD on 8/28/2023  Stage prefix: Initial diagnosis  Method of lymph node assessment: [place] lymph node biopsy  Multigene prognostic tests performed: None  Histologic grading system: 3 grade system       9/1/2023 -  Hormone Therapy    Anastrazole 1mg daily     10/16/2023 - 11/7/2023 Radiation    Treatment:  Course: C1    Plan ID Energy Fractions Dose per Fraction (cGy) Dose Correction (cGy) Total Dose Delivered (cGy) Elapsed Days   Prone L Br # 6X 16 / 16 [id number] 0 4,[id number] 22      Treatment dates:  C1: 10/16/2023 - 11/7/2023       Past Medical History:   Diagnosis Date   • Abdominal pain    • Asthma    • Back pain     occas due to \"disc problem\"   • Balance problems     \"right side\"   • Breast cancer (HCC)    • Cholelithiasis     lap aviva today 3/10/2021   • Colon polyp    • Cracked tooth     3   • Depression    • Diabetes mellitus (HCC)    • Difficulty swallowing     \"food feels like stuck sometimes'   • Exercise involving walking     steps and cleaning   • Fatty liver    • GERD (gastroesophageal reflux disease)    • Hiatal hernia    • History of pneumonia    • History of stomach ulcers    • Hyperlipidemia    • Hypertension    • Obesity    • Pneumonia    • Refusal of blood transfusions as patient is [facility]    • Risk for falls    • Stroke (HCC) 2013   • Uses Yi as primary spoken language    • Wears glasses      Past Surgical History:   Procedure Laterality Date   • BREAST LUMPECTOMY Left 08/15/2023    Procedure: BREAST RUBI  DIRECTED LUMPECTOMY;  Surgeon: Chester Sesay MD;  Location: [facility];  Service: Surgical Oncology   • CHOLECYSTECTOMY     • COLONOSCOPY  02/09/2021    4 polyps tubular adenomas by Dr. Luke   • EGD  " "04/20/2021    nonobstructing Schatzki's ring dilated with 54 French Hernandez, sliding hiatal hernia otherwise normal.  Biopsies stomach  negative for H. pylori biopsy esophagus negative for eosinophilic esophagitis by Dr. Calix   • EGD AND COLONOSCOPY  02/09/2021    3 YEAR RECOMMENDED = COLONO   • LYMPH NODE BIOPSY Left 08/15/2023    Procedure: LYMPHATIC MAPPING WITH BLUE AND RADIOACTIVE DYES, SENTINEL LYMPH NODE BIOPSY (INJECT AT 1200 BY DR TORRES IN THE OR);  Surgeon: Chester Torres MD;  Location: [facility];  Service: Surgical Oncology   • MN LAPAROSCOPY SURG CHOLECYSTECTOMY N/A 03/10/2021    Procedure: CHOLECYSTECTOMY LAPAROSCOPIC W/ ROBOTICS;  Surgeon: Barbara Luke MD;  Location: [facility];  Service: General   • ROTATOR CUFF REPAIR Bilateral     screws implanted\"   • UPPER GASTROINTESTINAL ENDOSCOPY  02/09/2021    4 cm hiatal hernia without Alejandro lesions.  Possibly shortened esophagus.  Biopsy of the stomach negative for H. pylori, biopsy of the EG junction negative for Kaplan's, biopsied duodenum negative for celiac by Dr. Luke   • US GUIDED BREAST BIOPSY LEFT COMPLETE Left 07/10/2023   • US GUIDED THYROID BIOPSY  3/7/2023     Social History   Social History     Substance and Sexual Activity   Alcohol Use Never     Social History     Substance and Sexual Activity   Drug Use Never     Social History     Tobacco Use   Smoking Status Never   Smokeless Tobacco Never       Meds/Allergies     Current Outpatient Medications:   •  Accu-Chek Guide test strip, TEST DAILY BEFORE ALL MEALS/SNACKS AND ONCE BEFORE BEDTIME., Disp: , Rfl:   •  albuterol (2.5 mg/3 mL) 0.083 % nebulizer solution, Take 3 mL (2.5 mg total) by nebulization every 6 (six) hours as needed for wheezing or shortness of breath, Disp: 60 mL, Rfl: 3  •  albuterol (PROVENTIL HFA,VENTOLIN HFA) 90 mcg/act inhaler, Inhale 2 puffs every 6 (six) hours as needed for wheezing, Disp: 18 g, Rfl: 2  •  anastrozole (ARIMIDEX) 1 mg tablet, Take 1 tablet (1 mg " total) by mouth daily, Disp: 90 tablet, Rfl: 1  •  aspirin 81 mg chewable tablet, Chew 1 tablet (81 mg total) daily, Disp: 90 tablet, Rfl: 1  •  atorvastatin (LIPITOR) 20 mg tablet, TAKE 1 TABLET (20 MG TOTAL) BY MOUTH DAILY, Disp: 90 tablet, Rfl: 0  •  budesonide-formoterol (SYMBICORT) 160-4.5 mcg/act inhaler, Inhale 2 puffs 2 (two) times a day as needed (1) Rinse mouth after use., Disp: 10.2 g, Rfl: 3  •  clonazePAM (KlonoPIN) 0.125 mg disintegrating tablet, Take 1 tablet daily as needed for anxiety, Disp: 90 tablet, Rfl: 0  •  Empagliflozin-metFORMIN HCl (Synjardy) 12.5-1000 MG TABS, Take by mouth Take 2 tablets by mouth daily, Disp: , Rfl:   •  ezetimibe (ZETIA) 10 mg tablet, Take 1 tablet (10 mg total) by mouth daily, Disp: 90 tablet, Rfl: 1  •  furosemide (LASIX) 20 mg tablet, TAKE 1 TABLET DAILY AS NEEDED FOR EDEMA, Disp: 90 tablet, Rfl: 0  •  insulin degludec (Tresiba FlexTouch) 100 units/mL injection pen, Inject 20 Units under the skin daily, Disp: 18 mL, Rfl: 1  •  Insulin Pen Needle 31G X 4 MM MISC, Use 4 (four) times a day, Disp: 100 each, Rfl: 3  •  levothyroxine 137 mcg tablet, Take 137 mcg by mouth daily One tablet  M+Tu+ TH+ F 1/2 tablet W+ S+[place], Disp: , Rfl:   •  losartan (COZAAR) 100 MG tablet, TAKE 1 TABLET EVERY DAY, Disp: 90 tablet, Rfl: 1  •  metoprolol succinate (TOPROL-XL) 50 mg 24 hr tablet, TAKE 1 TABLET EVERY DAY, Disp: 90 tablet, Rfl: 0  •  montelukast (SINGULAIR) 10 mg tablet, TAKE 1 TABLET EVERY DAY, Disp: 90 tablet, Rfl: 1  •  NovoLOG FlexPen 100 units/mL injection pen, Inject under the skin INJECT 16 UNITS FOR BREAKFAST 15 UNITS FOR LUNCH AND 14 UNITS FOR DINNER, Disp: , Rfl:   •  omeprazole (PriLOSEC) 20 mg delayed release capsule, TAKE 1 CAPSULE EVERY DAY, Disp: 90 capsule, Rfl: 0  •  OneTouch Delica Lancets 33G MISC, Check blood sugars twice daily. Please substitute with appropriate alternative as covered by patient's insurance. Dx: E11.65, Disp: 200 each, Rfl: 3  •  VITAMIN D PO,  "Take by mouth once a week, Disp: , Rfl:   •  naloxone (NARCAN) 4 mg/0.1 mL nasal spray, Administer 1 spray into a nostril. If no response after 2-3 minutes, give another dose in the other nostril using a new spray. (Patient not taking: Reported on 9/1/2023), Disp: 1 each, Rfl: 1  Allergies   Allergen Reactions   • Etodolac Shortness Of Breath   • Iodinated Contrast Media Shortness Of Breath and Wheezing   • Simvastatin Other (See Comments)     elevated liver function    • Latex Rash   • Morphine Palpitations   • Penicillins Rash       Review of Systems:  Refer to nursing note    OBJECTIVE:   /83 (BP Location: Right arm, Patient Position: Sitting, Cuff Size: Standard)   Pulse 70   Temp 97.6 °F (36.4 °C) (Temporal)   Resp 16   Ht 5' 2\" (1.575 m)   Wt 86.2 kg (190 lb)   SpO2 99%   BMI 34.75 kg/m²     Physical Exam:   General Appearance:  Alert, cooperative, no distress, appears stated age  Lungs: Respirations unlabored, no cyanosis, able to speak in complete sentences without dyspnea.  Breast Exam:  Left  Breast: Mild residual dyspigmentation in the irradiated field consisting of hyperpigmentation in the inferior breast and axilla. Mild to no edema.   Exam chaperoned by female staff member.  Extremities: No cyanosis or edema  Skin: No generalized rash or dermatitis  Neurologic: ANOx3, speech and cognition intact.    Portions of the record may have been created with voice recognition software.  Occasional wrong word or \"sound a like\" substitutions may have occurred due to the inherent limitations of voice recognition software.  Read the chart carefully and recognize, using context, where substitutions have occurred.  "

## 2023-12-21 ENCOUNTER — APPOINTMENT (OUTPATIENT)
Dept: RADIATION ONCOLOGY | Facility: CLINIC | Age: 68
End: 2023-12-21
Payer: COMMERCIAL

## 2024-01-29 DIAGNOSIS — Z17.0 MALIGNANT NEOPLASM OF UPPER-INNER QUADRANT OF LEFT BREAST IN FEMALE, ESTROGEN RECEPTOR POSITIVE: ICD-10-CM

## 2024-01-29 DIAGNOSIS — C50.212 MALIGNANT NEOPLASM OF UPPER-INNER QUADRANT OF LEFT BREAST IN FEMALE, ESTROGEN RECEPTOR POSITIVE: ICD-10-CM

## 2024-01-29 RX ORDER — ANASTROZOLE 1 MG/1
1 TABLET ORAL DAILY
Qty: 90 TABLET | Refills: 3 | Status: SHIPPED | OUTPATIENT
Start: 2024-01-29

## 2024-02-08 ENCOUNTER — VBI (OUTPATIENT)
Dept: ADMINISTRATIVE | Facility: OTHER | Age: 69
End: 2024-02-08

## 2024-02-12 ENCOUNTER — TELEPHONE (OUTPATIENT)
Dept: INTERNAL MEDICINE CLINIC | Facility: CLINIC | Age: 69
End: 2024-02-12

## 2024-02-12 NOTE — TELEPHONE ENCOUNTER
LM for pt to call office in regards to 2/14 appointment which was moved to 11:30 as physician will not available be available at scheduled time.

## 2024-02-13 ENCOUNTER — APPOINTMENT (EMERGENCY)
Dept: CT IMAGING | Facility: HOSPITAL | Age: 69
End: 2024-02-13
Payer: COMMERCIAL

## 2024-02-13 ENCOUNTER — HOSPITAL ENCOUNTER (EMERGENCY)
Facility: HOSPITAL | Age: 69
Discharge: HOME | End: 2024-02-13
Attending: EMERGENCY MEDICINE
Payer: COMMERCIAL

## 2024-02-13 VITALS
TEMPERATURE: 98.8 F | WEIGHT: 201.72 LBS | DIASTOLIC BLOOD PRESSURE: 75 MMHG | OXYGEN SATURATION: 98 % | BODY MASS INDEX: 36.9 KG/M2 | HEART RATE: 80 BPM | SYSTOLIC BLOOD PRESSURE: 185 MMHG | RESPIRATION RATE: 18 BRPM

## 2024-02-13 DIAGNOSIS — K29.70 GASTRITIS: ICD-10-CM

## 2024-02-13 DIAGNOSIS — M54.9 BACK PAIN: ICD-10-CM

## 2024-02-13 DIAGNOSIS — R10.13 EPIGASTRIC PAIN: Primary | ICD-10-CM

## 2024-02-13 LAB
ALBUMIN SERPL BCP-MCNC: 4.2 G/DL (ref 3.5–5)
ALP SERPL-CCNC: 93 U/L (ref 34–104)
ALT SERPL W P-5'-P-CCNC: 18 U/L (ref 7–52)
ANION GAP SERPL CALCULATED.3IONS-SCNC: 8 MMOL/L
AST SERPL W P-5'-P-CCNC: 30 U/L (ref 13–39)
ATRIAL RATE: 86 BPM
ATRIAL RATE: 86 BPM
BACTERIA UR QL AUTO: ABNORMAL /HPF
BASOPHILS # BLD AUTO: 0.02 THOUSANDS/ÂΜL (ref 0–0.1)
BASOPHILS NFR BLD AUTO: 0 % (ref 0–1)
BILIRUB SERPL-MCNC: 0.56 MG/DL (ref 0.2–1)
BILIRUB UR QL STRIP: NEGATIVE
BUN SERPL-MCNC: 17 MG/DL (ref 5–25)
CALCIUM SERPL-MCNC: 9.2 MG/DL (ref 8.4–10.2)
CARDIAC TROPONIN I PNL SERPL HS: 3 NG/L
CHLORIDE SERPL-SCNC: 101 MMOL/L (ref 96–108)
CLARITY UR: CLEAR
CO2 SERPL-SCNC: 28 MMOL/L (ref 21–32)
COLOR UR: YELLOW
CREAT SERPL-MCNC: 0.91 MG/DL (ref 0.6–1.3)
EOSINOPHIL # BLD AUTO: 0.06 THOUSAND/ÂΜL (ref 0–0.61)
EOSINOPHIL NFR BLD AUTO: 1 % (ref 0–6)
ERYTHROCYTE [DISTWIDTH] IN BLOOD BY AUTOMATED COUNT: 12.6 % (ref 11.6–15.1)
GFR SERPL CREATININE-BSD FRML MDRD: 65 ML/MIN/1.73SQ M
GLUCOSE SERPL-MCNC: 168 MG/DL (ref 65–140)
GLUCOSE UR STRIP-MCNC: NEGATIVE MG/DL
HCT VFR BLD AUTO: 39.4 % (ref 34.8–46.1)
HGB BLD-MCNC: 13.5 G/DL (ref 11.5–15.4)
HGB UR QL STRIP.AUTO: NEGATIVE
HYALINE CASTS #/AREA URNS LPF: ABNORMAL /LPF
IMM GRANULOCYTES # BLD AUTO: 0.05 THOUSAND/UL (ref 0–0.2)
IMM GRANULOCYTES NFR BLD AUTO: 0 % (ref 0–2)
KETONES UR STRIP-MCNC: ABNORMAL MG/DL
LEUKOCYTE ESTERASE UR QL STRIP: NEGATIVE
LIPASE SERPL-CCNC: 20 U/L (ref 11–82)
LYMPHOCYTES # BLD AUTO: 0.43 THOUSANDS/ÂΜL (ref 0.6–4.47)
LYMPHOCYTES NFR BLD AUTO: 4 % (ref 14–44)
MCH RBC QN AUTO: 29 PG (ref 26.8–34.3)
MCHC RBC AUTO-ENTMCNC: 34.3 G/DL (ref 31.4–37.4)
MCV RBC AUTO: 85 FL (ref 82–98)
MONOCYTES # BLD AUTO: 0.4 THOUSAND/ÂΜL (ref 0.17–1.22)
MONOCYTES NFR BLD AUTO: 3 % (ref 4–12)
MUCOUS THREADS UR QL AUTO: ABNORMAL
NEUTROPHILS # BLD AUTO: 11.18 THOUSANDS/ÂΜL (ref 1.85–7.62)
NEUTS SEG NFR BLD AUTO: 92 % (ref 43–75)
NITRITE UR QL STRIP: NEGATIVE
NON-SQ EPI CELLS URNS QL MICRO: ABNORMAL /HPF
NRBC BLD AUTO-RTO: 0 /100 WBCS
P AXIS: 61 DEGREES
P AXIS: 70 DEGREES
PH UR STRIP.AUTO: 6.5 [PH] (ref 4.5–8)
PLATELET # BLD AUTO: 235 THOUSANDS/UL (ref 149–390)
PMV BLD AUTO: 10.5 FL (ref 8.9–12.7)
POTASSIUM SERPL-SCNC: 4.3 MMOL/L (ref 3.5–5.3)
PR INTERVAL: 142 MS
PR INTERVAL: 146 MS
PROT SERPL-MCNC: 7.5 G/DL (ref 6.4–8.4)
PROT UR STRIP-MCNC: ABNORMAL MG/DL
QRS AXIS: -5 DEGREES
QRS AXIS: 3 DEGREES
QRSD INTERVAL: 84 MS
QRSD INTERVAL: 86 MS
QT INTERVAL: 338 MS
QT INTERVAL: 362 MS
QTC INTERVAL: 402 MS
QTC INTERVAL: 433 MS
RBC # BLD AUTO: 4.65 MILLION/UL (ref 3.81–5.12)
RBC #/AREA URNS AUTO: ABNORMAL /HPF
SODIUM SERPL-SCNC: 137 MMOL/L (ref 135–147)
SP GR UR STRIP.AUTO: >=1.03 (ref 1–1.03)
T WAVE AXIS: 25 DEGREES
T WAVE AXIS: 74 DEGREES
UROBILINOGEN UR QL STRIP.AUTO: 0.2 E.U./DL
VENTRICULAR RATE: 85 BPM
VENTRICULAR RATE: 86 BPM
WBC # BLD AUTO: 12.14 THOUSAND/UL (ref 4.31–10.16)
WBC #/AREA URNS AUTO: ABNORMAL /HPF

## 2024-02-13 PROCEDURE — 99284 EMERGENCY DEPT VISIT MOD MDM: CPT

## 2024-02-13 PROCEDURE — 74176 CT ABD & PELVIS W/O CONTRAST: CPT

## 2024-02-13 PROCEDURE — 85025 COMPLETE CBC W/AUTO DIFF WBC: CPT | Performed by: EMERGENCY MEDICINE

## 2024-02-13 PROCEDURE — 80053 COMPREHEN METABOLIC PANEL: CPT | Performed by: EMERGENCY MEDICINE

## 2024-02-13 PROCEDURE — 96375 TX/PRO/DX INJ NEW DRUG ADDON: CPT

## 2024-02-13 PROCEDURE — 96374 THER/PROPH/DIAG INJ IV PUSH: CPT

## 2024-02-13 PROCEDURE — 99285 EMERGENCY DEPT VISIT HI MDM: CPT | Performed by: EMERGENCY MEDICINE

## 2024-02-13 PROCEDURE — 93005 ELECTROCARDIOGRAM TRACING: CPT

## 2024-02-13 PROCEDURE — 36415 COLL VENOUS BLD VENIPUNCTURE: CPT | Performed by: EMERGENCY MEDICINE

## 2024-02-13 PROCEDURE — 83690 ASSAY OF LIPASE: CPT | Performed by: EMERGENCY MEDICINE

## 2024-02-13 PROCEDURE — 96361 HYDRATE IV INFUSION ADD-ON: CPT

## 2024-02-13 PROCEDURE — C9113 INJ PANTOPRAZOLE SODIUM, VIA: HCPCS | Performed by: EMERGENCY MEDICINE

## 2024-02-13 PROCEDURE — 84484 ASSAY OF TROPONIN QUANT: CPT | Performed by: EMERGENCY MEDICINE

## 2024-02-13 PROCEDURE — 81001 URINALYSIS AUTO W/SCOPE: CPT

## 2024-02-13 RX ORDER — ONDANSETRON 4 MG/1
4 TABLET, ORALLY DISINTEGRATING ORAL EVERY 8 HOURS PRN
Qty: 10 TABLET | Refills: 0 | Status: SHIPPED | OUTPATIENT
Start: 2024-02-13 | End: 2024-02-19

## 2024-02-13 RX ORDER — MAGNESIUM HYDROXIDE/ALUMINUM HYDROXICE/SIMETHICONE 120; 1200; 1200 MG/30ML; MG/30ML; MG/30ML
30 SUSPENSION ORAL ONCE
Status: COMPLETED | OUTPATIENT
Start: 2024-02-13 | End: 2024-02-13

## 2024-02-13 RX ORDER — FENTANYL CITRATE 50 UG/ML
25 INJECTION, SOLUTION INTRAMUSCULAR; INTRAVENOUS ONCE
Status: COMPLETED | OUTPATIENT
Start: 2024-02-13 | End: 2024-02-13

## 2024-02-13 RX ORDER — SUCRALFATE 1 G/1
1 TABLET ORAL 3 TIMES DAILY
Qty: 60 TABLET | Refills: 0 | Status: SHIPPED | OUTPATIENT
Start: 2024-02-13

## 2024-02-13 RX ORDER — PANTOPRAZOLE SODIUM 40 MG/1
40 TABLET, DELAYED RELEASE ORAL DAILY
Qty: 30 TABLET | Refills: 0 | Status: SHIPPED | OUTPATIENT
Start: 2024-02-13

## 2024-02-13 RX ORDER — PANTOPRAZOLE SODIUM 40 MG/10ML
40 INJECTION, POWDER, LYOPHILIZED, FOR SOLUTION INTRAVENOUS ONCE
Status: COMPLETED | OUTPATIENT
Start: 2024-02-13 | End: 2024-02-13

## 2024-02-13 RX ORDER — ONDANSETRON 2 MG/ML
4 INJECTION INTRAMUSCULAR; INTRAVENOUS ONCE AS NEEDED
Status: COMPLETED | OUTPATIENT
Start: 2024-02-13 | End: 2024-02-13

## 2024-02-13 RX ADMIN — ALUMINUM HYDROXIDE, MAGNESIUM HYDROXIDE, AND DIMETHICONE 30 ML: 200; 20; 200 SUSPENSION ORAL at 17:09

## 2024-02-13 RX ADMIN — ONDANSETRON 4 MG: 2 INJECTION INTRAMUSCULAR; INTRAVENOUS at 19:03

## 2024-02-13 RX ADMIN — PANTOPRAZOLE SODIUM 40 MG: 40 INJECTION, POWDER, FOR SOLUTION INTRAVENOUS at 17:09

## 2024-02-13 RX ADMIN — FENTANYL CITRATE 25 MCG: 50 INJECTION INTRAMUSCULAR; INTRAVENOUS at 17:15

## 2024-02-13 RX ADMIN — SODIUM CHLORIDE 1000 ML: 0.9 INJECTION, SOLUTION INTRAVENOUS at 17:09

## 2024-02-13 NOTE — ED PROVIDER NOTES
History  Chief Complaint   Patient presents with    Flank Pain     Pt reports right flank pain that radiates around to RLQ, and states she has now also started vomiting       History provided by:  Patient   used: No    Flank Pain  Pain location:  Epigastric  Pain quality: cramping    Pain radiates to:  Does not radiate  Pain severity:  Moderate  Onset quality:  Gradual  Duration:  1 day  Timing:  Intermittent  Progression:  Waxing and waning  Chronicity:  New  Context comment:  Epigastric pain, vomiting since yesterday, multiple times  Relieved by:  Nothing  Worsened by:  Nothing  Ineffective treatments:  None tried  Associated symptoms: vomiting    Associated symptoms: no chest pain, no chills, no constipation, no cough, no diarrhea, no dysuria, no fever, no nausea, no shortness of breath and no sore throat    Associated symptoms comment:  Back pain radiating to right leg  Vomiting:     Quality:  Stomach contents    Number of occurrences:  Several per patient    Severity:  Moderate    Duration:  1 day    Timing:  Intermittent    Progression:  Partially resolved  Risk factors comment:  DM, HTN, GERD      Prior to Admission Medications   Prescriptions Last Dose Informant Patient Reported? Taking?   Accu-Chek Guide test strip   Yes No   Sig: TEST DAILY BEFORE ALL MEALS/SNACKS AND ONCE BEFORE BEDTIME.   Empagliflozin-metFORMIN HCl (Synjardy) 12.5-1000 MG TABS  Self Yes Yes   Sig: Take by mouth Take 2 tablets by mouth daily   Insulin Pen Needle 31G X 4 MM MISC  Self No No   Sig: Use 4 (four) times a day   NovoLOG FlexPen 100 units/mL injection pen   Yes No   Sig: Inject under the skin INJECT 16 UNITS FOR BREAKFAST 15 UNITS FOR LUNCH AND 14 UNITS FOR DINNER   OneTouch Delica Lancets 33G MISC  Self No No   Sig: Check blood sugars twice daily. Please substitute with appropriate alternative as covered by patient's insurance. Dx: E11.65   VITAMIN D PO  Self Yes No   Sig: Take by mouth once a week    albuterol (2.5 mg/3 mL) 0.083 % nebulizer solution   No No   Sig: Take 3 mL (2.5 mg total) by nebulization every 6 (six) hours as needed for wheezing or shortness of breath   albuterol (PROVENTIL HFA,VENTOLIN HFA) 90 mcg/act inhaler  Self No No   Sig: Inhale 2 puffs every 6 (six) hours as needed for wheezing   anastrozole (ARIMIDEX) 1 mg tablet   No Yes   Sig: TAKE 1 TABLET (1 MG TOTAL) BY MOUTH DAILY   aspirin 81 mg chewable tablet  Self No No   Sig: Chew 1 tablet (81 mg total) daily   atorvastatin (LIPITOR) 20 mg tablet   No Yes   Sig: TAKE 1 TABLET (20 MG TOTAL) BY MOUTH DAILY   budesonide-formoterol (SYMBICORT) 160-4.5 mcg/act inhaler  Self No No   Sig: Inhale 2 puffs 2 (two) times a day as needed (1) Rinse mouth after use.   clonazePAM (KlonoPIN) 0.125 mg disintegrating tablet  Self No No   Sig: Take 1 tablet daily as needed for anxiety   ezetimibe (ZETIA) 10 mg tablet  Self No No   Sig: Take 1 tablet (10 mg total) by mouth daily   furosemide (LASIX) 20 mg tablet   No No   Sig: TAKE 1 TABLET DAILY AS NEEDED FOR EDEMA   insulin degludec (Tresiba FlexTouch) 100 units/mL injection pen  Self No No   Sig: Inject 20 Units under the skin daily   levothyroxine 137 mcg tablet   Yes Yes   Sig: Take 137 mcg by mouth daily One tablet  M+Tu+ TH+ F  1/2 tablet W+ S+[place]   losartan (COZAAR) 100 MG tablet   No Yes   Sig: TAKE 1 TABLET EVERY DAY   metoprolol succinate (TOPROL-XL) 50 mg 24 hr tablet   No Yes   Sig: TAKE 1 TABLET EVERY DAY   montelukast (SINGULAIR) 10 mg tablet   No Yes   Sig: TAKE 1 TABLET EVERY DAY   naloxone (NARCAN) 4 mg/0.1 mL nasal spray  Self No No   Sig: Administer 1 spray into a nostril. If no response after 2-3 minutes, give another dose in the other nostril using a new spray.   Patient not taking: Reported on 9/1/2023   omeprazole (PriLOSEC) 20 mg delayed release capsule   No Yes   Sig: TAKE 1 CAPSULE EVERY DAY      Facility-Administered Medications: None       Past Medical History:   Diagnosis Date     "Abdominal pain     Asthma     Back pain     occas due to \"disc problem\"    Balance problems     \"right side\"    Breast cancer (HCC)     Cholelithiasis     lap aviva today 3/10/2021    Colon polyp     Cracked tooth     3    Depression     Diabetes mellitus (HCC)     Difficulty swallowing     \"food feels like stuck sometimes'    Exercise involving walking     steps and cleaning    Fatty liver     GERD (gastroesophageal reflux disease)     Hiatal hernia     History of pneumonia     History of stomach ulcers     Hyperlipidemia     Hypertension     Obesity     Pneumonia     Refusal of blood transfusions as patient is Quaker     Risk for falls     Stroke (HCC) 2013    Uses Luxembourgish as primary spoken language     Wears glasses        Past Surgical History:   Procedure Laterality Date    BREAST LUMPECTOMY Left 08/15/2023    Procedure: BREAST RUBI  DIRECTED LUMPECTOMY;  Surgeon: Chester Torres MD;  [facility];  Service: Surgical Oncology    CHOLECYSTECTOMY      COLONOSCOPY  02/09/2021    4 polyps tubular adenomas by Dr. Luke    EGD  04/20/2021    nonobstructing Schatzki's ring dilated with 54 Malay Hernandez, sliding hiatal hernia otherwise normal.  Biopsies stomach  negative for H. pylori biopsy esophagus negative for eosinophilic esophagitis by Dr. Calix    EGD AND COLONOSCOPY  02/09/2021    3 YEAR RECOMMENDED = COLONO    LYMPH NODE BIOPSY Left 08/15/2023    Procedure: LYMPHATIC MAPPING WITH BLUE AND RADIOACTIVE DYES, SENTINEL LYMPH NODE BIOPSY (INJECT AT 1200 BY DR TORRES IN THE OR);  Surgeon: Chester Torres MD;  [facility];  Service: Surgical Oncology    MD LAPAROSCOPY SURG CHOLECYSTECTOMY N/A 03/10/2021    Procedure: CHOLECYSTECTOMY LAPAROSCOPIC W/ ROBOTICS;  Surgeon: Barbara Luke MD;  [facility];  Service: General    ROTATOR CUFF REPAIR Bilateral     screws implanted\"    UPPER GASTROINTESTINAL ENDOSCOPY  02/09/2021    4 cm hiatal hernia without Alejandro lesions.  Possibly " shortened esophagus.  Biopsy of the stomach negative for H. pylori, biopsy of the EG junction negative for Kaplan's, biopsied duodenum negative for celiac by Dr. Luke    US GUIDED BREAST BIOPSY LEFT COMPLETE Left 07/10/2023    US GUIDED THYROID BIOPSY  3/7/2023       Family History   Problem Relation Age of Onset    Diabetes Mother     Hypertension Mother     Hypertension Father     No Known Problems Sister     Colon cancer Brother 69    No Known Problems Son     No Known Problems Son     No Known Problems Daughter     No Known Problems Daughter     No Known Problems Daughter     No Known Problems Maternal Grandmother     No Known Problems Maternal Grandfather     No Known Problems Paternal Grandmother     No Known Problems Paternal Grandfather     Breast cancer Other      I have reviewed and agree with the history as documented.    E-Cigarette/Vaping    E-Cigarette Use Never User      E-Cigarette/Vaping Substances    Nicotine No     THC No     CBD No     Flavoring No     Other No     Unknown No      Social History     Tobacco Use    Smoking status: Never    Smokeless tobacco: Never   Vaping Use    Vaping status: Never Used   Substance Use Topics    Alcohol use: Never    Drug use: Never       Review of Systems   Constitutional:  Negative for chills and fever.   HENT:  Negative for facial swelling, sore throat and trouble swallowing.    Eyes:  Negative for pain and visual disturbance.   Respiratory:  Negative for cough, chest tightness and shortness of breath.    Cardiovascular:  Negative for chest pain and leg swelling.   Gastrointestinal:  Positive for vomiting. Negative for abdominal pain, blood in stool, constipation, diarrhea and nausea.   Genitourinary:  Positive for flank pain. Negative for dysuria.   Musculoskeletal:  Negative for back pain, neck pain and neck stiffness.   Skin:  Negative for pallor and rash.   Allergic/Immunologic: Negative for environmental allergies and immunocompromised state.    Neurological:  Negative for dizziness and headaches.   Hematological:  Negative for adenopathy. Does not bruise/bleed easily.   Psychiatric/Behavioral:  Negative for agitation and behavioral problems.    All other systems reviewed and are negative.      Physical Exam  Physical Exam  Vitals and nursing note reviewed.   Constitutional:       General: She is not in acute distress.     Appearance: She is well-developed.   HENT:      Head: Normocephalic and atraumatic.   Eyes:      Extraocular Movements: Extraocular movements intact.   Cardiovascular:      Rate and Rhythm: Normal rate and regular rhythm.   Pulmonary:      Effort: Pulmonary effort is normal. No respiratory distress.   Abdominal:      Palpations: Abdomen is soft.      Tenderness: There is abdominal tenderness in the epigastric area. There is no guarding or rebound.   Musculoskeletal:         General: Normal range of motion.      Cervical back: Normal range of motion and neck supple.   Skin:     General: Skin is warm and dry.   Neurological:      General: No focal deficit present.      Mental Status: She is alert and oriented to person, place, and time.   Psychiatric:         Mood and Affect: Mood normal.         Behavior: Behavior normal.         Vital Signs  ED Triage Vitals [02/13/24 1626]   Temperature Pulse Respirations Blood Pressure SpO2   98.8 °F (37.1 °C) 89 18 (!) 196/79 97 %      Temp Source Heart Rate Source Patient Position - Orthostatic VS BP Location FiO2 (%)   Oral Monitor Sitting Right arm --      Pain Score       8           Vitals:    02/13/24 1626 02/13/24 1830   BP: (!) 196/79 (!) 185/75   Pulse: 89 80   Patient Position - Orthostatic VS: Sitting Lying         Visual Acuity      ED Medications  Medications   sodium chloride 0.9 % bolus 1,000 mL (0 mL Intravenous Stopped 2/13/24 1826)   pantoprazole (PROTONIX) injection 40 mg (40 mg Intravenous Given 2/13/24 1709)   aluminum-magnesium hydroxide-simethicone (MAALOX) oral suspension 30  mL (30 mL Oral Given 2/13/24 1709)   ondansetron (ZOFRAN) injection 4 mg (4 mg Intravenous Given 2/13/24 1903)   fentanyl citrate (PF) 100 MCG/2ML 25 mcg (25 mcg Intravenous Given 2/13/24 1715)       Diagnostic Studies  Results Reviewed       Procedure Component Value Units Date/Time    HS Troponin 0hr (reflex protocol) [[id number]]  (Normal) Collected: 02/13/24 1708    Lab Status: Final result Specimen: Blood from Arm, Right Updated: 02/13/24 1739     hs TnI 0hr 3 ng/L     Comprehensive metabolic panel [[id number]]  (Abnormal) Collected: 02/13/24 1708    Lab Status: Final result Specimen: Blood from Arm, Right Updated: 02/13/24 1734     Sodium 137 mmol/L      Potassium 4.3 mmol/L      Chloride 101 mmol/L      CO2 28 mmol/L      ANION GAP 8 mmol/L      BUN 17 mg/dL      Creatinine 0.91 mg/dL      Glucose 168 mg/dL      Calcium 9.2 mg/dL      AST 30 U/L      ALT 18 U/L      Alkaline Phosphatase 93 U/L      Total Protein 7.5 g/dL      Albumin 4.2 g/dL      Total Bilirubin 0.56 mg/dL      eGFR 65 ml/min/1.73sq m     Narrative:      National Kidney Disease Foundation guidelines for Chronic Kidney Disease (CKD):     Stage 1 with normal or high GFR (GFR > 90 mL/min/1.73 square meters)    Stage 2 Mild CKD (GFR = 60-89 mL/min/1.73 square meters)    Stage 3A Moderate CKD (GFR = 45-59 mL/min/1.73 square meters)    Stage 3B Moderate CKD (GFR = 30-44 mL/min/1.73 square meters)    Stage 4 Severe CKD (GFR = 15-29 mL/min/1.73 square meters)    Stage 5 End Stage CKD (GFR <15 mL/min/1.73 square meters)  Note: GFR calculation is accurate only with a steady state creatinine    Lipase [[id number]]  (Normal) Collected: 02/13/24 1708    Lab Status: Final result Specimen: Blood from Arm, Right Updated: 02/13/24 1734     Lipase 20 u/L     CBC and differential [[id number]]  (Abnormal) Collected: 02/13/24 1708    Lab Status: Final result Specimen: Blood from Arm, Right Updated: 02/13/24 1719     WBC 12.14 Thousand/uL      RBC 4.65 Million/uL       Hemoglobin 13.5 g/dL      Hematocrit 39.4 %      MCV 85 fL      MCH 29.0 pg      MCHC 34.3 g/dL      RDW 12.6 %      MPV 10.5 fL      Platelets 235 Thousands/uL      nRBC 0 /100 WBCs      Neutrophils Relative 92 %      Immat GRANS % 0 %      Lymphocytes Relative 4 %      Monocytes Relative 3 %      Eosinophils Relative 1 %      Basophils Relative 0 %      Neutrophils Absolute 11.18 Thousands/µL      Immature Grans Absolute 0.05 Thousand/uL      Lymphocytes Absolute 0.43 Thousands/µL      Monocytes Absolute 0.40 Thousand/µL      Eosinophils Absolute 0.06 Thousand/µL      Basophils Absolute 0.02 Thousands/µL     Narrative:      This is an appended report.  These results have been appended to a previously verified report.    Urine Microscopic [[id number]]  (Abnormal) Collected: 02/13/24 1640    Lab Status: Final result Specimen: Urine, Clean Catch Updated: 02/13/24 1702     RBC, UA 2-4 /hpf      WBC, UA 1-2 /hpf      Epithelial Cells Occasional /hpf      Bacteria, UA None Seen /hpf      MUCUS THREADS Occasional     Hyaline Casts, UA 3-5 /lpf     Urine Macroscopic, POC [[id number]]  (Abnormal) Collected: 02/13/24 1640    Lab Status: Final result Specimen: Urine Updated: 02/13/24 1641     Color, UA Yellow     Clarity, UA Clear     pH, UA 6.5     Leukocytes, UA Negative     Nitrite, UA Negative     Protein, UA 30 (1+) mg/dl      Glucose, UA Negative mg/dl      Ketones, UA Trace mg/dl      Urobilinogen, UA 0.2 E.U./dl      Bilirubin, UA Negative     Occult Blood, UA Negative     Specific Gravity, UA >=1.030    Narrative:      CLINITEK RESULT                   CT recon only lumbar spine (No Charge)   Final Result by Hubert Carvajal MD (02/13 1805)      No fracture or traumatic subluxation.      No osteolytic or osteoblastic lesion.      Moderate multilevel degenerative disc disease, most severe at T11-T12 with vacuum disc phenomena and facet joints at L4-L5 and L5-S1.         Workstation performed: [id number]         CT  abdomen pelvis wo contrast   Final Result by Hubert Carvajal MD (02/13 1803)      No acute inflammatory process.      Left breast fluid collection medially measuring 4.7 cm with surgical clips in the area likely a postoperative seroma.      Diffuse skin thickening of the left breast, likely treatment related.      Small hiatal hernia.         Workstation performed: [id number]                    Procedures  ECG 12 Lead Documentation Only    Date/Time: 2/13/2024 5:30 PM    Performed by: Jeromy Ordonez MD  Authorized by: Jeromy Ordonez MD    Indications / Diagnosis:  Epigastric pain  ECG reviewed by me, the ED Provider: yes    Patient location:  ED  Interpretation:     Interpretation: normal    Rate:     ECG rate:  86    ECG rate assessment: normal    Rhythm:     Rhythm: sinus rhythm    Ectopy:     Ectopy: none    QRS:     QRS axis:  Normal  Conduction:     Conduction: normal    ST segments:     ST segments:  Normal  T waves:     T waves: normal             ED Course  ED Course as of 02/13/24 2202 Tue Feb 13, 2024   1756 WBC(!): 12.14   1756 Hemoglobin: 13.5   1756 Platelet Count: 235   1756 Sodium: 137   1756 Potassium: 4.3   1756 BUN: 17   1756 Creatinine: 0.91   1756 GLUCOSE(!): 168   1756 Lipase: 20   1756 hs TnI 0hr: 3  Labs reviewed.   1817 CT abdomen pelvis wo contrast  IMPRESSION:     No acute inflammatory process.     Left breast fluid collection medially measuring 4.7 cm with surgical clips in the area likely a postoperative seroma.     Diffuse skin thickening of the left breast, likely treatment related.     Small hiatal hernia.     1818 CT recon only lumbar spine (No Charge)  IMPRESSION:     No fracture or traumatic subluxation.     No osteolytic or osteoblastic lesion.     Moderate multilevel degenerative disc disease, most severe at T11-T12 with vacuum disc phenomena and facet joints at L4-L5 and L5-S1.     1855 Patient informed about work up results, we will advised to sopt Omeprazole, start  Pantoprazeole, Carafate, follow up with PCP, GI and also Spina dn Pain for back problem.             HEART Risk Score      Flowsheet Row Most Recent Value   Heart Score Risk Calculator    History 0 Filed at: 02/13/2024 1756   ECG 0 Filed at: 02/13/2024 1756   Age 2 Filed at: 02/13/2024 1756   Risk Factors 1 Filed at: 02/13/2024 1756   Troponin 0 Filed at: 02/13/2024 1756   HEART Score 3 Filed at: 02/13/2024 1756                          SBIRT 20yo+      Flowsheet Row Most Recent Value   Initial Alcohol Screen: US AUDIT-C     1. How often do you have a drink containing alcohol? 0 Filed at: 02/13/2024 1629   2. How many drinks containing alcohol do you have on a typical day you are drinking?  0 Filed at: 02/13/2024 1629   3a. Male UNDER 65: How often do you have five or more drinks on one occasion? 0 Filed at: 02/13/2024 1629   3b. FEMALE Any Age, or MALE 65+: How often do you have 4 or more drinks on one occassion? 0 Filed at: 02/13/2024 1629   Audit-C Score 0 Filed at: 02/13/2024 1629   ARA: How many times in the past year have you...    Used an illegal drug or used a prescription medication for non-medical reasons? Never Filed at: 02/13/2024 1629                      Medical Decision Making  Patient is a 68-year-old female, history of hypertension, diabetes, GERD, comes in with plaints of epigastric pain, associated with vomiting, currently no active nausea or vomiting, also reports back pain radiating to right leg, patient does take omeprazole for GERD/gastritis, denies chest pain, dyspnea, bowel or bladder incontinence.  On exam, patient is conscious, alert, vital signs are stable, no acute distress, abdomen is soft, tenderness in epigastrium is noted, no midline lumbar spine tenderness, motor/sensory exam intact in bilateral lower extremities.  Patient diagnosis: Gastritis, GERD, pancreatitis, biliary colic, lumbar back pain with sciatica, will check labs, get CT scan abdomen pelvis with lumbar recon, give IV  fluids, pain meds, pantoprazole, Maalox.  Due to patient's age and risk factors, will get cardiac workup although patient has no typical chest pain, just epigastric pain.    Problems Addressed:  Back pain: acute illness or injury  Epigastric pain: acute illness or injury  Gastritis: acute illness or injury    Amount and/or Complexity of Data Reviewed  Labs: ordered. Decision-making details documented in ED Course.  Radiology: ordered. Decision-making details documented in ED Course.    Risk  OTC drugs.  Prescription drug management.             Disposition  Final diagnoses:   Epigastric pain   Back pain   Gastritis     Time reflects when diagnosis was documented in both MDM as applicable and the Disposition within this note       Time User Action Codes Description Comment    2/13/2024  5:13 PM Alam, Jeromy Add [R10.13] Epigastric pain     2/13/2024  5:13 PM Alam, Jeromy Add [M54.9] Back pain     2/13/2024  6:59 PM Alam, Jeromy Add [K29.70] Gastritis           ED Disposition       ED Disposition   Discharge    Condition   Stable    Date/Time   Tue Feb 13, 2024 1859    Comment   Verna Poole discharge to home/self care.                   Follow-up Information       Follow up With Specialties Details Why Contact Info Additional Information    Siddharth Mae MD Internal Medicine Schedule an appointment as soon as possible for a visit   [facility] [address]  [facility] [address] [phone]       [facility] [facility] Schedule an appointment as soon as possible for a visit   [address]  [facility] [address] [phone] [facility] [place], [facility] [place] [address], [place], [place], [address] [phone]    [facility] [facility] Schedule an appointment as soon as possible for a visit   [address]  [facility] [address] [phone] [facility] [place], [facility] [address], [place], [place], [address] [phone]            Discharge Medication List as of 2/13/2024  7:02 PM        START taking these medications    Details   ondansetron (ZOFRAN-ODT) 4 mg disintegrating tablet Take 1 tablet (4 mg total) by mouth every 8 (eight) hours as needed for vomiting or nausea for up to 5 days, Starting Tue 2/13/2024, Until Sun 2/18/2024 at 2359, Normal      pantoprazole (PROTONIX) 40 mg tablet Take 1 tablet (40 mg total) by mouth daily, Starting Tue 2/13/2024, Normal      sucralfate (CARAFATE) 1 g tablet Take 1 tablet (1 g total) by mouth 3 (three) times a day, Starting Tue 2/13/2024, Normal           CONTINUE these medications which have NOT CHANGED    Details   anastrozole (ARIMIDEX) 1 mg tablet TAKE 1 TABLET (1 MG TOTAL) BY MOUTH DAILY, Starting Mon 1/29/2024, Normal      atorvastatin (LIPITOR) 20 mg tablet TAKE 1 TABLET (20 MG TOTAL) BY MOUTH DAILY, Starting Wed 9/6/2023, Normal      Empagliflozin-metFORMIN HCl (Synjardy) 12.5-1000 MG TABS Take by mouth Take 2 tablets by mouth daily, Historical Med      levothyroxine 137 mcg tablet Take 137 mcg by mouth daily One tablet  M+Tu+ TH+ F  1/2 tablet W+ S+[place], Starting Thu 8/31/2023, Historical Med      losartan (COZAAR) 100 MG tablet TAKE 1 TABLET EVERY DAY, Starting Mon 11/13/2023, Normal      metoprolol succinate (TOPROL-XL) 50 mg 24 hr tablet TAKE 1 TABLET EVERY DAY, Normal      montelukast (SINGULAIR) 10 mg tablet TAKE 1 TABLET EVERY DAY, Starting Mon 11/13/2023, Normal      Accu-Chek Guide test strip TEST DAILY BEFORE ALL MEALS/SNACKS AND ONCE BEFORE BEDTIME., Historical Med      albuterol (2.5 mg/3 mL) 0.083 % nebulizer solution Take 3 mL (2.5 mg total) by nebulization every 6 (six) hours as needed for wheezing or shortness of breath, Starting Fri 9/15/2023, Normal      albuterol (PROVENTIL HFA,VENTOLIN HFA) 90 mcg/act inhaler Inhale 2 puffs every 6 (six) hours as needed for  wheezing, Starting Mon 2/6/2023, Normal      aspirin 81 mg chewable tablet Chew 1 tablet (81 mg total) daily, Starting Mon 2/6/2023, Normal      budesonide-formoterol (SYMBICORT) 160-4.5 mcg/act inhaler Inhale 2 puffs 2 (two) times a day as needed (1) Rinse mouth after use., Starting Thu 1/19/2023, Normal      clonazePAM (KlonoPIN) 0.125 mg disintegrating tablet Take 1 tablet daily as needed for anxiety, Normal      ezetimibe (ZETIA) 10 mg tablet Take 1 tablet (10 mg total) by mouth daily, Starting Mon 2/6/2023, Normal      furosemide (LASIX) 20 mg tablet TAKE 1 TABLET DAILY AS NEEDED FOR EDEMA, Normal      insulin degludec (Tresiba FlexTouch) 100 units/mL injection pen Inject 20 Units under the skin daily, Starting Mon 2/6/2023, Normal      Insulin Pen Needle 31G X 4 MM MISC Use 4 (four) times a day, Starting u 11/3/2022, Normal      naloxone (NARCAN) 4 mg/0.1 mL nasal spray Administer 1 spray into a nostril. If no response after 2-3 minutes, give another dose in the other nostril using a new spray., Normal      NovoLOG FlexPen 100 units/mL injection pen Inject under the skin INJECT 16 UNITS FOR BREAKFAST 15 UNITS FOR LUNCH AND 14 UNITS FOR DINNER, Starting Thu 11/9/2023, Historical Med      OneTouch Delica Lancets 33G MISC Check blood sugars twice daily. Please substitute with appropriate alternative as covered by patient's insurance. Dx: E11.65, Normal      VITAMIN D PO Take by mouth once a week, Historical Med           STOP taking these medications       omeprazole (PriLOSEC) 20 mg delayed release capsule Comments:   Reason for Stopping:               No discharge procedures on file.    PDMP Review         Value Time User    PDMP Reviewed  Yes 7/26/2023  4:08 PM Chester Sesay MD            ED Provider  Electronically Signed by             Jeromy Ordonez MD  02/13/24 5685

## 2024-02-14 ENCOUNTER — VBI (OUTPATIENT)
Dept: INTERNAL MEDICINE CLINIC | Facility: CLINIC | Age: 69
End: 2024-02-14

## 2024-02-14 NOTE — TELEPHONE ENCOUNTER
02/14/24 11:56 AM    Patient contacted post ED visit, [facility] department spoke with patient/caregiver and outreach was successful.    Thank you.  Velia Rudolph [facility]

## 2024-02-15 ENCOUNTER — RA CDI HCC (OUTPATIENT)
Dept: OTHER | Facility: HOSPITAL | Age: 69
End: 2024-02-15

## 2024-02-15 PROBLEM — E11.36 TYPE 2 DIABETES MELLITUS WITH DIABETIC CATARACT (HCC): Status: ACTIVE | Noted: 2024-02-15

## 2024-02-15 PROBLEM — I12.9 BENIGN HYPERTENSIVE KIDNEY DISEASE WITH CHRONIC KIDNEY DISEASE: Status: ACTIVE | Noted: 2024-02-15

## 2024-02-15 PROBLEM — E11.22 TYPE 2 DIABETES MELLITUS WITH DIABETIC CHRONIC KIDNEY DISEASE (HCC): Status: ACTIVE | Noted: 2024-02-15

## 2024-02-15 NOTE — PROGRESS NOTES
HCC coding opportunities          Chart Reviewed number of suggestions sent to Provider: 3  E11.22  E11.36- (11/7/22 eye exam yes for retinopathy & 7/26/23 no for retinopathy)  I12.9     Patients Insurance     Medicare Insurance: [employer]age

## 2024-02-19 ENCOUNTER — OFFICE VISIT (OUTPATIENT)
Dept: INTERNAL MEDICINE CLINIC | Facility: CLINIC | Age: 69
End: 2024-02-19
Payer: COMMERCIAL

## 2024-02-19 VITALS
HEIGHT: 62 IN | HEART RATE: 59 BPM | WEIGHT: 198 LBS | OXYGEN SATURATION: 96 % | BODY MASS INDEX: 36.44 KG/M2 | TEMPERATURE: 97 F | DIASTOLIC BLOOD PRESSURE: 82 MMHG | SYSTOLIC BLOOD PRESSURE: 142 MMHG

## 2024-02-19 DIAGNOSIS — K22.2 LOWER ESOPHAGEAL RING (SCHATZKI): ICD-10-CM

## 2024-02-19 DIAGNOSIS — E66.01 CLASS 2 SEVERE OBESITY DUE TO EXCESS CALORIES WITH SERIOUS COMORBIDITY AND BODY MASS INDEX (BMI) OF 35.0 TO 35.9 IN ADULT: ICD-10-CM

## 2024-02-19 DIAGNOSIS — R10.13 EPIGASTRIC PAIN: Primary | ICD-10-CM

## 2024-02-19 DIAGNOSIS — R14.0 ABDOMINAL BLOATING: ICD-10-CM

## 2024-02-19 DIAGNOSIS — E11.69 TYPE 2 DIABETES MELLITUS WITH OTHER SPECIFIED COMPLICATION, WITH LONG-TERM CURRENT USE OF INSULIN (HCC): ICD-10-CM

## 2024-02-19 DIAGNOSIS — N18.31 STAGE 3A CHRONIC KIDNEY DISEASE (HCC): ICD-10-CM

## 2024-02-19 DIAGNOSIS — E55.9 VITAMIN D DEFICIENCY: ICD-10-CM

## 2024-02-19 DIAGNOSIS — Z12.11 COLON CANCER SCREENING: ICD-10-CM

## 2024-02-19 DIAGNOSIS — M54.41 CHRONIC RIGHT-SIDED LOW BACK PAIN WITH RIGHT-SIDED SCIATICA: ICD-10-CM

## 2024-02-19 DIAGNOSIS — E53.8 B12 DEFICIENCY: ICD-10-CM

## 2024-02-19 DIAGNOSIS — E03.9 ACQUIRED HYPOTHYROIDISM: ICD-10-CM

## 2024-02-19 DIAGNOSIS — M43.16 SPONDYLOLISTHESIS OF LUMBAR REGION: ICD-10-CM

## 2024-02-19 DIAGNOSIS — F33.9 DEPRESSION, RECURRENT (HCC): ICD-10-CM

## 2024-02-19 DIAGNOSIS — Z79.4 TYPE 2 DIABETES MELLITUS WITH OTHER SPECIFIED COMPLICATION, WITH LONG-TERM CURRENT USE OF INSULIN (HCC): ICD-10-CM

## 2024-02-19 DIAGNOSIS — G89.29 CHRONIC RIGHT-SIDED LOW BACK PAIN WITH RIGHT-SIDED SCIATICA: ICD-10-CM

## 2024-02-19 DIAGNOSIS — M62.838 MUSCLE SPASM: ICD-10-CM

## 2024-02-19 LAB — SL AMB POCT HEMOGLOBIN AIC: 7.2 (ref ?–6.5)

## 2024-02-19 PROCEDURE — 83036 HEMOGLOBIN GLYCOSYLATED A1C: CPT | Performed by: STUDENT IN AN ORGANIZED HEALTH CARE EDUCATION/TRAINING PROGRAM

## 2024-02-19 PROCEDURE — 99214 OFFICE O/P EST MOD 30 MIN: CPT | Performed by: STUDENT IN AN ORGANIZED HEALTH CARE EDUCATION/TRAINING PROGRAM

## 2024-02-19 RX ORDER — CYANOCOBALAMIN (VITAMIN B-12) 500 MCG
500 TABLET ORAL DAILY
Qty: 90 TABLET | Refills: 1 | Status: SHIPPED | OUTPATIENT
Start: 2024-02-19

## 2024-02-19 RX ORDER — ERGOCALCIFEROL 1.25 MG/1
50000 CAPSULE ORAL WEEKLY
Qty: 12 CAPSULE | Refills: 0 | Status: SHIPPED | OUTPATIENT
Start: 2024-02-19

## 2024-02-19 NOTE — ASSESSMENT & PLAN NOTE
Following with endocrinology at [facility]  Currently on levothyroxine 137 mcg on Monday, Tuesday, Thursday, Friday. Half a tablet on Wednesday, Saturday and Sunday  -continue

## 2024-02-19 NOTE — ASSESSMENT & PLAN NOTE
Lab Results   Component Value Date    EGFR 65 02/13/2024    EGFR 60 12/05/2023    EGFR 76 12/03/2023    CREATININE 0.91 02/13/2024    CREATININE 1.02 12/05/2023    CREATININE 0.83 12/03/2023     Will focus on control of blood pressure control, cholesterol, diabetes  Avoid nephrotoxic medications

## 2024-02-19 NOTE — ASSESSMENT & PLAN NOTE
"Reports a long history of waxing and waning right-sided lower back pain with possible symptoms of sciatica.  Patient requesting a referral to pain medicine specialist  CT imaging on dec 2023 showed \" mild dorsal kyphosis. A 3 mm anterior subluxation of L4 and L5 is noted. Alignment is otherwise unremarkable. There is evidence of mild and moderate degenerative disc disease in the thoracic and lumbar spine with the T11-12 and L5-S1 levels most affected. There is at least   moderate/moderate to severe facet arthrosis at L4-5 and L5-S1.   Previously referred to physical therapy however reports she is unable to afford the copayment  -Will refer to pain management specialist  "

## 2024-02-19 NOTE — ASSESSMENT & PLAN NOTE
Body mass index is 36.21 kg/m².  Wt Readings from Last 3 Encounters:   02/19/24 89.8 kg (198 lb)   02/13/24 91.5 kg (201 lb 11.5 oz)   12/20/23 86.2 kg (190 lb)      -Lifestyle measures including diet and exercise were discussed

## 2024-02-19 NOTE — PROGRESS NOTES
"INTERNAL MEDICINE OFFICE VISIT NOTE  [facility] [place]    NAME: Verna Poole  AGE: 68 y.o. SEX: female    DATE OF ENCOUNTER: 2/19/2024       Chief Complaint   Patient presents with    Follow-up     3 month follow up          Review of Systems  10 point ROS negative except per HPI    OBJECTIVE:  Vitals:    02/19/24 1347   BP: 142/82   BP Location: Right arm   Patient Position: Sitting   Cuff Size: Standard   Pulse: 59   Temp: (!) 97 °F (36.1 °C)   SpO2: 96%   Weight: 89.8 kg (198 lb)   Height: 5' 2\" (1.575 m)       Physical Exam:   GENERAL: NAD, Normal appearance.    NEUROLOGIC:  Alert/oriented x3.  HEENT:  NC/AT, no scleral icterus  CARDIAC:  RRR, +S1/S2  PULMONARY:  non-labored breathing    ASSESSMENT/PLAN:    1. Epigastric pain  Assessment & Plan:  Status post evaluation in the emergency room secondary to epigastric abdominal pain with associated nausea, and abdominal bloating sensation  She was started on pantoprazole 40 mg daily-which she reports has been taking with minor improvement in her symptoms  She does have a history of lower esophageal ring status post dilation in late 2023  Gastric emptying study in 2021 was unrevealing  -She has been following with a gastroenterologist however would like to get a second opinion and is requesting a referral to gastroenterology.  Ordered.  -Continue pantoprazole 40 mg daily    Orders:  -     Ambulatory Referral to Gastroenterology; Future    2. Lower esophageal ring ([place])  -     Ambulatory Referral to Gastroenterology; Future    3. Abdominal bloating  -     Ambulatory Referral to Gastroenterology; Future    4. Depression, recurrent (HCC)  Assessment & Plan:  Stable currently on      5. Type 2 diabetes mellitus with other specified complication, with long-term current use of insulin (HCC)  Assessment & Plan:    Lab Results   Component Value Date    HGBA1C 7.2 (A) 02/19/2024    HGBA1C 6.9 (H) 12/01/2023    HGBA1C 7.7 (H) 08/21/2023 " "    Controlled  Following with endocrinology at [facility]  Currently on Tresiba  Reports she self discontinue Synjardy due to multiple UTIs and has been holding off lispro due to episodes of hypoglycemia -advised to discuss both of this with her endocrinologist in a few weeks  -Continue Tresiba for now    Orders:  -     POCT hemoglobin A1c    6. Stage 3a chronic kidney disease (HCC)  Assessment & Plan:  Lab Results   Component Value Date    EGFR 65 02/13/2024    EGFR 60 12/05/2023    EGFR 76 12/03/2023    CREATININE 0.91 02/13/2024    CREATININE 1.02 12/05/2023    CREATININE 0.83 12/03/2023     Will focus on control of blood pressure control, cholesterol, diabetes  Avoid nephrotoxic medications        7. Class 2 severe obesity due to excess calories with serious comorbidity and body mass index (BMI) of 35.0 to 35.9 in adult   Assessment & Plan:  Body mass index is 36.21 kg/m².  Wt Readings from Last 3 Encounters:   02/19/24 89.8 kg (198 lb)   02/13/24 91.5 kg (201 lb 11.5 oz)   12/20/23 86.2 kg (190 lb)      -Lifestyle measures including diet and exercise were discussed      8. Spondylolisthesis of lumbar region  -     Ambulatory referral to Spine & Pain Management; Future    9. Chronic right-sided low back pain with right-sided sciatica  Assessment & Plan:  Reports a long history of waxing and waning right-sided lower back pain with possible symptoms of sciatica.  Patient requesting a referral to pain medicine specialist  CT imaging on dec 2023 showed \" mild dorsal kyphosis. A 3 mm anterior subluxation of L4 and L5 is noted. Alignment is otherwise unremarkable. There is evidence of mild and moderate degenerative disc disease in the thoracic and lumbar spine with the T11-12 and L5-S1 levels most affected. There is at least   moderate/moderate to severe facet arthrosis at L4-5 and L5-S1.   Previously referred to physical therapy however reports she is unable to afford the copayment  -Will refer to pain management " specialist    Orders:  -     Ambulatory referral to Spine & Pain Management; Future    10. Muscle spasm  -     Magnesium; Future    11. Acquired hypothyroidism  Assessment & Plan:  Following with endocrinology at [facility]  Currently on levothyroxine 137 mcg on Monday, Tuesday, Thursday, Friday. Half a tablet on Wednesday, Saturday and Sunday  -continue    Orders:  -     TSH, 3rd generation with Free T4 reflex; Future    12. Vitamin D deficiency  -     Vitamin D 25 hydroxy; Future  -     ergocalciferol (VITAMIN D2) 50,000 units; Take 1 capsule (50,000 Units total) by mouth once a week    13. B12 deficiency  -     vitamin B-12 (CYANOCOBALAMIN) 500 MCG TABS; Take 1 tablet (500 mcg total) by mouth daily    14. Colon cancer screening  -     Ambulatory Referral to Gastroenterology; Future        Return in about 12 weeks (around 5/13/2024).      Current Outpatient Medications:     Accu-Chek Guide test strip, TEST DAILY BEFORE ALL MEALS/SNACKS AND ONCE BEFORE BEDTIME., Disp: , Rfl:     albuterol (2.5 mg/3 mL) 0.083 % nebulizer solution, Take 3 mL (2.5 mg total) by nebulization every 6 (six) hours as needed for wheezing or shortness of breath, Disp: 60 mL, Rfl: 3    albuterol (PROVENTIL HFA,VENTOLIN HFA) 90 mcg/act inhaler, Inhale 2 puffs every 6 (six) hours as needed for wheezing, Disp: 18 g, Rfl: 2    anastrozole (ARIMIDEX) 1 mg tablet, TAKE 1 TABLET (1 MG TOTAL) BY MOUTH DAILY, Disp: 90 tablet, Rfl: 3    aspirin 81 mg chewable tablet, Chew 1 tablet (81 mg total) daily, Disp: 90 tablet, Rfl: 1    atorvastatin (LIPITOR) 20 mg tablet, TAKE 1 TABLET (20 MG TOTAL) BY MOUTH DAILY, Disp: 90 tablet, Rfl: 0    budesonide-formoterol (SYMBICORT) 160-4.5 mcg/act inhaler, Inhale 2 puffs 2 (two) times a day as needed (1) Rinse mouth after use., Disp: 10.2 g, Rfl: 3    clonazePAM (KlonoPIN) 0.125 mg disintegrating tablet, Take 1 tablet daily as needed for anxiety, Disp: 90 tablet, Rfl: 0    Empagliflozin-metFORMIN HCl (Synjardy) 12.5-1000  MG TABS, Take by mouth Take 2 tablets by mouth daily, Disp: , Rfl:     ergocalciferol (VITAMIN D2) 50,000 units, Take 1 capsule (50,000 Units total) by mouth once a week, Disp: 12 capsule, Rfl: 0    ezetimibe (ZETIA) 10 mg tablet, Take 1 tablet (10 mg total) by mouth daily, Disp: 90 tablet, Rfl: 1    furosemide (LASIX) 20 mg tablet, TAKE 1 TABLET DAILY AS NEEDED FOR EDEMA, Disp: 90 tablet, Rfl: 3    insulin degludec (Tresiba FlexTouch) 100 units/mL injection pen, Inject 20 Units under the skin daily, Disp: 18 mL, Rfl: 1    Insulin Pen Needle 31G X 4 MM MISC, Use 4 (four) times a day, Disp: 100 each, Rfl: 3    levothyroxine 137 mcg tablet, Take 137 mcg by mouth daily One tablet  M+Tu+ TH+ F 1/2 tablet W+ S+[place], Disp: , Rfl:     losartan (COZAAR) 100 MG tablet, TAKE 1 TABLET EVERY DAY, Disp: 90 tablet, Rfl: 1    metoprolol succinate (TOPROL-XL) 50 mg 24 hr tablet, TAKE 1 TABLET EVERY DAY, Disp: 90 tablet, Rfl: 3    montelukast (SINGULAIR) 10 mg tablet, TAKE 1 TABLET EVERY DAY, Disp: 90 tablet, Rfl: 1    NovoLOG FlexPen 100 units/mL injection pen, Inject under the skin INJECT 16 UNITS FOR BREAKFAST 15 UNITS FOR LUNCH AND 14 UNITS FOR DINNER, Disp: , Rfl:     ondansetron (ZOFRAN-ODT) 4 mg disintegrating tablet, Take 1 tablet (4 mg total) by mouth every 8 (eight) hours as needed for vomiting or nausea for up to 5 days, Disp: 10 tablet, Rfl: 0    OneTouch Delica Lancets 33G MISC, Check blood sugars twice daily. Please substitute with appropriate alternative as covered by patient's insurance. Dx: E11.65, Disp: 200 each, Rfl: 3    pantoprazole (PROTONIX) 40 mg tablet, Take 1 tablet (40 mg total) by mouth daily, Disp: 30 tablet, Rfl: 0    sucralfate (CARAFATE) 1 g tablet, Take 1 tablet (1 g total) by mouth 3 (three) times a day, Disp: 60 tablet, Rfl: 0    vitamin B-12 (CYANOCOBALAMIN) 500 MCG TABS, Take 1 tablet (500 mcg total) by mouth daily, Disp: 90 tablet, Rfl: 1    VITAMIN D PO, Take by mouth once a week, Disp: , Rfl:      naloxone (NARCAN) 4 mg/0.1 mL nasal spray, Administer 1 spray into a nostril. If no response after 2-3 minutes, give another dose in the other nostril using a new spray. (Patient not taking: Reported on 9/1/2023), Disp: 1 each, Rfl: 1    TCM Call       Date and time call was made  12/7/2023  4:51 PM    Patient was hospitialized at  Other (comment)    Comment  [facility]    Date of Admission  12/01/23    Date of discharge  12/06/23    Diagnosis  Patient lost balance at home on her stairs, leaned down and slid 14 stairs. Pain down the right arm, hip and leg. - LOC, - Blood thinners. HX of vertigo, CA    Disposition  Home    Were the patients medications reviewed and updated  Yes    Current Symptoms  None          TCM Call       Post hospital issues  None    Should patient be enrolled in anticoag monitoring?  No    Scheduled for follow up?  Yes    Did you obtain your prescribed medications  Yes    Do you need help managing your prescriptions or medications  No    Is transportation to your appointment needed  No    I have advised the patient to call PCP with any new or worsening symptoms  Shayla Will (MA)               Siddharth Mae MD  [facility] [place]    * Please Note: This note was completed in part utilizing a dictation software.  Grammatical errors, random word insertions, spelling mistakes, and incomplete sentences may be an occasional consequence of this system secondary to software limitations, ambient noise, and hardware issues.  If you have any questions or concerns about the content, text, or information contained within the body of this dictation, please contact the provider for clarification.**

## 2024-02-19 NOTE — ASSESSMENT & PLAN NOTE
Status post evaluation in the emergency room secondary to epigastric abdominal pain with associated nausea, and abdominal bloating sensation  She was started on pantoprazole 40 mg daily-which she reports has been taking with minor improvement in her symptoms  She does have a history of lower esophageal ring status post dilation in late 2023  Gastric emptying study in 2021 was unrevealing  -She has been following with a gastroenterologist however would like to get a second opinion and is requesting a referral to gastroenterology.  Ordered.  -Continue pantoprazole 40 mg daily

## 2024-02-19 NOTE — ASSESSMENT & PLAN NOTE
Lab Results   Component Value Date    HGBA1C 7.2 (A) 02/19/2024    HGBA1C 6.9 (H) 12/01/2023    HGBA1C 7.7 (H) 08/21/2023     Controlled  Following with endocrinology at [facility]  Currently on Tresiba  Reports she self discontinue Synjardy due to multiple UTIs and has been holding off lispro due to episodes of hypoglycemia -advised to discuss both of this with her endocrinologist in a few weeks  -Continue Tresiba for now

## 2024-03-21 ENCOUNTER — TELEPHONE (OUTPATIENT)
Age: 69
End: 2024-03-21

## 2024-03-28 DIAGNOSIS — K29.70 GASTRITIS: ICD-10-CM

## 2024-03-28 DIAGNOSIS — J45.20 MILD INTERMITTENT ASTHMA WITHOUT COMPLICATION: ICD-10-CM

## 2024-03-28 DIAGNOSIS — I10 ESSENTIAL HYPERTENSION: ICD-10-CM

## 2024-03-28 DIAGNOSIS — E78.00 PURE HYPERCHOLESTEROLEMIA: ICD-10-CM

## 2024-03-28 RX ORDER — LOSARTAN POTASSIUM 100 MG/1
100 TABLET ORAL DAILY
Qty: 90 TABLET | Refills: 1 | Status: SHIPPED | OUTPATIENT
Start: 2024-03-28

## 2024-03-28 RX ORDER — MONTELUKAST SODIUM 10 MG/1
10 TABLET ORAL DAILY
Qty: 90 TABLET | Refills: 1 | Status: SHIPPED | OUTPATIENT
Start: 2024-03-28

## 2024-03-28 RX ORDER — EZETIMIBE 10 MG/1
10 TABLET ORAL DAILY
Qty: 90 TABLET | Refills: 1 | Status: SHIPPED | OUTPATIENT
Start: 2024-03-28

## 2024-03-28 RX ORDER — PANTOPRAZOLE SODIUM 40 MG/1
40 TABLET, DELAYED RELEASE ORAL DAILY
Qty: 30 TABLET | Refills: 1 | Status: SHIPPED | OUTPATIENT
Start: 2024-03-28

## 2024-03-28 NOTE — TELEPHONE ENCOUNTER
Reason for call:   [x] Refill   [] Prior Auth  [] Other:     Office:   [x] PCP/Provider -   [] Specialty/Provider -     Medication: Zetia    Dose/Frequency: 10 mg     Quantity: #90    Pharmacy: [facility]    Does the patient have enough for 3 days?   [] Yes   [x] No - Send as HP to POD                  Reason for call:   [x] Refill   [] Prior Auth  [] Other:     Office:   [x] PCP/Provider -   [] Specialty/Provider -     Medication: Cozaar    Dose/Frequency: 100 mg     Quantity: #90    Pharmacy: [facility]    Does the patient have enough for 3 days?   [] Yes   [x] No - Send as HP to POD                        Reason for call:   [x] Refill   [] Prior Auth  [] Other:     Office:   [x] PCP/Provider -   [] Specialty/Provider -     Medication: Singulair    Dose/Frequency: 10 mg     Quantity: #90    Pharmacy: [facility]    Does the patient have enough for 3 days?   [] Yes   [x] No - Send as HP to POD                Reason for call:   [x] Refill   [] Prior Auth  [x] Other: Prescribed in ED    Office:   [x] PCP/Provider -   [] Specialty/Provider -     Medication: Protonix    Dose/Frequency: 40 mg     Quantity: #90    Pharmacy: [facility]    Does the patient have enough for 3 days?   [] Yes   [x] No - Send as HP to POD

## 2024-05-16 ENCOUNTER — TELEPHONE (OUTPATIENT)
Dept: INTERNAL MEDICINE CLINIC | Facility: CLINIC | Age: 69
End: 2024-05-16

## 2024-05-16 NOTE — TELEPHONE ENCOUNTER
Please remove Dr. Mae from PCP      Patient stated that she moved to [place] and will follow their with a new PCP

## 2024-05-23 NOTE — TELEPHONE ENCOUNTER
05/23/24 1:56 PM        The office's request has been received, reviewed, and the patient chart updated. The PCP has successfully been removed with a patient attribution note. This message will now be completed.        Thank you  Mat Landeros

## 2024-05-29 ENCOUNTER — TELEPHONE (OUTPATIENT)
Dept: SURGICAL ONCOLOGY | Facility: CLINIC | Age: 69
End: 2024-05-29

## 2024-05-29 NOTE — TELEPHONE ENCOUNTER
Left  forfrancoise as she missed her follow up 5/29 appointment to see Dina Fontenot at 1:00 PM. Left [place] Line [phone] to rs the appt.

## 2024-07-19 ENCOUNTER — TELEPHONE (OUTPATIENT)
Dept: SURGICAL ONCOLOGY | Facility: CLINIC | Age: 69
End: 2024-07-19

## 2024-07-19 NOTE — TELEPHONE ENCOUNTER
Called patient and , ID # [id number]. There was no answer twice and answering machine did not go through, unable to contact patient at this time to schedule an appointment with Dina CARRINGTON or Dr. Sesay.

## 2024-07-24 ENCOUNTER — TELEPHONE (OUTPATIENT)
Dept: SURGICAL ONCOLOGY | Facility: CLINIC | Age: 69
End: 2024-07-24

## 2024-07-24 NOTE — TELEPHONE ENCOUNTER
Called patient to schedule an appointment with Dina CARRINGTON or if preferred Dr. Sesay for OVL 30 minute appt, had  on the phone and patient stated she moved to [place] since April. Patient is being seen for care in [place], patient gave information of seeing a Surgical Oncologist named Royal Hunt and did not specify anymore information. During the call patient was currently being admitted in the ER or falling.  gave patient information of phone number to contact our office for any records at [phone].  ID [id number]

## 2025-05-11 NOTE — TELEPHONE ENCOUNTER
Patient called stated she ate a sandwich 2 days ago that gave her severe stomach pains and had 1 episode of vomiting after  She stated the pain travels to her right side  She gets nausea when pain comes on  I spoke with Dr Bonnie Gonzalez and he advised for her to go to the ER to be evaluated   Patient understood
11-May-2025 23:40

## 2025-06-01 NOTE — PROGRESS NOTES
Assessment/Plan:  Lexapro 10 mg daily clonazepam p r n  For anxiety levothyroxine 150 mcg per day continue TSH has normalized  1  Palpitations probably secondary to PACs of possibly anxiety  We workup her palpitations before with the Holter monitor will in the last year was PACs no malignant arrhythmia  She had a 2D echo which showed normal ventricular function and she saw the cardiologist   She is on metoprolol to succinate 25 mg daily she actually increase said herself to 25 twice a day because it helps the palpitations I am going to do the following    Increase the metoprolol succinate to 50 mg daily she takes it on a daily basis    2  Anxiety her  is a cardiac pay shin and she is stressed concerning that  That can induce the palpitations she is on Klonopin which I reorder  I 1 time she was on Lexapro but she was afraid to take 2 medications not to be habit-forming but the insurance company doctor told her Lexapro is a good medication I totally agree with that I am going to start her on Lexapro 10 mg daily  She can takes the clonazepam as needed  3  Mi asthma is in remission  4  Blood pressure is fairly well control  No chest pain or shortness of breath  Hyperlipidemia continue on the statins  6  History of Graves disease and hyperthyroidism she had an I 131 treatment and now she is hypothyroid she finally got her TSH normalized  She is taking the levothyroxine on an empty stomach with water not with coffee will going to check the TSH when she comes back if is the TSH is suppressed will cut down the dose she is now taking levothyroxine 150 mcg per day  No problem-specific Assessment & Plan notes found for this encounter         Diagnoses and all orders for this visit:    Acquired hypothyroidism    Mild intermittent asthma with acute exacerbation    Essential hypertension    Lower esophageal ring (Schatzki)    Pure hypercholesterolemia    Obesity (BMI 35 0-39 9 without Take doxycycline as prescribed.  Tessalon as needed as directed for cough.    For nasal/sinus congestion you can try steam, warm compresses, saline nasal spray, Neti pot, nasal steroid (Flonase, Nasocort), or nasal decongestant (Afrin - for 3 days only).    For cough you can take an over-the-counter expectorant such as plain Robitussion or Mucinex. A spoonful of honey at bedtime may also be helpful.    For cold symptoms with high blood pressure take Coricidin cough/cold.     For sore throat you can use Cepacol lozenges, do warm salt water gargles, drink warm water with lemon or herbal teas, or use an over-the-counter throat spray (Chloraseptic).    You can take ibuprofen/Motrin and acetaminophen/Tylenol as needed for pain, fever, body aches. Do not take ibuprofen/Motrin/Advil if you have a history of heart disease, bleeding ulcers, or if you take blood thinners.     Drink plenty of fluids to stay hydrated. Airborne or Emergen-C for extra vitamin C and zinc.    Follow up with your PCP in 3-5 days for persistent symptoms.    Go to the ER if symptoms worsen.    comorbidity)          Subjective:      Patient ID: Cheri Barragan is a 77 y o  female  No chief complaint on file          Current Outpatient Medications:     albuterol (2 5 mg/3 mL) 0 083 % nebulizer solution, Take 1 vial (2 5 mg total) by nebulization every 6 (six) hours as needed for wheezing or shortness of breath, Disp: 60 vial, Rfl: 5    albuterol (PROVENTIL HFA,VENTOLIN HFA) 90 mcg/act inhaler, Inhale 2 puffs every 6 (six) hours as needed for wheezing, Disp: , Rfl:     Alum Hydroxide-Mag Carbonate (GAVISCON PO), Take 1 Dose by mouth as needed, Disp: , Rfl:     aspirin 81 mg chewable tablet, Chew 81 mg daily, Disp: , Rfl:     BD Pen Needle Berenice 2nd Gen 32G X 4 MM MISC, USE TWICE A DAY, Disp: 100 each, Rfl: 1    Blood Glucose Monitoring Suppl (ONE TOUCH ULTRA 2) w/Device KIT, Test blood sugar 3 times daily, Disp: 1 each, Rfl: 0    budesonide-formoterol (SYMBICORT) 160-4 5 mcg/act inhaler, Inhale 2 puffs 2 (two) times a day as needed Rinse mouth after use  , Disp: , Rfl:     clonazePAM (KlonoPIN) 0 125 mg disintegrating tablet, Take 0 125 mg by mouth as needed for anxiety, Disp: , Rfl:     docusate sodium (COLACE) 100 mg capsule, Take 1 capsule (100 mg total) by mouth 2 (two) times a day, Disp: 60 capsule, Rfl: 0    ezetimibe (ZETIA) 10 mg tablet, Take 1 tablet (10 mg total) by mouth daily, Disp: 90 tablet, Rfl: 1    fluticasone (FLONASE) 50 mcg/act nasal spray, 1 spray into each nostril daily as needed , Disp: , Rfl:     furosemide (LASIX) 20 mg tablet, Take 20 mg by mouth 2 (two) times a day as needed, Disp: , Rfl:     glimepiride (AMARYL) 4 mg tablet, Take 1 tablet (4 mg total) by mouth daily with breakfast, Disp: 90 tablet, Rfl: 1    glucose blood test strip, Prior to each meal, Disp: 300 each, Rfl: 1    HYDROcodone-acetaminophen (NORCO) 5-325 mg per tablet, Take 1 tablet by mouth every 4 (four) hours as needed for pain for up to 10 dosesMax Daily Amount: 6 tablets, Disp: 10 tablet, Rfl: 0    hydrocortisone 2 5 % cream, Apply topically 2 (two) times a day, Disp: 30 g, Rfl: 0    insulin degludec Joe Leather FlexTouch) 100 units/mL injection pen, Inject 25 Units under the skin daily Pt reports currently only taking 10 UNITS IN THE EVENING,depending on BS, Disp: 15 mL, Rfl: 5    Lancets (ONETOUCH DELICA PLUS [id number]) MISC, TEST 3 TIMES, Disp: 100 each, Rfl: 1    levothyroxine 150 mcg tablet, Take 1 tablet (150 mcg total) by mouth daily, Disp: 90 tablet, Rfl: 1    losartan (COZAAR) 100 MG tablet, Take 1 tablet (100 mg total) by mouth daily, Disp: 90 tablet, Rfl: 1    metFORMIN (GLUCOPHAGE) 1000 MG tablet, Take 1 tablet (1,000 mg total) by mouth 2 (two) times a day with meals, Disp: 180 tablet, Rfl: 1    metoprolol succinate (TOPROL-XL) 25 mg 24 hr tablet, Take 1 tablet (25 mg total) by mouth daily, Disp: 90 tablet, Rfl: 1    montelukast (SINGULAIR) 10 mg tablet, Take 1 tablet (10 mg total) by mouth daily, Disp: 90 tablet, Rfl: 1    omeprazole (PriLOSEC) 40 MG capsule, Take 1 capsule (40 mg total) by mouth daily, Disp: 90 capsule, Rfl: 0    spironolactone (ALDACTONE) 25 mg tablet, Take 1 tablet (25 mg total) by mouth daily, Disp: 90 tablet, Rfl: 1    VITAMIN D PO, Take by mouth, Disp: , Rfl:     HPI    The following portions of the patient's history were reviewed and updated as appropriate: allergies, current medications, past family history, past medical history, past social history, past surgical history and problem list     Review of Systems   Constitutional: Negative  Negative for activity change, appetite change, fatigue, fever and unexpected weight change  HENT: Negative for congestion, ear pain, hearing loss, mouth sores, postnasal drip, rhinorrhea, sore throat, trouble swallowing and voice change  Eyes: Negative for pain, redness and visual disturbance  Respiratory: Negative for cough, chest tightness, shortness of breath and wheezing      Cardiovascular: Negative for chest pain, palpitations and leg swelling  Gastrointestinal: Negative for abdominal distention, abdominal pain, blood in stool, constipation, diarrhea and nausea  Endocrine: Negative for cold intolerance, heat intolerance, polydipsia, polyphagia and polyuria  Genitourinary: Negative for difficulty urinating, dysuria, flank pain, frequency, hematuria and urgency  Musculoskeletal: Negative for arthralgias, back pain, gait problem, joint swelling and myalgias  Skin: Negative for color change and pallor  Neurological: Negative for dizziness, tremors, seizures, syncope, weakness, numbness and headaches  Hematological: Negative for adenopathy  Does not bruise/bleed easily  Psychiatric/Behavioral: Negative  Negative for sleep disturbance  The patient is not nervous/anxious  Objective: There were no vitals taken for this visit  Physical Exam  Vitals and nursing note reviewed  Constitutional:       Appearance: She is well-developed  HENT:      Head: Normocephalic  Right Ear: External ear normal       Left Ear: External ear normal       Nose: Nose normal       Mouth/Throat:      Pharynx: No oropharyngeal exudate  Eyes:      Conjunctiva/sclera: Conjunctivae normal       Pupils: Pupils are equal, round, and reactive to light  Neck:      Thyroid: No thyromegaly  Cardiovascular:      Rate and Rhythm: Normal rate and regular rhythm  Heart sounds: Normal heart sounds  No murmur heard  No friction rub  No gallop  Comments: S1-S2 regular rhythm  Extremities no edema  Pulmonary:      Effort: Pulmonary effort is normal  No respiratory distress  Breath sounds: Normal breath sounds  No wheezing or rales  Comments: Lungs are clear no wheezing rales or rhonchi  Abdominal:      General: Bowel sounds are normal  There is no distension  Palpations: Abdomen is soft  There is no mass  Tenderness: There is no abdominal tenderness  There is no guarding or rebound        Comments: Obese soft nontender   Musculoskeletal:         General: Normal range of motion  Cervical back: Normal range of motion and neck supple  Lymphadenopathy:      Cervical: No cervical adenopathy  Skin:     General: Skin is warm and dry  Neurological:      Mental Status: She is alert and oriented to person, place, and time     Psychiatric:         Behavior: Behavior normal          Judgment: Judgment normal

## (undated) DEVICE — SUT MONOCRYL 3-0 PS-2 18 IN Y497G

## (undated) DEVICE — LIGACLIP MCA MULTIPLE CLIP APPLIERS, 20 SMALL CLIPS: Brand: LIGACLIP

## (undated) DEVICE — TUBING SUCTION 5MM X 12 FT

## (undated) DEVICE — BLADELESS OBTURATOR: Brand: WECK VISTA

## (undated) DEVICE — MARGIN MARKER SET

## (undated) DEVICE — INTENDED FOR TISSUE SEPARATION, AND OTHER PROCEDURES THAT REQUIRE A SHARP SURGICAL BLADE TO PUNCTURE OR CUT.: Brand: BARD-PARKER SAFETY BLADES SIZE 15, STERILE

## (undated) DEVICE — TISSUE RETRIEVAL SYSTEM: Brand: INZII RETRIEVAL SYSTEM

## (undated) DEVICE — TROCAR: Brand: KII FIOS FIRST ENTRY

## (undated) DEVICE — TUBING SMOKE EVAC W/FILTRATION DEVICE PLUMEPORT ACTIV

## (undated) DEVICE — PMI DISPOSABLE PUNCTURE CLOSURE DEVICE / SUTURE GRASPER: Brand: PMI

## (undated) DEVICE — PENCIL ELECTROSURG E-Z CLEAN -0035H

## (undated) DEVICE — PLUMEPEN PRO 10FT

## (undated) DEVICE — PERMANENT CAUTERY HOOK: Brand: ENDOWRIST

## (undated) DEVICE — PROVE COVER: Brand: UNBRANDED

## (undated) DEVICE — ASTOUND STANDARD SURGICAL GOWN, XL: Brand: CONVERTORS

## (undated) DEVICE — BETHLEHEM UNIVERSAL MINOR GEN: Brand: CARDINAL HEALTH

## (undated) DEVICE — COLUMN DRAPE

## (undated) DEVICE — SUT MONOCRYL 4-0 PS-2 27 IN Y426H

## (undated) DEVICE — GLOVE INDICATOR PI UNDERGLOVE SZ 6.5 BLUE

## (undated) DEVICE — GLOVE PI ULTRA TOUCH SZ.6.5

## (undated) DEVICE — SUT PDS II 1 CT-1 27 IN Z347H

## (undated) DEVICE — MEDIUM-LARGE CLIP APPLIER: Brand: ENDOWRIST

## (undated) DEVICE — DECANTER: Brand: UNBRANDED

## (undated) DEVICE — SMOKE EVACUATION TUBING WITH 8 IN INTEGRAL WAND AND SPONGE GUARD: Brand: BUFFALO FILTER

## (undated) DEVICE — ARM DRAPE

## (undated) DEVICE — GLOVE PI ULTRA TOUCH SZ.7.0

## (undated) DEVICE — ADHESIVE SKIN HIGH VISCOSITY EXOFIN 1ML

## (undated) DEVICE — TELFA NON-ADHERENT ABSORBENT DRESSING: Brand: TELFA

## (undated) DEVICE — 3M™ STERI-STRIP™ COMPOUND BENZOIN TINCTURE 40 BAGS/CARTON 4 CARTONS/CASE C1544: Brand: 3M™ STERI-STRIP™

## (undated) DEVICE — CANNULA SEAL

## (undated) DEVICE — NEEDLE HYPO 22G X 1-1/2 IN

## (undated) DEVICE — 3M™ STERI-STRIP™ REINFORCED ADHESIVE SKIN CLOSURES, R1547, 1/2 IN X 4 IN (12 MM X 100 MM), 6 STRIPS/ENVELOPE: Brand: 3M™ STERI-STRIP™

## (undated) DEVICE — IRRIG ENDO FLO TUBING

## (undated) DEVICE — MAYO STAND COVER: Brand: CONVERTORS

## (undated) DEVICE — GLOVE INDICATOR PI UNDERGLOVE SZ 7 BLUE

## (undated) DEVICE — HANDPIECE, SINGLE-USE, SAVI SCOUT®: Brand: SAVI SCOUT®

## (undated) DEVICE — ALLENTOWN LAP CHOLE APP PACK: Brand: CARDINAL HEALTH

## (undated) DEVICE — 3000CC GUARDIAN II: Brand: GUARDIAN

## (undated) DEVICE — CHLORAPREP HI-LITE 26ML ORANGE

## (undated) DEVICE — GAUZE SPONGES,USP TYPE VII GAUZE, 12 PLY: Brand: CURITY

## (undated) DEVICE — SUT PDS II 4-0 PS-2 18 IN Z496G

## (undated) DEVICE — SURGICEL 2 X 14

## (undated) DEVICE — [HIGH FLOW INSUFFLATOR,  DO NOT USE IF PACKAGE IS DAMAGED,  KEEP DRY,  KEEP AWAY FROM SUNLIGHT,  PROTECT FROM HEAT AND RADIOACTIVE SOURCES.]: Brand: PNEUMOSURE

## (undated) DEVICE — PROGRASP FORCEPS: Brand: ENDOWRIST

## (undated) DEVICE — NEEDLE 25G X 1 1/2

## (undated) DEVICE — MEDI-VAC YANK SUCT HNDL W/TPRD BULBOUS TIP: Brand: CARDINAL HEALTH

## (undated) DEVICE — GLOVE SRG BIOGEL 7

## (undated) DEVICE — HEM-O-LOK CLIP CARTRIDGE MED/LARGE DA VINCI SI/XI